# Patient Record
Sex: MALE | Race: WHITE | NOT HISPANIC OR LATINO | Employment: UNEMPLOYED | URBAN - METROPOLITAN AREA
[De-identification: names, ages, dates, MRNs, and addresses within clinical notes are randomized per-mention and may not be internally consistent; named-entity substitution may affect disease eponyms.]

---

## 2017-03-13 ENCOUNTER — GENERIC CONVERSION - ENCOUNTER (OUTPATIENT)
Dept: OTHER | Facility: OTHER | Age: 47
End: 2017-03-13

## 2017-03-13 ENCOUNTER — HOSPITAL ENCOUNTER (INPATIENT)
Facility: HOSPITAL | Age: 47
LOS: 3 days | Discharge: HOME | DRG: 395 | End: 2017-03-16
Attending: EMERGENCY MEDICINE | Admitting: ANESTHESIOLOGY
Payer: COMMERCIAL

## 2017-03-13 ENCOUNTER — APPOINTMENT (EMERGENCY)
Dept: RADIOLOGY | Facility: HOSPITAL | Age: 47
DRG: 395 | End: 2017-03-13
Payer: COMMERCIAL

## 2017-03-13 DIAGNOSIS — D64.9 ANEMIA: Primary | ICD-10-CM

## 2017-03-13 DIAGNOSIS — D62 ACUTE BLOOD LOSS ANEMIA: ICD-10-CM

## 2017-03-13 DIAGNOSIS — D73.4 CYST OF SPLEEN: ICD-10-CM

## 2017-03-13 DIAGNOSIS — K92.1 GASTROINTESTINAL HEMORRHAGE WITH MELENA: Chronic | ICD-10-CM

## 2017-03-13 DIAGNOSIS — K92.2 GI BLEED: ICD-10-CM

## 2017-03-13 PROBLEM — R10.9 ABDOMINAL PAIN: Status: ACTIVE | Noted: 2017-03-13

## 2017-03-13 LAB
ABO GROUP BLD: NORMAL
ABO GROUP BLD: NORMAL
ALBUMIN SERPL BCP-MCNC: 2.8 G/DL (ref 3.5–5)
ALP SERPL-CCNC: 77 U/L (ref 46–116)
ALT SERPL W P-5'-P-CCNC: 13 U/L (ref 12–78)
ANION GAP SERPL CALCULATED.3IONS-SCNC: 12 MMOL/L (ref 4–13)
AST SERPL W P-5'-P-CCNC: 17 U/L (ref 5–45)
BASOPHILS # BLD AUTO: 0 THOUSANDS/ΜL (ref 0–0.1)
BASOPHILS NFR BLD AUTO: 0 % (ref 0–1)
BILIRUB SERPL-MCNC: 0.1 MG/DL (ref 0.2–1)
BLD GP AB SCN SERPL QL: NEGATIVE
BUN SERPL-MCNC: 17 MG/DL (ref 5–25)
CALCIUM SERPL-MCNC: 9.1 MG/DL (ref 8.3–10.1)
CHLORIDE SERPL-SCNC: 98 MMOL/L (ref 100–108)
CO2 SERPL-SCNC: 26 MMOL/L (ref 21–32)
CREAT SERPL-MCNC: 1.01 MG/DL (ref 0.6–1.3)
EOSINOPHIL # BLD AUTO: 0 THOUSAND/ΜL (ref 0–0.61)
EOSINOPHIL NFR BLD AUTO: 1 % (ref 0–6)
ERYTHROCYTE [DISTWIDTH] IN BLOOD BY AUTOMATED COUNT: 19.3 % (ref 11.6–15.1)
GFR SERPL CREATININE-BSD FRML MDRD: >60 ML/MIN/1.73SQ M
GLUCOSE SERPL-MCNC: 307 MG/DL (ref 65–140)
HCT VFR BLD AUTO: 17.4 % (ref 42–52)
HGB BLD-MCNC: 5.1 G/DL (ref 14–18)
LACTATE SERPL-SCNC: 2.1 MMOL/L (ref 0.5–2)
LIPASE SERPL-CCNC: 210 U/L (ref 73–393)
LYMPHOCYTES # BLD AUTO: 0.6 THOUSANDS/ΜL (ref 0.6–4.47)
LYMPHOCYTES NFR BLD AUTO: 10 % (ref 14–44)
MAGNESIUM SERPL-MCNC: 1.6 MG/DL (ref 1.6–2.6)
MCH RBC QN AUTO: 21 PG (ref 27–31)
MCHC RBC AUTO-ENTMCNC: 29.2 G/DL (ref 31.4–37.4)
MCV RBC AUTO: 72 FL (ref 82–98)
MONOCYTES # BLD AUTO: 0.5 THOUSAND/ΜL (ref 0.17–1.22)
MONOCYTES NFR BLD AUTO: 8 % (ref 4–12)
NEUTROPHILS # BLD AUTO: 5 THOUSANDS/ΜL (ref 1.85–7.62)
NEUTS SEG NFR BLD AUTO: 81 % (ref 43–75)
NRBC BLD AUTO-RTO: 0 /100 WBCS
PLATELET # BLD AUTO: 360 THOUSANDS/UL (ref 130–400)
PMV BLD AUTO: 7.5 FL (ref 8.9–12.7)
POTASSIUM SERPL-SCNC: 3.7 MMOL/L (ref 3.5–5.3)
PROT SERPL-MCNC: 7.7 G/DL (ref 6.4–8.2)
RBC # BLD AUTO: 2.42 MILLION/UL (ref 4.7–6.1)
RH BLD: NEGATIVE
RH BLD: NEGATIVE
SODIUM SERPL-SCNC: 136 MMOL/L (ref 136–145)
TROPONIN I SERPL-MCNC: <0.02 NG/ML
WBC # BLD AUTO: 6.2 THOUSAND/UL (ref 4.8–10.8)

## 2017-03-13 PROCEDURE — 85025 COMPLETE CBC W/AUTO DIFF WBC: CPT | Performed by: EMERGENCY MEDICINE

## 2017-03-13 PROCEDURE — C9113 INJ PANTOPRAZOLE SODIUM, VIA: HCPCS | Performed by: FAMILY MEDICINE

## 2017-03-13 PROCEDURE — 36415 COLL VENOUS BLD VENIPUNCTURE: CPT | Performed by: EMERGENCY MEDICINE

## 2017-03-13 PROCEDURE — 80053 COMPREHEN METABOLIC PANEL: CPT | Performed by: EMERGENCY MEDICINE

## 2017-03-13 PROCEDURE — 74177 CT ABD & PELVIS W/CONTRAST: CPT

## 2017-03-13 PROCEDURE — 83690 ASSAY OF LIPASE: CPT | Performed by: EMERGENCY MEDICINE

## 2017-03-13 PROCEDURE — 36430 TRANSFUSION BLD/BLD COMPNT: CPT

## 2017-03-13 PROCEDURE — 86901 BLOOD TYPING SEROLOGIC RH(D): CPT | Performed by: EMERGENCY MEDICINE

## 2017-03-13 PROCEDURE — 86850 RBC ANTIBODY SCREEN: CPT | Performed by: EMERGENCY MEDICINE

## 2017-03-13 PROCEDURE — 86900 BLOOD TYPING SEROLOGIC ABO: CPT | Performed by: EMERGENCY MEDICINE

## 2017-03-13 PROCEDURE — 93005 ELECTROCARDIOGRAM TRACING: CPT | Performed by: EMERGENCY MEDICINE

## 2017-03-13 PROCEDURE — 99285 EMERGENCY DEPT VISIT HI MDM: CPT

## 2017-03-13 PROCEDURE — 86920 COMPATIBILITY TEST SPIN: CPT

## 2017-03-13 PROCEDURE — 30233N1 TRANSFUSION OF NONAUTOLOGOUS RED BLOOD CELLS INTO PERIPHERAL VEIN, PERCUTANEOUS APPROACH: ICD-10-PCS | Performed by: INTERNAL MEDICINE

## 2017-03-13 PROCEDURE — 87081 CULTURE SCREEN ONLY: CPT | Performed by: ANESTHESIOLOGY

## 2017-03-13 PROCEDURE — 84484 ASSAY OF TROPONIN QUANT: CPT | Performed by: EMERGENCY MEDICINE

## 2017-03-13 PROCEDURE — 96361 HYDRATE IV INFUSION ADD-ON: CPT

## 2017-03-13 PROCEDURE — 83735 ASSAY OF MAGNESIUM: CPT | Performed by: EMERGENCY MEDICINE

## 2017-03-13 PROCEDURE — 83605 ASSAY OF LACTIC ACID: CPT | Performed by: EMERGENCY MEDICINE

## 2017-03-13 PROCEDURE — P9021 RED BLOOD CELLS UNIT: HCPCS

## 2017-03-13 PROCEDURE — 96360 HYDRATION IV INFUSION INIT: CPT

## 2017-03-13 RX ORDER — PANTOPRAZOLE SODIUM 40 MG/1
40 INJECTION, POWDER, FOR SOLUTION INTRAVENOUS EVERY 12 HOURS SCHEDULED
Status: DISCONTINUED | OUTPATIENT
Start: 2017-03-13 | End: 2017-03-16 | Stop reason: HOSPADM

## 2017-03-13 RX ORDER — DOXYCYCLINE HYCLATE 100 MG/1
100 TABLET, DELAYED RELEASE ORAL 2 TIMES DAILY
COMMUNITY
End: 2017-03-13 | Stop reason: CLARIF

## 2017-03-13 RX ORDER — OMEPRAZOLE 40 MG/1
40 CAPSULE, DELAYED RELEASE ORAL DAILY
Status: ON HOLD | COMMUNITY
End: 2017-03-16

## 2017-03-13 RX ADMIN — PANTOPRAZOLE SODIUM 40 MG: 40 INJECTION, POWDER, FOR SOLUTION INTRAVENOUS at 23:56

## 2017-03-13 RX ADMIN — SODIUM CHLORIDE 1000 ML: 0.9 INJECTION, SOLUTION INTRAVENOUS at 19:54

## 2017-03-13 RX ADMIN — IOHEXOL 100 ML: 350 INJECTION, SOLUTION INTRAVENOUS at 21:03

## 2017-03-14 PROBLEM — F32.A DEPRESSION: Chronic | Status: ACTIVE | Noted: 2017-03-14

## 2017-03-14 PROBLEM — D62 ACUTE BLOOD LOSS ANEMIA: Status: ACTIVE | Noted: 2017-03-13

## 2017-03-14 LAB
ABO GROUP BLD BPU: NORMAL
ABO GROUP BLD BPU: NORMAL
ANION GAP SERPL CALCULATED.3IONS-SCNC: 9 MMOL/L (ref 4–13)
BASOPHILS # BLD AUTO: 0 THOUSANDS/ΜL (ref 0–0.1)
BASOPHILS NFR BLD AUTO: 0 % (ref 0–1)
BILIRUB UR QL STRIP: NEGATIVE
BPU ID: NORMAL
BPU ID: NORMAL
BUN SERPL-MCNC: 9 MG/DL (ref 5–25)
CALCIUM SERPL-MCNC: 8.1 MG/DL (ref 8.3–10.1)
CHLORIDE SERPL-SCNC: 102 MMOL/L (ref 100–108)
CLARITY UR: CLEAR
CO2 SERPL-SCNC: 24 MMOL/L (ref 21–32)
COLOR UR: YELLOW
CREAT SERPL-MCNC: 0.7 MG/DL (ref 0.6–1.3)
CROSSMATCH: NORMAL
CROSSMATCH: NORMAL
EOSINOPHIL # BLD AUTO: 0.1 THOUSAND/ΜL (ref 0–0.61)
EOSINOPHIL NFR BLD AUTO: 1 % (ref 0–6)
ERYTHROCYTE [DISTWIDTH] IN BLOOD BY AUTOMATED COUNT: 18.8 % (ref 11.6–15.1)
ERYTHROCYTE [DISTWIDTH] IN BLOOD BY AUTOMATED COUNT: 19.9 % (ref 11.6–15.1)
EST. AVERAGE GLUCOSE BLD GHB EST-MCNC: 120 MG/DL
GFR SERPL CREATININE-BSD FRML MDRD: >60 ML/MIN/1.73SQ M
GLUCOSE SERPL-MCNC: 140 MG/DL (ref 65–140)
GLUCOSE UR STRIP-MCNC: NEGATIVE MG/DL
HBA1C MFR BLD: 5.8 % (ref 4.2–6.3)
HCT VFR BLD AUTO: 22.4 % (ref 42–52)
HCT VFR BLD AUTO: 23 % (ref 42–52)
HCT VFR BLD AUTO: 26.3 % (ref 42–52)
HCT VFR BLD AUTO: 26.5 % (ref 42–52)
HGB BLD-MCNC: 7 G/DL (ref 14–18)
HGB BLD-MCNC: 7.1 G/DL (ref 14–18)
HGB BLD-MCNC: 8.2 G/DL (ref 14–18)
HGB BLD-MCNC: 8.4 G/DL (ref 14–18)
HGB UR QL STRIP.AUTO: NEGATIVE
KETONES UR STRIP-MCNC: NEGATIVE MG/DL
LACTATE SERPL-SCNC: 0.7 MMOL/L (ref 0.5–2)
LEUKOCYTE ESTERASE UR QL STRIP: NEGATIVE
LYMPHOCYTES # BLD AUTO: 0.6 THOUSANDS/ΜL (ref 0.6–4.47)
LYMPHOCYTES NFR BLD AUTO: 9 % (ref 14–44)
MAGNESIUM SERPL-MCNC: 1.5 MG/DL (ref 1.6–2.6)
MCH RBC QN AUTO: 24 PG (ref 27–31)
MCH RBC QN AUTO: 24.3 PG (ref 27–31)
MCHC RBC AUTO-ENTMCNC: 31.2 G/DL (ref 31.4–37.4)
MCHC RBC AUTO-ENTMCNC: 31.8 G/DL (ref 31.4–37.4)
MCV RBC AUTO: 76 FL (ref 82–98)
MCV RBC AUTO: 77 FL (ref 82–98)
MONOCYTES # BLD AUTO: 0.6 THOUSAND/ΜL (ref 0.17–1.22)
MONOCYTES NFR BLD AUTO: 8 % (ref 4–12)
NEUTROPHILS # BLD AUTO: 5.8 THOUSANDS/ΜL (ref 1.85–7.62)
NEUTS SEG NFR BLD AUTO: 82 % (ref 43–75)
NITRITE UR QL STRIP: NEGATIVE
NRBC BLD AUTO-RTO: 0 /100 WBCS
PH UR STRIP.AUTO: 5.5 [PH] (ref 5–9)
PHOSPHATE SERPL-MCNC: 2.9 MG/DL (ref 2.7–4.5)
PLATELET # BLD AUTO: 299 THOUSANDS/UL (ref 130–400)
PLATELET # BLD AUTO: 308 THOUSANDS/UL (ref 130–400)
PMV BLD AUTO: 6.9 FL (ref 8.9–12.7)
PMV BLD AUTO: 7.3 FL (ref 8.9–12.7)
POTASSIUM SERPL-SCNC: 3.8 MMOL/L (ref 3.5–5.3)
PROT UR STRIP-MCNC: NEGATIVE MG/DL
RBC # BLD AUTO: 2.92 MILLION/UL (ref 4.7–6.1)
RBC # BLD AUTO: 3.46 MILLION/UL (ref 4.7–6.1)
SODIUM SERPL-SCNC: 135 MMOL/L (ref 136–145)
SP GR UR STRIP.AUTO: 1.01 (ref 1–1.03)
UNIT DISPENSE STATUS: NORMAL
UNIT DISPENSE STATUS: NORMAL
UNIT PRODUCT CODE: NORMAL
UNIT PRODUCT CODE: NORMAL
UNIT RH: NORMAL
UNIT RH: NORMAL
UROBILINOGEN UR QL STRIP.AUTO: 0.2 E.U./DL
WBC # BLD AUTO: 5.6 THOUSAND/UL (ref 4.8–10.8)
WBC # BLD AUTO: 7.1 THOUSAND/UL (ref 4.8–10.8)

## 2017-03-14 PROCEDURE — 84100 ASSAY OF PHOSPHORUS: CPT | Performed by: FAMILY MEDICINE

## 2017-03-14 PROCEDURE — 80048 BASIC METABOLIC PNL TOTAL CA: CPT | Performed by: FAMILY MEDICINE

## 2017-03-14 PROCEDURE — 85018 HEMOGLOBIN: CPT | Performed by: FAMILY MEDICINE

## 2017-03-14 PROCEDURE — P9021 RED BLOOD CELLS UNIT: HCPCS

## 2017-03-14 PROCEDURE — 83735 ASSAY OF MAGNESIUM: CPT | Performed by: FAMILY MEDICINE

## 2017-03-14 PROCEDURE — 85014 HEMATOCRIT: CPT | Performed by: FAMILY MEDICINE

## 2017-03-14 PROCEDURE — 83605 ASSAY OF LACTIC ACID: CPT | Performed by: FAMILY MEDICINE

## 2017-03-14 PROCEDURE — C9113 INJ PANTOPRAZOLE SODIUM, VIA: HCPCS | Performed by: FAMILY MEDICINE

## 2017-03-14 PROCEDURE — 85027 COMPLETE CBC AUTOMATED: CPT | Performed by: NURSE PRACTITIONER

## 2017-03-14 PROCEDURE — 85025 COMPLETE CBC W/AUTO DIFF WBC: CPT | Performed by: FAMILY MEDICINE

## 2017-03-14 PROCEDURE — 83036 HEMOGLOBIN GLYCOSYLATED A1C: CPT | Performed by: FAMILY MEDICINE

## 2017-03-14 PROCEDURE — 81003 URINALYSIS AUTO W/O SCOPE: CPT | Performed by: EMERGENCY MEDICINE

## 2017-03-14 RX ORDER — POLYETHYLENE GLYCOL 3350 17 G/17G
238 POWDER, FOR SOLUTION ORAL ONCE
Status: COMPLETED | OUTPATIENT
Start: 2017-03-14 | End: 2017-03-14

## 2017-03-14 RX ORDER — MAGNESIUM SULFATE HEPTAHYDRATE 40 MG/ML
4 INJECTION, SOLUTION INTRAVENOUS ONCE
Status: COMPLETED | OUTPATIENT
Start: 2017-03-14 | End: 2017-03-14

## 2017-03-14 RX ORDER — ACETAMINOPHEN 325 MG/1
650 TABLET ORAL EVERY 6 HOURS PRN
Status: DISCONTINUED | OUTPATIENT
Start: 2017-03-14 | End: 2017-03-16 | Stop reason: HOSPADM

## 2017-03-14 RX ORDER — SODIUM CHLORIDE 9 MG/ML
50 INJECTION, SOLUTION INTRAVENOUS CONTINUOUS
Status: DISCONTINUED | OUTPATIENT
Start: 2017-03-14 | End: 2017-03-15

## 2017-03-14 RX ORDER — POTASSIUM CHLORIDE 14.9 MG/ML
20 INJECTION INTRAVENOUS ONCE
Status: COMPLETED | OUTPATIENT
Start: 2017-03-14 | End: 2017-03-14

## 2017-03-14 RX ADMIN — PANTOPRAZOLE SODIUM 40 MG: 40 INJECTION, POWDER, FOR SOLUTION INTRAVENOUS at 08:40

## 2017-03-14 RX ADMIN — BISACODYL 10 MG: 5 TABLET, COATED ORAL at 12:19

## 2017-03-14 RX ADMIN — SODIUM CHLORIDE 50 ML/HR: 0.9 INJECTION, SOLUTION INTRAVENOUS at 06:10

## 2017-03-14 RX ADMIN — POTASSIUM CHLORIDE 20 MEQ: 200 INJECTION, SOLUTION INTRAVENOUS at 07:28

## 2017-03-14 RX ADMIN — MAGNESIUM SULFATE HEPTAHYDRATE 4 G: 40 INJECTION, SOLUTION INTRAVENOUS at 07:28

## 2017-03-14 RX ADMIN — ACETAMINOPHEN 650 MG: 325 TABLET, FILM COATED ORAL at 18:14

## 2017-03-14 RX ADMIN — POLYETHYLENE GLYCOL 3350 238 G: 17 POWDER, FOR SOLUTION ORAL at 13:10

## 2017-03-14 RX ADMIN — PANTOPRAZOLE SODIUM 40 MG: 40 INJECTION, POWDER, FOR SOLUTION INTRAVENOUS at 20:30

## 2017-03-15 ENCOUNTER — ANESTHESIA (INPATIENT)
Dept: GASTROENTEROLOGY | Facility: AMBULARY SURGERY CENTER | Age: 47
DRG: 395 | End: 2017-03-15
Payer: COMMERCIAL

## 2017-03-15 ENCOUNTER — GENERIC CONVERSION - ENCOUNTER (OUTPATIENT)
Dept: OTHER | Facility: OTHER | Age: 47
End: 2017-03-15

## 2017-03-15 LAB
ABO GROUP BLD BPU: NORMAL
ANION GAP SERPL CALCULATED.3IONS-SCNC: 9 MMOL/L (ref 4–13)
BPU ID: NORMAL
BUN SERPL-MCNC: 5 MG/DL (ref 5–25)
CALCIUM SERPL-MCNC: 8.1 MG/DL (ref 8.3–10.1)
CHLORIDE SERPL-SCNC: 104 MMOL/L (ref 100–108)
CO2 SERPL-SCNC: 26 MMOL/L (ref 21–32)
CREAT SERPL-MCNC: 0.67 MG/DL (ref 0.6–1.3)
CROSSMATCH: NORMAL
ERYTHROCYTE [DISTWIDTH] IN BLOOD BY AUTOMATED COUNT: 18.5 % (ref 11.6–15.1)
GFR SERPL CREATININE-BSD FRML MDRD: >60 ML/MIN/1.73SQ M
GLUCOSE SERPL-MCNC: 113 MG/DL (ref 65–140)
HCT VFR BLD AUTO: 26.1 % (ref 42–52)
HGB BLD-MCNC: 8.2 G/DL (ref 14–18)
MAGNESIUM SERPL-MCNC: 2.4 MG/DL (ref 1.6–2.6)
MCH RBC QN AUTO: 24.5 PG (ref 27–31)
MCHC RBC AUTO-ENTMCNC: 31.3 G/DL (ref 31.4–37.4)
MCV RBC AUTO: 78 FL (ref 82–98)
MRSA NOSE QL CULT: NORMAL
PLATELET # BLD AUTO: 278 THOUSANDS/UL (ref 130–400)
PMV BLD AUTO: 7.1 FL (ref 8.9–12.7)
POTASSIUM SERPL-SCNC: 3.9 MMOL/L (ref 3.5–5.3)
RBC # BLD AUTO: 3.33 MILLION/UL (ref 4.7–6.1)
SODIUM SERPL-SCNC: 139 MMOL/L (ref 136–145)
UNIT DISPENSE STATUS: NORMAL
UNIT PRODUCT CODE: NORMAL
UNIT RH: NORMAL
WBC # BLD AUTO: 5.2 THOUSAND/UL (ref 4.8–10.8)

## 2017-03-15 PROCEDURE — 0DB98ZX EXCISION OF DUODENUM, VIA NATURAL OR ARTIFICIAL OPENING ENDOSCOPIC, DIAGNOSTIC: ICD-10-PCS | Performed by: INTERNAL MEDICINE

## 2017-03-15 PROCEDURE — 88342 IMHCHEM/IMCYTCHM 1ST ANTB: CPT | Performed by: INTERNAL MEDICINE

## 2017-03-15 PROCEDURE — 0DBK8ZX EXCISION OF ASCENDING COLON, VIA NATURAL OR ARTIFICIAL OPENING ENDOSCOPIC, DIAGNOSTIC: ICD-10-PCS | Performed by: INTERNAL MEDICINE

## 2017-03-15 PROCEDURE — C9113 INJ PANTOPRAZOLE SODIUM, VIA: HCPCS | Performed by: FAMILY MEDICINE

## 2017-03-15 PROCEDURE — 88305 TISSUE EXAM BY PATHOLOGIST: CPT | Performed by: INTERNAL MEDICINE

## 2017-03-15 PROCEDURE — 0DB68ZX EXCISION OF STOMACH, VIA NATURAL OR ARTIFICIAL OPENING ENDOSCOPIC, DIAGNOSTIC: ICD-10-PCS | Performed by: INTERNAL MEDICINE

## 2017-03-15 PROCEDURE — 80048 BASIC METABOLIC PNL TOTAL CA: CPT | Performed by: NURSE PRACTITIONER

## 2017-03-15 PROCEDURE — 85027 COMPLETE CBC AUTOMATED: CPT | Performed by: NURSE PRACTITIONER

## 2017-03-15 PROCEDURE — 83735 ASSAY OF MAGNESIUM: CPT | Performed by: NURSE PRACTITIONER

## 2017-03-15 RX ORDER — DIPHENHYDRAMINE HCL 12.5MG/5ML
12.5 LIQUID (ML) ORAL ONCE
Status: DISCONTINUED | OUTPATIENT
Start: 2017-03-15 | End: 2017-03-15

## 2017-03-15 RX ORDER — LANOLIN ALCOHOL/MO/W.PET/CERES
3 CREAM (GRAM) TOPICAL
Status: DISCONTINUED | OUTPATIENT
Start: 2017-03-15 | End: 2017-03-16 | Stop reason: HOSPADM

## 2017-03-15 RX ORDER — PROPOFOL 10 MG/ML
INJECTION, EMULSION INTRAVENOUS AS NEEDED
Status: DISCONTINUED | OUTPATIENT
Start: 2017-03-15 | End: 2017-03-15 | Stop reason: SURG

## 2017-03-15 RX ORDER — FENTANYL CITRATE 50 UG/ML
INJECTION, SOLUTION INTRAMUSCULAR; INTRAVENOUS AS NEEDED
Status: DISCONTINUED | OUTPATIENT
Start: 2017-03-15 | End: 2017-03-15 | Stop reason: SURG

## 2017-03-15 RX ORDER — DIPHENHYDRAMINE HCL 25 MG
12.5 TABLET ORAL ONCE
Status: COMPLETED | OUTPATIENT
Start: 2017-03-15 | End: 2017-03-15

## 2017-03-15 RX ADMIN — PROPOFOL 20 MG: 10 INJECTION, EMULSION INTRAVENOUS at 13:40

## 2017-03-15 RX ADMIN — SODIUM CHLORIDE 50 ML/HR: 0.9 INJECTION, SOLUTION INTRAVENOUS at 01:28

## 2017-03-15 RX ADMIN — PROPOFOL 50 MG: 10 INJECTION, EMULSION INTRAVENOUS at 13:25

## 2017-03-15 RX ADMIN — SODIUM CHLORIDE: 0.9 INJECTION, SOLUTION INTRAVENOUS at 13:00

## 2017-03-15 RX ADMIN — PANTOPRAZOLE SODIUM 40 MG: 40 INJECTION, POWDER, FOR SOLUTION INTRAVENOUS at 21:10

## 2017-03-15 RX ADMIN — FENTANYL CITRATE 50 MCG: 50 INJECTION, SOLUTION INTRAMUSCULAR; INTRAVENOUS at 13:15

## 2017-03-15 RX ADMIN — DIPHENHYDRAMINE HCL 12.5 MG: 25 TABLET ORAL at 01:25

## 2017-03-15 RX ADMIN — FENTANYL CITRATE 50 MCG: 50 INJECTION, SOLUTION INTRAMUSCULAR; INTRAVENOUS at 13:22

## 2017-03-15 RX ADMIN — ACETAMINOPHEN 650 MG: 325 TABLET, FILM COATED ORAL at 19:43

## 2017-03-15 RX ADMIN — LIDOCAINE HYDROCHLORIDE 100 MG: 20 INJECTION, SOLUTION INTRAVENOUS at 13:15

## 2017-03-15 RX ADMIN — MELATONIN TAB 3 MG 3 MG: 3 TAB at 22:07

## 2017-03-15 RX ADMIN — PROPOFOL 50 MG: 10 INJECTION, EMULSION INTRAVENOUS at 13:30

## 2017-03-15 RX ADMIN — PANTOPRAZOLE SODIUM 40 MG: 40 INJECTION, POWDER, FOR SOLUTION INTRAVENOUS at 08:31

## 2017-03-15 RX ADMIN — PROPOFOL 200 MG: 10 INJECTION, EMULSION INTRAVENOUS at 13:15

## 2017-03-15 RX ADMIN — PROPOFOL 50 MG: 10 INJECTION, EMULSION INTRAVENOUS at 13:35

## 2017-03-15 RX ADMIN — PROPOFOL 50 MG: 10 INJECTION, EMULSION INTRAVENOUS at 13:20

## 2017-03-16 VITALS
HEIGHT: 69 IN | HEART RATE: 100 BPM | OXYGEN SATURATION: 100 % | BODY MASS INDEX: 22.53 KG/M2 | DIASTOLIC BLOOD PRESSURE: 75 MMHG | WEIGHT: 152.12 LBS | SYSTOLIC BLOOD PRESSURE: 140 MMHG | RESPIRATION RATE: 21 BRPM | TEMPERATURE: 98.2 F

## 2017-03-16 LAB
ATRIAL RATE: 101 BPM
ERYTHROCYTE [DISTWIDTH] IN BLOOD BY AUTOMATED COUNT: 19.3 % (ref 11.6–15.1)
HCT VFR BLD AUTO: 28.5 % (ref 42–52)
HGB BLD-MCNC: 8.7 G/DL (ref 14–18)
MCH RBC QN AUTO: 24.1 PG (ref 27–31)
MCHC RBC AUTO-ENTMCNC: 30.6 G/DL (ref 31.4–37.4)
MCV RBC AUTO: 79 FL (ref 82–98)
P AXIS: 73 DEGREES
PLATELET # BLD AUTO: 322 THOUSANDS/UL (ref 130–400)
PMV BLD AUTO: 6.9 FL (ref 8.9–12.7)
PR INTERVAL: 146 MS
QRS AXIS: 51 DEGREES
QRSD INTERVAL: 80 MS
QT INTERVAL: 340 MS
QTC INTERVAL: 440 MS
RBC # BLD AUTO: 3.61 MILLION/UL (ref 4.7–6.1)
T WAVE AXIS: 42 DEGREES
VENTRICULAR RATE: 101 BPM
WBC # BLD AUTO: 6.1 THOUSAND/UL (ref 4.8–10.8)

## 2017-03-16 PROCEDURE — 85027 COMPLETE CBC AUTOMATED: CPT | Performed by: PHYSICIAN ASSISTANT

## 2017-03-16 PROCEDURE — C9113 INJ PANTOPRAZOLE SODIUM, VIA: HCPCS | Performed by: FAMILY MEDICINE

## 2017-03-16 RX ORDER — OMEPRAZOLE 40 MG/1
40 CAPSULE, DELAYED RELEASE ORAL
Qty: 60 CAPSULE | Refills: 0 | Status: SHIPPED | OUTPATIENT
Start: 2017-03-16 | End: 2017-09-13 | Stop reason: HOSPADM

## 2017-03-16 RX ORDER — PANTOPRAZOLE SODIUM 40 MG/1
40 TABLET, DELAYED RELEASE ORAL
Qty: 60 TABLET | Refills: 1 | Status: SHIPPED | OUTPATIENT
Start: 2017-03-16 | End: 2017-09-12 | Stop reason: ALTCHOICE

## 2017-03-16 RX ADMIN — ACETAMINOPHEN 650 MG: 325 TABLET, FILM COATED ORAL at 05:19

## 2017-03-16 RX ADMIN — PANTOPRAZOLE SODIUM 40 MG: 40 INJECTION, POWDER, FOR SOLUTION INTRAVENOUS at 08:49

## 2017-03-19 ENCOUNTER — GENERIC CONVERSION - ENCOUNTER (OUTPATIENT)
Dept: OTHER | Facility: OTHER | Age: 47
End: 2017-03-19

## 2017-03-27 ENCOUNTER — ALLSCRIPTS OFFICE VISIT (OUTPATIENT)
Dept: OTHER | Facility: OTHER | Age: 47
End: 2017-03-27

## 2017-03-27 ENCOUNTER — GENERIC CONVERSION - ENCOUNTER (OUTPATIENT)
Dept: OTHER | Facility: OTHER | Age: 47
End: 2017-03-27

## 2017-03-29 ENCOUNTER — ALLSCRIPTS OFFICE VISIT (OUTPATIENT)
Dept: OTHER | Facility: OTHER | Age: 47
End: 2017-03-29

## 2017-05-08 ENCOUNTER — ANESTHESIA EVENT (OUTPATIENT)
Dept: GASTROENTEROLOGY | Facility: AMBULARY SURGERY CENTER | Age: 47
End: 2017-05-08

## 2017-05-08 ENCOUNTER — ANESTHESIA (OUTPATIENT)
Dept: GASTROENTEROLOGY | Facility: AMBULARY SURGERY CENTER | Age: 47
End: 2017-05-08

## 2017-05-23 ENCOUNTER — ANESTHESIA EVENT (OUTPATIENT)
Dept: GASTROENTEROLOGY | Facility: AMBULARY SURGERY CENTER | Age: 47
End: 2017-05-23
Payer: COMMERCIAL

## 2017-05-24 ENCOUNTER — GENERIC CONVERSION - ENCOUNTER (OUTPATIENT)
Dept: OTHER | Facility: OTHER | Age: 47
End: 2017-05-24

## 2017-05-24 ENCOUNTER — HOSPITAL ENCOUNTER (OUTPATIENT)
Facility: AMBULARY SURGERY CENTER | Age: 47
Setting detail: OUTPATIENT SURGERY
Discharge: HOME | End: 2017-05-24
Attending: INTERNAL MEDICINE | Admitting: INTERNAL MEDICINE
Payer: COMMERCIAL

## 2017-05-24 ENCOUNTER — ANESTHESIA (OUTPATIENT)
Dept: GASTROENTEROLOGY | Facility: AMBULARY SURGERY CENTER | Age: 47
End: 2017-05-24
Payer: COMMERCIAL

## 2017-05-24 VITALS
RESPIRATION RATE: 18 BRPM | DIASTOLIC BLOOD PRESSURE: 87 MMHG | OXYGEN SATURATION: 98 % | TEMPERATURE: 97.6 F | SYSTOLIC BLOOD PRESSURE: 126 MMHG | HEART RATE: 80 BPM

## 2017-05-24 RX ORDER — SODIUM CHLORIDE, SODIUM LACTATE, POTASSIUM CHLORIDE, CALCIUM CHLORIDE 600; 310; 30; 20 MG/100ML; MG/100ML; MG/100ML; MG/100ML
125 INJECTION, SOLUTION INTRAVENOUS CONTINUOUS
Status: DISCONTINUED | OUTPATIENT
Start: 2017-05-24 | End: 2017-05-24 | Stop reason: SDUPTHER

## 2017-05-24 RX ORDER — PROPOFOL 10 MG/ML
INJECTION, EMULSION INTRAVENOUS AS NEEDED
Status: DISCONTINUED | OUTPATIENT
Start: 2017-05-24 | End: 2017-05-24 | Stop reason: SURG

## 2017-05-24 RX ORDER — OMEPRAZOLE 10 MG/1
10 CAPSULE, DELAYED RELEASE ORAL DAILY
COMMUNITY
End: 2017-09-12 | Stop reason: ALTCHOICE

## 2017-05-24 RX ORDER — SODIUM CHLORIDE, SODIUM LACTATE, POTASSIUM CHLORIDE, CALCIUM CHLORIDE 600; 310; 30; 20 MG/100ML; MG/100ML; MG/100ML; MG/100ML
125 INJECTION, SOLUTION INTRAVENOUS CONTINUOUS
Status: DISCONTINUED | OUTPATIENT
Start: 2017-05-24 | End: 2017-05-24 | Stop reason: HOSPADM

## 2017-05-24 RX ADMIN — SODIUM CHLORIDE, SODIUM LACTATE, POTASSIUM CHLORIDE, AND CALCIUM CHLORIDE: .6; .31; .03; .02 INJECTION, SOLUTION INTRAVENOUS at 10:10

## 2017-05-24 RX ADMIN — LIDOCAINE HYDROCHLORIDE 50 MG: 20 INJECTION, SOLUTION INTRAVENOUS at 10:50

## 2017-05-24 RX ADMIN — PROPOFOL 100 MG: 10 INJECTION, EMULSION INTRAVENOUS at 10:55

## 2017-05-24 RX ADMIN — PROPOFOL 100 MG: 10 INJECTION, EMULSION INTRAVENOUS at 10:59

## 2017-05-24 RX ADMIN — PROPOFOL 200 MG: 10 INJECTION, EMULSION INTRAVENOUS at 10:50

## 2017-05-24 RX ADMIN — PROPOFOL 50 MG: 10 INJECTION, EMULSION INTRAVENOUS at 11:07

## 2017-05-24 RX ADMIN — PROPOFOL 50 MG: 10 INJECTION, EMULSION INTRAVENOUS at 11:04

## 2017-09-12 ENCOUNTER — ANESTHESIA EVENT (OUTPATIENT)
Dept: GASTROENTEROLOGY | Facility: AMBULARY SURGERY CENTER | Age: 47
End: 2017-09-12

## 2017-09-12 ENCOUNTER — HOSPITAL ENCOUNTER (INPATIENT)
Facility: HOSPITAL | Age: 47
LOS: 1 days | Discharge: LEFT BEFORE DISCHARGE | DRG: 395 | End: 2017-09-13
Attending: EMERGENCY MEDICINE | Admitting: ANESTHESIOLOGY
Payer: COMMERCIAL

## 2017-09-12 ENCOUNTER — APPOINTMENT (INPATIENT)
Dept: RADIOLOGY | Facility: HOSPITAL | Age: 47
DRG: 395 | End: 2017-09-12
Payer: COMMERCIAL

## 2017-09-12 DIAGNOSIS — D64.9 SYMPTOMATIC ANEMIA: Primary | ICD-10-CM

## 2017-09-12 DIAGNOSIS — K92.1 GASTROINTESTINAL HEMORRHAGE WITH MELENA: Chronic | ICD-10-CM

## 2017-09-12 PROBLEM — D62 ACUTE BLOOD LOSS ANEMIA: Status: ACTIVE | Noted: 2017-09-12

## 2017-09-12 LAB
ABO GROUP BLD: NORMAL
ALBUMIN SERPL BCP-MCNC: 2.5 G/DL (ref 3.5–5)
ALP SERPL-CCNC: 59 U/L (ref 46–116)
ALT SERPL W P-5'-P-CCNC: 13 U/L (ref 12–78)
ANION GAP SERPL CALCULATED.3IONS-SCNC: 8 MMOL/L (ref 4–13)
AST SERPL W P-5'-P-CCNC: 10 U/L (ref 5–45)
BASOPHILS # BLD AUTO: 0 THOUSANDS/ΜL (ref 0–0.1)
BASOPHILS NFR BLD AUTO: 0 % (ref 0–1)
BILIRUB SERPL-MCNC: 0.2 MG/DL (ref 0.2–1)
BLD GP AB SCN SERPL QL: NEGATIVE
BUN SERPL-MCNC: 14 MG/DL (ref 5–25)
CALCIUM SERPL-MCNC: 8.5 MG/DL (ref 8.3–10.1)
CHLORIDE SERPL-SCNC: 98 MMOL/L (ref 100–108)
CO2 SERPL-SCNC: 30 MMOL/L (ref 21–32)
CREAT SERPL-MCNC: 1.03 MG/DL (ref 0.6–1.3)
EOSINOPHIL # BLD AUTO: 0.1 THOUSAND/ΜL (ref 0–0.61)
EOSINOPHIL NFR BLD AUTO: 1 % (ref 0–6)
ERYTHROCYTE [DISTWIDTH] IN BLOOD BY AUTOMATED COUNT: 17.1 % (ref 11.6–15.1)
GFR SERPL CREATININE-BSD FRML MDRD: 85 ML/MIN/1.73SQ M
GLUCOSE SERPL-MCNC: 279 MG/DL (ref 65–140)
HCT VFR BLD AUTO: 12.1 % (ref 42–52)
HGB BLD-MCNC: 3.8 G/DL (ref 14–18)
HGB BLD-MCNC: 5.6 G/DL (ref 14–18)
HYPERCHROMIA BLD QL SMEAR: PRESENT
INR PPP: 1.08 (ref 0.86–1.16)
LYMPHOCYTES # BLD AUTO: 1.1 THOUSANDS/ΜL (ref 0.6–4.47)
LYMPHOCYTES NFR BLD AUTO: 10 % (ref 14–44)
MAGNESIUM SERPL-MCNC: 1.9 MG/DL (ref 1.6–2.6)
MCH RBC QN AUTO: 24.2 PG (ref 27–31)
MCHC RBC AUTO-ENTMCNC: 30.9 G/DL (ref 31.4–37.4)
MCV RBC AUTO: 79 FL (ref 82–98)
MICROCYTES BLD QL AUTO: PRESENT
MONOCYTES # BLD AUTO: 0.7 THOUSAND/ΜL (ref 0.17–1.22)
MONOCYTES NFR BLD AUTO: 6 % (ref 4–12)
NEUTROPHILS # BLD AUTO: 10 THOUSANDS/ΜL (ref 1.85–7.62)
NEUTS SEG NFR BLD AUTO: 84 % (ref 43–75)
NRBC BLD AUTO-RTO: 0 /100 WBCS
PHOSPHATE SERPL-MCNC: 2.9 MG/DL (ref 2.7–4.5)
PLATELET # BLD AUTO: 673 THOUSANDS/UL (ref 130–400)
PLATELET BLD QL SMEAR: ABNORMAL
PMV BLD AUTO: 6.9 FL (ref 8.9–12.7)
POTASSIUM SERPL-SCNC: 3.6 MMOL/L (ref 3.5–5.3)
PROT SERPL-MCNC: 6.7 G/DL (ref 6.4–8.2)
PROTHROMBIN TIME: 11.4 SECONDS (ref 9.4–11.7)
RBC # BLD AUTO: 1.55 MILLION/UL (ref 4.7–6.1)
RH BLD: NEGATIVE
SODIUM SERPL-SCNC: 136 MMOL/L (ref 136–145)
SPECIMEN EXPIRATION DATE: NORMAL
WBC # BLD AUTO: 11.9 THOUSAND/UL (ref 4.8–10.8)

## 2017-09-12 PROCEDURE — 87081 CULTURE SCREEN ONLY: CPT | Performed by: INTERNAL MEDICINE

## 2017-09-12 PROCEDURE — 80053 COMPREHEN METABOLIC PANEL: CPT

## 2017-09-12 PROCEDURE — 30233N1 TRANSFUSION OF NONAUTOLOGOUS RED BLOOD CELLS INTO PERIPHERAL VEIN, PERCUTANEOUS APPROACH: ICD-10-PCS | Performed by: ANESTHESIOLOGY

## 2017-09-12 PROCEDURE — C9113 INJ PANTOPRAZOLE SODIUM, VIA: HCPCS | Performed by: PHYSICIAN ASSISTANT

## 2017-09-12 PROCEDURE — 99285 EMERGENCY DEPT VISIT HI MDM: CPT

## 2017-09-12 PROCEDURE — 85018 HEMOGLOBIN: CPT | Performed by: PHYSICIAN ASSISTANT

## 2017-09-12 PROCEDURE — 86920 COMPATIBILITY TEST SPIN: CPT

## 2017-09-12 PROCEDURE — P9021 RED BLOOD CELLS UNIT: HCPCS

## 2017-09-12 PROCEDURE — 85610 PROTHROMBIN TIME: CPT | Performed by: PHYSICIAN ASSISTANT

## 2017-09-12 PROCEDURE — 86900 BLOOD TYPING SEROLOGIC ABO: CPT | Performed by: EMERGENCY MEDICINE

## 2017-09-12 PROCEDURE — 71260 CT THORAX DX C+: CPT

## 2017-09-12 PROCEDURE — 93005 ELECTROCARDIOGRAM TRACING: CPT | Performed by: PHYSICIAN ASSISTANT

## 2017-09-12 PROCEDURE — 36415 COLL VENOUS BLD VENIPUNCTURE: CPT

## 2017-09-12 PROCEDURE — 36430 TRANSFUSION BLD/BLD COMPNT: CPT

## 2017-09-12 PROCEDURE — 86850 RBC ANTIBODY SCREEN: CPT | Performed by: EMERGENCY MEDICINE

## 2017-09-12 PROCEDURE — 86901 BLOOD TYPING SEROLOGIC RH(D): CPT | Performed by: EMERGENCY MEDICINE

## 2017-09-12 PROCEDURE — 85025 COMPLETE CBC W/AUTO DIFF WBC: CPT

## 2017-09-12 PROCEDURE — P9016 RBC LEUKOCYTES REDUCED: HCPCS

## 2017-09-12 PROCEDURE — 83735 ASSAY OF MAGNESIUM: CPT | Performed by: ANESTHESIOLOGY

## 2017-09-12 PROCEDURE — 84100 ASSAY OF PHOSPHORUS: CPT | Performed by: ANESTHESIOLOGY

## 2017-09-12 RX ORDER — ACETAMINOPHEN 325 MG/1
1000 TABLET ORAL ONCE
Status: DISCONTINUED | OUTPATIENT
Start: 2017-09-12 | End: 2017-09-12

## 2017-09-12 RX ORDER — PANTOPRAZOLE SODIUM 40 MG/1
40 INJECTION, POWDER, FOR SOLUTION INTRAVENOUS ONCE
Status: COMPLETED | OUTPATIENT
Start: 2017-09-12 | End: 2017-09-12

## 2017-09-12 RX ORDER — LANOLIN ALCOHOL/MO/W.PET/CERES
6 CREAM (GRAM) TOPICAL
Status: DISCONTINUED | OUTPATIENT
Start: 2017-09-12 | End: 2017-09-13 | Stop reason: HOSPADM

## 2017-09-12 RX ORDER — ACETAMINOPHEN 325 MG/1
975 TABLET ORAL ONCE
Status: COMPLETED | OUTPATIENT
Start: 2017-09-12 | End: 2017-09-12

## 2017-09-12 RX ADMIN — PANTOPRAZOLE SODIUM 40 MG: 40 INJECTION, POWDER, FOR SOLUTION INTRAVENOUS at 13:50

## 2017-09-12 RX ADMIN — SODIUM CHLORIDE 8 MG/HR: 9 INJECTION, SOLUTION INTRAVENOUS at 13:51

## 2017-09-12 RX ADMIN — SODIUM CHLORIDE 8 MG/HR: 9 INJECTION, SOLUTION INTRAVENOUS at 13:16

## 2017-09-12 RX ADMIN — SODIUM CHLORIDE 8 MG/HR: 9 INJECTION, SOLUTION INTRAVENOUS at 22:09

## 2017-09-12 RX ADMIN — ACETAMINOPHEN 975 MG: 325 TABLET, FILM COATED ORAL at 17:31

## 2017-09-12 RX ADMIN — IOHEXOL 80 ML: 350 INJECTION, SOLUTION INTRAVENOUS at 12:28

## 2017-09-12 RX ADMIN — MELATONIN TAB 3 MG 6 MG: 3 TAB at 21:41

## 2017-09-13 ENCOUNTER — ANESTHESIA (OUTPATIENT)
Dept: GASTROENTEROLOGY | Facility: AMBULARY SURGERY CENTER | Age: 47
End: 2017-09-13

## 2017-09-13 VITALS
HEART RATE: 92 BPM | HEIGHT: 69 IN | DIASTOLIC BLOOD PRESSURE: 70 MMHG | BODY MASS INDEX: 20.51 KG/M2 | RESPIRATION RATE: 20 BRPM | SYSTOLIC BLOOD PRESSURE: 139 MMHG | TEMPERATURE: 99.1 F | WEIGHT: 138.45 LBS | OXYGEN SATURATION: 99 %

## 2017-09-13 LAB
ABO GROUP BLD BPU: NORMAL
ALBUMIN SERPL BCP-MCNC: 2.3 G/DL (ref 3.5–5)
ALP SERPL-CCNC: 64 U/L (ref 46–116)
ALT SERPL W P-5'-P-CCNC: 11 U/L (ref 12–78)
ANION GAP SERPL CALCULATED.3IONS-SCNC: 7 MMOL/L (ref 4–13)
AST SERPL W P-5'-P-CCNC: 9 U/L (ref 5–45)
BASOPHILS # BLD AUTO: 0 THOUSANDS/ΜL (ref 0–0.1)
BASOPHILS NFR BLD AUTO: 0 % (ref 0–1)
BILIRUB SERPL-MCNC: 0.3 MG/DL (ref 0.2–1)
BPU ID: NORMAL
BUN SERPL-MCNC: 9 MG/DL (ref 5–25)
CALCIUM SERPL-MCNC: 8.4 MG/DL (ref 8.3–10.1)
CHLORIDE SERPL-SCNC: 102 MMOL/L (ref 100–108)
CO2 SERPL-SCNC: 29 MMOL/L (ref 21–32)
CREAT SERPL-MCNC: 0.78 MG/DL (ref 0.6–1.3)
CROSSMATCH: NORMAL
EOSINOPHIL # BLD AUTO: 0.1 THOUSAND/ΜL (ref 0–0.61)
EOSINOPHIL NFR BLD AUTO: 1 % (ref 0–6)
ERYTHROCYTE [DISTWIDTH] IN BLOOD BY AUTOMATED COUNT: 16.5 % (ref 11.6–15.1)
GFR SERPL CREATININE-BSD FRML MDRD: 106 ML/MIN/1.73SQ M
GLUCOSE SERPL-MCNC: 158 MG/DL (ref 65–140)
HCT VFR BLD AUTO: 22.6 % (ref 42–52)
HCT VFR BLD AUTO: 23 % (ref 42–52)
HGB BLD-MCNC: 7.3 G/DL (ref 14–18)
HGB BLD-MCNC: 7.4 G/DL (ref 14–18)
INR PPP: 1.08 (ref 0.86–1.16)
LG PLATELETS BLD QL SMEAR: PRESENT
LYMPHOCYTES # BLD AUTO: 0.8 THOUSANDS/ΜL (ref 0.6–4.47)
LYMPHOCYTES NFR BLD AUTO: 8 % (ref 14–44)
MAGNESIUM SERPL-MCNC: 1.9 MG/DL (ref 1.6–2.6)
MCH RBC QN AUTO: 27 PG (ref 27–31)
MCHC RBC AUTO-ENTMCNC: 32.2 G/DL (ref 31.4–37.4)
MCV RBC AUTO: 84 FL (ref 82–98)
MONOCYTES # BLD AUTO: 0.4 THOUSAND/ΜL (ref 0.17–1.22)
MONOCYTES NFR BLD AUTO: 4 % (ref 4–12)
NEUTROPHILS # BLD AUTO: 9.1 THOUSANDS/ΜL (ref 1.85–7.62)
NEUTS SEG NFR BLD AUTO: 87 % (ref 43–75)
NRBC BLD AUTO-RTO: 0 /100 WBCS
PHOSPHATE SERPL-MCNC: 3.5 MG/DL (ref 2.7–4.5)
PLATELET # BLD AUTO: 590 THOUSANDS/UL (ref 130–400)
PLATELET BLD QL SMEAR: ABNORMAL
PMV BLD AUTO: 6.3 FL (ref 8.9–12.7)
POTASSIUM SERPL-SCNC: 4.4 MMOL/L (ref 3.5–5.3)
PROT SERPL-MCNC: 6.2 G/DL (ref 6.4–8.2)
PROTHROMBIN TIME: 11.4 SECONDS (ref 9.4–11.7)
RBC # BLD AUTO: 2.74 MILLION/UL (ref 4.7–6.1)
SODIUM SERPL-SCNC: 138 MMOL/L (ref 136–145)
TOXIC GRANULES BLD QL SMEAR: PRESENT
UNIT DISPENSE STATUS: NORMAL
UNIT PRODUCT CODE: NORMAL
UNIT RH: NORMAL
WBC # BLD AUTO: 10.5 THOUSAND/UL (ref 4.8–10.8)

## 2017-09-13 PROCEDURE — C9113 INJ PANTOPRAZOLE SODIUM, VIA: HCPCS | Performed by: PHYSICIAN ASSISTANT

## 2017-09-13 PROCEDURE — 85018 HEMOGLOBIN: CPT | Performed by: PHYSICIAN ASSISTANT

## 2017-09-13 PROCEDURE — 84100 ASSAY OF PHOSPHORUS: CPT | Performed by: PHYSICIAN ASSISTANT

## 2017-09-13 PROCEDURE — 80053 COMPREHEN METABOLIC PANEL: CPT | Performed by: PHYSICIAN ASSISTANT

## 2017-09-13 PROCEDURE — 85014 HEMATOCRIT: CPT | Performed by: PHYSICIAN ASSISTANT

## 2017-09-13 PROCEDURE — 83735 ASSAY OF MAGNESIUM: CPT | Performed by: PHYSICIAN ASSISTANT

## 2017-09-13 PROCEDURE — 85025 COMPLETE CBC W/AUTO DIFF WBC: CPT | Performed by: PHYSICIAN ASSISTANT

## 2017-09-13 PROCEDURE — 85610 PROTHROMBIN TIME: CPT | Performed by: PHYSICIAN ASSISTANT

## 2017-09-13 RX ORDER — SODIUM CHLORIDE, SODIUM LACTATE, POTASSIUM CHLORIDE, CALCIUM CHLORIDE 600; 310; 30; 20 MG/100ML; MG/100ML; MG/100ML; MG/100ML
125 INJECTION, SOLUTION INTRAVENOUS CONTINUOUS
Status: DISCONTINUED | OUTPATIENT
Start: 2017-09-13 | End: 2017-09-13 | Stop reason: SDUPTHER

## 2017-09-13 RX ORDER — SUCRALFATE 1 G/1
1 TABLET ORAL 4 TIMES DAILY
Qty: 120 TABLET | Refills: 0 | Status: SHIPPED | OUTPATIENT
Start: 2017-09-13 | End: 2019-03-13

## 2017-09-13 RX ORDER — PANTOPRAZOLE SODIUM 40 MG/1
40 TABLET, DELAYED RELEASE ORAL 2 TIMES DAILY
Qty: 60 TABLET | Refills: 0 | Status: SHIPPED | OUTPATIENT
Start: 2017-09-13 | End: 2019-09-04

## 2017-09-13 RX ORDER — SODIUM CHLORIDE, SODIUM LACTATE, POTASSIUM CHLORIDE, CALCIUM CHLORIDE 600; 310; 30; 20 MG/100ML; MG/100ML; MG/100ML; MG/100ML
125 INJECTION, SOLUTION INTRAVENOUS CONTINUOUS
Status: DISCONTINUED | OUTPATIENT
Start: 2017-09-13 | End: 2017-09-13 | Stop reason: HOSPADM

## 2017-09-13 RX ORDER — MAGNESIUM SULFATE HEPTAHYDRATE 40 MG/ML
2 INJECTION, SOLUTION INTRAVENOUS ONCE
Status: COMPLETED | OUTPATIENT
Start: 2017-09-13 | End: 2017-09-13

## 2017-09-13 RX ADMIN — SODIUM CHLORIDE, SODIUM LACTATE, POTASSIUM CHLORIDE, AND CALCIUM CHLORIDE 125 ML/HR: .6; .31; .03; .02 INJECTION, SOLUTION INTRAVENOUS at 08:10

## 2017-09-13 RX ADMIN — SODIUM CHLORIDE 8 MG/HR: 9 INJECTION, SOLUTION INTRAVENOUS at 08:32

## 2017-09-13 RX ADMIN — SODIUM CHLORIDE, SODIUM LACTATE, POTASSIUM CHLORIDE, AND CALCIUM CHLORIDE 125 ML/HR: .6; .31; .03; .02 INJECTION, SOLUTION INTRAVENOUS at 05:49

## 2017-09-13 RX ADMIN — MAGNESIUM SULFATE HEPTAHYDRATE 2 G: 40 INJECTION, SOLUTION INTRAVENOUS at 08:06

## 2017-09-14 LAB — MRSA NOSE QL CULT: NORMAL

## 2017-09-15 LAB
ATRIAL RATE: 96 BPM
P AXIS: 80 DEGREES
PR INTERVAL: 152 MS
QRS AXIS: 52 DEGREES
QRSD INTERVAL: 82 MS
QT INTERVAL: 352 MS
QTC INTERVAL: 444 MS
T WAVE AXIS: 53 DEGREES
VENTRICULAR RATE: 96 BPM

## 2018-01-13 VITALS
SYSTOLIC BLOOD PRESSURE: 140 MMHG | DIASTOLIC BLOOD PRESSURE: 86 MMHG | HEART RATE: 93 BPM | TEMPERATURE: 97.4 F | BODY MASS INDEX: 22.22 KG/M2 | RESPIRATION RATE: 18 BRPM | OXYGEN SATURATION: 99 % | HEIGHT: 69 IN | WEIGHT: 150 LBS

## 2018-01-16 NOTE — RESULT NOTES
Message   Please inform the patient that the biopsies from his upper endoscopy were negative for celiac sprue, negative for H  pylori  The one polyp removed was a tubular adenoma, there was no high-grade dysplasia and no cancer  Please schedule office visit in 6-8 weeks  Verified Results  COLONOSCOPY [id number] 03:32PM Susy Merlin     Test Name Result Flag Reference   Colonoscopy 03/15/2017        Summary / No summary entered :      No summary entered   Documents attached :      EPIC OP NOTE - Susy Merlin; [place]: [id number] - Transcription      Encounter - Golden Ming (Gastroenterology Adult) (Additional      Information Document)  (1) TISSUE EXAM [id number] 01:29PM Susy Merlin     Test Name Result Flag Reference   LAB AP CASE REPORT (Report)     Surgical Pathology Report             Case: [id number]                   Authorizing Provider: Monica Gonzales MD      Collected:      03/15/2017 1329        Ordering Location:   [address] [place] [address] Received:      03/15/2017 1423                    Intensive Care Unit                              Pathologist:      Molly Hylton MD                              Specimens:  A) - Duodenum, Duodenum bx-cold bx-r/o Celiac                             B) - Stomach, Stomach body bx-cold bx-r/o H  Pylori                           C) - Large Intestine, Cecum, Cecum polyp x 1-cold snare   LAB AP FINAL DIAGNOSIS (Report)     A  Small bowel, duodenum, biopsy:        - Small bowel mucosa with no significant pathologic alteration         - No villous blunting, intraepithelial lymphocytosis or crypt   hyperplasia is identified to suggest malabsorptive enteropathy         - No acute or peptic duodenitis is identified         - No dysplasia or malignancy is identified  B  Stomach, body, biopsy:        - Oxyntic mucosa with mild chronic inactive gastritis          - No Helicobacter pylori organisms are identified on the   immunohistochemical stain, performed with an appropriate positive control         - No intestinal metaplasia, dysplasia or malignancy is   identified  C  Colon, cecum, polypectomy:        - Tubular adenoma  - No high-grade dysplasia or malignancy is identified  Interpretation performed at , [address]   Electronically signed by Carlos Ribeiro MD on 3/17/2017 at 5:44 PM   LAB AP SURGICAL ADDITIONAL INFORMATION (Report)     These tests were developed and their performance characteristics   determined by [facility] or [facility]  They may not be cleared or approved by the [place]  Food and   Drug Administration  The FDA has determined that such clearance or   approval is not necessary  These tests are used for clinical purposes  They should not be regarded as investigational or for research  This   laboratory has been approved by [facility], designated as a high-complexity   laboratory and is qualified to perform these tests  LAB AP GROSS DESCRIPTION (Report)     A  The specimen is received in formalin, labeled with the patient's name   and hospital number, and is designated duodenum biopsy rule out celiac  The specimen consists of 5 tan soft tissue fragments ranging in size from   0 2-0 5 cm each  Entirely submitted  One cassette  B  The specimen is received in formalin, labeled with the patient's name   and hospital number, and is designated stomach body biopsy rule out H    pylori  The specimen consists of multiple tan soft tissue fragments   measuring in aggregate 0 6 x 0 6 x 0 1 cm  Entirely submitted  One   cassette  Note: The estimated total formalin fixation time based upon information   provided by the submitting clinician and the standard processing schedule   is 15 0 hours  C  The specimen is received in formalin, labeled with the patient's name   and hospital number, and is designated cecum polyp ? ?1   The specimen   consists of a tan soft tissue fragment measuring 0 6 cm  No margin is   identified  Entirely submitted  One cassette  Note: The estimated total formalin fixation time based upon information   provided by the submitting clinician and the standard processing schedule   is 14 75 hours      MAC

## 2018-01-22 VITALS
RESPIRATION RATE: 18 BRPM | OXYGEN SATURATION: 97 % | SYSTOLIC BLOOD PRESSURE: 140 MMHG | DIASTOLIC BLOOD PRESSURE: 80 MMHG | HEART RATE: 90 BPM | HEIGHT: 69 IN | TEMPERATURE: 98.7 F | BODY MASS INDEX: 22.19 KG/M2 | WEIGHT: 149.8 LBS

## 2018-01-24 NOTE — MISCELLANEOUS
Assessment   1  GI bleeding (578 9) (K92 2)1   2  Blood loss anemia (280 0) (D50 0)1   3  Weight loss (783 21) (R63 4)1   4  Depression (311) (F32 9)1   5  Impaired fasting glucose (790 21) (R73 01)1   6  Hospital discharge follow-up (G13 00) (E35)3      1 Amended By: Rommel Rubio; Mar 27 2017 2:26 PM EST   2 Amended By: Rommel Rubio; Mar 27 2017 3:44 PM EST    Plan  Blood loss anemia, GI bleeding, Impaired fasting glucose, Weight loss    · (1) CBC/PLT/DIFF; Status:Active; Requested for:27Mar2017; 1   Perform:[facility]; Due:27Mar2018; Ordered; For:Blood loss anemia, GI bleeding,   Impaired fasting glucose, Weight loss; Ordered By:Sergey Edward    · (1) COMPREHENSIVE METABOLIC PANEL; Status:Active; Requested for:27Mar2017; 1   Perform:[facility]; Due:27Mar2018; Ordered; For:Blood loss anemia, GI bleeding,   Impaired fasting glucose, Weight loss; Ordered By:Sergey Edward    · (1) HEMOGLOBIN A1C; Status:Active; Requested for:27Mar2017; 1   Perform:[facility]; Due:27Mar2018; Ordered; For:Blood loss anemia, GI bleeding,   Impaired fasting glucose, Weight loss; Ordered By:Sergey Edward    · 1 - Hammad Galindo MD (Gastroenterology) Physician Referral  Consult Only: the expectation  is that the referring provider will communicate back to the patient on treatment options  Evaluation and Treatment: the expectation is that the referred to provider will  communicate back to the patient on treatment options  Status: Active  Requested for:  [id number]   Ordered; For: Blood loss anemia, GI bleeding, Impaired fasting glucose, Weight loss;    Ordered By: Rommel Rubio  Performed:   Due: 67DKN15914   () Care Summary provided  Louann Cirilo    · Follow-up Visit in 4 Weeks Evaluation and Treatment  Follow-up  Status:1 1,3  Complete  Done:3  [id number]   Ordered; For: Blood loss anemia, GI bleeding, Impaired fasting glucose, Weight loss;    Ordered By: Rommel Rubio  Performed:   Due: 18UFX0811;1  Last Updated By:   Sp Garcia Beau Conklin; 3/27/2017 2:34:39 PM3   Depression    · Start: Escitalopram Oxalate 10 MG Oral Tablet (Lexapro); TAKE 1 TABLET DAILY1   Rx By: Charlene Romo; Dispense: 30 Days ; #:30 Tablet; Refill: 1;For: Depression; BE   = N;1 1,2  Verified Transmission to [address] [facility];2 1,2  Last Updated By: System, [employer]; 3/27/2017 2:26:43 PM2      1 Amended By: Charlene Romo; Mar 27 2017 2:26 PM EST   2 Amended By: Charlene Romo; Mar 27 2017 2:31 PM EST   3 Amended By: Charlene Romo; Mar 27 2017 2:35 PM EST    Discussion/Summary  Discussion Summary:   MUST FOLLOW UP WITH GI per hospital records and recommendation  unfortunately, I can't write a letter getting you out of community service  please get blood work done and start the omeprazole  start the lexapro    f/u in 4 weeks1    if symptoms worsen--- please go back to ED>2    Counseling Documentation With Imm: The  patient3  ,  patient's family3  was counseled regarding3  instructions for management3 , risk factor reductions3 , impressions3 , importance of compliance with treatment3   Medication SE Review and Pt Understands Tx:       1 Amended By: Charlene Romo; Mar 27 2017 2:24 PM EST   2 Amended By: Charlene Romo; Mar 27 2017 2:34 PM EST   3 Amended By: Charlene Romo; Mar 27 2017 3:42 PM EST    Chief Complaint  Chief Complaint Free Text Note Form: patient presenting to follow from [facility] for GI bleed /[facility]1        1 Amended By: Mary Anne Clark; Mar 27 2017 2:06 PM EST    History of Present Illness  TCM Communication [facility]: The patient is being contacted for follow-up after hospitalization and 3/16/17 [facility]  He was hospitalized at [place] [address]  The  date of admission:1  ,  date of discharge: 03/16/20171  1   Diagnosis:1  GI Bleed1   He was discharged to home  Medications reviewed and updated today  1    He scheduled a follow up appointment  1   1   1      Symptoms:1  lower abdominal pain1 , anorexia1 , loose stools1 and constipation1 , but no fever1 , no weakness1 , no dizziness1 , no headache1 , no fatigue1 , no cough1 , no shortness of breath1 , no chest pain1 , no back pain on left side1 , no back pain on right side1 , no arm pain left side1 , no arm pain on right side1 , no leg pain on left side1 , no leg pain on right side1 , no upper abdominal pain1 , no middle abdominal pain1 , no rash:1 , no nausea1 , no vomiting1 , no pain with urinating1 , no incisional pain1 , no wound drainage1  and no swelling1   Counseling was provided to  the patient1  1   Communication performed and completed by AMADA Guzman LPN 0/85/89   HPI: Pt seen and examined  Here with his mom  Pt was hospitalized at [facility] from 3/13-3/16 for GI bleed and anemia  Pt was transfused for a HGB initially of 5  Pt also went for endoscopic studies  Pt had a 6mm polyp in ascending colon  + esophagitis, moderate gastritis, duodenal ulcer  Pt was supposed to follow up with GI but didn't    2  2,3  Pt was told to follow up with GI in 2 weeks for repeat c-scope  Pt stated that his symptoms are still 2  2,3  present 3  2,3  with 3  2,3  abdominal pain and weight loss  3  Pt stated that he continues to lose weight  denied any drug use or supplements  2    Pt's sugar was 307 on arrival to ED and mom is very upset she wasn't told about this    4  Pt's mom stated that 2  2,3  he 2  2,3  had a DUI and never completed his community service  Pt now has to do community service but mom wants a note stating that he can't do the community service  Pt's 2  2,3  mother 3  2,3  became 3  very upset 2  2,3  with 3  2,3  me 3  2,3  when I explained that I wouldn't write a note excusing him from community service  She became agitated stated the hospitalist wouldn't write it either  Pt noted, "I wish Dr Nini Baumann never retired; he would write it 3  for 2  2,3  him "  3  2,3    2  Pt's mother feels he is anxious and depressed   Pt stated that he is only anxious over his medical issues  Mom stated that he goes up to his room and stays there all day  3      Pt refuses to see a psychiatrist or a therapist 2        1 Amended By: Hugh Blanchard; Mar 22 2017 4:05 PM EST   2 Amended By: Nicole Baird; Mar 27 2017 2:09 PM EST   3 Amended By: Nicole Baird; Mar 27 2017 2:33 PM EST   4 Amended By: Nicole Baird; Mar 27 2017 2:35 PM EST    Review of Systems  Complete-Male:   Constitutional:1  feeling poorly1  and feeling tired1   ENT:1  no sore throat1   Cardiovascular: 1  No complaints of slow heart rate, no fast heart rate, no chest pain, no palpitations, no leg claudication, no lower extremity1   Respiratory:1  no shortness of breath1   Gastrointestinal:1  as noted in [facility]   Musculoskeletal:1  no arthralgias1  and no myalgias1   Psychiatric:1  anxiety1   Hematologic/Lymphatic:1  no swollen glands1         1 Amended By: Nicole Baird; Mar 27 2017 3:41 PM EST    Active Problems   1  Abdominal pain (789 00) (R10 9)  2  Benign essential hypertension (401 1) (I10)  3  Body mass index (BMI) 22 0-22 9, adult (V85 1) (Z68 22)  4  Fatigue (780 79) (R53 83)  5  History of GI bleed (V12 79) (Z87 19)  6  Weight loss (783 21) (R63 4)    Past Medical History   1  History of Diverticulosis (562 10) (K57 90)    Surgical History   1  History of Endoscopic Control Of Gastric Bleeding    Family History  Mother   1  No pertinent family history  Family History Reviewed: The family history was reviewed and updated today  1        1 Amended By: Nicole Baird; Mar 27 2017 3:41 PM EST    Social History    · Caffeine use (V49 89) (F15 90)   · Current smoker on some days (305 1) (F17 200)   · Occasional alcohol use    Social History Reviewed: The social history was reviewed and updated today  1        1 Amended By: Nicole Baird; Mar 27 2017 3:41 PM EST    Current Meds  1  Omeprazole 40 MG Oral Capsule Delayed Release; take 1 capsule daily;    Therapy: 66REV4472 to (Evaluate:94Nee9914) Requested for: 89Hsn0374; Last   Rx:36Vup2617 Ordered  Medication List Reviewed: The medication list was reviewed and updated today  1        1 Amended By: Kuldip rFegoso; Mar 27 2017 3:41 PM EST    Allergies   1  No Known Drug Allergies   2  No Known Environmental Allergies  3  No Known Food Allergies    Physical Exam    Constitutional1    General appearance: No acute distress, well appearing and well nourished  1    Eyes1    Conjunctiva and lids: No swelling, erythema, or discharge  1  Non icteric sclera1   Ears, Nose, Mouth, and Throat1  Moist mucus membranes1   Pulmonary1    Respiratory effort: No increased work of breathing or signs of respiratory distress  1    Auscultation of lungs: Clear to auscultation, equal breath sounds bilaterally, no wheezes, no rales, no rhonci  1    Cardiovascular1    Auscultation of heart: Normal rate and rhythm, normal S1 and S2, without murmurs  1    Abdomen1  Soft but tenderness epigastric and left upper quadarant  no rebound1      Musculoskeletal1    Gait and station: Normal 1    Psychiatric1    Orientation to person, place and time: Normal 1    Mood and affect: Normal 1          1 Amended By: Kuldip Fregoso; Mar 27 2017 3:42 PM EST    Results/Data  COLONOSCOPY1 [id number] 03:32PM1 Salvador Sim     Test Name Result Flag Reference   Colonoscopy1 03/15/91415       Summary / No summary entered :      No summary entered  Documents attached :      EPIC OP NOTE - Salvador Mo Paton: [id number] - Transcription Encounter - Salvador Sim -      (Gastroenterology Adult) (Additional Information Document)  (1) TISSUE EXAM1 [id number] 01:29PM1 Salvador Sim     Test Name Result Flag Reference   LAB AP CASE [address] [place], [place] [id number] [facility]     Surgical Pathology Report             Case: [id number]                   Authorizing Provider: Sidney Cotter MD      Collected:      03/15/2017 1329        Ordering Location:   [address] [place] [address] Received:      03/15/2017 1423                    Intensive Care Unit                              Pathologist:      Neal Manjarrez MD                              Specimens:  A) - Duodenum, Duodenum bx-cold bx-r/o Celiac                             B) - Stomach, Stomach body bx-cold bx-r/o H  Pylori                           C) - Large Intestine, Cecum, Cecum polyp x 1-cold snare   LAB AP FINAL DIAGNOSIS1 (Report)1     A  Small bowel, duodenum, biopsy:        - Small bowel mucosa with no significant pathologic alteration         - No villous blunting, intraepithelial lymphocytosis or crypt   hyperplasia is identified to suggest malabsorptive enteropathy         - No acute or peptic duodenitis is identified         - No dysplasia or malignancy is identified  B  Stomach, body, biopsy:        - Oxyntic mucosa with mild chronic inactive gastritis  - No Helicobacter pylori organisms are identified on the   immunohistochemical stain, performed with an appropriate positive control         - No intestinal metaplasia, dysplasia or malignancy is   identified  C  Colon, cecum, polypectomy:        - Tubular adenoma  - No high-grade dysplasia or malignancy is identified  Interpretation performed at , [address]   Electronically signed by Neal Manjarrez MD on 3/17/2017 at 5:44 PM   LAB AP SURGICAL ADDITIONAL INFORMATION1 (Report)1     These tests were developed and their performance characteristics   determined by [facility] or [facility]  They may not be cleared or approved by the [place]  Food and   Drug Administration  The FDA has determined that such clearance or   approval is not necessary  These tests are used for clinical purposes  They should not be regarded as investigational or for research  This   laboratory has been approved by CLIA 88, designated as a high-complexity   laboratory and is qualified to perform these tests  LAB AP GROSS DESCRIPTION1 (Report)1     A   The specimen is received in formalin, labeled with the patient's name   and hospital number, and is designated duodenum biopsy rule out celiac  The specimen consists of 5 tan soft tissue fragments ranging in size from   0 2-0 5 cm each  Entirely submitted  One cassette  B  The specimen is received in formalin, labeled with the patient's name   and hospital number, and is designated stomach body biopsy rule out H    pylori  The specimen consists of multiple tan soft tissue fragments   measuring in aggregate 0 6 x 0 6 x 0 1 cm  Entirely submitted  One   cassette  Note: The estimated total formalin fixation time based upon information   provided by the submitting clinician and the standard processing schedule   is 15 0 hours  C  The specimen is received in formalin, labeled with the patient's name   and hospital number, and is designated cecum polyp ? ?1  The specimen   consists of a tan soft tissue fragment measuring 0 6 cm  No margin is   identified  Entirely submitted  One cassette  Note: The estimated total formalin fixation time based upon information   provided by the submitting clinician and the standard processing schedule   is 14 75 hours  MAC          1   Amended By: Ariel Chen; Mar 27 2017 3:42 PM EST Signatures   Electronically signed by : Wan Rushing DO; Mar 16 2017  3:19PM EST                       (Author)    Electronically signed by : Wan Rushing DO; Mar 23 2017 12:58PM EST                       (Author)    Electronically signed by : Wan Rushing DO; Mar 27 2017  3:43PM EST                       (Author)    Electronically signed by : Wan Rushing DO; Mar 27 2017  3:44PM EST                       (Author)

## 2019-02-26 ENCOUNTER — TELEPHONE (OUTPATIENT)
Dept: FAMILY MEDICINE CLINIC | Facility: CLINIC | Age: 49
End: 2019-02-26

## 2019-03-06 ENCOUNTER — TELEPHONE (OUTPATIENT)
Dept: FAMILY MEDICINE CLINIC | Facility: CLINIC | Age: 49
End: 2019-03-06

## 2019-03-06 NOTE — TELEPHONE ENCOUNTER
Pt  Has michael appt  With Dr Chapis Pereyra on 3/13  He is being d/c from [place] at [facility] in [place] today (3/6)  They will fax current meds and other records

## 2019-03-08 ENCOUNTER — TELEPHONE (OUTPATIENT)
Dept: FAMILY MEDICINE CLINIC | Facility: CLINIC | Age: 49
End: 2019-03-08

## 2019-03-08 NOTE — TELEPHONE ENCOUNTER
Nurse  from [facility] would like to talk to a nurse regarding Aron Jolly  He has a michael appt  Scheduled for 3/13 with Dr Royer Brar  This will be his first time here  She would like to go over the discharge needs and home nursing

## 2019-03-13 ENCOUNTER — OFFICE VISIT (OUTPATIENT)
Dept: FAMILY MEDICINE CLINIC | Facility: CLINIC | Age: 49
End: 2019-03-13
Payer: COMMERCIAL

## 2019-03-13 VITALS
TEMPERATURE: 98.7 F | HEART RATE: 104 BPM | DIASTOLIC BLOOD PRESSURE: 70 MMHG | WEIGHT: 148 LBS | SYSTOLIC BLOOD PRESSURE: 124 MMHG | BODY MASS INDEX: 21.92 KG/M2 | RESPIRATION RATE: 16 BRPM | HEIGHT: 69 IN

## 2019-03-13 DIAGNOSIS — I10 BENIGN ESSENTIAL HYPERTENSION: ICD-10-CM

## 2019-03-13 DIAGNOSIS — D50.9 IRON DEFICIENCY ANEMIA, UNSPECIFIED IRON DEFICIENCY ANEMIA TYPE: ICD-10-CM

## 2019-03-13 DIAGNOSIS — E11.65 TYPE 2 DIABETES MELLITUS WITH HYPERGLYCEMIA, WITH LONG-TERM CURRENT USE OF INSULIN (HCC): Primary | ICD-10-CM

## 2019-03-13 DIAGNOSIS — I69.851 HEMIPARESIS OF RIGHT DOMINANT SIDE AS LATE EFFECT OF OTHER CEREBROVASCULAR DISEASE (HCC): ICD-10-CM

## 2019-03-13 DIAGNOSIS — Z79.4 TYPE 2 DIABETES MELLITUS WITH HYPERGLYCEMIA, WITH LONG-TERM CURRENT USE OF INSULIN (HCC): Primary | ICD-10-CM

## 2019-03-13 DIAGNOSIS — R25.2 SPASTICITY: ICD-10-CM

## 2019-03-13 PROBLEM — K92.2 GI BLEEDING: Chronic | Status: RESOLVED | Noted: 2017-03-13 | Resolved: 2019-03-13

## 2019-03-13 PROBLEM — E11.9 TYPE II DIABETES MELLITUS (HCC): Status: ACTIVE | Noted: 2018-12-29

## 2019-03-13 PROBLEM — Z98.890 S/P CRANIOTOMY: Status: ACTIVE | Noted: 2019-02-28

## 2019-03-13 PROBLEM — D62 ACUTE BLOOD LOSS ANEMIA: Status: RESOLVED | Noted: 2017-09-12 | Resolved: 2019-03-13

## 2019-03-13 PROBLEM — K92.1 GASTROINTESTINAL HEMORRHAGE WITH MELENA: Status: RESOLVED | Noted: 2017-09-12 | Resolved: 2019-03-13

## 2019-03-13 PROBLEM — F17.200 TOBACCO USE DISORDER: Status: ACTIVE | Noted: 2018-12-29

## 2019-03-13 PROBLEM — D62 ACUTE BLOOD LOSS ANEMIA: Status: RESOLVED | Noted: 2017-03-13 | Resolved: 2019-03-13

## 2019-03-13 PROCEDURE — 3008F BODY MASS INDEX DOCD: CPT | Performed by: FAMILY MEDICINE

## 2019-03-13 PROCEDURE — 3074F SYST BP LT 130 MM HG: CPT | Performed by: FAMILY MEDICINE

## 2019-03-13 PROCEDURE — 99214 OFFICE O/P EST MOD 30 MIN: CPT | Performed by: FAMILY MEDICINE

## 2019-03-13 PROCEDURE — 3078F DIAST BP <80 MM HG: CPT | Performed by: FAMILY MEDICINE

## 2019-03-13 RX ORDER — OMEPRAZOLE 40 MG/1
CAPSULE, DELAYED RELEASE ORAL
COMMUNITY
Start: 2019-01-24 | End: 2019-09-04

## 2019-03-13 RX ORDER — FLASH GLUCOSE SCANNING READER
EACH MISCELLANEOUS
Qty: 1 DEVICE | Refills: 6 | Status: SHIPPED | OUTPATIENT
Start: 2019-03-13 | End: 2019-09-04

## 2019-03-13 RX ORDER — PANTOPRAZOLE SODIUM 40 MG/1
1 TABLET, DELAYED RELEASE ORAL
COMMUNITY
Start: 2017-03-29 | End: 2019-09-04

## 2019-03-13 RX ORDER — FLASH GLUCOSE SENSOR
KIT MISCELLANEOUS
Qty: 1 EACH | Refills: 6 | Status: SHIPPED | OUTPATIENT
Start: 2019-03-13 | End: 2019-09-04

## 2019-03-13 RX ORDER — ATORVASTATIN CALCIUM 10 MG/1
10 TABLET, FILM COATED ORAL DAILY
Qty: 30 TABLET | Refills: 3 | Status: SHIPPED | OUTPATIENT
Start: 2019-03-13 | End: 2019-09-04

## 2019-03-13 RX ORDER — BACLOFEN 5 MG/1
10 TABLET ORAL 3 TIMES DAILY
Qty: 90 TABLET | Refills: 3 | Status: SHIPPED | OUTPATIENT
Start: 2019-03-13 | End: 2019-04-29 | Stop reason: SDUPTHER

## 2019-03-13 RX ORDER — LISINOPRIL 10 MG/1
TABLET ORAL
COMMUNITY
Start: 2019-02-14 | End: 2019-04-19 | Stop reason: SDUPTHER

## 2019-03-13 RX ORDER — PREDNISONE 20 MG/1
TABLET ORAL
COMMUNITY
Start: 2019-02-12 | End: 2019-03-13

## 2019-03-13 RX ORDER — VANCOMYCIN HYDROCHLORIDE 1 G/20ML
INJECTION, POWDER, LYOPHILIZED, FOR SOLUTION INTRAVENOUS
COMMUNITY
Start: 2019-01-15 | End: 2019-03-13

## 2019-03-13 NOTE — PROGRESS NOTES
Chief Complaint   Patient presents with    Follow-up     chronic conditions   recent rehab stay     Patient ID: Maryam Hamilton is a 46 y o  male  HPI   pt was Dx with brain abscesses, change in MS, anemia 3 m ago -  Underwent IV AB tx, multiple blood transfusions, biopsy of brain lesions -  Was d/c from rehab 1 wk ago, newly Dx of DM - has residual R sided weakness, in wheelchair -  H/o anemia since 2017 -  Did not have health insurance -  Did not see GI until last hospital stay since 2017  -  BG ot home from 140-400 -  Does not follow ADA diet     The following portions of the patient's history were reviewed and updated as appropriate: allergies, current medications, past family history, past medical history, past social history, past surgical history and problem list     Review of Systems   Constitutional: Positive for fatigue  Negative for activity change, appetite change and fever  Respiratory: Negative  Cardiovascular: Negative  Gastrointestinal: Negative  Genitourinary: Negative  Musculoskeletal: Positive for gait problem  Negative for arthralgias and back pain  Skin: Negative  Neurological: Positive for facial asymmetry, speech difficulty and weakness  Negative for tremors, seizures and numbness  Current Outpatient Medications   Medication Sig Dispense Refill    lisinopril (ZESTRIL) 10 mg tablet        No current facility-administered medications for this visit  Objective:    /70 (BP Location: Right arm, Patient Position: Sitting, Cuff Size: Standard)   Pulse 104   Temp 98 7 °F (37 1 °C) (Tympanic)   Resp 16   Ht 5' 9" (1 753 m)   Wt 67 1 kg (148 lb)   BMI 21 86 kg/m²        Physical Exam   Constitutional: He is oriented to person, place, and time  No distress  Cardiovascular: Normal rate and regular rhythm  Pulmonary/Chest: Effort normal and breath sounds normal  No respiratory distress  Abdominal: Soft     Musculoskeletal: He exhibits no edema or deformity  Neurological: He is alert and oriented to person, place, and time  He displays no tremor  He exhibits abnormal muscle tone  Gait abnormal    R side weakness                Assessment/Plan:         Diagnoses and all orders for this visit:    Type 2 diabetes mellitus with hyperglycemia, with long-term current use of insulin ([address] )  -     Ambulatory referral to Diabetic Education; Future  -     atorvastatin (LIPITOR) 10 mg tablet; Take 1 tablet (10 mg total) by mouth daily  -     SITagliptin-metFORMIN HCl ER (JANUMET XR)  MG TB24; Take 1 tablet by mouth daily with dinner  -     Continuous Blood Gluc Sensor (FREESTYLE MARCIE 14 DAY SENSOR) MISC; To check BG 3 times a day  -     Continuous Blood Gluc  (FREESTYLE MARCIE 14 DAY READER) KARIME; To check BG 3 times a day       Discussed general issues about diabetes pathophysiology and management  Counseling at today's visit: discussed the need for weight loss, focused on the need to adhere to the prescribed ADA diet, discussed the advantages of a diet low in carbohydrates and discussed DASH diet  Addressed ADA diet  Encouraged aerobic exercise    Benign essential hypertension    Hemiparesis of right dominant side as late effect of other cerebrovascular disease (HCC)    Spasticity  -     baclofen 5 MG TABS; Take 10 mg by mouth 3 (three) times a day    rto in 1 m   Phone f/u in 2 wks                           Leonid Helton MD

## 2019-03-18 ENCOUNTER — TELEPHONE (OUTPATIENT)
Dept: FAMILY MEDICINE CLINIC | Facility: CLINIC | Age: 49
End: 2019-03-18

## 2019-03-18 NOTE — TELEPHONE ENCOUNTER
Katrina Fish from [facility] called to advise us that patient's blood sugar is 356 as of 4:30 this afternoon

## 2019-03-20 NOTE — TELEPHONE ENCOUNTER
Spoke with PT regarding elevated HR - HR was 120 and dropped to 105 - Dr TEJADA would like to see the patient this Friday - pt will call back on whether he can schedule due to transportation - also, need to speak with patient upon return call so that I can review blood sugar readings

## 2019-03-22 NOTE — TELEPHONE ENCOUNTER
Pt called this morning and spoke with phone room because he was under the impression he had an appt, (he had never called back to schedule an appt) but even if he had had an appt, he told staff he is unable to come in today

## 2019-03-29 LAB
LEFT EYE DIABETIC RETINOPATHY: NORMAL
RIGHT EYE DIABETIC RETINOPATHY: NORMAL
SEVERITY (EYE EXAM): NORMAL

## 2019-04-08 ENCOUNTER — TRANSCRIBE ORDERS (OUTPATIENT)
Dept: ADMINISTRATIVE | Facility: HOSPITAL | Age: 49
End: 2019-04-08

## 2019-04-08 DIAGNOSIS — Z09 FOLLOW-UP EXAMINATION, FOLLOWING OTHER SURGERY: ICD-10-CM

## 2019-04-08 DIAGNOSIS — Z98.890 PERSONAL HISTORY OF SURGERY TO HEART AND GREAT VESSELS, PRESENTING HAZARDS TO HEALTH: ICD-10-CM

## 2019-04-08 DIAGNOSIS — G06.0 INTRACRANIAL ABSCESS: Primary | ICD-10-CM

## 2019-04-09 ENCOUNTER — TELEPHONE (OUTPATIENT)
Dept: FAMILY MEDICINE CLINIC | Facility: CLINIC | Age: 49
End: 2019-04-09

## 2019-04-19 ENCOUNTER — TELEPHONE (OUTPATIENT)
Dept: FAMILY MEDICINE CLINIC | Facility: CLINIC | Age: 49
End: 2019-04-19

## 2019-04-19 DIAGNOSIS — I69.851 HEMIPARESIS OF RIGHT DOMINANT SIDE AS LATE EFFECT OF OTHER CEREBROVASCULAR DISEASE (HCC): Primary | ICD-10-CM

## 2019-04-19 DIAGNOSIS — I10 BENIGN ESSENTIAL HYPERTENSION: ICD-10-CM

## 2019-04-19 DIAGNOSIS — E11.9 TYPE 2 DIABETES MELLITUS WITHOUT COMPLICATION, WITH LONG-TERM CURRENT USE OF INSULIN (HCC): ICD-10-CM

## 2019-04-19 DIAGNOSIS — Z79.4 TYPE 2 DIABETES MELLITUS WITHOUT COMPLICATION, WITH LONG-TERM CURRENT USE OF INSULIN (HCC): ICD-10-CM

## 2019-04-19 PROCEDURE — 4010F ACE/ARB THERAPY RXD/TAKEN: CPT | Performed by: FAMILY MEDICINE

## 2019-04-19 RX ORDER — LISINOPRIL 10 MG/1
10 TABLET ORAL DAILY
Qty: 90 TABLET | Refills: 1 | Status: SHIPPED | OUTPATIENT
Start: 2019-04-19 | End: 2019-09-04

## 2019-04-19 RX ORDER — BLOOD-GLUCOSE METER
EACH MISCELLANEOUS 3 TIMES DAILY
Qty: 1 KIT | Refills: 0 | Status: SHIPPED | OUTPATIENT
Start: 2019-04-19 | End: 2019-09-04

## 2019-04-22 ENCOUNTER — HOSPITAL ENCOUNTER (OUTPATIENT)
Dept: RADIOLOGY | Facility: HOSPITAL | Age: 49
Discharge: HOME | End: 2019-04-22
Payer: COMMERCIAL

## 2019-04-22 DIAGNOSIS — Z98.890 PERSONAL HISTORY OF SURGERY TO HEART AND GREAT VESSELS, PRESENTING HAZARDS TO HEALTH: ICD-10-CM

## 2019-04-22 DIAGNOSIS — G06.0 INTRACRANIAL ABSCESS: ICD-10-CM

## 2019-04-22 DIAGNOSIS — Z09 FOLLOW-UP EXAMINATION, FOLLOWING OTHER SURGERY: ICD-10-CM

## 2019-04-22 PROCEDURE — A9585 GADOBUTROL INJECTION: HCPCS

## 2019-04-22 PROCEDURE — 70553 MRI BRAIN STEM W/O & W/DYE: CPT

## 2019-04-22 RX ADMIN — GADOBUTROL 7 ML: 604.72 INJECTION INTRAVENOUS at 16:16

## 2019-04-23 ENCOUNTER — TELEPHONE (OUTPATIENT)
Dept: FAMILY MEDICINE CLINIC | Facility: CLINIC | Age: 49
End: 2019-04-23

## 2019-04-23 ENCOUNTER — EVALUATION (OUTPATIENT)
Dept: PHYSICAL THERAPY | Facility: CLINIC | Age: 49
End: 2019-04-23
Payer: COMMERCIAL

## 2019-04-23 DIAGNOSIS — I69.851 HEMIPARESIS OF RIGHT DOMINANT SIDE AS LATE EFFECT OF OTHER CEREBROVASCULAR DISEASE (HCC): Primary | ICD-10-CM

## 2019-04-23 PROCEDURE — 97163 PT EVAL HIGH COMPLEX 45 MIN: CPT

## 2019-04-23 PROCEDURE — G8979 MOBILITY GOAL STATUS: HCPCS

## 2019-04-23 PROCEDURE — G8978 MOBILITY CURRENT STATUS: HCPCS

## 2019-04-25 ENCOUNTER — APPOINTMENT (OUTPATIENT)
Dept: PHYSICAL THERAPY | Facility: CLINIC | Age: 49
End: 2019-04-25
Payer: COMMERCIAL

## 2019-04-29 ENCOUNTER — APPOINTMENT (OUTPATIENT)
Dept: PHYSICAL THERAPY | Facility: CLINIC | Age: 49
End: 2019-04-29
Payer: COMMERCIAL

## 2019-04-29 DIAGNOSIS — R25.2 SPASTICITY: ICD-10-CM

## 2019-04-29 RX ORDER — BACLOFEN 5 MG/1
10 TABLET ORAL 3 TIMES DAILY
Qty: 90 TABLET | Refills: 3 | Status: SHIPPED | OUTPATIENT
Start: 2019-04-29 | End: 2019-06-10 | Stop reason: SDUPTHER

## 2019-05-02 ENCOUNTER — TELEPHONE (OUTPATIENT)
Dept: PHYSICAL THERAPY | Facility: CLINIC | Age: 49
End: 2019-05-02

## 2019-05-09 ENCOUNTER — OFFICE VISIT (OUTPATIENT)
Dept: FAMILY MEDICINE CLINIC | Facility: CLINIC | Age: 49
End: 2019-05-09
Payer: COMMERCIAL

## 2019-05-09 VITALS
BODY MASS INDEX: 23.99 KG/M2 | DIASTOLIC BLOOD PRESSURE: 90 MMHG | WEIGHT: 162 LBS | HEIGHT: 69 IN | SYSTOLIC BLOOD PRESSURE: 140 MMHG | TEMPERATURE: 98 F | HEART RATE: 96 BPM | RESPIRATION RATE: 16 BRPM

## 2019-05-09 DIAGNOSIS — E11.9 TYPE 2 DIABETES MELLITUS WITHOUT COMPLICATION, WITH LONG-TERM CURRENT USE OF INSULIN (HCC): ICD-10-CM

## 2019-05-09 DIAGNOSIS — Z79.4 TYPE 2 DIABETES MELLITUS WITHOUT COMPLICATION, WITH LONG-TERM CURRENT USE OF INSULIN (HCC): ICD-10-CM

## 2019-05-09 DIAGNOSIS — I69.851 HEMIPARESIS OF RIGHT DOMINANT SIDE AS LATE EFFECT OF OTHER CEREBROVASCULAR DISEASE (HCC): Primary | ICD-10-CM

## 2019-05-09 DIAGNOSIS — I10 BENIGN ESSENTIAL HYPERTENSION: ICD-10-CM

## 2019-05-09 DIAGNOSIS — Z98.890 S/P CRANIOTOMY: ICD-10-CM

## 2019-05-09 DIAGNOSIS — D50.9 IRON DEFICIENCY ANEMIA, UNSPECIFIED IRON DEFICIENCY ANEMIA TYPE: ICD-10-CM

## 2019-05-09 LAB — SL AMB POCT HEMOGLOBIN AIC: 6.5 (ref ?–6.5)

## 2019-05-09 PROCEDURE — 3044F HG A1C LEVEL LT 7.0%: CPT | Performed by: FAMILY MEDICINE

## 2019-05-09 PROCEDURE — 99214 OFFICE O/P EST MOD 30 MIN: CPT | Performed by: FAMILY MEDICINE

## 2019-05-09 PROCEDURE — 3008F BODY MASS INDEX DOCD: CPT | Performed by: FAMILY MEDICINE

## 2019-05-09 PROCEDURE — 83036 HEMOGLOBIN GLYCOSYLATED A1C: CPT | Performed by: FAMILY MEDICINE

## 2019-05-10 ENCOUNTER — TELEPHONE (OUTPATIENT)
Dept: FAMILY MEDICINE CLINIC | Facility: CLINIC | Age: 49
End: 2019-05-10

## 2019-05-10 RX ORDER — PEN NEEDLE, DIABETIC 32GX 5/32"
1 NEEDLE, DISPOSABLE MISCELLANEOUS 4 TIMES DAILY
Refills: 0 | COMMUNITY
Start: 2019-04-20 | End: 2019-08-06 | Stop reason: SDUPTHER

## 2019-05-20 ENCOUNTER — TELEPHONE (OUTPATIENT)
Dept: FAMILY MEDICINE CLINIC | Facility: CLINIC | Age: 49
End: 2019-05-20

## 2019-06-05 DIAGNOSIS — E11.9 TYPE 2 DIABETES MELLITUS WITHOUT COMPLICATION, WITH LONG-TERM CURRENT USE OF INSULIN (HCC): Primary | ICD-10-CM

## 2019-06-05 DIAGNOSIS — Z79.4 TYPE 2 DIABETES MELLITUS WITHOUT COMPLICATION, WITH LONG-TERM CURRENT USE OF INSULIN (HCC): Primary | ICD-10-CM

## 2019-06-05 RX ORDER — ALOGLIPTIN AND METFORMIN HYDROCHLORIDE 12.5; 5 MG/1; MG/1
1 TABLET, FILM COATED ORAL 2 TIMES DAILY
Qty: 60 TABLET | Refills: 6 | Status: SHIPPED | OUTPATIENT
Start: 2019-06-05 | End: 2019-09-04

## 2019-06-10 ENCOUNTER — EVALUATION (OUTPATIENT)
Dept: PHYSICAL THERAPY | Facility: CLINIC | Age: 49
End: 2019-06-10
Payer: COMMERCIAL

## 2019-06-10 DIAGNOSIS — E11.65 TYPE 2 DIABETES MELLITUS WITH HYPERGLYCEMIA, WITH LONG-TERM CURRENT USE OF INSULIN (HCC): Primary | ICD-10-CM

## 2019-06-10 DIAGNOSIS — Z98.890 S/P CRANIOTOMY: Primary | ICD-10-CM

## 2019-06-10 DIAGNOSIS — R25.2 SPASTICITY: ICD-10-CM

## 2019-06-10 DIAGNOSIS — Z79.4 TYPE 2 DIABETES MELLITUS WITH HYPERGLYCEMIA, WITH LONG-TERM CURRENT USE OF INSULIN (HCC): Primary | ICD-10-CM

## 2019-06-10 PROCEDURE — 97112 NEUROMUSCULAR REEDUCATION: CPT | Performed by: PHYSICAL THERAPIST

## 2019-06-10 RX ORDER — BACLOFEN 5 MG/1
10 TABLET ORAL 3 TIMES DAILY
Qty: 90 TABLET | Refills: 3 | Status: SHIPPED | OUTPATIENT
Start: 2019-06-10 | End: 2019-07-18 | Stop reason: SDUPTHER

## 2019-06-18 ENCOUNTER — APPOINTMENT (OUTPATIENT)
Dept: PHYSICAL THERAPY | Facility: CLINIC | Age: 49
End: 2019-06-18
Payer: COMMERCIAL

## 2019-06-25 ENCOUNTER — OFFICE VISIT (OUTPATIENT)
Dept: PHYSICAL THERAPY | Facility: CLINIC | Age: 49
End: 2019-06-25
Payer: COMMERCIAL

## 2019-06-25 DIAGNOSIS — Z98.890 S/P CRANIOTOMY: Primary | ICD-10-CM

## 2019-06-25 PROCEDURE — 97112 NEUROMUSCULAR REEDUCATION: CPT | Performed by: PHYSICAL THERAPIST

## 2019-06-25 PROCEDURE — G8980 MOBILITY D/C STATUS: HCPCS | Performed by: PHYSICAL THERAPIST

## 2019-06-25 PROCEDURE — G8979 MOBILITY GOAL STATUS: HCPCS | Performed by: PHYSICAL THERAPIST

## 2019-07-03 DIAGNOSIS — Z79.4 TYPE 2 DIABETES MELLITUS WITH HYPERGLYCEMIA, WITH LONG-TERM CURRENT USE OF INSULIN (HCC): Primary | ICD-10-CM

## 2019-07-03 DIAGNOSIS — E11.65 TYPE 2 DIABETES MELLITUS WITH HYPERGLYCEMIA, WITH LONG-TERM CURRENT USE OF INSULIN (HCC): Primary | ICD-10-CM

## 2019-07-03 DIAGNOSIS — Z79.4 TYPE 2 DIABETES MELLITUS WITH HYPERGLYCEMIA, WITH LONG-TERM CURRENT USE OF INSULIN (HCC): ICD-10-CM

## 2019-07-03 DIAGNOSIS — E11.65 TYPE 2 DIABETES MELLITUS WITH HYPERGLYCEMIA, WITH LONG-TERM CURRENT USE OF INSULIN (HCC): ICD-10-CM

## 2019-07-18 DIAGNOSIS — R25.2 SPASTICITY: ICD-10-CM

## 2019-07-19 RX ORDER — BACLOFEN 5 MG/1
TABLET ORAL
Qty: 90 TABLET | Refills: 3 | Status: SHIPPED | OUTPATIENT
Start: 2019-07-19 | End: 2019-08-16 | Stop reason: SDUPTHER

## 2019-07-23 ENCOUNTER — TELEPHONE (OUTPATIENT)
Dept: PHYSICAL THERAPY | Facility: CLINIC | Age: 49
End: 2019-07-23

## 2019-07-23 NOTE — TELEPHONE ENCOUNTER
Sae Veliz at home number regarding cancellation of PT appointment today  Voice message left asking pt to call, and to offer assistance with transportation as able

## 2019-08-06 DIAGNOSIS — Z79.4 TYPE 2 DIABETES MELLITUS WITH HYPERGLYCEMIA, WITH LONG-TERM CURRENT USE OF INSULIN (HCC): Primary | ICD-10-CM

## 2019-08-06 DIAGNOSIS — E11.65 TYPE 2 DIABETES MELLITUS WITH HYPERGLYCEMIA, WITH LONG-TERM CURRENT USE OF INSULIN (HCC): Primary | ICD-10-CM

## 2019-08-06 RX ORDER — PEN NEEDLE, DIABETIC 32GX 5/32"
NEEDLE, DISPOSABLE MISCELLANEOUS
Qty: 100 EACH | Refills: 0 | Status: SHIPPED | OUTPATIENT
Start: 2019-08-06 | End: 2019-09-03 | Stop reason: SDUPTHER

## 2019-08-16 DIAGNOSIS — R25.2 SPASTICITY: ICD-10-CM

## 2019-08-16 RX ORDER — BACLOFEN 5 MG/1
TABLET ORAL
Qty: 90 TABLET | Refills: 3 | Status: SHIPPED | OUTPATIENT
Start: 2019-08-16 | End: 2019-09-04

## 2019-09-03 DIAGNOSIS — E11.9 TYPE 2 DIABETES MELLITUS WITHOUT COMPLICATION, WITH LONG-TERM CURRENT USE OF INSULIN (HCC): ICD-10-CM

## 2019-09-03 DIAGNOSIS — R25.2 SPASTICITY: ICD-10-CM

## 2019-09-03 DIAGNOSIS — E11.65 TYPE 2 DIABETES MELLITUS WITH HYPERGLYCEMIA, WITH LONG-TERM CURRENT USE OF INSULIN (HCC): ICD-10-CM

## 2019-09-03 DIAGNOSIS — Z79.4 TYPE 2 DIABETES MELLITUS WITHOUT COMPLICATION, WITH LONG-TERM CURRENT USE OF INSULIN (HCC): ICD-10-CM

## 2019-09-03 DIAGNOSIS — Z79.4 TYPE 2 DIABETES MELLITUS WITH HYPERGLYCEMIA, WITH LONG-TERM CURRENT USE OF INSULIN (HCC): ICD-10-CM

## 2019-09-04 ENCOUNTER — APPOINTMENT (EMERGENCY)
Dept: RADIOLOGY | Facility: HOSPITAL | Age: 49
End: 2019-09-04
Payer: COMMERCIAL

## 2019-09-04 ENCOUNTER — HOSPITAL ENCOUNTER (EMERGENCY)
Facility: HOSPITAL | Age: 49
Discharge: HOME | End: 2019-09-05
Attending: EMERGENCY MEDICINE | Admitting: EMERGENCY MEDICINE
Payer: COMMERCIAL

## 2019-09-04 DIAGNOSIS — R53.1 WEAKNESS: Primary | ICD-10-CM

## 2019-09-04 LAB
ALBUMIN SERPL BCP-MCNC: 4.7 G/DL (ref 3.5–5)
ALP SERPL-CCNC: 79 U/L (ref 46–116)
ALT SERPL W P-5'-P-CCNC: 14 U/L (ref 12–78)
ANION GAP SERPL CALCULATED.3IONS-SCNC: 13 MMOL/L (ref 4–13)
APTT PPP: 31 SECONDS (ref 23–37)
AST SERPL W P-5'-P-CCNC: 16 U/L (ref 5–45)
BASOPHILS # BLD AUTO: 0.02 THOUSANDS/ΜL (ref 0–0.1)
BASOPHILS NFR BLD AUTO: 0 % (ref 0–1)
BILIRUB SERPL-MCNC: 0.5 MG/DL (ref 0.2–1)
BILIRUB UR QL STRIP: NEGATIVE
BUN SERPL-MCNC: 15 MG/DL (ref 5–25)
CALCIUM SERPL-MCNC: 9.3 MG/DL (ref 8.3–10.1)
CHLORIDE SERPL-SCNC: 100 MMOL/L (ref 100–108)
CLARITY UR: CLEAR
CO2 SERPL-SCNC: 26 MMOL/L (ref 21–32)
COLOR UR: YELLOW
CREAT SERPL-MCNC: 0.87 MG/DL (ref 0.6–1.3)
EOSINOPHIL # BLD AUTO: 0.01 THOUSAND/ΜL (ref 0–0.61)
EOSINOPHIL NFR BLD AUTO: 0 % (ref 0–6)
ERYTHROCYTE [DISTWIDTH] IN BLOOD BY AUTOMATED COUNT: 13.4 % (ref 11.6–15.1)
GFR SERPL CREATININE-BSD FRML MDRD: 100 ML/MIN/1.73SQ M
GLUCOSE SERPL-MCNC: 76 MG/DL (ref 65–140)
GLUCOSE SERPL-MCNC: 84 MG/DL (ref 65–140)
GLUCOSE UR STRIP-MCNC: NEGATIVE MG/DL
HCT VFR BLD AUTO: 40.9 % (ref 36.5–49.3)
HGB BLD-MCNC: 13.3 G/DL (ref 12–17)
HGB UR QL STRIP.AUTO: NEGATIVE
IMM GRANULOCYTES # BLD AUTO: 0.02 THOUSAND/UL (ref 0–0.2)
IMM GRANULOCYTES NFR BLD AUTO: 0 % (ref 0–2)
INR PPP: 1.06 (ref 0.91–1.09)
KETONES UR STRIP-MCNC: ABNORMAL MG/DL
LACTATE SERPL-SCNC: 0.6 MMOL/L (ref 0.5–2)
LEUKOCYTE ESTERASE UR QL STRIP: NEGATIVE
LYMPHOCYTES # BLD AUTO: 0.7 THOUSANDS/ΜL (ref 0.6–4.47)
LYMPHOCYTES NFR BLD AUTO: 8 % (ref 14–44)
MCH RBC QN AUTO: 30.3 PG (ref 26.8–34.3)
MCHC RBC AUTO-ENTMCNC: 32.5 G/DL (ref 31.4–37.4)
MCV RBC AUTO: 93 FL (ref 82–98)
MONOCYTES # BLD AUTO: 0.36 THOUSAND/ΜL (ref 0.17–1.22)
MONOCYTES NFR BLD AUTO: 4 % (ref 4–12)
NEUTROPHILS # BLD AUTO: 7.34 THOUSANDS/ΜL (ref 1.85–7.62)
NEUTS SEG NFR BLD AUTO: 88 % (ref 43–75)
NITRITE UR QL STRIP: NEGATIVE
NRBC BLD AUTO-RTO: 0 /100 WBCS
PH UR STRIP.AUTO: 6 [PH]
PLATELET # BLD AUTO: 225 THOUSANDS/UL (ref 149–390)
PMV BLD AUTO: 10.3 FL (ref 8.9–12.7)
POTASSIUM SERPL-SCNC: 3.1 MMOL/L (ref 3.5–5.3)
PROT SERPL-MCNC: 7.8 G/DL (ref 6.4–8.2)
PROT UR STRIP-MCNC: NEGATIVE MG/DL
PROTHROMBIN TIME: 11.4 SECONDS (ref 9.8–12)
RBC # BLD AUTO: 4.39 MILLION/UL (ref 3.88–5.62)
SODIUM SERPL-SCNC: 139 MMOL/L (ref 136–145)
SP GR UR STRIP.AUTO: <=1.005 (ref 1–1.03)
UROBILINOGEN UR QL STRIP.AUTO: 0.2 E.U./DL
WBC # BLD AUTO: 8.45 THOUSAND/UL (ref 4.31–10.16)

## 2019-09-04 PROCEDURE — 93005 ELECTROCARDIOGRAM TRACING: CPT

## 2019-09-04 PROCEDURE — 80053 COMPREHEN METABOLIC PANEL: CPT | Performed by: EMERGENCY MEDICINE

## 2019-09-04 PROCEDURE — 85730 THROMBOPLASTIN TIME PARTIAL: CPT | Performed by: EMERGENCY MEDICINE

## 2019-09-04 PROCEDURE — 82948 REAGENT STRIP/BLOOD GLUCOSE: CPT

## 2019-09-04 PROCEDURE — 81003 URINALYSIS AUTO W/O SCOPE: CPT | Performed by: EMERGENCY MEDICINE

## 2019-09-04 PROCEDURE — 85610 PROTHROMBIN TIME: CPT | Performed by: EMERGENCY MEDICINE

## 2019-09-04 PROCEDURE — 99285 EMERGENCY DEPT VISIT HI MDM: CPT

## 2019-09-04 PROCEDURE — 87040 BLOOD CULTURE FOR BACTERIA: CPT | Performed by: EMERGENCY MEDICINE

## 2019-09-04 PROCEDURE — 85025 COMPLETE CBC W/AUTO DIFF WBC: CPT | Performed by: EMERGENCY MEDICINE

## 2019-09-04 PROCEDURE — 96361 HYDRATE IV INFUSION ADD-ON: CPT

## 2019-09-04 PROCEDURE — 83605 ASSAY OF LACTIC ACID: CPT | Performed by: EMERGENCY MEDICINE

## 2019-09-04 PROCEDURE — 96360 HYDRATION IV INFUSION INIT: CPT

## 2019-09-04 PROCEDURE — 36415 COLL VENOUS BLD VENIPUNCTURE: CPT | Performed by: EMERGENCY MEDICINE

## 2019-09-04 PROCEDURE — 70450 CT HEAD/BRAIN W/O DYE: CPT

## 2019-09-04 RX ORDER — INSULIN GLARGINE 100 [IU]/ML
20 INJECTION, SOLUTION SUBCUTANEOUS
COMMUNITY
End: 2019-10-18 | Stop reason: SDUPTHER

## 2019-09-04 RX ORDER — INSULIN ASPART 100 [IU]/ML
5 INJECTION, SUSPENSION SUBCUTANEOUS
COMMUNITY
End: 2019-10-18

## 2019-09-04 RX ORDER — CHOLECALCIFEROL (VITAMIN D3) 125 MCG
CAPSULE ORAL
COMMUNITY

## 2019-09-04 RX ORDER — LISINOPRIL 10 MG/1
10 TABLET ORAL DAILY
COMMUNITY
End: 2020-03-11 | Stop reason: SDUPTHER

## 2019-09-04 RX ORDER — POTASSIUM CHLORIDE 20 MEQ/1
20 TABLET, EXTENDED RELEASE ORAL ONCE
Status: COMPLETED | OUTPATIENT
Start: 2019-09-04 | End: 2019-09-04

## 2019-09-04 RX ORDER — BACLOFEN 10 MG/1
10 TABLET ORAL 3 TIMES DAILY
COMMUNITY
End: 2019-10-04 | Stop reason: SDUPTHER

## 2019-09-04 RX ORDER — BACLOFEN 10 MG/1
10 TABLET ORAL ONCE
Status: COMPLETED | OUTPATIENT
Start: 2019-09-04 | End: 2019-09-04

## 2019-09-04 RX ORDER — PEN NEEDLE, DIABETIC 32GX 5/32"
NEEDLE, DISPOSABLE MISCELLANEOUS
Qty: 100 EACH | Refills: 0 | Status: SHIPPED | OUTPATIENT
Start: 2019-09-04 | End: 2019-09-04

## 2019-09-04 RX ORDER — ATORVASTATIN CALCIUM 10 MG/1
10 TABLET, FILM COATED ORAL DAILY
COMMUNITY
End: 2020-03-11 | Stop reason: SDUPTHER

## 2019-09-04 RX ORDER — BACLOFEN 5 MG/1
TABLET ORAL
Qty: 90 TABLET | Refills: 3 | Status: SHIPPED | OUTPATIENT
Start: 2019-09-04 | End: 2019-09-04

## 2019-09-04 RX ADMIN — SODIUM CHLORIDE 1000 ML: 0.9 INJECTION, SOLUTION INTRAVENOUS at 20:58

## 2019-09-04 RX ADMIN — POTASSIUM CHLORIDE 20 MEQ: 1500 TABLET, EXTENDED RELEASE ORAL at 22:03

## 2019-09-04 RX ADMIN — BACLOFEN 10 MG: 10 TABLET ORAL at 21:03

## 2019-09-05 ENCOUNTER — VBI (OUTPATIENT)
Dept: ADMINISTRATIVE | Facility: OTHER | Age: 49
End: 2019-09-05

## 2019-09-05 VITALS
DIASTOLIC BLOOD PRESSURE: 77 MMHG | OXYGEN SATURATION: 97 % | HEART RATE: 96 BPM | TEMPERATURE: 97.8 F | SYSTOLIC BLOOD PRESSURE: 143 MMHG | RESPIRATION RATE: 20 BRPM

## 2019-09-05 LAB
ATRIAL RATE: 101 BPM
P AXIS: 65 DEGREES
PR INTERVAL: 152 MS
QRS AXIS: 39 DEGREES
QRSD INTERVAL: 84 MS
QT INTERVAL: 356 MS
QTC INTERVAL: 461 MS
T WAVE AXIS: 37 DEGREES
VENTRICULAR RATE: 101 BPM

## 2019-09-05 PROCEDURE — 93010 ELECTROCARDIOGRAM REPORT: CPT | Performed by: INTERNAL MEDICINE

## 2019-09-05 NOTE — ED PROVIDER NOTES
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 Taking?    Melatonin 5 MG TABS   Yes Yes   Sig: Take by mouth   atorvastatin (LIPITOR) 10 mg tablet   Yes Yes   Sig: Take 10 mg by mouth daily   baclofen 10 mg tablet   Yes Yes   Sig: Take 10 mg by mouth 3 (three) times a day   insulin aspart protamine-insulin aspart (NovoLOG 70/30) 100 units/mL injection   Yes Yes   Sig: Inject 5 Units under the skin 2 (two) times a day before meals   insulin glargine (LANTUS) 100 units/mL subcutaneous injection   Yes Yes   Sig: Inject 20 Units under the skin daily at bedtime   lisinopril (ZESTRIL) 10 mg tablet   Yes Yes   Sig: Take 10 mg by mouth daily   sitaGLIPtin-metFORMIN (JANUMET)  MG per tablet   Yes Yes   Sig: Take 1 tablet by mouth 2 (two) times a day with meals      Facility-Administered Medications: None       Past Medical History:   Diagnosis Date    Abdominal cyst     Anemia     Hx     Chronic pain disorder     abdominal    Diverticulitis     GI bleed     History of transfusion 2016    5 units    Lightheadedness     Multiple lesions on computed tomography of brain and spine     Spleen laceration        Past Surgical History:   Procedure Laterality Date    ABDOMINAL SURGERY  2016    lap removal of mass post spleen injury    COLONOSCOPY N/A 5/24/2017    Procedure: COLONOSCOPY;  Surgeon: Julia Cordero MD;  Location: [place] [address] GI LAB; Service:     EGD AND COLONOSCOPY N/A 3/15/2017    Procedure: EGD AND COLONOSCOPY;  Surgeon: Julia Cordero MD;  Location: [place] [address] GI LAB; Service:     OTHER SURGICAL HISTORY      per Allscripts-had spleen surgery; no other details    UPPER GASTROINTESTINAL ENDOSCOPY      for gastric bleeding       Family History   Problem Relation Age of Onset    No Known Problems Mother     Diabetes Father     No Known Problems Sister     No Known Problems Brother      I have reviewed and agree with the history as documented  Social History     Tobacco Use    Smoking status: Former Smoker     Packs/day: 0 25     Years: 5 00     Pack years: 1 25     Last attempt to quit: 4/23/2016     Years since quitting: 3 3    Smokeless tobacco: Current User    Tobacco comment: per Allscripts-Former smoker   Substance Use Topics    Alcohol use: Yes     Alcohol/week: 0 0 - 1 0 standard drinks     Frequency: Monthly or less     Drinks per session: 1 or 2     Binge frequency: Never     Comment: ocassionally    Drug use: No        Review of Systems   Constitutional: Negative for activity change, chills, diaphoresis and fever  HENT: Negative for congestion, ear pain, nosebleeds, sore throat, trouble swallowing and voice change  Eyes: Negative for pain, discharge and redness  Respiratory: Negative for apnea, cough, choking, shortness of breath, wheezing and stridor  Cardiovascular: Negative for chest pain and palpitations  Gastrointestinal: Negative for abdominal distention, abdominal pain, constipation, diarrhea, nausea and vomiting  Endocrine: Negative for polydipsia     Genitourinary: Negative for difficulty urinating, dysuria, flank pain, frequency, hematuria and urgency  Musculoskeletal: Positive for gait problem  Negative for back pain, joint swelling, myalgias, neck pain and neck stiffness  Skin: Negative for pallor and rash  Neurological: Positive for weakness  Negative for dizziness, tremors, syncope, speech difficulty, numbness and headaches  Hematological: Negative for adenopathy  Psychiatric/Behavioral: Positive for confusion  Negative for hallucinations, self-injury and suicidal ideas  The patient is not nervous/anxious  Physical Exam  Physical Exam   Constitutional: He is oriented to person, place, and time  He appears well-developed and well-nourished  No distress  HENT:   Head: Normocephalic and atraumatic  Right Ear: External ear normal    Left Ear: External ear normal    Nose: Nose normal    Mouth/Throat: Oropharynx is clear and moist    Eyes: Pupils are equal, round, and reactive to light  Conjunctivae are normal    Neck: Normal range of motion  Neck supple  Cardiovascular: Normal rate, regular rhythm, normal heart sounds and intact distal pulses  Pulmonary/Chest: Effort normal and breath sounds normal    Abdominal: Soft  Bowel sounds are normal    Musculoskeletal: Normal range of motion  Neurological: He is alert and oriented to person, place, and time  He has normal reflexes  Patient has residual right-sided weakness from prior brain lesions  Skin: Skin is warm and dry  He is not diaphoretic  Psychiatric: He has a normal mood and affect  Nursing note and vitals reviewed        Vital Signs  ED Triage Vitals   Temperature Pulse Respirations Blood Pressure SpO2   09/04/19 2023 09/04/19 2023 09/04/19 2023 09/04/19 2030 09/04/19 2023   97 8 °F (36 6 °C) (!) 108 18 148/88 97 %      Temp Source Heart Rate Source Patient Position - Orthostatic VS BP Location FiO2 (%)   09/04/19 2023 09/04/19 2206 09/04/19 2206 09/04/19 2030 --   Tympanic Monitor Lying Left arm       Pain Score --                  Vitals:    09/04/19 2023 09/04/19 2030 09/04/19 2206   BP:  148/88 153/90   Pulse: (!) 108  90   Patient Position - Orthostatic VS:   Lying         Visual Acuity      ED Medications  Medications   baclofen tablet 10 mg (10 mg Oral Given 9/4/19 2103)   sodium chloride 0 9 % bolus 1,000 mL (0 mL Intravenous Stopped 9/4/19 2325)   potassium chloride (K-DUR,KLOR-CON) CR tablet 20 mEq (20 mEq Oral Given 9/4/19 2203)       Diagnostic Studies  Results Reviewed     Procedure Component Value Units Date/Time    UA w Reflex to Microscopic [[id number]]  (Abnormal) Collected:  09/04/19 2203    Lab Status:  Final result Specimen:  Urine, Clean Catch Updated:  09/04/19 2212     Color, UA Yellow     Clarity, UA Clear     Specific Gravity, UA <=1 005     pH, UA 6 0     Leukocytes, UA Negative     Nitrite, UA Negative     Protein, UA Negative mg/dl      Glucose, UA Negative mg/dl      Ketones, UA 15 (1+) mg/dl      Urobilinogen, UA 0 2 E U /dl      Bilirubin, UA Negative     Blood, UA Negative    Lactic acid, plasma [[id number]]  (Normal) Collected:  09/04/19 2056    Lab Status:  Final result Specimen:  Blood from Arm, Left Updated:  09/04/19 2129     LACTIC ACID 0 6 mmol/L     Narrative:       Result may be elevated if tourniquet was used during collection      Comprehensive metabolic panel [[id number]]  (Abnormal) Collected:  09/04/19 2056    Lab Status:  Final result Specimen:  Blood from Arm, Left Updated:  09/04/19 2126     Sodium 139 mmol/L      Potassium 3 1 mmol/L      Chloride 100 mmol/L      CO2 26 mmol/L      ANION GAP 13 mmol/L      BUN 15 mg/dL      Creatinine 0 87 mg/dL      Glucose 84 mg/dL      Calcium 9 3 mg/dL      AST 16 U/L      ALT 14 U/L      Alkaline Phosphatase 79 U/L      Total Protein 7 8 g/dL      Albumin 4 7 g/dL      Total Bilirubin 0 50 mg/dL      eGFR 100 ml/min/1 73sq m     Narrative:       [place] guidelines for Chronic Kidney Disease (CKD):     Stage 1 with normal or high GFR (GFR > 90 mL/min/1 73 square meters)    Stage 2 Mild CKD (GFR = 60-89 mL/min/1 73 square meters)    Stage 3A Moderate CKD (GFR = 45-59 mL/min/1 73 square meters)    Stage 3B Moderate CKD (GFR = 30-44 mL/min/1 73 square meters)    Stage 4 Severe CKD (GFR = 15-29 mL/min/1 73 square meters)    Stage 5 End Stage CKD (GFR <15 mL/min/1 73 square meters)  Note: GFR calculation is accurate only with a steady state creatinine    Protime-INR [[id number]]  (Normal) Collected:  09/04/19 2056    Lab Status:  Final result Specimen:  Blood from Arm, Left Updated:  09/04/19 2119     Protime 11 4 seconds      INR 1 06    APTT [[id number]]  (Normal) Collected:  09/04/19 2056    Lab Status:  Final result Specimen:  Blood from Arm, Left Updated:  09/04/19 2119     PTT 31 seconds     CBC and differential [[id number]]  (Abnormal) Collected:  09/04/19 2056    Lab Status:  Final result Specimen:  Blood from Arm, Left Updated:  09/04/19 2106     WBC 8 45 Thousand/uL      RBC 4 39 Million/uL      Hemoglobin 13 3 g/dL      Hematocrit 40 9 %      MCV 93 fL      MCH 30 3 pg      MCHC 32 5 g/dL      RDW 13 4 %      MPV 10 3 fL      Platelets 781 Thousands/uL      nRBC 0 /100 WBCs      Neutrophils Relative 88 %      Immat GRANS % 0 %      Lymphocytes Relative 8 %      Monocytes Relative 4 %      Eosinophils Relative 0 %      Basophils Relative 0 %      Neutrophils Absolute 7 34 Thousands/µL      Immature Grans Absolute 0 02 Thousand/uL      Lymphocytes Absolute 0 70 Thousands/µL      Monocytes Absolute 0 36 Thousand/µL      Eosinophils Absolute 0 01 Thousand/µL      Basophils Absolute 0 02 Thousands/µL     Blood culture #1 [[id number]] Collected:  09/04/19 2056    Lab Status: In process Specimen:  Blood from Arm, Left Updated:  09/04/19 2103    Blood culture #2 [[id number]] Collected:  09/04/19 2056    Lab Status:   In process Specimen:  Blood from Arm, Right Updated:  09/04/19 2103    Fingerstick Glucose (POCT) [[id number]]  (Normal) Collected:  09/04/19 2038    Lab Status:  Final result Updated:  09/04/19 2051     POC Glucose 76 mg/dl                  CT head without contrast   Final Result by Merlinda Joe, DO (09/04 2316)      No acute intracranial abnormality is seen  Other findings as above  Workstation performed: [id number]                    Procedures  Procedures       ED Course                               MDM    Disposition  Final diagnoses:   Weakness     Time reflects when diagnosis was documented in both MDM as applicable and the Disposition within this note     Time User Action Codes Description Comment    9/5/2019 12:02 AM Adrian Lowe Add [R53 1] Weakness       ED Disposition     ED Disposition Condition Date/Time Comment    Discharge Stable Thu Sep 5, 2019 12:02 AM Felecia Vera discharge to home/self care  Follow-up Information     Follow up With Specialties Details Why Contact Info    Wil Noyola MD Family Medicine Schedule an appointment as soon as possible for a visit in 1 day As needed [id number] [facility] [id number] [phone]            Patient's Medications   Discharge Prescriptions    No medications on file     No discharge procedures on file      ED Provider  Electronically Signed by           Brayan Vega DO  09/05/19 0003

## 2019-09-05 NOTE — TELEPHONE ENCOUNTER
PATIENT WENT TO THE ED ON 9/4 FOR WEAKNESS/ALTERED MENTAL STATUS = NEEDS F/U APPT HEALTH CARE ASSISTANCE PENDING    2nd attempt - lmom - mailing ED Edc letter

## 2019-09-10 LAB
BACTERIA BLD CULT: NORMAL
BACTERIA BLD CULT: NORMAL

## 2019-09-23 ENCOUNTER — APPOINTMENT (EMERGENCY)
Dept: RADIOLOGY | Facility: HOSPITAL | Age: 49
DRG: 052 | End: 2019-09-23
Payer: COMMERCIAL

## 2019-09-23 ENCOUNTER — HOSPITAL ENCOUNTER (INPATIENT)
Facility: HOSPITAL | Age: 49
LOS: 1 days | Discharge: HOME | DRG: 052 | End: 2019-09-25
Attending: EMERGENCY MEDICINE | Admitting: ANESTHESIOLOGY
Payer: COMMERCIAL

## 2019-09-23 DIAGNOSIS — R44.3 HALLUCINATIONS: Primary | ICD-10-CM

## 2019-09-23 DIAGNOSIS — N17.9 AKI (ACUTE KIDNEY INJURY) (HCC): ICD-10-CM

## 2019-09-23 DIAGNOSIS — E87.2 HIGH ANION GAP METABOLIC ACIDOSIS: ICD-10-CM

## 2019-09-23 PROBLEM — R41.82 ALTERED MENTAL STATUS: Status: ACTIVE | Noted: 2019-09-23

## 2019-09-23 LAB
ALBUMIN SERPL BCP-MCNC: 5 G/DL (ref 3.5–5)
ALP SERPL-CCNC: 63 U/L (ref 46–116)
ALT SERPL W P-5'-P-CCNC: 15 U/L (ref 12–78)
AMPHETAMINES SERPL QL SCN: NEGATIVE
ANION GAP SERPL CALCULATED.3IONS-SCNC: 17 MMOL/L (ref 4–13)
APTT PPP: 33 SECONDS (ref 25–32)
ARTERIAL PATENCY WRIST A: YES
AST SERPL W P-5'-P-CCNC: 14 U/L (ref 5–45)
BACTERIA UR QL AUTO: ABNORMAL /HPF
BARBITURATES UR QL: NEGATIVE
BASE EXCESS BLDA CALC-SCNC: -9.5 MMOL/L
BASOPHILS # BLD AUTO: 0.03 THOUSANDS/ΜL (ref 0–0.1)
BASOPHILS NFR BLD AUTO: 0 % (ref 0–1)
BENZODIAZ UR QL: NEGATIVE
BILIRUB SERPL-MCNC: 0.5 MG/DL (ref 0.2–1)
BILIRUB UR QL STRIP: NEGATIVE
BODY TEMPERATURE: 99.7 DEGREES FEHRENHEIT
BUN SERPL-MCNC: 48 MG/DL (ref 5–25)
CALCIUM SERPL-MCNC: 10 MG/DL (ref 8.3–10.1)
CHLORIDE SERPL-SCNC: 98 MMOL/L (ref 100–108)
CLARITY UR: CLEAR
CO2 SERPL-SCNC: 22 MMOL/L (ref 21–32)
COCAINE UR QL: NEGATIVE
COLOR UR: YELLOW
CREAT SERPL-MCNC: 1.38 MG/DL (ref 0.6–1.3)
EOSINOPHIL # BLD AUTO: 0.04 THOUSAND/ΜL (ref 0–0.61)
EOSINOPHIL NFR BLD AUTO: 0 % (ref 0–6)
ERYTHROCYTE [DISTWIDTH] IN BLOOD BY AUTOMATED COUNT: 13.7 % (ref 11.6–15.1)
GFR SERPL CREATININE-BSD FRML MDRD: 59 ML/MIN/1.73SQ M
GLUCOSE SERPL-MCNC: 93 MG/DL (ref 65–140)
GLUCOSE UR STRIP-MCNC: NEGATIVE MG/DL
HCO3 BLDA-SCNC: 16.6 MMOL/L (ref 22–28)
HCT VFR BLD AUTO: 43.2 % (ref 36.5–49.3)
HGB BLD-MCNC: 14.1 G/DL (ref 12–17)
HGB UR QL STRIP.AUTO: ABNORMAL
HYALINE CASTS #/AREA URNS LPF: ABNORMAL /LPF
IMM GRANULOCYTES # BLD AUTO: 0.03 THOUSAND/UL (ref 0–0.2)
IMM GRANULOCYTES NFR BLD AUTO: 0 % (ref 0–2)
INR PPP: 1.17 (ref 0.91–1.09)
KETONES UR STRIP-MCNC: ABNORMAL MG/DL
LEUKOCYTE ESTERASE UR QL STRIP: NEGATIVE
LYMPHOCYTES # BLD AUTO: 1.06 THOUSANDS/ΜL (ref 0.6–4.47)
LYMPHOCYTES NFR BLD AUTO: 9 % (ref 14–44)
MCH RBC QN AUTO: 30.7 PG (ref 26.8–34.3)
MCHC RBC AUTO-ENTMCNC: 32.6 G/DL (ref 31.4–37.4)
MCV RBC AUTO: 94 FL (ref 82–98)
METHADONE UR QL: NEGATIVE
MONOCYTES # BLD AUTO: 0.55 THOUSAND/ΜL (ref 0.17–1.22)
MONOCYTES NFR BLD AUTO: 5 % (ref 4–12)
NEUTROPHILS # BLD AUTO: 9.78 THOUSANDS/ΜL (ref 1.85–7.62)
NEUTS SEG NFR BLD AUTO: 86 % (ref 43–75)
NITRITE UR QL STRIP: NEGATIVE
NON VENT ROOM AIR: 21 %
NON-SQ EPI CELLS URNS QL MICRO: ABNORMAL /HPF
NRBC BLD AUTO-RTO: 0 /100 WBCS
O2 CT BLDA-SCNC: 18 ML/DL (ref 16–23)
OPIATES UR QL SCN: NEGATIVE
OXYHGB MFR BLDA: 93.6 % (ref 94–97)
PCO2 BLDA: 37.1 MM HG (ref 36–44)
PCO2 TEMP ADJ BLDA: 38.1 MM HG (ref 36–44)
PCP UR QL: NEGATIVE
PH BLD: 7.26 [PH] (ref 7.35–7.45)
PH BLDA: 7.27 [PH] (ref 7.35–7.45)
PH UR STRIP.AUTO: 5 [PH]
PLATELET # BLD AUTO: 252 THOUSANDS/UL (ref 149–390)
PMV BLD AUTO: 10.6 FL (ref 8.9–12.7)
PO2 BLD: 88.2 MM HG (ref 75–129)
PO2 BLDA: 84.9 MM HG (ref 75–129)
POTASSIUM SERPL-SCNC: 4.4 MMOL/L (ref 3.5–5.3)
PROT SERPL-MCNC: 8.3 G/DL (ref 6.4–8.2)
PROT UR STRIP-MCNC: NEGATIVE MG/DL
PROTHROMBIN TIME: 12.5 SECONDS (ref 9.8–12)
RBC # BLD AUTO: 4.59 MILLION/UL (ref 3.88–5.62)
RBC #/AREA URNS AUTO: ABNORMAL /HPF
SODIUM SERPL-SCNC: 137 MMOL/L (ref 136–145)
SP GR UR STRIP.AUTO: <=1.005 (ref 1–1.03)
SPECIMEN SOURCE: ABNORMAL
THC UR QL: NEGATIVE
UROBILINOGEN UR QL STRIP.AUTO: 0.2 E.U./DL
WBC # BLD AUTO: 11.49 THOUSAND/UL (ref 4.31–10.16)
WBC #/AREA URNS AUTO: ABNORMAL /HPF

## 2019-09-23 PROCEDURE — 36415 COLL VENOUS BLD VENIPUNCTURE: CPT | Performed by: EMERGENCY MEDICINE

## 2019-09-23 PROCEDURE — 80053 COMPREHEN METABOLIC PANEL: CPT | Performed by: EMERGENCY MEDICINE

## 2019-09-23 PROCEDURE — 93005 ELECTROCARDIOGRAM TRACING: CPT

## 2019-09-23 PROCEDURE — 85610 PROTHROMBIN TIME: CPT | Performed by: EMERGENCY MEDICINE

## 2019-09-23 PROCEDURE — 81001 URINALYSIS AUTO W/SCOPE: CPT | Performed by: NURSE PRACTITIONER

## 2019-09-23 PROCEDURE — 99285 EMERGENCY DEPT VISIT HI MDM: CPT

## 2019-09-23 PROCEDURE — 85025 COMPLETE CBC W/AUTO DIFF WBC: CPT | Performed by: EMERGENCY MEDICINE

## 2019-09-23 PROCEDURE — 82805 BLOOD GASES W/O2 SATURATION: CPT | Performed by: NURSE PRACTITIONER

## 2019-09-23 PROCEDURE — 99220 PR INITIAL OBSERVATION CARE/DAY 70 MINUTES: CPT | Performed by: NURSE PRACTITIONER

## 2019-09-23 PROCEDURE — 85730 THROMBOPLASTIN TIME PARTIAL: CPT | Performed by: EMERGENCY MEDICINE

## 2019-09-23 PROCEDURE — 96361 HYDRATE IV INFUSION ADD-ON: CPT

## 2019-09-23 PROCEDURE — 70470 CT HEAD/BRAIN W/O & W/DYE: CPT

## 2019-09-23 PROCEDURE — 80307 DRUG TEST PRSMV CHEM ANLYZR: CPT | Performed by: NURSE PRACTITIONER

## 2019-09-23 PROCEDURE — 96360 HYDRATION IV INFUSION INIT: CPT

## 2019-09-23 RX ORDER — DEXTROSE AND SODIUM CHLORIDE 5; .9 G/100ML; G/100ML
100 INJECTION, SOLUTION INTRAVENOUS CONTINUOUS
Status: DISCONTINUED | OUTPATIENT
Start: 2019-09-24 | End: 2019-09-24

## 2019-09-23 RX ADMIN — SODIUM CHLORIDE 1000 ML: 0.9 INJECTION, SOLUTION INTRAVENOUS at 20:57

## 2019-09-23 RX ADMIN — IOHEXOL 85 ML: 350 INJECTION, SOLUTION INTRAVENOUS at 21:30

## 2019-09-23 RX ADMIN — SODIUM CHLORIDE 1000 ML: 0.9 INJECTION, SOLUTION INTRAVENOUS at 22:47

## 2019-09-24 PROBLEM — E87.29 HIGH ANION GAP METABOLIC ACIDOSIS: Status: ACTIVE | Noted: 2019-09-24

## 2019-09-24 PROBLEM — E78.5 HYPERLIPIDEMIA: Status: ACTIVE | Noted: 2019-09-24

## 2019-09-24 PROBLEM — D50.9 IRON DEFICIENCY ANEMIA: Chronic | Status: ACTIVE | Noted: 2017-04-14

## 2019-09-24 PROBLEM — R33.9 URINARY RETENTION: Status: RESOLVED | Noted: 2019-09-24 | Resolved: 2019-09-24

## 2019-09-24 PROBLEM — F17.200 TOBACCO USE DISORDER: Chronic | Status: ACTIVE | Noted: 2018-12-29

## 2019-09-24 PROBLEM — N17.9 ACUTE KIDNEY INJURY (HCC): Status: ACTIVE | Noted: 2019-09-24

## 2019-09-24 PROBLEM — E87.29 HIGH ANION GAP METABOLIC ACIDOSIS: Status: RESOLVED | Noted: 2019-09-24 | Resolved: 2019-09-24

## 2019-09-24 PROBLEM — E78.5 HYPERLIPIDEMIA: Chronic | Status: ACTIVE | Noted: 2019-09-24

## 2019-09-24 PROBLEM — Z98.890 S/P CRANIOTOMY: Chronic | Status: ACTIVE | Noted: 2019-02-28

## 2019-09-24 PROBLEM — G92.8 TOXIC METABOLIC ENCEPHALOPATHY: Status: ACTIVE | Noted: 2019-09-23

## 2019-09-24 PROBLEM — G92.8 TOXIC METABOLIC ENCEPHALOPATHY: Status: RESOLVED | Noted: 2019-09-23 | Resolved: 2019-09-24

## 2019-09-24 PROBLEM — E11.9 TYPE II DIABETES MELLITUS (HCC): Status: RESOLVED | Noted: 2018-12-29 | Resolved: 2019-09-24

## 2019-09-24 PROBLEM — N17.9 ACUTE KIDNEY INJURY (HCC): Status: RESOLVED | Noted: 2019-09-24 | Resolved: 2019-09-24

## 2019-09-24 PROBLEM — E87.2 HIGH ANION GAP METABOLIC ACIDOSIS: Status: ACTIVE | Noted: 2019-09-24

## 2019-09-24 PROBLEM — E11.65 TYPE 2 DIABETES MELLITUS WITH HYPERGLYCEMIA (HCC): Chronic | Status: ACTIVE | Noted: 2018-12-31

## 2019-09-24 PROBLEM — E87.2 HIGH ANION GAP METABOLIC ACIDOSIS: Status: RESOLVED | Noted: 2019-09-24 | Resolved: 2019-09-24

## 2019-09-24 PROBLEM — R33.9 URINARY RETENTION: Status: ACTIVE | Noted: 2019-09-24

## 2019-09-24 LAB
ANION GAP SERPL CALCULATED.3IONS-SCNC: 13 MMOL/L (ref 4–13)
ANION GAP SERPL CALCULATED.3IONS-SCNC: 15 MMOL/L (ref 4–13)
APAP SERPL-MCNC: <2 UG/ML (ref 10–20)
ARTERIAL PATENCY WRIST A: YES
ATRIAL RATE: 99 BPM
BASE EXCESS BLDA CALC-SCNC: -3.9 MMOL/L
BETA-HYDROXYBUTYRATE: 2.6 MMOL/L
BODY TEMPERATURE: 99.2 DEGREES FEHRENHEIT
BUN SERPL-MCNC: 33 MG/DL (ref 5–25)
BUN SERPL-MCNC: 36 MG/DL (ref 5–25)
CALCIUM SERPL-MCNC: 8.4 MG/DL (ref 8.3–10.1)
CALCIUM SERPL-MCNC: 8.7 MG/DL (ref 8.3–10.1)
CHLORIDE SERPL-SCNC: 104 MMOL/L (ref 100–108)
CHLORIDE SERPL-SCNC: 104 MMOL/L (ref 100–108)
CK MB SERPL-MCNC: 1.6 % (ref 0–2.5)
CK MB SERPL-MCNC: 3.8 NG/ML (ref 0–5)
CK SERPL-CCNC: 237 U/L (ref 39–308)
CO2 SERPL-SCNC: 22 MMOL/L (ref 21–32)
CO2 SERPL-SCNC: 23 MMOL/L (ref 21–32)
CREAT SERPL-MCNC: 0.99 MG/DL (ref 0.6–1.3)
CREAT SERPL-MCNC: 1.09 MG/DL (ref 0.6–1.3)
ERYTHROCYTE [DISTWIDTH] IN BLOOD BY AUTOMATED COUNT: 13.7 % (ref 11.6–15.1)
EST. AVERAGE GLUCOSE BLD GHB EST-MCNC: 108 MG/DL
ETHANOL SERPL-MCNC: <3 MG/DL (ref 0–3)
GFR SERPL CREATININE-BSD FRML MDRD: 78 ML/MIN/1.73SQ M
GFR SERPL CREATININE-BSD FRML MDRD: 88 ML/MIN/1.73SQ M
GLUCOSE SERPL-MCNC: 100 MG/DL (ref 65–140)
GLUCOSE SERPL-MCNC: 100 MG/DL (ref 65–140)
GLUCOSE SERPL-MCNC: 107 MG/DL (ref 65–140)
GLUCOSE SERPL-MCNC: 135 MG/DL (ref 65–140)
GLUCOSE SERPL-MCNC: 162 MG/DL (ref 65–140)
GLUCOSE SERPL-MCNC: 88 MG/DL (ref 65–140)
GLUCOSE SERPL-MCNC: 92 MG/DL (ref 65–140)
HBA1C MFR BLD: 5.4 % (ref 4.2–6.3)
HCO3 BLDA-SCNC: 22.3 MMOL/L (ref 22–28)
HCT VFR BLD AUTO: 39.8 % (ref 36.5–49.3)
HGB BLD-MCNC: 12.8 G/DL (ref 12–17)
LACTATE SERPL-SCNC: 0.4 MMOL/L (ref 0.5–2)
MCH RBC QN AUTO: 30.5 PG (ref 26.8–34.3)
MCHC RBC AUTO-ENTMCNC: 32.2 G/DL (ref 31.4–37.4)
MCV RBC AUTO: 95 FL (ref 82–98)
NASAL CANNULA: 1.5
O2 CT BLDA-SCNC: 17.5 ML/DL (ref 16–23)
OXYHGB MFR BLDA: 94.6 % (ref 94–97)
P AXIS: 79 DEGREES
PCO2 BLDA: 45.1 MM HG (ref 36–44)
PCO2 TEMP ADJ BLDA: 45.7 MM HG (ref 36–44)
PH BLD: 7.31 [PH] (ref 7.35–7.45)
PH BLDA: 7.31 [PH] (ref 7.35–7.45)
PLATELET # BLD AUTO: 216 THOUSANDS/UL (ref 149–390)
PMV BLD AUTO: 10.2 FL (ref 8.9–12.7)
PO2 BLD: 91 MM HG (ref 75–129)
PO2 BLDA: 89.3 MM HG (ref 75–129)
POTASSIUM SERPL-SCNC: 4.1 MMOL/L (ref 3.5–5.3)
POTASSIUM SERPL-SCNC: 4.5 MMOL/L (ref 3.5–5.3)
PR INTERVAL: 150 MS
QRS AXIS: 67 DEGREES
QRSD INTERVAL: 78 MS
QT INTERVAL: 332 MS
QTC INTERVAL: 426 MS
RBC # BLD AUTO: 4.2 MILLION/UL (ref 3.88–5.62)
SALICYLATES SERPL-MCNC: 13 MG/DL (ref 3–20)
SALICYLATES SERPL-MCNC: 17.6 MG/DL (ref 3–20)
SALICYLATES SERPL-MCNC: 18.8 MG/DL (ref 3–20)
SODIUM SERPL-SCNC: 140 MMOL/L (ref 136–145)
SODIUM SERPL-SCNC: 141 MMOL/L (ref 136–145)
SPECIMEN SOURCE: ABNORMAL
T WAVE AXIS: 51 DEGREES
VENTRICULAR RATE: 99 BPM
WBC # BLD AUTO: 8.48 THOUSAND/UL (ref 4.31–10.16)

## 2019-09-24 PROCEDURE — 87147 CULTURE TYPE IMMUNOLOGIC: CPT | Performed by: FAMILY MEDICINE

## 2019-09-24 PROCEDURE — 80329 ANALGESICS NON-OPIOID 1 OR 2: CPT | Performed by: NURSE PRACTITIONER

## 2019-09-24 PROCEDURE — 80329 ANALGESICS NON-OPIOID 1 OR 2: CPT | Performed by: PHYSICIAN ASSISTANT

## 2019-09-24 PROCEDURE — 90732 PPSV23 VACC 2 YRS+ SUBQ/IM: CPT | Performed by: PHYSICIAN ASSISTANT

## 2019-09-24 PROCEDURE — 99255 IP/OBS CONSLTJ NEW/EST HI 80: CPT | Performed by: PSYCHIATRY & NEUROLOGY

## 2019-09-24 PROCEDURE — 80048 BASIC METABOLIC PNL TOTAL CA: CPT | Performed by: NURSE PRACTITIONER

## 2019-09-24 PROCEDURE — 99255 IP/OBS CONSLTJ NEW/EST HI 80: CPT | Performed by: PHYSICIAN ASSISTANT

## 2019-09-24 PROCEDURE — 85027 COMPLETE CBC AUTOMATED: CPT | Performed by: PHYSICIAN ASSISTANT

## 2019-09-24 PROCEDURE — 82553 CREATINE MB FRACTION: CPT | Performed by: NURSE PRACTITIONER

## 2019-09-24 PROCEDURE — 99238 HOSP IP/OBS DSCHRG MGMT 30/<: CPT | Performed by: PHYSICIAN ASSISTANT

## 2019-09-24 PROCEDURE — 80320 DRUG SCREEN QUANTALCOHOLS: CPT | Performed by: NURSE PRACTITIONER

## 2019-09-24 PROCEDURE — 82010 KETONE BODYS QUAN: CPT | Performed by: NURSE PRACTITIONER

## 2019-09-24 PROCEDURE — 82805 BLOOD GASES W/O2 SATURATION: CPT | Performed by: PHYSICIAN ASSISTANT

## 2019-09-24 PROCEDURE — 87081 CULTURE SCREEN ONLY: CPT | Performed by: FAMILY MEDICINE

## 2019-09-24 PROCEDURE — 99449 NTRPROF PH1/NTRNET/EHR 31/>: CPT | Performed by: EMERGENCY MEDICINE

## 2019-09-24 PROCEDURE — 82948 REAGENT STRIP/BLOOD GLUCOSE: CPT

## 2019-09-24 PROCEDURE — 80048 BASIC METABOLIC PNL TOTAL CA: CPT | Performed by: PHYSICIAN ASSISTANT

## 2019-09-24 PROCEDURE — 83036 HEMOGLOBIN GLYCOSYLATED A1C: CPT | Performed by: PHYSICIAN ASSISTANT

## 2019-09-24 PROCEDURE — 82550 ASSAY OF CK (CPK): CPT | Performed by: NURSE PRACTITIONER

## 2019-09-24 PROCEDURE — 83605 ASSAY OF LACTIC ACID: CPT | Performed by: NURSE PRACTITIONER

## 2019-09-24 PROCEDURE — 93010 ELECTROCARDIOGRAM REPORT: CPT | Performed by: INTERNAL MEDICINE

## 2019-09-24 RX ORDER — BACLOFEN 10 MG/1
10 TABLET ORAL 3 TIMES DAILY
Status: DISCONTINUED | OUTPATIENT
Start: 2019-09-24 | End: 2019-09-25 | Stop reason: HOSPADM

## 2019-09-24 RX ORDER — ONDANSETRON 2 MG/ML
4 INJECTION INTRAMUSCULAR; INTRAVENOUS EVERY 6 HOURS PRN
Status: DISCONTINUED | OUTPATIENT
Start: 2019-09-24 | End: 2019-09-25 | Stop reason: HOSPADM

## 2019-09-24 RX ORDER — INSULIN GLARGINE 100 [IU]/ML
20 INJECTION, SOLUTION SUBCUTANEOUS
Status: DISCONTINUED | OUTPATIENT
Start: 2019-09-24 | End: 2019-09-25 | Stop reason: HOSPADM

## 2019-09-24 RX ORDER — METOPROLOL TARTRATE 5 MG/5ML
5 INJECTION INTRAVENOUS ONCE
Status: DISCONTINUED | OUTPATIENT
Start: 2019-09-24 | End: 2019-09-24

## 2019-09-24 RX ORDER — ECHINACEA PURPUREA EXTRACT 125 MG
1 TABLET ORAL
Status: DISCONTINUED | OUTPATIENT
Start: 2019-09-24 | End: 2019-09-25 | Stop reason: HOSPADM

## 2019-09-24 RX ORDER — INSULIN ASPART 100 [IU]/ML
5 INJECTION, SUSPENSION SUBCUTANEOUS
Status: DISCONTINUED | OUTPATIENT
Start: 2019-09-24 | End: 2019-09-25 | Stop reason: HOSPADM

## 2019-09-24 RX ORDER — ATORVASTATIN CALCIUM 10 MG/1
10 TABLET, FILM COATED ORAL DAILY
Status: DISCONTINUED | OUTPATIENT
Start: 2019-09-24 | End: 2019-09-25 | Stop reason: HOSPADM

## 2019-09-24 RX ORDER — OXYMETAZOLINE HYDROCHLORIDE 0.05 G/100ML
2 SPRAY NASAL EVERY 12 HOURS SCHEDULED
Status: DISCONTINUED | OUTPATIENT
Start: 2019-09-24 | End: 2019-09-25 | Stop reason: HOSPADM

## 2019-09-24 RX ADMIN — INSULIN ASPART 5 UNITS: 100 INJECTION, SUSPENSION SUBCUTANEOUS at 16:24

## 2019-09-24 RX ADMIN — OXYMETAZOLINE HCL 2 SPRAY: 0.05 SPRAY NASAL at 23:06

## 2019-09-24 RX ADMIN — SODIUM BICARBONATE 100 ML/HR: 84 INJECTION, SOLUTION INTRAVENOUS at 03:23

## 2019-09-24 RX ADMIN — ATORVASTATIN CALCIUM 10 MG: 10 TABLET, FILM COATED ORAL at 08:12

## 2019-09-24 RX ADMIN — NICOTINE 1 PATCH: 7 PATCH TRANSDERMAL at 08:19

## 2019-09-24 RX ADMIN — BACLOFEN 10 MG: 10 TABLET ORAL at 22:17

## 2019-09-24 RX ADMIN — INSULIN LISPRO 1 UNITS: 100 INJECTION, SOLUTION INTRAVENOUS; SUBCUTANEOUS at 11:49

## 2019-09-24 RX ADMIN — BACLOFEN 10 MG: 10 TABLET ORAL at 08:12

## 2019-09-24 RX ADMIN — INSULIN ASPART 5 UNITS: 100 INJECTION, SUSPENSION SUBCUTANEOUS at 08:11

## 2019-09-24 RX ADMIN — INSULIN GLARGINE 20 UNITS: 100 INJECTION, SOLUTION SUBCUTANEOUS at 01:51

## 2019-09-24 RX ADMIN — DEXTROSE AND SODIUM CHLORIDE 100 ML/HR: 5; .9 INJECTION, SOLUTION INTRAVENOUS at 00:58

## 2019-09-24 RX ADMIN — PNEUMOCOCCAL VACCINE POLYVALENT 0.5 ML
25; 25; 25; 25; 25; 25; 25; 25; 25; 25; 25; 25; 25; 25; 25; 25; 25; 25; 25; 25; 25; 25; 25 INJECTION, SOLUTION INTRAMUSCULAR; SUBCUTANEOUS at 13:49

## 2019-09-24 RX ADMIN — BACLOFEN 10 MG: 10 TABLET ORAL at 16:24

## 2019-09-24 RX ADMIN — INSULIN GLARGINE 20 UNITS: 100 INJECTION, SOLUTION SUBCUTANEOUS at 22:17

## 2019-09-24 NOTE — ED NOTES
Pt did not respond to verbal stimuli  Patient aroused with painful stimuli       Tessy Aleman RN  09/23/19 3211

## 2019-09-24 NOTE — ASSESSMENT & PLAN NOTE
Etiology unclear, urine positive for ketones, patient does have an BELLA  He is unable to provide detail of the events for the past several days  · Ethanol, lactic acid, salicylate, beta hydroxybutyrate pending  · Start D5NS - query starvation acidosis?   · Repeat ABG in AM

## 2019-09-24 NOTE — H&P
H&P- Celsa Ballesteros 1970, 44 y o  male MRN: [id number]    Unit/Bed#: 2 [place] [address] Encounter: [id number]    Primary Care Provider: Lacy Santa MD   Date and time admitted to hospital: 9/23/2019  8:26 PM    * Altered mental status  Assessment & Plan  Patient presented to the ED with AMS  Family was called to obtain history but did not call back  Per ER staff, patient was brought in by EMS after he was confused and walking around his house with a machete  In the ER initially patient was alert and oriented for staff  At the time of evaluation, patient was disoriented and was unable to answer basic questions, speaking inappropriately  Patient was again seen on the floor and was alert and oriented with no recollection of the ER, today's events or events for the past three days  CT of the head unremarkable  Labs revealed mild leukocytosis, BELLA with Cr of 1 3, anion gap metabolic acidosis  UDS negative  Patient had over 800cc of urine in the bladder and catheter was placed  · Admit to medicine  · Neuro check every 4 hours  · Neurology evaluation  · EEG  · MRI of the brain  · 1:1 for safety  · Management of metabolic acidosis as below    Hyperlipidemia  Assessment & Plan  Continue lipitor  Check lipid panel    Acute kidney injury ([address] )  Assessment & Plan  In the setting of urinary retention and likely poor oral intake  · Continue wiseman catheter for now  · Urinalysis with no infection  · Repeat BMP in AM  · If no improvement - renal ultrasound    Urinary retention  Assessment & Plan  History of urinary retention  Wiseman catheter was placed in the ER as patient was very difficult to catheterize  Urology evaluation    High anion gap metabolic acidosis  Assessment & Plan  Etiology unclear, urine positive for ketones, patient does have an BELLA  He is unable to provide detail of the events for the past several days      · Ethanol, lactic acid, salicylate, beta hydroxybutyrate pending  · Start D5NS - query starvation acidosis? · Repeat ABG in AM    Type 2 diabetes mellitus with hyperglycemia [facility])  Assessment & Plan  Lab Results   Component Value Date    HGBA1C 6 5 05/09/2019   Continue lantus and 70/30 per home routine  Diabetic diet  Accuchecks AC/HS with insulin sliding scale  Check Hgba1c  Hold metformin    S/P craniotomy  Assessment & Plan  For multiple abscesses; residual spasticity  Continue baclofen    Benign essential hypertension  Assessment & Plan  Hold lisinopril given BELLA  Monitor BPs    VTE Prophylaxis: Enoxaparin (Lovenox)  / sequential compression device   Code Status: Level 1 - Full Code    Anticipated Length of Stay:  Patient will be admitted on an Observation basis with an anticipated length of stay of less than 2 midnights  Justification for Hospital Stay: AMS    Total Time for Visit, including Counseling / Coordination of Care: 30 minutes  Greater than 50% of this total time spent on direct patient counseling and coordination of care  Chief Complaint:   Hallucinations (squad states pt was running around with a machete, pt states has been having hallucinations after waking up, states dreams seem to continue after he wakes up for about 5-10 minutes  Tonight woke up and went downstairs, turned and "saw a pair of sneakered feet at the top of the stairs, no one else should have been there so I grabbed a machete, I guess I was running around with it")      History of Present Illness:    Celsa Ballesteros is a 44 y o  male with a PMH of hypertension, hyperlipidemia, insulin dependent diabetes, brain abscess post craniotomy who presents with altered mental status  Family was called to obtain history but did not call back  Per ER staff, patient was brought in by EMS after he was confused and walking around his house with a machete  In the ER initially patient was alert and oriented for staff    At the time of evaluation, patient was disoriented and was unable to answer basic questions, speaking inappropriately  Patient was again seen on the floor and was alert and oriented with no recollection of the ER, today's events or events for the past three days  CT of the head unremarkable  Labs revealed mild leukocytosis, BELLA with Cr of 1 3, anion gap metabolic acidosis  UDS negative  Patient had over 800cc of urine in the bladder and catheter was placed  Review of Systems:    Review of Systems   Unable to perform ROS: Mental status change       Past Medical and Surgical History:     Past Medical History:   Diagnosis Date    Abdominal cyst     Anemia     Hx     Chronic pain disorder     abdominal    Diabetes mellitus ([address] )     Diverticulitis     GI bleed     History of transfusion 2016    5 units    Lightheadedness     Multiple lesions on computed tomography of brain and spine     Spleen laceration        Past Surgical History:   Procedure Laterality Date    ABDOMINAL SURGERY  2016    lap removal of mass post spleen injury    COLONOSCOPY N/A 5/24/2017    Procedure: COLONOSCOPY;  Surgeon: Nicolette Monge MD;  Location: [facility] GI LAB; Service:     EGD AND COLONOSCOPY N/A 3/15/2017    Procedure: EGD AND COLONOSCOPY;  Surgeon: Nicolette Monge MD;  Location: [facility] GI LAB; Service:     OTHER SURGICAL HISTORY      per Allscripts-had spleen surgery; no other details    UPPER GASTROINTESTINAL ENDOSCOPY      for gastric bleeding       Meds/Allergies:    Prior to Admission medications    Medication Sig Start Date End Date Taking?  Authorizing Provider   metFORMIN (GLUCOPHAGE) 500 mg tablet Take 500 mg by mouth 2 (two) times a day with meals   Yes Historical Provider, MD   atorvastatin (LIPITOR) 10 mg tablet Take 10 mg by mouth daily    Historical Provider, MD   baclofen 10 mg tablet Take 10 mg by mouth 3 (three) times a day    Historical Provider, MD   insulin aspart protamine-insulin aspart (NovoLOG 70/30) 100 units/mL injection Inject 5 Units under the skin 2 (two) times a day before meals Historical Provider, MD   insulin glargine (LANTUS) 100 units/mL subcutaneous injection Inject 20 Units under the skin daily at bedtime    Historical Provider, MD   lisinopril (ZESTRIL) 10 mg tablet Take 10 mg by mouth daily    Historical Provider, MD   Melatonin 5 MG TABS Take by mouth    Historical Provider, MD   sitaGLIPtin-metFORMIN (JANUMET)  MG per tablet Take 1 tablet by mouth 2 (two) times a day with meals    Historical Provider, MD       Allergies: No Known Allergies    Social History:     Marital Status: Single   Substance Use History:   Social History     Substance and Sexual Activity   Alcohol Use Yes    Alcohol/week: 0 0 - 1 0 standard drinks    Frequency: Monthly or less    Drinks per session: 1 or 2    Binge frequency: Never    Comment: ocassionally     Social History     Tobacco Use   Smoking Status Former Smoker    Packs/day: 0 25    Years: 5 00    Pack years: 1 25    Last attempt to quit: 4/23/2016    Years since quitting: 3 4   Smokeless Tobacco Current User   Tobacco Comment    per [employer]-Former smoker     Social History     Substance and Sexual Activity   Drug Use No       Family History:    Family History   Problem Relation Age of Onset    No Known Problems Mother     Diabetes Father     No Known Problems Sister     No Known Problems Brother        Physical Exam:     Vitals:   Blood Pressure: 117/79 (09/24/19 0024)  Pulse: (!) 109 (09/24/19 0024)  Temperature: 98 9 °F (37 2 °C) (09/24/19 0024)  Temp Source: Tympanic (09/23/19 2338)  Respirations: 18 (09/24/19 0024)  Height: 5' 9" (175 3 cm) (09/24/19 0048)  Weight - Scale: 72 kg (158 lb 11 7 oz) (09/24/19 0048)  SpO2: 94 % (09/24/19 0024)    Physical Exam   Constitutional: He appears well-developed  HENT:   Head: Normocephalic and atraumatic  Eyes: Pupils are equal, round, and reactive to light  Neck: Normal range of motion  Cardiovascular: Normal rate, regular rhythm and normal heart sounds     Pulmonary/Chest: Effort normal and breath sounds normal    Abdominal: Soft  Bowel sounds are normal    Musculoskeletal: He exhibits no edema  Spasms to right side of body  Mild weakness comparatively  Waxing and waning mentation   Neurological: He is alert  Skin: Skin is warm and dry  Psychiatric: He has a normal mood and affect  His behavior is normal    Nursing note and vitals reviewed  Additional Data:     Lab Results: I have personally reviewed pertinent reports  Results from last 7 days   Lab Units 09/23/19 2053   WBC Thousand/uL 11 49*   HEMOGLOBIN g/dL 14 1   HEMATOCRIT % 43 2   PLATELETS Thousands/uL 252   NEUTROS PCT % 86*     Results from last 7 days   Lab Units 09/23/19 2053   SODIUM mmol/L 137   POTASSIUM mmol/L 4 4   CHLORIDE mmol/L 98*   CO2 mmol/L 22   BUN mg/dL 48*   CREATININE mg/dL 1 38*   CALCIUM mg/dL 10 0   TOTAL BILIRUBIN mg/dL 0 50   ALK PHOS U/L 63   ALT U/L 15   AST U/L 14     Results from last 7 days   Lab Units 09/23/19 2053   INR  1 17*                   Imaging: I have personally reviewed pertinent reports  CT head with and without contrast   Final Result by Maurizio Chairez MD (09/23 2151)      No acute intracranial abnormalities visualized on CT of the brain without and with contrast       Workstation performed: [id number]         MRI inpatient order    (Results Pending)       CT head with and without contrast   Final Result      No acute intracranial abnormalities visualized on CT of the brain without and with contrast       Workstation performed: [id number]         MRI inpatient order    (Results Pending)       EKG, Pathology, and Other Studies Reviewed on Admission:   · EKG: pending    Allscripts / Epic Records Reviewed: Yes     ** Please Note: This note has been constructed using a voice recognition system   **

## 2019-09-24 NOTE — CONSULTS
Consult- Dennys Varela 1970, 44 y o  male MRN: [id number]    Unit/Bed#: [address] [place] [address] Encounter: [id number]    Primary Care Provider: Britney Brown MD   Date and time admitted to hospital: 9/23/2019  8:26 PM      Inpatient consult to Deanna Sanders performed by: Abhi Estrada PA-C  Consult ordered by: TIM Green          * Altered mental status  Assessment & Plan  · Waxing and waning mental status between being clear and coherent and completely confused  · Unclear if medication overdose  Patient had a metabolic acidosis evident on labs and later a salicylate level was found to be positive  Given the unclear timeline as patient cannot remember if he took any medication, poison Control advising that we treat this as a true overdose  · Neurochecks q2 hrs  · Toxicology consult  · MRI of brain and EEG ordered    High anion gap metabolic acidosis  Assessment & Plan  · ABG and BMP with anion gap acidosis with unclear etiology  · Given salicylate level positive, and unknown time of ingestion, advised by poison Control to treat as true overdose  · Bicarb infusion  Poison Control recommending dialysis however will discuss further with Nephrology  · Nephrology consult  · Patient also had positive ketones in urine as well as positive beta hydroxybutyrate, possible component of starvation ketosis    Acute kidney injury [facility])  Assessment & Plan  · Likely pre renal, continue fluid resuscitation    S/P craniotomy  Assessment & Plan  · History of brain abscess status post craniotomy in January 2019  Grow strep anginosus and completed 6 week therapy with ceftriaxone at that time      Type 2 diabetes mellitus with hyperglycemia [facility])  Assessment & Plan  Lab Results   Component Value Date    HGBA1C 6 5 05/09/2019       Recent Labs     09/24/19  0131   POCGLU 92     Home metformin, sliding scale insulin    Blood Sugar Average: Last 72 hrs:  (P) 92    Urinary retention  Assessment & Plan  · History of retention in the past   Brandt currently in place, will attempt voiding trial    Hyperlipidemia  Assessment & Plan  · Continue Lipitor    Benign essential hypertension  Assessment & Plan  · Hold lisinopril for now      -------------------------------------------------------------------------------------------------------------  Chief Complaint:     History of Present Illness   HX and PE limited by: Altered mental status  Nelda Karimi is a 44 y o  male past medical history of hypertension, hyperlipidemia, insulin-dependent diabetes, 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 antibiotic treatment, duodenal ulcers who presents with altered mental status  Patient does not remember anything from the past week and is currently alert and oriented  Reportedly patient was disoriented and unable to answer basic questions, speaking inappropriately  At home, he was confused in yielding a machete around the house  Currently unable to reach his family  Patient was admitted initially to Internal Medicine, however in the workup of a metabolic acidosis, his salicylate level was elevated and there was a concern for overdose  He will transfer to step-down for further monitoring    History obtained from chart review and the patient   -------------------------------------------------------------------------------------------------------------  Dispo: Transfer to Stepdown Level 1     Code Status: Level 1 - Full Code  --------------------------------------------------------------------------------------------------------------  Review of Systems    A 12-point, complete review of systems was reviewed and negative except as stated above     Physical Exam   Constitutional: He is oriented to person, place, and time  He appears well-developed and well-nourished  No distress  HENT:   Head: Normocephalic and atraumatic  Eyes: Pupils are equal, round, and reactive to light     Neck: Normal range of motion  Neck supple  Cardiovascular: Normal rate and regular rhythm  No murmur heard  Pulmonary/Chest: Effort normal and breath sounds normal  No respiratory distress  Abdominal: Soft  Bowel sounds are normal  He exhibits no distension  Neurological: He is alert and oriented to person, place, and time  Skin: Skin is warm and dry  Nursing note and vitals reviewed  --------------------------------------------------------------------------------------------------------------  Historical Information   Past Medical History:   Diagnosis Date    Abdominal cyst     Anemia     Hx     Chronic pain disorder     abdominal    Diabetes mellitus ([facility] [address] )     Diverticulitis     GI bleed     History of transfusion 2016    5 units    Lightheadedness     Multiple lesions on computed tomography of brain and spine     Spleen laceration      Past Surgical History:   Procedure Laterality Date    ABDOMINAL SURGERY  2016    lap removal of mass post spleen injury    COLONOSCOPY N/A 5/24/2017    Procedure: COLONOSCOPY;  Surgeon: Nicolette Monge MD;  Location: [facility]; Service:     EGD AND COLONOSCOPY N/A 3/15/2017    Procedure: EGD AND COLONOSCOPY;  Surgeon: Nicolette Monge MD;  Location: [facility];   Service:     OTHER SURGICAL HISTORY      per [employer]-had spleen surgery; no other details    UPPER GASTROINTESTINAL ENDOSCOPY      for gastric bleeding     Social History   Social History     Substance and Sexual Activity   Alcohol Use Yes    Alcohol/week: 0 0 - 1 0 standard drinks    Frequency: Monthly or less    Drinks per session: 1 or 2    Binge frequency: Never    Comment: ocassionally     Social History     Substance and Sexual Activity   Drug Use No     Social History     Tobacco Use   Smoking Status Former Smoker    Packs/day: 0 25    Years: 5 00    Pack years: 1 25    Last attempt to quit: 4/23/2016    Years since quitting: 3 4   Smokeless Tobacco Current User   Tobacco Comment    per [employer]-Former smoker     Exercise History:   Family History: I have reviewed this patient's family history and commented on sigificant items within the HPI    Vitals:   Vitals:    09/23/19 2338 09/24/19 0024 09/24/19 0024 09/24/19 0048   BP: 166/66 117/79 117/79    BP Location: Right arm      Pulse: (!) 114 102 (!) 109    Resp: 22  18    Temp: 100 °F (37 8 °C) 98 9 °F (37 2 °C) 98 9 °F (37 2 °C)    TempSrc: Tympanic      SpO2: 96% 96% 94%    Weight:    72 kg (158 lb 11 7 oz)   Height:    5' 9" (1 753 m)     Temp  Min: 97 4 °F (36 3 °C)  Max: 100 °F (37 8 °C)  IBW: 70 7 kg  Height: 5' 9" (175 3 cm)  Body mass index is 23 44 kg/m²  Medications:  Current Facility-Administered Medications   Medication Dose Route Frequency    atorvastatin (LIPITOR) tablet 10 mg  10 mg Oral Daily    baclofen tablet 10 mg  10 mg Oral TID    insulin aspart protamine-insulin aspart (NovoLOG 70/30) 100 units/mL subcutaneous injection 5 Units  5 Units Subcutaneous BID AC    insulin glargine (LANTUS) subcutaneous injection 20 Units 0 2 mL  20 Units Subcutaneous HS    insulin lispro (HumaLOG) 100 units/mL subcutaneous injection 1-5 Units  1-5 Units Subcutaneous TID AC    insulin lispro (HumaLOG) 100 units/mL subcutaneous injection 1-5 Units  1-5 Units Subcutaneous HS    nicotine (NICODERM CQ) 7 mg/24hr TD 24 hr patch 1 patch  1 patch Transdermal Daily    ondansetron (ZOFRAN) injection 4 mg  4 mg Intravenous Q6H PRN    pneumococcal 23-valent polysaccharide vaccine (PNEUMOVAX-23) injection 0 5 mL  0 5 mL Subcutaneous Prior to discharge    sodium bicarbonate 150 mEq in dextrose 5 % 1,000 mL infusion  100 mL/hr Intravenous Continuous     Home medications:  Prior to Admission Medications   Prescriptions Last Dose Informant Patient Reported? Taking?    Melatonin 5 MG TABS   Yes No   Sig: Take by mouth   atorvastatin (LIPITOR) 10 mg tablet   Yes No   Sig: Take 10 mg by mouth daily   baclofen 10 mg tablet   Yes No   Sig: Take 10 mg by mouth 3 (three) times a day   insulin aspart protamine-insulin aspart (NovoLOG 70/30) 100 units/mL injection   Yes No   Sig: Inject 5 Units under the skin 2 (two) times a day before meals   insulin glargine (LANTUS) 100 units/mL subcutaneous injection   Yes No   Sig: Inject 20 Units under the skin daily at bedtime   lisinopril (ZESTRIL) 10 mg tablet   Yes No   Sig: Take 10 mg by mouth daily   metFORMIN (GLUCOPHAGE) 500 mg tablet   Yes Yes   Sig: Take 500 mg by mouth 2 (two) times a day with meals   sitaGLIPtin-metFORMIN (JANUMET)  MG per tablet Not Taking at Unknown time  Yes No   Sig: Take 1 tablet by mouth 2 (two) times a day with meals      Facility-Administered Medications: None     Allergies:  No Known Allergies      Laboratory and Diagnostics:  Results from last 7 days   Lab Units 09/23/19 2053   WBC Thousand/uL 11 49*   HEMOGLOBIN g/dL 14 1   HEMATOCRIT % 43 2   PLATELETS Thousands/uL 252   NEUTROS PCT % 86*   MONOS PCT % 5     Results from last 7 days   Lab Units 09/23/19 2053   SODIUM mmol/L 137   POTASSIUM mmol/L 4 4   CHLORIDE mmol/L 98*   CO2 mmol/L 22   ANION GAP mmol/L 17*   BUN mg/dL 48*   CREATININE mg/dL 1 38*   CALCIUM mg/dL 10 0   GLUCOSE RANDOM mg/dL 93   ALT U/L 15   AST U/L 14   ALK PHOS U/L 63   ALBUMIN g/dL 5 0   TOTAL BILIRUBIN mg/dL 0 50          Results from last 7 days   Lab Units 09/23/19 2053   INR  1 17*   PTT seconds 33*          Results from last 7 days   Lab Units 09/24/19  0051   LACTIC ACID mmol/L 0 4*     ABG:  Results from last 7 days   Lab Units 09/23/19 2335   PH ART  7 269*   PCO2 ART mm Hg 37 1   PO2 ART mm Hg 84 9   HCO3 ART mmol/L 16 6*   BASE EXC ART mmol/L -9 5   ABG SOURCE  Radial, Right     VBG:  Results from last 7 days   Lab Units 09/23/19 2335   ABG SOURCE  Radial, Right             Micro:          EKG: nsr  Imaging: I have personally reviewed pertinent reports  ------------------------------------------------------------------------------------------------------------  Advance Directive and Living Will:      Power of :    POLST:    ------------------------------------------------------------------------------------------------------------  Counseling / Coordination of Care  Total time spent today 41 minutes  Greater than 50% of total time was spent with the patient and / or family counseling and / or coordination of care  A description of the counseling / coordination of care: reviewing labs, notes      Lee Kincaid PA-C        Portions of the record may have been created with voice recognition software  Occasional wrong word or "sound a like" substitutions may have occurred due to the inherent limitations of voice recognition software    Read the chart carefully and recognize, using context, where substitutions have occurred

## 2019-09-24 NOTE — ASSESSMENT & PLAN NOTE
Lab Results   Component Value Date    HGBA1C 6 5 05/09/2019       Recent Labs     09/24/19  0131   POCGLU 92     Home metformin, sliding scale insulin    Blood Sugar Average: Last 72 hrs:  (P) 92

## 2019-09-24 NOTE — QUICK NOTE
Elevated salicylate noted  Poison control contacted  Recommendations received for repeat salicylate every 2 hours, bicarb drip, dialysis, charcoal and nephrology consultation  Poison control recommended ICU admission  Nephrology consulted, case reviewed  Recommendations received for bicarb drip, repeat BMP  Critical care consulted

## 2019-09-24 NOTE — ASSESSMENT & PLAN NOTE
Lab Results   Component Value Date    HGBA1C 6 5 05/09/2019   Continue lantus and 70/30 per home routine  Diabetic diet  Accuchecks AC/HS with insulin sliding scale  Check Hgba1c  Hold metformin

## 2019-09-24 NOTE — DISCHARGE INSTRUCTIONS
Adult Overdose   WHAT YOU NEED TO KNOW:   An overdose occurs when you take more medicine than is safe to take  An overdose may be mild, or it may be a life-threatening emergency  You may feel drowsy, dizzy, or nauseated, depending on what medicine you took  No specific harm was found to your body as a result of your overdose  Your symptoms have decreased over the last 6 to 12 hours  DISCHARGE INSTRUCTIONS:   Call 911 if you or someone close to you has any of the following symptoms:   · Your face is very pale and clammy to the touch  · Your body is limp or you are unable to speak  · You cannot be awakened  · Your breathing is slower or faster than usual      · Your heart is beating slower than usual     · You feel confused or more tired than usual, or you are sweating more than normal     · Your speech is slurred  · Your fingernails or lips are blue or purple  Return to the emergency department if:   · You have severe nausea and vomiting  · You cannot have a bowel movement or urinate  · Your skin and the whites of your eyes turn yellow  Contact your healthcare provider if:   · You think your medicine is not working  · You have nausea, vomiting, diarrhea, or abdominal cramps  · You have questions or concerns about your medicine  Take your medicine as directed:  Contact your healthcare provider if you think your medicine is not helping or if you have side effects  Do not take more medicine that is prescribed  Keep your medicines in the original containers  Keep a list of the medicines, vitamins, and herbs you take  Include the amounts, and when and why you take them  Do not share your medicine with others  Prevent another overdose:   · Read labels carefully  Read the labels of all the medicines that you take  Never take more than the label says to take  If you have questions, ask your pharmacist or healthcare provider  · Do not drink alcohol    Alcohol increases your risk for another overdose  Alcohol can also hide important symptoms that you need to call your healthcare provider for  · Do not drive or operate machinery  until your healthcare provider says it is okay  These activities may be dangerous after an overdose  · Use caution if you take more than one medicine at a time  Mixing medicines or taking more than one medicine at a time can be dangerous  · Tell your family or friends what medicines you are taking  Talk with them about what to do if you have an overdose  Follow up with your healthcare provider as directed: You may need to see a counselor or psychiatrist  Write down your questions so you remember to ask them during your visits  © 2017 [facility] Information is for End User's use only and may not be sold, redistributed or otherwise used for commercial purposes  All illustrations and images included in CareNotes® are the copyrighted property of A D A M [employer] Inc  or [employer]  The above information is an  only  It is not intended as medical advice for individual conditions or treatments  Talk to your doctor, nurse or pharmacist before following any medical regimen to see if it is safe and effective for you

## 2019-09-24 NOTE — ASSESSMENT & PLAN NOTE
· History of brain abscess status post craniotomy in January 2019  Grow strep anginosus and completed 6 week therapy with ceftriaxone at that time

## 2019-09-24 NOTE — ASSESSMENT & PLAN NOTE
Patient presented to the ED with AMS  Family was called to obtain history but did not call back  Per ER staff, patient was brought in by EMS after he was confused and walking around his house with a machete  In the ER initially patient was alert and oriented for staff  At the time of evaluation, patient was disoriented and was unable to answer basic questions, speaking inappropriately  Patient was again seen on the floor and was alert and oriented with no recollection of the ER, today's events or events for the past three days  CT of the head unremarkable  Labs revealed mild leukocytosis, BELLA with Cr of 1 3, anion gap metabolic acidosis  UDS negative  Patient had over 800cc of urine in the bladder and catheter was placed      · Admit to medicine  · Neuro check every 4 hours  · Neurology evaluation  · EEG  · MRI of the brain  · 1:1 for safety  · Management of metabolic acidosis as below

## 2019-09-24 NOTE — CONSULTS
INTERPROFESSIONAL (PHONE) CONSULTATION - Medical Toxicology  Ruben Goetz 44 y o  male MRN: [id number]  Unit/Bed#: ICU 10 Encounter: [id number]      Reason for Consult / Principal Problem: altered mental status  Inpatient consult to Toxicology  Consult performed by: Edward Reid DO  Consult ordered by: Ronan Chowdhury PA-C        09/24/19      ASSESSMENT:  44year-old male with altered mental status    RECOMMENDATIONS:  Presentation is consistent with substance abuse - baclofen and ethanol  There is no evidence of aspirin toxicity or severe baclofen toxicity  From a toxicology standpoint, there is no further intervention needed but I would suggest counseling him on adhering to prescription recommendations regarding his baclofen  For further questions, please contact the medical  on call via [employer] or throughl the [employer] or [employer]  Please see additional teaching note below:    Hx and PE limited by the dynamics of a phone consultation  I have not personally interviewed or evaluated the patient, but only advised based on the information provided to me  Primary provider is responsible for all clinical decisions  Pertinent history, physical exam and clinical findings and course discussed: Ruben Goetz is a 44y o  year old male who presents with possibly loss of consciousness after reportedly taking some extra baclofen and then consuming ethanol  He is currently asymptomatic after observation and supportive care  Review of systems and physical exam not performed by me      Historical Information   Past Medical History:   Diagnosis Date    Abdominal cyst     Anemia     Hx     Chronic pain disorder     abdominal    Diabetes mellitus ([address] )     Diverticulitis     GI bleed     History of transfusion 2016    5 units    Lightheadedness     Multiple lesions on computed tomography of brain and spine     Spleen laceration      Past Surgical History:   Procedure Laterality Date    ABDOMINAL SURGERY  2016    lap removal of mass post spleen injury    COLONOSCOPY N/A 5/24/2017    Procedure: COLONOSCOPY;  Surgeon: Ban Amanda MD;  Location: [facility]; Service:     EGD AND COLONOSCOPY N/A 3/15/2017    Procedure: EGD AND COLONOSCOPY;  Surgeon: Ban Amanda MD;  Location: [facility]; Service:     OTHER SURGICAL HISTORY      per [employer]-had spleen surgery; no other details    UPPER GASTROINTESTINAL ENDOSCOPY      for gastric bleeding     Social History   Social History     Substance and Sexual Activity   Alcohol Use Yes    Alcohol/week: 0 0 - 1 0 standard drinks    Frequency: Monthly or less    Drinks per session: 1 or 2    Binge frequency: Never    Comment: ocassionally     Social History     Substance and Sexual Activity   Drug Use No     Social History     Tobacco Use   Smoking Status Former Smoker    Packs/day: 0 25    Years: 5 00    Pack years: 1 25    Last attempt to quit: 4/23/2016    Years since quitting: 3 4   Smokeless Tobacco Current User   Tobacco Comment    per [employer]-Former smoker     Family History   Problem Relation Age of Onset    No Known Problems Mother     Diabetes Father     No Known Problems Sister     No Known Problems Brother         Prior to Admission medications    Medication Sig Start Date End Date Taking?  Authorizing Provider   metFORMIN (GLUCOPHAGE) 500 mg tablet Take 500 mg by mouth 2 (two) times a day with meals   Yes Historical Provider, MD   atorvastatin (LIPITOR) 10 mg tablet Take 10 mg by mouth daily    Historical Provider, MD   baclofen 10 mg tablet Take 10 mg by mouth 3 (three) times a day    Historical Provider, MD   insulin aspart protamine-insulin aspart (NovoLOG 70/30) 100 units/mL injection Inject 5 Units under the skin 2 (two) times a day before meals    Historical Provider, MD   insulin glargine (LANTUS) 100 units/mL subcutaneous injection Inject 20 Units under the skin daily at bedtime    Historical Provider, MD   lisinopril (ZESTRIL) 10 mg tablet Take 10 mg by mouth daily    Historical Provider, MD   Melatonin 5 MG TABS Take by mouth    Historical Provider, MD   sitaGLIPtin-metFORMIN (JANUMET)  MG per tablet Take 1 tablet by mouth 2 (two) times a day with meals    Historical Provider, MD       Current Facility-Administered Medications   Medication Dose Route Frequency    atorvastatin (LIPITOR) tablet 10 mg  10 mg Oral Daily    baclofen tablet 10 mg  10 mg Oral TID    insulin aspart protamine-insulin aspart (NovoLOG 70/30) 100 units/mL subcutaneous injection 5 Units  5 Units Subcutaneous BID AC    insulin glargine (LANTUS) subcutaneous injection 20 Units 0 2 mL  20 Units Subcutaneous HS    insulin lispro (HumaLOG) 100 units/mL subcutaneous injection 1-5 Units  1-5 Units Subcutaneous TID AC    insulin lispro (HumaLOG) 100 units/mL subcutaneous injection 1-5 Units  1-5 Units Subcutaneous HS    nicotine (NICODERM CQ) 7 mg/24hr TD 24 hr patch 1 patch  1 patch Transdermal Daily    ondansetron (ZOFRAN) injection 4 mg  4 mg Intravenous Q6H PRN    pneumococcal 23-valent polysaccharide vaccine (PNEUMOVAX-23) injection 0 5 mL  0 5 mL Subcutaneous Prior to discharge    sodium bicarbonate 150 mEq in dextrose 5 % 1,000 mL infusion  100 mL/hr Intravenous Continuous       No Known Allergies    Objective       Intake/Output Summary (Last 24 hours) at 9/24/2019 0808  Last data filed at 9/24/2019 0345  Gross per 24 hour   Intake 2191 67 ml   Output 1550 ml   Net 641 67 ml       Invasive Devices:   Peripheral IV 09/23/19 Right Arm (Active)   Site Assessment Clean;Dry; Intact 9/24/2019  3:35 AM   Dressing Type Transparent 9/24/2019  3:35 AM   Line Status Blood return noted; Flushed; Infusing 9/24/2019  3:35 AM   Dressing Status Clean;Dry; Intact 9/24/2019  3:35 AM   Dressing Change Due 09/27/19 9/24/2019  3:35 AM       Urethral Catheter Coude 16 Fr   (Active)   Amt returned on insertion(mL) 1000 mL 9/23/2019 11:36 PM   Reasons to continue Urinary Catheter  Acute urinary retention/obstruction failing urinary retention protocol 9/24/2019  3:23 AM   Goal for Removal Remove after 48 hrs of I/O monitoring 9/24/2019  3:23 AM   Site Assessment Clean;Skin intact; Patent 9/24/2019  3:23 AM   Collection Container Standard drainage bag 9/24/2019  3:23 AM   Securement Method Securing device (Describe) 9/24/2019  3:23 AM   Output (mL) 550 mL 9/24/2019  3:45 AM       Vitals   Vitals:    09/24/19 0540 09/24/19 0600 09/24/19 0700 09/24/19 0723   BP:  115/74 131/61    TempSrc: Oral   Tympanic   Pulse:  101 99    Resp:  (!) 52 (!) 34    Patient Position - Orthostatic VS:       Temp: 99 2 °F (37 3 °C)   98 8 °F (37 1 °C)         EKG, Pathology, and/or Other Studies: I have personally reviewed pertinent reports  Lab Results: I have personally reviewed pertinent reports  Labs:    Results from last 7 days   Lab Units 09/24/19 0512 09/23/19 2053   WBC Thousand/uL 8 48 11 49*   HEMOGLOBIN g/dL 12 8 14 1   HEMATOCRIT % 39 8 43 2   PLATELETS Thousands/uL 216 252   NEUTROS PCT %  --  86*   LYMPHS PCT %  --  9*   MONOS PCT %  --  5      Results from last 7 days   Lab Units 09/24/19 0512 09/23/19 2053   SODIUM mmol/L 140   < > 137   POTASSIUM mmol/L 4 1   < > 4 4   CHLORIDE mmol/L 104   < > 98*   CO2 mmol/L 23   < > 22   BUN mg/dL 33*   < > 48*   CREATININE mg/dL 1 09   < > 1 38*   CALCIUM mg/dL 8 7   < > 10 0   ALK PHOS U/L  --   --  63   ALT U/L  --   --  15   AST U/L  --   --  14    < > = values in this interval not displayed        Results from last 7 days   Lab Units 09/23/19 2053   INR  1 17*   PTT seconds 33*     Results from last 7 days   Lab Units 09/24/19  0051   LACTIC ACID mmol/L 0 4*     0   Lab Value Date/Time    TROPONINI <0 02 03/13/2017 1952         Results from last 7 days   Lab Units 09/24/19 0512  09/24/19  0051 09/24/19  0024   ACETAMINOPHEN LVL ug/mL  --   --   --  <2 0*   ETHANOL LVL mg/dL  --   -- <3  --    SALICYLATE LVL mg/dL 07 0   < >  --   --     < > = values in this interval not displayed  Invalid input(s): EXTPREGUR      Imaging Studies: I have personally reviewed pertinent reports  Counseling / Coordination of Care  Total time spent today 36 minutes  This was a phone consultation

## 2019-09-24 NOTE — CONSULTS
[address]   Neurology Initial Consult    Clayton Harrell is a 44 y o  male  ICU 10/ICU 10          Information obtained from:   Chief Complaint   Patient presents with    Hallucinations     squad states pt was running around with a machete, pt states has been having hallucinations after waking up, states dreams seem to continue after he wakes up for about 5-10 minutes  Tonight woke up and went downstairs, turned and "saw a pair of sneakered feet at the top of the stairs, no one else should have been there so I grabbed a machete, I guess I was running around with it"         Assessment/Plan:    1  Visual hallucinations  2  Auditory hallucinations  3  Dyskinesia  3  S/p left suboccipital craniotomy  4  H/o intracranial abscesses     He may have taken more baclofen dosage than prescribed and had interaction with alcohol explaining his initial encephalopathy  His visual hallucinations are chronic likely from involvement of brainstem with abscesses and surgical resection  Restart him on baclofen 10mg tid and advise family members to monitor that he's taking it only as prescribed  He should avoid alcohol while taking baclofen  His auditory hallucinations are rather new  These are typically psychological  Recommend psych evaluation  Discussed plan  HPI:  Clayton Harrell is a 43 yo M with PMH of DM II is being evaluated for hallucinations  Patient has 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 and is s/p left suboccipital craniotomy in Jan of 2019  Since then his vision is poor and can not focus on an image  He has had visual hallucinations since the surgery  He says that for few days prior to admission he was experiencing auditory hallucinations  He would have conversations with people that were not present according to his family members  Patient was found to be confused at home, speaking inappropriately   He says baclofen 10mg tid was helping with his dyskinesia and visual hallucinations but he may have inadvertently taken more and there was concern for overdose  He had metabolic acidosis  He drinks alcohol periodically as well  His cr is mildly elevated       Past Medical History:   Diagnosis Date    Abdominal cyst     Anemia     Hx     Chronic pain disorder     abdominal    Diabetes mellitus ([address] )     Diverticulitis     GI bleed     History of transfusion 2016    5 units    Lightheadedness     Multiple lesions on computed tomography of brain and spine     Spleen laceration        Past Surgical History:   Procedure Laterality Date    ABDOMINAL SURGERY  2016    lap removal of mass post spleen injury    COLONOSCOPY N/A 5/24/2017    Procedure: COLONOSCOPY;  Surgeon: Carlos Panchal MD;  Location: [facility] GI LAB; Service:     EGD AND COLONOSCOPY N/A 3/15/2017    Procedure: EGD AND COLONOSCOPY;  Surgeon: Carlos Panchal MD;  Location: [facility] GI LAB;   Service:     OTHER SURGICAL HISTORY      per [employer]-had spleen surgery; no other details    UPPER GASTROINTESTINAL ENDOSCOPY      for gastric bleeding       No Known Allergies      Current Facility-Administered Medications:     atorvastatin (LIPITOR) tablet 10 mg, 10 mg, Oral, Daily, Candy Salinas PA-C, 10 mg at 09/24/19 0233    baclofen tablet 10 mg, 10 mg, Oral, TID, Candy Salinas PA-C, 10 mg at 09/24/19 1624    insulin aspart protamine-insulin aspart (NovoLOG 70/30) 100 units/mL subcutaneous injection 5 Units, 5 Units, Subcutaneous, BID AC, Candy Salinas PA-C, 5 Units at 09/24/19 1624    insulin glargine (LANTUS) subcutaneous injection 20 Units 0 2 mL, 20 Units, Subcutaneous, HS, Candy Salinas PA-C, 20 Units at 09/24/19 0151    insulin lispro (HumaLOG) 100 units/mL subcutaneous injection 1-5 Units, 1-5 Units, Subcutaneous, TID AC, 1 Units at 09/24/19 1149 **AND** [CANCELED] Fingerstick Glucose (POCT), , , TID AC, Candy Salinas PA-C    insulin lispro (HumaLOG) 100 units/mL subcutaneous injection 1-5 Units, 1-5 Units, Subcutaneous, HS, Candy Salinas PA-C    nicotine (NICODERM CQ) 7 mg/24hr TD 24 hr patch 1 patch, 1 patch, Transdermal, Daily, Candy Salinas PA-C, 1 patch at 09/24/19 0819    ondansetron (ZOFRAN) injection 4 mg, 4 mg, Intravenous, Q6H PRN, Rain Tse PA-C    Social History     Socioeconomic History    Marital status: Single     Spouse name: Not on file    Number of children: Not on file    Years of education: Not on file    Highest education level: Not on file   Occupational History    Not on file   Social Needs    Financial resource strain: Not on file    Food insecurity:     Worry: Not on file     Inability: Not on file    Transportation needs:     Medical: Not on file     Non-medical: Not on file   Tobacco Use    Smoking status: Former Smoker     Packs/day: 0 25     Years: 5 00     Pack years: 1 25     Last attempt to quit: 4/23/2016     Years since quitting: 3 4    Smokeless tobacco: Current User    Tobacco comment: per [employer]-Former smoker   Substance and Sexual Activity    Alcohol use:  Yes     Alcohol/week: 0 0 - 1 0 standard drinks     Frequency: Monthly or less     Drinks per session: 1 or 2     Binge frequency: Never     Comment: ocassionally    Drug use: No    Sexual activity: Not on file   Lifestyle    Physical activity:     Days per week: Not on file     Minutes per session: Not on file    Stress: Not on file   Relationships    Social connections:     Talks on phone: Not on file     Gets together: Not on file     Attends [employer] service: Not on file     Active member of club or organization: Not on file     Attends meetings of clubs or organizations: Not on file     Relationship status: Not on file    Intimate partner violence:     Fear of current or ex partner: Not on file     Emotionally abused: Not on file     Physically abused: Not on file     Forced sexual activity: Not on file   Other Topics Concern    Not on file   Social History Narrative    Caffeine use    Feels safe at home       Family History   Problem Relation Age of Onset    No Known Problems Mother     Diabetes Father     No Known Problems Sister     No Known Problems Brother          Review of systems:  Please see HPI for positive symptoms  No fever, no chills, no weight change  Ocular: No drainage, no blurred vision  + visual hallucinations HEENT:  No sore throat, earache, or congestion  No neck pain  COR:  No chest pain  No palpitations  Lungs:  no sob, wheezing,  GI:  no  nausea, no vomiting, no diarrhea, no constipation, no anorexia  :  No dysuria, frequency, or urgency  No hematuria  Musculoskeletal:  No joint pain or swelling or edema  Skin:  No rash or itching  Psychiatric:  no anxiety, no depression  Endocrine:  No polyuria or polydipsia  Physical examination:  Vitals:    09/24/19 1600   BP: 160/90   Pulse: 97   Resp: 20   Temp: 98 4 °F (36 9 °C)   SpO2: 96%       GENERAL APPEARANCE:  The patient is alert, oriented  He is in no acute distress  HEENT:  Head is normocephalic  The sinuses are otherwise nontender  Pupils are equal and reactive  NECK:  Supple without lymphadenopathy  HEART:  Regular rate and rhythm  LUNGS:  clear to auscultation  No crackles or wheezes are heard  ABDOMEN:  Soft, nontender, nondistended with good bowel sounds heard  EXTREMITIES:  Without cyanosis, clubbing or edema  Mental status: The patient is alert, attentive, and oriented  Speech is clear and fluent, good repetition, comprehension, and naming  he recalls 3/3 objects at 5 minutes  Cranial nerves:  CN II: Visual fields: poor acuity b/l  Fundoscopic exam is normal with sharp discs and no vascular changes    Pupils are 3 mm and  reactive to light  CN III, IV, VI: At primary gaze, there is no eye deviation  CN V: Facial sensation is intact to pinprick in all 3 divisions bilaterally  Corneal responses are intact    CN VII: Face is symmetric with normal eye closure and smile   CN VIII: Hearing is normal to rubbing fingers  CN IX, X: Palate elevates symmetrically  Phonation is normal   CN XI: Head turning and shoulder shrug are intact  CN XII: Tongue is midline with normal movements and no atrophy  Motor: There is no pronator drift of out-stretched arms  Muscle bulk and tone are normal      Muscle exam  Arm Right Left Leg Right Left   Deltoid 5/5 5/5 Iliopsoas 5/5 5/5   Biceps 5/5 5/5 Quads 5/5 5/5   Triceps 5/5 5/5 Hamstrings 5/5 5/5   Wrist Extension 5/5 5/5 Ankle Dorsi Flexion 5/5 5/5   Wrist Flexion 5/5 5/5 Ankle Plantar Flexion 5/5 5/5   Interossei 5/5 5/5 Ankle Eversion 5/5 5/5   APB 5/5 5/5 Ankle Inversion 5/5 5/5       Reflexes   RJ BJ TJ KJ AJ Plantars Alvarez's   Right 2+ 2+ 2+ 2+ 2+ Downgoing Not present   Left 2+ 2+ 2+ 2+ 2+ Downgoing Not present     Sensory:  Light touch, pinprick, position sense, and vibration sense are intact in fingers and toes  Coordination:  Constant fidgetiness  Moves his head, trunk, feet involuntarily  Romberg negative  Gait/Stance:  Can ambulate with cane  Lab Results   Component Value Date    WBC 8 48 09/24/2019    HGB 12 8 09/24/2019    HCT 39 8 09/24/2019    MCV 95 09/24/2019     09/24/2019     Lab Results   Component Value Date    HGBA1C 5 4 09/24/2019     Lab Results   Component Value Date    ALT 15 09/23/2019    AST 14 09/23/2019    ALKPHOS 63 09/23/2019     Lab Results   Component Value Date    CALCIUM 8 7 09/24/2019    K 4 1 09/24/2019    CO2 23 09/24/2019     09/24/2019    BUN 33 (H) 09/24/2019    CREATININE 1 09 09/24/2019         Radiology          Review of reports and notes reveal:    Independent Interpretation of images or specimens:  Ct Head With And Without Contrast    Result Date: 9/23/2019  No acute intracranial abnormalities visualized on CT of the brain without and with contrast  Workstation performed: [id number]               Thank you for this consult      Total time of encounter: 60 min  More than 50% of time was spent in counseling and coordination of care of patient  DEVAN Walker    [facility]  [address]  [address]

## 2019-09-24 NOTE — UTILIZATION REVIEW
Initial Clinical Review    Admission: Date/Time/Statement: Inpatient Admission Orders (From admission, onward)     Ordered        09/24/19 0419  Inpatient Admission  Once                   Orders Placed This Encounter   Procedures    Place in Observation     Standing Status:   Standing     Number of Occurrences:   1     Order Specific Question:   Admitting Physician     Answer:   Emmanuel Garnica [[id number]]     Order Specific Question:   Level of Care     Answer:   Med Surg [16]    Inpatient Admission     Standing Status:   Standing     Number of Occurrences:   1     Order Specific Question:   Admitting Physician     Answer:   Omid Muñiz [[id number]]     Order Specific Question:   Level of Care     Answer:   Level 1 Stepdown [13]     Order Specific Question:   Estimated length of stay     Answer:   More than 2 Midnights     Order Specific Question:   Certification     Answer:   I certify that inpatient services are medically necessary for this patient for a duration of greater than two midnights  See H&P and MD Progress Notes for additional information about the patient's course of treatment  ED Arrival Information     Expected Arrival Acuity Means of Arrival Escorted By Service Admission Type    9/23/2019 9/23/2019 20:23 Emergent Ambulance [facility] Emergency    Arrival Complaint    altered mental state        Chief Complaint   Patient presents with    Hallucinations     squad states pt was running around with a machete, pt states has been having hallucinations after waking up, states dreams seem to continue after he wakes up for about 5-10 minutes  Tonight woke up and went downstairs, turned and "saw a pair of sneakered feet at the top of the stairs, no one else should have been there so I grabbed a machete, I guess I was running around with it"     Assessment/Plan:   Patient has a history of cryptogenic brain abscesses which were treated    Patient has had problems with spasticity in the extremities since  He states that for the last year he has been taking baclofen up to 6 times a day  He states that in the last few months he has been noticing visual and sometimes auditory hallucinations  He states it feels like he is still in a dream but he knows he is not  More recently patient has been stressed and has been eating and sleeping very poorly  Family states he is not taking care of himself  Today the patient had hallucination that there was someone in the house and he grabbed a machete which he was running around with  Has had no fever no recent head injury          ED Triage Vitals   Temperature Pulse Respirations Blood Pressure SpO2   09/23/19 2037 09/23/19 2037 09/23/19 2037 09/23/19 2037 09/23/19 2037   99 4 °F (37 4 °C) 100 20 152/98 94 %      Temp Source Heart Rate Source Patient Position - Orthostatic VS BP Location FiO2 (%)   09/23/19 2037 09/23/19 2200 09/23/19 2037 09/23/19 2037 --   Tympanic Monitor Lying Right arm       Pain Score       09/23/19 2037       No Pain        Wt Readings from Last 1 Encounters:   09/24/19 68 8 kg (151 lb 10 8 oz)     Additional Vital Signs:   09/23/19 2130    98  24  Abnormal       95 %  None (Room air)     09/23/19 2111              None (Room air)     09/23/19 2100        153/85           09/23/19 2037  99 4 °F (37 4 °C)  100  20  152/98    94 %  None (Room air)  Lying     Pertinent Labs/Diagnostic Test Results:   Results from last 7 days   Lab Units 09/24/19 0512 09/23/19 2053   WBC Thousand/uL 8 48 11 49*   HEMOGLOBIN g/dL 12 8 14 1   HEMATOCRIT % 39 8 43 2   PLATELETS Thousands/uL 216 252   NEUTROS ABS Thousands/µL  --  9 78*     Results from last 7 days   Lab Units 09/24/19  0512 09/24/19  0230 09/23/19 2053   SODIUM mmol/L 140 141 137   POTASSIUM mmol/L 4 1 4 5 4 4   CHLORIDE mmol/L 104 104 98*   CO2 mmol/L 23 22 22   ANION GAP mmol/L 13 15* 17*   BUN mg/dL 33* 36* 48*   CREATININE mg/dL 1 09 0 99 1 38*   EGFR ml/min/1 73sq m 78 88 59   CALCIUM mg/dL 8 7 8 4 10 0     Results from last 7 days   Lab Units 09/23/19  2053   AST U/L 14   ALT U/L 15   ALK PHOS U/L 63   TOTAL PROTEIN g/dL 8 3*   ALBUMIN g/dL 5 0   TOTAL BILIRUBIN mg/dL 0 50     Results from last 7 days   Lab Units 09/24/19  0718 09/24/19  0131   POC GLUCOSE mg/dl 100 92     Results from last 7 days   Lab Units 09/24/19  0512 09/24/19  0230 09/23/19  2053   GLUCOSE RANDOM mg/dL 135 107 93         Results from last 7 days   Lab Units 09/24/19  0512   HEMOGLOBIN A1C % 5 4   EAG mg/dl 108     BETA-HYDROXYBUTYRATE   Date Value Ref Range Status   09/24/2019 2 6 (H) <0 6 mmol/L Final      Results from last 7 days   Lab Units 09/24/19  0546 09/23/19  2335   PH ART  7 312* 7 269*   PCO2 ART mm Hg 45 1* 37 1   PO2 ART mm Hg 89 3 84 9   HCO3 ART mmol/L 22 3 16 6*   BASE EXC ART mmol/L -3 9 -9 5   O2 CONTENT ART mL/dL 17 5 18 0   O2 HGB, ARTERIAL % 94 6 93 6*   ABG SOURCE  Radial, Right Radial, Right     Results from last 7 days   Lab Units 09/24/19  0230   CK TOTAL U/L 237   CK MB INDEX % 1 6   CK MB ng/mL 3 8     Results from last 7 days   Lab Units 09/23/19  2053   PROTIME seconds 12 5*   INR  1 17*   PTT seconds 33*     Results from last 7 days   Lab Units 09/24/19  0051   LACTIC ACID mmol/L 0 4*     Results from last 7 days   Lab Units 09/23/19  2335   CLARITY UA  Clear   COLOR UA  Yellow   SPEC GRAV UA  <=1 005   PH UA  5 0   GLUCOSE UA mg/dl Negative   KETONES UA mg/dl 15 (1+)*   BLOOD UA  Small*   PROTEIN UA mg/dl Negative   NITRITE UA  Negative   BILIRUBIN UA  Negative   UROBILINOGEN UA E U /dl 0 2   LEUKOCYTES UA  Negative   WBC UA /hpf None Seen   RBC UA /hpf 2-4*   BACTERIA UA /hpf Occasional   EPITHELIAL CELLS WET PREP /hpf None Seen     Results from last 7 days   Lab Units 09/23/19  2334   AMPH/METH  Negative   BARBITURATE UR  Negative   BENZODIAZEPINE UR  Negative   COCAINE UR  Negative   METHADONE URINE  Negative   OPIATE UR  Negative   PCP UR  Negative THC UR  Negative     Results from last 7 days   Lab Units 09/24/19  0512 09/24/19  0230 09/24/19  0052 09/24/19  0051 09/24/19  0024   ETHANOL LVL mg/dL  --   --   --  <3  --    ACETAMINOPHEN LVL ug/mL  --   --   --   --  <8 8*   SALICYLATE LVL mg/dL 52 3 17 6 18 8  --   --      CT HEAD= No acute intracranial abnormalities visualized on CT of the brain without and with contrast   ED Treatment:   Medication Administration from 09/23/2019 2023 to 09/24/2019 0012       Date/Time Order Dose Route Action Action by Comments     09/23/2019 2057 sodium chloride 0 9 % bolus 1,000 mL 1,000 mL Intravenous New Bag Sobeida Helton RN      09/23/2019 2130 iohexol (OMNIPAQUE) 350 MG/ML injection (MULTI-DOSE) 85 mL 85 mL Intravenous Given Trude Preeti      09/23/2019 2247 sodium chloride 0 9 % bolus 1,000 mL 1,000 mL Intravenous New Bag Otis Packer RN         Past Medical History:   Diagnosis Date    Abdominal cyst     Anemia     Hx     Chronic pain disorder     abdominal    [address] mellitus ([facility] [address] )     Diverticulitis     GI bleed     History of transfusion 2016    5 units    Lightheadedness     Multiple lesions on computed tomography of brain and spine     Spleen laceration      Present on Admission:   Toxic metabolic encephalopathy   High anion gap metabolic acidosis   Urinary retention   Acute kidney injury ([facility] [address] )   Type 2 diabetes mellitus with hyperglycemia (HCC)   Benign essential hypertension   Hyperlipidemia  Admitting Diagnosis: Hallucinations [R44 3]  BELLA (acute kidney injury) ([facility] [address] ) [N17 9]  Age/Sex: 44 y o  male  Admission Orders:  LEVEL 1 STEPDOWN  1:1 OBSERVATION  ACCUCHECKS WITH COVERAGE SCALE  CONSULT NEURO  NEURO CHECKS Q4H  CONSULT RENAL  CONSULT TOXICOLOGY    Current Facility-Administered Medications:  atorvastatin 10 mg Oral Daily Candy Salinas PA-C   baclofen 10 mg Oral TID Candy Salinas PA-C   insulin aspart protamine-insulin aspart 5 Units Subcutaneous BID AC Candy Salinas PA-C   insulin glargine 20 Units Subcutaneous HS Candy Salinas PA-C   insulin lispro 1-5 Units Subcutaneous TID AC Candy Salinas PA-C   insulin lispro 1-5 Units Subcutaneous HS Candy Salinas PA-C   nicotine 1 patch Transdermal Daily Candy Salinas PA-C   ondansetron 4 mg Intravenous Q6H PRN Candy Salinas PA-C   pneumococcal 23-valent polysaccharide vaccine 0 5 mL Subcutaneous Prior to discharge Rain Tse PA-C   sodium bicarbonate 150 mEq in dextrose 5 % 1,000 mL infusion   Rate: 100 mL/hr Dose: 100 mL/hr  Freq: Continuous Route: IV  Last Dose: Stopped (09/24/19 0820)    Network Utilization Review Department  Phone: [phone]; Fax [phone]  [email or web address]  ATTENTION: Please call with any questions or concerns to [phone]  and carefully listen to the prompts so that you are directed to the right person  Send all requests for admission clinical reviews, approved or denied determinations and any other requests to fax [phone]   All voicemails are confidential

## 2019-09-24 NOTE — ASSESSMENT & PLAN NOTE
· Waxing and waning mental status between being clear and coherent and completely confused  · Unclear if medication overdose  Patient had a metabolic acidosis evident on labs and later a salicylate level was found to be positive  Given the unclear timeline as patient cannot remember if he took any medication, poison Control advising that we treat this as a true overdose    · Neurochecks q2 hrs  · Toxicology consult  · MRI of brain and EEG ordered

## 2019-09-24 NOTE — ASSESSMENT & PLAN NOTE
History of urinary retention  Brandt catheter was placed in the ER as patient was very difficult to catheterize  Urology evaluation

## 2019-09-24 NOTE — DISCHARGE SUMMARY
Discharge Summary - [facility] Critical Care    Patient Information: Jermaine Knutson 44 y o  male MRN: [id number]  Unit/Bed#: ICU 10 Encounter: [id number]    Discharging Physician / Practitioner: ANGELO Stephens  PCP: Salome Griffin MD    Admission Date: 9/23/2019  Discharge Date: 09/25/19    Reason for Admission: Altered Mental Status    Discharge Diagnoses:   Principal Problem (Resolved):    Toxic metabolic encephalopathy  Active Problems:    S/P craniotomy    Benign essential hypertension    Type 2 diabetes mellitus with hyperglycemia ([facility] [address] )    Hyperlipidemia  Resolved Problems:    High anion gap metabolic acidosis    Acute kidney injury [facility])    Urinary retention      Consultations During Hospital Stay:  · Toxicology  · Neurology  · Psychiatry    Procedures Performed:     · None    Significant Findings:     · None    Incidental Findings:   · None     Test Results Pending at Discharge (will require follow up): · None     Outpatient Tests Requested:  · None    Complications:  None    Hospital Course:     Jermaine Knutson is a 44 y o  male patient with pmhx of hypertension, hyperlipidemia, insulin-dependent diabetes, 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 antibiotic treatment, duodenal ulcers who originally presented to the hospital on 9/23/2019 due to altered mental status  Patient does not remember anything from the past week and is currently alert and oriented  Reportedly patient was disoriented and unable to answer basic questions, speaking inappropriately  At home, he was confused and yielding a machete around the house due to hallucinations he was having  Patient was admitted initially to Internal Medicine, however in the workup of a metabolic acidosis, his salicylate level was elevated and there was a concern for possible overdose  He wasl transfered to step-down for further monitoring  Today he was awake alert and oriented x 3 and offered no complaints   He self reports accidentally taking more baclofen then was prescribed but unclear how many he took  He admits to having no recollection of the days events until he awoke here in the hospital  He denies any c/o pain, nausea or dizziness  He denies any other symptoms at this time  Condition at Discharge: good     Discharge Day Visit / Exam:   Physical Exam   Constitutional: He is oriented to person, place, and time  He appears well-developed  No distress  HENT:   Head: Normocephalic and atraumatic  Eyes: Pupils are equal, round, and reactive to light  Conjunctivae are normal    Neck: Normal range of motion  Cardiovascular: Normal rate, regular rhythm, normal heart sounds and intact distal pulses  No murmur heard  Pulmonary/Chest: Effort normal and breath sounds normal  No stridor  No respiratory distress  He has no wheezes  Abdominal: Soft  Bowel sounds are normal  He exhibits no distension  There is no tenderness  Musculoskeletal: Normal range of motion  He exhibits no edema  Neurological: He is alert and oriented to person, place, and time  Skin: Skin is warm  Capillary refill takes less than 2 seconds  He is not diaphoretic  Discharge instructions/Information to patient and family:   See after visit summary for information provided to patient and family  Provisions for Follow-Up Care:  See after visit summary for information related to follow-up care and any pertinent home health orders  Disposition: Home    Planned Readmission: none    Discharge Statement:  I spent 20 minutes discharging the patient  This time was spent on the day of discharge  I had direct contact with the patient on the day of discharge  Greater than 50% of the total time was spent examining patient, answering all patient questions, arranging and discussing plan of care with patient as well as directly providing post-discharge instructions  Additional time then spent on discharge activities      Discharge Medications:  See after visit summary for reconciled discharge medications provided to patient and family      Discharge Diet: diabetic diet  Activity restrictions: none    ANGELO Stephens  9/25/2019  5:18 AM

## 2019-09-24 NOTE — PLAN OF CARE
Problem: Potential for Falls  Goal: Patient will remain free of falls  Description  INTERVENTIONS:  - Assess patient frequently for physical needs  -  Identify cognitive and physical deficits and behaviors that affect risk of falls    -  [place] fall precautions as indicated by assessment   - Educate patient/family on patient safety including physical limitations  - Instruct patient to call for assistance with activity based on assessment  - Modify environment to reduce risk of injury  - Consider OT/PT consult to assist with strengthening/mobility  Outcome: Progressing     Problem: SAFETY ADULT  Goal: Maintain or return to baseline ADL function  Description  INTERVENTIONS:  -  Assess patient's ability to carry out ADLs; assess patient's baseline for ADL function and identify physical deficits which impact ability to perform ADLs (bathing, care of mouth/teeth, toileting, grooming, dressing, etc )  - Assess/evaluate cause of self-care deficits   - Assess range of motion  - Assess patient's mobility; develop plan if impaired  - Assess patient's need for assistive devices and provide as appropriate  - Encourage maximum independence but intervene and supervise when necessary  - Involve family in performance of ADLs  - Assess for home care needs following discharge   - Consider OT consult to assist with ADL evaluation and planning for discharge  - Provide patient education as appropriate  Outcome: Progressing  Goal: Maintain or return mobility status to optimal level  Description  INTERVENTIONS:  - Assess patient's baseline mobility status (ambulation, transfers, stairs, etc )    - Identify cognitive and physical deficits and behaviors that affect mobility  - Identify mobility aids required to assist with transfers and/or ambulation (gait belt, sit-to-stand, lift, walker, cane, etc )  - [place] fall precautions as indicated by assessment  - Record patient progress and toleration of activity level on Mobility SBAR; progress patient to next Phase/Stage  - Instruct patient to call for assistance with activity based on assessment  - Consider rehabilitation consult to assist with strengthening/weightbearing, etc   Outcome: Progressing     Problem: DISCHARGE PLANNING  Goal: Discharge to home or other facility with appropriate resources  Description  INTERVENTIONS:  - Identify barriers to discharge w/patient and caregiver  - Arrange for needed discharge resources and transportation as appropriate  - Identify discharge learning needs (meds, wound care, etc )  - Arrange for interpretive services to assist at discharge as needed  - Refer to Case Management Department for coordinating discharge planning if the patient needs post-hospital services based on physician/advanced practitioner order or complex needs related to functional status, cognitive ability, or social support system  Outcome: Progressing     Problem: Knowledge Deficit  Goal: Patient/family/caregiver demonstrates understanding of disease process, treatment plan, medications, and discharge instructions  Description  Complete learning assessment and assess knowledge base    Interventions:  - Provide teaching at level of understanding  - Provide teaching via preferred learning methods  Outcome: Progressing     Problem: NEUROSENSORY - ADULT  Goal: Achieves stable or improved neurological status  Description  INTERVENTIONS  - Monitor and report changes in neurological status  - Monitor vital signs such as temperature, blood pressure, glucose, and any other labs ordered   - Initiate measures to prevent increased intracranial pressure  - Monitor for seizure activity and implement precautions if appropriate      Outcome: Progressing  Goal: Remains free of injury related to seizures activity  Description  INTERVENTIONS  - Maintain airway, patient safety  and administer oxygen as ordered  - Monitor patient for seizure activity, document and report duration and description of seizure to physician/advanced practitioner  - If seizure occurs,  ensure patient safety during seizure  - Reorient patient post seizure  - Seizure pads on all 4 side rails  - Instruct patient/family to notify RN of any seizure activity including if an aura is experienced  - Instruct patient/family to call for assistance with activity based on nursing assessment  - Administer anti-seizure medications if ordered    Outcome: Progressing  Goal: Achieves maximal functionality and self care  Description  INTERVENTIONS  - Monitor swallowing and airway patency with patient fatigue and changes in neurological status  - Encourage and assist patient to increase activity and self care     - Encourage visually impaired, hearing impaired and aphasic patients to use assistive/communication devices  Outcome: Progressing     Problem: GENITOURINARY - ADULT  Goal: Maintains or returns to baseline urinary function  Description  INTERVENTIONS:  - Assess urinary function  - Encourage oral fluids to ensure adequate hydration if ordered  - Administer IV fluids as ordered to ensure adequate hydration  - Administer ordered medications as needed  - Offer frequent toileting  - Follow urinary retention protocol if ordered  Outcome: Progressing  Goal: Absence of urinary retention  Description  INTERVENTIONS:  - Assess patient's ability to void and empty bladder  - Monitor I/O  - Bladder scan as needed  - Discuss with physician/AP medications to alleviate retention as needed  - Discuss catheterization for long term situations as appropriate   Outcome: Progressing  Goal: Urinary catheter remains patent  Description  INTERVENTIONS:  - Assess patency of urinary catheter  - If patient has a chronic wiseman, consider changing catheter if non-functioning  - Follow guidelines for intermittent irrigation of non-functioning urinary catheter  Outcome: Progressing

## 2019-09-24 NOTE — ED PROVIDER NOTES
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  Patient has had problems with spasticity in the extremities since  He states that for the last year he has been taking baclofen up to 6 times a day  He states that in the last few months he has been noticing visual and sometimes auditory hallucinations  He states it feels like he is still in a dream but he knows he is not  More recently patient has been stressed and has been eating and sleeping very poorly  Family states he is not taking care of himself  Today the patient had hallucination that there was someone in the house and he grabbed a machete which he was running around with  Has had no fever no recent head injury  Prior to Admission Medications   Prescriptions Last Dose Informant Patient Reported? Taking?    Melatonin 5 MG TABS   Yes No   Sig: Take by mouth   atorvastatin (LIPITOR) 10 mg tablet   Yes No   Sig: Take 10 mg by mouth daily   baclofen 10 mg tablet   Yes No   Sig: Take 10 mg by mouth 3 (three) times a day   insulin aspart protamine-insulin aspart (NovoLOG 70/30) 100 units/mL injection   Yes No   Sig: Inject 5 Units under the skin 2 (two) times a day before meals   insulin glargine (LANTUS) 100 units/mL subcutaneous injection   Yes No   Sig: Inject 20 Units under the skin daily at bedtime   lisinopril (ZESTRIL) 10 mg tablet   Yes No   Sig: Take 10 mg by mouth daily   metFORMIN (GLUCOPHAGE) 500 mg tablet   Yes Yes   Sig: Take 500 mg by mouth 2 (two) times a day with meals   sitaGLIPtin-metFORMIN (JANUMET)  MG per tablet Not Taking at Unknown time  Yes No   Sig: Take 1 tablet by mouth 2 (two) times a day with meals      Facility-Administered Medications: None       Past Medical History:   Diagnosis Date    Abdominal cyst     Anemia     Hx     Chronic pain disorder     abdominal    Diverticulitis     GI bleed     History of transfusion 2016    5 units    Lightheadedness     Multiple lesions on computed tomography of brain and spine     Spleen laceration        Past Surgical History:   Procedure Laterality Date    ABDOMINAL SURGERY  2016    lap removal of mass post spleen injury    COLONOSCOPY N/A 5/24/2017    Procedure: COLONOSCOPY;  Surgeon: Daniel Sam MD;  Location: [facility] GI LAB; Service:     EGD AND COLONOSCOPY N/A 3/15/2017    Procedure: EGD AND COLONOSCOPY;  Surgeon: Daniel Sam MD;  Location: [facility] GI LAB; Service:     OTHER SURGICAL HISTORY      per [employer]-had spleen surgery; no other details    UPPER GASTROINTESTINAL ENDOSCOPY      for gastric bleeding       Family History   Problem Relation Age of Onset    No Known Problems Mother     Diabetes Father     No Known Problems Sister     No Known Problems Brother      I have reviewed and agree with the history as documented  Social History     Tobacco Use    Smoking status: Former Smoker     Packs/day: 0 25     Years: 5 00     Pack years: 1 25     Last attempt to quit: 4/23/2016     Years since quitting: 3 4    Smokeless tobacco: Current User    Tobacco comment: per [employer]-Former smoker   Substance Use Topics    Alcohol use: Yes     Alcohol/week: 0 0 - 1 0 standard drinks     Frequency: Monthly or less     Drinks per session: 1 or 2     Binge frequency: Never     Comment: ocassionally    Drug use: No        Review of Systems   Constitutional: Negative for chills and fever  HENT: Negative for congestion and sore throat  Eyes: Positive for visual disturbance  Respiratory: Negative for cough and shortness of breath  Cardiovascular: Negative for chest pain  Gastrointestinal: Negative for abdominal pain  Genitourinary: Negative for dysuria  Musculoskeletal: Positive for myalgias  Negative for back pain  Patient gets spasticity   Skin: Negative for rash  Neurological: Negative for syncope and headaches  Hematological: Does not bruise/bleed easily  Psychiatric/Behavioral: Positive for hallucinations  All other systems reviewed and are negative  Physical Exam  Physical Exam   Constitutional: He is oriented to person, place, and time  He appears well-developed and well-nourished  HENT:   Head: Normocephalic  Mouth/Throat: Oropharynx is clear and moist    Eyes: Conjunctivae are normal    Neck: Normal range of motion  Neck supple  Cardiovascular: Normal rate, regular rhythm and normal heart sounds  Pulmonary/Chest: Effort normal and breath sounds normal    Abdominal: Soft  Bowel sounds are normal    Musculoskeletal: Normal range of motion  He exhibits no tenderness  Neurological: He is alert and oriented to person, place, and time  No cranial nerve deficit or sensory deficit  He exhibits normal muscle tone  Coordination normal    Skin: Skin is warm and dry  Capillary refill takes less than 2 seconds  Psychiatric: He has a normal mood and affect  His behavior is normal    Nursing note and vitals reviewed        Vital Signs  ED Triage Vitals   Temperature Pulse Respirations Blood Pressure SpO2   09/23/19 2037 09/23/19 2037 09/23/19 2037 09/23/19 2037 09/23/19 2037   99 4 °F (37 4 °C) 100 20 152/98 94 %      Temp Source Heart Rate Source Patient Position - Orthostatic VS BP Location FiO2 (%)   09/23/19 2037 09/23/19 2200 09/23/19 2037 09/23/19 2037 --   Tympanic Monitor Lying Right arm       Pain Score       09/23/19 2037       No Pain           Vitals:    09/23/19 2145 09/23/19 2200 09/23/19 2215 09/23/19 2338   BP: 142/70 142/70 134/74 166/66   Pulse:  94 94 (!) 114   Patient Position - Orthostatic VS:  Lying           Visual Acuity  Visual Acuity      Most Recent Value   L Pupil Size (mm)  2   R Pupil Size (mm)  2          ED Medications  Medications   sodium chloride 0 9 % bolus 1,000 mL (0 mL Intravenous Stopped 9/23/19 2240)   iohexol (OMNIPAQUE) 350 MG/ML injection (MULTI-DOSE) 85 mL (85 mL Intravenous Given 9/23/19 2130)   sodium chloride 0 9 % bolus 1,000 mL (1,000 mL Intravenous New Bag 9/23/19 2247)       Diagnostic Studies  Results Reviewed     Procedure Component Value Units Date/Time    UA w Reflex to Microscopic w Reflex to Culture [[id number]]  (Abnormal) Collected:  09/23/19 2335    Lab Status:  Final result Specimen:  Urine, Indwelling Brandt Catheter Updated:  09/23/19 2344     Color, UA Yellow     Clarity, UA Clear     Specific Gravity, UA <=1 005     pH, UA 5 0     Leukocytes, UA Negative     Nitrite, UA Negative     Protein, UA Negative mg/dl      Glucose, UA Negative mg/dl      Ketones, UA 15 (1+) mg/dl      Urobilinogen, UA 0 2 E U /dl      Bilirubin, UA Negative     Blood, UA Small    Urine Microscopic [[id number]] Collected:  09/23/19 2335    Lab Status: In process Specimen:  Urine, Indwelling Brandt Catheter Updated:  09/23/19 2344    Rapid drug screen, urine [[id number]] Collected:  09/23/19 2334    Lab Status:   In process Specimen:  Urine, Clean Catch Updated:  09/23/19 2341    Blood gas, arterial [[id number]]  (Abnormal) Collected:  09/23/19 2335    Lab Status:  Final result Specimen:  Blood, Arterial from Radial, Right Updated:  09/23/19 2340     pH, Arterial 7 269     PH ART TC 7 261     pCO2, Arterial 37 1 mm Hg      PCO2 (TC) Arterial 38 1 mm Hg      pO2, Arterial 84 9 mm Hg      PO2 (TC) Arterial 88 2 mm Hg      HCO3, Arterial 16 6 mmol/L      Base Excess, Arterial -9 5 mmol/L      O2 Content, Arterial 18 0 mL/dL      O2 HGB,Arterial  93 6 %      SOURCE Radial, Right     MARK TEST Yes     Tem[facility] 99 7 [facility]      ROOM AIR FIO2 21 %     Comprehensive metabolic panel [[id number]]  (Abnormal) Collected:  09/23/19 2053    Lab Status:  Final result Specimen:  Blood from Arm, Right Updated:  09/23/19 2116     Sodium 137 mmol/L      Potassium 4 4 mmol/L      Chloride 98 mmol/L      CO2 22 mmol/L      ANION GAP 17 mmol/L      BUN 48 mg/dL      Creatinine 1 38 mg/dL      Glucose 93 mg/dL      Calcium 10 0 mg/dL      AST 14 U/L      ALT 15 U/L      Alkaline Phosphatase 63 U/L      Total Protein 8 3 g/dL      Albumin 5 0 g/dL      Total Bilirubin 0 50 mg/dL      eGFR 59 ml/min/1 73sq m     Narrative:       [place] guidelines for Chronic Kidney Disease (CKD):     Stage 1 with normal or high GFR (GFR > 90 mL/min/1 73 square meters)    Stage 2 Mild CKD (GFR = 60-89 mL/min/1 73 square meters)    Stage 3A Moderate CKD (GFR = 45-59 mL/min/1 73 square meters)    Stage 3B Moderate CKD (GFR = 30-44 mL/min/1 73 square meters)    Stage 4 Severe CKD (GFR = 15-29 mL/min/1 73 square meters)    Stage 5 End Stage CKD (GFR <15 mL/min/1 73 square meters)  Note: GFR calculation is accurate only with a steady state creatinine    Protime-INR [[id number]]  (Abnormal) Collected:  09/23/19 2053    Lab Status:  Final result Specimen:  Blood from Arm, Right Updated:  09/23/19 2112     Protime 12 5 seconds      INR 1 17    APTT [[id number]]  (Abnormal) Collected:  09/23/19 2053    Lab Status:  Final result Specimen:  Blood from Arm, Right Updated:  09/23/19 2112     PTT 33 seconds     CBC and differential [[id number]]  (Abnormal) Collected:  09/23/19 2053    Lab Status:  Final result Specimen:  Blood from Arm, Right Updated:  09/23/19 2101     WBC 11 49 Thousand/uL      RBC 4 59 Million/uL      Hemoglobin 14 1 g/dL      Hematocrit 43 2 %      MCV 94 fL      MCH 30 7 pg      MCHC 32 6 g/dL      RDW 13 7 %      MPV 10 6 fL      Platelets 523 Thousands/uL      nRBC 0 /100 WBCs      Neutrophils Relative 86 %      Immat GRANS % 0 %      Lymphocytes Relative 9 %      Monocytes Relative 5 %      Eosinophils Relative 0 %      Basophils Relative 0 %      Neutrophils Absolute 9 78 Thousands/µL      Immature Grans Absolute 0 03 Thousand/uL      Lymphocytes Absolute 1 06 Thousands/µL      Monocytes Absolute 0 55 Thousand/µL      Eosinophils Absolute 0 04 Thousand/µL      Basophils Absolute 0 03 Thousands/µL                  CT head with and without contrast   Final Result by Caity Montgomery MD (09/23 2151)      No acute intracranial abnormalities visualized on CT of the brain without and with contrast       Workstation performed: [id number]                    Procedures  ECG 12 Lead Documentation Only  Date/Time: 9/23/2019 9:26 PM  Performed by: Audrey Head MD  Authorized by: Audrey Head MD     Indications / Diagnosis:  Visual hallucinations  ECG reviewed by me, the ED Provider: yes    Patient location:  ED  Interpretation:     Interpretation: normal    Rate:     ECG rate:  99    ECG rate assessment: normal    Rhythm:     Rhythm: sinus rhythm    Ectopy:     Ectopy: none    QRS:     QRS axis:  Normal    QRS intervals:  Normal  Conduction:     Conduction: normal    ST segments:     ST segments:  Normal  T waves:     T waves: normal             ED Course                               MDM  Number of Diagnoses or Management Options  BELLA (acute kidney injury) ([facility] 75 ):   Hallucinations:   Diagnosis management comments: Patient has been taking in excess of baclofen which is known to cause auditory and visual hallucinations  Will check CT scan of his head considering his history        Disposition  Final diagnoses:   Hallucinations   [facility]) ([facility] )     Time reflects when diagnosis was documented in both MDM as applicable and the Disposition within this note     Time User Action Codes Description Comment    9/23/2019 10:45 PM Joann Mark Add [R44 3] Hallucinations     9/23/2019 10:45 PM Melanie PUGH Add [N17 9] BELLA (acute kidney injury) [facility])       ED Disposition     ED Disposition Condition Date/Time Comment    Admit Stable Mon Sep 23, 2019 10:45 PM Case was discussed with HUI Marc and the patient's admission status was agreed to be Admission Status: observation status to the service of Dr Zeynep Peña   Follow-up Information    None         Patient's Medications   Discharge Prescriptions    No medications on file     No discharge procedures on file      ED Provider  Electronically Signed by           Home Lemon MD  09/23/19 2278

## 2019-09-24 NOTE — ED NOTES
Pt attempted to urinated via urinal, pt is unable to at this time  Attempted to strait cath pt but was unable  Bladder scan completed and pt retaining >895 ml of urine  Order obtained to insert wiseman, coude wiseman placed         Aldo Ibarra RN  09/23/19 [address] Amarjit Quinones RN  09/23/19 0011

## 2019-09-24 NOTE — ED NOTES
Patient transported to [address] [place] [address], [address] [place] [address]  09/23/19 2762

## 2019-09-25 VITALS
BODY MASS INDEX: 22.47 KG/M2 | TEMPERATURE: 98.3 F | HEART RATE: 89 BPM | WEIGHT: 151.68 LBS | HEIGHT: 69 IN | DIASTOLIC BLOOD PRESSURE: 87 MMHG | SYSTOLIC BLOOD PRESSURE: 158 MMHG | RESPIRATION RATE: 20 BRPM | OXYGEN SATURATION: 96 %

## 2019-09-25 LAB
GLUCOSE SERPL-MCNC: 83 MG/DL (ref 65–140)
MRSA NOSE QL CULT: ABNORMAL
MRSA NOSE QL CULT: ABNORMAL

## 2019-09-25 PROCEDURE — 82948 REAGENT STRIP/BLOOD GLUCOSE: CPT

## 2019-09-25 RX ADMIN — SALINE NASAL SPRAY 1 SPRAY: 1.5 SOLUTION NASAL at 06:10

## 2019-09-25 RX ADMIN — ATORVASTATIN CALCIUM 10 MG: 10 TABLET, FILM COATED ORAL at 09:07

## 2019-09-25 RX ADMIN — SALINE NASAL SPRAY 1 SPRAY: 1.5 SOLUTION NASAL at 01:37

## 2019-09-25 RX ADMIN — OXYMETAZOLINE HCL 2 SPRAY: 0.05 SPRAY NASAL at 09:07

## 2019-09-25 RX ADMIN — NICOTINE 1 PATCH: 7 PATCH TRANSDERMAL at 07:52

## 2019-09-25 RX ADMIN — BACLOFEN 10 MG: 10 TABLET ORAL at 09:07

## 2019-09-25 RX ADMIN — INSULIN ASPART 5 UNITS: 100 INJECTION, SUSPENSION SUBCUTANEOUS at 07:50
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
## 2019-09-25 NOTE — PLAN OF CARE
Problem: Potential for Falls  Goal: Patient will remain free of falls  Description  INTERVENTIONS:  - Assess patient frequently for physical needs  -  Identify cognitive and physical deficits and behaviors that affect risk of falls    -  [place] fall precautions as indicated by assessment   - Educate patient/family on patient safety including physical limitations  - Instruct patient to call for assistance with activity based on assessment  - Modify environment to reduce risk of injury  - Consider OT/PT consult to assist with strengthening/mobility  Outcome: Completed     Problem: SAFETY ADULT  Goal: Maintain or return to baseline ADL function  Description  INTERVENTIONS:  -  Assess patient's ability to carry out ADLs; assess patient's baseline for ADL function and identify physical deficits which impact ability to perform ADLs (bathing, care of mouth/teeth, toileting, grooming, dressing, etc )  - Assess/evaluate cause of self-care deficits   - Assess range of motion  - Assess patient's mobility; develop plan if impaired  - Assess patient's need for assistive devices and provide as appropriate  - Encourage maximum independence but intervene and supervise when necessary  - Involve family in performance of ADLs  - Assess for home care needs following discharge   - Consider OT consult to assist with ADL evaluation and planning for discharge  - Provide patient education as appropriate  Outcome: Completed  Goal: Maintain or return mobility status to optimal level  Description  INTERVENTIONS:  - Assess patient's baseline mobility status (ambulation, transfers, stairs, etc )    - Identify cognitive and physical deficits and behaviors that affect mobility  - Identify mobility aids required to assist with transfers and/or ambulation (gait belt, sit-to-stand, lift, walker, cane, etc )  - [place] fall precautions as indicated by assessment  - Record patient progress and toleration of activity level on Mobility SBAR; progress patient to next Phase/Stage  - Instruct patient to call for assistance with activity based on assessment  - Consider rehabilitation consult to assist with strengthening/weightbearing, etc   Outcome: Completed     Problem: DISCHARGE PLANNING  Goal: Discharge to home or other facility with appropriate resources  Description  INTERVENTIONS:  - Identify barriers to discharge w/patient and caregiver  - Arrange for needed discharge resources and transportation as appropriate  - Identify discharge learning needs (meds, wound care, etc )  - Arrange for interpretive services to assist at discharge as needed  - Refer to Case Management Department for coordinating discharge planning if the patient needs post-hospital services based on physician/advanced practitioner order or complex needs related to functional status, cognitive ability, or social support system  Outcome: Completed     Problem: Knowledge Deficit  Goal: Patient/family/caregiver demonstrates understanding of disease process, treatment plan, medications, and discharge instructions  Description  Complete learning assessment and assess knowledge base  Interventions:  - Provide teaching at level of understanding  - Provide teaching via preferred learning methods  Outcome: Completed     Problem: NEUROSENSORY - ADULT  Goal: Achieves stable or improved neurological status  Description  INTERVENTIONS  - Monitor and report changes in neurological status  - Monitor vital signs such as temperature, blood pressure, glucose, and any other labs ordered   - Initiate measures to prevent increased intracranial pressure  - Monitor for seizure activity and implement precautions if appropriate      Outcome: Completed  Goal: Achieves maximal functionality and self care  Description  INTERVENTIONS  - Monitor swallowing and airway patency with patient fatigue and changes in neurological status  - Encourage and assist patient to increase activity and self care     - Encourage visually impaired, hearing impaired and aphasic patients to use assistive/communication devices  Outcome: Completed     Problem: GENITOURINARY - ADULT  Goal: Maintains or returns to baseline urinary function  Description  INTERVENTIONS:  - Assess urinary function  - Encourage oral fluids to ensure adequate hydration if ordered  - Administer IV fluids as ordered to ensure adequate hydration  - Administer ordered medications as needed  - Offer frequent toileting  - Follow urinary retention protocol if ordered  Outcome: Completed  Goal: Absence of urinary retention  Description  INTERVENTIONS:  - Assess patient's ability to void and empty bladder  - Monitor I/O  - Bladder scan as needed  - Discuss with physician/AP medications to alleviate retention as needed  - Discuss catheterization for long term situations as appropriate   Outcome: Completed     Problem: CONFUSION/THOUGHT DISTURBANCE  Goal: Thought disturbances (confusion, delirium, depression, dementia or psychosis) are managed to maintain or return to baseline mental status and functional level  Description  INTERVENTIONS:  - Assess for possible contributors to  thought disturbance, including but not limited to medications, infection, impaired vision or hearing, underlying metabolic abnormalities, dehydration, respiratory compromise,  psychiatric diagnoses and notify attending PHYSICAN/AP  - Monitor and intervene to maintain adequate nutrition, hydration, elimination, sleep and activity  - Decrease environmental stimuli, including noise as appropriate  - Provide frequent contacts to provide refocusing, direction and reassurance as needed  Approach patient calmly with eye contact and at their level    - [place] high risk fall precautions, aspiration precautions and other safety measures, as indicated  - If delirium suspected, notify physician/AP of change in condition and request immediate in-person evaluation  - Pursue consults as appropriate including Geriatric (campus dependent), OT for cognitive evaluation/activity planning, psychiatric, pastoral care, etc   Outcome: Completed

## 2019-09-25 NOTE — PROGRESS NOTES
09/25/19 [id number] [facility]   Patient Information   Mental Status Alert   Primary Caregiver Self   Support System Immediate family   Activities of Daily Living Prior to Admission   Functional Status Independent   Ambulation Independent     Per chart review and rounds with nursing and provider pt is reported to be independent with no apparent discharge needs at this time

## 2019-09-26 ENCOUNTER — TRANSITIONAL CARE MANAGEMENT (OUTPATIENT)
Dept: FAMILY MEDICINE CLINIC | Facility: CLINIC | Age: 49
End: 2019-09-26

## 2019-10-04 DIAGNOSIS — R25.2 SPASTICITY: Primary | ICD-10-CM

## 2019-10-04 DIAGNOSIS — R25.2 SPASTICITY: ICD-10-CM

## 2019-10-04 RX ORDER — BACLOFEN 10 MG/1
10 TABLET ORAL 3 TIMES DAILY
Qty: 30 TABLET | Refills: 0 | Status: SHIPPED | OUTPATIENT
Start: 2019-10-04 | End: 2019-10-12 | Stop reason: SDUPTHER

## 2019-10-04 RX ORDER — BACLOFEN 10 MG/1
10 TABLET ORAL 3 TIMES DAILY
OUTPATIENT
Start: 2019-10-04

## 2019-10-04 RX ORDER — BACLOFEN 5 MG/1
TABLET ORAL
Qty: 90 TABLET | Refills: 3 | OUTPATIENT
Start: 2019-10-04

## 2019-10-12 DIAGNOSIS — R25.2 SPASTICITY: ICD-10-CM

## 2019-10-12 RX ORDER — BACLOFEN 10 MG/1
10 TABLET ORAL 3 TIMES DAILY
Qty: 30 TABLET | Refills: 0 | Status: SHIPPED | OUTPATIENT
Start: 2019-10-12 | End: 2019-10-18

## 2019-10-17 RX ORDER — BACLOFEN 5 MG/1
TABLET ORAL
Refills: 0 | COMMUNITY
Start: 2019-09-23 | End: 2019-10-18 | Stop reason: SDUPTHER

## 2019-10-18 ENCOUNTER — OFFICE VISIT (OUTPATIENT)
Dept: FAMILY MEDICINE CLINIC | Facility: CLINIC | Age: 49
End: 2019-10-18
Payer: COMMERCIAL

## 2019-10-18 VITALS
SYSTOLIC BLOOD PRESSURE: 136 MMHG | TEMPERATURE: 97.9 F | WEIGHT: 156 LBS | HEART RATE: 100 BPM | DIASTOLIC BLOOD PRESSURE: 74 MMHG | RESPIRATION RATE: 18 BRPM | BODY MASS INDEX: 23.11 KG/M2 | HEIGHT: 69 IN

## 2019-10-18 DIAGNOSIS — R25.2 SPASTICITY: ICD-10-CM

## 2019-10-18 DIAGNOSIS — R47.9 SPEECH DISTURBANCE, UNSPECIFIED TYPE: ICD-10-CM

## 2019-10-18 DIAGNOSIS — E11.65 TYPE 2 DIABETES MELLITUS WITH HYPERGLYCEMIA, WITH LONG-TERM CURRENT USE OF INSULIN (HCC): Primary | Chronic | ICD-10-CM

## 2019-10-18 DIAGNOSIS — R53.1 RIGHT SIDED WEAKNESS: ICD-10-CM

## 2019-10-18 DIAGNOSIS — Z98.890 S/P CRANIOTOMY: Chronic | ICD-10-CM

## 2019-10-18 DIAGNOSIS — Z79.4 TYPE 2 DIABETES MELLITUS WITH HYPERGLYCEMIA, WITH LONG-TERM CURRENT USE OF INSULIN (HCC): Primary | Chronic | ICD-10-CM

## 2019-10-18 DIAGNOSIS — I10 BENIGN ESSENTIAL HYPERTENSION: Chronic | ICD-10-CM

## 2019-10-18 DIAGNOSIS — E78.5 HYPERLIPIDEMIA, UNSPECIFIED HYPERLIPIDEMIA TYPE: Chronic | ICD-10-CM

## 2019-10-18 PROCEDURE — 3078F DIAST BP <80 MM HG: CPT | Performed by: FAMILY MEDICINE

## 2019-10-18 PROCEDURE — 99214 OFFICE O/P EST MOD 30 MIN: CPT | Performed by: FAMILY MEDICINE

## 2019-10-18 PROCEDURE — 3008F BODY MASS INDEX DOCD: CPT | Performed by: FAMILY MEDICINE

## 2019-10-18 PROCEDURE — 3075F SYST BP GE 130 - 139MM HG: CPT | Performed by: FAMILY MEDICINE

## 2019-10-18 RX ORDER — INSULIN GLARGINE 100 [IU]/ML
20 INJECTION, SOLUTION SUBCUTANEOUS
Qty: 600 UNITS | Refills: 6 | Status: SHIPPED | OUTPATIENT
Start: 2019-10-18 | End: 2019-10-21 | Stop reason: CLARIF

## 2019-10-18 RX ORDER — BACLOFEN 5 MG/1
5 TABLET ORAL 3 TIMES DAILY PRN
Qty: 90 TABLET | Refills: 1 | Status: SHIPPED | OUTPATIENT
Start: 2019-10-18 | End: 2019-12-03 | Stop reason: SDUPTHER

## 2019-10-18 NOTE — PROGRESS NOTES
Chief Complaint   Patient presents with    Follow-up     multiple issues        Patient ID: Reina Shafer is a 44 y o  male  HPI  Pt is seeing for f/u DM2, R residual weakness after craniotomy for multiple brain abssecces several months ago - willing to restart PT/Pt/speech therapy, was recently hospitalized for change in MS after taking too much of Baclofen - Hg AIC was 5 4     The following portions of the patient's history were reviewed and updated as appropriate: allergies, current medications, past family history, past medical history, past social history, past surgical history and problem list     Review of Systems   Constitutional: Negative  Respiratory: Negative  Cardiovascular: Negative  Gastrointestinal: Negative  Genitourinary: Negative  Musculoskeletal: Negative  Skin: Negative  Neurological: Positive for speech difficulty and weakness  Hematological: Negative  Psychiatric/Behavioral: Negative  Negative for dysphoric mood  The patient is not nervous/anxious  Current Outpatient Medications   Medication Sig Dispense Refill    atorvastatin (LIPITOR) 10 mg tablet Take 10 mg by mouth daily      Baclofen 10 MG TABS   0    insulin aspart protamine-insulin aspart (NovoLOG 70/30) 100 units/mL injection Inject 5 Units under the skin 2 (two) times a day before meals      insulin glargine (LANTUS) 100 units/mL subcutaneous injection Inject 20 Units under the skin daily at bedtime      lisinopril (ZESTRIL) 10 mg tablet Take 10 mg by mouth daily      Melatonin 5 MG TABS Take by mouth      metFORMIN (GLUCOPHAGE) 500 mg tablet Take 500 mg by mouth 2 (two) times a day with meals      sitaGLIPtin-metFORMIN (JANUMET)  MG per tablet Take 1 tablet by mouth 2 (two) times a day with meals       No current facility-administered medications for this visit          Objective:    /74 (BP Location: Right arm, Patient Position: Sitting, Cuff Size: Standard)   Pulse 100 Temp 97 9 °F (36 6 °C) (Tympanic)   Resp 18   Ht 5' 9" (1 753 m)   Wt 70 8 kg (156 lb)   BMI 23 04 kg/m²        Physical Exam   Constitutional: He is oriented to person, place, and time  No distress  Cardiovascular: Normal rate  Pulmonary/Chest: Breath sounds normal  No respiratory distress  He has no wheezes  He has no rales  Musculoskeletal: Normal range of motion  He exhibits no edema, tenderness or deformity  Using a cane    Neurological: He is alert and oriented to person, place, and time  R side weakness    Skin: No pallor  Psychiatric: He has a normal mood and affect  Judgment normal          Labs in chart were reviewed    Recent Results (from the past 672 hour(s))   CBC and differential    Collection Time: 09/23/19  8:53 PM   Result Value Ref Range    WBC 11 49 (H) 4 31 - 10 16 Thousand/uL    RBC 4 59 3 88 - 5 62 Million/uL    Hemoglobin 14 1 12 0 - 17 0 g/dL    Hematocrit 43 2 36 5 - 49 3 %    MCV 94 82 - 98 fL    MCH 30 7 26 8 - 34 3 pg    MCHC 32 6 31 4 - 37 4 g/dL    RDW 13 7 11 6 - 15 1 %    MPV 10 6 8 9 - 12 7 fL    Platelets 764 700 - 138 Thousands/uL    nRBC 0 /100 WBCs    Neutrophils Relative 86 (H) 43 - 75 %    Immat GRANS % 0 0 - 2 %    Lymphocytes Relative 9 (L) 14 - 44 %    Monocytes Relative 5 4 - 12 %    Eosinophils Relative 0 0 - 6 %    Basophils Relative 0 0 - 1 %    Neutrophils Absolute 9 78 (H) 1 85 - 7 62 Thousands/µL    Immature Grans Absolute 0 03 0 00 - 0 20 Thousand/uL    Lymphocytes Absolute 1 06 0 60 - 4 47 Thousands/µL    Monocytes Absolute 0 55 0 17 - 1 22 Thousand/µL    Eosinophils Absolute 0 04 0 00 - 0 61 Thousand/µL    Basophils Absolute 0 03 0 00 - 0 10 Thousands/µL   Protime-INR    Collection Time: 09/23/19  8:53 PM   Result Value Ref Range    Protime 12 5 (H) 9 8 - 12 0 seconds    INR 1 17 (H) 0 91 - 1 09   APTT    Collection Time: 09/23/19  8:53 PM   Result Value Ref Range    PTT 33 (H) 25 - 32 seconds   Comprehensive metabolic panel    Collection Time: 09/23/19  8:53 PM   Result Value Ref Range    Sodium 137 136 - 145 mmol/L    Potassium 4 4 3 5 - 5 3 mmol/L    Chloride 98 (L) 100 - 108 mmol/L    CO2 22 21 - 32 mmol/L    ANION GAP 17 (H) 4 - 13 mmol/L    BUN 48 (H) 5 - 25 mg/dL    Creatinine 1 38 (H) 0 60 - 1 30 mg/dL    Glucose 93 65 - 140 mg/dL    Calcium 10 0 8 3 - 10 1 mg/dL    AST 14 5 - 45 U/L    ALT 15 12 - 78 U/L    Alkaline Phosphatase 63 46 - 116 U/L    Total Protein 8 3 (H) 6 4 - 8 2 g/dL    Albumin 5 0 3 5 - 5 0 g/dL    Total Bilirubin 0 50 0 20 - 1 00 mg/dL    eGFR 59 ml/min/1 73sq m   ECG 12 lead    Collection Time: 09/23/19  9:22 PM   Result Value Ref Range    Ventricular Rate 99 BPM    Atrial Rate 99 BPM    NC Interval 150 ms    QRSD Interval 78 ms    QT Interval 332 ms    QTC Interval 426 ms    P Amonate 79 degrees    QRS Axis 67 degrees    T Wave Axis 51 degrees   Rapid drug screen, urine    Collection Time: 09/23/19 11:34 PM   Result Value Ref Range    Amph/Meth UR Negative Negative    Barbiturate Ur Negative Negative    Benzodiazepine Urine Negative Negative    Cocaine Urine Negative Negative    Methadone Urine Negative Negative    Opiate Urine Negative Negative    PCP Ur Negative Negative    THC Urine Negative Negative   Blood gas, arterial    Collection Time: 09/23/19 11:35 PM   Result Value Ref Range    pH, Arterial 7 269 (L) 7 350 - 7 450    PH ART TC 7 261 (L) 7 350 - 7 450    pCO2, Arterial 37 1 36 0 - 44 0 mm Hg    PCO2 (TC) Arterial 38 1 36 0 - 44 0 mm Hg    pO2, Arterial 84 9 75 0 - 129 0 mm Hg    PO2 (TC) Arterial 88 2 75 0 - 129 0 mm Hg    HCO3, Arterial 16 6 (L) 22 0 - 28 0 mmol/L    Base Excess, Arterial -9 5 mmol/L    O2 Content, Arterial 18 0 16 0 - 23 0 mL/dL    O2 HGB,Arterial  93 6 (L) 94 0 - 97 0 %    SOURCE Radial, Right     MARK TEST Yes     Temperature 99 7 Degrees Fehrenheit    ROOM AIR FIO2 21 %   UA w Reflex to Microscopic w Reflex to Culture    Collection Time: 09/23/19 11:35 PM   Result Value Ref Range    Color, UA Yellow     Clarity, UA Clear     Specific Gravity, UA <=1 005 1 000 - 1 030    pH, UA 5 0 5 0, 5 5, 6 0, 6 5, 7 0, 7 5, 8 0, 8 5, 9 0    Leukocytes, UA Negative Negative    Nitrite, UA Negative Negative    Protein, UA Negative Negative mg/dl    Glucose, UA Negative Negative mg/dl    Ketones, UA 15 (1+) (A) Negative mg/dl    Urobilinogen, UA 0 2 0 2, 1 0 E U /dl E U /dl    Bilirubin, UA Negative Negative    Blood, UA Small (A) Negative   Urine Microscopic    Collection Time: 09/23/19 11:35 PM   Result Value Ref Range    RBC, UA 2-4 (A) None Seen, 0-5 /hpf    WBC, UA None Seen None Seen, 0-5, 5-55, 5-65 /hpf    Epithelial Cells None Seen None Seen, Occasional /hpf    Bacteria, UA Occasional None Seen, Occasional /hpf    Hyaline Casts, UA 1-2 (A) (none) /lpf   Acetaminophen level-"If concentration is detectable, please discuss with medical  on call "    Collection Time: 09/24/19 12:24 AM   Result Value Ref Range    Acetaminophen Level <2 0 (L) 10 - 20 ug/mL   MRSA culture    Collection Time: 09/24/19 12:45 AM   Result Value Ref Range    MRSA Culture Only (A)      Methicillin Resistant Staphylococcus aureus isolated    MRSA Culture Only       Please note: This patient requires contact precautions     Lactic acid, plasma    Collection Time: 09/24/19 12:51 AM   Result Value Ref Range    LACTIC ACID 0 4 (L) 0 5 - 2 0 mmol/L   Ethanol    Collection Time: 09/24/19 12:51 AM   Result Value Ref Range    Ethanol Lvl <3 0 - 3 mg/dL   Salicylate level    Collection Time: 09/24/19 12:52 AM   Result Value Ref Range    Salicylate Lvl 39 6 3 - 20 mg/dL   Beta Hydroxybutyrate    Collection Time: 09/24/19 12:52 AM   Result Value Ref Range    BETA-HYDROXYBUTYRATE 2 6 (H) <0 6 mmol/L   Fingerstick Glucose (POCT)    Collection Time: 09/24/19  1:31 AM   Result Value Ref Range    POC Glucose 92 65 - 140 mg/dl   Basic metabolic panel    Collection Time: 09/24/19  2:30 AM   Result Value Ref Range    Sodium 141 136 - 145 mmol/L Potassium 4 5 3 5 - 5 3 mmol/L    Chloride 104 100 - 108 mmol/L    CO2 22 21 - 32 mmol/L    ANION GAP 15 (H) 4 - 13 mmol/L    BUN 36 (H) 5 - 25 mg/dL    Creatinine 0 99 0 60 - 1 30 mg/dL    Glucose 107 65 - 140 mg/dL    Calcium 8 4 8 3 - 10 1 mg/dL    eGFR 88 TX/HUK/9 34NX m   Salicylate level    Collection Time: 09/24/19  2:30 AM   Result Value Ref Range    Salicylate Lvl 41 0 3 - 20 mg/dL   CK (with reflex to MB)    Collection Time: 09/24/19  2:30 AM   Result Value Ref Range    Total  39 - 308 U/L   CKMB    Collection Time: 09/24/19  2:30 AM   Result Value Ref Range    CK-MB Index 1 6 0 0 - 2 5 %    CK-MB 3 8 0 0 - 5 0 ng/mL   Hemoglobin A1C    Collection Time: 09/24/19  5:12 AM   Result Value Ref Range    Hemoglobin A1C 5 4 4 2 - 6 3 %     mg/dl   Basic metabolic panel    Collection Time: 09/24/19  5:12 AM   Result Value Ref Range    Sodium 140 136 - 145 mmol/L    Potassium 4 1 3 5 - 5 3 mmol/L    Chloride 104 100 - 108 mmol/L    CO2 23 21 - 32 mmol/L    ANION GAP 13 4 - 13 mmol/L    BUN 33 (H) 5 - 25 mg/dL    Creatinine 1 09 0 60 - 1 30 mg/dL    Glucose 135 65 - 140 mg/dL    Calcium 8 7 8 3 - 10 1 mg/dL    eGFR 78 ml/min/1 73sq m   CBC (With Platelets)    Collection Time: 09/24/19  5:12 AM   Result Value Ref Range    WBC 8 48 4 31 - 10 16 Thousand/uL    RBC 4 20 3 88 - 5 62 Million/uL    Hemoglobin 12 8 12 0 - 17 0 g/dL    Hematocrit 39 8 36 5 - 49 3 %    MCV 95 82 - 98 fL    MCH 30 5 26 8 - 34 3 pg    MCHC 32 2 31 4 - 37 4 g/dL    RDW 13 7 11 6 - 15 1 %    Platelets 420 454 - 877 Thousands/uL    MPV 10 2 8 9 - 91 8 fL   Salicylate level    Collection Time: 09/24/19  5:12 AM   Result Value Ref Range    Salicylate Lvl 59 0 3 - 20 mg/dL   Blood gas, arterial    Collection Time: 09/24/19  5:46 AM   Result Value Ref Range    pH, Arterial 7 312 (L) 7 350 - 7 450    PH ART TC 7 308 (L) 7 350 - 7 450    pCO2, Arterial 45 1 (H) 36 0 - 44 0 mm Hg    PCO2 (TC) Arterial 45 7 (H) 36 0 - 44 0 mm Hg    pO2, Arterial 89 3 75 0 - 129 0 mm Hg    PO2 (TC) Arterial 91 0 75 0 - 129 0 mm Hg    HCO3, Arterial 22 3 22 0 - 28 0 mmol/L    Base Excess, Arterial -3 9 mmol/L    O2 Content, Arterial 17 5 16 0 - 23 0 mL/dL    O2 HGB,Arterial  94 6 94 0 - 97 0 %    SOURCE Radial, Right     MARK TEST Yes     Temperature 99 2 Degrees Fehrenheit    Nasal Cannula 1 5    Fingerstick Glucose (POCT)    Collection Time: 09/24/19  7:18 AM   Result Value Ref Range    POC Glucose 100 65 - 140 mg/dl   Fingerstick Glucose (POCT)    Collection Time: 09/24/19 11:08 AM   Result Value Ref Range    POC Glucose 162 (H) 65 - 140 mg/dl   Fingerstick Glucose (POCT)    Collection Time: 09/24/19  4:19 PM   Result Value Ref Range    POC Glucose 100 65 - 140 mg/dl   Fingerstick Glucose (POCT)    Collection Time: 09/24/19  8:52 PM   Result Value Ref Range    POC Glucose 88 65 - 140 mg/dl   Fingerstick Glucose (POCT)    Collection Time: 09/25/19  7:18 AM   Result Value Ref Range    POC Glucose 83 65 - 140 mg/dl     Assessment/Plan:         Diagnoses and all orders for this visit:    Type 2 diabetes mellitus with hyperglycemia, with long-term current use of insulin (HCC)  -     insulin glargine (LANTUS) 100 units/mL subcutaneous injection; Inject 20 Units under the skin daily at bedtime  -     Lipid panel; Future  -     Microalbumin / creatinine urine ratio; Future  -     Lipid panel  -     Microalbumin / creatinine urine ratio    Spasticity  -  Will decrease med from 10 mg TID to    Baclofen 5 MG TABS; Take 5 mg by mouth 3 (three) times a day as needed (spasticity)  -     Ambulatory referral to Physical Therapy; Future  -     Ambulatory referral to Occupational Therapy; Future    Benign essential hypertension    Hyperlipidemia, unspecified hyperlipidemia type  -     Lipid panel; Future  -     Lipid panel    S/P craniotomy    Right sided weakness  -     Ambulatory referral to Physical Therapy;  Future  -     Ambulatory referral to Occupational Therapy; Future    Speech disturbance, unspecified type  -     Ambulatory referral to Speech Therapy;  Future          rto in 3                        Tommie Ortiz MD

## 2019-10-21 DIAGNOSIS — E11.9 TYPE 2 DIABETES MELLITUS WITHOUT COMPLICATION, WITH LONG-TERM CURRENT USE OF INSULIN (HCC): Primary | ICD-10-CM

## 2019-10-21 DIAGNOSIS — Z79.4 TYPE 2 DIABETES MELLITUS WITHOUT COMPLICATION, WITH LONG-TERM CURRENT USE OF INSULIN (HCC): Primary | ICD-10-CM

## 2019-10-22 DIAGNOSIS — Z79.4 TYPE 2 DIABETES MELLITUS WITHOUT COMPLICATION, WITH LONG-TERM CURRENT USE OF INSULIN (HCC): Primary | ICD-10-CM

## 2019-10-22 DIAGNOSIS — E11.9 TYPE 2 DIABETES MELLITUS WITHOUT COMPLICATION, WITH LONG-TERM CURRENT USE OF INSULIN (HCC): Primary | ICD-10-CM

## 2019-11-04 ENCOUNTER — EVALUATION (OUTPATIENT)
Dept: PHYSICAL THERAPY | Facility: CLINIC | Age: 49
End: 2019-11-04
Payer: COMMERCIAL

## 2019-11-04 ENCOUNTER — EVALUATION (OUTPATIENT)
Dept: OCCUPATIONAL THERAPY | Facility: CLINIC | Age: 49
End: 2019-11-04
Payer: COMMERCIAL

## 2019-11-04 DIAGNOSIS — R25.2 SPASTICITY: ICD-10-CM

## 2019-11-04 DIAGNOSIS — R53.1 RIGHT SIDED WEAKNESS: ICD-10-CM

## 2019-11-04 DIAGNOSIS — R53.1 RIGHT SIDED WEAKNESS: Primary | ICD-10-CM

## 2019-11-04 PROCEDURE — 97110 THERAPEUTIC EXERCISES: CPT | Performed by: OCCUPATIONAL THERAPIST

## 2019-11-04 PROCEDURE — 97165 OT EVAL LOW COMPLEX 30 MIN: CPT | Performed by: OCCUPATIONAL THERAPIST

## 2019-11-04 PROCEDURE — 97163 PT EVAL HIGH COMPLEX 45 MIN: CPT | Performed by: PHYSICAL THERAPIST

## 2019-11-04 NOTE — PROGRESS NOTES
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  He denies falls over the past year, however based on outcome measures he is deemed a high fall risk (Kennedy, gait speed, TUG)  He takes baclofen for spasticity management however he ran out and hasn't taken it over past 2 days, reports feeling more unsteady and shaky today  Used RW today , but otherwise reports using SPC mainly for ambulation  He presents w/ increased tone RLE, decreased strength proximally, impaired coordination, ataxia, impaired static and dynamic balance - all contributing to fall risk and decreased safety w/ functional mobility  Discussed w/ pt referral to physiatry for further spasticity management and pt in agreement  Pt requires skilled PT services to address above impairments, reduce fall risk, and maximize safety w/ all functional mobility  Impairments: abnormal coordination, abnormal gait, abnormal muscle tone, abnormal movement, impaired balance, impaired physical strength, lacks appropriate home exercise program and safety issue  Understanding of Dx/Px/POC: good   Prognosis: good    Goals  Goals  STG 30 days    1  Patient will improve static balance with feet together eyes closed on firm surface to 30 seconds indicating reduction in fall risk   2  Patient will perform 6MWT with LRAD to assess cardiovascular endurance     3  Patient will display 2 3  second improvement with overall score of 11 seconds (using SPC/ or no AD) with TUG test or lower with noted improvement being Minimal Detectable Change pre current research standards with fall risk     4  Patient will achieve 38/56 LYONS score with minimal improve by 6 points or more demonstrating Minimal Detectable change per current research standards for this objective test which assess fall risk  5  Patient will achieve   59 ft/sec improvement with score of  2 66 ft/sec (using SPC) with 10 Meter Walk test, demonstrating Minimal Detectable change per current research standards for this objective test which assess fall risk  LT days   1  Patient will score low risk for falls with 3/4 fall risk measures   2  Patient will be able to ambulate 1,000 feet without AD during 6 minute walk test w/ SPC    3  Patient will be able to perform floor transfer without physical assistance   4  Patient will be able to carry objects without loss of balance  5   Patient will be able to ambulate outdoors without any loss of balance    Cut off score   All date taken from [facility] or Rehab Measures    LYONS test: 46/56                                              5 x STS Test:  MDC: 6 points                                                  MDC: 2 3 seconds   age norms                                                                 Age Norms   61-76 year old = M: 54, F: 54                        61-76 year old: 11 4 seconds   66-77 year old = M 47,  F: 50                       71-76 year old: 12 6 seconds    80-80 year old = M46,   F: 53                       80-80 year old: 14 8 seconds     TUG test:                                                                     10 Meter Walk Test:  MDC: 4 14 seconds       MDC:  59 ft/sec  Cut off score for Falls                                                  Age Norms  > 13 5 seconds community dwelling adults                20-29; M: 4 56 ft/sec F: 4 62 ft/sec  > 32 2 Frail Elderly                                                     30-39: M 4 76 ft/sec  F: 4 68 ft/sec          40-49: M: 4 79 ft/sec  F: 4 62 ft/sec  6 Minute Walk Test      50-59: M: 4 76 ft/sec  F: 4 56 ft/sec  MDC: 190 feet       60-69: M: 4 56 ft/sec  F: 4 26 ft/sec  Age Norms       70-+    M: 4 36 ft/sec  F: 4 16 ft/sec  60-69:    M: 1876 F: 1765  70-79:    M: 80 F: 1545  80-89 +: M: 80 F; 1286     Plan  Patient would benefit from: skilled physical therapy  Planned modality interventions: biofeedback and electrical stimulation/Russian stimulation  Planned therapy interventions: activity modification, balance, balance/weight bearing training, behavior modification, body mechanics training, breathing training, community reintegration, flexibility, gait training, graded activity, graded exercise, graded motor, home exercise program, functional ROM exercises, transfer training, therapeutic training, therapeutic exercise, therapeutic activities, stretching, strengthening, postural training, patient education, neuromuscular re-education and manual therapy  Frequency: 2x week  Duration in weeks: 12  Plan of Care beginning date: 2019  Plan of Care expiration date: 2020  Treatment plan discussed with: patient        Subjective Evaluation    History of Present Illness  Mechanism of injury: 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 , resulting in stroke-like symptoms (R sided impairments)  He takes baclofen for spasticity, but hasn't had any in past 2 days due to running out so he feels his symptoms are worsened today  He reports double vision and difficulty with reading - he is trying to see an optometrist regarding prism glasses  At home he ambulates w/ SPC or no AD  He presents today w/ RW      Pain  No pain reported  Current pain ratin  At best pain ratin  At worst pain ratin    Social Support  Steps to enter house: yes  Stairs in house: yes (12)   Lives in: multiple-level home  Lives with: parents    Employment status: not working ([employer] )  Hand dominance: left    Treatments  Previous treatment: physical therapy (a few months ago)  Patient Goals  Patient goals for therapy: improved balance, increased motion, increased strength, independence with ADLs/IADLs and return to sport/leisure activities  Patient goal: get full control of R side back , walk without AD safely no loss of balance        Objective     Functional Assessment        Comments  5x STS= 11 72 sec, no UE    LLE strength grossly 5/5  RLE hip flexion 3+/5, knee ext 3+/5, ankle DF 5/5    Increased tone t/o R adductors and gastroc      Impaired coordination via finger to nose, heel to shin, alt toe taps - ataxic    Gait: ataxic, exaggerated heel strike R side w/ poor roll over     Kennedy/56  TUG (RW): 13 3 sec  TUG (SPC): 13 7 sec (used gait belt for safety during testing)  TUG (no AD): 13 8 sec (used gait belt for safety during testing)  Gait speed (RW): 10/11 36 = 0 88 m/s or 2 9 ft/sec  Gait speed [facility]): 10/15 95 = 0 62 m/s or 2 07 ft/sec (used gait belt for safety during testing)               Precautions:   Past Medical History:   Diagnosis Date    Abdominal cyst     Anemia     Hx     Chronic pain disorder     abdominal    Diabetes mellitus (HCC)     Diverticulitis     GI bleed     History of transfusion 2016    5 units    Lightheadedness     Multiple lesions on computed tomography of brain and spine     Spleen laceration

## 2019-11-04 NOTE — PROGRESS NOTES
OT Evaluation     Today's date: 2019  Patient name: Sarah Coyle  : 1970  MRN: [id number]  Referring provider: Destinee Leigh MD  Dx:   Encounter Diagnosis     ICD-10-CM    1  Right sided weakness R53 1 Ambulatory referral to Occupational Therapy   2  Spasticity R25 2 Ambulatory referral to Occupational Therapy       Start Time: 0800  Stop Time: 2477  Total time in clinic (min): 55 minutes    Assessment  Assessment details: CASE SUMMARY:   Sarah Coyle is a 44y o  year old male who suffered a Brain infection in ~Dec  2018  He underwent a craniotomy with drainage of an intracranial abscess on 1/3/2019  His primary complaints are vison and fine motor skills  PMHx includes: See chart for full details with medications  He currently lives with his parents  Some of his interests include: computer work  He was previously working in [employer]  FUNCTIONAL SUMMARY:   Sarha Coyle is independent in basic self care skills  He has difficulty with balance and ambulation, lifting tasks  He manages some light cooking tasks  He reports difficulty with vision and tracking  FOTO was not available  Patient presents with   Good general ROM in right shoulder,   Decreased strength in  right upper extremity and hand,   Decreased Coordination in the  right U/E and hand   Impaired sensation on R hand  Difficulties with visual perception- to be further evaluated at next visit  Patient would benefit from Occupational Therapy to address these impairments in order to return to His prior level of function  Goals  Short Term Goals:   to be completed in 6-8 weeks  1 Increase U/E/ wrist to 5/5   2  Increase hand strength right =75% of unaffected side  3  Increase coordination in  right hand and U/E as evidenced by improved Nine Hold Peg Test scores, through the use of coordination activities and metronome activites to improve timing and sequencing    4  Improve sensory awareness in R hand through sensory re-ed techniques  5  Complete evaluation for visual perception difficulties and develop goals as appropriate  Long Term Goals: To be completed in 8-12 weeks  1  Ability to perform lifting, carrying, cooking,cleaning tasks  with minimal to no discomfort,   2  Patient demonstrates good carryover of HEP,   3  Improved U/E / wrist strength to 5/5 and hand strength  right =75% of unaffected side  4  Improved Coordination as evidenced by improved Nine Hole Peg Test score  5  Improve sensory awareness in R hand as evidenced by improved Chuck Rossier results  Plan  Patient would benefit from: skilled speech therapy and skilled occupational therapy  Planned modality interventions: thermotherapy: hydrocollator packs  Planned therapy interventions: joint mobilization, manual therapy, massage, flexibility, functional ROM exercises, home exercise program, therapeutic activities, therapeutic exercise, stretching and strengthening  Other planned therapy interventions: vision exercises  Frequency: 2x week  Duration in weeks: 12  Plan of Care beginning date: 2019  Plan of Care expiration date: 2020        Subjective Evaluation    Pain  Current pain ratin  At best pain ratin  At worst pain ratin  Location: R u/e    Treatments  Current treatment: occupational therapy  Patient Goals  Patient goals for therapy: increased motion, increased strength and independence with ADLs/IADLs  Patient goal: Improved coordination in R hand and improved visual perception  Objective     Neurological Testing     Sensation     Wrist/Hand     Right   Intact: light touch    Additional Neurological Details  4 31mmHg on all fingertips except 4 56mmHg on ring finger  Impaired stereognosis       Strength/Myotome Testing     Left Wrist/Hand      (2nd hand position)     Trial 1: 108    Thumb Strength  Key/Lateral Pinch     [place] 1: 25  Palmar/Three-Point Pinch     Trial 1: 25    Right Wrist/Hand   Wrist extension: 4+  Wrist flexion: 4+     (2nd hand position)     Trial 1: 60    Thumb Strength   Key/Lateral Pinch     Trial 1: 19    Additional Strength Details  Unable to coordinate a tip pinch  Tests     Additional Tests Details  COORDINATION: Patient demonstrates incoordination in the shoulder/R U/E as well as the R hand  He demonstrates some ataxic motion and mild increased tone that impairs hand and U/E function  Nine Hole Peg test   Placement:  R=Unable  L=22sec  Removal:      R=23sec   L=8sec             Treatment Today:     Subjective: Patient reports pain level as 0/10 today  Objective: Completed initial evaluation  See report for details  Instructed in supine exercises with 1lb weight to increase strength/ stability and coordination at the proximal shoulder  Instructed in exercises for hand with pinck sponge to , pinch and try to twirl  Home Program:See Media Section for details  Supine ex with 1lb wt  See handout  Pink sponge  See handout    Precautions: Fall risk  Assessment: Patient tolerated session well  Appears to understand HEP well  Plan: Continue Occupational Therapy ~2x/week to  improve ROM, strength coordination  and function

## 2019-11-06 ENCOUNTER — TELEPHONE (OUTPATIENT)
Dept: FAMILY MEDICINE CLINIC | Facility: CLINIC | Age: 49
End: 2019-11-06

## 2019-11-06 DIAGNOSIS — R25.2 SPASTICITY: Primary | ICD-10-CM

## 2019-11-06 RX ORDER — CYCLOBENZAPRINE HCL 5 MG
5 TABLET ORAL 3 TIMES DAILY PRN
Qty: 30 TABLET | Refills: 0 | Status: SHIPPED | OUTPATIENT
Start: 2019-11-06 | End: 2020-01-14

## 2019-11-06 NOTE — TELEPHONE ENCOUNTER
Dr Simona Rendon    Patient says he had been taking baclofen 5 mg 2 pills 3 x daily, instead of 1 pill  3x daily, so he is all out of meds and pharmacy says he is not yet due for refill  Please send new rx to [facility]

## 2019-11-07 ENCOUNTER — APPOINTMENT (OUTPATIENT)
Dept: PHYSICAL THERAPY | Facility: CLINIC | Age: 49
End: 2019-11-07
Payer: COMMERCIAL

## 2019-11-07 ENCOUNTER — APPOINTMENT (OUTPATIENT)
Dept: OCCUPATIONAL THERAPY | Facility: CLINIC | Age: 49
End: 2019-11-07
Payer: COMMERCIAL

## 2019-11-12 ENCOUNTER — OFFICE VISIT (OUTPATIENT)
Dept: PHYSICAL THERAPY | Facility: CLINIC | Age: 49
End: 2019-11-12
Payer: COMMERCIAL

## 2019-11-12 ENCOUNTER — OFFICE VISIT (OUTPATIENT)
Dept: OCCUPATIONAL THERAPY | Facility: CLINIC | Age: 49
End: 2019-11-12
Payer: COMMERCIAL

## 2019-11-12 DIAGNOSIS — R25.2 SPASTICITY: Primary | ICD-10-CM

## 2019-11-12 DIAGNOSIS — R53.1 RIGHT SIDED WEAKNESS: ICD-10-CM

## 2019-11-12 PROCEDURE — 97110 THERAPEUTIC EXERCISES: CPT | Performed by: PHYSICAL THERAPIST

## 2019-11-12 PROCEDURE — 97530 THERAPEUTIC ACTIVITIES: CPT

## 2019-11-12 PROCEDURE — 97112 NEUROMUSCULAR REEDUCATION: CPT | Performed by: PHYSICAL THERAPIST

## 2019-11-12 NOTE — PROGRESS NOTES
Daily Note     Today's date: 2019  Patient name: Meir Spine  : 1970  MRN: [id number]  Referring provider: Ron Franco MD  Dx:   Encounter Diagnosis     ICD-10-CM    1  Spasticity R25 2    2  Right sided weakness R53 1                  Assessment: Tolerated treatment fair  Patient would benefit from continued OT      Plan: Continue per plan of care  Precautions:   Past Medical History:   Diagnosis Date    Abdominal cyst     Anemia     Hx     Chronic pain disorder     abdominal    Diabetes mellitus (HCC)     Diverticulitis     GI bleed     History of transfusion 2016    5 units    Lightheadedness     Multiple lesions on computed tomography of brain and spine     Spleen laceration        Subjective: 010      Objective: Completed exercises and activities to increase ROM, strength, coordination and function  I See treatment diary below     Precautions          Exercise Diary         Clothes pegs Easy 2 x 20       putty Pushing/button removal       Weight bearing Ball pushing       Large geometric peg/pegboard Able to manipulate 3 pegs                                                                                                                                                  Assessment: Tolerated treatment fair  Patient would benefit from continued OT will assess patient's vision at next session- very ataxic movement- difficulty to manipulate items      Plan: Continue per plan of care

## 2019-11-12 NOTE — PROGRESS NOTES
Daily Note     Today's date: 2019  Patient name: Karen Fenton  : 1970  MRN: [id number]  Referring provider: Alma Rosa Guthrie MD  Dx:   Encounter Diagnosis     ICD-10-CM    1  Spasticity R25 2    2  Right sided weakness R53 1        Start Time: 0800  Stop Time: 0845  Total time in clinic (min): 45 minutes    Subjective: Keren Rush reports that he has still not received more Baclofen and instead his doctor gave him a muscle relaxant  He is hoping to get more baclofen next week  Objective: See treatment diary below  3lb ankle weights used throughout session    - Ambulation with SPC 50 ft x4     Exercises on 8 in step with targets for stepping   - Step taps   - Step ups  - Lunges     - Small step lunges with forward reach  - Standing hamstring curls     Assessment: Celestino tolerated treatment well, though he still demonstrates ataxic gait even with use of ankle weights  He required manual facilitation to prevent knee hyperextension throughout step taps and hamstring curls, as well as support for small step lunges in order to perform without UEs  He responded well to manual facilitation to prevent hyperextension and promote weight bearing through his forefoot, and benefited from targets for purposeful stepping  He also demonstrated difficulty without UE support during exercises but was able to complete  Keren Rush would benefit from continued skilled PT to reduce ataxia and improve balance for safety with independent function  Plan: Continue plan of care  Keren Rush was instructed to bring shoes that had better arch support for next session        Precautions:   Past Medical History:   Diagnosis Date    Abdominal cyst     Anemia     Hx     Chronic pain disorder     abdominal    Diabetes mellitus ([address] )     Diverticulitis     GI bleed     History of transfusion 2016    5 units    Lightheadedness     Multiple lesions on computed tomography of brain and spine     Spleen laceration

## 2019-11-14 ENCOUNTER — APPOINTMENT (OUTPATIENT)
Dept: OCCUPATIONAL THERAPY | Facility: CLINIC | Age: 49
End: 2019-11-14
Payer: COMMERCIAL

## 2019-11-14 ENCOUNTER — APPOINTMENT (OUTPATIENT)
Dept: PHYSICAL THERAPY | Facility: CLINIC | Age: 49
End: 2019-11-14
Payer: COMMERCIAL

## 2019-11-19 ENCOUNTER — OFFICE VISIT (OUTPATIENT)
Dept: PHYSICAL THERAPY | Facility: CLINIC | Age: 49
End: 2019-11-19
Payer: COMMERCIAL

## 2019-11-19 ENCOUNTER — OFFICE VISIT (OUTPATIENT)
Dept: OCCUPATIONAL THERAPY | Facility: CLINIC | Age: 49
End: 2019-11-19
Payer: COMMERCIAL

## 2019-11-19 DIAGNOSIS — R53.1 RIGHT SIDED WEAKNESS: ICD-10-CM

## 2019-11-19 DIAGNOSIS — R25.2 SPASTICITY: Primary | ICD-10-CM

## 2019-11-19 PROCEDURE — 97110 THERAPEUTIC EXERCISES: CPT | Performed by: PHYSICAL THERAPIST

## 2019-11-19 PROCEDURE — 97530 THERAPEUTIC ACTIVITIES: CPT

## 2019-11-19 PROCEDURE — 97112 NEUROMUSCULAR REEDUCATION: CPT | Performed by: PHYSICAL THERAPIST

## 2019-11-19 NOTE — PROGRESS NOTES
Daily Note     Today's date: 2019  Patient name: Sarah Coyle  : 1970  MRN: [id number]  Referring provider: Destinee Leigh MD  Dx:   Encounter Diagnosis     ICD-10-CM    1  Spasticity R25 2    2  Right sided weakness R53 1        Start Time: 945  Stop Time: 1030  Total time in clinic (min): 45 minutes    Subjective: Omkar Sen reports that the muscle relaxant his doctor prescribed is not working, and he is taking greater efforts to get more Baclofen  Objective: See treatment diary below  2lb ankle weights & 8lb vest used throughout session    - Ambulation with SPC 50 ft x2  - Ambulation with walker 50 ft x3  - Step Taps on 6 in step with 2 UE support - 20x each leg  - Heel Raises 25x each leg  - Hamstring Curls 25x each leg  - Wall Slides x10 with 10 second hold  - Terminal Knee Extension in parallel bars, with red t-band 20x - R side only  - Supine Bridging with TrA Contraction x10  - Supine Hamstring Stretch 1 minute each leg  - Side-lying Quad Stretch 1 minute each leg  - Prone Hip Flexor Stretch 30 seconds     Assessment: Celestino tolerated treatment well, though he still demonstrates ataxic gait with use of ankle weights and vest  He requires facilitation to prevent R knee hyperextension throughout standing exercises  Additionally he required facilitation to prevent compensation of hip flexors during hamstring curls  He responded well to terminal knee extension and wall slide exercises, and would benefit from wall squats in small range oscillation for greater quadriceps strengthening  Omkar Sen requires continued skilled PT to reduce ataxia and improve balance for safety with independent function  Plan: Continue plan of care  Progress wall slides  Incorporate ambulation with terminal knee extension        Precautions:   Past Medical History:   Diagnosis Date    Abdominal cyst     Anemia     Hx     Chronic pain disorder     abdominal    Diabetes mellitus ([facility] [address]     Diverticulitis     GI bleed  History of transfusion 2016    5 units    Lightheadedness     Multiple lesions on computed tomography of brain and spine     Spleen laceration

## 2019-11-19 NOTE — PROGRESS NOTES
Daily Note     Today's date: 2019  Patient name: Zara Arana  : 1970  MRN: [id number]  Referring provider: Reyna Johnson MD  Dx:   Encounter Diagnosis     ICD-10-CM    1  Spasticity R25 2    2  Right sided weakness R53 1                  Assessment: Tolerated treatment fair  Patient would benefit from continued OT      Plan: Continue per plan of care  Precautions:   Past Medical History:   Diagnosis Date    Abdominal cyst     Anemia     Hx     Chronic pain disorder     abdominal    Diabetes mellitus (HCC)     Diverticulitis     GI bleed     History of transfusion 2016    5 units    Lightheadedness     Multiple lesions on computed tomography of brain and spine     Spleen laceration        Subjective: 010      Objective: Completed exercises and activities to increase ROM, strength, coordination and function  See treatment diary below    Position in quadruped to complete strengthening exercises- weight shifting on R UE for increased proprioceptive input through arm while using LUE to engage in Large movement task  Weight bearing on R elbow while using the heal of R hand for pushing in theraputty      Encourage patient to use gross grasp for placing and moving large blocks  Visual screening completed see below:     Precautions          Exercise Diary         Clothes pegs Easy 2 x 20       putty Pushing/button removal       Weight bearing Ball pushing       Large geometric peg/pegboard Able to manipulate 3 pegs                                                                                                                                            Vision Screening Recording:   vision screen: With Glasses  near acuity: R 20/200  L 20/70  binocularity far: esotropia  binocularity near: esotropia  red green fusion: suppression  near point of convergence: 6" with 10" conv break  deny string: Misalignment  Suppression far and near  Pursuits: Jerky with undershooting in horizontal plain  Saccades: Inaccurate with both eye undershooting in horizontal plain  ocular ROM: Limited in R/L upper quadrants  visual perceptual midline shift:     Vision: ST  - Pt will increase oculomotor control for improved saccades, con/divergent tasks for improved reading, board to table tasks with minimal increase in symptoms 4 weeks  - Pt will tolerate multi-modal envt x 15 min with 80% accuracy of cog load and min increase of symptoms of diplopia 4 weeks  Vision: LT  - Pt will increase oculomotor control for improved dynamic activities with head turns, board/screen to table tasks symptom free  - Pt will increase oculomotor control for WNL saccades, con/divergent tasks symptom free               Assessment: Tolerated treatment fair  Patient would benefit from continued OT will Vision  Patient's vision assessed see goal     Very ataxic movement- difficulty to manipulate items      Plan: Continue per plan of care

## 2019-11-21 ENCOUNTER — OFFICE VISIT (OUTPATIENT)
Dept: PHYSICAL THERAPY | Facility: CLINIC | Age: 49
End: 2019-11-21
Payer: COMMERCIAL

## 2019-11-21 ENCOUNTER — OFFICE VISIT (OUTPATIENT)
Dept: OCCUPATIONAL THERAPY | Facility: CLINIC | Age: 49
End: 2019-11-21
Payer: COMMERCIAL

## 2019-11-21 DIAGNOSIS — R25.2 SPASTICITY: Primary | ICD-10-CM

## 2019-11-21 DIAGNOSIS — R53.1 RIGHT SIDED WEAKNESS: Primary | ICD-10-CM

## 2019-11-21 DIAGNOSIS — R53.1 RIGHT SIDED WEAKNESS: ICD-10-CM

## 2019-11-21 DIAGNOSIS — R25.2 SPASTICITY: ICD-10-CM

## 2019-11-21 PROCEDURE — 97116 GAIT TRAINING THERAPY: CPT | Performed by: PHYSICAL THERAPIST

## 2019-11-21 PROCEDURE — 97530 THERAPEUTIC ACTIVITIES: CPT

## 2019-11-21 PROCEDURE — 97110 THERAPEUTIC EXERCISES: CPT

## 2019-11-21 PROCEDURE — 97110 THERAPEUTIC EXERCISES: CPT | Performed by: PHYSICAL THERAPIST

## 2019-11-21 PROCEDURE — 97112 NEUROMUSCULAR REEDUCATION: CPT | Performed by: PHYSICAL THERAPIST

## 2019-11-21 NOTE — PROGRESS NOTES
Daily Note     Today's date: 2019  Patient name: Shanita Umaña  : 1970  MRN: [id number]  Referring provider: Carlene Bazzi MD  Dx:   Encounter Diagnosis     ICD-10-CM    1  Right sided weakness R53 1    2  Spasticity R25 2        Start Time:   Stop Time: 1030  Total time in clinic (min): 45 minutes    Subjective: Harriett reports that he felt tired after the last session but "it was a good workout " Additionally, he states that he has stopped taking the muscle relaxant his doctor provided, and he is still waiting for more baclofen  Objective: See treatment diary below  2lb ankle weights & 8lb vest used throughout session (except for solo step activities)     - Ambulation with walker 50 ft x2  - Ambulation in Solo Step - with green resistance band and dowel with blue tband - 3 Laps  - Ambulation in Solo Step - pushing sled 50 lbs - 4 laps   - Step Taps on 6 in step with 2 UE support - 25x each leg  - Hamstring Curls 25x each leg  - Supine Bridging with TrA Contraction x20  - Supine Hamstring Stretch 1 minute each leg  - Side-lying Quad Stretch 1 minute each leg  - Prone Hip Flexor Stretch 30 seconds     Not Performed:  - Heel Raises 25x each leg  - Wall Slide with small range oscillation x10 with 10 second hold  - Terminal Knee Extension in parallel bars, with red t-band 20x - R side only    Assessment: Celestino tolerated treatment well, though he still demonstrates ataxic gait, especially because he has not taken any medication for his spasticity  For this reason, a walker was used for general ambulation outside Solo Step  Harriett performed very well in the solo step, responding well to use of sled and dowel for UE proprioceptive input and theraband for prevention of knee hyperextension, as he still requires this tactile feedback  He also continues to require tactile feedback to prevent hip flexor compensation during hamstring curls  Harriett was escorted out of the clinic using RW   He requires continued skilled PT to reduce ataxia and improve balance for safety with independent function  Plan: Continue plan of care  Progress wall slides to small range oscillation  Incorporate ambulation with terminal knee extension  NMES for plantarflexors       Precautions:   Past Medical History:   Diagnosis Date    Abdominal cyst     Anemia     Hx     Chronic pain disorder     abdominal    Diabetes mellitus ([facility] [address]     Diverticulitis     GI bleed     History of transfusion 2016    5 units    Lightheadedness     Multiple lesions on computed tomography of brain and spine     Spleen laceration

## 2019-11-21 NOTE — PROGRESS NOTES
Daily Note     Today's date: 2019  Patient name: Duane Roth  : 1970  MRN: [id number]  Referring provider: Jackie Ibrahim MD  Dx:   Encounter Diagnosis     ICD-10-CM    1  Spasticity R25 2    2  Right sided weakness R53 1               Assessment: Tolerated treatment fair  Patient would benefit from continued OT      Plan: Continue per plan of care  Precautions:   Past Medical History:   Diagnosis Date    Abdominal cyst     Anemia     Hx     Chronic pain disorder     abdominal    Diabetes mellitus (HCC)     Diverticulitis     GI bleed     History of transfusion 2016    5 units    Lightheadedness     Multiple lesions on computed tomography of brain and spine     Spleen laceration        Subjective: 010      Objective: Completed exercises and activities to increase ROM, strength, coordination and function  See treatment diary below    Position in quadruped to complete strengthening exercises- weight shifting on R UE for increased proprioceptive input through arm while using LUE to engage in Large movement task  Weight bearing on R elbow while using the heal of R hand for pushing in theraputty      Encourage patient to use gross grasp for placing and moving large blocks       Precautions          Exercise Diary         Clothes pegs Easy 2 x 20       putty Pushing/button removal       Weight bearing Ball pushing       Large geometric peg/pegboard Able to manipulate 3 pegs                                                                                                                                            Vision Screening Recording:   vision screen: With Glasses  near acuity: R 20/200  L 20/70  binocularity far: esotropia  binocularity near: esotropia  red green fusion: suppression  near point of convergence: 6" with 10" conv break  deny string: Misalignment  Suppression far and near  Pursuits: Jerky with undershooting in horizontal plain  Saccades: Inaccurate with both eye undershooting in horizontal plain  ocular ROM: Limited in R/L upper quadrants  visual perceptual midline shift:     Vision: ST  - Pt will increase oculomotor control for improved saccades, con/divergent tasks for improved reading, board to table tasks with minimal increase in symptoms 4 weeks  - Pt will tolerate multi-modal envt x 15 min with 80% accuracy of cog load and min increase of symptoms of diplopia 4 weeks  Vision: LT  - Pt will increase oculomotor control for improved dynamic activities with head turns, board/screen to table tasks symptom free  - Pt will increase oculomotor control for WNL saccades, con/divergent tasks symptom free               Assessment: Tolerated treatment fair  Patient would benefit from continued OT will Vision  Patient's vision assessed see goal     Very ataxic movement- difficulty to manipulate items      Plan: Continue per plan of care

## 2019-11-26 ENCOUNTER — OFFICE VISIT (OUTPATIENT)
Dept: PHYSICAL THERAPY | Facility: CLINIC | Age: 49
End: 2019-11-26
Payer: COMMERCIAL

## 2019-11-26 DIAGNOSIS — R25.2 SPASTICITY: Primary | ICD-10-CM

## 2019-11-26 DIAGNOSIS — R53.1 RIGHT SIDED WEAKNESS: ICD-10-CM

## 2019-11-26 PROCEDURE — 97116 GAIT TRAINING THERAPY: CPT | Performed by: PHYSICAL THERAPIST

## 2019-11-26 PROCEDURE — 97110 THERAPEUTIC EXERCISES: CPT | Performed by: PHYSICAL THERAPIST

## 2019-11-26 NOTE — PROGRESS NOTES
Daily Note     Today's date: 2019  Patient name: Zaida Aguila  : 1970  MRN: [id number]  Referring provider: Piter Fajardo MD  Dx:   Encounter Diagnosis     ICD-10-CM    1  Spasticity R25 2    2  Right sided weakness R53 1        Start Time: 945  Stop Time: 1030  Total time in clinic (min): 45 minutes    Subjective: Frandy Angela reports feeling okay today, no new updates  He is still wearing his usual sneakers and has not received more Baclofen    Objective: See treatment diary below    2lb ankle weights & 16lb vest used throughout session     - Ambulation with walker 50 ft x4 with NMES on R gastroc  - Ambulation with walker 50 ft x2 with NMES on R hamstrings   - Ankle DF & PF in supine with knees bent - blue t-band for L, no resistance and NMES facilitation on R  - Heel Raises in standing, bilaterally x25, R LE only x25 - NMES facilitation throughout  - Prone Hamstring Curls 25x each leg  - Supine Psoas Marches with blue t-band, x20 bilaterally - facilitation to prevent hyperextension on R  - Supine Bridging with TrA Contraction x20       Assessment: Celestino tolerated treatment well  He responded well to the use of ankle weights and increased weight in vest to diminish ataxia with gait, but a walker is still required  He also benefited from use of NMES on R plantarflexors to promote eccentric control during heel strike in ambulation  Frandy Angela did demonstrate increased eversion during this intervention though  NMES on hamstrings during ambulation did not contribute significantly to reduced hyperextension, but this may be due to small electrode size used today  Frandy Angela performed well with supine exercises, but still requires manual facilitation for proper postural alignment and to avoid compensation  Frandy Angela was escorted out of the clinic using RW  He requires continued skilled PT to reduce ataxia and improve balance for safety with independent function  Plan: Continue plan of care   Progress wall slides to small range oscillation  Incorporate ambulation with terminal knee extension  NMES for plantarflexors       Precautions:   Past Medical History:   Diagnosis Date    Abdominal cyst     Anemia     Hx     Chronic pain disorder     abdominal    Diabetes mellitus ([facility] [address] [facility]     Diverticulitis     GI bleed     History of transfusion 2016    5 units    Lightheadedness     Multiple lesions on computed tomography of brain and spine     Spleen laceration

## 2019-11-29 ENCOUNTER — OFFICE VISIT (OUTPATIENT)
Dept: PHYSICAL THERAPY | Facility: CLINIC | Age: 49
End: 2019-11-29
Payer: COMMERCIAL

## 2019-11-29 DIAGNOSIS — R25.2 SPASTICITY: Primary | ICD-10-CM

## 2019-11-29 DIAGNOSIS — R53.1 RIGHT SIDED WEAKNESS: ICD-10-CM

## 2019-11-29 PROCEDURE — 97112 NEUROMUSCULAR REEDUCATION: CPT | Performed by: PHYSICAL THERAPIST

## 2019-11-29 PROCEDURE — 97116 GAIT TRAINING THERAPY: CPT | Performed by: PHYSICAL THERAPIST

## 2019-11-29 NOTE — PROGRESS NOTES
Daily Note     Today's date: 2019  Patient name: Shanita Umaña  : 1970  MRN: [id number]  Referring provider: Carlene Bazzi MD  Dx:   Encounter Diagnosis     ICD-10-CM    1  Spasticity R25 2    2  Right sided weakness R53 1                   Subjective: You Blevins reports he has not gotten any baclofen, keeps forgetting to call MD  He is having trouble using R hand controller for his VR gt system  Objective: See treatment diary below    16lb vest used throughout session     - Ambulation with weighted walker (8#) 100 ft x4 with NMES on R gastroc  -Pushing 50# weighted sled   -Standing R hamstring curls  -R step taps 20 reps  -R step ups 20 reps  -RLE on 6" step and LLE stepping from floor to 12" step   -LLE on 6" step and RLE stepping from floor to 12" step       Assessment:   Celestino tolerated treatment well today  Initiated session w/ use of NMES to R gastroc with overall improvements noted in stance phase of gait  He responds well to use of weighted vest and weighted walker to improve his stability  He requires continued skilled PT to reduce ataxia and improve balance for safety with independent function  Plan: Continue plan of care  Progress wall slides to small range oscillation  Incorporate ambulation with terminal knee extension  NMES for plantarflexors       Precautions:   Past Medical History:   Diagnosis Date    Abdominal cyst     Anemia     Hx     Chronic pain disorder     abdominal    Diabetes mellitus ([facility] [address]     Diverticulitis     GI bleed     History of transfusion 2016    5 units    Lightheadedness     Multiple lesions on computed tomography of brain and spine     Spleen laceration

## 2019-12-03 ENCOUNTER — OFFICE VISIT (OUTPATIENT)
Dept: PHYSICAL THERAPY | Facility: CLINIC | Age: 49
End: 2019-12-03
Payer: COMMERCIAL

## 2019-12-03 ENCOUNTER — OFFICE VISIT (OUTPATIENT)
Dept: OCCUPATIONAL THERAPY | Facility: CLINIC | Age: 49
End: 2019-12-03
Payer: COMMERCIAL

## 2019-12-03 DIAGNOSIS — R53.1 RIGHT SIDED WEAKNESS: ICD-10-CM

## 2019-12-03 DIAGNOSIS — Z79.4 TYPE 2 DIABETES MELLITUS WITHOUT COMPLICATION, WITH LONG-TERM CURRENT USE OF INSULIN (HCC): ICD-10-CM

## 2019-12-03 DIAGNOSIS — R25.2 SPASTICITY: ICD-10-CM

## 2019-12-03 DIAGNOSIS — E11.9 TYPE 2 DIABETES MELLITUS WITHOUT COMPLICATION, WITH LONG-TERM CURRENT USE OF INSULIN (HCC): ICD-10-CM

## 2019-12-03 DIAGNOSIS — R25.2 SPASTICITY: Primary | ICD-10-CM

## 2019-12-03 PROCEDURE — 97110 THERAPEUTIC EXERCISES: CPT

## 2019-12-03 PROCEDURE — 97110 THERAPEUTIC EXERCISES: CPT | Performed by: PHYSICAL THERAPIST

## 2019-12-03 PROCEDURE — 97530 THERAPEUTIC ACTIVITIES: CPT

## 2019-12-03 PROCEDURE — 97112 NEUROMUSCULAR REEDUCATION: CPT | Performed by: PHYSICAL THERAPIST

## 2019-12-03 PROCEDURE — 97116 GAIT TRAINING THERAPY: CPT | Performed by: PHYSICAL THERAPIST

## 2019-12-03 RX ORDER — BACLOFEN 5 MG/1
5 TABLET ORAL 3 TIMES DAILY PRN
Qty: 90 TABLET | Refills: 1 | Status: SHIPPED | OUTPATIENT
Start: 2019-12-03 | End: 2020-01-14

## 2019-12-03 NOTE — TELEPHONE ENCOUNTER
Dr Ursula Cisneros pt needs a new script for baclofen it has to be 10 mg that's all the ins will pay for

## 2019-12-03 NOTE — PROGRESS NOTES
Daily Note     Today's date: 12/3/2019  Patient name: Hemal Hummel  : 1970  MRN: [id number]  Referring provider: Dali Hammer MD  Dx:   Encounter Diagnosis     ICD-10-CM    1  Spasticity R25 2    2  Right sided weakness R53 1               Assessment: Tolerated treatment fair  Patient would benefit from continued OT      Plan: Continue per plan of care  Precautions:   Past Medical History:   Diagnosis Date    Abdominal cyst     Anemia     Hx     Chronic pain disorder     abdominal    Diabetes mellitus (HCC)     Diverticulitis     GI bleed     History of transfusion 2016    5 units    Lightheadedness     Multiple lesions on computed tomography of brain and spine     Spleen laceration        Subjective: 010      Objective: Completed exercises and activities to increase ROM, strength, coordination and function  See treatment diary below    Position in quadruped to complete strengthening exercises- weight shifting on R UE for increased proprioceptive input through arm while using LUE to engage in Large movement task    Completed grasp and release with RUE,  Strengthening with power web  Completed sensory integration activity with various textures  Visual processing exercises with using orthopic exercises to improve eye teaming             Precautions          Exercise Diary         Clothes pegs Easy 2 x 20       putty Pushing/button removal       Weight bearing Ball pushing       Large geometric peg/pegboard Able to manipulate 3 pegs                                                                                                                                            Vision Screening Recording:   vision screen: With Glasses  near acuity: R 20/200  L 20/70  binocularity far: esotropia  binocularity near: esotropia  red green fusion: suppression  near point of convergence: 6" with 10" conv break  deny string: Misalignment  Suppression far and near  Pursuits: Jerky with undershooting in horizontal plain  Saccades: Inaccurate with both eye undershooting in horizontal plain  ocular ROM: Limited in R/L upper quadrants  visual perceptual midline shift:     Vision: ST  - Pt will increase oculomotor control for improved saccades, con/divergent tasks for improved reading, board to table tasks with minimal increase in symptoms 4 weeks  - Pt will tolerate multi-modal envt x 15 min with 80% accuracy of cog load and min increase of symptoms of diplopia 4 weeks  Vision: LT  - Pt will increase oculomotor control for improved dynamic activities with head turns, board/screen to table tasks symptom free  - Pt will increase oculomotor control for WNL saccades, con/divergent tasks symptom free               Assessment: Tolerated treatment fair  Patient would benefit from continued OT will Vision  Very ataxic movement- difficulty to manipulate items  Patient discussed difficulty with using remote of his Oculus Quest- will fashion support to trial at next session when he brings in the instrument      Plan: Continue per plan of care

## 2019-12-03 NOTE — PROGRESS NOTES
Daily Note     Today's date: 12/3/2019  Patient name: Cipriano Farr  : 1970  MRN: [id number]  Referring provider: Archie Garrido MD  Dx:   Encounter Diagnosis     ICD-10-CM    1  Spasticity R25 2    2  Right sided weakness R53 1        Start Time: 945  Stop Time: 104  Total time in clinic (min): 60 minutes    Subjective: Harriett reports feeling good, no updates or changes since last visit  Objective: See treatment diary below    - Bridges with TrA Contraction - x20  - Psoas Marches - x20 bilaterally with blue tb  - Prone Hamstring Curl - x20 8 lb ankle weights    With 16lb vest:   - Ambulation with weighted walker (16#) 100 ft x2   - Pushing 50# weighted sled 100 ft - with use of blue tb to prevent hyperextension of R knee     Ambulation in AlterG x10 minutes  - feet not completely on ground with 50% so increased to 60% for first 5 minutes  - 70% for last 5 minutes   - speed varied from  5-1 2 mph    - Quadruped Lifting 1 UE/LE x10 each  - Prone>>Quadruped>>Tall Kneel x5  - Tall Knee >> Half Kneel x5 each leg  - Sitting on Heels>>Tall Kneel with 10lb Med Ball Lift x10    Assessment: Harriett responded well to use of Alter G for un-weighted ambulation and visual feedback of gait pattern  He was able to initiate self-correction of gait abnormalities but would benefit from maintaining a slower speed to focus on neuromuscular control  He demonstrated good carryover of improved gait pattern afterwards  Harriett demonstrated difficulty with quadruped exercises, indicating core, hip flexor, and general UE weakness  He required frequent verbal and tactile cueing to perform with proper posture and prevent compensations  He also struggled with movements through developmental sequence and would benefit from further performance of these exercises  Harriett requires continues skilled physical therapy to address ataxic gait and general weakness to promote safe and optimal level of function  Plan: Continue plan of care  Continue use of AlterG       Precautions:   Past Medical History:   Diagnosis Date    Abdominal cyst     Anemia     Hx     Chronic pain disorder     abdominal    [facility] mellitus ([facility] [address]     Diverticulitis     GI bleed     History of transfusion 2016    5 units    Lightheadedness     Multiple lesions on computed tomography of brain and spine     Spleen laceration

## 2019-12-04 RX ORDER — BACLOFEN 10 MG/1
5 TABLET ORAL 3 TIMES DAILY
Qty: 30 TABLET | Refills: 0 | Status: SHIPPED | OUTPATIENT
Start: 2019-12-04 | End: 2020-01-14

## 2019-12-05 ENCOUNTER — OFFICE VISIT (OUTPATIENT)
Dept: OCCUPATIONAL THERAPY | Facility: CLINIC | Age: 49
End: 2019-12-05
Payer: COMMERCIAL

## 2019-12-05 ENCOUNTER — OFFICE VISIT (OUTPATIENT)
Dept: PHYSICAL THERAPY | Facility: CLINIC | Age: 49
End: 2019-12-05
Payer: COMMERCIAL

## 2019-12-05 DIAGNOSIS — R53.1 RIGHT SIDED WEAKNESS: ICD-10-CM

## 2019-12-05 DIAGNOSIS — R25.2 SPASTICITY: Primary | ICD-10-CM

## 2019-12-05 PROCEDURE — 97530 THERAPEUTIC ACTIVITIES: CPT

## 2019-12-05 PROCEDURE — 97112 NEUROMUSCULAR REEDUCATION: CPT | Performed by: PHYSICAL THERAPIST

## 2019-12-05 PROCEDURE — 97116 GAIT TRAINING THERAPY: CPT | Performed by: PHYSICAL THERAPIST

## 2019-12-05 PROCEDURE — 97140 MANUAL THERAPY 1/> REGIONS: CPT

## 2019-12-05 NOTE — PROGRESS NOTES
Daily Note     Today's date: 2019  Patient name: Meir Spine  : 1970  MRN: [id number]  Referring provider: Ron Franco MD  Dx:   Encounter Diagnosis     ICD-10-CM    1  Spasticity R25 2    2  Right sided weakness R53 1               Assessment: Tolerated treatment fair  Patient would benefit from continued OT      Plan: Continue per plan of care  Precautions:   Past Medical History:   Diagnosis Date    Abdominal cyst     Anemia     Hx     Chronic pain disorder     abdominal    Diabetes mellitus (HCC)     Diverticulitis     GI bleed     History of transfusion 2016    5 units    Lightheadedness     Multiple lesions on computed tomography of brain and spine     Spleen laceration        Subjective: 010      Objective: Completed exercises and activities to increase ROM, strength, coordination and function  See treatment diary below   Graston technique to R UE to relax muscles   Use of theraband to provide Proprioceptive input through arm while using LUE to engage in Large movement task   While engage in grasp and release   Strengthening with power web  Completed sensory integration activity with various textures        Patient brought in his Oculus Quest to trial inhouse made hand support to improves coordination and stability during usage       Precautions          Exercise Diary         Clothes pegs Easy 2 x 20       putty Pushing/button removal       Weight bearing Ball pushing       Large geometric peg/pegboard Able to manipulate 3 pegs                                                                                                                                            Vision Screening Recording:   vision screen: With Glasses  near acuity: R 20/200  L 20/70  binocularity far: esotropia  binocularity near: esotropia  red green fusion: suppression  near point of convergence: 6" with 10" conv break  deny string: Misalignment  Suppression far and near  Pursuits: [employer] with undershooting in horizontal plain  Saccades: Inaccurate with both eye undershooting in horizontal plain  ocular ROM: Limited in R/L upper quadrants  visual perceptual midline shift:     Vision: ST  - Pt will increase oculomotor control for improved saccades, con/divergent tasks for improved reading, board to table tasks with minimal increase in symptoms 4 weeks  - Pt will tolerate multi-modal envt x 15 min with 80% accuracy of cog load and min increase of symptoms of diplopia 4 weeks  Vision: LT  - Pt will increase oculomotor control for improved dynamic activities with head turns, board/screen to table tasks symptom free  - Pt will increase oculomotor control for WNL saccades, con/divergent tasks symptom free         Assessment: Tolerated treatment fair  Patient would benefit from continued OT with Vision perception training     Very ataxic movement- difficulty to manipulate items  Patient tolerated hand support and will take home at next session      Plan: Continue per plan of care

## 2019-12-05 NOTE — PROGRESS NOTES
Daily Note     Today's date: 2019  Patient name: Steve Roberts  : 1970  MRN: [id number]  Referring provider: Colette Whiteside MD  Dx:   Encounter Diagnosis     ICD-10-CM    1  Spasticity R25 2    2  Right sided weakness R53 1        Start Time: 945  Stop Time:   Total time in clinic (min): 45 minutes    Subjective: Cara Syed reports feeling good, no updates or changes since last visit  Objective: See treatment diary below    Ambulation in AlterG x20 minutes @ 70%  -  3- 5 mph for first 10 minutes  - 1 2 for last 10 minutes     -Ambulation with 16 lb vest, 4 lb ankle weights, and 16 lbs on walker: Ambulation 50 ft x3   -STS 20 reps     Not Performed:  - Pushing 50# weighted sled 100 ft - with use of blue tb to prevent hyperextension of R knee  - Bridges with TrA Contraction - x20  - Psoas Marches - x20 bilaterally with blue tb  - Prone Hamstring Curl - x20 8 lb ankle weights  - Quadruped Lifting 1 UE/LE x10 each  - Prone>>Quadruped>>Tall Kneel x5  - Tall Knee >> Half Kneel x5 each leg  - Sitting on Heels>>Tall Kneel with 10lb Med Ball Lift x10    Assessment: Cara Syed continues to respond well to use of Alter G for un-weighted ambulation and visual feedback of gait pattern  At slow speeds he is able to self-correct gait abnormalities and he demonstrates good carry over when speed is increased to a more natural rate  He occasionally requires verbal cues, especially for proper heel strike and equal weight bearing on both LEs  Overall, Cara Syed was able to ambulate for 20 minutes consecutively, indicating improvements in cardiovascular endurance  He also demonstrates fair carryover of corrected stepping when weighted after Alter G, however he does demonstrate hyperextension and medial ankle whip of R LE  Cara Syed requires continues skilled physical therapy to address ataxic gait and general weakness to promote safe and optimal level of function  Plan: Continue plan of care  Continue use of AlterG  Precautions:   Past Medical History:   Diagnosis Date    Abdominal cyst     Anemia     Hx     Chronic pain disorder     abdominal    Diabetes mellitus ([place] [address] )     Diverticulitis     GI bleed     History of transfusion 2016    5 units    Lightheadedness     Multiple lesions on computed tomography of brain and spine     Spleen laceration

## 2019-12-10 ENCOUNTER — OFFICE VISIT (OUTPATIENT)
Dept: OCCUPATIONAL THERAPY | Facility: CLINIC | Age: 49
End: 2019-12-10
Payer: COMMERCIAL

## 2019-12-10 ENCOUNTER — OFFICE VISIT (OUTPATIENT)
Dept: PHYSICAL THERAPY | Facility: CLINIC | Age: 49
End: 2019-12-10
Payer: COMMERCIAL

## 2019-12-10 DIAGNOSIS — R53.1 RIGHT SIDED WEAKNESS: Primary | ICD-10-CM

## 2019-12-10 DIAGNOSIS — R25.2 SPASTICITY: Primary | ICD-10-CM

## 2019-12-10 DIAGNOSIS — R25.2 SPASTICITY: ICD-10-CM

## 2019-12-10 DIAGNOSIS — R53.1 RIGHT SIDED WEAKNESS: ICD-10-CM

## 2019-12-10 PROCEDURE — 97530 THERAPEUTIC ACTIVITIES: CPT

## 2019-12-10 PROCEDURE — 97116 GAIT TRAINING THERAPY: CPT | Performed by: PHYSICAL THERAPIST

## 2019-12-10 PROCEDURE — 97112 NEUROMUSCULAR REEDUCATION: CPT | Performed by: PHYSICAL THERAPIST

## 2019-12-10 PROCEDURE — 97110 THERAPEUTIC EXERCISES: CPT

## 2019-12-10 NOTE — PROGRESS NOTES
Daily Note     Today's date: 12/10/2019  Patient name: Steve Roberts  : 1970  MRN: [id number]  Referring provider: Colette Whiteside MD  Dx:   Encounter Diagnosis     ICD-10-CM    1  Spasticity R25 2    2  Right sided weakness R53 1        Start Time: 945  Stop Time:   Total time in clinic (min): 45 minutes    Subjective: Cara Syed presents to session with his own RW  He reports feeling good today  He received more Baclofen on Friday and has taken it today  He reports that he feels more steady and his balance seems better with it  Objective: See treatment diary below    Ambulation in AlterG x20 minutes @ 70%  -  3- 5 mph for first 10 minutes  - 1 2 for last 10 minutes     - Ambulation 300 ft with 16 lb vest, 4 lb ankle weights, and 16 lbs on walker  - STS 20 reps, no UE   - Hamstring Curls x25 on machine - 50#    Not Performed:  - Pushing 50# weighted sled 100 ft - with use of blue tb to prevent hyperextension of R knee  - Bridges with TrA Contraction - x20  - Psoas Marches - x20 bilaterally with blue tb  - Prone Hamstring Curl - x20 8 lb ankle weights  - Quadruped Lifting 1 UE/LE x10 each  - Prone>>Quadruped>>Tall Kneel x5  - Tall Knee >> Half Kneel x5 each leg  - Sitting on Heels>>Tall Kneel with 10lb Med Ball Lift x10    Assessment: Cara Syed performed well in the Alter G with un-weighted ambulation and visual feedback of gait pattern  He displays good carryover of corrections to gait abnormalities is able to maintain these with faster speeds  He also demonstrates fair carryover of corrected stepping after Alter G, and continues to benefit from use of weighted vest, ankle weights, and weighted walker  He was able to perform hamstring curls but requires use of gait belt to prevent hip compensation  Cara Syed requires continued skilled physical therapy to address ataxic gait and general weakness to promote safe and optimal level of function  Plan: Continue plan of care  Continue use of AlterG  Precautions:   Past Medical History:   Diagnosis Date    Abdominal cyst     Anemia     Hx     Chronic pain disorder     abdominal    Diabetes mellitus ([address] )     Diverticulitis     GI bleed     History of transfusion 2016    5 units    Lightheadedness     Multiple lesions on computed tomography of brain and spine     Spleen laceration

## 2019-12-10 NOTE — PROGRESS NOTES
Daily Note     Today's date: 12/10/2019  Patient name: Douglas Gottlieb  : 1970  MRN: [id number]  Referring provider: Rika Miller MD  Dx:   Encounter Diagnosis     ICD-10-CM    1  Right sided weakness R53 1    2  Spasticity R25 2               Assessment: Tolerated treatment fair  Patient would benefit from continued OT      Plan: Continue per plan of care  Precautions:   Past Medical History:   Diagnosis Date    Abdominal cyst     Anemia     Hx     Chronic pain disorder     abdominal    Diabetes mellitus (HCC)     Diverticulitis     GI bleed     History of transfusion 2016    5 units    Lightheadedness     Multiple lesions on computed tomography of brain and spine     Spleen laceration        Subjective: 010      Objective: Completed exercises and activities to increase ROM, strength, coordination and function  See treatment diary below   Use of theraband to provide Proprioceptive input through arm while using LUE to engage in Large movement task   While engage in grasp and release  Completed sensory integration activity with various textures  Quadruped while engage in reaching/ grasping and placing cones with use of 2lb wrist weight for extra stability  Putty for push pull with focus to use all digits        Patient brought in his Oculus Quest to trial inhouse made hand support to improves coordination and stability during usage       Precautions          Exercise Diary         Clothes pegs Easy 2 x 20       putty Pushing/button removal       Weight bearing Ball pushing       Large geometric peg/pegboard Able to manipulate 3 pegs                                                                                                                                            Vision Screening Recording:   vision screen: With Glasses  near acuity: R 20/200  L 20/70  binocularity far: esotropia  binocularity near: esotropia  red green fusion: suppression  near point of convergence: 6" with 10" conv break  deny string: Misalignment  Suppression far and near  Pursuits: Jerky with undershooting in horizontal plain  Saccades: Inaccurate with both eye undershooting in horizontal plain  ocular ROM: Limited in R/L upper quadrants  visual perceptual midline shift:     Vision: ST  - Pt will increase oculomotor control for improved saccades, con/divergent tasks for improved reading, board to table tasks with minimal increase in symptoms 4 weeks  - Pt will tolerate multi-modal envt x 15 min with 80% accuracy of cog load and min increase of symptoms of diplopia 4 weeks  Vision: LT  - Pt will increase oculomotor control for improved dynamic activities with head turns, board/screen to table tasks symptom free  - Pt will increase oculomotor control for WNL saccades, con/divergent tasks symptom free         Assessment: Tolerated treatment fair  Patient would benefit from continued OT with Vision perception training     Very ataxic movement- difficulty to manipulate items  Tolerated increased exercises well      Plan: Continue per plan of care

## 2019-12-12 ENCOUNTER — APPOINTMENT (OUTPATIENT)
Dept: PHYSICAL THERAPY | Facility: CLINIC | Age: 49
End: 2019-12-12
Payer: COMMERCIAL

## 2019-12-12 ENCOUNTER — APPOINTMENT (OUTPATIENT)
Dept: OCCUPATIONAL THERAPY | Facility: CLINIC | Age: 49
End: 2019-12-12
Payer: COMMERCIAL

## 2019-12-17 ENCOUNTER — APPOINTMENT (OUTPATIENT)
Dept: PHYSICAL THERAPY | Facility: CLINIC | Age: 49
End: 2019-12-17
Payer: COMMERCIAL

## 2019-12-17 ENCOUNTER — APPOINTMENT (OUTPATIENT)
Dept: OCCUPATIONAL THERAPY | Facility: CLINIC | Age: 49
End: 2019-12-17
Payer: COMMERCIAL

## 2019-12-18 ENCOUNTER — OFFICE VISIT (OUTPATIENT)
Dept: OCCUPATIONAL THERAPY | Facility: CLINIC | Age: 49
End: 2019-12-18
Payer: COMMERCIAL

## 2019-12-18 DIAGNOSIS — R53.1 RIGHT SIDED WEAKNESS: Primary | ICD-10-CM

## 2019-12-18 DIAGNOSIS — R25.2 SPASTICITY: ICD-10-CM

## 2019-12-18 PROCEDURE — 97140 MANUAL THERAPY 1/> REGIONS: CPT

## 2019-12-18 PROCEDURE — 97530 THERAPEUTIC ACTIVITIES: CPT

## 2019-12-18 NOTE — PROGRESS NOTES
Daily Note     Today's date: 2019  Patient name: Roxanne Vazquez  : 1970  MRN: [id number]  Referring provider: Tab Lucas MD  Dx:   Encounter Diagnosis     ICD-10-CM    1  Right sided weakness R53 1    2  Spasticity R25 2               Assessment: Tolerated treatment fair  Patient would benefit from continued OT      Plan: Continue per plan of care  Precautions:   Past Medical History:   Diagnosis Date    Abdominal cyst     Anemia     Hx     Chronic pain disorder     abdominal    Diabetes mellitus (HCC)     Diverticulitis     GI bleed     History of transfusion 2016    5 units    Lightheadedness     Multiple lesions on computed tomography of brain and spine     Spleen laceration        Subjective: 010      Objective: Completed exercises and activities to increase ROM, strength, coordination and function    See treatment diary below   Graston technique to the R UE with sweeping motion between radial and ulna bones  Vibration with ball for forearm to wrist  Grasp and release of large clothes pegs while placing on board  Completed sensory integration activity with various textures                  Patient given support for his Oculus Quest to trial at home - should improve his coordination     Precautions          Exercise Diary         Clothes pegs Easy 2 x 20       putty Pushing/button removal       Weight bearing Ball pushing       Large geometric peg/pegboard Able to manipulate 3 pegs                                                                                                                                            Vision Screening Recording:   vision screen: With Glasses  near acuity: R 20/200  L 20/70  binocularity far: esotropia  binocularity near: esotropia  red green fusion: suppression  near point of convergence: 6" with 10" conv break  deny string: Misalignment  Suppression far and near  Pursuits: Jerky with undershooting in horizontal plain  Saccades: Inaccurate with both eye undershooting in horizontal plain  ocular ROM: Limited in R/L upper quadrants  visual perceptual midline shift:     Vision: ST  - Pt will increase oculomotor control for improved saccades, con/divergent tasks for improved reading, board to table tasks with minimal increase in symptoms 4 weeks  - Pt will tolerate multi-modal envt x 15 min with 80% accuracy of cog load and min increase of symptoms of diplopia 4 weeks  Vision: LT  - Pt will increase oculomotor control for improved dynamic activities with head turns, board/screen to table tasks symptom free  - Pt will increase oculomotor control for WNL saccades, con/divergent tasks symptom free         Assessment: Tolerated treatment fair  Patient would benefit from continued OT with Vision perception training     Very ataxic movement- difficulty to manipulate items  Tolerated increased exercises well      Plan: Continue per plan of care

## 2019-12-19 ENCOUNTER — OFFICE VISIT (OUTPATIENT)
Dept: OCCUPATIONAL THERAPY | Facility: CLINIC | Age: 49
End: 2019-12-19
Payer: COMMERCIAL

## 2019-12-19 ENCOUNTER — OFFICE VISIT (OUTPATIENT)
Dept: PHYSICAL THERAPY | Facility: CLINIC | Age: 49
End: 2019-12-19
Payer: COMMERCIAL

## 2019-12-19 DIAGNOSIS — R25.2 SPASTICITY: ICD-10-CM

## 2019-12-19 DIAGNOSIS — R25.2 SPASTICITY: Primary | ICD-10-CM

## 2019-12-19 DIAGNOSIS — R53.1 RIGHT SIDED WEAKNESS: Primary | ICD-10-CM

## 2019-12-19 DIAGNOSIS — R53.1 RIGHT SIDED WEAKNESS: ICD-10-CM

## 2019-12-19 PROCEDURE — 97110 THERAPEUTIC EXERCISES: CPT

## 2019-12-19 PROCEDURE — 97116 GAIT TRAINING THERAPY: CPT | Performed by: PHYSICAL THERAPIST

## 2019-12-19 PROCEDURE — 97530 THERAPEUTIC ACTIVITIES: CPT

## 2019-12-19 PROCEDURE — 97112 NEUROMUSCULAR REEDUCATION: CPT | Performed by: PHYSICAL THERAPIST

## 2019-12-19 PROCEDURE — 97140 MANUAL THERAPY 1/> REGIONS: CPT

## 2019-12-19 PROCEDURE — 97110 THERAPEUTIC EXERCISES: CPT | Performed by: PHYSICAL THERAPIST

## 2019-12-19 NOTE — PROGRESS NOTES
Daily Note     Today's date: 2019  Patient name: Daryl Greco  : 1970  MRN: [id number]  Referring provider: Socorro Balderas MD  Dx:   Encounter Diagnosis     ICD-10-CM    1  Right sided weakness R53 1    2  Spasticity R25 2               Assessment: Tolerated treatment fair  Patient would benefit from continued OT      Plan: Continue per plan of care  Precautions:   Past Medical History:   Diagnosis Date    Abdominal cyst     Anemia     Hx     Chronic pain disorder     abdominal    Diabetes mellitus (HCC)     Diverticulitis     GI bleed     History of transfusion 2016    5 units    Lightheadedness     Multiple lesions on computed tomography of brain and spine     Spleen laceration        Subjective: 010      Objective: Completed exercises and activities to increase ROM, strength, coordination and function    See treatment diary below   Graston technique to the R UE with sweeping motion between radial and ulna bones  Vibration with ball for forearm to wrist  Grasp and release of large clothes pegs while placing on board  Weight bearing on table top - push/relax x ~ 10 times  Dynamic activity with LUE while engage in proprioceptive/dynamic input R on rolling stool      Patient given support for his Oculus Quest to trial at home - should improve his coordination      Precautions          Exercise Diary        Clothes pegs Easy 2 x 20 Firm 2 x 26      putty Pushing/button removal       Weight bearing Ball pushing       Large geometric peg/pegboard Able to manipulate 3 pegs       Weight pulley  triceps #5 x15  IE#5 x 15  ER#5 x 15  Prot#5 x 15  Biceps#5 x 15                                                                                                                                     Vision Screening Recording:   vision screen: With Glasses  near acuity: R 20/200  L 20/70  binocularity far: esotropia  binocularity near: esotropia  red green fusion: suppression  near point of convergence: 6" with 10" conv break  deny string: Misalignment  Suppression far and near  Pursuits: Jerky with undershooting in horizontal plain  Saccades: Inaccurate with both eye undershooting in horizontal plain  ocular ROM: Limited in R/L upper quadrants  visual perceptual midline shift:     Vision: ST  - Pt will increase oculomotor control for improved saccades, con/divergent tasks for improved reading, board to table tasks with minimal increase in symptoms 4 weeks  - Pt will tolerate multi-modal envt x 15 min with 80% accuracy of cog load and min increase of symptoms of diplopia 4 weeks  Vision: LT  - Pt will increase oculomotor control for improved dynamic activities with head turns, board/screen to table tasks symptom free  - Pt will increase oculomotor control for WNL saccades, con/divergent tasks symptom free         Assessment: Tolerated treatment well  Patient would benefit from continued OT with Vision perception training     Very ataxic movement- difficulty to manipulate items  Tolerated increased weights well      Plan: Continue per plan of care

## 2019-12-19 NOTE — PROGRESS NOTES
Progress Note     Today's date: 2019  Patient name: Duane Roth  : 1970  MRN: [id number]  Referring provider: Jackie Ibrahim MD  Dx:   Encounter Diagnosis     ICD-10-CM    1  Spasticity R25 2    2  Right sided weakness R53 1                   Subjective: Pt presents to PT w/ his own walker today  Reports he is back on baclofen  Objective: See treatment diary below    5x STS: 11 72 sec --> 9 76 sec  Kennedy56--> 42/56  TUG (RW): 13 3 sec --> 13 96 sec  TUG (SPC): 13 7 sec -->12 78 sec (gait belt)  TUG (no AD): 13 8 sec--> 12 9 sec (gait belt)  Gait speed (RW): 0 88 m/s or 2 9 ft/sec --> 0 96 m/s or 3 17 ft/sec  Gait speed [facility]): 0 62 m/s or 2 07 ft/sec--> 0 86 m/s or 2 84 ft/sec  6MWT: 900 ft w/ RW      Interventions:   Ambulation in AlterG x20 minutes @ 80%  - 0 5 mph for first 5 minutes, focus on heel strike and gait mechanics components  - 1 5 mph for last 10 minutes, focus on more fluid gait mechanics        Assessment:   Pt demonstrates improvements in all outcome measures, although TUG w/ RW remains about the same as last time  TUG, Gait speed, and Kennedy are still indicative of high fall risk  Based on 6MWT score he scores below age norms indicating impaired cardiovascular endurance  Gait analysis continues to reveal exaggerated R heel strike with impaired roll over into flat foot  R knee hyperextension has improved somewhat  Rose Cote appears to be helpful in improving gait pattern, although plan to resume use of NMES for gait training as well  He has met 3 out of 5 short term goals at this time  Briseida Gayle requires continued skilled physical therapy to address ataxic gait and general weakness to promote safe and optimal level of function  Goals  STG 30 days    1  Patient will improve static balance with feet together eyes closed on firm surface to 30 seconds indicating reduction in fall risk - NOT MET  2  Patient will perform 6MWT with LRAD to assess cardiovascular endurance  -MET  3  Patient will display 2 3  second improvement with overall score of 11 seconds (using SPC/ or no AD) with TUG test or lower with noted improvement being Minimal Detectable Change pre current research standards with fall risk   NOT MET  4  Patient will achieve 38/56 LYONS score with minimal improve by 6 points or more demonstrating Minimal Detectable change per current research standards for this objective test which assess fall risk  -MET  5  Patient will achieve   59 ft/sec improvement with score of  2 66 ft/sec (using SPC) with 10 Meter Walk test, demonstrating Minimal Detectable change per current research standards for this objective test which assess fall risk  -MET , 2 84 FT/SEC      LT days   1  Patient will score low risk for falls with 3/4 fall risk measures   2  Patient will be able to ambulate 1,000 feet without AD during 6 minute walk test w/ SPC    3  Patient will be able to perform floor transfer without physical assistance   4  Patient will be able to carry objects without loss of balance  5  Patient will be able to ambulate outdoors without any loss of balance    Plan: Continue plan of care  Continue use of AlterG  POC expires 20        Precautions:   Past Medical History:   Diagnosis Date    Abdominal cyst     Anemia     Hx     Chronic pain disorder     abdominal    Diabetes mellitus ([address] )     Diverticulitis     GI bleed     History of transfusion 2016    5 units    Lightheadedness     Multiple lesions on computed tomography of brain and spine     Spleen laceration

## 2019-12-20 ENCOUNTER — APPOINTMENT (OUTPATIENT)
Dept: PHYSICAL THERAPY | Facility: CLINIC | Age: 49
End: 2019-12-20
Payer: COMMERCIAL

## 2019-12-24 ENCOUNTER — OFFICE VISIT (OUTPATIENT)
Dept: PHYSICAL THERAPY | Facility: CLINIC | Age: 49
End: 2019-12-24
Payer: COMMERCIAL

## 2019-12-24 ENCOUNTER — OFFICE VISIT (OUTPATIENT)
Dept: OCCUPATIONAL THERAPY | Facility: CLINIC | Age: 49
End: 2019-12-24
Payer: COMMERCIAL

## 2019-12-24 DIAGNOSIS — R25.2 SPASTICITY: ICD-10-CM

## 2019-12-24 DIAGNOSIS — R53.1 RIGHT SIDED WEAKNESS: Primary | ICD-10-CM

## 2019-12-24 DIAGNOSIS — R53.1 RIGHT SIDED WEAKNESS: ICD-10-CM

## 2019-12-24 DIAGNOSIS — R25.2 SPASTICITY: Primary | ICD-10-CM

## 2019-12-24 PROCEDURE — 97140 MANUAL THERAPY 1/> REGIONS: CPT

## 2019-12-24 PROCEDURE — 97530 THERAPEUTIC ACTIVITIES: CPT

## 2019-12-24 PROCEDURE — 97112 NEUROMUSCULAR REEDUCATION: CPT | Performed by: PHYSICAL THERAPIST

## 2019-12-24 PROCEDURE — 97110 THERAPEUTIC EXERCISES: CPT | Performed by: PHYSICAL THERAPIST

## 2019-12-24 NOTE — PROGRESS NOTES
Daily Note     Today's date: 2019  Patient name: Jose Alberto Gomez  : 1970  MRN: [id number]  Referring provider: Jm Mccarthy MD  Dx:   Encounter Diagnosis     ICD-10-CM    1  Spasticity R25 2    2  Right sided weakness R53 1        Start Time: 945  Stop Time:   Total time in clinic (min): 45 minutes    Subjective: Wendi Marroquin presents to session with his own RW  No new complaints  Objective: See treatment diary below    16# weighted vest + 4# ankle weights:    -STS with airex - 20 reps  -8" step taps, BHR's - 20 reps  -Step ups w/ 1 LE on 4" step and other LE up to 12" step - 20 reps each  -Standing core rotation with 10# med ball - 20 reps  -Standing shoulder flex to 90 deg with 9# dumbbell - 20 reps  -Stepping over 6" ivy fwd,lat, bwd (colored dots as targets) - 20 reps each  -Sidestepping w/ cone taps, BHR's - 10 ft x 10       Assessment: Celestino tolerated session well today w/ use of weighted vest and ankle weights  Good stability during standing core exercises - minimal sway and no LOB noted  He did well w/ use of colored dots as visual targets during stepping exercises  Will resume NMES next visit  Wendi Marroquin requires continued skilled physical therapy to address ataxic gait and general weakness to promote safe and optimal level of function  Plan: Continue plan of care  NMES next visit        Precautions:   Past Medical History:   Diagnosis Date    Abdominal cyst     Anemia     Hx     Chronic pain disorder     abdominal    Diabetes mellitus ([address] )     Diverticulitis     GI bleed     History of transfusion 2016    5 units    Lightheadedness     Multiple lesions on computed tomography of brain and spine     Spleen laceration

## 2019-12-24 NOTE — PROGRESS NOTES
OT Re-Evaluation     Today's date: 2019  Patient name: Kelley Sierra  : 1970  MRN: [id number]  Referring provider: Zully Santo MD  Dx:   Encounter Diagnosis     ICD-10-CM    1  Right sided weakness R53 1    2  Spasticity R25 2                     Subjective Evaluation    Pain  Current pain ratin  At best pain ratin  At worst pain ratin  Location: R u/e    Treatments  Current treatment: occupational therapy  Patient Goals  Patient goals for therapy: increased motion, increased strength and independence with ADLs/IADLs  Patient goal: Improved coordination in R hand and improved visual perception  Objective     Neurological Testing     Sensation     Wrist/Hand     Right   Diminished: light touch    Additional Neurological Details  3 61mmHg on all fingertips except 4 31mmHg on middle and small fingers  With noted improvement  Impaired stereognosis  Strength/Myotome Testing     Left Wrist/Hand      (2nd hand position)     Trial 1: 108    Thumb Strength  Key/Lateral Pinch     [place] 1: 25  Palmar/Three-Point Pinch     Trial 1: 25    Right Wrist/Hand   Wrist extension: 4+  Wrist flexion: 4+     (2nd hand position)     Trial 1: 80    Thumb Strength   Key/Lateral Pinch     Trial 1: 19    Additional Strength Details  Unable to coordinate a tip pinch  Tests     Additional Tests Details  COORDINATION: Patient demonstrates incoordination in the shoulder/R U/E as well as the R hand  He demonstrates some ataxic motion and mild increased tone that impairs hand and U/E function  Nine Hole Peg test   Placement:  R=Unable  L=22sec   R= unable      L =22  Removal:      R=23sec   L=8sec     R= 18             L  =7          Vision Screening Recording:   vision screen: With Glasses  near acuity: R 20/200  L 20/70  binocularity far: esotropia  binocularity near: esotropia  red green fusion: suppression  near point of convergence: 6" with 10" conv break  deny string: Misalignment  Suppression far and near  Pursuits: Jerky with undershooting in horizontal plain  Saccades: Inaccurate with both eye undershooting in horizontal plain  ocular ROM: Limited in R/L upper quadrants  visual perceptual midline shift:     Vision: ST  - Pt will increase oculomotor control for improved saccades, con/divergent tasks for improved reading, board to table tasks with minimal increase in symptoms 4 weeks  - Pt will tolerate multi-modal envt x 15 min with 80% accuracy of cog load and min increase of symptoms of diplopia 4 weeks  Vision: LT  - Pt will increase oculomotor control for improved dynamic activities with head turns, board/screen to table tasks symptom free  - Pt will increase oculomotor control for WNL saccades, con/divergent tasks symptom free      Subjective: 010      Objective: Completed exercises and activities to increase ROM, strength, coordination and function  See treatment diary below   Graston technique to the R UE with sweeping motion between radial and ulna bones  Vibration with ball for forearm to wrist  Weight bearing on table top - push/relax x ~ 10 times  Dynamic activity with LUE while engage in proprioceptive/dynamic input R on rolling stool      Patient given support for his Oculus Quest to trial at home - should improve his coordination 12/18     Precautions          Exercise Diary  11/12 12/19 12/24     Clothes pegs Easy 2 x 20 Firm 2 x 26 Firm 2 x 26     putty Pushing/button removal       Weight bearing Ball pushing       Large geometric peg/pegboard Able to manipulate 3 pegs       Weight pulley  triceps #5 x15  IE#5 x 15  ER#5 x 15  Prot#5 x 15  Biceps#5 x 15                                                                                                                                        Home Program:See Media Section for details  Supine ex with 1lb wt  See handout  Pink sponge  See handout    Precautions: Fall risk      Assessment: Patient tolerated session well  Needed reminder to engage in his HEP    Plan: Continue Occupational Therapy ~2x/week to  improve ROM, strength coordination  and function

## 2019-12-27 ENCOUNTER — OFFICE VISIT (OUTPATIENT)
Dept: PHYSICAL THERAPY | Facility: CLINIC | Age: 49
End: 2019-12-27
Payer: COMMERCIAL

## 2019-12-27 DIAGNOSIS — R53.1 RIGHT SIDED WEAKNESS: ICD-10-CM

## 2019-12-27 DIAGNOSIS — R25.2 SPASTICITY: Primary | ICD-10-CM

## 2019-12-27 PROCEDURE — 97112 NEUROMUSCULAR REEDUCATION: CPT | Performed by: PHYSICAL THERAPIST

## 2019-12-27 PROCEDURE — 97110 THERAPEUTIC EXERCISES: CPT | Performed by: PHYSICAL THERAPIST

## 2019-12-27 NOTE — PROGRESS NOTES
Daily Note     Today's date: 2019  Patient name: Purnima Barriga  : 1970  MRN: [id number]  Referring provider: Jeramie Coates MD  Dx:   Encounter Diagnosis     ICD-10-CM    1  Spasticity R25 2    2  Right sided weakness R53 1                   Subjective: Ventura Klein presents to session with his own RW  No new complaints  Objective: See treatment diary below    16# weighted vest + 4# ankle weights:    -STS with airex, holding 10# med ball - 20 reps  -8" step taps, BHR's - 20 reps  -Standing core rotation with 10# med ball - 20 reps  -Standing shoulder flex to 90 deg with 10# med ball - 20 reps  -Step ups w/ 1 LE on 4" step and other LE up to 12" step - 20 reps each  -Stepping over 9" ivy fwd,lat, bwd (colored dots as targets) - 20 reps each  -Sidestepping w/ cone taps, BHR's - 10 ft x 10       Assessment: Celestino tolerated session well today w/ continuation of weighted vest and ankle weights  Improved stability with use of weights - discussed pt ordering ankle weights for HEP @ home  Tactile cueing required to prevent hyperextension R knee  Ventura Klein requires continued skilled physical therapy to address ataxic gait and general weakness to promote safe and optimal level of function  Plan: Continue plan of care        Precautions:   Past Medical History:   Diagnosis Date    Abdominal cyst     Anemia     Hx     Chronic pain disorder     abdominal    Diabetes mellitus ([address] )     Diverticulitis     GI bleed     History of transfusion 2016    5 units    Lightheadedness     Multiple lesions on computed tomography of brain and spine     Spleen laceration

## 2019-12-31 ENCOUNTER — OFFICE VISIT (OUTPATIENT)
Dept: OCCUPATIONAL THERAPY | Facility: CLINIC | Age: 49
End: 2019-12-31
Payer: COMMERCIAL

## 2019-12-31 ENCOUNTER — OFFICE VISIT (OUTPATIENT)
Dept: PHYSICAL THERAPY | Facility: CLINIC | Age: 49
End: 2019-12-31
Payer: COMMERCIAL

## 2019-12-31 DIAGNOSIS — R53.1 RIGHT SIDED WEAKNESS: ICD-10-CM

## 2019-12-31 DIAGNOSIS — R25.2 SPASTICITY: Primary | ICD-10-CM

## 2019-12-31 DIAGNOSIS — R53.1 RIGHT SIDED WEAKNESS: Primary | ICD-10-CM

## 2019-12-31 DIAGNOSIS — R25.2 SPASTICITY: ICD-10-CM

## 2019-12-31 PROCEDURE — 97530 THERAPEUTIC ACTIVITIES: CPT

## 2019-12-31 PROCEDURE — 97112 NEUROMUSCULAR REEDUCATION: CPT | Performed by: PHYSICAL THERAPIST

## 2019-12-31 PROCEDURE — 97140 MANUAL THERAPY 1/> REGIONS: CPT

## 2019-12-31 PROCEDURE — 97110 THERAPEUTIC EXERCISES: CPT | Performed by: PHYSICAL THERAPIST

## 2019-12-31 PROCEDURE — 97116 GAIT TRAINING THERAPY: CPT | Performed by: PHYSICAL THERAPIST

## 2019-12-31 NOTE — PROGRESS NOTES
Daily Note     Today's date: 2019  Patient name: Jacob Sapp  : 1970  MRN: [id number]  Referring provider: Nirmal Maurer MD  Dx:   Encounter Diagnosis     ICD-10-CM    1  Spasticity R25 2    2  Right sided weakness R53 1                   Subjective: Son Guerra presents to session with his own RW  No new complaints  Objective: See treatment diary below    16# weighted vest + 4# ankle weights:    -STS, holding 10# med ball - 20 reps  -6" and 12" step taps, BHR's - 20 reps  -Standing core rotation with 10# med ball - 20 reps  -Standing shoulder flex to 90 deg with 10# med ball - 20 reps  -Step ups w/ 1 LE on 6" step and other LE up to 12" step - 20 reps each  -Sidestepping w/ cone taps, BHR's - 10 ft x 10   -Stepping over 9" ivy fwd,lat, bwd (colored dots as targets) - 20 reps each  -Resisted gait w/ red band - walking forward and laterally out from pole to retrieve cones   -Ambulation w/ RW 2 laps around PT gym   -Heel raises - 20 reps      Assessment: Celestino tolerated session well today w/ continuation of weighted vest and ankle weights  Added in resistance band walking and he tolerated well, w/ increased difficulty controlling backwards movement  Pt fatigued by end of session, and reports fatigue usually lasts for 2 hrs following PT sessions  Son Guerra requires continued skilled physical therapy to address ataxic gait and general weakness to promote safe and optimal level of function  Plan: Continue plan of care        Precautions:   Past Medical History:   Diagnosis Date    Abdominal cyst     Anemia     Hx     Chronic pain disorder     abdominal    Diabetes mellitus ([address]     Diverticulitis     GI bleed     History of transfusion 2016    5 units    Lightheadedness     Multiple lesions on computed tomography of brain and spine     Spleen laceration

## 2019-12-31 NOTE — PROGRESS NOTES
Daily Note     Today's date: 2019  Patient name: Wero Yates  : 1970  MRN: [id number]  Referring provider: Eric Back MD  Dx:   Encounter Diagnosis     ICD-10-CM    1  Right sided weakness R53 1    2  Spasticity R25 2               Assessment: Tolerated treatment fair  Patient would benefit from continued OT      Plan: Continue per plan of care  Precautions:   Past Medical History:   Diagnosis Date    Abdominal cyst     Anemia     Hx     Chronic pain disorder     abdominal    Diabetes mellitus (HCC)     Diverticulitis     GI bleed     History of transfusion     5 units    Lightheadedness     Multiple lesions on computed tomography of brain and spine     Spleen laceration        Subjective: 010      Objective: Completed exercises and activities to increase ROM, strength, coordination and function    See treatment diary below   Graston technique to the R UE with sweeping motion between radial and ulna bones  Vibration to forearm to wrist  Grasp and release of large clothes pegs   Dynamic activity with LUE while engage in proprioceptive/dynamic input R on rolling stool  Metronome for coordination      Patient given support for his Oculus Quest to trial at home - should improve his coordination      Precautions        Exercise Diary      Clothes pegs Easy 2 x 20 Firm 2 x 26 Firm 2 x 26 Firm 2 x 26    putty Pushing/button removal       Weight bearing Ball pushing       Large geometric peg/pegboard Able to manipulate 3 pegs       Weight pulley  triceps #5 x15  IE#5 x 15  ER#5 x 15  Prot#5 x 15  Biceps#5 x 15    triceps #5 x15  IE#5 x 15  ER#5 x 15  Prot#5 x 15  Biceps#5 x 15    metronome    Rhythm and timing x ~ 5 mins                                                                                                                           Vision Screening Recording:   vision screen: With Glasses  near acuity: R 20/200  L 20/70  binocularity far: esotropia  binocularity near: esotropia  red green fusion: suppression  near point of convergence: 6" with 10" conv break  deny string: Misalignment  Suppression far and near  Pursuits: Jerky with undershooting in horizontal plain  Saccades: Inaccurate with both eye undershooting in horizontal plain  ocular ROM: Limited in R/L upper quadrants  visual perceptual midline shift:     Vision: ST  - Pt will increase oculomotor control for improved saccades, con/divergent tasks for improved reading, board to table tasks with minimal increase in symptoms 4 weeks  - Pt will tolerate multi-modal envt x 15 min with 80% accuracy of cog load and min increase of symptoms of diplopia 4 weeks  Vision: LT  - Pt will increase oculomotor control for improved dynamic activities with head turns, board/screen to table tasks symptom free  - Pt will increase oculomotor control for WNL saccades, con/divergent tasks symptom free         Assessment: Tolerated treatment well  Patient would benefit from continued OT with Vision perception training     Very ataxic movement- difficulty to manipulate items        Plan: Continue per plan of care

## 2020-01-02 ENCOUNTER — APPOINTMENT (OUTPATIENT)
Dept: OCCUPATIONAL THERAPY | Facility: CLINIC | Age: 50
End: 2020-01-02
Payer: COMMERCIAL

## 2020-01-02 ENCOUNTER — APPOINTMENT (OUTPATIENT)
Dept: PHYSICAL THERAPY | Facility: CLINIC | Age: 50
End: 2020-01-02
Payer: COMMERCIAL

## 2020-01-07 ENCOUNTER — APPOINTMENT (OUTPATIENT)
Dept: OCCUPATIONAL THERAPY | Facility: CLINIC | Age: 50
End: 2020-01-07
Payer: COMMERCIAL

## 2020-01-07 ENCOUNTER — OFFICE VISIT (OUTPATIENT)
Dept: PHYSICAL THERAPY | Facility: CLINIC | Age: 50
End: 2020-01-07
Payer: COMMERCIAL

## 2020-01-07 DIAGNOSIS — R25.2 SPASTICITY: Primary | ICD-10-CM

## 2020-01-07 DIAGNOSIS — R53.1 RIGHT SIDED WEAKNESS: ICD-10-CM

## 2020-01-07 PROCEDURE — 97112 NEUROMUSCULAR REEDUCATION: CPT | Performed by: PHYSICAL THERAPIST

## 2020-01-09 ENCOUNTER — OFFICE VISIT (OUTPATIENT)
Dept: PHYSICAL THERAPY | Facility: CLINIC | Age: 50
End: 2020-01-09
Payer: COMMERCIAL

## 2020-01-09 ENCOUNTER — OFFICE VISIT (OUTPATIENT)
Dept: OCCUPATIONAL THERAPY | Facility: CLINIC | Age: 50
End: 2020-01-09
Payer: COMMERCIAL

## 2020-01-09 DIAGNOSIS — R53.1 RIGHT SIDED WEAKNESS: ICD-10-CM

## 2020-01-09 DIAGNOSIS — R25.2 SPASTICITY: Primary | ICD-10-CM

## 2020-01-09 DIAGNOSIS — R25.2 SPASTICITY: ICD-10-CM

## 2020-01-09 DIAGNOSIS — R53.1 RIGHT SIDED WEAKNESS: Primary | ICD-10-CM

## 2020-01-09 PROCEDURE — 97110 THERAPEUTIC EXERCISES: CPT | Performed by: PHYSICAL THERAPIST

## 2020-01-09 PROCEDURE — 97140 MANUAL THERAPY 1/> REGIONS: CPT

## 2020-01-09 PROCEDURE — 97112 NEUROMUSCULAR REEDUCATION: CPT | Performed by: PHYSICAL THERAPIST

## 2020-01-09 PROCEDURE — 97530 THERAPEUTIC ACTIVITIES: CPT

## 2020-01-09 PROCEDURE — 97110 THERAPEUTIC EXERCISES: CPT

## 2020-01-09 NOTE — PROGRESS NOTES
Daily Note     Today's date: 2020  Patient name: Alivia Bermudez  : 1970  MRN: [id number]  Referring provider: Radha Lazo MD  Dx:   Encounter Diagnosis     ICD-10-CM    1  Spasticity R25 2    2  Right sided weakness R53 1                   Subjective: Karen Net presents w/ RW, no issues since last session     Objective: See treatment diary below    16# weighted vest + 4# ankle weights:    -STS, holding 10# med ball - 20 reps  -6" and 12" step taps, BHR's - 20 reps  -Heel raises - 20 reps  -Standing hip abduction - 20 reps each  -Standing hip extension- 20 reps each  -Standing core rotation with 10# med ball - 20 reps  -Standing shoulder flex to 90 deg with 10# med ball - 20 reps  -Standing PNF chops w/ 10# med all - 20 reps each way  -Step ups w/ 1 LE on 6" step and other LE up to 12" step - 20 reps each  -Sidestepping w/ cone taps, BHR's - 10 ft x 10   -Stepping over 9" ivy fwd,lat, bwd (colored dots as targets) - 20 reps each  -Resisted gait w/ red band - walking forward and laterally out from pole to retrieve cones       Assessment: improvement in coordination with targets used to improve accuray secondary to ataxia  Challenged with ball roll with cone weave but was able to slow speed to improve performance  Improvement in static standing without UE support  Maribell Patel requires continued skilled physical therapy to address ataxic gait and general weakness to promote safe and optimal level of function  Plan: Continue plan of care        Precautions:   Past Medical History:   Diagnosis Date    Abdominal cyst     Anemia     Hx     Chronic pain disorder     abdominal    Diabetes mellitus ([facility] [address]     Diverticulitis     GI bleed     History of transfusion 2016    5 units    Lightheadedness     Multiple lesions on computed tomography of brain and spine     Spleen laceration

## 2020-01-09 NOTE — PROGRESS NOTES
Daily Note     Today's date: 2020  Patient name: Jose Alberto Gomez  : 1970  MRN: [id number]  Referring provider: Jm Mccarthy MD  Dx:   Encounter Diagnosis     ICD-10-CM    1  Right sided weakness R53 1    2  Spasticity R25 2               Assessment: Tolerated treatment fair  Patient would benefit from continued OT      Plan: Continue per plan of care  Precautions:   Past Medical History:   Diagnosis Date    Abdominal cyst     Anemia     Hx     Chronic pain disorder     abdominal    Diabetes mellitus (HCC)     Diverticulitis     GI bleed     History of transfusion     5 units    Lightheadedness     Multiple lesions on computed tomography of brain and spine     Spleen laceration        Subjective: 010      Objective: Completed exercises and activities to increase ROM, strength, coordination and function    See treatment diary below   Graston technique to the R UE with sweeping motion between radial and ulna bones  Vibration to forearm to wrist dispersed through ball   Grasp and release of large clothes pegs   Dynamic activity with LUE while engage in proprioceptive/dynamic input R on rolling stool      Patient given support for his Oculus Quest to trial at home - should improve his coordination      Precautions        Exercise Diary  20   Clothes pegs Easy 2 x 20 Firm 2 x 26 Firm 2 x 26 Firm 2 x 26 Firm 2 x 26   putty Pushing/button removal       Weight bearing Ball pushing    On moving stool    Large geometric peg/pegboard Able to manipulate 3 pegs       Weight pulley  triceps #5 x15  IE#5 x 15  ER#5 x 15  Prot#5 x 15  Biceps#5 x 15    triceps #5 x15  IE#5 x 15  ER#5 x 15  Prot#5 x 15  Biceps#5 x 15 triceps #5 x15  IE#5 x 15  ER#5 x 15  Prot#5 x 15  Biceps#5 x 15   metronome    Rhythm and timing x ~ 5 mins                                                                                                                           Vision Screening Recording:   vision screen: With Glasses  near acuity: R 20/200  L 20/70  binocularity far: esotropia  binocularity near: esotropia  red green fusion: suppression  near point of convergence: 6" with 10" conv break  deny string: Misalignment  Suppression far and near  Pursuits: Jerky with undershooting in horizontal plain  Saccades: Inaccurate with both eye undershooting in horizontal plain  ocular ROM: Limited in R/L upper quadrants  visual perceptual midline shift:     Vision: ST  - Pt will increase oculomotor control for improved saccades, con/divergent tasks for improved reading, board to table tasks with minimal increase in symptoms 4 weeks  - Pt will tolerate multi-modal envt x 15 min with 80% accuracy of cog load and min increase of symptoms of diplopia 4 weeks  Vision: LT  - Pt will increase oculomotor control for improved dynamic activities with head turns, board/screen to table tasks symptom free  - Pt will increase oculomotor control for WNL saccades, con/divergent tasks symptom free         Assessment: Tolerated treatment well  Patient would benefit from continued OT with Vision perception training     Very ataxic movement- difficulty to manipulate items        Plan: Continue per plan of care

## 2020-01-13 ENCOUNTER — APPOINTMENT (OUTPATIENT)
Dept: OCCUPATIONAL THERAPY | Facility: CLINIC | Age: 50
End: 2020-01-13
Payer: COMMERCIAL

## 2020-01-13 ENCOUNTER — APPOINTMENT (OUTPATIENT)
Dept: PHYSICAL THERAPY | Facility: CLINIC | Age: 50
End: 2020-01-13
Payer: COMMERCIAL

## 2020-01-13 DIAGNOSIS — R25.2 SPASTICITY: ICD-10-CM

## 2020-01-13 DIAGNOSIS — E11.9 TYPE 2 DIABETES MELLITUS WITHOUT COMPLICATION, WITH LONG-TERM CURRENT USE OF INSULIN (HCC): ICD-10-CM

## 2020-01-13 DIAGNOSIS — Z79.4 TYPE 2 DIABETES MELLITUS WITHOUT COMPLICATION, WITH LONG-TERM CURRENT USE OF INSULIN (HCC): ICD-10-CM

## 2020-01-14 ENCOUNTER — APPOINTMENT (OUTPATIENT)
Dept: PHYSICAL THERAPY | Facility: CLINIC | Age: 50
End: 2020-01-14
Payer: COMMERCIAL

## 2020-01-14 DIAGNOSIS — E11.9 TYPE 2 DIABETES MELLITUS WITHOUT COMPLICATION, WITH LONG-TERM CURRENT USE OF INSULIN (HCC): Primary | ICD-10-CM

## 2020-01-14 DIAGNOSIS — Z79.4 TYPE 2 DIABETES MELLITUS WITHOUT COMPLICATION, WITH LONG-TERM CURRENT USE OF INSULIN (HCC): Primary | ICD-10-CM

## 2020-01-14 DIAGNOSIS — R25.2 SPASTICITY: ICD-10-CM

## 2020-01-14 RX ORDER — BACLOFEN 10 MG/1
TABLET ORAL
Qty: 30 TABLET | Refills: 0 | Status: SHIPPED | OUTPATIENT
Start: 2020-01-14 | End: 2020-02-10

## 2020-01-14 RX ORDER — PEN NEEDLE, DIABETIC 30 GX3/16"
NEEDLE, DISPOSABLE MISCELLANEOUS DAILY
Qty: 100 EACH | Refills: 0 | Status: SHIPPED | OUTPATIENT
Start: 2020-01-14 | End: 2020-04-30 | Stop reason: SDUPTHER

## 2020-01-14 RX ORDER — BACLOFEN 10 MG/1
5 TABLET ORAL 3 TIMES DAILY
Qty: 30 TABLET | Refills: 0 | OUTPATIENT
Start: 2020-01-14

## 2020-01-16 ENCOUNTER — OFFICE VISIT (OUTPATIENT)
Dept: PHYSICAL THERAPY | Facility: CLINIC | Age: 50
End: 2020-01-16
Payer: COMMERCIAL

## 2020-01-16 ENCOUNTER — OFFICE VISIT (OUTPATIENT)
Dept: OCCUPATIONAL THERAPY | Facility: CLINIC | Age: 50
End: 2020-01-16
Payer: COMMERCIAL

## 2020-01-16 DIAGNOSIS — R53.1 RIGHT SIDED WEAKNESS: ICD-10-CM

## 2020-01-16 DIAGNOSIS — R25.2 SPASTICITY: Primary | ICD-10-CM

## 2020-01-16 PROCEDURE — 97530 THERAPEUTIC ACTIVITIES: CPT

## 2020-01-16 PROCEDURE — 97110 THERAPEUTIC EXERCISES: CPT

## 2020-01-16 PROCEDURE — 97110 THERAPEUTIC EXERCISES: CPT | Performed by: PHYSICAL THERAPIST

## 2020-01-16 PROCEDURE — 97140 MANUAL THERAPY 1/> REGIONS: CPT

## 2020-01-16 PROCEDURE — 97112 NEUROMUSCULAR REEDUCATION: CPT | Performed by: PHYSICAL THERAPIST

## 2020-01-16 NOTE — PROGRESS NOTES
Daily Note     Today's date: 2020  Patient name: Alon Gonzalez  : 1970  MRN: [id number]  Referring provider: Migdalia Weston MD  Dx:   Encounter Diagnosis     ICD-10-CM    1  Spasticity R25 2    2  Right sided weakness R53 1                   Subjective: Gregg Ochoa presents w/ RW, feels like his balance is good some days and bad other days  Double vision is worse today  Read about prism glasses online and might be getting them from his optometrist      Objective: See treatment diary below    16# weighted vest + 4# ankle weights:    -STS, holding 10# med ball - 20 reps  -12" step taps, BHR's - 20 reps  -Heel raises - 20 reps  -Standing core rotation with 10# med ball - 20 reps  -Standing shoulder flex to 90 deg with 10# med ball - 20 reps  -Standing PNF chops w/ 10# med all - 20 reps each way  -Step ups w/ 1 LE on 6" step and other LE up to 12" step - 20 reps each  -Sidestepping w/ cone taps, BHR's - 10 ft x 10   -Stepping over 9" ivy fwd,lat, bwd (colored dots as targets) - 20 reps each    NOT PERFORMED TODAY:  -Resisted gait w/ red band - walking forward and laterally out from pole to retrieve cones       Assessment: Held resistance band walking today due to increased instability/imbalance today  Overall he tolerated session well w/ close guarding from therapist  Pt fatigued end of session  Gregg Ochoa requires continued skilled physical therapy to address ataxic gait and general weakness to promote safe and optimal level of function  Plan: Continue plan of care  POC expires 20        Precautions:   Past Medical History:   Diagnosis Date    Abdominal cyst     Anemia     Hx     Chronic pain disorder     abdominal    Diabetes mellitus ([address] )     Diverticulitis     GI bleed     History of transfusion 2016    5 units    Lightheadedness     Multiple lesions on computed tomography of brain and spine     Spleen laceration

## 2020-01-16 NOTE — PROGRESS NOTES
Daily Note     Today's date: 2020  Patient name: Daryl Greco  : 1970  MRN: [id number]  Referring provider: Socorro Balderas MD  Dx:   Encounter Diagnosis     ICD-10-CM    1  Spasticity R25 2    2  Right sided weakness R53 1               Assessment: Tolerated treatment fair  Patient would benefit from continued OT      Plan: Continue per plan of care  Precautions:   Past Medical History:   Diagnosis Date    Abdominal cyst     Anemia     Hx     Chronic pain disorder     abdominal    Diabetes mellitus (HCC)     Diverticulitis     GI bleed     History of transfusion 2016    5 units    Lightheadedness     Multiple lesions on computed tomography of brain and spine     Spleen laceration        Subjective: 010      Objective: Completed Initiated treatment with Ultrasound at 3 3MHz , 100%, 1w/cm2, 5cm2 head, x 5 min to forearm/hand with  Graston technique to the R UE with sweeping motion between radial and ulna bones  exercises and activities to increase ROM, strength, coordination and function     Vibration to forearm to wrist dispersed through ball   Grasp and release of large clothes pegs     Patient given support for his Oculus Quest to trial at home - should improve his coordination      Precautions        Exercise Diary  20   Clothes pegs Easy 2 x 20 Firm 2 x 26 Firm 2 x 26 Firm 2 x 26 Firm 2 x 26   putty          Weight bearing Ball pushing    On moving stool    Large geometric peg/pegboard Able to manipulate 3 pegs       Weight pulley triceps #5 x15  IE#5 x 15  ER#5 x 15  Prot#5 x 15  Biceps#5 x 15 triceps #5 x15  IE#5 x 15  ER#5 x 15  Prot#5 x 15  Biceps#5 x 15    triceps #5 x15  IE#5 x 15  ER#5 x 15  Prot#5 x 15  Biceps#5 x 15 triceps #5 x15  IE#5 x 15  ER#5 x 15  Prot#5 x 15  Biceps#5 x 15   metronome    Rhythm and timing x ~ 5 mins Vision Screening Recording:   vision screen: With Glasses  near acuity: R 20/200  L 20/70  binocularity far: esotropia  binocularity near: esotropia  red green fusion: suppression  near point of convergence: 6" with 10" conv break  deny string: Misalignment  Suppression far and near  Pursuits: Jerky with undershooting in horizontal plain  Saccades: Inaccurate with both eye undershooting in horizontal plain  ocular ROM: Limited in R/L upper quadrants  visual perceptual midline shift:     Vision: ST  - Pt will increase oculomotor control for improved saccades, con/divergent tasks for improved reading, board to table tasks with minimal increase in symptoms 4 weeks  - Pt will tolerate multi-modal envt x 15 min with 80% accuracy of cog load and min increase of symptoms of diplopia 4 weeks  Vision: LT  - Pt will increase oculomotor control for improved dynamic activities with head turns, board/screen to table tasks symptom free  - Pt will increase oculomotor control for WNL saccades, con/divergent tasks symptom free         Assessment: Tolerated treatment well  Patient would benefit from continued OT with Vision perception training     Very ataxic movement- difficulty to manipulate items        Plan: Continue per plan of care

## 2020-01-21 ENCOUNTER — APPOINTMENT (OUTPATIENT)
Dept: OCCUPATIONAL THERAPY | Facility: CLINIC | Age: 50
End: 2020-01-21
Payer: COMMERCIAL

## 2020-01-21 ENCOUNTER — APPOINTMENT (OUTPATIENT)
Dept: PHYSICAL THERAPY | Facility: CLINIC | Age: 50
End: 2020-01-21
Payer: COMMERCIAL

## 2020-01-23 ENCOUNTER — OFFICE VISIT (OUTPATIENT)
Dept: PHYSICAL THERAPY | Facility: CLINIC | Age: 50
End: 2020-01-23
Payer: COMMERCIAL

## 2020-01-23 DIAGNOSIS — R25.2 SPASTICITY: Primary | ICD-10-CM

## 2020-01-23 DIAGNOSIS — R53.1 RIGHT SIDED WEAKNESS: ICD-10-CM

## 2020-01-23 PROCEDURE — 97112 NEUROMUSCULAR REEDUCATION: CPT | Performed by: PHYSICAL THERAPIST

## 2020-01-23 PROCEDURE — 97110 THERAPEUTIC EXERCISES: CPT | Performed by: PHYSICAL THERAPIST

## 2020-01-23 NOTE — PROGRESS NOTES
Daily Note     Today's date: 2020  Patient name: Jailene Law  : 1970  MRN: [id number]  Referring provider: Stas Gomez MD  Dx:   Encounter Diagnosis     ICD-10-CM    1  Spasticity R25 2    2  Right sided weakness R53 1                   Subjective: Darshan Becerra presents w/ RW, no new complaints  Objective: See treatment diary below    16# weighted vest + 4# ankle weights:    -STS, holding 15# med ball - 20 reps  -12" step taps, BHR's - 20 reps  -Heel raises - 20 reps  -Standing core rotation with 15# med ball - 20 reps  -Standing shoulder flex to 90 deg with 15# med ball - 20 reps  -Standing PNF chops w/ 15# med all - 20 reps each way  -Step ups w/ 1 LE on 6" step and other LE up to 12" step - 20 reps each  -Sidestepping w/ cone taps, BHR's - 10 ft x 10   -Stepping over 9" ivy fwd,lat, bwd (colored dots as targets) - 20 reps each    NOT PERFORMED TODAY:  -Resisted gait w/ red band - walking forward and laterally out from pole to retrieve cones       Assessment: Pt more fatigued today with above exercises- increased rest breaks  Progressed to 15# med ball, which may contributed to increased fatigue  Darshan Becerra requires continued skilled physical therapy to address ataxic gait and general weakness to promote safe and optimal level of function  Plan: Continue plan of care  POC expires 20        Precautions:   Past Medical History:   Diagnosis Date    Abdominal cyst     Anemia     Hx     Chronic pain disorder     abdominal    Diabetes mellitus ([facility] [address] )     Diverticulitis     GI bleed     History of transfusion 2016    5 units    Lightheadedness     Multiple lesions on computed tomography of brain and spine     Spleen laceration

## 2020-01-28 ENCOUNTER — OFFICE VISIT (OUTPATIENT)
Dept: PHYSICAL THERAPY | Facility: CLINIC | Age: 50
End: 2020-01-28
Payer: COMMERCIAL

## 2020-01-28 DIAGNOSIS — R53.1 RIGHT SIDED WEAKNESS: ICD-10-CM

## 2020-01-28 DIAGNOSIS — R25.2 SPASTICITY: Primary | ICD-10-CM

## 2020-01-28 PROCEDURE — 97112 NEUROMUSCULAR REEDUCATION: CPT | Performed by: PHYSICAL THERAPIST

## 2020-01-28 PROCEDURE — 97164 PT RE-EVAL EST PLAN CARE: CPT | Performed by: PHYSICAL THERAPIST

## 2020-01-28 NOTE — PROGRESS NOTES
PT Re-evaluation     Today's date: 2020  Patient name: Juve James  : 1970  MRN: [id number]  Referring provider: Morena Orr MD  Dx:   Encounter Diagnosis     ICD-10-CM    1  Spasticity R25 2    2  Right sided weakness R53 1                   Subjective: Pt reports he forgot to wear R knee brace today  Denies pain     Pt goals= improve balance and coordination, walk w/ no AD    Objective: See treatment diary below     Initial Evaluation  19 Progress Note  19 Re-evaluation  20    5x STS 11 72 sec 9 76 sec 8 05 sec    TUG RW:13 3 sec  SPC:13 7 sec  None:13 8 sec RW:13 96 sec  SPC:12 78 sec  None:12 9 sec RW:9 55 sec  SPC:10 68 sec  None: 9 73 sec    Kennedy 32/56 42/56 45/56    Gait speed RW:0 88 m/s    SPC:0 62 m/s RW: 0 96 m/s    SPC: 0 86 m/s RW: 1 15 m/s  SPC:0 83 m/s  None: 0 90 m/s     6MWT  900 ft w/ RW 1,000 ft w/ RW    mCTSIB   FTEO firm: 30 sec  FTEC firm: 7 sec  FTEO foam: 30 sec  FTEC foam: 1 sec      Interventions today:  16# weighted vest + 8# ankle weights  -Alternating step taps 6" and 12" step, 30 reps each   -Step ups w/ 1 LE on 6" step and other LE up to 12" step - 30 reps each      Assessment:   Pt demonstrates improvements in all outcome measures above  He scores low fall risk based on all TUG conditions above, and his gait speed improved to > 1 0 m/s with use of RW  6MWT improved by 100 ft as well, although did not meet MDC of 190 ft, he did meet LTG for this test  He is still challenged w/ foam surfaces and eyes closed indicating impaired vestibular component of balance  He continues to present w/ ataxic gait pattern and R ankle instability  He requires continued skilled PT to meet LTG's and pt personal goal of improving ambulation without AD or with LRAD  Trinidad Price requires continued skilled physical therapy to address ataxic gait and general weakness to promote safe and optimal level of function  Goals  STG 30 days    1   Patient will improve static balance with feet together eyes closed on firm surface to 30 seconds indicating reduction in fall risk - NOT MET  2  Patient will perform 6MWT with LRAD to assess cardiovascular endurance  -MET  3  Patient will display 2 3  second improvement with overall score of 11 seconds (using SPC/ or no AD) with TUG test or lower with noted improvement being Minimal Detectable Change pre current research standards with fall risk  -MET  4  Patient will achieve 38/56 LYONS score with minimal improve by 6 points or more demonstrating Minimal Detectable change per current research standards for this objective test which assess fall risk  -MET  5  Patient will achieve   59 ft/sec improvement with score of  2 66 ft/sec (using SPC) with 10 Meter Walk test, demonstrating Minimal Detectable change per current research standards for this objective test which assess fall risk  -MET , 2 84 FT/SEC      LT days   1  Patient will score low risk for falls with 3/4 fall risk measures - NOT MET  2  Patient will be able to ambulate 1,000 feet without AD during 6 minute walk test w/ SPC  -MET  3  Patient will be able to perform floor transfer without physical assistance -NOT MET  4  Patient will be able to carry objects without loss of balance- NOT MET  5  Patient will be able to ambulate outdoors without any loss of balance- NOT MET    Plan: Continue plan of care, 2x/week  POC 20- 20        Precautions:   Past Medical History:   Diagnosis Date    Abdominal cyst     Anemia     Hx     Chronic pain disorder     abdominal    Diabetes mellitus ([address] )     Diverticulitis     GI bleed     History of transfusion     5 units    Lightheadedness     Multiple lesions on computed tomography of brain and spine     Spleen laceration

## 2020-01-30 ENCOUNTER — OFFICE VISIT (OUTPATIENT)
Dept: PHYSICAL THERAPY | Facility: CLINIC | Age: 50
End: 2020-01-30
Payer: COMMERCIAL

## 2020-01-30 ENCOUNTER — OFFICE VISIT (OUTPATIENT)
Dept: OCCUPATIONAL THERAPY | Facility: CLINIC | Age: 50
End: 2020-01-30
Payer: COMMERCIAL

## 2020-01-30 DIAGNOSIS — R53.1 RIGHT SIDED WEAKNESS: ICD-10-CM

## 2020-01-30 DIAGNOSIS — R53.1 RIGHT SIDED WEAKNESS: Primary | ICD-10-CM

## 2020-01-30 DIAGNOSIS — R25.2 SPASTICITY: Primary | ICD-10-CM

## 2020-01-30 DIAGNOSIS — R25.2 SPASTICITY: ICD-10-CM

## 2020-01-30 PROCEDURE — 97116 GAIT TRAINING THERAPY: CPT | Performed by: PHYSICAL THERAPIST

## 2020-01-30 PROCEDURE — 97530 THERAPEUTIC ACTIVITIES: CPT

## 2020-01-30 PROCEDURE — 97112 NEUROMUSCULAR REEDUCATION: CPT | Performed by: PHYSICAL THERAPIST

## 2020-01-30 PROCEDURE — 97110 THERAPEUTIC EXERCISES: CPT

## 2020-01-30 NOTE — PROGRESS NOTES
Daily Note     Today's date: 2020  Patient name: Daryl Greco  : 1970  MRN: [id number]  Referring provider: Socorro Balderas MD  Dx:   Encounter Diagnosis     ICD-10-CM    1  Right sided weakness R53 1    2  Spasticity R25 2               Assessment: Tolerated treatment fair  Patient would benefit from continued OT      Plan: Continue per plan of care  Precautions:   Past Medical History:   Diagnosis Date    Abdominal cyst     Anemia     Hx     Chronic pain disorder     abdominal    Diabetes mellitus (HCC)     Diverticulitis     GI bleed     History of transfusion 2016    5 units    Lightheadedness     Multiple lesions on computed tomography of brain and spine     Spleen laceration        Subjective: 010      Objective: Completed Initiated treatment with Ultrasound at 3 3MHz , 100%, 1w/cm2, 5cm2 head, x 5 min to forearm/hand with  Graston technique to the R UE with sweeping motion between radial and ulna bones  exercises and activities to increase ROM, strength, coordination and function     Vibration to forearm to wrist dispersed through ball   Grasp and release of large clothes pegs     Patient given support for his Oculus Quest to trial at home - should improve his coordination      Precautions        Exercise Diary  20   Clothes pegs Easy 2 x 20 Firm 2 x 26 Firm 2 x 26 Firm 2 x 26 Firm 2 x 26   putty          Weight bearing Ball pushing On moving stool   On moving stool    Large geometric peg/pegboard Able to manipulate 3 pegs       Weight pulley triceps #5 x15  IE#5 x 15  ER#5 x 15  Prot#5 x 15  Biceps#5 x 15 triceps #5 x15  IE#5 x 15  ER#5 x 15  Prot#5 x 15  Biceps#5 x 15    triceps #5 x15  IE#5 x 15  ER#5 x 15  Prot#5 x 15  Biceps#5 x 15 triceps #5 x15  IE#5 x 15  ER#5 x 15  Prot#5 x 15  Biceps#5 x 15   metronome    Rhythm and timing x ~ 5 mins Vision Screening Recording:   vision screen: With Glasses  near acuity: R 20/200  L 20/70  binocularity far: esotropia  binocularity near: esotropia  red green fusion: suppression  near point of convergence: 6" with 10" conv break  deny string: Misalignment  Suppression far and near  Pursuits: Jerky with undershooting in horizontal plain  Saccades: Inaccurate with both eye undershooting in horizontal plain  ocular ROM: Limited in R/L upper quadrants  visual perceptual midline shift:     Vision: ST  - Pt will increase oculomotor control for improved saccades, con/divergent tasks for improved reading, board to table tasks with minimal increase in symptoms 4 weeks  - Pt will tolerate multi-modal envt x 15 min with 80% accuracy of cog load and min increase of symptoms of diplopia 4 weeks  Vision: LT  - Pt will increase oculomotor control for improved dynamic activities with head turns, board/screen to table tasks symptom free  - Pt will increase oculomotor control for WNL saccades, con/divergent tasks symptom free         Assessment: Tolerated treatment well  Patient would benefit from continued OT with Vision perception training     Very ataxic movement- difficulty to manipulate items        Plan: Continue per plan of care

## 2020-01-30 NOTE — PROGRESS NOTES
Daily Note     Today's date: 2020  Patient name: Karen Fenton  : 1970  MRN: [id number]  Referring provider: Alma Rosa Guthrie MD  Dx:   Encounter Diagnosis     ICD-10-CM    1  Spasticity R25 2    2  Right sided weakness R53 1                   Subjective: Keren Rush presents w/ RW, no new complaints  Objective: See treatment diary below    16# weighted vest + 10# ankle weights (ankle weights removed for SOLO STEP):    -STS, holding 15# med ball - 30 reps  -12" step taps, BHR's - 30 reps  -Heel raises - 30 reps  -Step ups w/ 1 LE on 6" step and other LE up to 12" step - 30 reps each  -SOLO STEP: gait training w/ SPC- 4 laps around   -SOLO STEP: 9" hurdles w/ SPC forward, lateral, backwards  -Planks x 30 sec, 5 reps  -Side planks on knees x 15 sec , 3 reps on right and 1 rep on left         Assessment: Focused on gait training w/ SPC today in Solo Step harness  Most challenged w/ backwards hurdles using SPC, haptic touch against wall  He fatigues w/ use of weighted vest  Good for w/ planks- more challenged with side plank on the [place]  requires continued skilled physical therapy to address ataxic gait and general weakness to promote safe and optimal level of function  Plan: Continue plan of care  Gait training w/ SPC  POC expires 20        Precautions:   Past Medical History:   Diagnosis Date    Abdominal cyst     Anemia     Hx     Chronic pain disorder     abdominal    Diabetes mellitus ([address] )     Diverticulitis     GI bleed     History of transfusion 2016    5 units    Lightheadedness     Multiple lesions on computed tomography of brain and spine     Spleen laceration

## 2020-02-04 ENCOUNTER — OFFICE VISIT (OUTPATIENT)
Dept: PHYSICAL THERAPY | Facility: CLINIC | Age: 50
End: 2020-02-04
Payer: COMMERCIAL

## 2020-02-04 ENCOUNTER — OFFICE VISIT (OUTPATIENT)
Dept: OCCUPATIONAL THERAPY | Facility: CLINIC | Age: 50
End: 2020-02-04
Payer: COMMERCIAL

## 2020-02-04 DIAGNOSIS — R53.1 RIGHT SIDED WEAKNESS: Primary | ICD-10-CM

## 2020-02-04 DIAGNOSIS — R25.2 SPASTICITY: ICD-10-CM

## 2020-02-04 DIAGNOSIS — R53.1 RIGHT SIDED WEAKNESS: ICD-10-CM

## 2020-02-04 DIAGNOSIS — R25.2 SPASTICITY: Primary | ICD-10-CM

## 2020-02-04 PROCEDURE — 97530 THERAPEUTIC ACTIVITIES: CPT

## 2020-02-04 PROCEDURE — 97112 NEUROMUSCULAR REEDUCATION: CPT | Performed by: PHYSICAL THERAPIST

## 2020-02-04 PROCEDURE — 97116 GAIT TRAINING THERAPY: CPT | Performed by: PHYSICAL THERAPIST

## 2020-02-04 NOTE — PROGRESS NOTES
Daily Note     Today's date: 2020  Patient name: Juve James  : 1970  MRN: [id number]  Referring provider: Morena Orr MD  Dx:   Encounter Diagnosis     ICD-10-CM    1  Spasticity R25 2    2  Right sided weakness R53 1                   Subjective: Trinidad Priec presents w/ RW, no new complaints  Objective: See treatment diary below    16# weighted vest + 10# ankle weights (ankle weights removed for SOLO STEP):    -STS, holding 10# med ball - 30 reps  -Standing core rotation w/ 10# med ball- 20 reps  -Standing shoulder flexion to 90 deg w/ 10# med ball- 20 reps   -12" step taps, BHR's - 30 reps  -Heel raises - 30 reps  -SOLO STEP: gait training w/ SPC- 4 laps around   -SOLO STEP: 9" hurdles w/ SPC forward, lateral, backwards (lowered hurdles to 6" for backwards)    Assessment: Pt tolerated treatment session well today  Minor LOB when ambulating with SPC but demonstrates improved righting reactions  Challenged w/ foot clearance going backwards over hurdles, even when lowered to 6"  Fatigued by end of session  Trinidad Price requires continued skilled physical therapy to address ataxic gait and general weakness to promote safe and optimal level of function  Plan: Continue plan of care  Gait training w/ SPC  POC expires 20        Precautions:   Past Medical History:   Diagnosis Date    Abdominal cyst     Anemia     Hx     Chronic pain disorder     abdominal    Diabetes mellitus ([address] )     Diverticulitis     GI bleed     History of transfusion 2016    5 units    Lightheadedness     Multiple lesions on computed tomography of brain and spine     Spleen laceration

## 2020-02-04 NOTE — PROGRESS NOTES
Daily Note     Today's date: 2020  Patient name: Roxanne Vazquez  : 1970  MRN: [id number]  Referring provider: Tab Lucas MD  Dx:   Encounter Diagnosis     ICD-10-CM    1  Right sided weakness R53 1    2  Spasticity R25 2               Assessment: Tolerated treatment fair  Patient would benefit from continued OT      Plan: Continue per plan of care  Precautions:   Past Medical History:   Diagnosis Date    Abdominal cyst     Anemia     Hx     Chronic pain disorder     abdominal    Diabetes mellitus (HCC)     Diverticulitis     GI bleed     History of transfusion 2016    5 units    Lightheadedness     Multiple lesions on computed tomography of brain and spine     Spleen laceration        Subjective: 010      Objective: Initiated treatment with vibration through forearm/hand Graston technique to the R UE with sweeping motion between radial and ulna bones  exercises and activities to increase ROM, strength, coordination and function     Vibration to forearm to wrist  Grasp and release of large clothes pegs     Patient given support for his Oculus Quest to trial at home - should improve his coordination    Suggested using [employer] for HEP to improve rhythm and timing  Precautions        Exercise Diary  20   Clothes pegs Easy 2 x 20 Firm 2 x 26 Firm 2 x 26 Firm 2 x 26 Firm 2 x 26   putty          Weight bearing Ball pushing On moving stool   On moving stool    Large geometric peg/pegboard Able to manipulate 3 pegs       Weight pulley triceps #5 x15  IE#5 x 15  ER#5 x 15  Prot#5 x 15  Biceps#5 x 15 triceps #5 x15  IE#5 x 15  ER#5 x 15  Prot#5 x 15  Biceps#5 x 15   triceps #5 x15  IE#5 x 15  ER#5 x 15  Prot#5 x 15  Biceps#5 x 15 triceps #5 x15  IE#5 x 15  ER#5 x 15  Prot#5 x 15  Biceps#5 x 15 triceps #5 x15  IE#5 x 15  ER#5 x 15  Prot#5 x 15  Biceps#5 x 15   metronome   Rhythm and timing x ~ 6 mins Rhythm and timing x ~ 5 mins    bends   Orange therabar x 20                                                                                                                    Vision Screening Recording:   vision screen: With Glasses  near acuity: R 20/200  L 20/70  binocularity far: esotropia  binocularity near: esotropia  red green fusion: suppression  near point of convergence: 6" with 10" conv break  deny string: Misalignment  Suppression far and near  Pursuits: Jerky with undershooting in horizontal plain  Saccades: Inaccurate with both eye undershooting in horizontal plain  ocular ROM: Limited in R/L upper quadrants  visual perceptual midline shift:     Vision: ST  - Pt will increase oculomotor control for improved saccades, con/divergent tasks for improved reading, board to table tasks with minimal increase in symptoms 4 weeks  - Pt will tolerate multi-modal envt x 15 min with 80% accuracy of cog load and min increase of symptoms of diplopia 4 weeks  Vision: LT  - Pt will increase oculomotor control for improved dynamic activities with head turns, board/screen to table tasks symptom free  - Pt will increase oculomotor control for WNL saccades, con/divergent tasks symptom free         Assessment: Tolerated treatment well  Patient would benefit from continued OT with Vision perception training     Very ataxic movement- difficulty to manipulate items        Plan: Continue per plan of care

## 2020-02-06 ENCOUNTER — APPOINTMENT (OUTPATIENT)
Dept: PHYSICAL THERAPY | Facility: CLINIC | Age: 50
End: 2020-02-06
Payer: COMMERCIAL

## 2020-02-10 DIAGNOSIS — R25.2 SPASTICITY: ICD-10-CM

## 2020-02-10 RX ORDER — BACLOFEN 10 MG/1
TABLET ORAL
Qty: 30 TABLET | Refills: 0 | Status: SHIPPED | OUTPATIENT
Start: 2020-02-10 | End: 2020-03-02 | Stop reason: SDUPTHER

## 2020-02-11 ENCOUNTER — OFFICE VISIT (OUTPATIENT)
Dept: PHYSICAL THERAPY | Facility: CLINIC | Age: 50
End: 2020-02-11
Payer: COMMERCIAL

## 2020-02-11 DIAGNOSIS — R53.1 RIGHT SIDED WEAKNESS: ICD-10-CM

## 2020-02-11 DIAGNOSIS — R25.2 SPASTICITY: Primary | ICD-10-CM

## 2020-02-11 PROCEDURE — 97112 NEUROMUSCULAR REEDUCATION: CPT | Performed by: PHYSICAL THERAPIST

## 2020-02-11 PROCEDURE — 97116 GAIT TRAINING THERAPY: CPT | Performed by: PHYSICAL THERAPIST

## 2020-02-11 NOTE — PROGRESS NOTES
Daily Note     Today's date: 2020  Patient name: Jose Alberto Gomez  : 1970  MRN: [id number]  Referring provider: Jm Mccarthy MD  Dx:   Encounter Diagnosis     ICD-10-CM    1  Spasticity R25 2    2  Right sided weakness R53 1                   Subjective: Wendi Marroquin presents w/ RW, no new complaints  Objective: See treatment diary below    16# weighted vest + 4# ankle weights (ankle weights removed for SOLO STEP):    -STS, holding 15# med ball - 30 reps  -Standing core rotation w/ 15# med ball- 20 reps  -Standing shoulder flexion to 90 deg w/ 15# med ball- 20 reps   -12" step taps to targets, BHR's - 30 reps  -Heel raises - 30 reps    -SOLO STEP: gait training w/ SPC- forward, lateral, backwards  -SOLO STEP: 9" hurdles w/ SPC forward, lateral  -SOLO STEP: cone taps w/ SPC     Assessment: Pt tolerated treatment session well today  Improved stability and balance when negotiating hurdles  He does demonstrate improved righting reactions to correct minor LOB when ambulating w/ SPC  Wendi Marroquin requires continued skilled physical therapy to address ataxic gait and general weakness to promote safe and optimal level of function  Plan: Continue plan of care  Gait training w/ SPC  POC expires 20        Precautions:   Past Medical History:   Diagnosis Date    Abdominal cyst     Anemia     Hx     Chronic pain disorder     abdominal    [address] [facility] [address] [address]     Diverticulitis     GI bleed     History of transfusion 2016    5 units    Lightheadedness     Multiple lesions on computed tomography of brain and spine     Spleen laceration

## 2020-02-13 ENCOUNTER — OFFICE VISIT (OUTPATIENT)
Dept: PHYSICAL THERAPY | Facility: CLINIC | Age: 50
End: 2020-02-13
Payer: COMMERCIAL

## 2020-02-13 ENCOUNTER — OFFICE VISIT (OUTPATIENT)
Dept: OCCUPATIONAL THERAPY | Facility: CLINIC | Age: 50
End: 2020-02-13
Payer: COMMERCIAL

## 2020-02-13 DIAGNOSIS — R53.1 RIGHT SIDED WEAKNESS: ICD-10-CM

## 2020-02-13 DIAGNOSIS — R53.1 RIGHT SIDED WEAKNESS: Primary | ICD-10-CM

## 2020-02-13 DIAGNOSIS — R25.2 SPASTICITY: ICD-10-CM

## 2020-02-13 DIAGNOSIS — R25.2 SPASTICITY: Primary | ICD-10-CM

## 2020-02-13 PROCEDURE — 97116 GAIT TRAINING THERAPY: CPT | Performed by: PHYSICAL THERAPIST

## 2020-02-13 PROCEDURE — 97112 NEUROMUSCULAR REEDUCATION: CPT | Performed by: PHYSICAL THERAPIST

## 2020-02-13 PROCEDURE — 97140 MANUAL THERAPY 1/> REGIONS: CPT

## 2020-02-13 PROCEDURE — 97530 THERAPEUTIC ACTIVITIES: CPT

## 2020-02-13 NOTE — PROGRESS NOTES
Daily Note     Today's date: 2020  Patient name: Dallin Dhillon  : 1970  MRN: [id number]  Referring provider: Tiffany Sena MD  Dx:   Encounter Diagnosis     ICD-10-CM    1  Right sided weakness R53 1    2  Spasticity R25 2               Assessment: Tolerated treatment fair  Patient would benefit from continued OT      Plan: Continue per plan of care  Precautions:   Past Medical History:   Diagnosis Date    Abdominal cyst     Anemia     Hx     Chronic pain disorder     abdominal    Diabetes mellitus (HCC)     Diverticulitis     GI bleed     History of transfusion     5 units    Lightheadedness     Multiple lesions on computed tomography of brain and spine     Spleen laceration        Subjective: 010      Objective: Initiated treatment with vibration through forearm/hand Graston technique to the R UE with sweeping motion between radial and ulna bones  exercises and activities to increase ROM, strength, coordination and function     Vibration to forearm to wrist  Grasp and release of large clothes pegs     Patient given support for his Oculus Quest to trial at home - should improve his coordination    Suggested using WEB METRONOME for HEP to improve rhythm and timing  Precautions        Exercise Diary  20   Clothes pegs Easy 2 x 20 Firm 2 x 26 Firm 2 x 26 Firm 2 x 26 Firm 2 x 26   putty      Weight bearing through putty    Weight bearing Ball pushing On moving stool  On Moving stool On moving stool    Large geometric peg/pegboard Able to manipulate 3 pegs       Weight pulley triceps #5 x15  IE#5 x 15  ER#5 x 15  Prot#5 x 15  Biceps#5 x 15 triceps #5 x15  IE#5 x 15  ER#5 x 15  Prot#5 x 15  Biceps#5 x 15   triceps #5 x15  IE#5 x 15  ER#5 x 15  Prot#5 x 15  Biceps#5 x 15 triceps #5 x15  IE#5 x 15  ER#5 x 15  Prot#5 x 15  Biceps#5 x 15 triceps #5 x15  IE#5 x 15  ER#5 x 15  Prot#5 x 15  Biceps#5 x 15   metronome   Rhythm and timing x ~ 6 mins Rhythm and timing x ~ 5 mins    bends   [place] therabar x 20 [place] therabar x 20                                                                                                                   Vision Screening Recording:   vision screen: With Glasses  near acuity: R 20/200  L 20/70  binocularity far: esotropia  binocularity near: esotropia  red green fusion: suppression  near point of convergence: 6" with 10" conv break  deny string: Misalignment  Suppression far and near  Pursuits: Jerky with undershooting in horizontal plain  Saccades: Inaccurate with both eye undershooting in horizontal plain  ocular ROM: Limited in R/L upper quadrants  visual perceptual midline shift:     Vision: ST  - Pt will increase oculomotor control for improved saccades, con/divergent tasks for improved reading, board to table tasks with minimal increase in symptoms 4 weeks  - Pt will tolerate multi-modal envt x 15 min with 80% accuracy of cog load and min increase of symptoms of diplopia 4 weeks  Vision: LT  - Pt will increase oculomotor control for improved dynamic activities with head turns, board/screen to table tasks symptom free  - Pt will increase oculomotor control for WNL saccades, con/divergent tasks symptom free         Assessment: Tolerated treatment well  Patient would benefit from continued OT with Vision perception training     Very ataxic movement- difficulty to manipulate items        Plan: Continue per plan of care

## 2020-02-13 NOTE — PROGRESS NOTES
Daily Note     Today's date: 2020  Patient name: Alivia Bermudez  : 1970  MRN: [id number]  Referring provider: Radha Lazo MD  Dx:   Encounter Diagnosis     ICD-10-CM    1  Spasticity R25 2    2  Right sided weakness R53 1                   Subjective: Karen Net presents w/ RW  No complaints  Objective: See treatment diary below    16# weighted vest + 4# ankle weights (ankle weights removed for SOLO STEP):    -STS, holding 15# med ball - 30 reps  -Standing core rotation w/ 15# med ball- 20 reps  -12" step taps to targets, BHR's - 30 reps  -Heel raises - 30 reps  -Eccentric heel raises off edge of step- 30 reps    -SOLO STEP: gait training w/ SPC- 10 laps around track  -SOLO STEP: 9" hurdles w/ SPC forward, lateral, backwards       Assessment: Pt tolerated treatment session well today  Improved activity tolerance to ambulating w/ SPC- able to do 10 laps consecutively  Progressed to eccentric heel raise  Karen Net requires continued skilled physical therapy to address ataxic gait and general weakness to promote safe and optimal level of function  Plan: Continue plan of care  Gait training w/ SPC  POC expires 20        Precautions:   Past Medical History:   Diagnosis Date    Abdominal cyst     Anemia     Hx     Chronic pain disorder     abdominal    Diabetes mellitus ([facility] [address]     Diverticulitis     GI bleed     History of transfusion 2016    5 units    Lightheadedness     Multiple lesions on computed tomography of brain and spine     Spleen laceration

## 2020-02-17 ENCOUNTER — APPOINTMENT (OUTPATIENT)
Dept: OCCUPATIONAL THERAPY | Facility: CLINIC | Age: 50
End: 2020-02-17
Payer: COMMERCIAL

## 2020-02-18 ENCOUNTER — OFFICE VISIT (OUTPATIENT)
Dept: OCCUPATIONAL THERAPY | Facility: CLINIC | Age: 50
End: 2020-02-18
Payer: COMMERCIAL

## 2020-02-18 ENCOUNTER — OFFICE VISIT (OUTPATIENT)
Dept: PHYSICAL THERAPY | Facility: CLINIC | Age: 50
End: 2020-02-18
Payer: COMMERCIAL

## 2020-02-18 DIAGNOSIS — R25.2 SPASTICITY: Primary | ICD-10-CM

## 2020-02-18 DIAGNOSIS — R53.1 RIGHT SIDED WEAKNESS: ICD-10-CM

## 2020-02-18 DIAGNOSIS — R53.1 RIGHT SIDED WEAKNESS: Primary | ICD-10-CM

## 2020-02-18 DIAGNOSIS — R25.2 SPASTICITY: ICD-10-CM

## 2020-02-18 PROCEDURE — 97530 THERAPEUTIC ACTIVITIES: CPT

## 2020-02-18 PROCEDURE — 97116 GAIT TRAINING THERAPY: CPT | Performed by: PHYSICAL THERAPIST

## 2020-02-18 PROCEDURE — 97112 NEUROMUSCULAR REEDUCATION: CPT | Performed by: PHYSICAL THERAPIST

## 2020-02-18 NOTE — PROGRESS NOTES
Daily Note     Today's date: 2020  Patient name: Kelley Sierra  : 1970  MRN: [id number]  Referring provider: Zully Santo MD  Dx:   Encounter Diagnosis     ICD-10-CM    1  Spasticity R25 2    2  Right sided weakness R53 1                   Subjective: Breanna [place] presents w/ RW from OT session  Objective: See treatment diary below    16# weighted vest + 4# ankle weights (ankle weights removed for SOLO STEP):    -STS, holding 15# med ball - 30 reps  -Standing core rotation w/ 15# med ball- 20 reps  -12" step taps to targets, BHR's - 30 reps  -Step ups w/ 1 foot on 6" step and other foot stepping up to 12" step- 30 reps each  -Eccentric heel raises off edge of step, w/ tennis ball between heels- 30 reps  - Med ball slams w/ 10# med ball - 10 reps       -SOLO STEP: forward, lateral, and backwards walking w/ SPC   -SOLO STEP: 9" hurdles w/ SPC forward, lateral       Assessment: Pt tolerated treatment session well today  Added in tennis ball squeeze during heel raises for post tib activation  Also added in med ball slams for core strengthening and balance  He will require continued gait training w/ SPC due to ongoing instances of sway and LOB  Breanna [place] requires continued skilled physical therapy to address ataxic gait and general weakness to promote safe and optimal level of function  Plan: Continue plan of care  Gait training w/ SPC  POC expires 20        Precautions:   Past Medical History:   Diagnosis Date    Abdominal cyst     Anemia     Hx     Chronic pain disorder     abdominal    Diabetes mellitus ([facility] [address]     Diverticulitis     GI bleed     History of transfusion 2016    5 units    Lightheadedness     Multiple lesions on computed tomography of brain and spine     Spleen laceration

## 2020-02-18 NOTE — PROGRESS NOTES
Daily Note     Today's date: 2020  Patient name: Sarah Reyes  : 1970  MRN: [id number]  Referring provider: Gopal Syed MD  Dx:   Encounter Diagnosis     ICD-10-CM    1  Right sided weakness R53 1    2  Spasticity R25 2             Precautions:   Past Medical History:   Diagnosis Date    Abdominal cyst     Anemia     Hx     Chronic pain disorder     abdominal    Diabetes mellitus ([address] )     Diverticulitis     GI bleed     History of transfusion 2016    5 units    Lightheadedness     Multiple lesions on computed tomography of brain and spine     Spleen laceration        Subjective: 010      Objective: Initiated treatment with vibration through forearm/hand Graston technique to the R UE with sweeping motion between radial and ulna bones  exercises and activities to increase ROM, strength, coordination and function     Vibration to forearm to wrist  Grasp and release of large clothes pegs   Complete activity in stance for tracking reaching and grasping and placing cards  Patient given support for his Oculus Quest to trial at home - should improve his coordination    Suggested using WEB METRONOME for HEP to improve rhythm and timing  Precautions        Exercise Diary  20   Clothes pegs Easy 2 x 20 Firm 2 x 26 Firm 2 x 26 Firm 2 x 26 Firm 2 x 26   putty      Weight bearing through putty Weight bearing through putty   Weight bearing Ball pushing On moving stool  On Moving stool On moving stool    Large geometric peg/pegboard Able to manipulate 3 pegs       Weight pulley triceps #5 x15  IE#5 x 15  ER#5 x 15  Prot#5 x 15  Biceps#5 x 15 triceps #5 x15  IE#5 x 15  ER#5 x 15  Prot#5 x 15  Biceps#5 x 15   triceps #5 x15  IE#5 x 15  ER#5 x 15  Prot#5 x 15  Biceps#5 x 15 triceps #5 x15  IE#5 x 15  ER#5 x 15  Prot#5 x 15  Biceps#5 x 15 triceps #5 x15  IE#5 x 15  ER#5 x 15  Prot#5 x 15  Biceps#5 x 15   metronome   Rhythm and timing x ~ 6 mins Rhythm and timing x ~ 5 mins bends   [place] therabar x 20 Orange therabar x 20 Orange therabar x 20                                                                                                                  Vision Screening Recording:   vision screen: With Glasses  near acuity: R 20/200  L 20/70  binocularity far: esotropia  binocularity near: esotropia  red green fusion: suppression  near point of convergence: 6" with 10" conv break  deny string: Misalignment  Suppression far and near  Pursuits: Jerky with undershooting in horizontal plain  Saccades: Inaccurate with both eye undershooting in horizontal plain  ocular ROM: Limited in R/L upper quadrants  visual perceptual midline shift:     Vision: ST  - Pt will increase oculomotor control for improved saccades, con/divergent tasks for improved reading, board to table tasks with minimal increase in symptoms 4 weeks  - Pt will tolerate multi-modal envt x 15 min with 80% accuracy of cog load and min increase of symptoms of diplopia 4 weeks  Vision: LT  - Pt will increase oculomotor control for improved dynamic activities with head turns, board/screen to table tasks symptom free  - Pt will increase oculomotor control for WNL saccades, con/divergent tasks symptom free         Assessment: Tolerated treatment well  Patient would benefit from continued OT with Vision perception training     Very ataxic movement- difficulty to manipulate items           Assessment: Tolerated treatment fair  Patient would benefit from continued OT    Plan: Continue per plan of care

## 2020-02-20 ENCOUNTER — OFFICE VISIT (OUTPATIENT)
Dept: OCCUPATIONAL THERAPY | Facility: CLINIC | Age: 50
End: 2020-02-20
Payer: COMMERCIAL

## 2020-02-20 ENCOUNTER — OFFICE VISIT (OUTPATIENT)
Dept: PHYSICAL THERAPY | Facility: CLINIC | Age: 50
End: 2020-02-20
Payer: COMMERCIAL

## 2020-02-20 DIAGNOSIS — R53.1 RIGHT SIDED WEAKNESS: ICD-10-CM

## 2020-02-20 DIAGNOSIS — R53.1 RIGHT SIDED WEAKNESS: Primary | ICD-10-CM

## 2020-02-20 DIAGNOSIS — R25.2 SPASTICITY: Primary | ICD-10-CM

## 2020-02-20 DIAGNOSIS — R25.2 SPASTICITY: ICD-10-CM

## 2020-02-20 PROCEDURE — 97112 NEUROMUSCULAR REEDUCATION: CPT | Performed by: PHYSICAL THERAPIST

## 2020-02-20 PROCEDURE — 97530 THERAPEUTIC ACTIVITIES: CPT

## 2020-02-20 PROCEDURE — 97116 GAIT TRAINING THERAPY: CPT | Performed by: PHYSICAL THERAPIST

## 2020-02-20 PROCEDURE — 97110 THERAPEUTIC EXERCISES: CPT

## 2020-02-20 NOTE — PROGRESS NOTES
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  He continues to demonstrate improvements in outcome measures each month  Goal was to progress independence and safety w/ SPC - his gait speed increased and he was able to perform 6MWT with [facility] today  He does demonstrate continued path deviation and veering with SPC, he also has increased instability with more distracted/busy environments  Have not been able to practice ambulating outdoors w/ [facility] due to poor weather conditions  RW continues to be primary AD for community mobility at this time, but he is progressing w/ SPC  He has not had any falls and he is able to perform floor transfers independently  He is still very challenged w/ eyes closed conditions of mCTSIB, indicating impairments in vestibular component of balance  He has met 4 of 5 STG's , and only 1 of 5 LTG's  He requires continued skilled PT to improve balance and increase independence w/ SPC  Impairments: abnormal coordination, abnormal gait, abnormal muscle tone, abnormal movement, impaired balance, impaired physical strength, lacks appropriate home exercise program and safety issue  Understanding of Dx/Px/POC: good   Prognosis: good    Goals  Goals  STG 30 days    1  Patient will improve static balance with feet together eyes closed on firm surface to 30 seconds indicating reduction in fall risk -NOT MET  2  Patient will perform 6MWT with LRAD to assess cardiovascular endurance  -MET  3   Patient will display 2 3  second improvement with overall score of 11 seconds (using SPC/ or no AD) with TUG test or lower with noted improvement being Minimal Detectable Change pre current research standards with fall risk   -MET  4  Patient will achieve 38/56 LYONS score with minimal improve by 6 points or more demonstrating Minimal Detectable change per current research standards for this objective test which assess fall risk  -MET  5  Patient will achieve   59 ft/sec improvement with score of  2 66 ft/sec (using SPC) with 10 Meter Walk test, demonstrating Minimal Detectable change per current research standards for this objective test which assess fall risk  -MET      LT days   1  Patient will score low risk for falls with 3/4 fall risk measures -NOT MET  2  Patient will be able to ambulate 1,000 feet without AD during 6 minute walk test w/ SPC  -NOT MET  3  Patient will be able to perform floor transfer without physical assistance -MET  4  Patient will be able to carry objects without loss of balance - NOT MET  5   Patient will be able to ambulate outdoors without any loss of balance - NOT MET    Cut off score   All date taken from [facility] or Rehab Measures    LYONS test: 46/56                                              5 x STS Test:  MDC: 6 points                                                  MDC: 2 3 seconds   age norms                                                                 Age Norms   61-76 year old = M: 54, F: 54                        61-76 year old: 11 4 seconds   66-77 year old = M 47,  F: 50                       71-76 year old: 12 6 seconds    80-80 year old = M46,   F: 53                       80-80 year old: 14 8 seconds     TUG test:                                                                     10 Meter Walk Test:  MDC: 4 14 seconds       MDC:  59 ft/sec  Cut off score for Falls                                                  Age Norms  > 13 5 seconds community dwelling adults                20-29; M: 4 56 ft/sec F: 4 62 ft/sec  > 32 2 Frail Elderly 30-39: M 4 76 ft/sec  F: 4 68 ft/sec          40-49: M: 4 79 ft/sec  F: 4 62 ft/sec  6 Minute Walk Test      50-59: M: 4 76 ft/sec  F: 4 56 ft/sec  MDC: 190 feet       60-69: M: 4 56 ft/sec  F: 4 26 ft/sec  Age Norms       70-+    M: 4 36 ft/sec  F: 4 16 ft/sec  60-69:    M: 1876 F:   36-96:    M: 1729 F:   80-89 +: M: 80 F; 1286     Plan  Patient would benefit from: skilled physical therapy  Planned modality interventions: biofeedback and electrical stimulation/Russian stimulation  Planned therapy interventions: activity modification, balance, balance/weight bearing training, behavior modification, body mechanics training, breathing training, community reintegration, flexibility, gait training, graded activity, graded exercise, graded motor, home exercise program, functional ROM exercises, transfer training, therapeutic training, therapeutic exercise, therapeutic activities, stretching, strengthening, postural training, patient education, neuromuscular re-education and manual therapy  Frequency: 2x week  Duration in weeks: 12  Plan of Care beginning date: 2020  Plan of Care expiration date: 2020  Treatment plan discussed with: patient        Subjective Evaluation    History of Present Illness  Mechanism of injury: 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 , resulting in stroke-like symptoms (R sided impairments)  He is still taking baclofen  At home he does not use AD - and he uses RW in the community  He goes in 2 weeks to neuro-optometrist and will potentially receive prism glasses  Denies any falls over past month        Pain  No pain reported  Current pain ratin  At best pain ratin  At worst pain ratin    Social Support  Steps to enter house: yes  Stairs in house: yes (12)   Lives in: multiple-level home  Lives with: parents    Employment status: not working ([employer] )  Hand dominance: left    Treatments  Previous treatment: physical therapy (a few months ago)  Patient Goals  Patient goals for therapy: improved balance, increased motion, increased strength, independence with ADLs/IADLs and return to sport/leisure activities  Patient goal: get full control of R side back , walk without AD safely no loss of balance        Objective        Initial Evaluation  11/4/19 Progress Note  12/19/19 Re-evaluation  1/27/20 Progress Note  2/20/20   5x STS 11 72 sec 9 76 sec 8 05 sec 8 62 sec   TUG RW:13 3 sec  SPC:13 7 sec  None:13 8 sec RW:13 96 sec  SPC:12 78 sec  None:12 9 sec RW:9 55 sec  SPC:10 68 sec  None: 9 73 sec RW:9 48 sec  SPC:9 73 sec  None: 9 68 sec   Kennedy 32/56 42/56 45/56 45/56   Gait speed RW:0 88 m/s    SPC:0 62 m/s RW: 0 96 m/s    SPC: 0 86 m/s RW: 1 15 m/s  SPC:0 83 m/s  None: 0 90 m/s  RW: 1 16 m/s  SPC: 1 0 m/s  None: 0 98 m/s   6MWT  900 ft w/ RW 1,000 ft w/  ft w/ SPC   mCTSIB   FTEO firm: 30 sec  FTEC firm: 7 sec  FTEO foam: 30 sec  FTEC foam: 1 sec FTEO firm: 30 sec  FTEC firm: 10 sec  FTEO foam: 30 sec  FTEC foam: 1 sec     Interventions 2/20:  -SOLO STEP: 6" ivy negotiation forward   initially nonreciprocal but progressed to reciprocal , w/ SPC        Precautions:   Past Medical History:   Diagnosis Date    Abdominal cyst     Anemia     Hx     Chronic pain disorder     abdominal    Diabetes mellitus ([address] )     Diverticulitis     GI bleed     History of transfusion 2016    5 units    Lightheadedness     Multiple lesions on computed tomography of brain and spine     Spleen laceration

## 2020-02-25 ENCOUNTER — OFFICE VISIT (OUTPATIENT)
Dept: PHYSICAL THERAPY | Facility: CLINIC | Age: 50
End: 2020-02-25
Payer: COMMERCIAL

## 2020-02-25 DIAGNOSIS — R53.1 RIGHT SIDED WEAKNESS: ICD-10-CM

## 2020-02-25 DIAGNOSIS — R25.2 SPASTICITY: Primary | ICD-10-CM

## 2020-02-25 PROCEDURE — 97116 GAIT TRAINING THERAPY: CPT | Performed by: PHYSICAL THERAPIST

## 2020-02-25 PROCEDURE — 97112 NEUROMUSCULAR REEDUCATION: CPT | Performed by: PHYSICAL THERAPIST

## 2020-02-25 NOTE — PROGRESS NOTES
Daily Note     Today's date: 2020  Patient name: Juve James  : 1970  MRN: [id number]  Referring provider: Morena Orr MD  Dx:   Encounter Diagnosis     ICD-10-CM    1  Spasticity R25 2    2  Right sided weakness R53 1                   Subjective: Trinidad Price reports feeling good  Objective: See treatment diary below    20# weighted vest:    -STS, holding 15# med ball - 20 reps  -Standing core rotation w/ 15# med ball- 20 reps  -Step ups w/ 1 foot on 6" step and other foot stepping up to 12" step- 30 reps each  -Eccentric heel raises off edge of step, w/ tennis ball between heels- 30 reps  -Med ball slams w/ 10# med ball - 10 reps   -SOLO STEP: forward, lateral, and backwards walking w/ SPC   -SOLO STEP: 9" hurdles w/ SPC forward, lateral       Assessment: Pt tolerated treatment session well today  Increased difficulty with backwards walking, step to pattern noted  He continues to get fatigued with med ball slams- limited to 10 reps  He will require continued gait training w/ SPC due to ongoing instances of sway and LOB  Trinidad Price requires continued skilled physical therapy to address ataxic gait and general weakness to promote safe and optimal level of function  Plan: Continue plan of care  Gait training w/ SPC  POC expires 20        Precautions:   Past Medical History:   Diagnosis Date    Abdominal cyst     Anemia     Hx     Chronic pain disorder     abdominal    Diabetes mellitus ([facility] [address]     Diverticulitis     GI bleed     History of transfusion 2016    5 units    Lightheadedness     Multiple lesions on computed tomography of brain and spine     Spleen laceration

## 2020-02-27 ENCOUNTER — OFFICE VISIT (OUTPATIENT)
Dept: PHYSICAL THERAPY | Facility: CLINIC | Age: 50
End: 2020-02-27
Payer: COMMERCIAL

## 2020-02-27 ENCOUNTER — OFFICE VISIT (OUTPATIENT)
Dept: OCCUPATIONAL THERAPY | Facility: CLINIC | Age: 50
End: 2020-02-27
Payer: COMMERCIAL

## 2020-02-27 DIAGNOSIS — R25.2 SPASTICITY: Primary | ICD-10-CM

## 2020-02-27 DIAGNOSIS — R53.1 RIGHT SIDED WEAKNESS: ICD-10-CM

## 2020-02-27 DIAGNOSIS — R53.1 RIGHT SIDED WEAKNESS: Primary | ICD-10-CM

## 2020-02-27 DIAGNOSIS — R25.2 SPASTICITY: ICD-10-CM

## 2020-02-27 PROCEDURE — 97110 THERAPEUTIC EXERCISES: CPT

## 2020-02-27 PROCEDURE — 97112 NEUROMUSCULAR REEDUCATION: CPT

## 2020-02-27 PROCEDURE — 97116 GAIT TRAINING THERAPY: CPT

## 2020-02-27 PROCEDURE — 97530 THERAPEUTIC ACTIVITIES: CPT

## 2020-02-27 NOTE — PROGRESS NOTES
Daily Note     Today's date: 2020  Patient name: Roxanne Vazquez  : 1970  MRN: [id number]  Referring provider: Tab Lucas MD  Dx:   Encounter Diagnosis     ICD-10-CM    1  Spasticity R25 2    2  Right sided weakness R53 1                   Subjective: Myla Ohara reports no new complaints and is overall "feeling pretty good" today  Objective: See treatment diary below    20# weighted vest:    -STS, holding 15# med ball - 2 sets, 20 reps  -Standing core rotation w/ 15# med ball- 20 reps  -Standing chops w/ 15# med ball- 20 reps each  -Med ball rainbow slams w/ 10# med ball - 10 reps bilaterally  -Eccentric heel raises off edge of step, w/ tennis ball between heels- 30 reps  -Step ups w/ 1 foot on 6" step and other foot stepping up to 12" step with cone tap and green TB around knees- 20    SOLO Step Circuit (2x)  - Step Up/Down with SPC: 4"  - Med ball lat toss into wall (10 reps)  - Jose De Jesus with SPC: 6"  - Step onto foam pad, shoot mini basketball into hoop (5 reps)  *perform circuit in reverse      Assessment: Patient able to tolerate treatment session well today with continued fatigue  He was challenged with rainbow med ball slams requiring him to reach laterally outside his My, with 1 LoB and ability to maintain balance with UE support  He demonstrated fair negotiation with SPC and had difficulty with 180 degree turn on foam surface  He will continue to benefit from skilled physical therapy to address ataxic gait and general weakness to promote safe and optimal level of function  Plan: Continue plan of care  Gait training w/ SPC  POC expires 20        Precautions:   Past Medical History:   Diagnosis Date    Abdominal cyst     Anemia     Hx     Chronic pain disorder     abdominal    Diabetes mellitus ([address]     Diverticulitis     GI bleed     History of transfusion 2016    5 units    Lightheadedness     Multiple lesions on computed tomography of brain and spine     Spleen laceration

## 2020-02-27 NOTE — PROGRESS NOTES
Daily Note     Today's date: 2020  Patient name: Triston Handley  : 1970  MRN: [id number]  Referring provider: Aylin Blanco MD  Dx:   Encounter Diagnosis     ICD-10-CM    1  Right sided weakness R53 1    2  Spasticity R25 2             Precautions:   Past Medical History:   Diagnosis Date    Abdominal cyst     Anemia     Hx     Chronic pain disorder     abdominal    Diabetes mellitus ([address] )     Diverticulitis     GI bleed     History of transfusion 2016    5 units    Lightheadedness     Multiple lesions on computed tomography of brain and spine     Spleen laceration        Subjective: 010      Objective: Initiated treatment with vibration through forearm/hand/wrist  exercises and activities to increase ROM, strength, coordination and function     Grasp and release of large clothes pegs   Patient given support for his Oculus Quest to trial at home - should improve his coordination    Suggested using WEB METRONOME for HEP to improve rhythm and timing  Patient 10 mins late due to traffic  Precautions        Exercise Diary  20   Clothes pegs Firm 2 x 26 Firm 2 x 26 Firm 2 x 26 Firm 2 x 26 Firm 2 x 26   putty      Weight bearing through putty Weight bearing through putty   Weight bearing      On Moving stool On moving stool    Large geometric peg/pegboard          Weight pulley triceps #5 x15  IE#5 x 15  ER#5 x 15  Prot#5 x 15  Biceps#5 x 15 triceps #5 x15  IE#5 x 15  ER#5 x 15  Prot#5 x 15  Biceps#5 x 15   triceps #5 x15  IE#5 x 15  ER#5 x 15  Prot#5 x 15  Biceps#5 x 15 triceps #5 x15  IE#5 x 15  ER#5 x 15  Prot#5 x 15  Biceps#5 x 15 triceps #5 x15  IE#5 x 15  ER#5 x 15  Prot#5 x 15  Biceps#5 x 15   metronome Rhythm and timing x ~ 6 mins Rhythm and timing x ~ 6 mins    Rhythm and timing x ~ 6 mins Rhythm and timing x ~ 5 mins    bends [place] therabar x 20 Orange therabar x 20 Orange therabar x 20 Orange therabar x 20 Orange therabar x 20   Wrist weight  Ext 3lb x 15  Ext 3lb x 15  RD/UD 3lb x 15                                                                                                             Vision Screening Recording:   vision screen: With Glasses  near acuity: R 20/200  L 20/70  binocularity far: esotropia  binocularity near: esotropia  red green fusion: suppression  near point of convergence: 6" with 10" conv break  deny string: Misalignment  Suppression far and near  Pursuits: Jerky with undershooting in horizontal plain  Saccades: Inaccurate with both eye undershooting in horizontal plain  ocular ROM: Limited in R/L upper quadrants  visual perceptual midline shift:     Vision: ST  - Pt will increase oculomotor control for improved saccades, con/divergent tasks for improved reading, board to table tasks with minimal increase in symptoms 4 weeks  - Pt will tolerate multi-modal envt x 15 min with 80% accuracy of cog load and min increase of symptoms of diplopia 4 weeks  Vision: LT  - Pt will increase oculomotor control for improved dynamic activities with head turns, board/screen to table tasks symptom free  - Pt will increase oculomotor control for WNL saccades, con/divergent tasks symptom free         Assessment: Tolerated treatment well  Patient would benefit from continued OT with Vision perception training     Very ataxic movement- difficulty to manipulate items       Plan: Continue per plan of care

## 2020-02-28 DIAGNOSIS — R25.2 SPASTICITY: ICD-10-CM

## 2020-03-01 RX ORDER — BACLOFEN 10 MG/1
TABLET ORAL
Qty: 30 TABLET | Refills: 0 | OUTPATIENT
Start: 2020-03-01

## 2020-03-02 DIAGNOSIS — R25.2 SPASTICITY: ICD-10-CM

## 2020-03-02 RX ORDER — BACLOFEN 10 MG/1
5 TABLET ORAL 3 TIMES DAILY
Qty: 15 TABLET | Refills: 0 | Status: SHIPPED | OUTPATIENT
Start: 2020-03-02 | End: 2020-03-11 | Stop reason: SDUPTHER

## 2020-03-02 NOTE — TELEPHONE ENCOUNTER
Patient scheduled an appt  for Wed 3/11  Needs a refill on his Baclofin 10mg sent to [facility]

## 2020-03-03 ENCOUNTER — APPOINTMENT (OUTPATIENT)
Dept: OCCUPATIONAL THERAPY | Facility: CLINIC | Age: 50
End: 2020-03-03
Payer: COMMERCIAL

## 2020-03-05 ENCOUNTER — APPOINTMENT (OUTPATIENT)
Dept: OCCUPATIONAL THERAPY | Facility: CLINIC | Age: 50
End: 2020-03-05
Payer: COMMERCIAL

## 2020-03-10 ENCOUNTER — OFFICE VISIT (OUTPATIENT)
Dept: OCCUPATIONAL THERAPY | Facility: CLINIC | Age: 50
End: 2020-03-10
Payer: COMMERCIAL

## 2020-03-10 DIAGNOSIS — R25.2 SPASTICITY: ICD-10-CM

## 2020-03-10 DIAGNOSIS — R53.1 RIGHT SIDED WEAKNESS: Primary | ICD-10-CM

## 2020-03-10 PROCEDURE — 97530 THERAPEUTIC ACTIVITIES: CPT

## 2020-03-10 PROCEDURE — 97110 THERAPEUTIC EXERCISES: CPT

## 2020-03-10 NOTE — PROGRESS NOTES
Daily Note     Today's date: 3/10/2020  Patient name: Sarah Coyle  : 1970  MRN: [id number]  Referring provider: Destinee Leigh MD  Dx:   Encounter Diagnosis     ICD-10-CM    1  Right sided weakness R53 1    2  Spasticity R25 2             Precautions:   Past Medical History:   Diagnosis Date    Abdominal cyst     Anemia     Hx     Chronic pain disorder     abdominal    Diabetes mellitus ([address] )     Diverticulitis     GI bleed     History of transfusion 2016    5 units    Lightheadedness     Multiple lesions on computed tomography of brain and spine     Spleen laceration        Subjective: 010      Objective: Initiated treatment with vibration through forearm/hand/wrist  exercises and activities to increase ROM, strength, coordination and function     Grasp and release of large clothes pegs   Patient given support for his Oculus Quest to trial at home - should improve his coordination    Suggested using WEB METRONOME for HEP to improve rhythm and timing  Repeat suggestion for WEB Metronome- given printout for website  Precautions        Exercise Diary  2/20 3/10 2/4 2/13 2/18/20   Clothes pegs Firm 2 x 26 Firm 2 x 26 Firm 2 x 26 Firm 2 x 26 Firm 2 x 26   putty      Weight bearing through putty Weight bearing through putty   Weight bearing  On moving stool  On Moving stool On moving stool    Large geometric peg/pegboard          Weight pulley triceps #5 x15  IE#5 x 15  ER#5 x 15  Prot#5 x 15  Biceps#5 x 15 triceps #6 x15  IE#6 x 15  ER#6 x 15  Prot#6 x 15  Biceps# x 15   triceps #5 x15  IE#5 x 15  ER#5 x 15  Prot#5 x 15  Biceps#5 x 15 triceps #5 x15  IE#5 x 15  ER#5 x 15  Prot#5 x 15  Biceps#5 x 15 triceps #5 x15  IE#5 x 15  ER#5 x 15  Prot#5 x 15  Biceps#5 x 15   metronome Rhythm and timing x ~ 6 mins Rhythm and timing x ~ 6 mins Rhythm and timing x ~ 6 mins Rhythm and timing x ~ 5 mins    bends [place] therabar x 20 Orange therabar x 20 Orange therabar x 20 Orange therabar x 20 [employer] therabar x 20                                                                                                                  Vision Screening Recording:   vision screen: With Glasses  near acuity: R 20/200  L 20/70  binocularity far: esotropia  binocularity near: esotropia  red green fusion: suppression  near point of convergence: 6" with 10" conv break  deny string: Misalignment  Suppression far and near  Pursuits: Jerky with undershooting in horizontal plain  Saccades: Inaccurate with both eye undershooting in horizontal plain  ocular ROM: Limited in R/L upper quadrants  visual perceptual midline shift:     Vision: ST  - Pt will increase oculomotor control for improved saccades, con/divergent tasks for improved reading, board to table tasks with minimal increase in symptoms 4 weeks  - Pt will tolerate multi-modal envt x 15 min with 80% accuracy of cog load and min increase of symptoms of diplopia 4 weeks  Vision: LT  - Pt will increase oculomotor control for improved dynamic activities with head turns, board/screen to table tasks symptom free  - Pt will increase oculomotor control for WNL saccades, con/divergent tasks symptom free         Assessment: Tolerated treatment well  Patient would benefit from continued OT with Vision perception training     Very ataxic movement- difficulty to manipulate items       Plan: Continue per plan of care  Patient was called to ask about v-therapy; however he has not returned the call    He will be discharged at this time and may resume after COVID-19

## 2020-03-11 ENCOUNTER — OFFICE VISIT (OUTPATIENT)
Dept: FAMILY MEDICINE CLINIC | Facility: CLINIC | Age: 50
End: 2020-03-11
Payer: COMMERCIAL

## 2020-03-11 VITALS
RESPIRATION RATE: 18 BRPM | WEIGHT: 166 LBS | DIASTOLIC BLOOD PRESSURE: 100 MMHG | TEMPERATURE: 97.8 F | HEART RATE: 104 BPM | SYSTOLIC BLOOD PRESSURE: 164 MMHG | HEIGHT: 69 IN | BODY MASS INDEX: 24.59 KG/M2

## 2020-03-11 DIAGNOSIS — R53.1 RIGHT SIDED WEAKNESS: ICD-10-CM

## 2020-03-11 DIAGNOSIS — Z91.19 NONCOMPLIANCE: ICD-10-CM

## 2020-03-11 DIAGNOSIS — Z79.4 TYPE 2 DIABETES MELLITUS WITHOUT COMPLICATION, WITH LONG-TERM CURRENT USE OF INSULIN (HCC): ICD-10-CM

## 2020-03-11 DIAGNOSIS — E11.9 TYPE 2 DIABETES MELLITUS WITHOUT COMPLICATION, WITH LONG-TERM CURRENT USE OF INSULIN (HCC): ICD-10-CM

## 2020-03-11 DIAGNOSIS — R25.2 SPASTICITY: ICD-10-CM

## 2020-03-11 DIAGNOSIS — E78.5 HYPERLIPIDEMIA, UNSPECIFIED HYPERLIPIDEMIA TYPE: Chronic | ICD-10-CM

## 2020-03-11 DIAGNOSIS — D50.9 IRON DEFICIENCY ANEMIA, UNSPECIFIED IRON DEFICIENCY ANEMIA TYPE: Chronic | ICD-10-CM

## 2020-03-11 DIAGNOSIS — I10 BENIGN ESSENTIAL HYPERTENSION: Primary | Chronic | ICD-10-CM

## 2020-03-11 PROCEDURE — 83036 HEMOGLOBIN GLYCOSYLATED A1C: CPT | Performed by: FAMILY MEDICINE

## 2020-03-11 PROCEDURE — 4010F ACE/ARB THERAPY RXD/TAKEN: CPT | Performed by: FAMILY MEDICINE

## 2020-03-11 PROCEDURE — 3008F BODY MASS INDEX DOCD: CPT | Performed by: FAMILY MEDICINE

## 2020-03-11 PROCEDURE — 2022F DILAT RTA XM EVC RTNOPTHY: CPT | Performed by: FAMILY MEDICINE

## 2020-03-11 PROCEDURE — 3077F SYST BP >= 140 MM HG: CPT | Performed by: FAMILY MEDICINE

## 2020-03-11 PROCEDURE — 3044F HG A1C LEVEL LT 7.0%: CPT | Performed by: FAMILY MEDICINE

## 2020-03-11 PROCEDURE — 99214 OFFICE O/P EST MOD 30 MIN: CPT | Performed by: FAMILY MEDICINE

## 2020-03-11 PROCEDURE — 3080F DIAST BP >= 90 MM HG: CPT | Performed by: FAMILY MEDICINE

## 2020-03-11 RX ORDER — LISINOPRIL 10 MG/1
10 TABLET ORAL DAILY
Qty: 30 TABLET | Refills: 1 | Status: SHIPPED | OUTPATIENT
Start: 2020-03-11 | End: 2021-08-28 | Stop reason: SDUPTHER

## 2020-03-11 RX ORDER — BACLOFEN 10 MG/1
TABLET ORAL
Qty: 30 TABLET | Refills: 1 | Status: SHIPPED | OUTPATIENT
Start: 2020-03-11 | End: 2020-04-20

## 2020-03-11 RX ORDER — ATORVASTATIN CALCIUM 10 MG/1
10 TABLET, FILM COATED ORAL DAILY
Qty: 30 TABLET | Refills: 1 | Status: SHIPPED | OUTPATIENT
Start: 2020-03-11 | End: 2020-04-10 | Stop reason: SDUPTHER

## 2020-03-11 NOTE — PROGRESS NOTES
Chief Complaint   Patient presents with    Diabetes     Multiple chronic problems        Patient ID: Marah Wolf is a 44 y o  male  HPI  Pt is seeing for f/u DM2, HTN, HLD, h/o anemia, R sided weakness after brain abscesses drainage  -  Stopped all meds except for Baclofen 10 mg in am and Lantus     The following portions of the patient's history were reviewed and updated as appropriate: allergies, current medications, past family history, past medical history, past social history, past surgical history and problem list     Review of Systems   Constitutional: Negative  Respiratory: Negative  Cardiovascular: Negative  Gastrointestinal: Negative  Genitourinary: Negative  Musculoskeletal: Negative  Skin: Negative  Neurological: Positive for speech difficulty (minimal residual ) and weakness  + muscle spasticity in am    Hematological: Negative  Psychiatric/Behavioral: Negative  Negative for dysphoric mood  The patient is not nervous/anxious  Current Outpatient Medications   Medication Sig Dispense Refill    atorvastatin (LIPITOR) 10 mg tablet Take 10 mg by mouth daily      baclofen 10 mg tablet TAKE ONE TABLET DAILY 30 tablet 1    insulin glargine (BASAGLAR KWIKPEN) 100 units/mL injection pen Inject 20 Units under the skin daily 2 pen 6    Insulin Pen Needle (PEN NEEDLES) 32G X 4 MM MISC by Does not apply route daily 100 each 0    lisinopril (ZESTRIL) 10 mg tablet Take 10 mg by mouth daily      Melatonin 5 MG TABS Take by mouth       No current facility-administered medications for this visit  Objective:    /100 (BP Location: Right arm, Patient Position: Sitting, Cuff Size: Standard)   Pulse 104   Temp 97 8 °F (36 6 °C) (Tympanic)   Resp 18   Ht 5' 9" (1 753 m)   Wt 75 3 kg (166 lb)   BMI 24 51 kg/m²        Physical Exam   Constitutional: He is oriented to person, place, and time  No distress  Cardiovascular: Normal rate     Pulmonary/Chest: Breath sounds normal  No respiratory distress  He has no wheezes  He has no rales  Musculoskeletal: Normal range of motion  He exhibits no edema, tenderness or deformity  Using a cane    Neurological: He is alert and oriented to person, place, and time  R side weakness    Skin: No pallor  Psychiatric: He has a normal mood and affect  Judgment normal          Labs in chart were reviewed   AIC 5  3     Assessment/Plan:         Diagnoses and all orders for this visit:    Benign essential hypertension  -     Comprehensive metabolic panel; Future  -     Lipid panel; Future  -     Comprehensive metabolic panel  -     Lipid panel  -     lisinopril (ZESTRIL) 10 mg tablet; Take 1 tablet (10 mg total) by mouth daily    Spasticity  -     baclofen 10 mg tablet; TAKE ONE TABLET DAILY    Type 2 diabetes mellitus without complication, with long-term current use of insulin ([facility])  -     Cancel: Microalbumin / creatinine urine ratio; Future  -     POCT hemoglobin A1c  -     Microalbumin / creatinine urine ratio; Future  -     Microalbumin / creatinine urine ratio  -     atorvastatin (LIPITOR) 10 mg tablet; Take 1 tablet (10 mg total) by mouth daily    Right sided weakness    Iron deficiency anemia, unspecified iron deficiency anemia type  -     CBC; Future  -     CBC    Hyperlipidemia, unspecified hyperlipidemia type  -     atorvastatin (LIPITOR) 10 mg tablet;  Take 1 tablet (10 mg total) by mouth daily    Noncompliance        Rey of non compliance with meds including death d/w pt  -  Was advised to restart meds     rto in 1 m                     Hemal Rodriguez MD

## 2020-03-12 ENCOUNTER — APPOINTMENT (OUTPATIENT)
Dept: OCCUPATIONAL THERAPY | Facility: CLINIC | Age: 50
End: 2020-03-12
Payer: COMMERCIAL

## 2020-03-12 PROBLEM — Z91.199 NONCOMPLIANCE: Status: ACTIVE | Noted: 2020-03-12

## 2020-03-12 PROBLEM — Z91.19 NONCOMPLIANCE: Status: ACTIVE | Noted: 2020-03-12

## 2020-03-12 LAB — SL AMB POCT HEMOGLOBIN AIC: 5.3 (ref ?–6.5)

## 2020-03-12 PROCEDURE — 3044F HG A1C LEVEL LT 7.0%: CPT | Performed by: FAMILY MEDICINE

## 2020-03-17 ENCOUNTER — APPOINTMENT (OUTPATIENT)
Dept: OCCUPATIONAL THERAPY | Facility: CLINIC | Age: 50
End: 2020-03-17
Payer: COMMERCIAL

## 2020-03-19 ENCOUNTER — APPOINTMENT (OUTPATIENT)
Dept: OCCUPATIONAL THERAPY | Facility: CLINIC | Age: 50
End: 2020-03-19
Payer: COMMERCIAL

## 2020-03-25 ENCOUNTER — TELEPHONE (OUTPATIENT)
Dept: OCCUPATIONAL THERAPY | Facility: CLINIC | Age: 50
End: 2020-03-25

## 2020-03-31 ENCOUNTER — APPOINTMENT (OUTPATIENT)
Dept: OCCUPATIONAL THERAPY | Facility: CLINIC | Age: 50
End: 2020-03-31
Payer: COMMERCIAL

## 2020-04-10 ENCOUNTER — TELEMEDICINE (OUTPATIENT)
Dept: FAMILY MEDICINE CLINIC | Facility: CLINIC | Age: 50
End: 2020-04-10
Payer: COMMERCIAL

## 2020-04-10 VITALS — DIASTOLIC BLOOD PRESSURE: 105 MMHG | SYSTOLIC BLOOD PRESSURE: 170 MMHG

## 2020-04-10 DIAGNOSIS — Z79.4 TYPE 2 DIABETES MELLITUS WITHOUT COMPLICATION, WITH LONG-TERM CURRENT USE OF INSULIN (HCC): ICD-10-CM

## 2020-04-10 DIAGNOSIS — R25.2 SPASTICITY: ICD-10-CM

## 2020-04-10 DIAGNOSIS — E11.9 TYPE 2 DIABETES MELLITUS WITHOUT COMPLICATION, WITH LONG-TERM CURRENT USE OF INSULIN (HCC): ICD-10-CM

## 2020-04-10 DIAGNOSIS — I10 BENIGN ESSENTIAL HYPERTENSION: Primary | Chronic | ICD-10-CM

## 2020-04-10 DIAGNOSIS — E78.5 HYPERLIPIDEMIA, UNSPECIFIED HYPERLIPIDEMIA TYPE: Chronic | ICD-10-CM

## 2020-04-10 PROCEDURE — 99213 OFFICE O/P EST LOW 20 MIN: CPT | Performed by: FAMILY MEDICINE

## 2020-04-10 PROCEDURE — 3077F SYST BP >= 140 MM HG: CPT | Performed by: FAMILY MEDICINE

## 2020-04-10 PROCEDURE — 3080F DIAST BP >= 90 MM HG: CPT | Performed by: FAMILY MEDICINE

## 2020-04-10 RX ORDER — ATORVASTATIN CALCIUM 10 MG/1
10 TABLET, FILM COATED ORAL DAILY
Qty: 30 TABLET | Refills: 1 | Status: SHIPPED | OUTPATIENT
Start: 2020-04-10 | End: 2021-08-28 | Stop reason: SDUPTHER

## 2020-04-10 RX ORDER — AMLODIPINE BESYLATE 5 MG/1
5 TABLET ORAL DAILY
Qty: 30 TABLET | Refills: 6 | Status: SHIPPED | OUTPATIENT
Start: 2020-04-10 | End: 2021-08-28 | Stop reason: SDUPTHER

## 2020-04-20 DIAGNOSIS — R25.2 SPASTICITY: ICD-10-CM

## 2020-04-20 RX ORDER — BACLOFEN 10 MG/1
TABLET ORAL
Qty: 30 TABLET | Refills: 1 | Status: SHIPPED | OUTPATIENT
Start: 2020-04-20 | End: 2020-04-30 | Stop reason: SDUPTHER

## 2020-04-30 DIAGNOSIS — Z79.4 TYPE 2 DIABETES MELLITUS WITHOUT COMPLICATION, WITH LONG-TERM CURRENT USE OF INSULIN (HCC): ICD-10-CM

## 2020-04-30 DIAGNOSIS — E11.9 TYPE 2 DIABETES MELLITUS WITHOUT COMPLICATION, WITH LONG-TERM CURRENT USE OF INSULIN (HCC): ICD-10-CM

## 2020-04-30 DIAGNOSIS — R25.2 SPASTICITY: ICD-10-CM

## 2020-05-01 RX ORDER — BACLOFEN 10 MG/1
TABLET ORAL
Qty: 30 TABLET | Refills: 1 | Status: SHIPPED | OUTPATIENT
Start: 2020-05-01 | End: 2020-08-05 | Stop reason: SDUPTHER

## 2020-05-01 RX ORDER — PEN NEEDLE, DIABETIC 30 GX3/16"
NEEDLE, DISPOSABLE MISCELLANEOUS DAILY
Qty: 100 EACH | Refills: 0 | Status: SHIPPED | OUTPATIENT
Start: 2020-05-01 | End: 2020-08-06 | Stop reason: SDUPTHER

## 2020-08-05 DIAGNOSIS — R25.2 SPASTICITY: ICD-10-CM

## 2020-08-05 RX ORDER — BACLOFEN 10 MG/1
TABLET ORAL
Qty: 30 TABLET | Refills: 1 | Status: SHIPPED | OUTPATIENT
Start: 2020-08-05 | End: 2020-10-06

## 2020-08-06 DIAGNOSIS — Z79.4 TYPE 2 DIABETES MELLITUS WITHOUT COMPLICATION, WITH LONG-TERM CURRENT USE OF INSULIN (HCC): ICD-10-CM

## 2020-08-06 DIAGNOSIS — E11.9 TYPE 2 DIABETES MELLITUS WITHOUT COMPLICATION, WITH LONG-TERM CURRENT USE OF INSULIN (HCC): ICD-10-CM

## 2020-08-06 RX ORDER — PEN NEEDLE, DIABETIC 32GX 5/32"
NEEDLE, DISPOSABLE MISCELLANEOUS
Qty: 100 EACH | Refills: 0 | Status: SHIPPED | OUTPATIENT
Start: 2020-08-06 | End: 2020-11-04

## 2020-10-06 DIAGNOSIS — R25.2 SPASTICITY: ICD-10-CM

## 2020-10-06 RX ORDER — BACLOFEN 10 MG/1
TABLET ORAL
Qty: 30 TABLET | Refills: 0 | Status: SHIPPED | OUTPATIENT
Start: 2020-10-06 | End: 2020-11-04

## 2020-11-04 DIAGNOSIS — Z79.4 TYPE 2 DIABETES MELLITUS WITHOUT COMPLICATION, WITH LONG-TERM CURRENT USE OF INSULIN (HCC): ICD-10-CM

## 2020-11-04 DIAGNOSIS — E11.9 TYPE 2 DIABETES MELLITUS WITHOUT COMPLICATION, WITH LONG-TERM CURRENT USE OF INSULIN (HCC): ICD-10-CM

## 2020-11-04 DIAGNOSIS — R25.2 SPASTICITY: ICD-10-CM

## 2020-11-04 RX ORDER — PEN NEEDLE, DIABETIC 32GX 5/32"
NEEDLE, DISPOSABLE MISCELLANEOUS
Qty: 100 EACH | Refills: 0 | Status: SHIPPED | OUTPATIENT
Start: 2020-11-04 | End: 2021-03-08

## 2020-11-04 RX ORDER — BACLOFEN 10 MG/1
TABLET ORAL
Qty: 30 TABLET | Refills: 0 | Status: SHIPPED | OUTPATIENT
Start: 2020-11-04 | End: 2020-12-02

## 2020-12-02 DIAGNOSIS — R25.2 SPASTICITY: ICD-10-CM

## 2020-12-02 RX ORDER — BACLOFEN 10 MG/1
TABLET ORAL
Qty: 30 TABLET | Refills: 0 | Status: SHIPPED | OUTPATIENT
Start: 2020-12-02 | End: 2020-12-29

## 2020-12-29 DIAGNOSIS — R25.2 SPASTICITY: ICD-10-CM

## 2020-12-29 RX ORDER — BACLOFEN 10 MG/1
TABLET ORAL
Qty: 10 TABLET | Refills: 0 | Status: SHIPPED | OUTPATIENT
Start: 2020-12-29 | End: 2021-02-26

## 2021-01-08 ENCOUNTER — TELEMEDICINE (OUTPATIENT)
Dept: FAMILY MEDICINE CLINIC | Facility: CLINIC | Age: 51
End: 2021-01-08
Payer: COMMERCIAL

## 2021-01-08 DIAGNOSIS — R19.7 DIARRHEA, UNSPECIFIED TYPE: Primary | ICD-10-CM

## 2021-01-08 DIAGNOSIS — R53.83 FATIGUE, UNSPECIFIED TYPE: ICD-10-CM

## 2021-01-08 PROCEDURE — 99212 OFFICE O/P EST SF 10 MIN: CPT | Performed by: FAMILY MEDICINE

## 2021-01-08 NOTE — PROGRESS NOTES
COVID-19 Virtual Visit     Assessment/Plan:    Problem List Items Addressed This Visit     None      Visit Diagnoses     Diarrhea, unspecified type    -  Primary    Relevant Orders    Novel Coronavirus (COVID-19), PCR [facility] - Collected at [facility]    Fatigue, unspecified type        Relevant Orders    Novel Coronavirus (COVID-19), PCR [facility] - Collected at [facility]         Disposition:     I referred patient to one of our centralized sites for a COVID-19 swab  I have spent 5 minutes directly with the patient  Encounter provider Tommie Ortiz MD    Provider located at [address] [place], [place] [id number]    Recent Visits  No visits were found meeting these conditions  Showing recent visits within past 7 days and meeting all other requirements     Today's Visits  Date Type Provider Dept   01/08/21 Telemedicine Tommie Ortiz MD [address] [place] [address] today's visits and meeting all other requirements     Future Appointments  No visits were found meeting these conditions  Showing future appointments within next 150 days and meeting all other requirements      This virtual check-in was done via [employer] and patient was informed that this is a secure, HIPAA-compliant platform  He agrees to proceed  Patient agrees to participate in a virtual check in via telephone or video visit instead of presenting to the office to address urgent/immediate medical needs  Patient is aware this is a billable service  After connecting through [facility], the patient was identified by name and date of birth  Alivia Bermudez was informed that this was a telemedicine visit and that the exam was being conducted confidentially over secure lines  My office door was closed  No one else was in the room  Alivia Bermudez acknowledged consent and understanding of privacy and security of the telemedicine visit   I informed the patient that I have reviewed his record in Epic and presented the opportunity for him to ask any questions regarding the visit today  The patient agreed to participate  Subjective:   Genaro Vitale is a 44 y o  male who is concerned about COVID-19  Patient's symptoms include chills, fatigue, malaise, nasal congestion, abdominal pain, diarrhea and headache  Patient denies fever, rhinorrhea, sore throat, anosmia, loss of taste, cough, shortness of breath, chest tightness, nausea, vomiting and myalgias  Date of symptom onset: 1/4/2021    Exposure:   Contact with a person who is under investigation (PUI) for or who is positive for COVID-19 within the last 14 days?: No    Hospitalized recently for fever and/or lower respiratory symptoms?: No      Currently a healthcare worker that is involved in direct patient care?: No      Works in a special setting where the risk of COVID-19 transmission may be high? (this may include long-term care, correctional and [facility] facilities; homeless shelters; assisted-living facilities and group homes ): No      Resident in a special setting where the risk of COVID-19 transmission may be high? (this may include long-term care, correctional and [facility] facilities; homeless shelters; assisted-living facilities and group homes ): No      No results found for: [facility] [address] [place] [address], [address] [place] [address],[address] [address]  Past Medical History:   Diagnosis Date    Abdominal cyst     Anemia     Hx     Chronic pain disorder     abdominal    Diabetes mellitus ([address] )     Diverticulitis     GI bleed     History of transfusion 2016    5 units    Lightheadedness     Multiple lesions on computed tomography of brain and spine     Spleen laceration      Past Surgical History:   Procedure Laterality Date    ABDOMINAL SURGERY  2016    lap removal of mass post spleen injury    COLONOSCOPY N/A 5/24/2017    Procedure: COLONOSCOPY;  Surgeon: Du Smith MD;  [facility];   Service:  EGD AND COLONOSCOPY N/A 3/15/2017    Procedure: EGD AND COLONOSCOPY;  Surgeon: Bozena Ferreira MD;  Location: [place] [address] GI LAB; Service:     OTHER SURGICAL HISTORY      per [employer]-had spleen surgery; no other details    UPPER GASTROINTESTINAL ENDOSCOPY      for gastric bleeding     Current Outpatient Medications   Medication Sig Dispense Refill    amLODIPine (NORVASC) 5 mg tablet Take 1 tablet (5 mg total) by mouth daily 30 tablet 6    atorvastatin (LIPITOR) 10 mg tablet Take 1 tablet (10 mg total) by mouth daily 30 tablet 1    baclofen 10 mg tablet take 1 tablet by mouth once daily 10 tablet 0    BD Pen Needle Beba 2nd Gen 32G X 4 MM MISC use 1 PEN NEEDLE to inject MEDICATION subcutaneously four times a day 100 each 0    insulin glargine (Basaglar KwikPen) 100 units/mL injection pen Inject 20 Units under the skin daily 2 pen 5    lisinopril (ZESTRIL) 10 mg tablet Take 1 tablet (10 mg total) by mouth daily 30 tablet 1    Melatonin 5 MG TABS Take by mouth       No current facility-administered medications for this visit  No Known Allergies    Review of Systems   Constitutional: Positive for chills and fatigue  Negative for fever  HENT: Positive for congestion  Negative for rhinorrhea and sore throat  Respiratory: Negative for cough, chest tightness and shortness of breath  Gastrointestinal: Positive for abdominal pain and diarrhea  Negative for nausea and vomiting  Musculoskeletal: Negative for myalgias  Neurological: Positive for headaches  Objective: There were no vitals filed for this visit  Physical Exam  VIRTUAL VISIT DISCLAIMER    Hellen Gaspar acknowledges that he has consented to an online visit or consultation   He understands that the online visit is based solely on information provided by him, and that, in the absence of a face-to-face physical evaluation by the physician, the diagnosis he receives is both limited and provisional in terms of accuracy and completeness  This is not intended to replace a full medical face-to-face evaluation by the physician  Sabrina Sánchez understands and accepts these terms

## 2021-01-27 ENCOUNTER — TELEMEDICINE (OUTPATIENT)
Dept: FAMILY MEDICINE CLINIC | Facility: CLINIC | Age: 51
End: 2021-01-27

## 2021-01-27 ENCOUNTER — TELEPHONE (OUTPATIENT)
Dept: FAMILY MEDICINE CLINIC | Facility: CLINIC | Age: 51
End: 2021-01-27

## 2021-01-27 DIAGNOSIS — Z91.19 NON-COMPLIANCE: Primary | ICD-10-CM

## 2021-01-28 DIAGNOSIS — R25.2 SPASTICITY: ICD-10-CM

## 2021-01-28 RX ORDER — BACLOFEN 10 MG/1
TABLET ORAL
Qty: 10 TABLET | Refills: 0 | OUTPATIENT
Start: 2021-01-28

## 2021-02-18 NOTE — TELEPHONE ENCOUNTER
Left another message for patient to call back to schedule diabetes check also letter was mailed 2 weeks ago to patient

## 2021-02-26 DIAGNOSIS — R25.2 SPASTICITY: ICD-10-CM

## 2021-02-26 RX ORDER — BACLOFEN 10 MG/1
TABLET ORAL
Qty: 10 TABLET | Refills: 0 | Status: SHIPPED | OUTPATIENT
Start: 2021-02-26 | End: 2021-08-28 | Stop reason: SDUPTHER

## 2021-03-06 DIAGNOSIS — E11.9 TYPE 2 DIABETES MELLITUS WITHOUT COMPLICATION, WITH LONG-TERM CURRENT USE OF INSULIN (HCC): ICD-10-CM

## 2021-03-06 DIAGNOSIS — Z79.4 TYPE 2 DIABETES MELLITUS WITHOUT COMPLICATION, WITH LONG-TERM CURRENT USE OF INSULIN (HCC): ICD-10-CM

## 2021-03-08 RX ORDER — PEN NEEDLE, DIABETIC 32GX 5/32"
NEEDLE, DISPOSABLE MISCELLANEOUS
Qty: 100 EACH | Refills: 0 | Status: SHIPPED | OUTPATIENT
Start: 2021-03-08 | End: 2021-07-06

## 2021-04-05 DIAGNOSIS — R25.2 SPASTICITY: ICD-10-CM

## 2021-04-06 RX ORDER — BACLOFEN 10 MG/1
TABLET ORAL
Qty: 10 TABLET | Refills: 0 | OUTPATIENT
Start: 2021-04-06

## 2021-05-03 ENCOUNTER — TELEPHONE (OUTPATIENT)
Dept: FAMILY MEDICINE CLINIC | Facility: CLINIC | Age: 51
End: 2021-05-03

## 2021-05-03 DIAGNOSIS — Z79.4 TYPE 2 DIABETES MELLITUS WITHOUT COMPLICATION, WITH LONG-TERM CURRENT USE OF INSULIN (HCC): ICD-10-CM

## 2021-05-03 DIAGNOSIS — E11.9 TYPE 2 DIABETES MELLITUS WITHOUT COMPLICATION, WITH LONG-TERM CURRENT USE OF INSULIN (HCC): ICD-10-CM

## 2021-05-03 RX ORDER — INSULIN GLARGINE 100 [IU]/ML
INJECTION, SOLUTION SUBCUTANEOUS
Qty: 6 ML | Refills: 0 | Status: SHIPPED | OUTPATIENT
Start: 2021-05-03 | End: 2021-06-02

## 2021-05-20 ENCOUNTER — TELEPHONE (OUTPATIENT)
Dept: FAMILY MEDICINE CLINIC | Facility: CLINIC | Age: 51
End: 2021-05-20

## 2021-06-02 DIAGNOSIS — E11.9 TYPE 2 DIABETES MELLITUS WITHOUT COMPLICATION, WITH LONG-TERM CURRENT USE OF INSULIN (HCC): ICD-10-CM

## 2021-06-02 DIAGNOSIS — Z79.4 TYPE 2 DIABETES MELLITUS WITHOUT COMPLICATION, WITH LONG-TERM CURRENT USE OF INSULIN (HCC): ICD-10-CM

## 2021-06-02 DIAGNOSIS — R25.2 SPASTICITY: ICD-10-CM

## 2021-06-02 RX ORDER — BACLOFEN 10 MG/1
TABLET ORAL
Qty: 10 TABLET | Refills: 0 | OUTPATIENT
Start: 2021-06-02

## 2021-06-02 RX ORDER — INSULIN GLARGINE 100 [IU]/ML
INJECTION, SOLUTION SUBCUTANEOUS
Qty: 6 ML | Refills: 0 | Status: SHIPPED | OUTPATIENT
Start: 2021-06-02 | End: 2021-07-03 | Stop reason: SDUPTHER

## 2021-06-09 ENCOUNTER — TELEPHONE (OUTPATIENT)
Dept: FAMILY MEDICINE CLINIC | Facility: CLINIC | Age: 51
End: 2021-06-09

## 2021-06-09 NOTE — TELEPHONE ENCOUNTER
Patient has not been seen since 3/2020 in office and has "no showed" several appointments - please reach out to patient as he is overdue for DM check   Thank you

## 2021-07-03 ENCOUNTER — TELEPHONE (OUTPATIENT)
Dept: FAMILY MEDICINE CLINIC | Facility: CLINIC | Age: 51
End: 2021-07-03

## 2021-07-03 DIAGNOSIS — Z79.4 TYPE 2 DIABETES MELLITUS WITHOUT COMPLICATION, WITH LONG-TERM CURRENT USE OF INSULIN (HCC): ICD-10-CM

## 2021-07-03 DIAGNOSIS — E11.9 TYPE 2 DIABETES MELLITUS WITHOUT COMPLICATION, WITH LONG-TERM CURRENT USE OF INSULIN (HCC): ICD-10-CM

## 2021-07-03 RX ORDER — INSULIN GLARGINE 100 [IU]/ML
20 INJECTION, SOLUTION SUBCUTANEOUS DAILY
Qty: 6 ML | Refills: 0 | OUTPATIENT
Start: 2021-07-03

## 2021-07-03 NOTE — TELEPHONE ENCOUNTER
Dr Elise Rios,    Patient did schedule an appointment for 7/26  Patient states that he is out of his insulin  Explained to patient that we have been trying to get a hold of him and even sent him a letter to schedule appointment  Advised that I would sent the message to doctor  Pharmacy is [facility] in [place]

## 2021-07-03 NOTE — TELEPHONE ENCOUNTER
Left message to return call for appt  for pt in order for med to be renewed he needs to be seen by doctor   He has not been seen by doctor physically  In well over 6 months

## 2021-07-06 RX ORDER — PEN NEEDLE, DIABETIC 32GX 5/32"
NEEDLE, DISPOSABLE MISCELLANEOUS
Qty: 100 EACH | Refills: 0 | Status: SHIPPED | OUTPATIENT
Start: 2021-07-06 | End: 2021-10-15

## 2021-07-16 ENCOUNTER — RA CDI HCC (OUTPATIENT)
Dept: OTHER | Facility: HOSPITAL | Age: 51
End: 2021-07-16

## 2021-07-16 NOTE — PROGRESS NOTES
[facility]  coding opportunities             Chart reviewed, (number of) suggestions sent to provider: 1                  Patients insurance company: [employer] (Medicare and Commercial for [employer] and [employer])     Visit status: Patient canceled the appointment     Provider never responded to [employer]  coding request     [facility]  coding opportunities             Chart reviewed, (number of) suggestions sent to provider: 1      [id number]  Type 2 diabetes mellitus without complication, with long-term current use of insulin ([facility] [employer] )     Please assess at you upcoming visit on 7/26/2021            Patients insurance company: [employer] (Medicare and Commercial for [employer] and [employer])

## 2021-07-26 ENCOUNTER — TELEPHONE (OUTPATIENT)
Dept: FAMILY MEDICINE CLINIC | Facility: CLINIC | Age: 51
End: 2021-07-26

## 2021-07-26 NOTE — TELEPHONE ENCOUNTER
LMOM for patient to call and reschedule today's missed appointment  Per Dr Missy Gomez patient needs DM appointment in order to refill future meds

## 2021-08-03 DIAGNOSIS — Z79.4 TYPE 2 DIABETES MELLITUS WITHOUT COMPLICATION, WITH LONG-TERM CURRENT USE OF INSULIN (HCC): ICD-10-CM

## 2021-08-03 DIAGNOSIS — E11.9 TYPE 2 DIABETES MELLITUS WITHOUT COMPLICATION, WITH LONG-TERM CURRENT USE OF INSULIN (HCC): ICD-10-CM

## 2021-08-04 RX ORDER — INSULIN GLARGINE 100 [IU]/ML
20 INJECTION, SOLUTION SUBCUTANEOUS DAILY
Qty: 6 ML | Refills: 0 | Status: SHIPPED | OUTPATIENT
Start: 2021-08-04 | End: 2021-08-28 | Stop reason: SDUPTHER

## 2021-08-28 ENCOUNTER — OFFICE VISIT (OUTPATIENT)
Dept: FAMILY MEDICINE CLINIC | Facility: CLINIC | Age: 51
End: 2021-08-28
Payer: COMMERCIAL

## 2021-08-28 VITALS
SYSTOLIC BLOOD PRESSURE: 180 MMHG | BODY MASS INDEX: 22.81 KG/M2 | DIASTOLIC BLOOD PRESSURE: 100 MMHG | OXYGEN SATURATION: 99 % | TEMPERATURE: 97.9 F | HEART RATE: 103 BPM | RESPIRATION RATE: 17 BRPM | HEIGHT: 69 IN | WEIGHT: 154 LBS

## 2021-08-28 DIAGNOSIS — R25.2 SPASTICITY: ICD-10-CM

## 2021-08-28 DIAGNOSIS — R41.3 MEMORY DIFFICULTY: ICD-10-CM

## 2021-08-28 DIAGNOSIS — Z91.19 NONCOMPLIANCE: ICD-10-CM

## 2021-08-28 DIAGNOSIS — Z79.4 TYPE 2 DIABETES MELLITUS WITHOUT COMPLICATION, WITH LONG-TERM CURRENT USE OF INSULIN (HCC): Primary | ICD-10-CM

## 2021-08-28 DIAGNOSIS — E78.5 HYPERLIPIDEMIA, UNSPECIFIED HYPERLIPIDEMIA TYPE: Chronic | ICD-10-CM

## 2021-08-28 DIAGNOSIS — I10 BENIGN ESSENTIAL HYPERTENSION: Chronic | ICD-10-CM

## 2021-08-28 DIAGNOSIS — E11.9 TYPE 2 DIABETES MELLITUS WITHOUT COMPLICATION, WITH LONG-TERM CURRENT USE OF INSULIN (HCC): Primary | ICD-10-CM

## 2021-08-28 DIAGNOSIS — R53.1 RIGHT SIDED WEAKNESS: ICD-10-CM

## 2021-08-28 PROBLEM — D50.9 IRON DEFICIENCY ANEMIA: Chronic | Status: RESOLVED | Noted: 2017-04-14 | Resolved: 2021-08-28

## 2021-08-28 LAB — SL AMB POCT HEMOGLOBIN AIC: 4.6 (ref ?–6.5)

## 2021-08-28 PROCEDURE — 3044F HG A1C LEVEL LT 7.0%: CPT | Performed by: FAMILY MEDICINE

## 2021-08-28 PROCEDURE — 4010F ACE/ARB THERAPY RXD/TAKEN: CPT | Performed by: FAMILY MEDICINE

## 2021-08-28 PROCEDURE — 99214 OFFICE O/P EST MOD 30 MIN: CPT | Performed by: FAMILY MEDICINE

## 2021-08-28 PROCEDURE — 83036 HEMOGLOBIN GLYCOSYLATED A1C: CPT | Performed by: FAMILY MEDICINE

## 2021-08-28 PROCEDURE — 3725F SCREEN DEPRESSION PERFORMED: CPT | Performed by: FAMILY MEDICINE

## 2021-08-28 PROCEDURE — 3008F BODY MASS INDEX DOCD: CPT | Performed by: FAMILY MEDICINE

## 2021-08-28 RX ORDER — LISINOPRIL 10 MG/1
10 TABLET ORAL DAILY
Qty: 30 TABLET | Refills: 5 | Status: SHIPPED | OUTPATIENT
Start: 2021-08-28 | End: 2022-07-12

## 2021-08-28 RX ORDER — DONEPEZIL HYDROCHLORIDE 5 MG/1
5 TABLET, FILM COATED ORAL
Qty: 30 TABLET | Refills: 5 | Status: SHIPPED | OUTPATIENT
Start: 2021-08-28 | End: 2022-07-12

## 2021-08-28 RX ORDER — ATORVASTATIN CALCIUM 10 MG/1
10 TABLET, FILM COATED ORAL DAILY
Qty: 30 TABLET | Refills: 6 | Status: SHIPPED | OUTPATIENT
Start: 2021-08-28 | End: 2022-07-12

## 2021-08-28 RX ORDER — BACLOFEN 10 MG/1
5 TABLET ORAL 2 TIMES DAILY
Qty: 60 TABLET | Refills: 6 | Status: SHIPPED | OUTPATIENT
Start: 2021-08-28 | End: 2022-01-31 | Stop reason: SDUPTHER

## 2021-08-28 RX ORDER — INSULIN GLARGINE 100 [IU]/ML
10 INJECTION, SOLUTION SUBCUTANEOUS DAILY
Qty: 3 ML | Refills: 6 | Status: ON HOLD | OUTPATIENT
Start: 2021-08-28 | End: 2022-07-12

## 2021-08-28 RX ORDER — AMLODIPINE BESYLATE 5 MG/1
5 TABLET ORAL DAILY
Qty: 30 TABLET | Refills: 6 | Status: SHIPPED | OUTPATIENT
Start: 2021-08-28 | End: 2022-07-12

## 2021-08-28 NOTE — PROGRESS NOTES
Chief Complaint   Patient presents with    Diabetes    Follow-up     chronic conditions         Patient ID: Reema Evans is a 46 y o  male  HPI  Pt is seeing for f/u DM2, HTN, HLD, residual R side weakness with spasticity after craniotomy for multiple brain abscesses  -  Non compliant -  Last OV 18 m ago -  Stopped all meds except for Tauna Rily - does not check BP or BG at home     The following portions of the patient's history were reviewed and updated as appropriate: allergies, current medications, past family history, past medical history, past social history, past surgical history and problem list     Review of Systems   Constitutional: Negative for fatigue, fever and unexpected weight change  HENT: Negative for congestion, ear discharge, ear pain, hearing loss, rhinorrhea, sinus pressure, sore throat and trouble swallowing  Eyes: Negative  Respiratory: Negative  Cardiovascular: Negative  Gastrointestinal: Negative  Endocrine: Negative  Genitourinary: Negative  Musculoskeletal: Positive for gait problem  Skin: Negative  Neurological: Positive for weakness (R side )  Negative for dizziness, syncope, speech difficulty, light-headedness and numbness  + spasticity    Hematological: Negative  Psychiatric/Behavioral: Positive for decreased concentration (memory issues )  Negative for sleep disturbance and suicidal ideas  The patient is not nervous/anxious and is not hyperactive          Current Outpatient Medications   Medication Sig Dispense Refill    Basaglar KwikPen 100 units/mL injection pen INJECT 20 UNITS UNDER THE SKIN DAILY 6 mL 0    BD Pen Needle Beba 2nd Gen 32G X 4 MM MISC use 1 PEN NEEDLE to inject MEDICATION subcutaneously four times a day 100 each 0    Melatonin 5 MG TABS Take by mouth      amLODIPine (NORVASC) 5 mg tablet Take 1 tablet (5 mg total) by mouth daily 30 tablet 6    atorvastatin (LIPITOR) 10 mg tablet Take 1 tablet (10 mg total) by mouth daily 30 tablet 6    baclofen 10 mg tablet Take 0 5 tablets (5 mg total) by mouth 2 (two) times a day 60 tablet 6           lisinopril (ZESTRIL) 10 mg tablet Take 1 tablet (10 mg total) by mouth daily 30 tablet 5     No current facility-administered medications for this visit  Objective:    BP (!) 180/100 (BP Location: Right arm, Patient Position: Sitting, Cuff Size: Standard)   Pulse 103   Temp 97 9 °F (36 6 °C) (Temporal)   Resp 17   Ht 5' 9" (1 753 m)   Wt 69 9 kg (154 lb)   SpO2 99%   BMI 22 74 kg/m²        Physical Exam  Constitutional:       General: He is not in acute distress  Appearance: He is not ill-appearing  Cardiovascular:      Rate and Rhythm: Normal rate and regular rhythm  Heart sounds: No murmur heard  Pulmonary:      Effort: Pulmonary effort is normal  No respiratory distress  Breath sounds: Decreased breath sounds present  No wheezing, rhonchi or rales  Abdominal:      Palpations: Abdomen is soft  Tenderness: There is no abdominal tenderness  Musculoskeletal:      Right lower leg: No edema  Left lower leg: No edema  Neurological:      Mental Status: He is alert and oriented to person, place, and time  Motor: Weakness (R side) present  Gait: Gait abnormal (uisng a cane )  Psychiatric:         Mood and Affect: Mood normal          Thought Content: Thought content normal          Judgment: Judgment normal            Labs in chart were reviewed  Recent Results (from the past 672 hour(s))   POCT hemoglobin A1c    Collection Time: 08/28/21  8:33 AM   Result Value Ref Range    Hemoglobin A1C 4 6 6 5       Assessment/Plan:         Diagnoses and all orders for this visit:    Type 2 diabetes mellitus without complication, with long-term current use of insulin ([facility])  -     POCT hemoglobin A1c  -     Comprehensive metabolic panel; Future  -     Microalbumin / creatinine urine ratio;  Future  -     Comprehensive metabolic panel  - Microalbumin / creatinine urine ratio  -     atorvastatin (LIPITOR) 10 mg tablet; Take 1 tablet (10 mg total) by mouth daily  -   Will decrease from 20 U to  insulin glargine (Basaglar KwikPen) 100 units/mL injection pen; Inject 10 Units under the skin daily  Declined foot exam   Noncompliance    Hyperlipidemia, unspecified hyperlipidemia type  -     Lipid panel; Future  -     Lipid panel  -    Will restart meds atorvastatin (LIPITOR) 10 mg tablet; Take 1 tablet (10 mg total) by mouth daily    Spasticity  -   Will restart  baclofen 10 mg tablet; Take 0 5 tablets (5 mg total) by mouth 2 (two) times a day  -     Ambulatory referral to Physical Therapy; Future    Benign essential hypertension  -     TSH, 3rd generation; Future  -     TSH, 3rd generation  Will restart meds  -     amLODIPine (NORVASC) 5 mg tablet; Take 1 tablet (5 mg total) by mouth daily  -     lisinopril (ZESTRIL) 10 mg tablet; Take 1 tablet (10 mg total) by mouth daily  Pt to call in 2 wks with BP log   Right sided weakness  -     Ambulatory referral to Physical Therapy; Future    Memory difficulty  -     donepezil (ARICEPT) 5 mg tablet;  Take 1 tablet (5 mg total) by mouth daily at bedtime            rto in 6 m                 Ravi Lantigua MD

## 2021-10-15 DIAGNOSIS — Z79.4 TYPE 2 DIABETES MELLITUS WITHOUT COMPLICATION, WITH LONG-TERM CURRENT USE OF INSULIN (HCC): ICD-10-CM

## 2021-10-15 DIAGNOSIS — E11.9 TYPE 2 DIABETES MELLITUS WITHOUT COMPLICATION, WITH LONG-TERM CURRENT USE OF INSULIN (HCC): ICD-10-CM

## 2021-10-15 RX ORDER — PEN NEEDLE, DIABETIC 32GX 5/32"
NEEDLE, DISPOSABLE MISCELLANEOUS
Qty: 100 EACH | Refills: 6 | Status: ON HOLD | OUTPATIENT
Start: 2021-10-15 | End: 2022-07-12 | Stop reason: SDUPTHER

## 2022-01-31 DIAGNOSIS — R25.2 SPASTICITY: ICD-10-CM

## 2022-01-31 RX ORDER — BACLOFEN 10 MG/1
5 TABLET ORAL 2 TIMES DAILY
Qty: 60 TABLET | Refills: 6 | Status: SHIPPED | OUTPATIENT
Start: 2022-01-31

## 2022-07-10 ENCOUNTER — HOSPITAL ENCOUNTER (OUTPATIENT)
Facility: HOSPITAL | Age: 52
Setting detail: OBSERVATION
Discharge: HOME | End: 2022-07-12
Attending: EMERGENCY MEDICINE | Admitting: INTERNAL MEDICINE
Payer: COMMERCIAL

## 2022-07-10 ENCOUNTER — APPOINTMENT (EMERGENCY)
Dept: RADIOLOGY | Facility: HOSPITAL | Age: 52
End: 2022-07-10
Payer: COMMERCIAL

## 2022-07-10 DIAGNOSIS — E11.9 TYPE 2 DIABETES MELLITUS WITHOUT COMPLICATION, WITH LONG-TERM CURRENT USE OF INSULIN (HCC): ICD-10-CM

## 2022-07-10 DIAGNOSIS — R29.90 STROKE-LIKE SYMPTOMS: ICD-10-CM

## 2022-07-10 DIAGNOSIS — R53.1 ACUTE RIGHT-SIDED WEAKNESS: ICD-10-CM

## 2022-07-10 DIAGNOSIS — F10.929 ACUTE ALCOHOL INTOXICATION (HCC): ICD-10-CM

## 2022-07-10 DIAGNOSIS — I10 BENIGN ESSENTIAL HYPERTENSION: Chronic | ICD-10-CM

## 2022-07-10 DIAGNOSIS — R41.3 MEMORY DIFFICULTY: ICD-10-CM

## 2022-07-10 DIAGNOSIS — F10.929 ALCOHOL INTOXICATION (HCC): ICD-10-CM

## 2022-07-10 DIAGNOSIS — Z79.4 TYPE 2 DIABETES MELLITUS WITHOUT COMPLICATION, WITH LONG-TERM CURRENT USE OF INSULIN (HCC): ICD-10-CM

## 2022-07-10 DIAGNOSIS — Z91.199 NONCOMPLIANCE: ICD-10-CM

## 2022-07-10 DIAGNOSIS — R25.2 SPASTICITY: ICD-10-CM

## 2022-07-10 DIAGNOSIS — Z98.890 S/P CRANIOTOMY: Chronic | ICD-10-CM

## 2022-07-10 DIAGNOSIS — F17.200 SMOKER: ICD-10-CM

## 2022-07-10 DIAGNOSIS — R41.82 ALTERED MENTAL STATUS: Primary | ICD-10-CM

## 2022-07-10 PROCEDURE — 82077 ASSAY SPEC XCP UR&BREATH IA: CPT | Performed by: EMERGENCY MEDICINE

## 2022-07-10 PROCEDURE — 36415 COLL VENOUS BLD VENIPUNCTURE: CPT | Performed by: EMERGENCY MEDICINE

## 2022-07-10 PROCEDURE — 82550 ASSAY OF CK (CPK): CPT | Performed by: NURSE PRACTITIONER

## 2022-07-10 PROCEDURE — 99285 EMERGENCY DEPT VISIT HI MDM: CPT | Performed by: EMERGENCY MEDICINE

## 2022-07-10 PROCEDURE — 85730 THROMBOPLASTIN TIME PARTIAL: CPT | Performed by: EMERGENCY MEDICINE

## 2022-07-10 PROCEDURE — 85025 COMPLETE CBC W/AUTO DIFF WBC: CPT | Performed by: EMERGENCY MEDICINE

## 2022-07-10 PROCEDURE — 80053 COMPREHEN METABOLIC PANEL: CPT | Performed by: EMERGENCY MEDICINE

## 2022-07-10 PROCEDURE — 83735 ASSAY OF MAGNESIUM: CPT | Performed by: EMERGENCY MEDICINE

## 2022-07-10 PROCEDURE — 84484 ASSAY OF TROPONIN QUANT: CPT | Performed by: EMERGENCY MEDICINE

## 2022-07-10 PROCEDURE — 82553 CREATINE MB FRACTION: CPT | Performed by: NURSE PRACTITIONER

## 2022-07-10 PROCEDURE — 85610 PROTHROMBIN TIME: CPT | Performed by: EMERGENCY MEDICINE

## 2022-07-10 PROCEDURE — 99285 EMERGENCY DEPT VISIT HI MDM: CPT

## 2022-07-11 ENCOUNTER — APPOINTMENT (EMERGENCY)
Dept: RADIOLOGY | Facility: HOSPITAL | Age: 52
End: 2022-07-11
Payer: COMMERCIAL

## 2022-07-11 ENCOUNTER — APPOINTMENT (OUTPATIENT)
Dept: RADIOLOGY | Facility: HOSPITAL | Age: 52
End: 2022-07-11
Payer: COMMERCIAL

## 2022-07-11 ENCOUNTER — APPOINTMENT (OUTPATIENT)
Dept: NON INVASIVE DIAGNOSTICS | Facility: HOSPITAL | Age: 52
End: 2022-07-11
Payer: COMMERCIAL

## 2022-07-11 PROBLEM — R29.90 STROKE-LIKE SYMPTOMS: Status: ACTIVE | Noted: 2022-07-11

## 2022-07-11 PROBLEM — Z87.19 HISTORY OF GI BLEED: Status: ACTIVE | Noted: 2022-07-11
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
## 2022-07-11 PROBLEM — R41.3 MEMORY DIFFICULTY: Status: ACTIVE | Noted: 2022-07-11

## 2022-07-11 PROBLEM — F10.929 ALCOHOL INTOXICATION (HCC): Status: ACTIVE | Noted: 2022-07-11

## 2022-07-11 PROBLEM — G93.40 ACUTE ENCEPHALOPATHY: Status: ACTIVE | Noted: 2022-07-11

## 2022-07-11 LAB
2HR DELTA HS TROPONIN: 0 NG/L
4HR DELTA HS TROPONIN: 2 NG/L
ALBUMIN SERPL BCP-MCNC: 4.4 G/DL (ref 3.5–5)
ALP SERPL-CCNC: 58 U/L (ref 46–116)
ALT SERPL W P-5'-P-CCNC: 50 U/L (ref 12–78)
AMPHETAMINES SERPL QL SCN: NEGATIVE
ANION GAP SERPL CALCULATED.3IONS-SCNC: 11 MMOL/L (ref 4–13)
ANION GAP SERPL CALCULATED.3IONS-SCNC: 13 MMOL/L (ref 4–13)
AORTIC ROOT: 3.8 CM
APICAL FOUR CHAMBER EJECTION FRACTION: 56 %
APTT PPP: 30 SECONDS (ref 23–37)
AST SERPL W P-5'-P-CCNC: 26 U/L (ref 5–45)
ATRIAL RATE: 101 BPM
BACTERIA UR QL AUTO: ABNORMAL /HPF
BARBITURATES UR QL: NEGATIVE
BASOPHILS # BLD AUTO: 0.01 THOUSANDS/ΜL (ref 0–0.1)
BASOPHILS NFR BLD AUTO: 0 % (ref 0–1)
BENZODIAZ UR QL: NEGATIVE
BILIRUB SERPL-MCNC: 0.51 MG/DL (ref 0.2–1)
BILIRUB UR QL STRIP: NEGATIVE
BUN SERPL-MCNC: 12 MG/DL (ref 5–25)
BUN SERPL-MCNC: 12 MG/DL (ref 5–25)
CALCIUM SERPL-MCNC: 8.1 MG/DL (ref 8.3–10.1)
CALCIUM SERPL-MCNC: 8.4 MG/DL (ref 8.3–10.1)
CARDIAC TROPONIN I PNL SERPL HS: 4 NG/L
CARDIAC TROPONIN I PNL SERPL HS: 4 NG/L
CARDIAC TROPONIN I PNL SERPL HS: 6 NG/L
CHLORIDE SERPL-SCNC: 107 MMOL/L (ref 100–108)
CHLORIDE SERPL-SCNC: 108 MMOL/L (ref 100–108)
CHOLEST SERPL-MCNC: 150 MG/DL
CK MB SERPL-MCNC: 1.6 % (ref 0–2.5)
CK MB SERPL-MCNC: 3.5 NG/ML (ref 0–5)
CK SERPL-CCNC: 213 U/L (ref 39–308)
CLARITY UR: CLEAR
CO2 SERPL-SCNC: 23 MMOL/L (ref 21–32)
CO2 SERPL-SCNC: 25 MMOL/L (ref 21–32)
COCAINE UR QL: NEGATIVE
COLOR UR: ABNORMAL
CREAT SERPL-MCNC: 0.54 MG/DL (ref 0.6–1.3)
CREAT SERPL-MCNC: 0.69 MG/DL (ref 0.6–1.3)
E WAVE DECELERATION TIME: 184 MS
EOSINOPHIL # BLD AUTO: 0.04 THOUSAND/ΜL (ref 0–0.61)
EOSINOPHIL NFR BLD AUTO: 1 % (ref 0–6)
ERYTHROCYTE [DISTWIDTH] IN BLOOD BY AUTOMATED COUNT: 14.4 % (ref 11.6–15.1)
EST. AVERAGE GLUCOSE BLD GHB EST-MCNC: 94 MG/DL
ETHANOL SERPL-MCNC: 257 MG/DL (ref 0–3)
FRACTIONAL SHORTENING: 37 (ref 28–44)
GFR SERPL CREATININE-BSD FRML MDRD: 107 ML/MIN/1.73SQ M
GFR SERPL CREATININE-BSD FRML MDRD: 119 ML/MIN/1.73SQ M
GLUCOSE SERPL-MCNC: 120 MG/DL (ref 65–140)
GLUCOSE SERPL-MCNC: 126 MG/DL (ref 65–140)
GLUCOSE SERPL-MCNC: 129 MG/DL (ref 65–140)
GLUCOSE SERPL-MCNC: 171 MG/DL (ref 65–140)
GLUCOSE SERPL-MCNC: 360 MG/DL (ref 65–140)
GLUCOSE SERPL-MCNC: 55 MG/DL (ref 65–140)
GLUCOSE SERPL-MCNC: 55 MG/DL (ref 65–140)
GLUCOSE SERPL-MCNC: 61 MG/DL (ref 65–140)
GLUCOSE SERPL-MCNC: 68 MG/DL (ref 65–140)
GLUCOSE SERPL-MCNC: 84 MG/DL (ref 65–140)
GLUCOSE UR STRIP-MCNC: NEGATIVE MG/DL
HBA1C MFR BLD: 4.9 %
HCT VFR BLD AUTO: 41.8 % (ref 36.5–49.3)
HDLC SERPL-MCNC: 46 MG/DL
HGB BLD-MCNC: 14.7 G/DL (ref 12–17)
HGB UR QL STRIP.AUTO: ABNORMAL
HYALINE CASTS #/AREA URNS LPF: ABNORMAL /LPF
IMM GRANULOCYTES # BLD AUTO: 0.03 THOUSAND/UL (ref 0–0.2)
IMM GRANULOCYTES NFR BLD AUTO: 0 % (ref 0–2)
INR PPP: 0.98 (ref 0.84–1.19)
INTERVENTRICULAR SEPTUM IN DIASTOLE (PARASTERNAL SHORT AXIS VIEW): 1.3 CM
INTERVENTRICULAR SEPTUM: 1.3 CM (ref 0.6–1.1)
KETONES UR STRIP-MCNC: NEGATIVE MG/DL
LAAS-AP2: 18.1 CM2
LAAS-AP4: 14.8 CM2
LDLC SERPL CALC-MCNC: 26 MG/DL (ref 0–100)
LEFT ATRIUM SIZE: 3.2 CM
LEFT INTERNAL DIMENSION IN SYSTOLE: 2.6 CM (ref 2.1–4)
LEFT VENTRICULAR INTERNAL DIMENSION IN DIASTOLE: 4.1 CM (ref 3.5–6)
LEFT VENTRICULAR POSTERIOR WALL IN END DIASTOLE: 1.2 CM
LEFT VENTRICULAR STROKE VOLUME: 51 ML
LEUKOCYTE ESTERASE UR QL STRIP: NEGATIVE
LVSV (TEICH): 51 ML
LYMPHOCYTES # BLD AUTO: 1.15 THOUSANDS/ΜL (ref 0.6–4.47)
LYMPHOCYTES NFR BLD AUTO: 17 % (ref 14–44)
MAGNESIUM SERPL-MCNC: 1.7 MG/DL (ref 1.6–2.6)
MAGNESIUM SERPL-MCNC: 2.1 MG/DL (ref 1.6–2.6)
MCH RBC QN AUTO: 37.7 PG (ref 26.8–34.3)
MCHC RBC AUTO-ENTMCNC: 35.2 G/DL (ref 31.4–37.4)
MCV RBC AUTO: 107 FL (ref 82–98)
METHADONE UR QL: NEGATIVE
MONOCYTES # BLD AUTO: 0.43 THOUSAND/ΜL (ref 0.17–1.22)
MONOCYTES NFR BLD AUTO: 6 % (ref 4–12)
MV E'TISSUE VEL-SEP: 10 CM/S
MV PEAK A VEL: 0.65 M/S
MV PEAK E VEL: 58 CM/S
MV STENOSIS PRESSURE HALF TIME: 53 MS
MV VALVE AREA P 1/2 METHOD: 4.15
NEUTROPHILS # BLD AUTO: 5.33 THOUSANDS/ΜL (ref 1.85–7.62)
NEUTS SEG NFR BLD AUTO: 76 % (ref 43–75)
NITRITE UR QL STRIP: NEGATIVE
NON-SQ EPI CELLS URNS QL MICRO: ABNORMAL /HPF
NRBC BLD AUTO-RTO: 0 /100 WBCS
OPIATES UR QL SCN: NEGATIVE
OXYCODONE+OXYMORPHONE UR QL SCN: NEGATIVE
P AXIS: 59 DEGREES
PCP UR QL: NEGATIVE
PH UR STRIP.AUTO: 5.5 [PH]
PLATELET # BLD AUTO: 197 THOUSANDS/UL (ref 149–390)
PMV BLD AUTO: 9.1 FL (ref 8.9–12.7)
POTASSIUM SERPL-SCNC: 3.4 MMOL/L (ref 3.5–5.3)
POTASSIUM SERPL-SCNC: 3.5 MMOL/L (ref 3.5–5.3)
PR INTERVAL: 164 MS
PROT SERPL-MCNC: 7.3 G/DL (ref 6.4–8.2)
PROT UR STRIP-MCNC: NEGATIVE MG/DL
PROTHROMBIN TIME: 12.8 SECONDS (ref 11.6–14.5)
QRS AXIS: 18 DEGREES
QRSD INTERVAL: 88 MS
QT INTERVAL: 364 MS
QTC INTERVAL: 471 MS
RBC # BLD AUTO: 3.9 MILLION/UL (ref 3.88–5.62)
RBC #/AREA URNS AUTO: ABNORMAL /HPF
RIGHT ATRIUM AREA SYSTOLE A4C: 10.9 CM2
RIGHT VENTRICLE ID DIMENSION: 2.7 CM
SL CV LEFT ATRIUM LENGTH A2C: 4.9 CM
SL CV PED ECHO LEFT VENTRICLE DIASTOLIC VOLUME (MOD BIPLANE) 2D: 75 ML
SL CV PED ECHO LEFT VENTRICLE SYSTOLIC VOLUME (MOD BIPLANE) 2D: 25 ML
SODIUM SERPL-SCNC: 143 MMOL/L (ref 136–145)
SODIUM SERPL-SCNC: 144 MMOL/L (ref 136–145)
SP GR UR STRIP.AUTO: <=1.005 (ref 1–1.03)
T WAVE AXIS: 24 DEGREES
THC UR QL: NEGATIVE
TRIGL SERPL-MCNC: 391 MG/DL
UROBILINOGEN UR QL STRIP.AUTO: 0.2 E.U./DL
VENTRICULAR RATE: 101 BPM
WBC # BLD AUTO: 6.99 THOUSAND/UL (ref 4.31–10.16)
WBC #/AREA URNS AUTO: ABNORMAL /HPF

## 2022-07-11 PROCEDURE — 93005 ELECTROCARDIOGRAM TRACING: CPT

## 2022-07-11 PROCEDURE — 71045 X-RAY EXAM CHEST 1 VIEW: CPT

## 2022-07-11 PROCEDURE — 97116 GAIT TRAINING THERAPY: CPT

## 2022-07-11 PROCEDURE — 80307 DRUG TEST PRSMV CHEM ANLYZR: CPT | Performed by: EMERGENCY MEDICINE

## 2022-07-11 PROCEDURE — 99219 PR INITIAL OBSERVATION CARE/DAY 50 MINUTES: CPT | Performed by: NURSE PRACTITIONER

## 2022-07-11 PROCEDURE — 36415 COLL VENOUS BLD VENIPUNCTURE: CPT | Performed by: EMERGENCY MEDICINE

## 2022-07-11 PROCEDURE — 97167 OT EVAL HIGH COMPLEX 60 MIN: CPT

## 2022-07-11 PROCEDURE — G1004 CDSM NDSC: HCPCS

## 2022-07-11 PROCEDURE — 83735 ASSAY OF MAGNESIUM: CPT | Performed by: NURSE PRACTITIONER

## 2022-07-11 PROCEDURE — 84484 ASSAY OF TROPONIN QUANT: CPT | Performed by: NURSE PRACTITIONER

## 2022-07-11 PROCEDURE — 93010 ELECTROCARDIOGRAM REPORT: CPT | Performed by: INTERNAL MEDICINE

## 2022-07-11 PROCEDURE — 70498 CT ANGIOGRAPHY NECK: CPT

## 2022-07-11 PROCEDURE — 97163 PT EVAL HIGH COMPLEX 45 MIN: CPT

## 2022-07-11 PROCEDURE — 70496 CT ANGIOGRAPHY HEAD: CPT

## 2022-07-11 PROCEDURE — 92610 EVALUATE SWALLOWING FUNCTION: CPT

## 2022-07-11 PROCEDURE — 99223 1ST HOSP IP/OBS HIGH 75: CPT | Performed by: INTERNAL MEDICINE

## 2022-07-11 PROCEDURE — 82948 REAGENT STRIP/BLOOD GLUCOSE: CPT

## 2022-07-11 PROCEDURE — 70553 MRI BRAIN STEM W/O & W/DYE: CPT

## 2022-07-11 PROCEDURE — 93306 TTE W/DOPPLER COMPLETE: CPT

## 2022-07-11 PROCEDURE — 83036 HEMOGLOBIN GLYCOSYLATED A1C: CPT | Performed by: NURSE PRACTITIONER

## 2022-07-11 PROCEDURE — 80048 BASIC METABOLIC PNL TOTAL CA: CPT | Performed by: NURSE PRACTITIONER

## 2022-07-11 PROCEDURE — A9585 GADOBUTROL INJECTION: HCPCS | Performed by: NURSE PRACTITIONER

## 2022-07-11 PROCEDURE — 80061 LIPID PANEL: CPT | Performed by: NURSE PRACTITIONER

## 2022-07-11 PROCEDURE — 99205 OFFICE O/P NEW HI 60 MIN: CPT | Performed by: PSYCHIATRY & NEUROLOGY

## 2022-07-11 PROCEDURE — 93306 TTE W/DOPPLER COMPLETE: CPT | Performed by: INTERNAL MEDICINE

## 2022-07-11 PROCEDURE — 81001 URINALYSIS AUTO W/SCOPE: CPT | Performed by: EMERGENCY MEDICINE

## 2022-07-11 PROCEDURE — 84484 ASSAY OF TROPONIN QUANT: CPT | Performed by: EMERGENCY MEDICINE

## 2022-07-11 RX ORDER — INSULIN LISPRO 100 [IU]/ML
1-5 INJECTION, SOLUTION INTRAVENOUS; SUBCUTANEOUS EVERY 6 HOURS SCHEDULED
Status: DISCONTINUED | OUTPATIENT
Start: 2022-07-11 | End: 2022-07-12 | Stop reason: HOSPADM

## 2022-07-11 RX ORDER — POTASSIUM CHLORIDE 14.9 MG/ML
20 INJECTION INTRAVENOUS
Status: DISCONTINUED | OUTPATIENT
Start: 2022-07-11 | End: 2022-07-11

## 2022-07-11 RX ORDER — ASPIRIN 81 MG/1
81 TABLET ORAL DAILY
Status: DISCONTINUED | OUTPATIENT
Start: 2022-07-11 | End: 2022-07-12 | Stop reason: HOSPADM

## 2022-07-11 RX ORDER — ATORVASTATIN CALCIUM 80 MG/1
80 TABLET, FILM COATED ORAL
Status: DISCONTINUED | OUTPATIENT
Start: 2022-07-11 | End: 2022-07-12 | Stop reason: HOSPADM

## 2022-07-11 RX ORDER — FAMOTIDINE 10 MG/ML
20 INJECTION, SOLUTION INTRAVENOUS EVERY 12 HOURS SCHEDULED
Status: DISCONTINUED | OUTPATIENT
Start: 2022-07-11 | End: 2022-07-12 | Stop reason: HOSPADM

## 2022-07-11 RX ORDER — ASPIRIN 325 MG
325 TABLET ORAL ONCE
Status: COMPLETED | OUTPATIENT
Start: 2022-07-11 | End: 2022-07-11

## 2022-07-11 RX ORDER — DEXTROSE, SODIUM CHLORIDE, AND POTASSIUM CHLORIDE 5; .45; .15 G/100ML; G/100ML; G/100ML
75 INJECTION INTRAVENOUS CONTINUOUS
Status: DISPENSED | OUTPATIENT
Start: 2022-07-11 | End: 2022-07-11

## 2022-07-11 RX ORDER — ONDANSETRON 2 MG/ML
4 INJECTION INTRAMUSCULAR; INTRAVENOUS EVERY 6 HOURS PRN
Status: DISCONTINUED | OUTPATIENT
Start: 2022-07-11 | End: 2022-07-12 | Stop reason: HOSPADM

## 2022-07-11 RX ORDER — INSULIN GLARGINE 100 [IU]/ML
10 INJECTION, SOLUTION SUBCUTANEOUS
Status: DISCONTINUED | OUTPATIENT
Start: 2022-07-11 | End: 2022-07-12 | Stop reason: HOSPADM

## 2022-07-11 RX ORDER — ATORVASTATIN CALCIUM 80 MG/1
80 TABLET, FILM COATED ORAL
Status: DISCONTINUED | OUTPATIENT
Start: 2022-07-11 | End: 2022-07-11

## 2022-07-11 RX ORDER — SODIUM CHLORIDE 9 MG/ML
75 INJECTION, SOLUTION INTRAVENOUS CONTINUOUS
Status: DISCONTINUED | OUTPATIENT
Start: 2022-07-11 | End: 2022-07-11

## 2022-07-11 RX ORDER — BACLOFEN 10 MG/1
5 TABLET ORAL 2 TIMES DAILY
Status: DISCONTINUED | OUTPATIENT
Start: 2022-07-11 | End: 2022-07-12 | Stop reason: HOSPADM

## 2022-07-11 RX ORDER — ASPIRIN 81 MG/1
81 TABLET, CHEWABLE ORAL DAILY
Status: DISCONTINUED | OUTPATIENT
Start: 2022-07-11 | End: 2022-07-11

## 2022-07-11 RX ORDER — ENOXAPARIN SODIUM 100 MG/ML
40 INJECTION SUBCUTANEOUS DAILY
Status: DISCONTINUED | OUTPATIENT
Start: 2022-07-11 | End: 2022-07-12 | Stop reason: HOSPADM

## 2022-07-11 RX ORDER — DEXTROSE MONOHYDRATE 25 G/50ML
25 INJECTION, SOLUTION INTRAVENOUS ONCE
Status: COMPLETED | OUTPATIENT
Start: 2022-07-11 | End: 2022-07-11

## 2022-07-11 RX ORDER — POTASSIUM CHLORIDE 14.9 MG/ML
20 INJECTION INTRAVENOUS ONCE
Status: COMPLETED | OUTPATIENT
Start: 2022-07-11 | End: 2022-07-11

## 2022-07-11 RX ORDER — DEXTROSE MONOHYDRATE 25 G/50ML
25 INJECTION, SOLUTION INTRAVENOUS ONCE
Status: DISCONTINUED | OUTPATIENT
Start: 2022-07-11 | End: 2022-07-11

## 2022-07-11 RX ORDER — FOLIC ACID 1 MG/1
1 TABLET ORAL DAILY
Status: DISCONTINUED | OUTPATIENT
Start: 2022-07-11 | End: 2022-07-12 | Stop reason: HOSPADM

## 2022-07-11 RX ORDER — ATORVASTATIN CALCIUM 80 MG/1
80 TABLET, FILM COATED ORAL EVERY EVENING
Status: DISCONTINUED | OUTPATIENT
Start: 2022-07-11 | End: 2022-07-11

## 2022-07-11 RX ORDER — LANOLIN ALCOHOL/MO/W.PET/CERES
100 CREAM (GRAM) TOPICAL DAILY
Status: DISCONTINUED | OUTPATIENT
Start: 2022-07-11 | End: 2022-07-12 | Stop reason: HOSPADM

## 2022-07-11 RX ORDER — ACETAMINOPHEN 325 MG/1
650 TABLET ORAL EVERY 6 HOURS PRN
Status: DISCONTINUED | OUTPATIENT
Start: 2022-07-11 | End: 2022-07-12 | Stop reason: HOSPADM

## 2022-07-11 RX ADMIN — Medication 1 TABLET: at 10:57

## 2022-07-11 RX ADMIN — GADOBUTROL 7 ML: 604.72 INJECTION INTRAVENOUS at 10:12

## 2022-07-11 RX ADMIN — IOHEXOL 70 ML: 300 INJECTION, SOLUTION INTRAVENOUS at 00:21

## 2022-07-11 RX ADMIN — ATORVASTATIN CALCIUM 80 MG: 80 TABLET, FILM COATED ORAL at 16:13

## 2022-07-11 RX ADMIN — ASPIRIN 325 MG: 325 TABLET ORAL at 01:52

## 2022-07-11 RX ADMIN — BACLOFEN 5 MG: 10 TABLET ORAL at 18:14

## 2022-07-11 RX ADMIN — BACLOFEN 5 MG: 10 TABLET ORAL at 10:57

## 2022-07-11 RX ADMIN — ATORVASTATIN CALCIUM 80 MG: 80 TABLET, FILM COATED ORAL at 03:51

## 2022-07-11 RX ADMIN — THIAMINE HCL TAB 100 MG 100 MG: 100 TAB at 10:56

## 2022-07-11 RX ADMIN — FAMOTIDINE 20 MG: 10 INJECTION, SOLUTION INTRAVENOUS at 03:46

## 2022-07-11 RX ADMIN — POTASSIUM CHLORIDE 20 MEQ: 14.9 INJECTION, SOLUTION INTRAVENOUS at 03:49

## 2022-07-11 RX ADMIN — INSULIN GLARGINE 10 UNITS: 100 INJECTION, SOLUTION SUBCUTANEOUS at 21:37

## 2022-07-11 RX ADMIN — ASPIRIN 81 MG: 81 TABLET, COATED ORAL at 10:57

## 2022-07-11 RX ADMIN — DEXTROSE MONOHYDRATE 25 ML: 25 INJECTION, SOLUTION INTRAVENOUS at 04:03

## 2022-07-11 RX ADMIN — INSULIN LISPRO 3 UNITS: 100 INJECTION, SOLUTION INTRAVENOUS; SUBCUTANEOUS at 17:12

## 2022-07-11 RX ADMIN — DEXTROSE, SODIUM CHLORIDE, AND POTASSIUM CHLORIDE 75 ML/HR: 5; .45; .15 INJECTION INTRAVENOUS at 03:42

## 2022-07-11 RX ADMIN — FOLIC ACID 1 MG: 1 TABLET ORAL at 10:57

## 2022-07-11 RX ADMIN — DEXTROSE MONOHYDRATE 25 ML: 25 INJECTION, SOLUTION INTRAVENOUS at 06:45

## 2022-07-11 RX ADMIN — FAMOTIDINE 20 MG: 10 INJECTION, SOLUTION INTRAVENOUS at 21:38

## 2022-07-11 RX ADMIN — NICOTINE 1 PATCH: 7 PATCH, EXTENDED RELEASE TRANSDERMAL at 11:07

## 2022-07-11 RX ADMIN — ENOXAPARIN SODIUM 40 MG: 40 INJECTION SUBCUTANEOUS at 11:07

## 2022-07-11 RX ADMIN — FAMOTIDINE 20 MG: 10 INJECTION, SOLUTION INTRAVENOUS at 11:09

## 2022-07-11 NOTE — ASSESSMENT & PLAN NOTE
Rodriguez Rodney onto buttocks in kitchen, unwitnessed  No LOC  Denies any pain  · Suspect secondary to alcohol intoxication versus acute CVA versus both    · Check total CK  · Fall precautions

## 2022-07-11 NOTE — ASSESSMENT & PLAN NOTE
Pedro Jolly onto buttocks in kitchen, unwitnessed  No LOC  Denies any pain    · Suspect secondary to alcohol intoxication plus concurrent baclofen use  · total CK within normal limits

## 2022-07-11 NOTE — CASE MANAGEMENT
Case Management Assessment & Discharge Planning Note    Patient name Ron Mane  Location [address] David Pedro2-* MRN [id number]  : 1970 Date 2022       Current Admission Date: 7/10/2022  Current Admission Diagnosis:Stroke-like symptoms   Patient Active Problem List    Diagnosis Date Noted    Alcohol intoxication ([facility] [address] ) 2022    Fall 2022    Stroke-like symptoms 2022    Acute encephalopathy 2022    Memory difficulty 2022    History of GI bleed 2022    Noncompliance 2020    Spasticity 2020    Right sided weakness 10/18/2019    Hyperlipidemia 2019    S/P craniotomy 2019    Type 2 diabetes mellitus without complication, with long-term current use of insulin [facility] 2018    Tobacco use disorder 2018    Depression 2017    Benign essential hypertension 2012      LOS (days): 0  Geometric Mean LOS (GMLOS) (days):   Days to GMLOS:     OBJECTIVE:        Current admission status: Observation  Referral Reason: Stroke, Other (Discharge planning)    Preferred Pharmacy:   [address], [facility]   Phone: [phone] Fax: [phone]    Primary Care Provider: Gracy Sauceda MD    Primary Insurance: [address] [place] [address]  Secondary Insurance:     ASS[facility]  [facility]  Proxies    There are no active Health Care Proxies on file  Obs Notice Signed: 22 (Notice reviewed with and signed by patient    Copy given to patient and copy placed in scan bin for chart )    Readmission Root Cause  30 Day Readmission: No    Patient Information  Admitted from[de-identified] Home  Mental Status: Alert  During Assessment patient was accompanied by: Not accompanied during assessment  Assessment information provided by[de-identified] Patient  Primary Caregiver: Family  Caregiver's Name[de-identified] Marycarmen Bolanos (mother)  Caregiver's Relationship to Patient[de-identified] Family Member  Caregiver's Telephone Number[de-identified] [phone]  Support Systems: Parent  [place] of Residence: [facility] Morena Badillo do you live in?: [address] [place] [address] entry access options   Select all that apply : Ramp  Type of Current Residence: 2 story home  Upon entering residence, is there a bedroom on the main floor (no further steps)?: No  A bedroom is located on the following floor levels of residence (select all that apply):: 2nd Floor  Upon entering residence, is there a bathroom on the main floor (no further steps)?: No  Indicate which floors of current residence have a bathroom (select all the apply):: 2nd Floor  Number of steps to 2nd floor from main floor: One Flight  In the last 12 months, was there a time when you were not able to pay the mortgage or rent on time?: No  In the last 12 months, how many places have you lived?: 1  In the last 12 months, was there a time when you did not have a steady place to sleep or slept in a shelter (including now)?: No  Homeless/housing insecurity resource given?: N/A  Living Arrangements: Lives w/ Parent(s)    Activities of Daily Living Prior to Admission  Functional Status: Independent  Completes ADLs independently?: Yes  Ambulates independently?: Yes  Does patient use assisted devices?: Yes  Assisted Devices (DME) used: Straight Cane  Does patient currently own DME?: Yes  What DME does the patient currently own?: Jinny Vásquez  Does patient currently have [facility]?: No     Patient Information Continued  Income Source: Unemployed  Does patient have prescription coverage?: Yes  Within the past 12 months, you worried that your food would run out before you got the money to buy more : Never true  Within the past 12 months, the food you bought just didn't last and you didn't have money to get more : Never true  Food insecurity resource given?: N/A  Does patient receive dialysis treatments?: No  Does patient have a history of substance abuse?: Yes  Historical substance use preference: Alcohol/ETOH  Is patient currently in treatment for substance abuse?: No  Patient declined treatment information  Does patient have a history of Mental Health Diagnosis?: Yes (Depression, memory loss)    Means of Transportation  Means of Transport to Appts[de-identified] Family transport  In the past 12 months, has lack of transportation kept you from medical appointments or from getting medications?: No  In the past 12 months, has lack of transportation kept you from meetings, work, or from getting things needed for daily living?: No  Was application for public transport provided?: N/A    DISCHARGE DETAILS:    Discharge planning discussed with[de-identified] Patient and mother  Freedom of Choice: Yes  Comments - Freedom of Choice: FOC reviewed with patient and mother Khoi Guajardo    Patient and his mother are in agreement with referrals to [facility] agencies for home PT  CM contacted family/caregiver?: Yes  Were Treatment Team discharge recommendations reviewed with patient/caregiver?: Yes  Did patient/caregiver verbalize understanding of patient care needs?: Yes  Were patient/caregiver advised of the risks associated with not following Treatment Team discharge recommendations?: Yes    Contacts  Patient Contacts: Mehrdad Rubio (mother)  Relationship to Patient[de-identified] Family  Contact Method: Phone  Phone Number: [phone]  Reason/Outcome: Emergency Contact, Discharge [address]         Is the patient interested in [facility] at discharge?: Yes  [address] requested[de-identified] Occupational Therapy, Physical [address] Name[de-identified] ANNABEL ignacia Marcelo [address] Provider[de-identified] PCP  Home Health Services Needed[de-identified] Evaluate Functional Status and Safety, Gait/ADL Training, Strengthening/Theraputic Exercises to Improve Function  Homebound Criteria Met[de-identified] Requires the Assistance of Another Person for Safe Ambulation or to Leave the Home, Uses an Assist Device (i e  cane, walker, etc)  Supporting Clincal Findings[de-identified] Fatigues Easliy in [employer], Limited Endurance    DME Referral Provided  Referral made for DME?: No    Other Referral/Resources/Interventions Provided:  Interventions: HHC    Would you like to participate in our \[facility]\"" [facility] service program?  : No - Declined    Treatment Team Recommendation: Home with [address]  Discharge Destination Plan[de-identified] Home with [place] at Discharge : Family      SW spoke with patient at bedside to introduce role of CM and conduct assessment  Patient lives with his parents and relies on family for transportation  He uses a cane when outside and does own a walker  He stated that his plan is to return home at discharge and shared that his symptoms may have been due to drinking alcohol with his baclofen  SW spoke by phone with patient's mother Leilani Tabatha Duke Hence stated that it has been difficult to have patient live with her and her  for the past two years  She does not feel that he does enough for himself and that he drinks too much  Leilani Hence asked about other housing options and SW explained that patient would like need to pursue emergency housing if he is unable to afford to move out on his own  It is not clear whether patient would be capable of living on his own  Leilani Hence inquired about the possibility of patient going to [facility] and SW explained that this would be a short-term option  Leilani Hence did state that she is open to referrals for home PT per the treatment team recommendation  SW also provided printed information about [address] and housing services and left them in the room per Millicent's request   Ashley Waller will continue to follow

## 2022-07-11 NOTE — ASSESSMENT & PLAN NOTE
- Ethanol level 257 on admission    - Encourage cessation    - Thaimine and folic acid as per medicine team    - Monitor for signs/symtpoms of withdrawal

## 2022-07-11 NOTE — PLAN OF CARE
Problem: Potential for Falls  Goal: Patient will remain free of falls  Description: INTERVENTIONS:  - Educate patient/family on patient safety including physical limitations  - Instruct patient to call for assistance with activity   - Consult OT/PT to assist with strengthening/mobility   - Keep Call bell within reach  - Keep bed low and locked with side rails adjusted as appropriate  - Keep care items and personal belongings within reach  - Initiate and maintain comfort rounds  - Make Fall Risk Sign visible to staff  - Offer Toileting every 2 Hours, in advance of need  - Initiate/Maintain bed alarm  - Obtain necessary fall risk management equipment: yellow socks  - Apply yellow socks and bracelet for high fall risk patients  - Consider moving patient to room near nurses station  Outcome: Progressing     Problem: MOBILITY - ADULT  Goal: Maintain or return to baseline ADL function  Description: INTERVENTIONS:  -  Assess patient's ability to carry out ADLs; assess patient's baseline for ADL function and identify physical deficits which impact ability to perform ADLs (bathing, care of mouth/teeth, toileting, grooming, dressing, etc )  - Assess/evaluate cause of self-care deficits   - Assess range of motion  - Assess patient's mobility; develop plan if impaired  - Assess patient's need for assistive devices and provide as appropriate  - Encourage maximum independence but intervene and supervise when necessary  - Involve family in performance of ADLs  - Assess for home care needs following discharge   - Consider OT consult to assist with ADL evaluation and planning for discharge  - Provide patient education as appropriate  Outcome: Progressing  Goal: Maintains/Returns to pre admission functional level  Description: INTERVENTIONS:  - Perform BMAT or MOVE assessment daily    - Set and communicate daily mobility goal to care team and patient/family/caregiver     - Collaborate with rehabilitation services on mobility goals if consulted  - Perform Range of Motion 2 times a day  - Reposition patient every 2 hours  - Dangle patient 2 times a day  - Stand patient 2 times a day  - Ambulate patient 2 times a day  - Out of bed to chair 2 times a day   - Out of bed for meals 2 times a day  - Out of bed for toileting  - Record patient progress and toleration of activity level   Outcome: Progressing     Problem: Neurological Deficit  Goal: Neurological status is stable or improving  Description: Interventions:  - Monitor and assess patient's level of consciousness, motor function, sensory function, and level of assistance needed for ADLs  - Monitor and report changes from baseline  Collaborate with interdisciplinary team to initiate plan and implement interventions as ordered  - Provide and maintain a safe environment  - Consider seizure precautions  - Consider fall precautions  - Consider aspiration precautions  - Consider bleeding precautions  Outcome: Progressing     Problem: Activity Intolerance/Impaired Mobility  Goal: Mobility/activity is maintained at optimum level for patient  Description: Interventions:  - Assess and monitor patient  barriers to mobility and need for assistive/adaptive devices  - Assess patient's emotional response to limitations  - Collaborate with interdisciplinary team and initiate plans and interventions as ordered  - Encourage independent activity per ability   - Maintain proper body alignment  - Perform active/passive rom as tolerated/ordered  - Plan activities to conserve energy   - Turn patient as appropriate  Outcome: Progressing     Problem: Potential for Aspiration  Goal: Non-ventilated patient's risk of aspiration is minimized  Description: Assess and monitor vital signs, respiratory status, and labs (WBC)  Monitor for signs of aspiration (tachypnea, cough, rales, wheezing, cyanosis, fever)  - Assess and monitor patient's ability to swallow    - Place patient up in chair to eat if possible  - HOB up at 90 degrees to eat if unable to get patient up into chair   - Supervise patient during oral intake  - Instruct patient/ family to take small bites  - Instruct patient/ family to take small single sips when taking liquids  - Follow patient-specific strategies generated by speech pathologist   Outcome: Progressing     Problem: Nutrition  Goal: Nutrition/Hydration status is improving  Description: Monitor and assess patient's nutrition/hydration status for malnutrition (ex- brittle hair, bruises, dry skin, pale skin and conjunctiva, muscle wasting, smooth red tongue, and disorientation)  Collaborate with interdisciplinary team and initiate plan and interventions as ordered  Monitor patient's weight and dietary intake as ordered or per policy  Utilize nutrition screening tool and intervene per policy  Determine patient's food preferences and provide high-protein, high-caloric foods as appropriate  - Assist patient with eating   - Allow adequate time for meals   - Encourage patient to take dietary supplement as ordered  - Collaborate with clinical nutritionist   - Include patient/family/caregiver in decisions related to nutrition  Outcome: Progressing     Problem: Nutrition/Hydration-ADULT  Goal: Nutrient/Hydration intake appropriate for improving, restoring or maintaining nutritional needs  Description: Monitor and assess patient's nutrition/hydration status for malnutrition  Collaborate with interdisciplinary team and initiate plan and interventions as ordered  Monitor patient's weight and dietary intake as ordered or per policy  Utilize nutrition screening tool and intervene as necessary  Determine patient's food preferences and provide high-protein, high-caloric foods as appropriate       INTERVENTIONS:  - Monitor oral intake, urinary output, labs, and treatment plans  - Assess nutrition and hydration status and recommend course of action  - Evaluate amount of meals eaten  - Assist patient with eating if necessary   - Allow adequate time for meals  - Recommend/ encourage appropriate diets, oral nutritional supplements, and vitamin/mineral supplements  - Order, calculate, and assess calorie counts as needed  - Recommend, monitor, and adjust tube feedings and TPN/PPN based on assessed needs  - Assess need for intravenous fluids  - Provide specific nutrition/hydration education as appropriate  - Include patient/family/caregiver in decisions related to nutrition  Outcome: Progressing     Problem: Prexisting or High Potential for Compromised Skin Integrity  Goal: Skin integrity is maintained or improved  Description: INTERVENTIONS:  - Identify patients at risk for skin breakdown  - Assess and monitor skin integrity  - Assess and monitor nutrition and hydration status  - Monitor labs   - Assess for incontinence   - Turn and reposition patient  - Assist with mobility/ambulation  - Relieve pressure over bony prominences  - Avoid friction and shearing  - Provide appropriate hygiene as needed including keeping skin clean and dry  - Evaluate need for skin moisturizer/barrier cream  - Collaborate with interdisciplinary team   - Patient/family teaching  - Consider wound care consult   Outcome: Progressing

## 2022-07-11 NOTE — H&P
[facility] 1970, 45 y o  male MRN: [id number]  Unit/Bed#: [address] [place] [address] Encounter: [id number]  Primary Care Provider: Javed Albarran MD   Date and time admitted to hospital: 7/10/2022 11:48 PM    * Stroke-like symptoms  Assessment & Plan  Patient presents with unwitnessed fall in the kitchen, could not get up for about 2 hours,confusion  Noticed to have slurred speech and left facial droop when patient was getting into ambulance per mother  Patient was found sitting on the floor in the kitchen after parents heard a "thud" from kitchen  Parents denies LOC  Last known normal around 630pm   · History of craniotomy secondary to brain abscess in 1/2019  Has residual right-sided weakness, right eye double vision per mother  Patient was using walker for a while,but not anymore  · Alcohol level 257  History of alcohol abuse per parents  Drinks [place] 2-3 times per week now  · Symptoms may be related to alcohol intoxication, need to rule out CVA  · Will follow stroke pathway  · CTA head and neck unremarkable per ED provider, pending final read  · EKG sinus tach, rate 101  · Check lipid panel, A1c  · MRI of the brain in a m  · 2D echo with bubble study  · Telemetry  · Patient was given full-dose aspirin, Lipitor 80 mg p o  Daily in ED  Will continue baby aspirin daily and Lipitor 80mg p o  Daily  · Allow permissive hypertension  · Consult neurology    Alcohol intoxication ([facility] [address] )  Assessment & Plan  History of alcohol abuse but no history of alcohol withdrawal per parents  · Follows CIWA protocol  · Complete alcohol cessation    [address] onto buttocks in kitchen, unwitnessed  No LOC  Denies any pain  · Suspect secondary to alcohol intoxication versus acute CVA versus both  · Check total CK  · Fall precautions    Acute encephalopathy  Assessment & Plan  Suspect due to alcohol intoxication versus acute CVA versus both    · UA no evidence of infection  · UDS negative  · Monitor mental status        History of GI bleed  Assessment & Plan  · Ordered EC aspirin  · GI prophylaxis while NPO      Memory difficulty  Assessment & Plan  Noncompliant with Aricept at home  · Likely secondary to history of brain abscess and craniotomy    Hyperlipidemia  Assessment & Plan  Noncompliant with Lipitor at home  · Lipid panel in a m  · Given Lipitor as above    Type 2 diabetes mellitus without complication, with long-term current use of insulin [facility])  Assessment & Plan  Lab Results   Component Value Date    HGBA1C 4 6 08/28/2021       Recent Labs     07/11/22  0304   POCGLU 61*       Blood Sugar Average: Last 72 hrs:  (P) 61   Patient is on Basaglar 10 units subQ HS  · Substitute Basaglar with Lantus 10 units subQ HS  · SSI  · Check A1c    Tobacco use disorder  Assessment & Plan  Nicotine patch, smoking cessation    S/P craniotomy  Assessment & Plan  Secondary to brain abscess in 1/2019  With residual right-sided weakness, right eye double vision and spasticity  · Continue baclofen  Benign essential hypertension  Assessment & Plan  Patient noncompliant with medications at home  · BP acceptable  · Monitor    VTE Prophylaxis: Enoxaparin (Lovenox)  / reason for no mechanical VTE prophylaxis On Lovenox   Code Status:  Full code  POLST: POLST form is not discussed and not completed at this time  Anticipated Length of Stay:  Patient will be admitted on an Observation basis with an anticipated length of stay of  < 2 midnights  Justification for Hospital Stay:  Stroke-like symptoms, alcohol intoxication    Total Time for Visit, including Counseling / Coordination of Care: 45 minutes  Greater than 50% of this total time spent on direct patient counseling and coordination of care  Chief Complaint:   Fall, confusion, slurred speech and left facial droop      History of Present Illness:    Mark Serrano is a 45 y o  male with PMH of brain abscess, status post craniotomy, type 2 diabetes, hypertension, hyperlipidemia, alcohol abuse, nicotine abuse who presents with unwitnessed fall in the kitchen, could not get up for about 2 hours,confusion  Noticed to have slurred speech and left facial droop when patient was getting into ambulance per mother  Patient was found sitting on the floor in the kitchen after parents heard a "thud" from kitchen around 9pm Parents denies LOC  Last known normal around 630pm Mother reports patient has right-sided weakness and right eye double vision post craniotomy  No slurred speech and facial droops post craniotomy  Patient used to use a walker but not anymore  Patient reports some difficulty speaking, denies trouble swallowing  Patient denies headache, dizziness, chest pain, SOB, nausea vomiting or any pain  Patient denies weakness to extremities  Patient is a poor historian due to confusion  I did speak to patient's parents on the phone  Review of Systems:    Review of Systems   Unable to perform ROS: Mental status change (Spoke to parents)   Constitutional:        Fall onto buttocks in kitchen, could not get up for 2 hours   Neurological:        Slurred speech, left facial droop   Psychiatric/Behavioral: Positive for confusion  All other systems reviewed and are negative  Past Medical and Surgical History:     Past Medical History:   Diagnosis Date    Abdominal cyst     Anemia     Hx     Chronic pain disorder     abdominal    Diabetes mellitus ([facility] [address] )     Diverticulitis     GI bleed     History of transfusion 2016    5 units    Lightheadedness     Multiple brain abscesses 2020    Multiple lesions on computed tomography of brain and spine     Spleen laceration        Past Surgical History:   Procedure Laterality Date    ABDOMINAL SURGERY  2016    lap removal of mass post spleen injury    COLONOSCOPY N/A 05/24/2017    Procedure: COLONOSCOPY;  Surgeon: Christiano Rawls MD;  Location: [facility] GI LAB;   Service:    Altagracia Martinez CRANIOTOMY      EGD AND COLONOSCOPY N/A 03/15/2017    Procedure: EGD AND COLONOSCOPY;  Surgeon: Kourtney Ledbetter MD;  Location[facility]; Service:     OTHER SURGICAL HISTORY      per Allscripts-had spleen surgery; no other details    UPPER GASTROINTESTINAL ENDOSCOPY      for gastric bleeding       Meds/Allergies:    Prior to Admission medications    Medication Sig Start Date End Date Taking? Authorizing Provider   baclofen 10 mg tablet Take 0 5 tablets (5 mg total) by mouth 2 (two) times a day 1/31/22  Yes Sarah Viera MD   Melatonin 5 MG TABS Take by mouth   Yes Historical Provider, MD   amLODIPine (NORVASC) 5 mg tablet Take 1 tablet (5 mg total) by mouth daily  Patient not taking: Reported on 7/11/2022 8/28/21   Sarah Viera MD   atorvastatin (LIPITOR) 10 mg tablet Take 1 tablet (10 mg total) by mouth daily  Patient not taking: Reported on 7/11/2022 8/28/21   Sarah Viera MD   BD Pen Needle Beba 2nd Gen 32G X 4 MM MISC use 1 PEN NEEDLE to inject MEDICATION subcutaneously four times a day 10/15/21   Sarah Viera MD   donepezil (ARICEPT) 5 mg tablet Take 1 tablet (5 mg total) by mouth daily at bedtime  Patient not taking: Reported on 7/11/2022 8/28/21   Sarah Viera MD   insulin glargine (Basaglar KwikPen) 100 units/mL injection pen Inject 10 Units under the skin daily  Patient taking differently: Inject 10 Units under the skin daily at bedtime 8/28/21 9/27/21  Sarah Viera MD   lisinopril (ZESTRIL) 10 mg tablet Take 1 tablet (10 mg total) by mouth daily  Patient not taking: Reported on 7/11/2022 8/28/21   Sarah Viera MD     I have reviewed home medications with patient family member      Allergies: No Known Allergies    Social History:     Marital Status: Single   Occupation:  Disabled  Patient Pre-hospital Living Situation:  Lives with parents  Patient Pre-hospital Level of Mobility:  Independent  Patient Pre-hospital Diet Restrictions:  Diabetic diet  Substance Use History:   Social History     Substance and Sexual Activity   Alcohol Use Yes    Alcohol/week: 0 0 - 1 0 standard drinks    Comment: ocassionally     Social History     Tobacco Use   Smoking Status Current Some Day Smoker    Packs/day: 0 25    Years: 5 00    Pack years: 1 25    Types: Cigarettes    Last attempt to quit: 2016    Years since quittin 2   Smokeless Tobacco Current User    Types: Chew   Tobacco Comment    1 pack/2-3 wks     Social History     Substance and Sexual Activity   Drug Use No       Family History:    non-contributory    Physical Exam:     Vitals:   Blood Pressure: 158/98 (22)  Pulse: 91 (22)  Temperature: 98 1 °F (36 7 °C) (22)  Temp Source: Oral (22)  Respirations: 17 (22)  Height: 5' 9" (175 3 cm) (22)  Weight - Scale: 67 6 kg (149 lb 0 5 oz) (22)  SpO2: 93 % (22)    Physical Exam  Vitals and nursing note reviewed  Constitutional:       Appearance: He is well-developed  HENT:      Head: Normocephalic and atraumatic  Eyes:      Extraocular Movements: Extraocular movements intact  Pupils: Pupils are equal, round, and reactive to light  Neck:      Thyroid: No thyromegaly  Vascular: No JVD  Trachea: No tracheal deviation  Cardiovascular:      Rate and Rhythm: Normal rate and regular rhythm  Heart sounds: Normal heart sounds  Pulmonary:      Effort: Pulmonary effort is normal  No respiratory distress  Breath sounds: Normal breath sounds  No wheezing or rales  Comments: Breath sounds clear diminished bilateral, on room air, respirations easy  Abdominal:      General: Bowel sounds are normal  There is no distension  Palpations: Abdomen is soft  Tenderness: There is no abdominal tenderness  There is no guarding  Musculoskeletal:         General: No swelling or deformity  Cervical back: Neck supple        Right lower leg: No edema  Left lower leg: No edema  Skin:     General: Skin is warm and dry  Neurological:      Mental Status: He is alert  Comments: Patient awake alert, oriented to place and person, follows commands  Left facial droop, mild right-sided weakness 4/5 on exam,rest extremity strength 5/5  Psychiatric:         Judgment: Judgment normal          Additional Data:     Lab Results: I have personally reviewed pertinent reports  Results from last 7 days   Lab Units 07/10/22  2357   WBC Thousand/uL 6 99   HEMOGLOBIN g/dL 14 7   HEMATOCRIT % 41 8   PLATELETS Thousands/uL 197   NEUTROS PCT % 76*   LYMPHS PCT % 17   MONOS PCT % 6   EOS PCT % 1     Results from last 7 days   Lab Units 07/10/22  2357   POTASSIUM mmol/L 3 5   CHLORIDE mmol/L 108   CO2 mmol/L 23   BUN mg/dL 12   CREATININE mg/dL 0 69   CALCIUM mg/dL 8 4   ALK PHOS U/L 58   ALT U/L 50   AST U/L 26     Results from last 7 days   Lab Units 07/10/22  2357   INR  0 98       Imaging: I have personally reviewed pertinent reports  Chest x-ray reviewed    EKG, Pathology, and Other Studies Reviewed on Admission:   · EKG:  Sinus tach    Allscripts Records Reviewed: Yes     ** Please Note: Dragon 360 Dictation voice to text software may have been used in the creation of this document   **

## 2022-07-11 NOTE — ASSESSMENT & PLAN NOTE
History of alcohol abuse but no history of alcohol withdrawal per parents  · Was on CIWA protocol  No signs of withdrawal  · Complete alcohol cessation discussed but patient does not seem interested    Discussed to at least cut down on the amount of alcohol he consumes

## 2022-07-11 NOTE — ASSESSMENT & PLAN NOTE
Lab Results   Component Value Date    HGBA1C 4 6 08/28/2021       No results for input(s): POCGLU in the last 72 hours  Blood Sugar Average: Last 72 hrs:     Patient is on Basaglar 10 units subQ HS    · Substitute Basaglar with Lantus 10 units subQ HS  · SSI  · Check A1c

## 2022-07-11 NOTE — ASSESSMENT & PLAN NOTE
Lab Results   Component Value Date    HGBA1C 4 9 07/11/2022       Recent Labs     07/12/22  0602 07/12/22  0703 07/12/22  1206 07/12/22  1613   POCGLU 91 80 196* 148*     Patient is on Basaglar 10 units subQ HS, decreased to 7 units on discharge as patient noted to have low blood sugars in the a m  at times  · Patient/mother advised to check blood sugars at home and if it starts to run high, he can go back to the 10 units

## 2022-07-11 NOTE — ASSESSMENT & PLAN NOTE
Suspect due to alcohol intoxication versus acute CVA versus both    · UA no evidence of infection  · UDS negative  · Monitor mental status

## 2022-07-11 NOTE — ED PROVIDER NOTES
History  Chief Complaint   Patient presents with    Altered Mental Status     Patient c/o fall and could not get up, slurring words, facial grimacing started prior to arrival, patient alert, unsure of date and president, aware he is in the hospital      Patient ER with EMS with altered mental status  Patient's mother states that she last saw him normal at 6:30p  He came down at approx 11p, attempted to making himself some food and fell  Per medics, patient leaning to the right with slurred speech  Patient has a prior medical history of diabetes, hypertension, hyperlipidemia, brain abscesses, diverticulitis, chronic abdominal pain, anemia  History provided by:  Patient, EMS personnel and parent  History limited by:  Acuity of condition and mental status change   used: No        Prior to Admission Medications   Prescriptions Last Dose Informant Patient Reported? Taking?    BD Pen Needle Beba 2nd Gen 32G X 4 MM MISC   No No   Sig: use 1 PEN NEEDLE to inject MEDICATION subcutaneously four times a day   Melatonin 5 MG TABS  Self Yes No   Sig: Take by mouth   amLODIPine (NORVASC) 5 mg tablet Not Taking at Unknown time  No No   Sig: Take 1 tablet (5 mg total) by mouth daily   Patient not taking: Reported on 7/11/2022   atorvastatin (LIPITOR) 10 mg tablet Not Taking at Unknown time  No No   Sig: Take 1 tablet (10 mg total) by mouth daily   Patient not taking: Reported on 7/11/2022   baclofen 10 mg tablet   No No   Sig: Take 0 5 tablets (5 mg total) by mouth 2 (two) times a day   donepezil (ARICEPT) 5 mg tablet Not Taking at Unknown time  No No   Sig: Take 1 tablet (5 mg total) by mouth daily at bedtime   Patient not taking: Reported on 7/11/2022   insulin glargine (Basaglar KwikPen) 100 units/mL injection pen   No No   Sig: Inject 10 Units under the skin daily   Patient taking differently: Inject 10 Units under the skin daily at bedtime   lisinopril (ZESTRIL) 10 mg tablet Not Taking at Unknown time  No No   Sig: Take 1 tablet (10 mg total) by mouth daily   Patient not taking: Reported on 2022      Facility-Administered Medications: None       Past Medical History:   Diagnosis Date    Abdominal cyst     Anemia     Hx     Chronic pain disorder     abdominal    Diabetes mellitus ([facility] [address] )     Diverticulitis     GI bleed     History of transfusion     5 units    Lightheadedness     Multiple brain abscesses     Multiple lesions on computed tomography of brain and spine     Spleen laceration        Past Surgical History:   Procedure Laterality Date    ABDOMINAL SURGERY      lap removal of mass post spleen injury    COLONOSCOPY N/A 2017    Procedure: COLONOSCOPY;  Surgeon: Sharmin Garza MD;  Location: [facility]; Service:    Carlin Pichardo EGD AND COLONOSCOPY N/A 03/15/2017    Procedure: EGD AND COLONOSCOPY;  Surgeon: Sharmin Garza MD;  Location: [facility]; Service:     OTHER SURGICAL HISTORY      per [employer]-had spleen surgery; no other details    UPPER GASTROINTESTINAL ENDOSCOPY      for gastric bleeding       Family History   Problem Relation Age of Onset    No Known Problems Mother     Diabetes Father     No Known Problems Sister     No Known Problems Brother      I have reviewed and agree with the history as documented  E-Cigarette/Vaping    E-Cigarette Use Never User      E-Cigarette/Vaping Substances     Social History     Tobacco Use    Smoking status: Current Some Day Smoker     Packs/day: 0 25     Years: 5 00     Pack years: 1 25     Types: Cigarettes     Last attempt to quit: 2016     Years since quittin 2    Smokeless tobacco: Current User     Types: Chew    Tobacco comment: 1 pack/2-3 wks   Vaping Use    Vaping Use: Never used   Substance Use Topics    Alcohol use: Yes     Alcohol/week: 0 0 - 1 0 standard drinks     Comment: ocassionally    Drug use: No       Review of Systems   Constitutional: Negative for chills and fever  Respiratory: Negative for cough, shortness of breath and wheezing  Cardiovascular: Negative for chest pain and palpitations  Gastrointestinal: Negative for abdominal pain, constipation, diarrhea, nausea and vomiting  Genitourinary: Negative for dysuria, flank pain, hematuria and urgency  Musculoskeletal: Negative for back pain  Skin: Negative for color change and rash  Neurological: Positive for speech difficulty and weakness  All other systems reviewed and are negative  Physical Exam  Physical Exam  Vitals and nursing note reviewed  Constitutional:       Appearance: He is well-developed  HENT:      Head: Normocephalic and atraumatic  Eyes:      Extraocular Movements: Extraocular movements intact  Pupils: Pupils are equal, round, and reactive to light  Cardiovascular:      Rate and Rhythm: Normal rate and regular rhythm  Heart sounds: Normal heart sounds  Pulmonary:      Effort: Pulmonary effort is normal       Breath sounds: Normal breath sounds  Abdominal:      General: Bowel sounds are normal  There is no distension  Palpations: Abdomen is soft  There is no mass  Tenderness: There is no abdominal tenderness  There is no guarding or rebound  Skin:     General: Skin is warm and dry  Capillary Refill: Capillary refill takes less than 2 seconds  Neurological:      Mental Status: He is alert  He is disoriented and confused  GCS: GCS eye subscore is 4  GCS verbal subscore is 4  GCS motor subscore is 6  Psychiatric:         Behavior: Behavior normal          Thought Content:  Thought content normal          Judgment: Judgment normal          Vital Signs  ED Triage Vitals [07/10/22 2351]   Temperature Pulse Respirations Blood Pressure SpO2   98 8 °F (37 1 °C) (!) 106 18 148/89 94 %      Temp Source Heart Rate Source Patient Position - Orthostatic VS BP Location FiO2 (%)   Tympanic Monitor Lying Right arm --      Pain Score       --           Vitals: 07/11/22 0045 07/11/22 0100 07/11/22 0145 07/11/22 0200   BP: 148/84 146/84 148/86 (!) 149/102   Pulse: (!) 108 94 98 92   Patient Position - Orthostatic VS:   Lying Lying         Visual Acuity  Visual Acuity    Flowsheet Row Most Recent Value   L Pupil Size (mm) 4   R Pupil Size (mm) 4          ED Medications  Medications   atorvastatin (LIPITOR) tablet 80 mg (has no administration in time range)   iohexol (OMNIPAQUE) 300 mg/mL injection 50 mL (70 mL Intravenous Given 7/11/22 0021)   aspirin tablet 325 mg (325 mg Oral Given 7/11/22 0152)       Diagnostic Studies  Results Reviewed     Procedure Component Value Units Date/Time    HS Troponin I 2hr [[id number]] Collected: 07/11/22 0156    Lab Status: In process Specimen: Blood from Hand, Left Updated: 07/11/22 0158    Rapid drug screen, urine [[id number]]  (Normal) Collected: 07/11/22 0045    Lab Status: Final result Specimen: Urine, Clean Catch Updated: 07/11/22 0119     Amph/Meth UR Negative     Barbiturate Ur Negative     Benzodiazepine Urine Negative     Cocaine Urine Negative     Methadone Urine Negative     Opiate Urine Negative     PCP Ur Negative     THC Urine Negative     Oxycodone Urine Negative    Narrative:      FOR MEDICAL PURPOSES ONLY  IF CONFIRMATION NEEDED PLEASE CONTACT THE LAB WITHIN 5 DAYS      Drug Screen Cutoff Levels:  AMPHETAMINE/METHAMPHETAMINES  1000 ng/mL  BARBITURATES     200 ng/mL  BENZODIAZEPINES     200 ng/mL  COCAINE      300 ng/mL  METHADONE      300 ng/mL  OPIATES      300 ng/mL  PHENCYCLIDINE     25 ng/mL  THC       50 ng/mL  OXYCODONE      100 ng/mL    Urine Microscopic [[id number]]  (Abnormal) Collected: 07/11/22 0045    Lab Status: Final result Specimen: Urine, Clean Catch Updated: 07/11/22 0118     RBC, UA 0-1 /hpf      WBC, UA 0-1 /hpf      Epithelial Cells Occasional /hpf      Bacteria, UA None Seen /hpf      Hyaline Casts, UA 0-1 /lpf     UA (URINE) with reflex to Scope [[id number]]  (Abnormal) Collected: 07/11/22 0045    Lab Status: Final result Specimen: Urine, Clean Catch Updated: 07/11/22 0105     Color, UA Light Yellow     Clarity, UA Clear     Specific Gravity, UA <=1 005     pH, UA 5 5     Leukocytes, UA Negative     Nitrite, UA Negative     Protein, UA Negative mg/dl      Glucose, UA Negative mg/dl      Ketones, UA Negative mg/dl      Urobilinogen, UA 0 2 E U /dl      Bilirubin, UA Negative     Occult Blood, UA Trace-Intact    Ethanol [[id number]]  (Abnormal) Collected: 07/10/22 2357    Lab Status: Final result Specimen: Blood from Arm, Right Updated: 07/11/22 0048     Ethanol Lvl 257 mg/dL     HS Troponin I 4hr [[id number]]     Lab Status: No result Specimen: Blood     HS Troponin 0hr (reflex protocol) [[id number]]  (Normal) Collected: 07/10/22 2357    Lab Status: Final result Specimen: Blood from Arm, Right Updated: 07/11/22 0034     hs TnI 0hr 4 ng/L     Comprehensive metabolic panel [[id number]] Collected: 07/10/22 2357    Lab Status: Final result Specimen: Blood from Arm, Right Updated: 07/11/22 0028     Sodium 144 mmol/L      Potassium 3 5 mmol/L      Chloride 108 mmol/L      CO2 23 mmol/L      ANION GAP 13 mmol/L      BUN 12 mg/dL      Creatinine 0 69 mg/dL      Glucose 84 mg/dL      Calcium 8 4 mg/dL      AST 26 U/L      ALT 50 U/L      Alkaline Phosphatase 58 U/L      Total Protein 7 3 g/dL      Albumin 4 4 g/dL      Total Bilirubin 0 51 mg/dL      eGFR 107 ml/min/1 73sq m     Narrative:      Oneal guidelines for Chronic Kidney Disease (CKD):     Stage 1 with normal or high GFR (GFR > 90 mL/min/1 73 square meters)    Stage 2 Mild CKD (GFR = 60-89 mL/min/1 73 square meters)    Stage 3A Moderate CKD (GFR = 45-59 mL/min/1 73 square meters)    Stage 3B Moderate CKD (GFR = 30-44 mL/min/1 73 square meters)    Stage 4 Severe CKD (GFR = 15-29 mL/min/1 73 square meters)    Stage 5 End Stage CKD (GFR <15 mL/min/1 73 square meters)  Note: GFR calculation is accurate only with a steady state creatinine Magnesium [[id number]]  (Normal) Collected: 07/10/22 2357    Lab Status: Final result Specimen: Blood from Arm, Right Updated: 07/11/22 0028     Magnesium 2 1 mg/dL     Protime-INR [[id number]]  (Normal) Collected: 07/10/22 2357    Lab Status: Final result Specimen: Blood from Arm, Right Updated: 07/11/22 0021     Protime 12 8 seconds      INR 0 98    APTT [[id number]]  (Normal) Collected: 07/10/22 2357    Lab Status: Final result Specimen: Blood from Arm, Right Updated: 07/11/22 0021     PTT 30 seconds     CBC and differential [[id number]]  (Abnormal) Collected: 07/10/22 2357    Lab Status: Final result Specimen: Blood from Arm, Right Updated: 07/11/22 0008     WBC 6 99 Thousand/uL      RBC 3 90 Million/uL      Hemoglobin 14 7 g/dL      Hematocrit 41 8 %       fL      MCH 37 7 pg      MCHC 35 2 g/dL      RDW 14 4 %      MPV 9 1 fL      Platelets 897 Thousands/uL      nRBC 0 /100 WBCs      Neutrophils Relative 76 %      Immat GRANS % 0 %      Lymphocytes Relative 17 %      Monocytes Relative 6 %      Eosinophils Relative 1 %      Basophils Relative 0 %      Neutrophils Absolute 5 33 Thousands/µL      Immature Grans Absolute 0 03 Thousand/uL      Lymphocytes Absolute 1 15 Thousands/µL      Monocytes Absolute 0 43 Thousand/µL      Eosinophils Absolute 0 04 Thousand/µL      Basophils Absolute 0 01 Thousands/µL                  XR chest 1 view portable    (Results Pending)   CTA head and neck with and without contrast    (Results Pending)              Procedures  ECG 12 Lead Documentation Only    Date/Time: 7/11/2022 12:25 AM  Performed by: Wen Turner DO  Authorized by: Wen Turner DO     Indications / Diagnosis:  Weakness  ECG reviewed by me, the ED Provider: yes    Patient location:  ED  Previous ECG:     Comparison to cardiac monitor: Yes    Interpretation:     Interpretation: normal    Rate:     ECG rate:  101bpm    ECG rate assessment: tachycardic    Rhythm:                 Rhythm: sinus tachycardia    Ectopy:     Ectopy: none    QRS:     QRS axis:  Normal  Conduction:     Conduction: normal    ST segments:     ST segments:  Normal  T waves:     T waves: normal               ED Course                                          Stroke Assessment     Row Name 07/10/22 2340             NIH Stroke Scale    Interval Baseline      Level of Consciousness (1a ) 0      LOC Questions (1b ) 1      LOC Commands (1c ) 0      Best Gaze (2 ) 0      Visual (3 ) 0      Facial Palsy (4 ) 2      Motor Arm, Left (5a ) 0      Motor Arm, Right (5b ) 2      Motor Leg, Left (6a ) 0      Motor Leg, Right (6b ) 0      Limb Ataxia (7 ) 1      Sensory (8 ) 0      Best Language (9 ) 0      Dysarthria (10 ) 1      Extinction and Inattention (11 ) (Formerly Neglect) 0      Total 7              Flowsheet Row Most Recent Value   TPA Decision Options    TPA Decision Patient not a TPA candidate  Patient is not a candidate options Unclear time of onset outside appropriate time window  SBIRT 20yo+    Flowsheet Row Most Recent Value   SBIRT (23 yo +)    In order to provide better care to our patients, we are screening all of our patients for alcohol and drug use  Would it be okay to ask you these screening questions? No Filed at: 07/11/2022 0015                    MDM  Number of Diagnoses or Management Options  Acute alcohol intoxication ([facility] [address] ): new and requires workup  Acute right-sided weakness: new and requires workup  Altered mental status: new and requires workup     Amount and/or Complexity of Data Reviewed  Clinical lab tests: ordered and reviewed  Tests in the radiology section of CPT®: ordered and reviewed    Risk of Complications, Morbidity, and/or Mortality  Presenting problems: high  Diagnostic procedures: high  Management options: high    Patient Progress  Patient progress: improved      Disposition  Final diagnoses:    Altered mental status   Acute right-sided weakness   Acute alcohol intoxication ([facility] [address] ) Time reflects when diagnosis was documented in both MDM as applicable and the Disposition within this note     Time User Action Codes Description Comment    7/11/2022  1:32 AM Rylee Mclaughlinter O Add [R41 82] Altered mental status     7/11/2022  1:33 AM Rylee Donovan O Add [R53 1] Acute right-sided weakness     7/11/2022  1:33 AM Rylee Donovan O Add [F10 929] Acute alcohol intoxication [facility])       ED Disposition     ED Disposition   Admit    Condition   Stable    Date/Time   Mon Jul 11, 2022  1:32 AM    Comment   Case was discussed with Maribel COBB and the patient's admission status was agreed to be Admission Status: observation status to the service of Dr Maria Luz Pereira   Follow-up Information    None         Patient's Medications   Discharge Prescriptions    No medications on file       No discharge procedures on file      PDMP Review     None          ED Provider  Electronically Signed by           Doritaor DO Ventura  07/11/22 0206

## 2022-07-11 NOTE — ASSESSMENT & PLAN NOTE
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, right eye double vision and spasticity    · Continue baclofen

## 2022-07-11 NOTE — ASSESSMENT & PLAN NOTE
45year old male with DM2, HTN, HLD, prior strep angiosus brain abscesses s/p craniotomy with residual R sided weakness admitted with altered mental status, fall and worsened slurred speech  Patient was noted to have ethanol level of 257 on admission  He also reports that he thinks he took an additional dose of Baclofen yesterday  MRI brain with and without contrast completed and no acute findings noted  Strong suspicion encephalopathy was in setting of alcohol intoxication as well as possible contribution from taking too much Baclofen as patient reports that he accidentally took extra dose of medication  Plan:   - Encourage alcohol cessation    - Encourage patient to be mindful of medication dosing and to consider using pill box to ensure that he is taking appropriate doses of medication

## 2022-07-11 NOTE — ASSESSMENT & PLAN NOTE
- Lipid panel reviewed, total cholesterol 150, triglycerides 391, HDL 46, LDL 26    - Continue on home dose of atorvastatin 10 mg QPM

## 2022-07-11 NOTE — PHYSICAL THERAPY NOTE
PHYSICAL THERAPY EVALUATION/TREATMENT       07/11/22 1116   PT Last Visit   PT Visit Date 07/11/22   Note Type   Note type Evaluation   Pain Assessment   Pain Assessment Tool 0-10   Pain Score No Pain   Hospital Pain Intervention(s) Repositioned   Multiple Pain Sites No   Restrictions/Precautions   Weight Bearing Precautions Per Order No   Other Precautions Fall Risk   Home Living   Type of [address]   [address] [facility]   Additional Comments Pt reports walker at home but father uses full time   Prior Function   Level of [place] Independent with ADLs and functional mobility   Lives With [employer] Help From Family   ADL Assistance Independent   IADLs Independent   General   Additional Pertinent History Pt is a 45year-old male who was admitted to the hospital on 7/10/22 due to AMS with fall at home  Pt with significant PMH for alcohol intoxication, brain abcess 2019   Family/Caregiver Present No   Cognition   Overall Cognitive Status WFL   Arousal/Participation Alert   Orientation Level Oriented X4   Memory Within functional limits   Following Commands Follows all commands and directions without difficulty   Subjective   Subjective "I am feeling a little better but I haven't been out of bed and I usually wear sneakers for support "   RLE Assessment   RLE Assessment WFL   LLE Assessment   LLE Assessment WFL   Coordination   Movements are Fluid and Coordinated 1   Finger to Nose & Finger to Finger  Intact   Heel to Shin Intact   Bed Mobility   Rolling R 7  Independent   Rolling L 7  Independent   Supine to Sit 7  Independent   Sit to Supine 7  Independent   Transfers   Sit to Stand 4  Minimal assistance   Additional items Assist x 1;Verbal cues   Stand to Sit 5  Supervision   Additional items Assist x 1   Ambulation/Elevation   Gait pattern Wide ANDRIY; Short stride  (Patient with unsteadiness upon standing with signficant posterior lean - unable to progress forward without AD/cane )   Gait Assistance 4  Minimal assist   Additional items Assist x 1;Verbal cues   Assistive Device Rolling walker   Distance 60 feet   Balance   Static Sitting Good   Dynamic Sitting Fair +   Static Standing Poor +   Dynamic Standing Poor   Ambulatory Poor   Activity Tolerance   Activity Tolerance Patient tolerated treatment well;Patient limited by fatigue   Medical Staff Made Aware yes   Nurse Made Aware GUNNAR Malik   Assessment   Prognosis Good   Problem List Decreased strength;Decreased endurance; Impaired balance;Decreased mobility; Decreased safety awareness   Assessment Patient seen for Physical Therapy evaluation  Patient admitted with Stroke-like symptoms  Comorbidities affecting patient's physical performance include: alcohol abuse, diabetes, falls and hypertension  Personal factors affecting patient at time of initial evaluation include: lives in 2 story house, ambulating with assistive device and positive fall history  Prior to admission, patient was independent with functional mobility with no AD when in the house, uses cane out of the house on uneven terrain, independent with ADLS, independent with IADLS, ambulating community distances and unemployed  Please find objective findings from Physical Therapy assessment regarding body systems outlined above with impairments and limitations including weakness, decreased ROM, impaired balance, decreased endurance, impaired coordination, gait deviations, decreased activity tolerance, decreased functional mobility tolerance and fall risk  The Barthel Index was used as a functional outcome tool presenting with a score of Barthel Index Score: 45 today indicating marked limitations of functional mobility and ADLS  Patient's clinical presentation is currently unstable/unpredictable as seen in patient's presentation of increased fall risk and decreased endurance   Pt would benefit from continued Physical Therapy treatment to address deficits as defined above and maximize level of functional mobility  As demonstrated by objective findings, the assigned level of complexity for this evaluation is high  The patient's AM-PAC Basic Mobility Inpatient Short Form Raw Score is 18  A Raw score of greater than 16 suggests the patient may benefit from discharge to home  Recommending home PT  Goals   Patient Goals To go home   STG Expiration Date 07/18/22   Short Term Goal #1 Pt will be able to perform all bed mobility/transfers IND   Short Term Goal #2 Pt will ambulate x 150 feet with appropriate device ; assess stairs for home with Superiviosn   LTG Expiration Date 07/25/22   Long Term Goal #1 Pt will ambulate x 250 feet with appropriate device with Supervision   Long Term Goal #2 Pt will negotiate x 1 flight of stairs with access second level of home with [address] [place] [address]   Treatment/Interventions ADL retraining;Functional transfer training;LE strengthening/ROM; Therapeutic exercise;Gait training;Spoke to nursing   PT Frequency 5-7x/wk   Recommendation   PT Discharge Recommendation Home with home health rehabilitation   Equipment Recommended [address] [facility] Recommended Wheeled walker   AM-PAC Basic Mobility Inpatient   Turning in Bed Without Bedrails 4   Lying on Back to Sitting on Edge of Flat Bed 4   Moving Bed to Chair 3   Standing Up From Chair 3   Walk in Room 3   Climb 3-5 Stairs 1   Basic Mobility Inpatient Raw Score 18   Basic Mobility Standardized Score 41 05   Highest Level Of Mobility   JH-HLM Goal 6: Walk 10 steps or more   JH-HLM Achieved 7: Walk 25 feet or more   Modified Charis Scale   Modified [place] Scale 4   Barthel Index   Feeding 10   Bathing 0   Grooming Score 5   Dressing Score 5   Bladder Score 10   Bowels Score 10   Toilet Use Score 0   Transfers (Bed/Chair) Score 5   Mobility (Level Surface) Score 0   Stairs Score 0   Barthel Index Score 45   Additional Treatment Session   Start Time 1105   End Time 1115   Treatment Assessment S: "I am feeling much better today " O: Pt received sitting in bedside chair with all needs within reach  A: Pt agreeable to participate in PT session  Pt able to perform bed mobility with no difficulties but presents with signficant unsteadiness upon standing  Attempted to stand/ambulate without device + straight cane but patient unable to progress forward  Able to progress with use of RW but continues to present with unsteadiness and requires Min A for safety, intermittent Supervision as sesssion progressed  Equipment Use walker   End of Consult   Patient Position at End of Consult Supine;Bed/Chair alarm activated; All needs within reach   [facility] Insurance Number  Tonie Terry [id number]

## 2022-07-11 NOTE — CONSULTS
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 admitted with altered mental status, fall and worsened slurred speech  Patient was noted to have ethanol level of 257 on admission  He also reports that he thinks he took an additional dose of Baclofen yesterday  MRI brain with and without contrast completed and no acute findings noted  Strong suspicion encephalopathy was in setting of alcohol intoxication as well as possible contribution from taking too much Baclofen as patient reports that he accidentally took extra dose of medication  Plan:   - Encourage alcohol cessation    - Encourage patient to be mindful of medication dosing and to consider using pill box to ensure that he is taking appropriate doses of medication       Alcohol intoxication ([facility] [address] )  Assessment & Plan  - Ethanol level 257 on admission    - Encourage cessation    - Thaimine and folic acid as per medicine team    - Monitor for signs/symtpoms of withdrawal     Type 2 diabetes mellitus without complication, with long-term current use of insulin [facility])  Assessment & Plan  Lab Results   Component Value Date    HGBA1C 4 9 07/11/2022       Recent Labs     07/11/22  0610 07/11/22  0634 07/11/22  0727 07/11/22  1100   POCGLU 55* 55* 129 68       Blood Sugar Average: Last 72 hrs:  (P) 73 6     - Goal euglycemia    - Management as per medicine team     Benign essential hypertension  Assessment & Plan  - Goal normotension    - Management as per medicine team     S/P craniotomy  Assessment & Plan  - Lipid panel reviewed, total cholesterol 150, triglycerides 391, HDL 46, LDL 26    - Continue on home dose of atorvastatin 10 mg QPM     Tobacco use disorder  Assessment & Plan  - Encourage cessation  Yelitza Hand will not need outpatient follow up with Neurology  He will not require outpatient neurological testing  History of Present Illness     Reason for Consult / Principal Problem: Stroke-like symptoms  HPI: Yelitza Hand is a 45 y o  left handed male with DM2, HTN, HLD, prior strep angiosus brain abscesses s/p craniotomy with residual right sided weakness who presents with altered mental status  To review, patient was seen by PCP in August 2021 and at that time was noted to be noncompliant with medical treatment and had stopped his medications except for insulin  During office visit at that time, tona lorenzo recommended to restart atorvastatin 10 mg QPM, continue with insulin for diabetes and also restart Amlodipine 5 mg QD and Lisinopril 10 mg QD  He was referred to PT and also was started on Aricept 5 mg QHS  Patient presented to the hospital on July 10, 2022 with altered mental status, slurred speech and fall  He was noted to have ethanol level 257 on admission  Patient reports that he fell at home and had a difficult time getting up and that his mother was concerned and had him brought to the hospital  He notes he feels at his baseline this morning and notes that his speech is somewhat slurred at baseline since his prior brain abscess and surgery  He notes that he drank 4 White Claw seltzers yesterday but denies drinking ETOH every day  He also notes that he thinks he may have accidentally taken extra Baclofen yesterday  He notes he thinks he forgot that he had already taken it and took another dose of the medication  He reports he feels completely at baseline now and notes that he does have RUE weakness and spasticity as well as double vision and slurred speech at baseline  Inpatient consult to Neurology  Consult performed by: hSo Chavis PA-C  Consult ordered by: TIM Loco          Review of Systems   Constitutional: Negative for chills, fatigue and fever  HENT: Negative for trouble swallowing  Eyes: Positive for visual disturbance  Respiratory: Negative for shortness of breath  Cardiovascular: Negative for chest pain  Gastrointestinal: Negative for abdominal pain, nausea and vomiting  Genitourinary: Negative for difficulty urinating and dysuria  Musculoskeletal: Negative for back pain, gait problem and neck pain  Skin: Negative for rash  Neurological: Positive for speech difficulty and weakness  Negative for dizziness, facial asymmetry, light-headedness, numbness and headaches  Psychiatric/Behavioral: Negative for confusion  Historical Information   Past Medical History:   Diagnosis Date    Abdominal cyst     Anemia     Hx     Chronic pain disorder     abdominal    Diabetes mellitus ([address] )     Diverticulitis     GI bleed     History of transfusion 2016    5 units    Lightheadedness     Multiple brain abscesses 2020    Multiple lesions on computed tomography of brain and spine     Spleen laceration      Past Surgical History:   Procedure Laterality Date    ABDOMINAL SURGERY  2016    lap removal of mass post spleen injury    COLONOSCOPY N/A 05/24/2017    Procedure: COLONOSCOPY;  Surgeon: Aaron Spaulding MD;  Location: [facility]; Service:    [place] EGD AND COLONOSCOPY N/A 03/15/2017    Procedure: EGD AND COLONOSCOPY;  Surgeon: Aaron Spaulding MD;  Location: [facility];   Service:     OTHER SURGICAL HISTORY      per [employer]-had spleen surgery; no other details    UPPER GASTROINTESTINAL ENDOSCOPY      for gastric bleeding     Social History   Social History     Substance and Sexual Activity   Alcohol Use Yes    Alcohol/week: 0 0 - 1 0 standard drinks    Comment: ocassionally     Social History     Substance and Sexual Activity   Drug Use No     E-Cigarette/Vaping    E-Cigarette Use Never User      E-Cigarette/Vaping Substances     Social History     Tobacco Use   Smoking Status Current Some Day Smoker    Packs/day: 0 25    Years: 5 00    Pack years: 1 25    Types: Cigarettes    Last attempt to quit: 2016    Years since quittin 2   Smokeless Tobacco Current User    Types: Chew   Tobacco Comment    1 pack/2-3 wks     Family History:   Family History   Problem Relation Age of Onset    No Known Problems Mother     Diabetes Father     No Known Problems Sister     No Known Problems Brother        Review of previous medical records was completed  Please see HPI  Meds/Allergies   Scheduled Meds:  Current Facility-Administered Medications   Medication Dose Route Frequency Provider Last Rate    acetaminophen  650 mg Oral Q6H PRN Cuanastacia Lewis, CHLOENP      aspirin  81 mg Oral Daily Cuiamada Lewis, CHLOENP      atorvastatin  80 mg Oral Daily With Ricky & Lilia, TIM      baclofen  5 mg Oral BID Cuanastacia Lewis, TIM      dextrose 5 % and sodium chloride 0 45 % with KCl 20 mEq/L  75 mL/hr Intravenous Continuous Cuiaydenn Joshua, CRNP 75 mL/hr (22)    enoxaparin  40 mg Subcutaneous Daily TIM Myles      famotidine  20 mg Intravenous Q12H [address] Cuiaydenn Joshua, CRNP      folic acid  1 mg Oral Daily Cuiamada Lewis, CRBROOKE      insulin glargine  10 Units Subcutaneous HS Cuanastacia Lewis, CRBROOKE      insulin lispro  1-5 Units Subcutaneous Q6H [address] Cuiamada Lewis, CRBROOKE      multivitamin-minerals  1 tablet Oral Daily Cuanastacia Lewis, CHLOENP      nicotine  1 patch Transdermal Daily TIM Myles      ondansetron  4 mg Intravenous Q6H PRN Cuanastacia Lewis, CRNP      thiamine  100 mg Oral Daily Cuiamada Lewis, TIM       Continuous Infusions:dextrose 5 % and sodium chloride 0 45 % with KCl 20 mEq/L, 75 mL/hr, Last Rate: 75 mL/hr (22)      PRN Meds:   acetaminophen    ondansetron    No Known Allergies    Objective   Vitals:Blood pressure (!) 165/104, pulse 93, temperature 98 5 °F (36 9 °C), temperature source Oral, resp  rate 19, height 5' 9" (1 753 m), weight 67 6 kg (149 lb 0 5 oz), SpO2 96 %  ,Body mass index is 22 01 kg/m²  Intake/Output Summary (Last 24 hours) at 7/11/2022 0925  Last data filed at 7/11/2022 0536  Gross per 24 hour   Intake 1940 ml   Output 500 ml   Net 1440 ml       Invasive Devices: Invasive Devices  Report    Peripheral Intravenous Line  Duration           Peripheral IV 07/11/22 Left Forearm <1 day                Physical Exam  Constitutional:       General: He is not in acute distress  Appearance: Normal appearance  He is not ill-appearing, toxic-appearing or diaphoretic  HENT:      Head: Normocephalic and atraumatic  Mouth/Throat:      Mouth: Mucous membranes are moist       Pharynx: Oropharynx is clear  No oropharyngeal exudate or posterior oropharyngeal erythema  Eyes:      General: No scleral icterus  Right eye: No discharge  Left eye: No discharge  Extraocular Movements: Extraocular movements intact  Conjunctiva/sclera: Conjunctivae normal       Pupils: Pupils are equal, round, and reactive to light  Cardiovascular:      Rate and Rhythm: Normal rate and regular rhythm  Pulmonary:      Effort: Pulmonary effort is normal  No respiratory distress  Breath sounds: Normal breath sounds  Abdominal:      General: There is no distension  Palpations: Abdomen is soft  Tenderness: There is no abdominal tenderness  Musculoskeletal:         General: Normal range of motion  Cervical back: Normal range of motion and neck supple  Right lower leg: No edema  Left lower leg: No edema  Skin:     General: Skin is warm and dry  Findings: No erythema or rash  Neurological:      Mental Status: He is alert and oriented to person, place, and time  Coordination: Finger-Nose-Finger Test abnormal (RUE)  Heel to [place] Tae Test normal       Deep Tendon Reflexes:      Reflex Scores:       Bicep reflexes are 2+ on the right side and 2+ on the left side         Brachioradialis reflexes are 2+ on the right side and 2+ on the left side        Patellar reflexes are 2+ on the right side and 2+ on the left side  Achilles reflexes are 2+ on the right side and 2+ on the left side  Psychiatric:         Mood and Affect: Mood normal          Speech: Speech is slurred  Behavior: Behavior normal        Neurologic Exam     Mental Status   Oriented to person, place, and time  Oriented to person  Oriented to place  Oriented to time  Attention: normal  Concentration: normal    Speech: slurred   Level of consciousness: alert  Able to name object  Able to repeat  Normal comprehension  Able to spell word "world" forward but not backward  Able to perform simple calculations  Cranial Nerves     CN III, IV, VI   Pupils are equal, round, and reactive to light  Nystagmus: none   Diplopia: none  Ophthalmoparesis: none  Upgaze: normal  Downgaze: normal  Conjugate gaze: present    CN V   Right facial sensation deficit: none  Left facial sensation deficit: none    CN VII   Right facial weakness: none  Left facial weakness: none    CN VIII   Hearing: intact    CN IX, X   Palate: symmetric    CN XI   Right sternocleidomastoid strength: normal  Left sternocleidomastoid strength: normal    CN XII   Tongue: not atrophic  Fasciculations: absent  Tongue deviation: none  Patient with poor visual acuity B/L and this limits ability to reliably assess visual fields  Motor Exam   Muscle bulk: normal  Overall muscle tone: normal  Right arm tone: increased  Left arm tone: normal  Right arm pronator drift: absent  Left arm pronator drift: absent  Right leg tone: normal  Left leg tone: normalMotor strength 5/5 B/L UE and LE except for R deltoid 4+/5       Sensory Exam   Right arm light touch: normal  Left arm light touch: normal  Right leg light touch: normal  Left leg light touch: normal  Right arm vibration: decreased from wrist  Left arm vibration: normal  Right leg vibration: normal  Left leg vibration: normal    Gait, Coordination, and Reflexes Gait  Gait: (Deferred for safety )    Coordination   Finger to nose coordination: abnormal (RUE)  Heel to shin coordination: normal    Tremor   Resting tremor: absent  Intention tremor: absent  Action tremor: absent    Reflexes   Right brachioradialis: 2+  Left brachioradialis: 2+  Right biceps: 2+  Left biceps: 2+  Right patellar: 2+  Left patellar: 2+  Right achilles: 2+  Left achilles: 2+  Right plantar: normal  Left plantar: normal      Lab Results:  Recent Results (from the past 24 hour(s))   CBC and differential    Collection Time: 07/10/22 11:57 PM   Result Value Ref Range    WBC 6 99 4 31 - 10 16 Thousand/uL    RBC 3 90 3 88 - 5 62 Million/uL    Hemoglobin 14 7 12 0 - 17 0 g/dL    Hematocrit 41 8 36 5 - 49 3 %     (H) 82 - 98 fL    MCH 37 7 (H) 26 8 - 34 3 pg    MCHC 35 2 31 4 - 37 4 g/dL    RDW 14 4 11 6 - 15 1 %    MPV 9 1 8 9 - 12 7 fL    Platelets 062 022 - 586 Thousands/uL    nRBC 0 /100 WBCs    Neutrophils Relative 76 (H) 43 - 75 %    Immat GRANS % 0 0 - 2 %    Lymphocytes Relative 17 14 - 44 %    Monocytes Relative 6 4 - 12 %    Eosinophils Relative 1 0 - 6 %    Basophils Relative 0 0 - 1 %    Neutrophils Absolute 5 33 1 85 - 7 62 Thousands/µL    Immature Grans Absolute 0 03 0 00 - 0 20 Thousand/uL    Lymphocytes Absolute 1 15 0 60 - 4 47 Thousands/µL    Monocytes Absolute 0 43 0 17 - 1 22 Thousand/µL    Eosinophils Absolute 0 04 0 00 - 0 61 Thousand/µL    Basophils Absolute 0 01 0 00 - 0 10 Thousands/µL   Protime-INR    Collection Time: 07/10/22 11:57 PM   Result Value Ref Range    Protime 12 8 11 6 - 14 5 seconds    INR 0 98 0 84 - 1 19   APTT    Collection Time: 07/10/22 11:57 PM   Result Value Ref Range    PTT 30 23 - 37 seconds   Comprehensive metabolic panel    Collection Time: 07/10/22 11:57 PM   Result Value Ref Range    Sodium 144 136 - 145 mmol/L    Potassium 3 5 3 5 - 5 3 mmol/L    Chloride 108 100 - 108 mmol/L    CO2 23 21 - 32 mmol/L    ANION GAP 13 4 - 13 mmol/L    BUN 12 5 - 25 mg/dL    Creatinine 0 69 0 60 - 1 30 mg/dL    Glucose 84 65 - 140 mg/dL    Calcium 8 4 8 3 - 10 1 mg/dL    AST 26 5 - 45 U/L    ALT 50 12 - 78 U/L    Alkaline Phosphatase 58 46 - 116 U/L    Total Protein 7 3 6 4 - 8 2 g/dL    Albumin 4 4 3 5 - 5 0 g/dL    Total Bilirubin 0 51 0 20 - 1 00 mg/dL    eGFR 107 ml/min/1 73sq m   HS Troponin 0hr (reflex protocol)    Collection Time: 07/10/22 11:57 PM   Result Value Ref Range    hs TnI 0hr 4 "Refer to ACS Flowchart"- see link ng/L   Magnesium    Collection Time: 07/10/22 11:57 PM   Result Value Ref Range    Magnesium 2 1 1 6 - 2 6 mg/dL   Ethanol    Collection Time: 07/10/22 11:57 PM   Result Value Ref Range    Ethanol Lvl 257 (H) 0 - 3 mg/dL   CK (with reflex to MB)    Collection Time: 07/10/22 11:57 PM   Result Value Ref Range    Total  39 - 308 U/L   CKMB    Collection Time: 07/10/22 11:57 PM   Result Value Ref Range    CK-MB Index 1 6 0 0 - 2 5 %    CK-MB 3 5 0 0 - 5 0 ng/mL   Rapid drug screen, urine    Collection Time: 07/11/22 12:45 AM   Result Value Ref Range    Amph/Meth UR Negative Negative    Barbiturate Ur Negative Negative    Benzodiazepine Urine Negative Negative    Cocaine Urine Negative Negative    Methadone Urine Negative Negative    Opiate Urine Negative Negative    PCP Ur Negative Negative    THC Urine Negative Negative    Oxycodone Urine Negative Negative   UA (URINE) with reflex to Scope    Collection Time: 07/11/22 12:45 AM   Result Value Ref Range    Color, UA Light Yellow     Clarity, UA Clear     Specific Gravity, UA <=1 005 1 000 - 1 030    pH, UA 5 5 5 0, 5 5, 6 0, 6 5, 7 0, 7 5, 8 0, 8 5, 9 0    Leukocytes, UA Negative Negative    Nitrite, UA Negative Negative    Protein, UA Negative Negative mg/dl    Glucose, UA Negative Negative mg/dl    Ketones, UA Negative Negative mg/dl    Urobilinogen, UA 0 2 0 2, 1 0 E U /dl E U /dl    Bilirubin, UA Negative Negative    Occult Blood, UA Trace-Intact (A) Negative   Urine Microscopic    Collection Time: 07/11/22 12:45 AM   Result Value Ref Range    RBC, UA 0-1 None Seen, 0-1, 1-2, 2-4, 0-5 /hpf    WBC, UA 0-1 None Seen, 0-1, 1-2, 0-5, 2-4 /hpf    Epithelial Cells Occasional None Seen, Occasional /hpf    Bacteria, UA None Seen None Seen, Occasional /hpf    Hyaline Casts, UA 0-1 (A) (none) /lpf   HS Troponin I 2hr    Collection Time: 07/11/22  1:56 AM   Result Value Ref Range    hs TnI 2hr 4 "Refer to ACS Flowchart"- see link ng/L    Delta 2hr hsTnI 0 <20 ng/L   Fingerstick Glucose (POCT)    Collection Time: 07/11/22  3:04 AM   Result Value Ref Range    POC Glucose 61 (L) 65 - 140 mg/dl   Lipid Panel with Direct LDL reflex    Collection Time: 07/11/22  4:17 AM   Result Value Ref Range    Cholesterol 150 See Comment mg/dL    Triglycerides 391 (H) See Comment mg/dL    HDL, Direct 46 >=40 mg/dL    LDL Calculated 26 0 - 100 mg/dL   Basic metabolic panel    Collection Time: 07/11/22  4:17 AM   Result Value Ref Range    Sodium 143 136 - 145 mmol/L    Potassium 3 4 (L) 3 5 - 5 3 mmol/L    Chloride 107 100 - 108 mmol/L    CO2 25 21 - 32 mmol/L    ANION GAP 11 4 - 13 mmol/L    BUN 12 5 - 25 mg/dL    Creatinine 0 54 (L) 0 60 - 1 30 mg/dL    Glucose 120 65 - 140 mg/dL    Calcium 8 1 (L) 8 3 - 10 1 mg/dL    eGFR 119 ml/min/1 73sq m   Magnesium    Collection Time: 07/11/22  4:17 AM   Result Value Ref Range    Magnesium 1 7 1 6 - 2 6 mg/dL   HS Troponin I 4hr    Collection Time: 07/11/22  4:22 AM   Result Value Ref Range    hs TnI 4hr 6 "Refer to ACS Flowchart"- see link ng/L    Delta 4hr hsTnI 2 <20 ng/L   Fingerstick Glucose (POCT)    Collection Time: 07/11/22  6:10 AM   Result Value Ref Range    POC Glucose 55 (L) 65 - 140 mg/dl   Fingerstick Glucose (POCT)    Collection Time: 07/11/22  6:34 AM   Result Value Ref Range    POC Glucose 55 (L) 65 - 140 mg/dl   Fingerstick Glucose (POCT)    Collection Time: 07/11/22  7:27 AM   Result Value Ref Range    POC Glucose 129 65 - 140 mg/dl       Imaging Studies: I have personally reviewed pertinent reports  and I have personally reviewed pertinent films in PACS  CTA head and neck with and without contrast- No acute intracranial abnormality  Negative CTA head and neck for large vessel occlusion, dissection, aneurysm, or high-grade stenosis  MRI brain with and without contrast- No acute intracranial pathology  Stable encephalomalacia, sequela of remote infection       VTE Prophylaxis: Enoxaparin (Lovenox)

## 2022-07-11 NOTE — ASSESSMENT & PLAN NOTE
Patient presents with unwitnessed fall in the kitchen, could not get up for about 2 hours,confusion  Noticed to have slurred speech and left facial droop when patient was getting into ambulance per mother  Patient was found sitting on the floor in the kitchen after parents heard a "thud" from kitchen  Parents denies LOC  Last known normal around 630pm   · History of craniotomy secondary to brain abscess in 1/2019  Has residual right-sided weakness, right eye double vision per mother  Patient was using walker for a while,but not anymore  · Alcohol level 257  History of alcohol abuse per parents  Drinks White [employer]aw Trevor 2-3 times per week now  · Symptoms may be related to alcohol intoxication, need to rule out CVA  · Will follow stroke pathway  · CTA head and neck unremarkable per ED provider, pending final read  · EKG sinus tach, rate 101  · Check lipid panel, A1c  · MRI of the brain in a m  · 2D echo with bubble study  · Telemetry  · Patient was given full-dose aspirin, Lipitor 80 mg p o  Daily in ED  Will continue baby aspirin daily and Lipitor 80mg p o   Daily  · Allow permissive hypertension  · Consult neurology

## 2022-07-11 NOTE — ASSESSMENT & PLAN NOTE
History of alcohol abuse but no history of alcohol withdrawal per parents    · Follows [facility]WA protocol  · Complete alcohol cessation

## 2022-07-11 NOTE — OCCUPATIONAL THERAPY NOTE
OT EVALUATION       07/11/22 1425   Note Type   Note type Evaluation   Restrictions/Precautions   Other Precautions Chair Alarm; Bed Alarm; Fall Risk   Pain Assessment   Pain Assessment Tool 0-10   Pain Score No Pain   Home Living   Type of Home House   Home Layout Two level;Bed/bath upstairs; Ramped entrance  (full handicap bathroom on 1st fl; 1/2 bath on 2nd)   Bathroom Shower/Tub Tub/shower unit   Bathroom Toilet Standard   Bathroom Equipment Grab bars in shower; Shower chair;Hand-held shower;Grab bars around toilet   Bathroom Accessibility Accessible via [employer]   [address] [facility]   Additional Comments Pt ambulates independently in the home, with [facility] outside   Prior Function   Level of [place] Independent with ADLs and functional mobility; Needs assistance with IADLs   Lives With Family  (mother and father)   Trudee Sleek Help From Family   ADL Assistance Independent   IADLs Needs assistance   Falls in the last 6 months 1 to 4   Comments pt cooks; mother does shopping, cleaning, and laundry   Psychosocial   Psychosocial (WDL) WDL   Subjective   Subjective "I feel almost back to my normal"   ADL   Where Assessed Edge of bed   Eating Assistance 7  [address]  [facility] Deficit Setup;Steadying;Verbal cueing;Supervision/safety; Increased time to complete   Additional Comments for all ADLS due to decreased strength, decreased endurance and decreased balance at this time   Bed Mobility   Supine to Sit 7  Independent   Sit to Supine 7  Independent   Transfers   Sit to Stand 4  Minimal assistance   Additional items Assist x 1;Verbal cues   Stand to Sit 4  Minimal assistance   Additional items Assist x 1;Verbal cues   Stand pivot 4  Minimal assistance Additional items Assist x 1;Verbal cues  (with RW)   Balance   Static Sitting Good   Dynamic Sitting Fair +   Static Standing Poor +   Dynamic Standing Poor   Activity Tolerance   Activity Tolerance Patient limited by fatigue   Nurse Jaylene Ash   Hand Function   Gross Motor Coordination Functional   Fine Motor Coordination Functional   Sensation   Light Touch No apparent deficits   Vision-Basic Assessment   Current Vision Wears glasses only for reading   Visual History Other (Comment);Surgical intervention  (craniotomy secondary to brain abscess in 1/2019  Has residual right eye double vision and decreased peripheral vision)   Patient Visual Report Diplopia   Cognition   Overall Cognitive Status WFL   Arousal/Participation Alert; Cooperative   Attention Within functional limits   Orientation Level Oriented X4   Memory Within functional limits   Following Commands Follows all commands and directions without difficulty   Assessment   Limitation Decreased ADL status; Decreased Safe judgement during ADL;Decreased endurance;Visual deficit; Decreased self-care trans;Decreased high-level ADLs  (decreased balance and mobility)   Prognosis Good   Assessment Patient evaluated by Occupational Therapy  Patient admitted with Stroke-like symptoms; MRI negative for acute CVA  The patients occupational profile, medical and therapy history includes a extensive additional review of physical, cognitive, or psychosocial history related to current functional performance  Comorbidities affecting functional mobility and ADLS include: brain abscess, status post craniotomy 2019, residual right-sided weakness, right eye double vision, type 2 diabetes, hypertension, hyperlipidemia, alcohol abuse, nicotine abuse, chronic pain, diverticulitis, and falls   Prior to admission, patient was independent with functional mobility with SPC outside, independent with ADLS and independent with IADLS  The evaluation identifies the following performance deficits: weakness, impaired balance, decreased endurance, increased fall risk, new onset of impairment of functional mobility, decreased ADLS, decreased IADLS, decreased activity tolerance, decreased safety awareness, impaired tone, decreased strength and visual deficits, that result in activity limitations and/or participation restrictions  This evaluation requires clinical decision making of high complexity, because the patient presents with comorbidites that affect occupational performance and required significant modification of tasks or assistance with consideration of multiple treatment options  The Barthel Index was used as a functional outcome tool presenting with a score of Barthel Index Score: 50, indicating marked limitations of functional mobility and ADLS  The patient's raw score on the -PAC Daily Activity inpatient short form is 20, standardized score is 42 03, greater than 39 4  Patients at this level are likely to benefit from DC to home  Please refer to the recommendation of the Occupational Therapist for safe DC planning  Patient will benefit from skilled Occupational Therapy services to address above deficits and facilitate a safe return to prior level of function     Goals   Patient Goals "start therapy again"   STG Time Frame   (1-7)   Short Term Goal #1 Patient will increase standing tolerance to 4 minutes during ADL task to decrease assistance level and decrease fall risk; Patient will increase functional mobility to and from bathroom with rolling walker with supervision to increase performance with ADLS and to use a toilet; Patient will tolerate 10 minutes of UE ROM/strengthening to increase general activity tolerance and performance in ADLS/IADLS; Patient will improve functional activity tolerance to 10 minutes of sustained functional tasks to increase participation in basic self-care and decrease assistance level;  Patient will be able to to verbalize understanding and perform energy conservation/proper body mechanics during ADLS and functional mobility at least 75% of the time with minimal cueing to decrease signs of fatigue and increase stamina to return to prior level of function;  Patient will increase static standing balance to good to improve postural stability and decrease fall risk during standing ADLS and transfers  LTG Time Frame   (8-14)   Long Term Goal #1 Patient will increase standing tolerance to 7 minutes during ADL task to decrease assistance level and decrease fall risk; Patient will increase functional mobility to and from bathroom with assistive device independently to increase performance with ADLS and to use a toilet; Patient will tolerate 15 minutes of UE ROM/strengthening to increase general activity tolerance and performance in ADLS/IADLS; Patient will improve functional activity tolerance to 15 minutes of sustained functional tasks to increase participation in basic self-care and decrease assistance level;  Patient will be able to to verbalize understanding and perform energy conservation/proper body mechanics during ADLS and functional mobility at least 90% of the time with no cueing to decrease signs of fatigue and increase stamina to return to prior level of function; Patient will increase dynamic standing balance to fair+ to improve postural stability and decrease fall risk during standing ADLS and transfers  Pt will score >/= 23/24 on AM-PAC Daily Activity Inpatient scale to promote safe independence with ADLs and functional mobility; Pt will score >/= 70/100 on Barthel Index in order to decrease caregiver assistance needed and increase ability to perform ADLs and functional mobility  Functional Transfer Goals   Pt Will Perform All Functional Transfers With mod indep; With assistive devices; With good judgment/safety  (LTG-Independent)   ADL Goals   Pt Will Perform All ADL's At edge of bed; With stand by assist;With good judgment/safety; With setup; With cues  (LTG-Independent standing or sitting)   Plan   Treatment Interventions ADL retraining;Functional transfer training;UE strengthening/ROM; Endurance training;Patient/family training;Equipment evaluation/education; Compensatory technique education; Energy conservation; Activityengagement   Goal Expiration Date 07/25/22   OT Frequency 3-5x/wk   Recommendation   OT Discharge Recommendation Home with home health rehabilitation   AM-PAC Daily Activity Inpatient   Lower Body Dressing 3   Bathing 3   Toileting 3   Upper Body Dressing 4   Grooming 3   Eating 4   Daily Activity Raw Score 20   Daily Activity Standardized Score (Calc for Raw Score >=11) 42 03   AM-PAC Applied Cognition Inpatient   Following a Speech/Presentation 4   Understanding Ordinary Conversation 4   Taking Medications 4   Remembering Where Things Are Placed or Put Away 4   Remembering List of 4-5 Errands 4   Taking Care of Complicated Tasks 3   Applied Cognition Raw Score 23   Applied Cognition Standardized Score 53 08   Barthel Index   Feeding 10   Bathing 0   Grooming Score 0   Dressing Score 5   Bladder Score 10   Bowels Score 10   Toilet Use Score 5   Transfers (Bed/Chair) Score 10   Mobility (Level Surface) Score 0   Stairs Score 0   Barthel Index Score 50   Licensure   [place] License Number  Lobo Sharad [address], OTR/L, [place] Lic# [id number]

## 2022-07-11 NOTE — ASSESSMENT & PLAN NOTE
Suspect due to alcohol intoxication  · UA no evidence of infection  · UDS negative  · Back at baseline

## 2022-07-11 NOTE — ASSESSMENT & PLAN NOTE
Patient presented with unwitnessed fall in the kitchen, could not get up for about 2 hours, confused  Noticed to have slurred speech and left facial droop when patient was getting into ambulance per mother  Patient was found sitting on the floor in the kitchen after parents heard a "thud" from kitchen  Last known normal around 630pm   · Alcohol level 257  Drinks [place] 2-3 times per week now  · Symptoms may be related to alcohol intoxication with concurrent baclofen use  · MRI negative for acute pathology  · CTA head and neck unremarkable  · 2D echo showed normal EF with negative bubble study  · Patient was given full-dose aspirin, Lipitor 80 mg p o  Daily in ED and was continued on baby aspirin daily and Lipitor 80mg p o  Daily while here    No indication for these meds on discharge

## 2022-07-11 NOTE — PLAN OF CARE
Problem: Potential for Falls  Goal: Patient will remain free of falls  Description: INTERVENTIONS:  - Educate patient/family on patient safety including physical limitations  - Instruct patient to call for assistance with activity   - Consult OT/PT to assist with strengthening/mobility   - Keep Call bell within reach  - Keep bed low and locked with side rails adjusted as appropriate  - Keep care items and personal belongings within reach  - Initiate and maintain comfort rounds  - Make Fall Risk Sign visible to staff  - Offer Toileting every 2 Hours, in advance of need  - Initiate/Maintain bed alarm  - Obtain necessary fall risk management equipment: yellow socks  - Apply yellow socks and bracelet for high fall risk patients  - Consider moving patient to room near nurses station  Outcome: Progressing     Problem: MOBILITY - ADULT  Goal: Maintain or return to baseline ADL function  Description: INTERVENTIONS:  -  Assess patient's ability to carry out ADLs; assess patient's baseline for ADL function and identify physical deficits which impact ability to perform ADLs (bathing, care of mouth/teeth, toileting, grooming, dressing, etc )  - Assess/evaluate cause of self-care deficits   - Assess range of motion  - Assess patient's mobility; develop plan if impaired  - Assess patient's need for assistive devices and provide as appropriate  - Encourage maximum independence but intervene and supervise when necessary  - Involve family in performance of ADLs  - Assess for home care needs following discharge   - Consider OT consult to assist with ADL evaluation and planning for discharge  - Provide patient education as appropriate  Outcome: Progressing  Goal: Maintains/Returns to pre admission functional level  Description: INTERVENTIONS:  - Perform BMAT or MOVE assessment daily    - Set and communicate daily mobility goal to care team and patient/family/caregiver     - Collaborate with rehabilitation services on mobility goals if consulted  - Perform Range of Motion 2 times a day  - Reposition patient every 2 hours  - Dangle patient 2 times a day  - Stand patient 2 times a day  - Ambulate patient 2 times a day  - Out of bed to chair 2 times a day   - Out of bed for meals 2 times a day  - Out of bed for toileting  - Record patient progress and toleration of activity level   Outcome: Progressing     Problem: Neurological Deficit  Goal: Neurological status is stable or improving  Description: Interventions:  - Monitor and assess patient's level of consciousness, motor function, sensory function, and level of assistance needed for ADLs  - Monitor and report changes from baseline  Collaborate with interdisciplinary team to initiate plan and implement interventions as ordered  - Provide and maintain a safe environment  - Consider seizure precautions  - Consider fall precautions  - Consider aspiration precautions  - Consider bleeding precautions  Outcome: Progressing     Problem: Activity Intolerance/Impaired Mobility  Goal: Mobility/activity is maintained at optimum level for patient  Description: Interventions:  - Assess and monitor patient  barriers to mobility and need for assistive/adaptive devices  - Assess patient's emotional response to limitations  - Collaborate with interdisciplinary team and initiate plans and interventions as ordered  - Encourage independent activity per ability   - Maintain proper body alignment  - Perform active/passive rom as tolerated/ordered  - Plan activities to conserve energy   - Turn patient as appropriate  Outcome: Progressing     Problem: Potential for Aspiration  Goal: Non-ventilated patient's risk of aspiration is minimized  Description: Assess and monitor vital signs, respiratory status, and labs (WBC)  Monitor for signs of aspiration (tachypnea, cough, rales, wheezing, cyanosis, fever)  - Assess and monitor patient's ability to swallow    - Place patient up in chair to eat if possible  - HOB up at 90 degrees to eat if unable to get patient up into chair   - Supervise patient during oral intake  - Instruct patient/ family to take small bites  - Instruct patient/ family to take small single sips when taking liquids  - Follow patient-specific strategies generated by speech pathologist   Outcome: Progressing     Problem: Nutrition  Goal: Nutrition/Hydration status is improving  Description: Monitor and assess patient's nutrition/hydration status for malnutrition (ex- brittle hair, bruises, dry skin, pale skin and conjunctiva, muscle wasting, smooth red tongue, and disorientation)  Collaborate with interdisciplinary team and initiate plan and interventions as ordered  Monitor patient's weight and dietary intake as ordered or per policy  Utilize nutrition screening tool and intervene per policy  Determine patient's food preferences and provide high-protein, high-caloric foods as appropriate  - Assist patient with eating   - Allow adequate time for meals   - Encourage patient to take dietary supplement as ordered  - Collaborate with clinical nutritionist   - Include patient/family/caregiver in decisions related to nutrition    Outcome: Progressing

## 2022-07-11 NOTE — SPEECH THERAPY NOTE
Speech-Language Pathology Evaluation      Patient Name: Shyanne Amos    [id number] Date: 7/11/2022     Problem List  Principal Problem:    Stroke-like symptoms  Active Problems:    Benign essential hypertension    S/P craniotomy    Tobacco use disorder    Type 2 diabetes mellitus without complication, with long-term current use of insulin (HCC)    Hyperlipidemia    Alcohol intoxication ([facility] [address] )    Fall    Acute encephalopathy    Memory difficulty    History of GI bleed      Past Medical History  Past Medical History:   Diagnosis Date    Abdominal cyst     Anemia     Hx     Chronic pain disorder     abdominal    [facility]     Diverticulitis     GI bleed     History of transfusion 2016    5 units    Lightheadedness     Multiple brain abscesses 2020    Multiple lesions on computed tomography of brain and spine     Spleen laceration        Past Surgical History  Past Surgical History:   Procedure Laterality Date    ABDOMINAL SURGERY  2016    lap removal of mass post spleen injury    COLONOSCOPY N/A 05/24/2017    Procedure: COLONOSCOPY;  Surgeon: Sharmin Garza MD;  Location: [facility] GI LAB; Service:    Carlin Pichardo EGD AND COLONOSCOPY N/A 03/15/2017    Procedure: EGD AND COLONOSCOPY;  Surgeon: Sharmin Garza MD;  Location: [facility] GI LAB; Service:     OTHER SURGICAL HISTORY      per [employer]-had spleen surgery; no other details    UPPER GASTROINTESTINAL ENDOSCOPY      for gastric bleeding       Summary   Pt presented with functional appearing oral and pharyngeal stage swallowing skills with food and liquids (reported h/o inability to swallow whole pills so pill crushed) and no s/s R facial weakness, dysarthria or dysphonia      Recommended Diet: regular diet and thin liquids   Recommended Form of Meds: crushed with puree   Aspiration precautions: must be alert, upright and able to feed himself  Other Recommendations: Continue frequent oral care        Current Medical Status  Alyx Wells Roberto Elkins is a 45 y o  male with PMH of brain abscess (is s/p craniotomy with residual right-sided weakness, right eye double vision and spasticity), type 2 diabetes, hypertension, hyperlipidemia, alcohol abuse, nicotine abuse who presents with unwitnessed fall in the kitchen, could not get up for about 2 hours,confusion  Noticed to have slurred speech and left facial droop when patient was getting into ambulance per mother  Mother reports patient has right-sided weakness and right eye double vision post craniotomy  No slurred speech and facial droops post craniotomy  DX: fall, stroke-like symptoms, alcohol intoxication (h/o abuse and oin CIWA protocol), acute encephalopathy, h/o GI bleed, h/o memory dysfunction  Pt NPO for order for SLP Swallowing Evaluation at this time  Current Precautions:  Fall     Allergies:  No known food allergies    Past medical history:  Please see H&P for details    Special Studies:  7/11 MRI of brain: No acute intracranial pathology  Stable encephalomalacia, sequela of remote infection  7/11 CXR: No acute cardiopulmonary disease  Social/Education/Vocational Hx:  Pt lives w/parents    Swallow Information   Current Risks for Dysphagia & Aspiration: AMS  Current Diet: NPO   Baseline Diet: regular diet and thin liquids      Baseline Assessment   Behavior/Cognition: A & O to self, place, yr    Speech/Language Status: able to follow commands and express needs  +s/s impaired memory (initially unable to recall name of hospital were he had his craniotomy and wherehe received rehab services)  Patient Positioning: upright in bed  Pain Status/Interventions/Response to Interventions:  No report of or nonverbal indications of pain         Swallow Mechanism Exam  Facial: min asymmetric appearance (reduced L eye closure)  Labial: WFL  Lingual: WFL  Velum: symmetrical  Mandible: adequate ROM  Dentition: adequate  Vocal quality:clear/adequate   Respiratory Status: on RA         Consistencies Assessed and Performance   Materials administered included thin liquid (indv and successive sips), cookies (few thompson)    Oral Stage:   Mastication was adequate with the materials administered today  Bolus formation and transfer were functional with no significant oral residue noted  No overt s/s reduced oral control  Pharyngeal Stage:   Swallow Mechanics:  Swallowing initiation appeared prompt  Laryngeal rise was palpated and judged to be within functional limits  No coughing, throat clearing, change in vocal quality or respiratory status noted today despite challenge (eg pt took 5-6ozs via sequential sips with no s/s    Esophageal Concerns: none reported    Summary and Recommendations (see above)    Results Reviewed with: patient and RN     Treatment Recommended: None at this time     Thank you for this referral   Please do not hesitate to contact me with any questions or concerns      Jesusita Holliday [facility]   Speech Pathologist  [place] License [id number]  [place] License [id number]  Available via [facility] Text

## 2022-07-12 ENCOUNTER — TELEPHONE (OUTPATIENT)
Dept: FAMILY MEDICINE CLINIC | Facility: CLINIC | Age: 52
End: 2022-07-12

## 2022-07-12 VITALS
DIASTOLIC BLOOD PRESSURE: 95 MMHG | HEIGHT: 69 IN | RESPIRATION RATE: 18 BRPM | TEMPERATURE: 98.2 F | SYSTOLIC BLOOD PRESSURE: 160 MMHG | WEIGHT: 149.03 LBS | HEART RATE: 93 BPM | BODY MASS INDEX: 22.07 KG/M2 | OXYGEN SATURATION: 95 %

## 2022-07-12 PROBLEM — G93.40 ACUTE ENCEPHALOPATHY: Status: RESOLVED | Noted: 2022-07-11 | Resolved: 2022-07-12

## 2022-07-12 PROBLEM — R47.81 SLURRED SPEECH: Status: ACTIVE | Noted: 2022-07-11

## 2022-07-12 LAB
GLUCOSE SERPL-MCNC: 148 MG/DL (ref 65–140)
GLUCOSE SERPL-MCNC: 196 MG/DL (ref 65–140)
GLUCOSE SERPL-MCNC: 80 MG/DL (ref 65–140)
GLUCOSE SERPL-MCNC: 91 MG/DL (ref 65–140)
GLUCOSE SERPL-MCNC: 99 MG/DL (ref 65–140)

## 2022-07-12 PROCEDURE — 82948 REAGENT STRIP/BLOOD GLUCOSE: CPT

## 2022-07-12 PROCEDURE — 99217 PR OBSERVATION CARE DISCHARGE MANAGEMENT: CPT | Performed by: FAMILY MEDICINE

## 2022-07-12 PROCEDURE — 97116 GAIT TRAINING THERAPY: CPT

## 2022-07-12 RX ORDER — MULTIVITAMIN
1 TABLET ORAL DAILY
Qty: 30 TABLET | Refills: 0 | Status: SHIPPED | OUTPATIENT
Start: 2022-07-12 | End: 2022-10-21

## 2022-07-12 RX ORDER — FOLIC ACID 1 MG/1
1 TABLET ORAL DAILY
Qty: 30 TABLET | Refills: 0 | Status: SHIPPED | OUTPATIENT
Start: 2022-07-13 | End: 2022-10-21

## 2022-07-12 RX ORDER — PEN NEEDLE, DIABETIC 32GX 5/32"
NEEDLE, DISPOSABLE MISCELLANEOUS
Qty: 100 EACH | Refills: 0 | Status: SHIPPED | OUTPATIENT
Start: 2022-07-12 | End: 2022-10-21

## 2022-07-12 RX ORDER — LANOLIN ALCOHOL/MO/W.PET/CERES
100 CREAM (GRAM) TOPICAL DAILY
Qty: 30 TABLET | Refills: 0 | Status: SHIPPED | OUTPATIENT
Start: 2022-07-13 | End: 2022-10-21

## 2022-07-12 RX ORDER — INSULIN GLARGINE 100 [IU]/ML
7 INJECTION, SOLUTION SUBCUTANEOUS
Start: 2022-07-12 | End: 2022-10-15

## 2022-07-12 RX ADMIN — ENOXAPARIN SODIUM 40 MG: 40 INJECTION SUBCUTANEOUS at 10:50

## 2022-07-12 RX ADMIN — FOLIC ACID 1 MG: 1 TABLET ORAL at 10:51

## 2022-07-12 RX ADMIN — FAMOTIDINE 20 MG: 10 INJECTION, SOLUTION INTRAVENOUS at 10:51

## 2022-07-12 RX ADMIN — ASPIRIN 81 MG: 81 TABLET, COATED ORAL at 10:51

## 2022-07-12 RX ADMIN — THIAMINE HCL TAB 100 MG 100 MG: 100 TAB at 10:51

## 2022-07-12 RX ADMIN — BACLOFEN 5 MG: 10 TABLET ORAL at 10:48

## 2022-07-12 RX ADMIN — NICOTINE 1 PATCH: 7 PATCH, EXTENDED RELEASE TRANSDERMAL at 10:50

## 2022-07-12 RX ADMIN — Medication 1 TABLET: at 10:51

## 2022-07-12 RX ADMIN — INSULIN LISPRO 1 UNITS: 100 INJECTION, SOLUTION INTRAVENOUS; SUBCUTANEOUS at 12:17

## 2022-07-12 NOTE — PLAN OF CARE
Problem: Potential for Falls  Goal: Patient will remain free of falls  Description: INTERVENTIONS:  - Educate patient/family on patient safety including physical limitations  - Instruct patient to call for assistance with activity   - Consult OT/PT to assist with strengthening/mobility   - Keep Call bell within reach  - Keep bed low and locked with side rails adjusted as appropriate  - Keep care items and personal belongings within reach  - Initiate and maintain comfort rounds  - Make Fall Risk Sign visible to staff  - Offer Toileting every 2 Hours, in advance of need  - Initiate/Maintain bed alarm  - Obtain necessary fall risk management equipment: yellow socks  - Apply yellow socks and bracelet for high fall risk patients  - Consider moving patient to room near nurses station  Outcome: Progressing     Problem: MOBILITY - ADULT  Goal: Maintain or return to baseline ADL function  Description: INTERVENTIONS:  -  Assess patient's ability to carry out ADLs; assess patient's baseline for ADL function and identify physical deficits which impact ability to perform ADLs (bathing, care of mouth/teeth, toileting, grooming, dressing, etc )  - Assess/evaluate cause of self-care deficits   - Assess range of motion  - Assess patient's mobility; develop plan if impaired  - Assess patient's need for assistive devices and provide as appropriate  - Encourage maximum independence but intervene and supervise when necessary  - Involve family in performance of ADLs  - Assess for home care needs following discharge   - Consider OT consult to assist with ADL evaluation and planning for discharge  - Provide patient education as appropriate  Outcome: Progressing     Problem: Neurological Deficit  Goal: Neurological status is stable or improving  Description: Interventions:  - Monitor and assess patient's level of consciousness, motor function, sensory function, and level of assistance needed for ADLs     - Monitor and report changes from baseline  Collaborate with interdisciplinary team to initiate plan and implement interventions as ordered  - Provide and maintain a safe environment  - Consider seizure precautions  - Consider fall precautions  - Consider aspiration precautions  - Consider bleeding precautions  Outcome: Progressing     Problem: Potential for Aspiration  Goal: Non-ventilated patient's risk of aspiration is minimized  Description: Assess and monitor vital signs, respiratory status, and labs (WBC)  Monitor for signs of aspiration (tachypnea, cough, rales, wheezing, cyanosis, fever)  - Assess and monitor patient's ability to swallow  - Place patient up in chair to eat if possible  - HOB up at 90 degrees to eat if unable to get patient up into chair   - Supervise patient during oral intake  - Instruct patient/ family to take small bites  - Instruct patient/ family to take small single sips when taking liquids  - Follow patient-specific strategies generated by speech pathologist   Outcome: Progressing     Problem: Nutrition  Goal: Nutrition/Hydration status is improving  Description: Monitor and assess patient's nutrition/hydration status for malnutrition (ex- brittle hair, bruises, dry skin, pale skin and conjunctiva, muscle wasting, smooth red tongue, and disorientation)  Collaborate with interdisciplinary team and initiate plan and interventions as ordered  Monitor patient's weight and dietary intake as ordered or per policy  Utilize nutrition screening tool and intervene per policy  Determine patient's food preferences and provide high-protein, high-caloric foods as appropriate  - Assist patient with eating   - Allow adequate time for meals   - Encourage patient to take dietary supplement as ordered  - Collaborate with clinical nutritionist   - Include patient/family/caregiver in decisions related to nutrition    Outcome: Progressing     Problem: Nutrition/Hydration-ADULT  Goal: Nutrient/Hydration intake appropriate for improving, restoring or maintaining nutritional needs  Description: Monitor and assess patient's nutrition/hydration status for malnutrition  Collaborate with interdisciplinary team and initiate plan and interventions as ordered  Monitor patient's weight and dietary intake as ordered or per policy  Utilize nutrition screening tool and intervene as necessary  Determine patient's food preferences and provide high-protein, high-caloric foods as appropriate       INTERVENTIONS:  - Monitor oral intake, urinary output, labs, and treatment plans  - Assess nutrition and hydration status and recommend course of action  - Evaluate amount of meals eaten  - Assist patient with eating if necessary   - Allow adequate time for meals  - Recommend/ encourage appropriate diets, oral nutritional supplements, and vitamin/mineral supplements  - Order, calculate, and assess calorie counts as needed  - Recommend, monitor, and adjust tube feedings and TPN/PPN based on assessed needs  - Assess need for intravenous fluids  - Provide specific nutrition/hydration education as appropriate  - Include patient/family/caregiver in decisions related to nutrition  Outcome: Progressing     Problem: Prexisting or High Potential for Compromised Skin Integrity  Goal: Skin integrity is maintained or improved  Description: INTERVENTIONS:  - Identify patients at risk for skin breakdown  - Assess and monitor skin integrity  - Assess and monitor nutrition and hydration status  - Monitor labs   - Assess for incontinence   - Turn and reposition patient  - Assist with mobility/ambulation  - Relieve pressure over bony prominences  - Avoid friction and shearing  - Provide appropriate hygiene as needed including keeping skin clean and dry  - Evaluate need for skin moisturizer/barrier cream  - Collaborate with interdisciplinary team   - Patient/family teaching  - Consider wound care consult   Outcome: Progressing

## 2022-07-12 NOTE — NURSING NOTE
The patient's mother at the bedside visiting had questions regarding availability of medications listed on the patient's discharge AVS MD notified to clarify order that was faxed to the pharmacy  The patient is discharged to home accompanied by his mother offering no complaints in stable condition

## 2022-07-12 NOTE — PLAN OF CARE
Problem: PHYSICAL THERAPY ADULT  Goal: Performs mobility at highest level of function for planned discharge setting  See evaluation for individualized goals  Description: Treatment/Interventions: ADL retraining, Functional transfer training, LE strengthening/ROM, Therapeutic exercise, Bed mobility, Gait training, Spoke to nursing  Equipment Recommended: [place]       See flowsheet documentation for full assessment, interventions and recommendations  Outcome: Progressing  Note: Prognosis: Good  Problem List: Decreased strength, Decreased endurance, Impaired balance, Decreased mobility, Decreased coordination  Assessment: Pt agreable to PT session this AM  Able to progress transfers/ambulation with Supervision/intermittent Min A to avoid falls  Pt able to ambulate with RW with gait deviations as mentioned above - minor unsteadiness noted but able to attend with cues  Able to progress to negotiate stairs - able to safely perform with intermittent verbal cues  Once returned to room reviewed exercise to perform as able  PT Discharge Recommendation: Home with home health rehabilitation    See flowsheet documentation for full assessment

## 2022-07-12 NOTE — PHYSICAL THERAPY NOTE
PT TREATMENT       07/12/22 1201   PT Last Visit   PT Visit Date 07/12/22   Note Type   Note Type Treatment   Pain Assessment   Pain Assessment Tool 0-10   Pain Score No Pain   Multiple Pain Sites No   Restrictions/Precautions   Weight Bearing Precautions Per Order No   Other Precautions Chair Alarm; Bed Alarm; Fall Risk   General   Chart Reviewed Yes   Family/Caregiver Present No   Cognition   Overall Cognitive Status WFL   Arousal/Participation Cooperative   Attention Within functional limits   Orientation Level Oriented X4   Memory Within functional limits   Following Commands Follows all commands and directions without difficulty   Subjective   Subjective "I feel better today  My mom brought my shoes and the cane but it isn't the right one "   Bed Mobility   Rolling R 7  Independent   Rolling L 7  Independent   Supine to Sit 7  Independent   Sit to Supine 7  Independent   Transfers   Sit to Stand 5  Supervision   Additional items Verbal cues   Stand to Sit 5  Supervision   Additional items Verbal cues   Stand pivot 5  Supervision   Additional items Assist x 1   Ambulation/Elevation   Gait pattern Wide ANDRIY; Short stride  (Excessive R knee extension in stance phase with intermittent LOB to R side - requires Min A to stabilize but otherwise able to ambulate with supervision using RW)   Gait Assistance 4  Minimal assist   Additional items Assist x 1;Verbal cues   Assistive Device Rolling walker   Distance 150 feet   Stair Management Assistance 5  Supervision   Additional items Assist x 1;Verbal cues   Stair Management Technique Alternating pattern; Two rails   Number of Stairs 3   Balance   Static Sitting Good   Dynamic Sitting Fair +   Static Standing Fair   Dynamic Standing Fair -   Ambulatory Poor +   Activity Tolerance   Activity Tolerance Patient tolerated treatment well;Patient limited by fatigue   Medical Staff Made Aware yes   Nurse Made Aware GUNNAR Lester   Exercises   Neuro re-ed Reviewed exercise to perform throughout the day - patient does not wish to complete at this moment secondary to fatigue  Assessment   Prognosis Good   Problem List Decreased strength;Decreased endurance; Impaired balance;Decreased mobility; Decreased coordination   Assessment Pt agreable to PT session this AM  Able to progress transfers/ambulation with Supervision/intermittent Min A to avoid falls  Pt able to ambulate with RW with gait deviations as mentioned above - minor unsteadiness noted but able to attend with cues  Able to progress to negotiate stairs - able to safely perform with intermittent verbal cues  Once returned to room reviewed exercise to perform as able  The patient's AM-PAC Basic Mobility Inpatient Short Form Raw Score is 20  A Raw score of greater than 16 suggests the patient may benefit from discharge to home  Goals   Patient Goals To get better and go home   Plan   Treatment/Interventions ADL retraining;Functional transfer training;LE strengthening/ROM; Therapeutic exercise; Bed mobility;Gait training;Spoke to nursing   PT Frequency 5-7x/wk   Recommendation   PT Discharge Recommendation Home with home health rehabilitation   Equipment Recommended [place] walker   AM-PAC Basic Mobility Inpatient   Turning in Bed Without Bedrails 4   Lying on Back to Sitting on Edge of Flat Bed 4   Moving Bed to Chair 3   Standing Up From Chair 3   Walk in Room 3   Climb 3-5 Stairs 3   Basic Mobility Inpatient Raw Score 20   Basic Mobility Standardized Score 43 99   Highest Level Of Mobility   JH-HLM Goal 6: Walk 10 steps or more   JH-HLM Achieved 7: Walk 25 feet or more   End of Consult   Patient Position at End of Consult Supine;Bed/Chair alarm activated; All needs within reach   [facility] [place] Insurance Number  Tonie [place] [id number]

## 2022-07-12 NOTE — DISCHARGE SUMMARY
Discharge Summary - [facility] Internal Medicine    Patient Information: Rosario Goldsmith 45 y o  male MRN: [id number]  Unit/Bed#: [address] 402-01 Encounter: [id number]    Discharging Physician / Practitioner: Neel Sawyer DO  PCP: Javed Albarran MD  Admission Date: 7/10/2022  Discharge Date: 07/12/22    Reason for Admission: Altered Mental Status (Patient c/o fall and could not get up, slurring words, facial grimacing started prior to arrival, patient alert, unsure of date and president, aware he is in the hospital )      Discharge Diagnoses:     Principal Problem:    Stroke-like symptoms  Active Problems:    Benign essential hypertension    S/P craniotomy    Tobacco use disorder    Type 2 diabetes mellitus without complication, with long-term current use of insulin ([address] )    Hyperlipidemia    Alcohol intoxication ([address] )    Fall    Acute encephalopathy    Memory difficulty    History of GI bleed  Resolved Problems:    * No resolved hospital problems  *        * Slurred speech  Assessment & Plan  Patient presented with unwitnessed fall in the kitchen, could not get up for about 2 hours, confused  Noticed to have slurred speech and left facial droop when patient was getting into ambulance per mother  Patient was found sitting on the floor in the kitchen after parents heard a "thud" from kitchen  Last known normal around 630pm   · Alcohol level 257  Drinks [place] 2-3 times per week now  · Symptoms may be related to alcohol intoxication with concurrent baclofen use  · MRI negative for acute pathology  · CTA head and neck unremarkable  · 2D echo showed normal EF with negative bubble study  · Patient was given full-dose aspirin, Lipitor 80 mg p o  Daily in ED and was continued on baby aspirin daily and Lipitor 80mg p o  Daily while here    No indication for these meds on discharge    Alcohol intoxication ([address] )  Assessment & Plan  History of alcohol abuse but no history of alcohol withdrawal per parents  · Was on CIWA protocol  No signs of withdrawal  · Complete alcohol cessation discussed but patient does not seem interested  Discussed to at least cut down on the amount of alcohol he consumes    [address] onto buttocks in kitchen, unwitnessed  No LOC  Denies any pain  · Suspect secondary to alcohol intoxication plus concurrent baclofen use  · total CK within normal limits    History of GI bleed  Assessment & Plan  · No active bleeding while here      Memory difficulty  Assessment & Plan  Noncompliant with Aricept at home  · Likely due to history of brain abscess and craniotomy    Hyperlipidemia  Assessment & Plan  Noncompliant with Lipitor at home  · Lipid panel - LDL 26    Type 2 diabetes mellitus without complication, with long-term current use of insulin [facility])  Assessment & Plan  Lab Results   Component Value Date    HGBA1C 4 9 07/11/2022       Recent Labs     07/12/22  0602 07/12/22  0703 07/12/22  1206 07/12/22  1613   POCGLU 91 80 196* 148*     Patient is on Basaglar 10 units subQ HS, decreased to 7 units on discharge as patient noted to have low blood sugars in the a m  at times  · Patient/mother advised to check blood sugars at home and if it starts to run high, he can go back to the 10 units    Tobacco use disorder  Assessment & Plan  Nicotine patch  Complete smoking cessation discussed    S/P craniotomy  Assessment & Plan  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, right eye double vision and spasticity  · Continue baclofen    Benign essential hypertension  Assessment & Plan  Patient noncompliant with medications at home    · REstart lisinopril as BPs not ideal    Acute encephalopathy-resolved as of 7/12/2022  Assessment & Plan  Suspect due to alcohol intoxication  · UA no evidence of infection  · UDS negative  · Back at baseline          Consultations During Hospital Stay:  [facility] TO CASE MANAGEMENT  IP CONSULT TO NUTRITION SERVICES    Procedures Performed:     · Echo    Significant Findings:     · Refer to hospital course and above listed diagnosis related plan for details    Imaging while in hospital:    CTA head and neck with and without contrast    Result Date: 7/11/2022  Narrative: CTA NECK AND BRAIN WITH AND WITHOUT CONTRAST INDICATION: right sided weakness   Altered Mental Status (Patient c/o fall and could not get up, slurring words, facial grimacing started prior to arrival, patient alert, unsure of date and president, aware he is in the hospital ) COMPARISON:   CT head with and without contrast September 23, 2019  MRI brain with and without contrast April 22, 2019 TECHNIQUE:  Routine CT imaging of the Brain without contrast   Post contrast imaging was performed after administration of iodinated contrast through the neck and brain  Post contrast axial 0 625 mm images timed to opacify the arterial system  3D rendering was performed on an independent workstation  MIP reconstructions performed  Coronal reconstructions were performed of the noncontrast portion of the brain  Radiation dose length product (DLP) for this visit:  1475 56 mGy-cm   This examination, like all CT scans performed in the [facility], was performed utilizing techniques to minimize radiation dose exposure, including the use of iterative reconstruction and automated exposure control  IV Contrast:  70 mL of iohexol (OMNIPAQUE)  IMAGE QUALITY:   Diagnostic FINDINGS: NONCONTRAST BRAIN PARENCHYMA: Postsurgical changes of left suboccipital craniotomy  Unchanged focal encephalomalacia and gliosis due to prior catheter tract in right frontal lobe  No intracranial mass, mass effect or midline shift  No CT signs of acute infarction  No acute parenchymal hemorrhage  VENTRICLES AND EXTRA-AXIAL SPACES:  Normal for the patient's age  VISUALIZED ORBITS AND PARANASAL SINUSES:  Unremarkable   CERVICAL VASCULATURE AORTIC ARCH AND GREAT VESSELS:  Minimal calcified atherosclerotic disease of aortic arch  Normal great vessel origins  Normal visualized subclavian vessels  RIGHT VERTEBRAL ARTERY CERVICAL SEGMENT:  Normal origin  The vessel is normal and dominant in caliber throughout the neck  LEFT VERTEBRAL ARTERY CERVICAL SEGMENT:  Normal origin  The vessel is mildly hypoplastic in caliber throughout the neck  RIGHT EXTRACRANIAL CAROTID SEGMENT:  Normal caliber common carotid artery  Normal bifurcation and cervical internal carotid artery  No stenosis or dissection  LEFT EXTRACRANIAL CAROTID SEGMENT:  Normal caliber common carotid artery  Normal bifurcation and cervical internal carotid artery  No stenosis or dissection  NASCET criteria was used to determine the degree of internal carotid artery diameter stenosis  INTRACRANIAL VASCULATURE INTERNAL CAROTID ARTERIES:  Normal enhancement of the intracranial portions of the internal carotid arteries  Minimal arterial calcifications in bilateral ICA cavernous segment  Normal ophthalmic artery origins  Normal ICA terminus  ANTERIOR CIRCULATION:  Symmetric A1 segments and anterior cerebral arteries with normal enhancement  Normal anterior communicating artery  MIDDLE CEREBRAL ARTERY CIRCULATION:  M1 segment and middle cerebral artery branches demonstrate normal enhancement bilaterally  DISTAL VERTEBRAL ARTERIES:  Patent dominant right and mildly hypoplastic left distal vertebral arteries  Posterior inferior cerebellar artery origins are normal  Normal vertebral basilar junction  BASILAR ARTERY:  Basilar artery is normal in caliber  Normal superior cerebellar arteries  POSTERIOR CEREBRAL ARTERIES: Both posterior cerebral arteries arises from the basilar tip  Both arteries demonstrate normal enhancement  Infundibular origin of right posterior communicating artery  Tiny posterior communicating arteries   VENOUS STRUCTURES:  Normal  NON VASCULAR ANATOMY BONY STRUCTURES:  No acute osseous abnormality  Right frontal reny hole  Left suboccipital craniotomy  Mild multilevel degenerative changes of cervical spine  Multiple scattered dental caries  SOFT TISSUES OF THE NECK:  Unremarkable  THORACIC INLET:  Normal      Impression: No acute intracranial abnormality  Negative CTA head and neck for large vessel occlusion, dissection, aneurysm, or high-grade stenosis  Additional chronic/incidental findings as detailed above  I agree with preliminary report provided by [employer] services  Workstation performed: [id number]     XR chest 1 view portable    Result Date: 7/11/2022  Narrative: CHEST INDICATION:   ms changes  COMPARISON:  None EXAM PERFORMED/VIEWS:  XR CHEST PORTABLE FINDINGS: Cardiomediastinal silhouette appears unremarkable  The lungs are clear  Right hemidiaphragm elevation  No pneumothorax or pleural effusion  Upper left abdomen and vascular coil Osseous structures appear within normal limits for patient age  Impression: No acute cardiopulmonary disease  Workstation performed: [id number]     MRI brain w wo contrast    Result Date: 7/11/2022  Narrative: MRI BRAIN WITH AND WITHOUT CONTRAST INDICATION: Slurred speech, left facial droop, rule out CVA  History of brain abscess in 2019  COMPARISON:  CTA performed earlier today, MRI of brain dated 4/22/2019 and 1/3/2019  TECHNIQUE: Sagittal T1, axial T2, axial FLAIR, axial T1, axial [place], axial diffusion  Sagittal, axial T1 postcontrast   Axial bravo postcontrast with coronal reconstructions  IV Contrast:  7 mL of Gadobutrol injection (SINGLE-DOSE)  IMAGE QUALITY:   Diagnostic  FINDINGS: BRAIN PARENCHYMA: No restricted diffusion to indicate an acute infarct  No acute hemorrhage, mass, mass effect or herniation  Stable encephalomalacia in the posterior left callum that is related to abscess seen on MRI 1/4/2019  Stable tract of encephalomalacia and gliosis in the left cerebellum extending from small craniotomy toward the posterior left callum  Tract from prior ventriculostomy is seen in the right frontal lobe  Stable stable small foci of encephalomalacia and gliosis in the left frontal, right parietal and right occipital lobes that are also related to prior infection  Tiny nonspecific focus of FLAIR hyperintensity in the right corona radiata  Postcontrast imaging of the brain demonstrates no abnormal enhancement  VENTRICLES:  Normal for the patient's age  SELLA AND PITUITARY GLAND:  Normal  ORBITS:  Normal  PARANASAL SINUSES:  Minimal scattered mucosal thickening  VASCULATURE:  Evaluation of the major intracranial vasculature demonstrates appropriate flow voids  CALVARIUM AND SKULL BASE: Stable small left suboccipital craniotomy  EXTRACRANIAL SOFT TISSUES:  Normal      Impression: No acute intracranial pathology  Stable encephalomalacia, sequela of remote infection Workstation performed: [id number]     Echo complete w/ contrast if indicated    Result Date: 7/11/2022  Narrative: [facility]  Left Ventricle: Left ventricular cavity size is normal  Wall thickness is mild to moderately increased  Systolic function is normal   Estimated ejection fraction is 65%  Although no diagnostic regional wall motion abnormality was identified, this possibility cannot be completely excluded on the basis of this study  Diastolic function is normal    Right Ventricle: Systolic function is normal    Atrial Septum: No interatrial communication seen with agitated saline/bubble study   Tricuspid Valve: There is trace regurgitation  There is no indirect evidence of pulmonary hypertension    Aorta: The aortic root is mildly dilated  Consider other imaging modalities for more accurate sizing if clinically indicated         Incidental Findings:   · none    Test Results Pending at Discharge (will require follow up):   · As per After Visit Summary     Outpatient Tests Requested:  · none    Complications:  Refer to hospital course and above listed diagnosis related plan, if any    JOSE ALEXANDRE Course:     Nan Medrano is a 45 y o  male patient who originally presented to the hospital on 7/10/2022 due to Unwitnessed fall in the kitchen  He could not get up for about 2 hours and was noted to be confused  He was also noted to have slurred speech and left facial drooping per mother went getting into the ambulance  Patient has residual right-sided weakness along with right eye double vision post craniotomy  Patient was admitted and seen by Neurology  Underwent stroke workup which was negative  Cleared by Neurology prior to discharge  Discharge plan discussed with patient and his mother over the phone    Please see above list of diagnoses and related plan for additional information  Condition at Discharge: stable     Discharge Day Visit / Exam:     Subjective:  States he is doing well and feels ready to go home  Vitals: Blood Pressure: 160/95 (07/12/22 1220)  Pulse: 93 (07/12/22 1220)  Temperature: 98 2 °F (36 8 °C) (07/12/22 1220)  Temp Source: Oral (07/12/22 1220)  Respirations: 18 (07/12/22 1220)  Height: 5' 9" (175 3 cm) (07/11/22 0600)  Weight - Scale: 67 6 kg (149 lb 0 5 oz) (07/11/22 0600)  SpO2: 95 % (07/12/22 1220)  Exam:   Physical Exam  Constitutional:       General: He is not in acute distress  Appearance: He is not diaphoretic  HENT:      Head: Normocephalic and atraumatic  Eyes:      Conjunctiva/sclera: Conjunctivae normal    Cardiovascular:      Rate and Rhythm: Normal rate and regular rhythm  Pulmonary:      Effort: Pulmonary effort is normal  No respiratory distress  Breath sounds: No wheezing, rhonchi or rales  Abdominal:      General: Bowel sounds are normal  There is no distension  Palpations: Abdomen is soft  Tenderness: There is no abdominal tenderness  Neurological:      Mental Status: He is alert           Discharge instructions/Information to patient and family:(Discharge Medications and Follow up):   See after visit summary for information provided to patient and family  Provisions for Follow-Up Care:  See after visit summary for information related to follow-up care and any pertinent home health orders  Disposition: Home with [facility]    Planned Readmission:  No     Discharge Statement:  I spent > 30 minutes discharging the patient  This time was spent on the day of discharge  I had direct contact with the patient on the day of discharge  Greater than 50% of the total time was spent examining patient, answering all patient questions, arranging and discussing plan of care with patient as well as directly providing post-discharge instructions  Additional time then spent on discharge activities  Discharge Medications:  See after visit summary for reconciled discharge medications provided to patient and family  ** Please Note: "This note has been constructed using a voice recognition system  Therefore there may be syntax, spelling, and/or grammatical errors   Please call if you have any questions  "**

## 2022-07-12 NOTE — UTILIZATION REVIEW
Initial Clinical Review    Admission: Date/Time/Statement:   Admission Orders (From admission, onward)     Ordered        07/11/22 0133  Place in Observation  Once                      Orders Placed This Encounter   Procedures    Place in Observation     Standing Status:   Standing     Number of Occurrences:   1     Order Specific Question:   Level of Care     Answer:   Med Surg [16]     ED Arrival Information     Expected   -    Arrival   7/10/2022 23:39    Acuity   Emergent            Means of arrival   Ambulance    Escorted by   [facility]    Admission type   Emergency            Arrival complaint   stroke symptoms           Chief Complaint   Patient presents with    Altered Mental Status     Patient c/o fall and could not get up, slurring words, facial grimacing started prior to arrival, patient alert, unsure of date and president, aware he is in the hospital        Initial Presentation: 45 y o  male to ER from home with EMS with altered mental status  Patient's mother states that she last saw him normal at 6:30p  He came down at approx 11p, attempted to making himself some food and fell  Per medics, patient leaning to the right with slurred speech  Patient has a prior medical history of diabetes, hypertension, hyperlipidemia, brain abscesses, diverticulitis, chronic abdominal pain, anemia  Acute Toxic Encepalopathy and fall likely Due to Alcohol intoxication r/o CVA vs fall due to Cardiogenic origin/syncope - Will plan for MRI BRAIN, TTE, NEURO CONSULT, PT EVAL, monitoring on tele, CIWA protocol, continue to monitor and follow closely     Per neuro: Patient is s/p craniotomy in 2019 for extensive cerebral abscess  He has chronic diplopia, incoordination, spasticity since  He takes baclofen for spasticity and at times takes 2 tabs at same time  For past 2 days he was experimenting with new type of alcoholic drink at home and may have had a little too much   Reason of his initial symptoms was likely interaction of baclofen and alcohol intoxication  There is no evidence of new process on MRI brain  ED Triage Vitals   Temperature Pulse Respirations Blood Pressure SpO2   07/10/22 2351 07/10/22 2351 07/10/22 2351 07/10/22 2351 07/10/22 2351   98 8 °F (37 1 °C) (!) 106 18 148/89 94 %      Temp Source Heart Rate Source Patient Position - Orthostatic VS BP Location FiO2 (%)   07/10/22 2351 07/10/22 2351 07/10/22 2351 07/10/22 2351 --   Tympanic Monitor Lying Right arm       Pain Score       07/11/22 0242       No Pain          Wt Readings from Last 1 Encounters:   07/11/22 67 6 kg (149 lb 0 5 oz)     Additional Vital Signs:   Date/Time Temp Pulse Resp BP MAP (mmHg) SpO2 O2 Device Patient Position - Orthostatic VS   07/12/22 02:08:08 97 9 °F (36 6 °C) 83 19 156/90 112 96 % -- --   07/11/22 22:15:28 98 2 °F (36 8 °C) 84 18 147/96 113 96 % -- --   07/11/22 18:53:44 98 8 °F (37 1 °C) 101 18 147/96 113 97 % -- --   07/11/22 1547 98 6 °F (37 °C) 104 18 142/96 111 95 % None (Room air) Sitting   07/11/22 15:46:35 98 6 °F (37 °C) 104 18 142/96 111 97 % -- --   07/11/22 13:26:17 98 4 °F (36 9 °C) 83 18 173/105 Abnormal  128 96 % None (Room air) Sitting    Patient Position - Orthostatic VS: Patient is sitting up in the bed at 07/11/22 1326   07/11/22 1119 98 2 °F (36 8 °C) 93 17 177/107 Abnormal  130 96 % None (Room air) Sitting     Pertinent Labs/Diagnostic Test Results:   MRI brain w wo contrast   Final Result by Gerson Lewis MD (07/11 5799)      No acute intracranial pathology  Stable encephalomalacia, sequela of remote infection      Workstation performed: [id number]         CTA head and neck with and without contrast   Final Result by Iain Guillermo MD (07/11 9162)      No acute intracranial abnormality  Negative CTA head and neck for large vessel occlusion, dissection, aneurysm, or high-grade stenosis  Additional chronic/incidental findings as detailed above        I agree with preliminary report provided by teleradiology services  Workstation performed: [id number]         XR chest 1 view portable   Final Result by Jamir Zafar MD (52/74 1644)      No acute cardiopulmonary disease                    Workstation performed: [id number]               Results from last 7 days   Lab Units 07/10/22  2357   WBC Thousand/uL 6 99   HEMOGLOBIN g/dL 14 7   HEMATOCRIT % 41 8   PLATELETS Thousands/uL 197   NEUTROS ABS Thousands/µL 5 33         Results from last 7 days   Lab Units 07/11/22  0417 07/10/22  2357   SODIUM mmol/L 143 144   POTASSIUM mmol/L 3 4* 3 5   CHLORIDE mmol/L 107 108   CO2 mmol/L 25 23   ANION GAP mmol/L 11 13   BUN mg/dL 12 12   CREATININE mg/dL 0 54* 0 69   EGFR ml/min/1 73sq m 119 107   CALCIUM mg/dL 8 1* 8 4   MAGNESIUM mg/dL 1 7 2 1     Results from last 7 days   Lab Units 07/10/22  2357   AST U/L 26   ALT U/L 50   ALK PHOS U/L 58   TOTAL PROTEIN g/dL 7 3   ALBUMIN g/dL 4 4   TOTAL BILIRUBIN mg/dL 0 51     Results from last 7 days   Lab Units 07/12/22  0703 07/12/22  0602 07/12/22  0015 07/11/22  2026 07/11/22  1555 07/11/22  1255 07/11/22  1100 07/11/22  0727 07/11/22  0634 07/11/22  0610 07/11/22  0304   POC GLUCOSE mg/dl 80 91 99 171* 360* 126 68 129 55* 55* 61*     Results from last 7 days   Lab Units 07/11/22  0417 07/10/22  2357   GLUCOSE RANDOM mg/dL 120 84         Results from last 7 days   Lab Units 07/11/22  0417   HEMOGLOBIN A1C % 4 9   EAG mg/dl 94     BETA-HYDROXYBUTYRATE   Date Value Ref Range Status   09/24/2019 2 6 (H) <0 6 mmol/L Final                  Results from last 7 days   Lab Units 07/10/22  2357   CK TOTAL U/L 213   CK MB INDEX % 1 6   CK MB ng/mL 3 5     Results from last 7 days   Lab Units 07/11/22  0422 07/11/22  0156 07/10/22  2357   HS TNI 0HR ng/L  --   --  4   HS TNI 2HR ng/L  --  4  --    HSTNI D2 ng/L  --  0  --    HS TNI 4HR ng/L 6  --   --    HSTNI D4 ng/L 2  --   --          Results from last 7 days   Lab Units 07/10/22  2357   PROTIME seconds 12 8   INR  0 98   PTT seconds 30                                                     Results from last 7 days   Lab Units 07/11/22  0045   CLARITY UA  Clear   COLOR UA  Light Yellow   SPEC GRAV UA  <=1 005   PH UA  5 5   GLUCOSE UA mg/dl Negative   KETONES UA mg/dl Negative   BLOOD UA  Trace-Intact*   PROTEIN UA mg/dl Negative   NITRITE UA  Negative   BILIRUBIN UA  Negative   UROBILINOGEN UA E U /dl 0 2   LEUKOCYTES UA  Negative   WBC UA /hpf 0-1   RBC UA /hpf 0-1   BACTERIA UA /hpf None Seen   EPITHELIAL CELLS WET PREP /hpf Occasional             Results from last 7 days   Lab Units 07/11/22  0045   AMPH/METH  Negative   BARBITURATE UR  Negative   BENZODIAZEPINE UR  Negative   COCAINE UR  Negative   METHADONE URINE  Negative   OPIATE UR  Negative   PCP UR  Negative   THC UR  Negative     Results from last 7 days   Lab Units 07/10/22  2357   ETHANOL LVL mg/dL 257*                               ED Treatment:   Medication Administration from 07/10/2022 2339 to 07/11/2022 0240       Date/Time Order Dose Route Action     07/11/2022 0021 iohexol (OMNIPAQUE) 300 mg/mL injection 50 mL 70 mL Intravenous Given     07/11/2022 0152 aspirin tablet 325 mg 325 mg Oral Given        Past Medical History:   Diagnosis Date    Abdominal cyst     Anemia     Hx     Chronic pain disorder     abdominal    Diabetes mellitus ([facility] [address] )     Diverticulitis     GI bleed     History of transfusion 2016    5 units    Lightheadedness     Multiple brain abscesses 2020    Multiple lesions on computed tomography of brain and spine     Spleen laceration      Present on Admission:   Benign essential hypertension   Hyperlipidemia   Tobacco use disorder      Admitting Diagnosis: Altered mental status [R41 82]  Acute alcohol intoxication ([address] ) [F10 929]  Acute right-sided weakness [R53 1]  Stroke-like symptoms [R29 90]  Age/Sex: 45 y o  male  Admission Orders:  accuchecks  Telemetry  Neuro checks:Every 1 hour x 4 hours, then every 2 hours x 8 hours, then every 4 hours x 72 hours  Pt/ot/st eval & tx  Consult neuro  Aspiration precautions  CIWA protocol    Scheduled Medications:  aspirin, 81 mg, Oral, Daily  atorvastatin, 80 mg, Oral, Daily With Dinner  baclofen, 5 mg, Oral, BID  enoxaparin, 40 mg, Subcutaneous, Daily  famotidine, 20 mg, Intravenous, X45X JEF  folic acid, 1 mg, Oral, Daily  insulin glargine, 10 Units, Subcutaneous, HS  insulin lispro, 1-5 Units, Subcutaneous, Q6H [facility]  multivitamin-minerals, 1 tablet, Oral, Daily  nicotine, 1 patch, Transdermal, Daily  thiamine, 100 mg, Oral, Daily      Continuous IV Infusions:     PRN Meds:  acetaminophen, 650 mg, Oral, Q6H PRN  ondansetron, 4 mg, Intravenous, Q6H PRN        Network Utilization Review Department  ATTENTION: Please call with any questions or concerns to [phone] and carefully listen to the prompts so that you are directed to the right person  All voicemails are confidential   Nabeel Snow all requests for admission clinical reviews, approved or denied determinations and any other requests to dedicated fax number below belonging to the campus where the patient is receiving treatment   List of dedicated fax numbers for the Facilities:  [address] DENIALS (Administrative/Medical Necessity) [phone]   [address] (Maternity/NICU/Pediatrics) [phone] [address] [place] [address] [address] [facility] [place] Nora Madridoniel Josias [address]134 [address] [address]   [address] [phone]   [place] [address] [place] [address] [phone]

## 2022-07-12 NOTE — DISCHARGE INSTRUCTIONS
Stop drinking alcohol or at least cut down on the amount of alcohol you consume    Do not mix alcohol with any of your medications    Stop smoking    Your morning blood sugars were noted to be on the low side at times here  Your Gustavo Pickles has been decreased to 7 units at bedtime    Check your blood sugars at home and if it starts to run high again, you can increase it to the 10 units as you normally take

## 2022-07-12 NOTE — PLAN OF CARE
Problem: Potential for Falls  Goal: Patient will remain free of falls  Description: INTERVENTIONS:  - Educate patient/family on patient safety including physical limitations  - Instruct patient to call for assistance with activity   - Consult OT/PT to assist with strengthening/mobility   - Keep Call bell within reach  - Keep bed low and locked with side rails adjusted as appropriate  - Keep care items and personal belongings within reach  - Initiate and maintain comfort rounds  - Make Fall Risk Sign visible to staff  - Offer Toileting every 2 Hours, in advance of need  - Initiate/Maintain bed alarm  - Obtain necessary fall risk management equipment: yellow socks  - Apply yellow socks and bracelet for high fall risk patients  - Consider moving patient to room near nurses station  Outcome: Adequate for Discharge     Problem: MOBILITY - ADULT  Goal: Maintain or return to baseline ADL function  Description: INTERVENTIONS:  -  Assess patient's ability to carry out ADLs; assess patient's baseline for ADL function and identify physical deficits which impact ability to perform ADLs (bathing, care of mouth/teeth, toileting, grooming, dressing, etc )  - Assess/evaluate cause of self-care deficits   - Assess range of motion  - Assess patient's mobility; develop plan if impaired  - Assess patient's need for assistive devices and provide as appropriate  - Encourage maximum independence but intervene and supervise when necessary  - Involve family in performance of ADLs  - Assess for home care needs following discharge   - Consider OT consult to assist with ADL evaluation and planning for discharge  - Provide patient education as appropriate  Outcome: Adequate for Discharge  Goal: Maintains/Returns to pre admission functional level  Description: INTERVENTIONS:  - Perform BMAT or MOVE assessment daily    - Set and communicate daily mobility goal to care team and patient/family/caregiver     - Collaborate with rehabilitation services on mobility goals if consulted  - Perform Range of Motion 2 times a day  - Reposition patient every 2 hours  - Dangle patient 2 times a day  - Stand patient 2 times a day  - Ambulate patient 2 times a day  - Out of bed to chair 2 times a day   - Out of bed for meals 2 times a day  - Out of bed for toileting  - Record patient progress and toleration of activity level   Outcome: Adequate for Discharge     Problem: Neurological Deficit  Goal: Neurological status is stable or improving  Description: Interventions:  - Monitor and assess patient's level of consciousness, motor function, sensory function, and level of assistance needed for ADLs  - Monitor and report changes from baseline  Collaborate with interdisciplinary team to initiate plan and implement interventions as ordered  - Provide and maintain a safe environment  - Consider seizure precautions  - Consider fall precautions  - Consider aspiration precautions  - Consider bleeding precautions  Outcome: Adequate for Discharge     Problem: Activity Intolerance/Impaired Mobility  Goal: Mobility/activity is maintained at optimum level for patient  Description: Interventions:  - Assess and monitor patient  barriers to mobility and need for assistive/adaptive devices  - Assess patient's emotional response to limitations  - Collaborate with interdisciplinary team and initiate plans and interventions as ordered  - Encourage independent activity per ability   - Maintain proper body alignment  - Perform active/passive rom as tolerated/ordered  - Plan activities to conserve energy   - Turn patient as appropriate  Outcome: Adequate for Discharge     Problem: Potential for Aspiration  Goal: Non-ventilated patient's risk of aspiration is minimized  Description: Assess and monitor vital signs, respiratory status, and labs (WBC)  Monitor for signs of aspiration (tachypnea, cough, rales, wheezing, cyanosis, fever)      - Assess and monitor patient's ability to swallow  - Place patient up in chair to eat if possible  - HOB up at 90 degrees to eat if unable to get patient up into chair   - Supervise patient during oral intake  - Instruct patient/ family to take small bites  - Instruct patient/ family to take small single sips when taking liquids  - Follow patient-specific strategies generated by speech pathologist   Outcome: Adequate for Discharge     Problem: Nutrition  Goal: Nutrition/Hydration status is improving  Description: Monitor and assess patient's nutrition/hydration status for malnutrition (ex- brittle hair, bruises, dry skin, pale skin and conjunctiva, muscle wasting, smooth red tongue, and disorientation)  Collaborate with interdisciplinary team and initiate plan and interventions as ordered  Monitor patient's weight and dietary intake as ordered or per policy  Utilize nutrition screening tool and intervene per policy  Determine patient's food preferences and provide high-protein, high-caloric foods as appropriate  - Assist patient with eating   - Allow adequate time for meals   - Encourage patient to take dietary supplement as ordered  - Collaborate with clinical nutritionist   - Include patient/family/caregiver in decisions related to nutrition  Outcome: Adequate for Discharge     Problem: Nutrition/Hydration-ADULT  Goal: Nutrient/Hydration intake appropriate for improving, restoring or maintaining nutritional needs  Description: Monitor and assess patient's nutrition/hydration status for malnutrition  Collaborate with interdisciplinary team and initiate plan and interventions as ordered  Monitor patient's weight and dietary intake as ordered or per policy  Utilize nutrition screening tool and intervene as necessary  Determine patient's food preferences and provide high-protein, high-caloric foods as appropriate       INTERVENTIONS:  - Monitor oral intake, urinary output, labs, and treatment plans  - Assess nutrition and hydration status and recommend course of action  - Evaluate amount of meals eaten  - Assist patient with eating if necessary   - Allow adequate time for meals  - Recommend/ encourage appropriate diets, oral nutritional supplements, and vitamin/mineral supplements  - Order, calculate, and assess calorie counts as needed  - Recommend, monitor, and adjust tube feedings and TPN/PPN based on assessed needs  - Assess need for intravenous fluids  - Provide specific nutrition/hydration education as appropriate  - Include patient/family/caregiver in decisions related to nutrition  Outcome: Adequate for Discharge     Problem: Prexisting or High Potential for Compromised Skin Integrity  Goal: Skin integrity is maintained or improved  Description: INTERVENTIONS:  - Identify patients at risk for skin breakdown  - Assess and monitor skin integrity  - Assess and monitor nutrition and hydration status  - Monitor labs   - Assess for incontinence   - Turn and reposition patient  - Assist with mobility/ambulation  - Relieve pressure over bony prominences  - Avoid friction and shearing  - Provide appropriate hygiene as needed including keeping skin clean and dry  - Evaluate need for skin moisturizer/barrier cream  - Collaborate with interdisciplinary team   - Patient/family teaching  - Consider wound care consult   Outcome: Adequate for Discharge

## 2022-07-13 ENCOUNTER — TRANSITIONAL CARE MANAGEMENT (OUTPATIENT)
Dept: FAMILY MEDICINE CLINIC | Facility: CLINIC | Age: 52
End: 2022-07-13

## 2022-07-13 RX ORDER — LISINOPRIL 10 MG/1
10 TABLET ORAL DAILY
Qty: 30 TABLET | Refills: 0 | Status: SHIPPED | OUTPATIENT
Start: 2022-07-13 | End: 2022-10-21 | Stop reason: SDUPTHER

## 2022-07-19 NOTE — TELEPHONE ENCOUNTER
I informed Trinidad Willoughby Dr will sign orders and she will have someone schedule an appt for pt

## 2022-10-15 DIAGNOSIS — E11.9 TYPE 2 DIABETES MELLITUS WITHOUT COMPLICATION, WITH LONG-TERM CURRENT USE OF INSULIN (HCC): ICD-10-CM

## 2022-10-15 DIAGNOSIS — Z79.4 TYPE 2 DIABETES MELLITUS WITHOUT COMPLICATION, WITH LONG-TERM CURRENT USE OF INSULIN (HCC): ICD-10-CM

## 2022-10-15 RX ORDER — INSULIN GLARGINE 100 [IU]/ML
INJECTION, SOLUTION SUBCUTANEOUS
Qty: 3 ML | Refills: 0 | Status: SHIPPED | OUTPATIENT
Start: 2022-10-15 | End: 2022-10-21

## 2022-10-21 ENCOUNTER — OFFICE VISIT (OUTPATIENT)
Dept: FAMILY MEDICINE CLINIC | Facility: CLINIC | Age: 52
End: 2022-10-21
Payer: COMMERCIAL

## 2022-10-21 VITALS
DIASTOLIC BLOOD PRESSURE: 90 MMHG | SYSTOLIC BLOOD PRESSURE: 160 MMHG | TEMPERATURE: 97.9 F | RESPIRATION RATE: 16 BRPM | WEIGHT: 148 LBS | HEART RATE: 92 BPM | HEIGHT: 69 IN | BODY MASS INDEX: 21.92 KG/M2

## 2022-10-21 DIAGNOSIS — E11.9 TYPE 2 DIABETES MELLITUS WITHOUT COMPLICATION, WITH LONG-TERM CURRENT USE OF INSULIN (HCC): ICD-10-CM

## 2022-10-21 DIAGNOSIS — R26.2 AMBULATORY DYSFUNCTION: ICD-10-CM

## 2022-10-21 DIAGNOSIS — R25.2 SPASTICITY: ICD-10-CM

## 2022-10-21 DIAGNOSIS — I10 BENIGN ESSENTIAL HYPERTENSION: Primary | Chronic | ICD-10-CM

## 2022-10-21 DIAGNOSIS — Z79.4 TYPE 2 DIABETES MELLITUS WITHOUT COMPLICATION, WITH LONG-TERM CURRENT USE OF INSULIN (HCC): ICD-10-CM

## 2022-10-21 DIAGNOSIS — R53.1 RIGHT SIDED WEAKNESS: ICD-10-CM

## 2022-10-21 PROCEDURE — 99214 OFFICE O/P EST MOD 30 MIN: CPT | Performed by: FAMILY MEDICINE

## 2022-10-21 PROCEDURE — 83036 HEMOGLOBIN GLYCOSYLATED A1C: CPT | Performed by: FAMILY MEDICINE

## 2022-10-21 RX ORDER — BACLOFEN 10 MG/1
5 TABLET ORAL 2 TIMES DAILY
Qty: 60 TABLET | Refills: 6 | Status: SHIPPED | OUTPATIENT
Start: 2022-10-21

## 2022-10-21 RX ORDER — LISINOPRIL 10 MG/1
10 TABLET ORAL DAILY
Qty: 90 TABLET | Refills: 1 | Status: SHIPPED | OUTPATIENT
Start: 2022-10-21

## 2022-10-21 NOTE — PROGRESS NOTES
Chief Complaint   Patient presents with   • Diabetes     A1C 5 1   • Hypertension   • Follow-up     Chronic conditions         Patient ID: Ronna Pina is a 45 y o  male  HPI  Pt is seeing for f/u DM2 and HTN -  Last Hg AIC 4 9 -  Still on basaglar 10 U -  Did not f/u over 1 y -  Does not take Lisinopril - seen with his mother     The following portions of the patient's history were reviewed and updated as appropriate: allergies, current medications, past family history, past medical history, past social history, past surgical history and problem list     Review of Systems   Constitutional: Positive for fatigue  Negative for fever and unexpected weight change  HENT: Negative for congestion, ear discharge, ear pain, hearing loss, rhinorrhea, sinus pressure, sore throat and trouble swallowing  Eyes: Negative  Respiratory: Negative  Cardiovascular: Negative  Gastrointestinal: Negative  Endocrine: Negative  Genitourinary: Negative  Musculoskeletal: Positive for gait problem  Skin: Negative  Neurological: Positive for weakness (R side )  Negative for dizziness, syncope, speech difficulty, light-headedness and numbness  + spasticity    Hematological: Negative  Psychiatric/Behavioral: Positive for decreased concentration (memory issues )  Negative for sleep disturbance and suicidal ideas  The patient is not nervous/anxious and is not hyperactive          Current Outpatient Medications   Medication Sig Dispense Refill   • baclofen 10 mg tablet Take 0 5 tablets (5 mg total) by mouth 2 (two) times a day 60 tablet 6   • Basaglar KwikPen 100 units/mL SOPN inject 10 UNITS subcutaneously once daily 3 mL 0   • Insulin Pen Needle (BD Pen Needle Beba 2nd Gen) 32G X 4 MM MISC Inject under the skin daily at bedtime 100 each 0   • lisinopril (ZESTRIL) 10 mg tablet Take 1 tablet (10 mg total) by mouth daily (Patient not taking: Reported on 10/21/2022) 30 tablet 0   • Melatonin 5 MG TABS Take by mouth (Patient not taking: Reported on 10/21/2022)       No current facility-administered medications for this visit  Objective:    /90 (BP Location: Left arm, Patient Position: Sitting, Cuff Size: Standard)   Pulse 92   Temp 97 9 °F (36 6 °C) (Temporal)   Resp 16   Ht 5' 9" (1 753 m)   Wt 67 1 kg (148 lb)   BMI 21 86 kg/m²        Physical Exam  Constitutional:       General: He is not in acute distress  Appearance: He is not ill-appearing  Cardiovascular:      Rate and Rhythm: Normal rate and regular rhythm  Heart sounds: No murmur heard  Pulmonary:      Effort: Pulmonary effort is normal  No respiratory distress  Breath sounds: Decreased breath sounds present  No wheezing, rhonchi or rales  Abdominal:      Palpations: Abdomen is soft  Tenderness: There is no abdominal tenderness  Musculoskeletal:      Right lower leg: No edema  Left lower leg: No edema  Neurological:      Mental Status: He is alert and oriented to person, place, and time  Motor: Weakness (R side) present  Gait: Gait abnormal (uisng a cane )  Psychiatric:         Mood and Affect: Mood normal          Thought Content: Thought content normal          Judgment: Judgment normal              Recent Results (from the past 672 hour(s))   POCT hemoglobin A1c    Collection Time: 10/24/22 12:43 PM   Result Value Ref Range    Hemoglobin A1C 5 1 6 5         Assessment/Plan:         Diagnoses and all orders for this visit:    Benign essential hypertension  -     lisinopril (ZESTRIL) 10 mg tablet; Take 1 tablet (10 mg total) by mouth daily    Right sided weakness  -     Ambulatory Referral to Neurology; Future  -     Ambulatory Referral to Physical Therapy; Future    Ambulatory dysfunction  -     Ambulatory Referral to Neurology; Future  -     Ambulatory Referral to Physical Therapy; Future    Spasticity  -     baclofen 10 mg tablet;  Take 0 5 tablets (5 mg total) by mouth 2 (two) times a day  - Ambulatory Referral to Physical Therapy;  Future    Type 2 diabetes mellitus without complication, with long-term current use of insulin (HCC)  -     POCT hemoglobin A1c  Will stop insulin     rto in [address] [facility]

## 2022-10-24 LAB — SL AMB POCT HEMOGLOBIN AIC: 5.1 (ref ?–6.5)

## 2023-03-07 ENCOUNTER — TELEPHONE (OUTPATIENT)
Dept: NEUROLOGY | Facility: CLINIC | Age: 53
End: 2023-03-07

## 2023-03-27 ENCOUNTER — CONSULT (OUTPATIENT)
Dept: NEUROLOGY | Facility: CLINIC | Age: 53
End: 2023-03-27

## 2023-03-27 VITALS
TEMPERATURE: 96.9 F | DIASTOLIC BLOOD PRESSURE: 103 MMHG | SYSTOLIC BLOOD PRESSURE: 164 MMHG | WEIGHT: 146 LBS | BODY MASS INDEX: 21.62 KG/M2 | HEIGHT: 69 IN | HEART RATE: 106 BPM | OXYGEN SATURATION: 98 %

## 2023-03-27 DIAGNOSIS — R53.1 RIGHT SIDED WEAKNESS: ICD-10-CM

## 2023-03-27 DIAGNOSIS — R25.2 SPASTICITY: ICD-10-CM

## 2023-03-27 DIAGNOSIS — R26.89 BALANCE PROBLEMS: Primary | ICD-10-CM

## 2023-03-27 DIAGNOSIS — G06.0: ICD-10-CM

## 2023-03-27 RX ORDER — BACLOFEN 10 MG/1
10 TABLET ORAL 2 TIMES DAILY
Qty: 60 TABLET | Refills: 6 | Status: SHIPPED | OUTPATIENT
Start: 2023-03-27

## 2023-03-27 NOTE — PROGRESS NOTES
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  He does have a lot of residual effects since including right sided weakness, poor vision, mild dysarthria, word finding, dyskinesia  He lives with his mother  He's on baclofen 5mg bid  He does report major problem walking  He walks wide based with a cane  His MRI brain from July 2022 had revealed  stable encephalomalacia in the posterior left callum that is related to abscess seen on MRI 1/4/2019  Stable tract of   encephalomalacia and gliosis in the left cerebellum extending from small craniotomy toward the posterior left callum  Tract from prior ventriculostomy is seen in the right frontal lobe  Stable stable small foci of encephalomalacia and gliosis in the left   frontal, right parietal and right occipital lobes that are also related to prior infection  Tiny nonspecific focus of FLAIR hyperintensity in the right corona radiata           Past Medical History:   Diagnosis Date   • Abdominal cyst    • Anemia     Hx    • Chronic pain disorder     abdominal   • Diabetes mellitus ([facility] [address] )    • Diverticulitis    • GI bleed    • History of transfusion 2016    5 units   • Lightheadedness    • Multiple brain abscesses 2020   • Multiple lesions on computed tomography of brain and spine    • Spleen laceration                Current Outpatient Medications on File Prior to Visit Medication Sig Dispense Refill   • lisinopril (ZESTRIL) 10 mg tablet Take 1 tablet (10 mg total) by mouth daily 90 tablet 1   • [DISCONTINUED] baclofen 10 mg tablet Take 0 5 tablets (5 mg total) by mouth 2 (two) times a day 60 tablet 6     No current facility-administered medications on file prior to visit  No Known Allergies      Family History   Problem Relation Age of Onset   • No Known Problems Mother    • Diabetes Father    • No Known Problems Sister    • No Known Problems Brother                 Past Surgical History:   Procedure Laterality Date   • ABDOMINAL SURGERY      lap removal of mass post spleen injury   • COLONOSCOPY N/A 2017    Procedure: COLONOSCOPY;  Surgeon: Dottie Britt MD;  Location: [facility] GI LAB; Service:    • CRANIOTOMY     • EGD AND COLONOSCOPY N/A 03/15/2017    Procedure: EGD AND COLONOSCOPY;  Surgeon: Dottie Britt MD;  Location: [facility] GI LAB; Service:    • OTHER SURGICAL HISTORY      per Allscripts-had spleen surgery; no other details   • UPPER GASTROINTESTINAL ENDOSCOPY      for gastric bleeding           Social History     Socioeconomic History   • Marital status: Single     Spouse name: Not on file   • Number of children: Not on file   • Years of education: Not on file   • Highest education level: Not on file   Occupational History   • Not on file   Tobacco Use   • Smoking status: Some Days     Packs/day: 0 25     Years: 5 00     Pack years: 1 25     Types: Cigarettes     Last attempt to quit: 2016     Years since quittin 9   • Smokeless tobacco: Current     Types: Chew   • Tobacco comments:     1 pack/2-3 wks   Vaping Use   • Vaping Use: Never used   Substance and Sexual Activity   • Alcohol use:  Yes     Alcohol/week: 0 0 - 1 0 standard drinks     Comment: ocassionally   • Drug use: No   • Sexual activity: Not on file   Other Topics Concern   • Not on file   Social History Narrative    Caffeine use    Feels safe at home     Social Determinants of Health "    Financial Resource Strain: Not on file   Food Insecurity: No Food Insecurity   • Worried About [facility] in the Last Year: Never true   • Ran Out of Food in the Last Year: Never true   Transportation Needs: No Transportation Needs   • Lack of Transportation (Medical): No   • Lack of Transportation (Non-Medical):  No   Physical Activity: Not on file   Stress: Not on file   Social Connections: Not on file   Intimate Partner Violence: Not on file   Housing Stability: Low Risk    • Unable to Pay for Housing in the Last Year: No   • Number of Places Lived in the Last Year: 1   • Unstable Housing in the Last Year: No       Review of Systems  Refer to positive review of systems in HPI  Constitutional- No fever  Eyes- No visual change  ENT- Hearing normal  CV- No chest pain  Resp- No Shortness of breath  GI- No diarrhea  - Bladder normal  MS- No Arthritis   Skin- No rash  Psych- No depression  Endo- No DM  Heme- No nodes    PHYSICAL EXAM:    Vitals:    03/27/23 1140   BP: (!) 164/103   BP Location: Right arm   Patient Position: Sitting   Cuff Size: Standard   Pulse: (!) 106   Temp: (!) 96 9 °F (36 1 °C)   TempSrc: Tympanic   SpO2: 98%   Weight: 66 2 kg (146 lb)   Height: 5' 9\" (1 753 m)         Appearance: No Acute Distress  Ophthalmoscopic: Disc Flat, Normal fundus  Carotid/Heart/Peripheral Vascular: No Bruits, RRR  Orientation: Awake, Alert, and Oriented x 3  Mental status:  Memory: Registation 3/3 Recall 3/3  Attention: Normal  Knowledge: Appropriate  Language: No aphasia  Speech: No dysarthria  Cranial Nerves:  2 No Visual Defect on Confrontation; Pupils round, equal, reactive to light  3,4,6 Extraocular Movements Intact; no nystagmus  5 Facial Sensation Intact  7 No facial asymmetry  8 Intact hearing  9,10 Palate symmetric, normal gag  11 Good shoulder shrug  12 Tongue Midline  Gait: wide based, unsteady, uses cane  Coordination: No ataxia with finger to nose testing and heel to shin testing  Sensory: " Intact, Symmetric to Pinprick, Light Touch, Vibration, and Joint Position  Muscle Tone: mild spasticity in RLE  Muscle exam  Arm Right Left Leg Right Left   Deltoid 5/5 5/5 Iliopsoas 5/5 5/5   Biceps 5/5 5/5 Quads 5/5 5/5   Triceps 5/5 5/5 Hamstrings 5/5 5/5   Wrist Extension 5/5 5/5 Ankle Dorsi Flexion 5/5 5/5   Wrist Flexion 5/5 5/5 Ankle Plantar Flexion 5/5 5/5   Interossei 5/5 5/5 Ankle Eversion 5/5 5/5   APB 5/5 5/5 Ankle Inversion 5/5 5/5       Reflexes   RJ BJ TJ KJ AJ Plantars Alvarez's   Right 2+ 2+ 2+ 2+ 2+ Downgoing Not present   Left 2+ 2+ 2+ 2+ 2+ Downgoing Not present         Personal review of        MRI brain    Assessment/Plan:     1  Balance problems        2  Right sided weakness  Ambulatory Referral to Neurology      3  Abscess of brainstem        4  Spasticity  baclofen 10 mg tablet        Patient's prior abscesses in brain were mainly in left cerebellum and left pontine region which both would affect his balance  He has mild spasticity in RLE but it's not too much  Will optimize baclofen to 10mg bid to see if it helps against him locking up when ambulating  Recommend PT/OT  Supportive care  He does play some video games to keep himself active  Encouraged word finding and puzzles as well  Counseling Documentation:  The patient and/or patient's family were  counseled regarding diagnostic results  Instructions for management,risk factor reductions,prognosis of disease were discussed  Patient and family were educated regarding impressions,risks and benefits of treatment options,importance of compliance with treatment  Total time of encounter: 40 min   More than 50% of time was spent in counseling and coordination of care of patient  DEVAN Zelaya    [facility]  [address]  [address]

## 2023-04-05 ENCOUNTER — HOSPITAL ENCOUNTER (EMERGENCY)
Facility: HOSPITAL | Age: 53
Discharge: HOME | End: 2023-04-06
Attending: EMERGENCY MEDICINE

## 2023-04-05 ENCOUNTER — APPOINTMENT (EMERGENCY)
Dept: RADIOLOGY | Facility: HOSPITAL | Age: 53
End: 2023-04-05

## 2023-04-05 DIAGNOSIS — R41.82 ALTERED MENTAL STATUS: Primary | ICD-10-CM

## 2023-04-05 DIAGNOSIS — T42.8X1A: ICD-10-CM

## 2023-04-05 LAB
ALBUMIN SERPL BCP-MCNC: 4.7 G/DL (ref 3.5–5)
ALP SERPL-CCNC: 49 U/L (ref 34–104)
ALT SERPL W P-5'-P-CCNC: 102 U/L (ref 7–52)
AMPHETAMINES SERPL QL SCN: NEGATIVE
ANION GAP SERPL CALCULATED.3IONS-SCNC: 17 MMOL/L (ref 4–13)
AST SERPL W P-5'-P-CCNC: 66 U/L (ref 13–39)
BACTERIA UR QL AUTO: ABNORMAL /HPF
BARBITURATES UR QL: NEGATIVE
BASOPHILS # BLD AUTO: 0.01 THOUSANDS/ÂΜL (ref 0–0.1)
BASOPHILS NFR BLD AUTO: 0 % (ref 0–1)
BENZODIAZ UR QL: NEGATIVE
BILIRUB SERPL-MCNC: 0.7 MG/DL (ref 0.2–1)
BILIRUB UR QL STRIP: ABNORMAL
BUN SERPL-MCNC: 19 MG/DL (ref 5–25)
CALCIUM SERPL-MCNC: 8.8 MG/DL (ref 8.4–10.2)
CHLORIDE SERPL-SCNC: 106 MMOL/L (ref 96–108)
CLARITY UR: CLEAR
CO2 SERPL-SCNC: 11 MMOL/L (ref 21–32)
COCAINE UR QL: NEGATIVE
COLOR UR: ABNORMAL
CREAT SERPL-MCNC: 1.24 MG/DL (ref 0.6–1.3)
EOSINOPHIL # BLD AUTO: 0.01 THOUSAND/ÂΜL (ref 0–0.61)
EOSINOPHIL NFR BLD AUTO: 0 % (ref 0–6)
ERYTHROCYTE [DISTWIDTH] IN BLOOD BY AUTOMATED COUNT: 14.6 % (ref 11.6–15.1)
ETHANOL SERPL-MCNC: <10 MG/DL
GFR SERPL CREATININE-BSD FRML MDRD: 65 ML/MIN/1.73SQ M
GLUCOSE SERPL-MCNC: 79 MG/DL (ref 65–140)
GLUCOSE SERPL-MCNC: 80 MG/DL (ref 65–140)
GLUCOSE UR STRIP-MCNC: NEGATIVE MG/DL
HCT VFR BLD AUTO: 40.5 % (ref 36.5–49.3)
HGB BLD-MCNC: 13.5 G/DL (ref 12–17)
HGB UR QL STRIP.AUTO: ABNORMAL
IMM GRANULOCYTES # BLD AUTO: 0.03 THOUSAND/UL (ref 0–0.2)
IMM GRANULOCYTES NFR BLD AUTO: 0 % (ref 0–2)
KETONES UR STRIP-MCNC: ABNORMAL MG/DL
LEUKOCYTE ESTERASE UR QL STRIP: NEGATIVE
LYMPHOCYTES # BLD AUTO: 0.39 THOUSANDS/ÂΜL (ref 0.6–4.47)
LYMPHOCYTES NFR BLD AUTO: 5 % (ref 14–44)
MCH RBC QN AUTO: 38.8 PG (ref 26.8–34.3)
MCHC RBC AUTO-ENTMCNC: 33.3 G/DL (ref 31.4–37.4)
MCV RBC AUTO: 116 FL (ref 82–98)
METHADONE UR QL: NEGATIVE
MONOCYTES # BLD AUTO: 0.31 THOUSAND/ÂΜL (ref 0.17–1.22)
MONOCYTES NFR BLD AUTO: 4 % (ref 4–12)
NEUTROPHILS # BLD AUTO: 6.6 THOUSANDS/ÂΜL (ref 1.85–7.62)
NEUTS SEG NFR BLD AUTO: 91 % (ref 43–75)
NITRITE UR QL STRIP: NEGATIVE
NON-SQ EPI CELLS URNS QL MICRO: ABNORMAL /HPF
NRBC BLD AUTO-RTO: 0 /100 WBCS
OPIATES UR QL SCN: NEGATIVE
OXYCODONE+OXYMORPHONE UR QL SCN: NEGATIVE
PCP UR QL: NEGATIVE
PH UR STRIP.AUTO: 5.5 [PH]
PLATELET # BLD AUTO: 206 THOUSANDS/UL (ref 149–390)
PMV BLD AUTO: 9.3 FL (ref 8.9–12.7)
POTASSIUM SERPL-SCNC: 3.7 MMOL/L (ref 3.5–5.3)
PROT SERPL-MCNC: 6.8 G/DL (ref 6.4–8.4)
PROT UR STRIP-MCNC: ABNORMAL MG/DL
RBC # BLD AUTO: 3.48 MILLION/UL (ref 3.88–5.62)
RBC #/AREA URNS AUTO: ABNORMAL /HPF
SODIUM SERPL-SCNC: 134 MMOL/L (ref 135–147)
SP GR UR STRIP.AUTO: >=1.03 (ref 1–1.03)
THC UR QL: NEGATIVE
UROBILINOGEN UR QL STRIP.AUTO: 0.2 E.U./DL
WBC # BLD AUTO: 7.35 THOUSAND/UL (ref 4.31–10.16)
WBC #/AREA URNS AUTO: ABNORMAL /HPF

## 2023-04-05 RX ADMIN — SODIUM CHLORIDE 500 ML: 0.9 INJECTION, SOLUTION INTRAVENOUS at 21:07

## 2023-04-06 VITALS
SYSTOLIC BLOOD PRESSURE: 158 MMHG | DIASTOLIC BLOOD PRESSURE: 102 MMHG | TEMPERATURE: 98.3 F | RESPIRATION RATE: 18 BRPM | HEART RATE: 96 BPM | WEIGHT: 146 LBS | OXYGEN SATURATION: 98 % | BODY MASS INDEX: 21.56 KG/M2

## 2023-04-06 LAB
APAP SERPL-MCNC: 11 UG/ML (ref 10–20)
ATRIAL RATE: 106 BPM
P AXIS: 78 DEGREES
PR INTERVAL: 154 MS
QRS AXIS: 32 DEGREES
QRSD INTERVAL: 86 MS
QT INTERVAL: 364 MS
QTC INTERVAL: 483 MS
SALICYLATES SERPL-MCNC: <5 MG/DL (ref 3–20)
T WAVE AXIS: 50 DEGREES
VENTRICULAR RATE: 106 BPM
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
## 2023-04-06 NOTE — DISCHARGE INSTRUCTIONS
Get blood drawn tomorrow please and call your doctor to be checked in the office within the next day  You need to have your levels rechecked  You need to discuss with them about misusing your medicine  Your mother should control access to all medications in the house  Return to the ER if any problems or worsening symptoms

## 2023-09-13 ENCOUNTER — TELEPHONE (OUTPATIENT)
Dept: NEUROLOGY | Facility: CLINIC | Age: 53
End: 2023-09-13

## 2023-11-14 DIAGNOSIS — I10 BENIGN ESSENTIAL HYPERTENSION: Chronic | ICD-10-CM

## 2023-11-14 RX ORDER — LISINOPRIL 10 MG/1
TABLET ORAL
Qty: 30 TABLET | Refills: 0 | Status: SHIPPED | OUTPATIENT
Start: 2023-11-14

## 2023-12-28 DIAGNOSIS — R25.2 SPASTICITY: ICD-10-CM

## 2023-12-28 RX ORDER — BACLOFEN 10 MG/1
10 TABLET ORAL 2 TIMES DAILY
Qty: 60 TABLET | Refills: 6 | Status: SHIPPED | OUTPATIENT
Start: 2023-12-28

## 2024-01-01 ENCOUNTER — APPOINTMENT (INPATIENT)
Dept: RADIOLOGY | Facility: HOSPITAL | Age: 54
DRG: 963 | End: 2024-01-01
Payer: MEDICARE

## 2024-01-01 ENCOUNTER — APPOINTMENT (INPATIENT)
Dept: MRI IMAGING | Facility: HOSPITAL | Age: 54
DRG: 963 | End: 2024-01-01
Payer: MEDICARE

## 2024-01-01 ENCOUNTER — APPOINTMENT (INPATIENT)
Dept: NEUROLOGY | Facility: HOSPITAL | Age: 54
DRG: 963 | End: 2024-01-01
Attending: PSYCHIATRY & NEUROLOGY
Payer: MEDICARE

## 2024-01-01 ENCOUNTER — APPOINTMENT (INPATIENT)
Dept: CT IMAGING | Facility: HOSPITAL | Age: 54
DRG: 963 | End: 2024-01-01
Payer: MEDICARE

## 2024-01-01 ENCOUNTER — APPOINTMENT (INPATIENT)
Dept: GASTROENTEROLOGY | Facility: HOSPITAL | Age: 54
DRG: 963 | End: 2024-01-01
Payer: MEDICARE

## 2024-01-01 ENCOUNTER — APPOINTMENT (INPATIENT)
Dept: NEUROLOGY | Facility: HOSPITAL | Age: 54
DRG: 963 | End: 2024-01-01
Attending: STUDENT IN AN ORGANIZED HEALTH CARE EDUCATION/TRAINING PROGRAM
Payer: MEDICARE

## 2024-01-01 ENCOUNTER — APPOINTMENT (EMERGENCY)
Dept: RADIOLOGY | Facility: HOSPITAL | Age: 54
DRG: 963 | End: 2024-01-01
Payer: MEDICARE

## 2024-01-01 ENCOUNTER — HOSPITAL ENCOUNTER (INPATIENT)
Facility: HOSPITAL | Age: 54
LOS: 13 days | DRG: 963 | End: 2024-11-02
Attending: SURGERY | Admitting: SURGERY
Payer: MEDICARE

## 2024-01-01 ENCOUNTER — APPOINTMENT (INPATIENT)
Dept: GASTROENTEROLOGY | Facility: HOSPITAL | Age: 54
DRG: 963 | End: 2024-01-01
Attending: STUDENT IN AN ORGANIZED HEALTH CARE EDUCATION/TRAINING PROGRAM
Payer: MEDICARE

## 2024-01-01 ENCOUNTER — APPOINTMENT (EMERGENCY)
Dept: CT IMAGING | Facility: HOSPITAL | Age: 54
DRG: 963 | End: 2024-01-01
Payer: MEDICARE

## 2024-01-01 ENCOUNTER — APPOINTMENT (INPATIENT)
Dept: NEUROLOGY | Facility: HOSPITAL | Age: 54
DRG: 963 | End: 2024-01-01
Payer: MEDICARE

## 2024-01-01 VITALS
DIASTOLIC BLOOD PRESSURE: 98 MMHG | WEIGHT: 184.3 LBS | SYSTOLIC BLOOD PRESSURE: 222 MMHG | BODY MASS INDEX: 27.3 KG/M2 | RESPIRATION RATE: 28 BRPM | HEIGHT: 69 IN | TEMPERATURE: 98.7 F | OXYGEN SATURATION: 5 %

## 2024-01-01 DIAGNOSIS — G40.901 STATUS EPILEPTICUS (HCC): ICD-10-CM

## 2024-01-01 DIAGNOSIS — J96.01 ACUTE RESPIRATORY FAILURE WITH HYPOXIA (HCC): ICD-10-CM

## 2024-01-01 DIAGNOSIS — J18.9 PNEUMONIA INVOLVING RIGHT LUNG: ICD-10-CM

## 2024-01-01 DIAGNOSIS — J80 ARDS (ADULT RESPIRATORY DISTRESS SYNDROME) (HCC): ICD-10-CM

## 2024-01-01 DIAGNOSIS — R56.9 SEIZURE (HCC): ICD-10-CM

## 2024-01-01 DIAGNOSIS — S36.039A LACERATION OF SPLEEN, INITIAL ENCOUNTER: ICD-10-CM
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
## 2024-01-01 DIAGNOSIS — F39 MOOD DISORDER (HCC): ICD-10-CM

## 2024-01-01 DIAGNOSIS — S06.4XAA EPIDURAL HEMATOMA (HCC): ICD-10-CM

## 2024-01-01 DIAGNOSIS — G92.8 TOXIC METABOLIC ENCEPHALOPATHY: ICD-10-CM

## 2024-01-01 DIAGNOSIS — S12.401A CLOSED NONDISPLACED FRACTURE OF FIFTH CERVICAL VERTEBRA, UNSPECIFIED FRACTURE MORPHOLOGY, INITIAL ENCOUNTER (HCC): ICD-10-CM

## 2024-01-01 DIAGNOSIS — I95.9 HYPOTENSION: ICD-10-CM

## 2024-01-01 LAB
ABO GROUP BLD BPU: NORMAL
ABO GROUP BLD: NORMAL
ALBUMIN SERPL BCG-MCNC: 2 G/DL (ref 3.5–5)
ALBUMIN SERPL BCG-MCNC: 2.2 G/DL (ref 3.5–5)
ALBUMIN SERPL BCG-MCNC: 2.4 G/DL (ref 3.5–5)
ALBUMIN SERPL BCG-MCNC: 2.9 G/DL (ref 3.5–5)
ALBUMIN SERPL BCG-MCNC: 3 G/DL (ref 3.5–5)
ALBUMIN SERPL BCG-MCNC: 3.8 G/DL (ref 3.5–5)
ALBUMIN SERPL BCG-MCNC: 3.8 G/DL (ref 3.5–5)
ALP SERPL-CCNC: 139 U/L (ref 34–104)
ALP SERPL-CCNC: 204 U/L (ref 34–104)
ALP SERPL-CCNC: 230 U/L (ref 34–104)
ALP SERPL-CCNC: 38 U/L (ref 34–104)
ALP SERPL-CCNC: 43 U/L (ref 34–104)
ALP SERPL-CCNC: 56 U/L (ref 34–104)
ALP SERPL-CCNC: 57 U/L (ref 34–104)
ALT SERPL W P-5'-P-CCNC: 16 U/L (ref 7–52)
ALT SERPL W P-5'-P-CCNC: 30 U/L (ref 7–52)
ALT SERPL W P-5'-P-CCNC: 32 U/L (ref 7–52)
ALT SERPL W P-5'-P-CCNC: 5 U/L (ref 7–52)
ALT SERPL W P-5'-P-CCNC: 6 U/L (ref 7–52)
ALT SERPL W P-5'-P-CCNC: 8 U/L (ref 7–52)
ALT SERPL W P-5'-P-CCNC: 8 U/L (ref 7–52)
AMMONIA PLAS-SCNC: 30 UMOL/L (ref 18–72)
AMMONIA PLAS-SCNC: 39 UMOL/L (ref 18–72)
AMPHETAMINES SERPL QL SCN: NEGATIVE
ANION GAP SERPL CALCULATED.3IONS-SCNC: 12 MMOL/L (ref 4–13)
ANION GAP SERPL CALCULATED.3IONS-SCNC: 4 MMOL/L (ref 4–13)
ANION GAP SERPL CALCULATED.3IONS-SCNC: 5 MMOL/L (ref 4–13)
ANION GAP SERPL CALCULATED.3IONS-SCNC: 6 MMOL/L (ref 4–13)
ANION GAP SERPL CALCULATED.3IONS-SCNC: 7 MMOL/L (ref 4–13)
ANION GAP SERPL CALCULATED.3IONS-SCNC: 9 MMOL/L (ref 4–13)
ANISOCYTOSIS BLD QL SMEAR: PRESENT
APAP SERPL-MCNC: 5 UG/ML (ref 10–20)
APRV VENT TC%: 100
APTT PPP: 43 SECONDS (ref 23–34)
ARTERIAL PATENCY WRIST A: NO
AST SERPL W P-5'-P-CCNC: 15 U/L (ref 13–39)
AST SERPL W P-5'-P-CCNC: 17 U/L (ref 13–39)
AST SERPL W P-5'-P-CCNC: 31 U/L (ref 13–39)
AST SERPL W P-5'-P-CCNC: 32 U/L (ref 13–39)
AST SERPL W P-5'-P-CCNC: 37 U/L (ref 13–39)
AST SERPL W P-5'-P-CCNC: 7 U/L (ref 13–39)
AST SERPL W P-5'-P-CCNC: 8 U/L (ref 13–39)
ATRIAL RATE: 125 BPM
B PARAP IS1001 DNA NPH QL NAA+NON-PROBE: NOT DETECTED
B PERT.PT PRMT NPH QL NAA+NON-PROBE: NOT DETECTED
BACTERIA BLD CULT: NORMAL
BACTERIA BRONCH AEROBE CULT: ABNORMAL
BACTERIA BRONCH AEROBE CULT: ABNORMAL
BACTERIA SPT RESP CULT: ABNORMAL
BACTERIA SPT RESP CULT: ABNORMAL
BACTERIA UR CULT: NORMAL
BACTERIA UR QL AUTO: ABNORMAL /HPF
BACTERIA UR QL AUTO: NORMAL /HPF
BARBITURATES UR QL: NEGATIVE
BASE EXCESS BLDA CALC-SCNC: -0.2 MMOL/L
BASE EXCESS BLDA CALC-SCNC: -0.6 MMOL/L
BASE EXCESS BLDA CALC-SCNC: -0.7 MMOL/L
BASE EXCESS BLDA CALC-SCNC: -1.1 MMOL/L
BASE EXCESS BLDA CALC-SCNC: -1.5 MMOL/L
BASE EXCESS BLDA CALC-SCNC: -1.8 MMOL/L
BASE EXCESS BLDA CALC-SCNC: -2 MMOL/L
BASE EXCESS BLDA CALC-SCNC: -3.1 MMOL/L
BASE EXCESS BLDA CALC-SCNC: -4 MMOL/L (ref -2–3)
BASE EXCESS BLDA CALC-SCNC: 0.3 MMOL/L
BASE EXCESS BLDA CALC-SCNC: 0.6 MMOL/L
BASE EXCESS BLDA CALC-SCNC: 1 MMOL/L (ref -2–3)
BASE EXCESS BLDA CALC-SCNC: 1 MMOL/L (ref -2–3)
BASE EXCESS BLDA CALC-SCNC: 2 MMOL/L (ref -2–3)
BASE EXCESS BLDA CALC-SCNC: 3 MMOL/L (ref -2–3)
BASE EXCESS BLDA CALC-SCNC: 5 MMOL/L (ref -2–3)
BASOPHILS # BLD AUTO: 0.01 THOUSANDS/ÂΜL (ref 0–0.1)
BASOPHILS # BLD AUTO: 0.02 THOUSANDS/ÂΜL (ref 0–0.1)
BASOPHILS # BLD MANUAL: 0 THOUSAND/UL (ref 0–0.1)
BASOPHILS # BLD MANUAL: 0.3 THOUSAND/UL (ref 0–0.1)
BASOPHILS NFR BLD AUTO: 0 % (ref 0–1)
BASOPHILS NFR BLD AUTO: 0 % (ref 0–1)
BASOPHILS NFR MAR MANUAL: 0 % (ref 0–1)
BASOPHILS NFR MAR MANUAL: 1 % (ref 0–1)
BENZODIAZ UR QL: NEGATIVE
BILIRUB DIRECT SERPL-MCNC: 0.09 MG/DL (ref 0–0.2)
BILIRUB DIRECT SERPL-MCNC: 0.14 MG/DL (ref 0–0.2)
BILIRUB DIRECT SERPL-MCNC: 0.2 MG/DL (ref 0–0.2)
BILIRUB SERPL-MCNC: 0.29 MG/DL (ref 0.2–1)
BILIRUB SERPL-MCNC: 0.32 MG/DL (ref 0.2–1)
BILIRUB SERPL-MCNC: 0.37 MG/DL (ref 0.2–1)
BILIRUB SERPL-MCNC: 0.55 MG/DL (ref 0.2–1)
BILIRUB SERPL-MCNC: 0.63 MG/DL (ref 0.2–1)
BILIRUB SERPL-MCNC: 0.71 MG/DL (ref 0.2–1)
BILIRUB SERPL-MCNC: 0.78 MG/DL (ref 0.2–1)
BILIRUB UR QL STRIP: NEGATIVE
BILIRUB UR QL STRIP: NEGATIVE
BLD GP AB SCN SERPL QL: NEGATIVE
BLD SMEAR INTERP: NORMAL
BPU ID: NORMAL
BUN SERPL-MCNC: 10 MG/DL (ref 5–25)
BUN SERPL-MCNC: 11 MG/DL (ref 5–25)
BUN SERPL-MCNC: 11 MG/DL (ref 5–25)
BUN SERPL-MCNC: 12 MG/DL (ref 5–25)
BUN SERPL-MCNC: 21 MG/DL (ref 5–25)
BUN SERPL-MCNC: 24 MG/DL (ref 5–25)
BUN SERPL-MCNC: 24 MG/DL (ref 5–25)
BUN SERPL-MCNC: 26 MG/DL (ref 5–25)
BUN SERPL-MCNC: 27 MG/DL (ref 5–25)
BUN SERPL-MCNC: 29 MG/DL (ref 5–25)
BUN SERPL-MCNC: 33 MG/DL (ref 5–25)
BUN SERPL-MCNC: 37 MG/DL (ref 5–25)
BUN SERPL-MCNC: 38 MG/DL (ref 5–25)
BUN SERPL-MCNC: 4 MG/DL (ref 5–25)
BUN SERPL-MCNC: 5 MG/DL (ref 5–25)
BUN SERPL-MCNC: 5 MG/DL (ref 5–25)
BUN SERPL-MCNC: 6 MG/DL (ref 5–25)
BUN SERPL-MCNC: 7 MG/DL (ref 5–25)
BUN SERPL-MCNC: 8 MG/DL (ref 5–25)
BUN SERPL-MCNC: 9 MG/DL (ref 5–25)
C DIFF TOX GENS STL QL NAA+PROBE: NEGATIVE
C PNEUM DNA NPH QL NAA+NON-PROBE: NOT DETECTED
CA-I BLD-SCNC: 0.94 MMOL/L (ref 1.12–1.32)
CA-I BLD-SCNC: 1.01 MMOL/L (ref 1.12–1.32)
CA-I BLD-SCNC: 1.05 MMOL/L (ref 1.12–1.32)
CA-I BLD-SCNC: 1.09 MMOL/L (ref 1.12–1.32)
CA-I BLD-SCNC: 1.1 MMOL/L (ref 1.12–1.32)
CA-I BLD-SCNC: 1.1 MMOL/L (ref 1.12–1.32)
CA-I BLD-SCNC: 1.11 MMOL/L (ref 1.12–1.32)
CA-I BLD-SCNC: 1.12 MMOL/L (ref 1.12–1.32)
CA-I BLD-SCNC: 1.12 MMOL/L (ref 1.12–1.32)
CA-I BLD-SCNC: 1.14 MMOL/L (ref 1.12–1.32)
CA-I BLD-SCNC: 1.19 MMOL/L (ref 1.12–1.32)
CA-I BLD-SCNC: 1.21 MMOL/L (ref 1.12–1.32)
CA-I BLD-SCNC: 1.21 MMOL/L (ref 1.12–1.32)
CA-I BLD-SCNC: 1.22 MMOL/L (ref 1.12–1.32)
CA-I BLD-SCNC: 1.28 MMOL/L (ref 1.12–1.32)
CA-I BLD-SCNC: 1.33 MMOL/L (ref 1.12–1.32)
CA-I BLD-SCNC: 1.34 MMOL/L (ref 1.12–1.32)
CALCIUM ALBUM COR SERPL-MCNC: 11.4 MG/DL (ref 8.3–10.1)
CALCIUM ALBUM COR SERPL-MCNC: 8.7 MG/DL (ref 8.3–10.1)
CALCIUM SERPL-MCNC: 5.7 MG/DL (ref 8.4–10.2)
CALCIUM SERPL-MCNC: 7.4 MG/DL (ref 8.4–10.2)
CALCIUM SERPL-MCNC: 7.4 MG/DL (ref 8.4–10.2)
CALCIUM SERPL-MCNC: 7.5 MG/DL (ref 8.4–10.2)
CALCIUM SERPL-MCNC: 7.6 MG/DL (ref 8.4–10.2)
CALCIUM SERPL-MCNC: 7.7 MG/DL (ref 8.4–10.2)
CALCIUM SERPL-MCNC: 7.7 MG/DL (ref 8.4–10.2)
CALCIUM SERPL-MCNC: 7.8 MG/DL (ref 8.4–10.2)
CALCIUM SERPL-MCNC: 7.9 MG/DL (ref 8.4–10.2)
CALCIUM SERPL-MCNC: 8 MG/DL (ref 8.4–10.2)
CALCIUM SERPL-MCNC: 8.1 MG/DL (ref 8.4–10.2)
CALCIUM SERPL-MCNC: 8.2 MG/DL (ref 8.4–10.2)
CALCIUM SERPL-MCNC: 8.2 MG/DL (ref 8.4–10.2)
CALCIUM SERPL-MCNC: 8.4 MG/DL (ref 8.4–10.2)
CALCIUM SERPL-MCNC: 8.7 MG/DL (ref 8.4–10.2)
CALCIUM SERPL-MCNC: 8.8 MG/DL (ref 8.4–10.2)
CALCIUM SERPL-MCNC: 8.8 MG/DL (ref 8.4–10.2)
CALCIUM SERPL-MCNC: 9 MG/DL (ref 8.4–10.2)
CALCIUM SERPL-MCNC: 9.2 MG/DL (ref 8.4–10.2)
CALCIUM SERPL-MCNC: 9.2 MG/DL (ref 8.4–10.2)
CALCIUM SERPL-MCNC: 9.8 MG/DL (ref 8.4–10.2)
CHLORIDE SERPL-SCNC: 100 MMOL/L (ref 96–108)
CHLORIDE SERPL-SCNC: 101 MMOL/L (ref 96–108)
CHLORIDE SERPL-SCNC: 101 MMOL/L (ref 96–108)
CHLORIDE SERPL-SCNC: 102 MMOL/L (ref 96–108)
CHLORIDE SERPL-SCNC: 103 MMOL/L (ref 96–108)
CHLORIDE SERPL-SCNC: 103 MMOL/L (ref 96–108)
CHLORIDE SERPL-SCNC: 105 MMOL/L (ref 96–108)
CHLORIDE SERPL-SCNC: 107 MMOL/L (ref 96–108)
CHLORIDE SERPL-SCNC: 108 MMOL/L (ref 96–108)
CHLORIDE SERPL-SCNC: 108 MMOL/L (ref 96–108)
CHLORIDE SERPL-SCNC: 109 MMOL/L (ref 96–108)
CHLORIDE SERPL-SCNC: 110 MMOL/L (ref 96–108)
CHLORIDE SERPL-SCNC: 111 MMOL/L (ref 96–108)
CHLORIDE SERPL-SCNC: 111 MMOL/L (ref 96–108)
CHLORIDE SERPL-SCNC: 112 MMOL/L (ref 96–108)
CHLORIDE SERPL-SCNC: 114 MMOL/L (ref 96–108)
CHLORIDE SERPL-SCNC: 96 MMOL/L (ref 96–108)
CHLORIDE SERPL-SCNC: 98 MMOL/L (ref 96–108)
CHLORIDE SERPL-SCNC: 98 MMOL/L (ref 96–108)
CK SERPL-CCNC: 100 U/L (ref 39–308)
CLARITY UR: CLEAR
CLARITY UR: CLEAR
CO2 SERPL-SCNC: 18 MMOL/L (ref 21–32)
CO2 SERPL-SCNC: 21 MMOL/L (ref 21–32)
CO2 SERPL-SCNC: 23 MMOL/L (ref 21–32)
CO2 SERPL-SCNC: 24 MMOL/L (ref 21–32)
CO2 SERPL-SCNC: 25 MMOL/L (ref 21–32)
CO2 SERPL-SCNC: 25 MMOL/L (ref 21–32)
CO2 SERPL-SCNC: 26 MMOL/L (ref 21–32)
CO2 SERPL-SCNC: 28 MMOL/L (ref 21–32)
CO2 SERPL-SCNC: 29 MMOL/L (ref 21–32)
CO2 SERPL-SCNC: 30 MMOL/L (ref 21–32)
CO2 SERPL-SCNC: 31 MMOL/L (ref 21–32)
CO2 SERPL-SCNC: 32 MMOL/L (ref 21–32)
CO2 SERPL-SCNC: 34 MMOL/L (ref 21–32)
COCAINE UR QL: NEGATIVE
COLOR UR: ABNORMAL
COLOR UR: YELLOW
CREAT SERPL-MCNC: 0.37 MG/DL (ref 0.6–1.3)
CREAT SERPL-MCNC: 0.44 MG/DL (ref 0.6–1.3)
CREAT SERPL-MCNC: 0.45 MG/DL (ref 0.6–1.3)
CREAT SERPL-MCNC: 0.49 MG/DL (ref 0.6–1.3)
CREAT SERPL-MCNC: 0.49 MG/DL (ref 0.6–1.3)
CREAT SERPL-MCNC: 0.5 MG/DL (ref 0.6–1.3)
CREAT SERPL-MCNC: 0.52 MG/DL (ref 0.6–1.3)
CREAT SERPL-MCNC: 0.52 MG/DL (ref 0.6–1.3)
CREAT SERPL-MCNC: 0.55 MG/DL (ref 0.6–1.3)
CREAT SERPL-MCNC: 0.56 MG/DL (ref 0.6–1.3)
CREAT SERPL-MCNC: 0.56 MG/DL (ref 0.6–1.3)
CREAT SERPL-MCNC: 0.58 MG/DL (ref 0.6–1.3)
CREAT SERPL-MCNC: 0.59 MG/DL (ref 0.6–1.3)
CREAT SERPL-MCNC: 0.63 MG/DL (ref 0.6–1.3)
CREAT SERPL-MCNC: 0.75 MG/DL (ref 0.6–1.3)
CREAT SERPL-MCNC: 0.78 MG/DL (ref 0.6–1.3)
CREAT SERPL-MCNC: 0.9 MG/DL (ref 0.6–1.3)
CREAT SERPL-MCNC: 1.03 MG/DL (ref 0.6–1.3)
CREAT SERPL-MCNC: 1.04 MG/DL (ref 0.6–1.3)
CREAT SERPL-MCNC: 1.06 MG/DL (ref 0.6–1.3)
CREAT SERPL-MCNC: 1.1 MG/DL (ref 0.6–1.3)
CREAT SERPL-MCNC: 1.11 MG/DL (ref 0.6–1.3)
CREAT SERPL-MCNC: 1.12 MG/DL (ref 0.6–1.3)
CREAT SERPL-MCNC: 1.14 MG/DL (ref 0.6–1.3)
CREAT SERPL-MCNC: 1.15 MG/DL (ref 0.6–1.3)
CROSSMATCH: NORMAL
D DIMER PPP FEU-MCNC: 0.8 UG/ML FEU
DEPRECATED AT III PPP: 118 % OF NORMAL (ref 92–136)
DOHLE BOD BLD QL SMEAR: PRESENT
DOHLE BOD BLD QL SMEAR: PRESENT
EOSINOPHIL # BLD AUTO: 0.07 THOUSAND/ÂΜL (ref 0–0.61)
EOSINOPHIL # BLD AUTO: 0.13 THOUSAND/ÂΜL (ref 0–0.61)
EOSINOPHIL # BLD MANUAL: 0 THOUSAND/UL (ref 0–0.4)
EOSINOPHIL # BLD MANUAL: 0 THOUSAND/UL (ref 0–0.4)
EOSINOPHIL # BLD MANUAL: 0.24 THOUSAND/UL (ref 0–0.4)
EOSINOPHIL # BLD MANUAL: 0.3 THOUSAND/UL (ref 0–0.4)
EOSINOPHIL # BLD MANUAL: 0.38 THOUSAND/UL (ref 0–0.4)
EOSINOPHIL NFR BLD AUTO: 1 % (ref 0–6)
EOSINOPHIL NFR BLD AUTO: 2 % (ref 0–6)
EOSINOPHIL NFR BLD MANUAL: 0 % (ref 0–6)
EOSINOPHIL NFR BLD MANUAL: 0 % (ref 0–6)
EOSINOPHIL NFR BLD MANUAL: 1 % (ref 0–6)
ERYTHROCYTE [DISTWIDTH] IN BLOOD BY AUTOMATED COUNT: 14.8 % (ref 11.6–15.1)
ERYTHROCYTE [DISTWIDTH] IN BLOOD BY AUTOMATED COUNT: 15.3 % (ref 11.6–15.1)
ERYTHROCYTE [DISTWIDTH] IN BLOOD BY AUTOMATED COUNT: 15.3 % (ref 11.6–15.1)
ERYTHROCYTE [DISTWIDTH] IN BLOOD BY AUTOMATED COUNT: 15.7 % (ref 11.6–15.1)
ERYTHROCYTE [DISTWIDTH] IN BLOOD BY AUTOMATED COUNT: 15.8 % (ref 11.6–15.1)
ERYTHROCYTE [DISTWIDTH] IN BLOOD BY AUTOMATED COUNT: 15.8 % (ref 11.6–15.1)
ERYTHROCYTE [DISTWIDTH] IN BLOOD BY AUTOMATED COUNT: 17.9 % (ref 11.6–15.1)
ERYTHROCYTE [DISTWIDTH] IN BLOOD BY AUTOMATED COUNT: 18.2 % (ref 11.6–15.1)
ERYTHROCYTE [DISTWIDTH] IN BLOOD BY AUTOMATED COUNT: 18.6 % (ref 11.6–15.1)
ERYTHROCYTE [DISTWIDTH] IN BLOOD BY AUTOMATED COUNT: 18.6 % (ref 11.6–15.1)
ERYTHROCYTE [DISTWIDTH] IN BLOOD BY AUTOMATED COUNT: 18.8 % (ref 11.6–15.1)
ERYTHROCYTE [DISTWIDTH] IN BLOOD BY AUTOMATED COUNT: 19 % (ref 11.6–15.1)
ERYTHROCYTE [DISTWIDTH] IN BLOOD BY AUTOMATED COUNT: 19.4 % (ref 11.6–15.1)
ERYTHROCYTE [DISTWIDTH] IN BLOOD BY AUTOMATED COUNT: 20.3 % (ref 11.6–15.1)
ERYTHROCYTE [DISTWIDTH] IN BLOOD BY AUTOMATED COUNT: 20.4 % (ref 11.6–15.1)
ERYTHROCYTE [DISTWIDTH] IN BLOOD BY AUTOMATED COUNT: 20.7 % (ref 11.6–15.1)
EST. AVERAGE GLUCOSE BLD GHB EST-MCNC: 137 MG/DL
ETHANOL SERPL-MCNC: <10 MG/DL
ETHANOL SERPL-MCNC: <10 MG/DL
FDP BLD QL AGGL: <10
FENTANYL UR QL SCN: NEGATIVE
FIBRINOGEN PPP-MCNC: 724 MG/DL (ref 206–523)
FIO2 GAS DIL.REBREATH: 10 L
FIO2 GAS DIL.REBREATH: 100 L
FIO2 GAS DIL.REBREATH: 100 L
FIO2 GAS DIL.REBREATH: 36 L
FIO2 GAS DIL.REBREATH: 80 L
FIO2: 80 %
FLUAV RNA NPH QL NAA+NON-PROBE: NOT DETECTED
FLUAV RNA RESP QL NAA+PROBE: NEGATIVE
FLUBV RNA NPH QL NAA+NON-PROBE: NOT DETECTED
FLUBV RNA RESP QL NAA+PROBE: NEGATIVE
FOLATE SERPL-MCNC: >22.3 NG/ML
GFR SERPL CREATININE-BSD FRML MDRD: 100 ML/MIN/1.73SQ M
GFR SERPL CREATININE-BSD FRML MDRD: 102 ML/MIN/1.73SQ M
GFR SERPL CREATININE-BSD FRML MDRD: 110 ML/MIN/1.73SQ M
GFR SERPL CREATININE-BSD FRML MDRD: 113 ML/MIN/1.73SQ M
GFR SERPL CREATININE-BSD FRML MDRD: 113 ML/MIN/1.73SQ M
GFR SERPL CREATININE-BSD FRML MDRD: 115 ML/MIN/1.73SQ M
GFR SERPL CREATININE-BSD FRML MDRD: 115 ML/MIN/1.73SQ M
GFR SERPL CREATININE-BSD FRML MDRD: 116 ML/MIN/1.73SQ M
GFR SERPL CREATININE-BSD FRML MDRD: 119 ML/MIN/1.73SQ M
GFR SERPL CREATININE-BSD FRML MDRD: 119 ML/MIN/1.73SQ M
GFR SERPL CREATININE-BSD FRML MDRD: 121 ML/MIN/1.73SQ M
GFR SERPL CREATININE-BSD FRML MDRD: 122 ML/MIN/1.73SQ M
GFR SERPL CREATININE-BSD FRML MDRD: 122 ML/MIN/1.73SQ M
GFR SERPL CREATININE-BSD FRML MDRD: 126 ML/MIN/1.73SQ M
GFR SERPL CREATININE-BSD FRML MDRD: 127 ML/MIN/1.73SQ M
GFR SERPL CREATININE-BSD FRML MDRD: 137 ML/MIN/1.73SQ M
GFR SERPL CREATININE-BSD FRML MDRD: 70 ML/MIN/1.73SQ M
GFR SERPL CREATININE-BSD FRML MDRD: 71 ML/MIN/1.73SQ M
GFR SERPL CREATININE-BSD FRML MDRD: 73 ML/MIN/1.73SQ M
GFR SERPL CREATININE-BSD FRML MDRD: 73 ML/MIN/1.73SQ M
GFR SERPL CREATININE-BSD FRML MDRD: 74 ML/MIN/1.73SQ M
GFR SERPL CREATININE-BSD FRML MDRD: 78 ML/MIN/1.73SQ M
GFR SERPL CREATININE-BSD FRML MDRD: 79 ML/MIN/1.73SQ M
GFR SERPL CREATININE-BSD FRML MDRD: 80 ML/MIN/1.73SQ M
GFR SERPL CREATININE-BSD FRML MDRD: 95 ML/MIN/1.73SQ M
GLUCOSE SERPL-MCNC: 111 MG/DL (ref 65–140)
GLUCOSE SERPL-MCNC: 111 MG/DL (ref 65–140)
GLUCOSE SERPL-MCNC: 117 MG/DL (ref 65–140)
GLUCOSE SERPL-MCNC: 117 MG/DL (ref 65–140)
GLUCOSE SERPL-MCNC: 118 MG/DL (ref 65–140)
GLUCOSE SERPL-MCNC: 119 MG/DL (ref 65–140)
GLUCOSE SERPL-MCNC: 119 MG/DL (ref 65–140)
GLUCOSE SERPL-MCNC: 120 MG/DL (ref 65–140)
GLUCOSE SERPL-MCNC: 122 MG/DL (ref 65–140)
GLUCOSE SERPL-MCNC: 130 MG/DL (ref 65–140)
GLUCOSE SERPL-MCNC: 132 MG/DL (ref 65–140)
GLUCOSE SERPL-MCNC: 132 MG/DL (ref 65–140)
GLUCOSE SERPL-MCNC: 133 MG/DL (ref 65–140)
GLUCOSE SERPL-MCNC: 135 MG/DL (ref 65–140)
GLUCOSE SERPL-MCNC: 135 MG/DL (ref 65–140)
GLUCOSE SERPL-MCNC: 136 MG/DL (ref 65–140)
GLUCOSE SERPL-MCNC: 138 MG/DL (ref 65–140)
GLUCOSE SERPL-MCNC: 139 MG/DL (ref 65–140)
GLUCOSE SERPL-MCNC: 140 MG/DL (ref 65–140)
GLUCOSE SERPL-MCNC: 143 MG/DL (ref 65–140)
GLUCOSE SERPL-MCNC: 145 MG/DL (ref 65–140)
GLUCOSE SERPL-MCNC: 147 MG/DL (ref 65–140)
GLUCOSE SERPL-MCNC: 149 MG/DL (ref 65–140)
GLUCOSE SERPL-MCNC: 151 MG/DL (ref 65–140)
GLUCOSE SERPL-MCNC: 151 MG/DL (ref 65–140)
GLUCOSE SERPL-MCNC: 153 MG/DL (ref 65–140)
GLUCOSE SERPL-MCNC: 154 MG/DL (ref 65–140)
GLUCOSE SERPL-MCNC: 155 MG/DL (ref 65–140)
GLUCOSE SERPL-MCNC: 156 MG/DL (ref 65–140)
GLUCOSE SERPL-MCNC: 159 MG/DL (ref 65–140)
GLUCOSE SERPL-MCNC: 161 MG/DL (ref 65–140)
GLUCOSE SERPL-MCNC: 161 MG/DL (ref 65–140)
GLUCOSE SERPL-MCNC: 165 MG/DL (ref 65–140)
GLUCOSE SERPL-MCNC: 166 MG/DL (ref 65–140)
GLUCOSE SERPL-MCNC: 167 MG/DL (ref 65–140)
GLUCOSE SERPL-MCNC: 169 MG/DL (ref 65–140)
GLUCOSE SERPL-MCNC: 169 MG/DL (ref 65–140)
GLUCOSE SERPL-MCNC: 176 MG/DL (ref 65–140)
GLUCOSE SERPL-MCNC: 177 MG/DL (ref 65–140)
GLUCOSE SERPL-MCNC: 177 MG/DL (ref 65–140)
GLUCOSE SERPL-MCNC: 179 MG/DL (ref 65–140)
GLUCOSE SERPL-MCNC: 189 MG/DL (ref 65–140)
GLUCOSE SERPL-MCNC: 194 MG/DL (ref 65–140)
GLUCOSE SERPL-MCNC: 196 MG/DL (ref 65–140)
GLUCOSE SERPL-MCNC: 197 MG/DL (ref 65–140)
GLUCOSE SERPL-MCNC: 197 MG/DL (ref 65–140)
GLUCOSE SERPL-MCNC: 198 MG/DL (ref 65–140)
GLUCOSE SERPL-MCNC: 198 MG/DL (ref 65–140)
GLUCOSE SERPL-MCNC: 199 MG/DL (ref 65–140)
GLUCOSE SERPL-MCNC: 207 MG/DL (ref 65–140)
GLUCOSE SERPL-MCNC: 207 MG/DL (ref 65–140)
GLUCOSE SERPL-MCNC: 209 MG/DL (ref 65–140)
GLUCOSE SERPL-MCNC: 209 MG/DL (ref 65–140)
GLUCOSE SERPL-MCNC: 210 MG/DL (ref 65–140)
GLUCOSE SERPL-MCNC: 212 MG/DL (ref 65–140)
GLUCOSE SERPL-MCNC: 213 MG/DL (ref 65–140)
GLUCOSE SERPL-MCNC: 215 MG/DL (ref 65–140)
GLUCOSE SERPL-MCNC: 216 MG/DL (ref 65–140)
GLUCOSE SERPL-MCNC: 220 MG/DL (ref 65–140)
GLUCOSE SERPL-MCNC: 222 MG/DL (ref 65–140)
GLUCOSE SERPL-MCNC: 224 MG/DL (ref 65–140)
GLUCOSE SERPL-MCNC: 225 MG/DL (ref 65–140)
GLUCOSE SERPL-MCNC: 226 MG/DL (ref 65–140)
GLUCOSE SERPL-MCNC: 232 MG/DL (ref 65–140)
GLUCOSE SERPL-MCNC: 233 MG/DL (ref 65–140)
GLUCOSE SERPL-MCNC: 234 MG/DL (ref 65–140)
GLUCOSE SERPL-MCNC: 235 MG/DL (ref 65–140)
GLUCOSE SERPL-MCNC: 240 MG/DL (ref 65–140)
GLUCOSE SERPL-MCNC: 242 MG/DL (ref 65–140)
GLUCOSE SERPL-MCNC: 242 MG/DL (ref 65–140)
GLUCOSE SERPL-MCNC: 245 MG/DL (ref 65–140)
GLUCOSE SERPL-MCNC: 250 MG/DL (ref 65–140)
GLUCOSE SERPL-MCNC: 258 MG/DL (ref 65–140)
GLUCOSE SERPL-MCNC: 259 MG/DL (ref 65–140)
GLUCOSE SERPL-MCNC: 272 MG/DL (ref 65–140)
GLUCOSE SERPL-MCNC: 286 MG/DL (ref 65–140)
GLUCOSE SERPL-MCNC: 295 MG/DL (ref 65–140)
GLUCOSE SERPL-MCNC: 296 MG/DL (ref 65–140)
GLUCOSE SERPL-MCNC: 297 MG/DL (ref 65–140)
GLUCOSE SERPL-MCNC: 307 MG/DL (ref 65–140)
GLUCOSE SERPL-MCNC: 313 MG/DL (ref 65–140)
GLUCOSE SERPL-MCNC: 315 MG/DL (ref 65–140)
GLUCOSE SERPL-MCNC: 316 MG/DL (ref 65–140)
GLUCOSE SERPL-MCNC: 318 MG/DL (ref 65–140)
GLUCOSE SERPL-MCNC: 323 MG/DL (ref 65–140)
GLUCOSE SERPL-MCNC: 341 MG/DL (ref 65–140)
GLUCOSE SERPL-MCNC: 344 MG/DL (ref 65–140)
GLUCOSE SERPL-MCNC: 367 MG/DL (ref 65–140)
GLUCOSE SERPL-MCNC: 419 MG/DL (ref 65–140)
GLUCOSE SERPL-MCNC: 85 MG/DL (ref 65–140)
GLUCOSE SERPL-MCNC: 94 MG/DL (ref 65–140)
GLUCOSE SERPL-MCNC: 97 MG/DL (ref 65–140)
GLUCOSE UR STRIP-MCNC: ABNORMAL MG/DL
GLUCOSE UR STRIP-MCNC: ABNORMAL MG/DL
GRAM STN SPEC: ABNORMAL
HADV DNA NPH QL NAA+NON-PROBE: NOT DETECTED
HBA1C MFR BLD: 6.4 %
HCO3 BLDA-SCNC: 21.8 MMOL/L (ref 22–28)
HCO3 BLDA-SCNC: 22.2 MMOL/L (ref 22–28)
HCO3 BLDA-SCNC: 24.2 MMOL/L (ref 22–28)
HCO3 BLDA-SCNC: 24.7 MMOL/L (ref 22–28)
HCO3 BLDA-SCNC: 24.9 MMOL/L (ref 22–28)
HCO3 BLDA-SCNC: 26.7 MMOL/L (ref 22–28)
HCO3 BLDA-SCNC: 26.7 MMOL/L (ref 22–28)
HCO3 BLDA-SCNC: 26.8 MMOL/L (ref 22–28)
HCO3 BLDA-SCNC: 27 MMOL/L (ref 22–28)
HCO3 BLDA-SCNC: 27.6 MMOL/L (ref 22–28)
HCO3 BLDA-SCNC: 27.6 MMOL/L (ref 24–30)
HCO3 BLDA-SCNC: 27.9 MMOL/L (ref 22–28)
HCO3 BLDA-SCNC: 28.3 MMOL/L (ref 22–28)
HCO3 BLDA-SCNC: 28.5 MMOL/L (ref 24–30)
HCO3 BLDA-SCNC: 29.1 MMOL/L (ref 22–28)
HCO3 BLDA-SCNC: 32.9 MMOL/L (ref 24–30)
HCOV 229E RNA NPH QL NAA+NON-PROBE: NOT DETECTED
HCOV HKU1 RNA NPH QL NAA+NON-PROBE: NOT DETECTED
HCOV NL63 RNA NPH QL NAA+NON-PROBE: NOT DETECTED
HCOV OC43 RNA NPH QL NAA+NON-PROBE: NOT DETECTED
HCT VFR BLD AUTO: 16.1 % (ref 36.5–49.3)
HCT VFR BLD AUTO: 20.1 % (ref 36.5–49.3)
HCT VFR BLD AUTO: 20.4 % (ref 36.5–49.3)
HCT VFR BLD AUTO: 21 % (ref 36.5–49.3)
HCT VFR BLD AUTO: 21.6 % (ref 36.5–49.3)
HCT VFR BLD AUTO: 22 % (ref 36.5–49.3)
HCT VFR BLD AUTO: 23.5 % (ref 36.5–49.3)
HCT VFR BLD AUTO: 24.1 % (ref 36.5–49.3)
HCT VFR BLD AUTO: 24.5 % (ref 36.5–49.3)
HCT VFR BLD AUTO: 24.5 % (ref 36.5–49.3)
HCT VFR BLD AUTO: 24.7 % (ref 36.5–49.3)
HCT VFR BLD AUTO: 25.1 % (ref 36.5–49.3)
HCT VFR BLD AUTO: 25.3 % (ref 36.5–49.3)
HCT VFR BLD AUTO: 25.4 % (ref 36.5–49.3)
HCT VFR BLD AUTO: 25.7 % (ref 36.5–49.3)
HCT VFR BLD AUTO: 25.9 % (ref 36.5–49.3)
HCT VFR BLD AUTO: 26 % (ref 36.5–49.3)
HCT VFR BLD AUTO: 26.8 % (ref 36.5–49.3)
HCT VFR BLD AUTO: 27.4 % (ref 36.5–49.3)
HCT VFR BLD AUTO: 28 % (ref 36.5–49.3)
HCT VFR BLD AUTO: 28.4 % (ref 36.5–49.3)
HCT VFR BLD AUTO: 28.9 % (ref 36.5–49.3)
HCT VFR BLD AUTO: 31.4 % (ref 36.5–49.3)
HCT VFR BLD AUTO: 33.3 % (ref 36.5–49.3)
HCT VFR BLD AUTO: 33.3 % (ref 36.5–49.3)
HCT VFR BLD AUTO: 33.7 % (ref 36.5–49.3)
HCT VFR BLD CALC: 20 % (ref 36.5–49.3)
HCT VFR BLD CALC: 21 % (ref 36.5–49.3)
HCT VFR BLD CALC: 22 % (ref 36.5–49.3)
HCT VFR BLD CALC: 24 % (ref 36.5–49.3)
HCT VFR BLD CALC: 25 % (ref 36.5–49.3)
HCT VFR BLD CALC: 26 % (ref 36.5–49.3)
HCT VFR BLD CALC: 28 % (ref 36.5–49.3)
HCT VFR BLD CALC: 29 % (ref 36.5–49.3)
HCT VFR BLD CALC: 35 % (ref 36.5–49.3)
HEMOCCULT STL QL: NEGATIVE
HEMOCCULT STL QL: NORMAL
HEMOCCULT STL QL: NORMAL
HGB BLD-MCNC: 11 G/DL (ref 12–17)
HGB BLD-MCNC: 11.2 G/DL (ref 12–17)
HGB BLD-MCNC: 11.4 G/DL (ref 12–17)
HGB BLD-MCNC: 11.5 G/DL (ref 12–17)
HGB BLD-MCNC: 5.3 G/DL (ref 12–17)
HGB BLD-MCNC: 6.2 G/DL (ref 12–17)
HGB BLD-MCNC: 6.5 G/DL (ref 12–17)
HGB BLD-MCNC: 6.7 G/DL (ref 12–17)
HGB BLD-MCNC: 6.7 G/DL (ref 12–17)
HGB BLD-MCNC: 6.9 G/DL (ref 12–17)
HGB BLD-MCNC: 7.1 G/DL (ref 12–17)
HGB BLD-MCNC: 7.5 G/DL (ref 12–17)
HGB BLD-MCNC: 7.5 G/DL (ref 12–17)
HGB BLD-MCNC: 7.6 G/DL (ref 12–17)
HGB BLD-MCNC: 7.9 G/DL (ref 12–17)
HGB BLD-MCNC: 8 G/DL (ref 12–17)
HGB BLD-MCNC: 8 G/DL (ref 12–17)
HGB BLD-MCNC: 8.3 G/DL (ref 12–17)
HGB BLD-MCNC: 8.4 G/DL (ref 12–17)
HGB BLD-MCNC: 8.5 G/DL (ref 12–17)
HGB BLD-MCNC: 8.6 G/DL (ref 12–17)
HGB BLD-MCNC: 8.7 G/DL (ref 12–17)
HGB BLD-MCNC: 8.7 G/DL (ref 12–17)
HGB BLD-MCNC: 9.5 G/DL (ref 12–17)
HGB BLD-MCNC: 9.8 G/DL (ref 12–17)
HGB BLDA-MCNC: 11.9 G/DL (ref 12–17)
HGB BLDA-MCNC: 6.8 G/DL (ref 12–17)
HGB BLDA-MCNC: 7.1 G/DL (ref 12–17)
HGB BLDA-MCNC: 7.5 G/DL (ref 12–17)
HGB BLDA-MCNC: 8.2 G/DL (ref 12–17)
HGB BLDA-MCNC: 8.5 G/DL (ref 12–17)
HGB BLDA-MCNC: 8.8 G/DL (ref 12–17)
HGB BLDA-MCNC: 9.5 G/DL (ref 12–17)
HGB BLDA-MCNC: 9.9 G/DL (ref 12–17)
HGB UR QL STRIP.AUTO: NEGATIVE
HGB UR QL STRIP.AUTO: NEGATIVE
HMPV RNA NPH QL NAA+NON-PROBE: NOT DETECTED
HOROWITZ INDEX BLDA+IHG-RTO: 100 MM[HG]
HOROWITZ INDEX BLDA+IHG-RTO: 100 MM[HG]
HPIV1 RNA NPH QL NAA+NON-PROBE: NOT DETECTED
HPIV2 RNA NPH QL NAA+NON-PROBE: NOT DETECTED
HPIV3 RNA NPH QL NAA+NON-PROBE: NOT DETECTED
HPIV4 RNA NPH QL NAA+NON-PROBE: NOT DETECTED
HYDROCODONE UR QL SCN: NEGATIVE
HYPERCHROMIA BLD QL SMEAR: PRESENT
HYPERCHROMIA BLD QL SMEAR: PRESENT
I-TIME: 1
IMM GRANULOCYTES # BLD AUTO: 0.03 THOUSAND/UL (ref 0–0.2)
IMM GRANULOCYTES # BLD AUTO: 0.05 THOUSAND/UL (ref 0–0.2)
IMM GRANULOCYTES NFR BLD AUTO: 0 % (ref 0–2)
IMM GRANULOCYTES NFR BLD AUTO: 1 % (ref 0–2)
INR PPP: 1.12 (ref 0.85–1.19)
INR PPP: 1.41 (ref 0.85–1.19)
KETONES UR STRIP-MCNC: NEGATIVE MG/DL
KETONES UR STRIP-MCNC: NEGATIVE MG/DL
L PNEUMO1 AG UR QL IA.RAPID: NEGATIVE
LACOSAMIDE SERPL-MCNC: 6.98 UG/ML (ref 1–20)
LACTATE SERPL-SCNC: 0.8 MMOL/L (ref 0.5–2)
LACTATE SERPL-SCNC: 1.4 MMOL/L (ref 0.5–2)
LACTATE SERPL-SCNC: 9 MMOL/L (ref 0.5–2)
LEUKOCYTE ESTERASE UR QL STRIP: NEGATIVE
LEUKOCYTE ESTERASE UR QL STRIP: NEGATIVE
LG PLATELETS BLD QL SMEAR: PRESENT
LYMPHOCYTES # BLD AUTO: 0.22 THOUSAND/UL (ref 0.6–4.47)
LYMPHOCYTES # BLD AUTO: 0.48 THOUSAND/UL (ref 0.6–4.47)
LYMPHOCYTES # BLD AUTO: 0.48 THOUSANDS/ÂΜL (ref 0.6–4.47)
LYMPHOCYTES # BLD AUTO: 0.91 THOUSAND/UL (ref 0.6–4.47)
LYMPHOCYTES # BLD AUTO: 1.02 THOUSANDS/ÂΜL (ref 0.6–4.47)
LYMPHOCYTES # BLD AUTO: 1.07 THOUSAND/UL (ref 0.6–4.47)
LYMPHOCYTES # BLD AUTO: 2 % (ref 14–44)
LYMPHOCYTES # BLD AUTO: 2 % (ref 14–44)
LYMPHOCYTES # BLD AUTO: 3 % (ref 14–44)
LYMPHOCYTES # BLD AUTO: 3.44 THOUSAND/UL (ref 0.6–4.47)
LYMPHOCYTES # BLD AUTO: 6 % (ref 14–44)
LYMPHOCYTES # BLD AUTO: 9 % (ref 14–44)
LYMPHOCYTES NFR BLD AUTO: 14 % (ref 14–44)
LYMPHOCYTES NFR BLD AUTO: 7 % (ref 14–44)
M PNEUMO DNA NPH QL NAA+NON-PROBE: NOT DETECTED
MACROCYTES BLD QL AUTO: PRESENT
MACROCYTES BLD QL AUTO: PRESENT
MAGNESIUM SERPL-MCNC: 1.8 MG/DL (ref 1.9–2.7)
MAGNESIUM SERPL-MCNC: 1.8 MG/DL (ref 1.9–2.7)
MAGNESIUM SERPL-MCNC: 1.9 MG/DL (ref 1.9–2.7)
MAGNESIUM SERPL-MCNC: 1.9 MG/DL (ref 1.9–2.7)
MAGNESIUM SERPL-MCNC: 2 MG/DL (ref 1.9–2.7)
MAGNESIUM SERPL-MCNC: 2.1 MG/DL (ref 1.9–2.7)
MAGNESIUM SERPL-MCNC: 2.5 MG/DL (ref 1.9–2.7)
MAGNESIUM SERPL-MCNC: 2.6 MG/DL (ref 1.9–2.7)
MAGNESIUM SERPL-MCNC: 2.6 MG/DL (ref 1.9–2.7)
MAGNESIUM SERPL-MCNC: 2.7 MG/DL (ref 1.9–2.7)
MAGNESIUM SERPL-MCNC: 2.7 MG/DL (ref 1.9–2.7)
MAGNESIUM SERPL-MCNC: 2.8 MG/DL (ref 1.9–2.7)
MCH RBC QN AUTO: 31.6 PG (ref 26.8–34.3)
MCH RBC QN AUTO: 31.6 PG (ref 26.8–34.3)
MCH RBC QN AUTO: 31.7 PG (ref 26.8–34.3)
MCH RBC QN AUTO: 32 PG (ref 26.8–34.3)
MCH RBC QN AUTO: 32.1 PG (ref 26.8–34.3)
MCH RBC QN AUTO: 32.1 PG (ref 26.8–34.3)
MCH RBC QN AUTO: 32.5 PG (ref 26.8–34.3)
MCH RBC QN AUTO: 33 PG (ref 26.8–34.3)
MCH RBC QN AUTO: 33.1 PG (ref 26.8–34.3)
MCH RBC QN AUTO: 33.3 PG (ref 26.8–34.3)
MCH RBC QN AUTO: 33.4 PG (ref 26.8–34.3)
MCH RBC QN AUTO: 33.5 PG (ref 26.8–34.3)
MCH RBC QN AUTO: 33.8 PG (ref 26.8–34.3)
MCH RBC QN AUTO: 33.9 PG (ref 26.8–34.3)
MCH RBC QN AUTO: 34 PG (ref 26.8–34.3)
MCH RBC QN AUTO: 34.1 PG (ref 26.8–34.3)
MCHC RBC AUTO-ENTMCNC: 30.1 G/DL (ref 31.4–37.4)
MCHC RBC AUTO-ENTMCNC: 30.2 G/DL (ref 31.4–37.4)
MCHC RBC AUTO-ENTMCNC: 30.4 G/DL (ref 31.4–37.4)
MCHC RBC AUTO-ENTMCNC: 31 G/DL (ref 31.4–37.4)
MCHC RBC AUTO-ENTMCNC: 31.1 G/DL (ref 31.4–37.4)
MCHC RBC AUTO-ENTMCNC: 31.5 G/DL (ref 31.4–37.4)
MCHC RBC AUTO-ENTMCNC: 32.5 G/DL (ref 31.4–37.4)
MCHC RBC AUTO-ENTMCNC: 32.7 G/DL (ref 31.4–37.4)
MCHC RBC AUTO-ENTMCNC: 32.8 G/DL (ref 31.4–37.4)
MCHC RBC AUTO-ENTMCNC: 32.8 G/DL (ref 31.4–37.4)
MCHC RBC AUTO-ENTMCNC: 33.3 G/DL (ref 31.4–37.4)
MCHC RBC AUTO-ENTMCNC: 33.6 G/DL (ref 31.4–37.4)
MCHC RBC AUTO-ENTMCNC: 33.9 G/DL (ref 31.4–37.4)
MCHC RBC AUTO-ENTMCNC: 34 G/DL (ref 31.4–37.4)
MCHC RBC AUTO-ENTMCNC: 34.1 G/DL (ref 31.4–37.4)
MCHC RBC AUTO-ENTMCNC: 35 G/DL (ref 31.4–37.4)
MCV RBC AUTO: 100 FL (ref 82–98)
MCV RBC AUTO: 100 FL (ref 82–98)
MCV RBC AUTO: 101 FL (ref 82–98)
MCV RBC AUTO: 102 FL (ref 82–98)
MCV RBC AUTO: 103 FL (ref 82–98)
MCV RBC AUTO: 104 FL (ref 82–98)
MCV RBC AUTO: 104 FL (ref 82–98)
MCV RBC AUTO: 108 FL (ref 82–98)
MCV RBC AUTO: 97 FL (ref 82–98)
MCV RBC AUTO: 98 FL (ref 82–98)
MCV RBC AUTO: 98 FL (ref 82–98)
METAMYELOCYTE ABSOLUTE CT: 0.36 THOUSAND/UL (ref 0–0.1)
METAMYELOCYTE ABSOLUTE CT: 0.97 THOUSAND/UL (ref 0–0.1)
METAMYELOCYTE ABSOLUTE CT: 1.15 THOUSAND/UL (ref 0–0.1)
METAMYELOCYTE ABSOLUTE CT: 1.52 THOUSAND/UL (ref 0–0.1)
METAMYELOCYTES NFR BLD MANUAL: 2 % (ref 0–1)
METAMYELOCYTES NFR BLD MANUAL: 3 % (ref 0–1)
METAMYELOCYTES NFR BLD MANUAL: 4 % (ref 0–1)
METAMYELOCYTES NFR BLD MANUAL: 5 % (ref 0–1)
METHADONE UR QL: NEGATIVE
MICROCYTES BLD QL AUTO: PRESENT
MONOCYTES # BLD AUTO: 0.11 THOUSAND/UL (ref 0–1.22)
MONOCYTES # BLD AUTO: 0.48 THOUSAND/ÂΜL (ref 0.17–1.22)
MONOCYTES # BLD AUTO: 0.59 THOUSAND/ÂΜL (ref 0.17–1.22)
MONOCYTES # BLD AUTO: 0.61 THOUSAND/UL (ref 0–1.22)
MONOCYTES # BLD AUTO: 0.71 THOUSAND/UL (ref 0–1.22)
MONOCYTES # BLD AUTO: 0.97 THOUSAND/UL (ref 0–1.22)
MONOCYTES # BLD AUTO: 1.53 THOUSAND/UL (ref 0–1.22)
MONOCYTES NFR BLD AUTO: 7 % (ref 4–12)
MONOCYTES NFR BLD AUTO: 8 % (ref 4–12)
MONOCYTES NFR BLD: 1 % (ref 4–12)
MONOCYTES NFR BLD: 2 % (ref 4–12)
MONOCYTES NFR BLD: 4 % (ref 4–12)
MRSA NOSE QL CULT: ABNORMAL
MRSA NOSE QL CULT: ABNORMAL
MYELOCYTE ABSOLUTE CT: 0.24 THOUSAND/UL (ref 0–0.1)
MYELOCYTE ABSOLUTE CT: 0.54 THOUSAND/UL (ref 0–0.1)
MYELOCYTE ABSOLUTE CT: 0.61 THOUSAND/UL (ref 0–0.1)
MYELOCYTE ABSOLUTE CT: 1.53 THOUSAND/UL (ref 0–0.1)
MYELOCYTES NFR BLD MANUAL: 1 % (ref 0–1)
MYELOCYTES NFR BLD MANUAL: 2 % (ref 0–1)
MYELOCYTES NFR BLD MANUAL: 3 % (ref 0–1)
MYELOCYTES NFR BLD MANUAL: 4 % (ref 0–1)
NEUTROPHILS # BLD AUTO: 5.3 THOUSANDS/ÂΜL (ref 1.85–7.62)
NEUTROPHILS # BLD AUTO: 6.28 THOUSANDS/ÂΜL (ref 1.85–7.62)
NEUTROPHILS # BLD MANUAL: 10.68 THOUSAND/UL (ref 1.85–7.62)
NEUTROPHILS # BLD MANUAL: 15.19 THOUSAND/UL (ref 1.85–7.62)
NEUTROPHILS # BLD MANUAL: 21.33 THOUSAND/UL (ref 1.85–7.62)
NEUTROPHILS # BLD MANUAL: 26.15 THOUSAND/UL (ref 1.85–7.62)
NEUTROPHILS # BLD MANUAL: 30.19 THOUSAND/UL (ref 1.85–7.62)
NEUTS BAND NFR BLD MANUAL: 1 % (ref 0–8)
NEUTS BAND NFR BLD MANUAL: 10 % (ref 0–8)
NEUTS BAND NFR BLD MANUAL: 5 % (ref 0–8)
NEUTS BAND NFR BLD MANUAL: 6 % (ref 0–8)
NEUTS BAND NFR BLD MANUAL: 8 % (ref 0–8)
NEUTS SEG NFR BLD AUTO: 74 % (ref 43–75)
NEUTS SEG NFR BLD AUTO: 75 % (ref 43–75)
NEUTS SEG NFR BLD AUTO: 78 % (ref 43–75)
NEUTS SEG NFR BLD AUTO: 78 % (ref 43–75)
NEUTS SEG NFR BLD AUTO: 79 % (ref 43–75)
NEUTS SEG NFR BLD AUTO: 85 % (ref 43–75)
NEUTS SEG NFR BLD AUTO: 96 % (ref 43–75)
NITRITE UR QL STRIP: NEGATIVE
NITRITE UR QL STRIP: NEGATIVE
NON-SQ EPI CELLS URNS QL MICRO: ABNORMAL /HPF
NON-SQ EPI CELLS URNS QL MICRO: NORMAL /HPF
NRBC BLD AUTO-RTO: 0 /100 WBCS
NRBC BLD AUTO-RTO: 0 /100 WBCS
NRBC BLD AUTO-RTO: 1 /100 WBC (ref 0–2)
NRBC BLD AUTO-RTO: 2 /100 WBC (ref 0–2)
O2 CT BLDA-SCNC: 10.2 ML/DL (ref 16–23)
O2 CT BLDA-SCNC: 10.3 ML/DL (ref 16–23)
O2 CT BLDA-SCNC: 10.3 ML/DL (ref 16–23)
O2 CT BLDA-SCNC: 10.9 ML/DL (ref 16–23)
O2 CT BLDA-SCNC: 11 ML/DL (ref 16–23)
O2 CT BLDA-SCNC: 11.2 ML/DL (ref 16–23)
O2 CT BLDA-SCNC: 11.4 ML/DL (ref 16–23)
O2 CT BLDA-SCNC: 11.7 ML/DL (ref 16–23)
O2 CT BLDA-SCNC: 16.3 ML/DL (ref 16–23)
O2 CT BLDA-SCNC: 17.5 ML/DL (ref 16–23)
OPIATES UR QL SCN: NEGATIVE
OXYCODONE+OXYMORPHONE UR QL SCN: NEGATIVE
OXYHGB MFR BLDA: 81.7 % (ref 94–97)
OXYHGB MFR BLDA: 85.8 % (ref 94–97)
OXYHGB MFR BLDA: 85.9 % (ref 94–97)
OXYHGB MFR BLDA: 86 % (ref 94–97)
OXYHGB MFR BLDA: 86.9 % (ref 94–97)
OXYHGB MFR BLDA: 90.2 % (ref 94–97)
OXYHGB MFR BLDA: 91.9 % (ref 94–97)
OXYHGB MFR BLDA: 93.2 % (ref 94–97)
OXYHGB MFR BLDA: 93.6 % (ref 94–97)
OXYHGB MFR BLDA: 95.2 % (ref 94–97)
P AXIS: 70 DEGREES
PCO2 BLD: 100.1 MM HG (ref 42–50)
PCO2 BLD: 147 MM HG
PCO2 BLD: 23 MMOL/L (ref 21–32)
PCO2 BLD: 26 MMOL/L (ref 21–32)
PCO2 BLD: 29 MMOL/L (ref 21–32)
PCO2 BLD: 30 MMOL/L (ref 21–32)
PCO2 BLD: 31 MMOL/L (ref 21–32)
PCO2 BLD: 35.6 MM HG (ref 36–44)
PCO2 BLD: 36 MMOL/L (ref 21–32)
PCO2 BLD: 38.6 MM HG (ref 42–50)
PCO2 BLD: 43.2 MM HG (ref 36–44)
PCO2 BLD: 55.2 MM HG (ref 42–50)
PCO2 BLD: 57.4 MM HG (ref 36–44)
PCO2 BLD: >103 MM HG (ref 36–44)
PCO2 BLD: >103 MM HG (ref 42–50)
PCO2 BLDA: 40.8 MM HG (ref 36–44)
PCO2 BLDA: 47.2 MM HG (ref 36–44)
PCO2 BLDA: 52.7 MM HG (ref 36–44)
PCO2 BLDA: 55.7 MM HG (ref 36–44)
PCO2 BLDA: 58.2 MM HG (ref 36–44)
PCO2 BLDA: 60.4 MM HG (ref 36–44)
PCO2 BLDA: 62.4 MM HG (ref 36–44)
PCO2 BLDA: 64.7 MM HG (ref 36–44)
PCO2 BLDA: 65.3 MM HG (ref 36–44)
PCO2 BLDA: 66.1 MM HG (ref 36–44)
PCP UR QL: NEGATIVE
PEEP RESPIRATORY: 14 CM[H2O]
PEEP RESPIRATORY: 24 CM[H2O]
PH BLD: 6.93 [PH] (ref 7.3–7.4)
PH BLD: 7.05 [PH] (ref 7.35–7.45)
PH BLD: 7.12 [PH] (ref 7.3–7.4)
PH BLD: 7.31 [PH] (ref 7.35–7.45)
PH BLD: 7.31 [PH] (ref 7.35–7.45)
PH BLD: 7.31 [PH] (ref 7.3–7.4)
PH BLD: 7.45 [PH] (ref 7.35–7.45)
PH BLD: 7.48 [PH] (ref 7.3–7.4)
PH BLDA: 7.24 [PH] (ref 7.35–7.45)
PH BLDA: 7.25 [PH] (ref 7.35–7.45)
PH BLDA: 7.25 [PH] (ref 7.35–7.45)
PH BLDA: 7.26 [PH] (ref 7.35–7.45)
PH BLDA: 7.26 [PH] (ref 7.35–7.45)
PH BLDA: 7.28 [PH] (ref 7.35–7.45)
PH BLDA: 7.29 [PH] (ref 7.35–7.45)
PH BLDA: 7.3 [PH] (ref 7.35–7.45)
PH BLDA: 7.33 [PH] (ref 7.35–7.45)
PH BLDA: 7.35 [PH] (ref 7.35–7.45)
PH UR STRIP.AUTO: 5.5 [PH]
PH UR STRIP.AUTO: 7 [PH]
PHOSPHATE SERPL-MCNC: 2 MG/DL (ref 2.7–4.5)
PHOSPHATE SERPL-MCNC: 2.1 MG/DL (ref 2.7–4.5)
PHOSPHATE SERPL-MCNC: 2.1 MG/DL (ref 2.7–4.5)
PHOSPHATE SERPL-MCNC: 2.2 MG/DL (ref 2.7–4.5)
PHOSPHATE SERPL-MCNC: 2.7 MG/DL (ref 2.7–4.5)
PHOSPHATE SERPL-MCNC: 2.8 MG/DL (ref 2.7–4.5)
PHOSPHATE SERPL-MCNC: 2.8 MG/DL (ref 2.7–4.5)
PHOSPHATE SERPL-MCNC: 3 MG/DL (ref 2.7–4.5)
PHOSPHATE SERPL-MCNC: 3.1 MG/DL (ref 2.7–4.5)
PHOSPHATE SERPL-MCNC: 3.7 MG/DL (ref 2.7–4.5)
PHOSPHATE SERPL-MCNC: 3.8 MG/DL (ref 2.7–4.5)
PHOSPHATE SERPL-MCNC: 7.2 MG/DL (ref 2.7–4.5)
PLATELET # BLD AUTO: 122 THOUSANDS/UL (ref 149–390)
PLATELET # BLD AUTO: 129 THOUSANDS/UL (ref 149–390)
PLATELET # BLD AUTO: 129 THOUSANDS/UL (ref 149–390)
PLATELET # BLD AUTO: 146 THOUSANDS/UL (ref 149–390)
PLATELET # BLD AUTO: 172 THOUSANDS/UL (ref 149–390)
PLATELET # BLD AUTO: 176 THOUSANDS/UL (ref 149–390)
PLATELET # BLD AUTO: 182 THOUSANDS/UL (ref 149–390)
PLATELET # BLD AUTO: 192 THOUSANDS/UL (ref 149–390)
PLATELET # BLD AUTO: 214 THOUSANDS/UL (ref 149–390)
PLATELET # BLD AUTO: 235 THOUSANDS/UL (ref 149–390)
PLATELET # BLD AUTO: 252 THOUSANDS/UL (ref 149–390)
PLATELET # BLD AUTO: 271 THOUSANDS/UL (ref 149–390)
PLATELET # BLD AUTO: 313 THOUSANDS/UL (ref 149–390)
PLATELET # BLD AUTO: 314 THOUSANDS/UL (ref 149–390)
PLATELET # BLD AUTO: 317 THOUSANDS/UL (ref 149–390)
PLATELET # BLD AUTO: 332 THOUSANDS/UL (ref 149–390)
PLATELET # BLD AUTO: 375 THOUSANDS/UL (ref 149–390)
PLATELET BLD QL SMEAR: ADEQUATE
PLOW: 0
PMV BLD AUTO: 10 FL (ref 8.9–12.7)
PMV BLD AUTO: 10.1 FL (ref 8.9–12.7)
PMV BLD AUTO: 10.8 FL (ref 8.9–12.7)
PMV BLD AUTO: 10.9 FL (ref 8.9–12.7)
PMV BLD AUTO: 9.2 FL (ref 8.9–12.7)
PMV BLD AUTO: 9.3 FL (ref 8.9–12.7)
PMV BLD AUTO: 9.3 FL (ref 8.9–12.7)
PMV BLD AUTO: 9.4 FL (ref 8.9–12.7)
PMV BLD AUTO: 9.6 FL (ref 8.9–12.7)
PMV BLD AUTO: 9.7 FL (ref 8.9–12.7)
PMV BLD AUTO: 9.9 FL (ref 8.9–12.7)
PO2 BLD: 144 MM HG (ref 75–129)
PO2 BLD: 157 MM HG (ref 75–129)
PO2 BLD: 22 MM HG (ref 75–129)
PO2 BLD: 37 MM HG (ref 35–45)
PO2 BLD: 37 MM HG (ref 35–45)
PO2 BLD: 38 MM HG (ref 35–45)
PO2 BLD: 41 MM HG (ref 35–45)
PO2 BLD: 52 MM HG (ref 75–129)
PO2 BLD: 60 MM HG (ref 75–129)
PO2 BLDA: 51.5 MM HG (ref 75–129)
PO2 BLDA: 54.8 MM HG (ref 75–129)
PO2 BLDA: 57 MM HG (ref 75–129)
PO2 BLDA: 57.1 MM HG (ref 75–129)
PO2 BLDA: 58.7 MM HG (ref 75–129)
PO2 BLDA: 68 MM HG (ref 75–129)
PO2 BLDA: 69 MM HG (ref 75–129)
PO2 BLDA: 73.8 MM HG (ref 75–129)
PO2 BLDA: 80.9 MM HG (ref 75–129)
PO2 BLDA: 93.2 MM HG (ref 75–129)
POLYCHROMASIA BLD QL SMEAR: PRESENT
POTASSIUM BLD-SCNC: 3.5 MMOL/L (ref 3.5–5.3)
POTASSIUM BLD-SCNC: 3.6 MMOL/L (ref 3.5–5.3)
POTASSIUM BLD-SCNC: 3.7 MMOL/L (ref 3.5–5.3)
POTASSIUM BLD-SCNC: 3.9 MMOL/L (ref 3.5–5.3)
POTASSIUM BLD-SCNC: 4 MMOL/L (ref 3.5–5.3)
POTASSIUM BLD-SCNC: 4.6 MMOL/L (ref 3.5–5.3)
POTASSIUM BLD-SCNC: 4.7 MMOL/L (ref 3.5–5.3)
POTASSIUM SERPL-SCNC: 3.4 MMOL/L (ref 3.5–5.3)
POTASSIUM SERPL-SCNC: 3.5 MMOL/L (ref 3.5–5.3)
POTASSIUM SERPL-SCNC: 3.5 MMOL/L (ref 3.5–5.3)
POTASSIUM SERPL-SCNC: 3.6 MMOL/L (ref 3.5–5.3)
POTASSIUM SERPL-SCNC: 3.7 MMOL/L (ref 3.5–5.3)
POTASSIUM SERPL-SCNC: 3.8 MMOL/L (ref 3.5–5.3)
POTASSIUM SERPL-SCNC: 3.8 MMOL/L (ref 3.5–5.3)
POTASSIUM SERPL-SCNC: 3.9 MMOL/L (ref 3.5–5.3)
POTASSIUM SERPL-SCNC: 4.1 MMOL/L (ref 3.5–5.3)
POTASSIUM SERPL-SCNC: 4.2 MMOL/L (ref 3.5–5.3)
POTASSIUM SERPL-SCNC: 4.3 MMOL/L (ref 3.5–5.3)
POTASSIUM SERPL-SCNC: 4.4 MMOL/L (ref 3.5–5.3)
POTASSIUM SERPL-SCNC: 4.4 MMOL/L (ref 3.5–5.3)
POTASSIUM SERPL-SCNC: 4.5 MMOL/L (ref 3.5–5.3)
POTASSIUM SERPL-SCNC: 4.6 MMOL/L (ref 3.5–5.3)
POTASSIUM SERPL-SCNC: 5.3 MMOL/L (ref 3.5–5.3)
POTASSIUM SERPL-SCNC: 5.5 MMOL/L (ref 3.5–5.3)
PR INTERVAL: 156 MS
PRESSURE CONTROL: 12
PRESSURE CONTROL: 12
PROCALCITONIN SERPL-MCNC: 0.2 NG/ML
PROCALCITONIN SERPL-MCNC: 0.46 NG/ML
PROCALCITONIN SERPL-MCNC: 0.55 NG/ML
PROCALCITONIN SERPL-MCNC: 1.74 NG/ML
PROCALCITONIN SERPL-MCNC: 5.26 NG/ML
PROCALCITONIN SERPL-MCNC: 5.77 NG/ML
PROT SERPL-MCNC: 5.3 G/DL (ref 6.4–8.4)
PROT SERPL-MCNC: 5.3 G/DL (ref 6.4–8.4)
PROT SERPL-MCNC: 5.4 G/DL (ref 6.4–8.4)
PROT SERPL-MCNC: 5.4 G/DL (ref 6.4–8.4)
PROT SERPL-MCNC: 5.6 G/DL (ref 6.4–8.4)
PROT SERPL-MCNC: 6.1 G/DL (ref 6.4–8.4)
PROT SERPL-MCNC: 6.1 G/DL (ref 6.4–8.4)
PROT UR STRIP-MCNC: ABNORMAL MG/DL
PROT UR STRIP-MCNC: ABNORMAL MG/DL
PROTHROMBIN TIME: 15.1 SECONDS (ref 12.3–15)
PROTHROMBIN TIME: 18 SECONDS (ref 12.3–15)
PS VENT FIO2: 100
PS VENT PEEP: 6
QRS AXIS: 47 DEGREES
QRSD INTERVAL: 82 MS
QT INTERVAL: 300 MS
QTC INTERVAL: 433 MS
RBC # BLD AUTO: 1.96 MILLION/UL (ref 3.88–5.62)
RBC # BLD AUTO: 2 MILLION/UL (ref 3.88–5.62)
RBC # BLD AUTO: 2 MILLION/UL (ref 3.88–5.62)
RBC # BLD AUTO: 2.25 MILLION/UL (ref 3.88–5.62)
RBC # BLD AUTO: 2.37 MILLION/UL (ref 3.88–5.62)
RBC # BLD AUTO: 2.46 MILLION/UL (ref 3.88–5.62)
RBC # BLD AUTO: 2.49 MILLION/UL (ref 3.88–5.62)
RBC # BLD AUTO: 2.5 MILLION/UL (ref 3.88–5.62)
RBC # BLD AUTO: 2.51 MILLION/UL (ref 3.88–5.62)
RBC # BLD AUTO: 2.55 MILLION/UL (ref 3.88–5.62)
RBC # BLD AUTO: 2.64 MILLION/UL (ref 3.88–5.62)
RBC # BLD AUTO: 2.68 MILLION/UL (ref 3.88–5.62)
RBC # BLD AUTO: 2.89 MILLION/UL (ref 3.88–5.62)
RBC # BLD AUTO: 3.25 MILLION/UL (ref 3.88–5.62)
RBC # BLD AUTO: 3.29 MILLION/UL (ref 3.88–5.62)
RBC # BLD AUTO: 3.44 MILLION/UL (ref 3.88–5.62)
RBC #/AREA URNS AUTO: ABNORMAL /HPF
RBC #/AREA URNS AUTO: NORMAL /HPF
RBC MORPH BLD: PRESENT
RH BLD: NEGATIVE
RSV RNA NPH QL NAA+NON-PROBE: NOT DETECTED
RSV RNA RESP QL NAA+PROBE: NEGATIVE
RV+EV RNA NPH QL NAA+NON-PROBE: NOT DETECTED
S PNEUM AG UR QL: NEGATIVE
SALICYLATES SERPL-MCNC: 8 MG/DL (ref 3–20)
SAO2 % BLD FROM PO2: 56 % (ref 60–85)
SAO2 % BLD FROM PO2: 64 % (ref 60–85)
SAO2 % BLD FROM PO2: 76 % (ref 60–85)
SAO2 % BLD FROM PO2: 83 % (ref 60–85)
SAO2 % BLD FROM PO2: 87 % (ref 60–85)
SAO2 % BLD FROM PO2: 99 % (ref 60–85)
SARS-COV-2 RNA NPH QL NAA+NON-PROBE: NOT DETECTED
SARS-COV-2 RNA RESP QL NAA+PROBE: NEGATIVE
SODIUM BLD-SCNC: 128 MMOL/L (ref 136–145)
SODIUM BLD-SCNC: 129 MMOL/L (ref 136–145)
SODIUM BLD-SCNC: 133 MMOL/L (ref 136–145)
SODIUM BLD-SCNC: 138 MMOL/L (ref 136–145)
SODIUM BLD-SCNC: 140 MMOL/L (ref 136–145)
SODIUM BLD-SCNC: 142 MMOL/L (ref 136–145)
SODIUM BLD-SCNC: 143 MMOL/L (ref 136–145)
SODIUM BLD-SCNC: 145 MMOL/L (ref 136–145)
SODIUM BLD-SCNC: 147 MMOL/L (ref 136–145)
SODIUM SERPL-SCNC: 129 MMOL/L (ref 135–147)
SODIUM SERPL-SCNC: 129 MMOL/L (ref 135–147)
SODIUM SERPL-SCNC: 131 MMOL/L (ref 135–147)
SODIUM SERPL-SCNC: 132 MMOL/L (ref 135–147)
SODIUM SERPL-SCNC: 132 MMOL/L (ref 135–147)
SODIUM SERPL-SCNC: 133 MMOL/L (ref 135–147)
SODIUM SERPL-SCNC: 135 MMOL/L (ref 135–147)
SODIUM SERPL-SCNC: 137 MMOL/L (ref 135–147)
SODIUM SERPL-SCNC: 137 MMOL/L (ref 135–147)
SODIUM SERPL-SCNC: 138 MMOL/L (ref 135–147)
SODIUM SERPL-SCNC: 139 MMOL/L (ref 135–147)
SODIUM SERPL-SCNC: 140 MMOL/L (ref 135–147)
SODIUM SERPL-SCNC: 140 MMOL/L (ref 135–147)
SODIUM SERPL-SCNC: 141 MMOL/L (ref 135–147)
SODIUM SERPL-SCNC: 144 MMOL/L (ref 135–147)
SODIUM SERPL-SCNC: 145 MMOL/L (ref 135–147)
SODIUM SERPL-SCNC: 148 MMOL/L (ref 135–147)
SODIUM SERPL-SCNC: 149 MMOL/L (ref 135–147)
SP GR UR STRIP.AUTO: 1.03 (ref 1–1.03)
SP GR UR STRIP.AUTO: 1.05 (ref 1–1.03)
SPECIMEN EXPIRATION DATE: NORMAL
SPECIMEN SOURCE: ABNORMAL
STOMATOCYTES BLD QL SMEAR: PRESENT
T WAVE AXIS: 33 DEGREES
TC VENT FIO2: 80
THC UR QL: NEGATIVE
THIGH: 26
TLOW: 0
TPA PPP QL CHRO: 86 % OF NORMAL (ref 77–138)
TRIGL SERPL-MCNC: 153 MG/DL
TRIGL SERPL-MCNC: 202 MG/DL
TRIGL SERPL-MCNC: 538 MG/DL
TSH SERPL DL<=0.05 MIU/L-ACNC: 1.26 UIU/ML (ref 0.45–4.5)
UNIT DISPENSE STATUS: NORMAL
UNIT PRODUCT CODE: NORMAL
UNIT PRODUCT VOLUME: 300 ML
UNIT PRODUCT VOLUME: 300 ML
UNIT PRODUCT VOLUME: 350 ML
UNIT RH: NORMAL
UROBILINOGEN UR STRIP-ACNC: 8 MG/DL
UROBILINOGEN UR STRIP-ACNC: <2 MG/DL
VALPROATE SERPL-MCNC: 219 UG/ML (ref 50–100)
VANCOMYCIN SERPL-MCNC: 13.7 UG/ML (ref 10–20)
VANCOMYCIN SERPL-MCNC: 26.3 UG/ML (ref 10–20)
VENT - PS: ABNORMAL
VENT APRV: 12
VENT TC: 100 %
VENT- AC: AC
VENT- AC: AC
VENT- APRV: ABNORMAL
VENTRICULAR RATE: 125 BPM
VIT B12 SERPL-MCNC: 151 PG/ML (ref 180–914)
WBC # BLD AUTO: 11.01 THOUSAND/UL (ref 4.31–10.16)
WBC # BLD AUTO: 13.6 THOUSAND/UL (ref 4.31–10.16)
WBC # BLD AUTO: 17.87 THOUSAND/UL (ref 4.31–10.16)
WBC # BLD AUTO: 18.3 THOUSAND/UL (ref 4.31–10.16)
WBC # BLD AUTO: 18.46 THOUSAND/UL (ref 4.31–10.16)
WBC # BLD AUTO: 21.17 THOUSAND/UL (ref 4.31–10.16)
WBC # BLD AUTO: 24.24 THOUSAND/UL (ref 4.31–10.16)
WBC # BLD AUTO: 30.41 THOUSAND/UL (ref 4.31–10.16)
WBC # BLD AUTO: 38.21 THOUSAND/UL (ref 4.31–10.16)
WBC # BLD AUTO: 6.43 THOUSAND/UL (ref 4.31–10.16)
WBC # BLD AUTO: 7.1 THOUSAND/UL (ref 4.31–10.16)
WBC # BLD AUTO: 7.27 THOUSAND/UL (ref 4.31–10.16)
WBC # BLD AUTO: 7.29 THOUSAND/UL (ref 4.31–10.16)
WBC # BLD AUTO: 7.36 THOUSAND/UL (ref 4.31–10.16)
WBC # BLD AUTO: 9.27 THOUSAND/UL (ref 4.31–10.16)
WBC # BLD AUTO: 9.68 THOUSAND/UL (ref 4.31–10.16)
WBC #/AREA URNS AUTO: ABNORMAL /HPF
WBC #/AREA URNS AUTO: NORMAL /HPF

## 2024-01-01 PROCEDURE — NC001 PR NO CHARGE: Performed by: NURSE PRACTITIONER

## 2024-01-01 PROCEDURE — 84145 PROCALCITONIN (PCT): CPT

## 2024-01-01 PROCEDURE — 85014 HEMATOCRIT: CPT

## 2024-01-01 PROCEDURE — 80048 BASIC METABOLIC PNL TOTAL CA: CPT

## 2024-01-01 PROCEDURE — 81001 URINALYSIS AUTO W/SCOPE: CPT

## 2024-01-01 PROCEDURE — 84295 ASSAY OF SERUM SODIUM: CPT

## 2024-01-01 PROCEDURE — 86900 BLOOD TYPING SEROLOGIC ABO: CPT

## 2024-01-01 PROCEDURE — 85027 COMPLETE CBC AUTOMATED: CPT

## 2024-01-01 PROCEDURE — 0W9930Z DRAINAGE OF RIGHT PLEURAL CAVITY WITH DRAINAGE DEVICE, PERCUTANEOUS APPROACH: ICD-10-PCS | Performed by: SURGERY

## 2024-01-01 PROCEDURE — 99291 CRITICAL CARE FIRST HOUR: CPT | Performed by: PSYCHIATRY & NEUROLOGY

## 2024-01-01 PROCEDURE — 82330 ASSAY OF CALCIUM: CPT

## 2024-01-01 PROCEDURE — 85018 HEMOGLOBIN: CPT

## 2024-01-01 PROCEDURE — 31500 INSERT EMERGENCY AIRWAY: CPT | Performed by: SURGERY

## 2024-01-01 PROCEDURE — 82948 REAGENT STRIP/BLOOD GLUCOSE: CPT

## 2024-01-01 PROCEDURE — 80143 DRUG ASSAY ACETAMINOPHEN: CPT

## 2024-01-01 PROCEDURE — 84100 ASSAY OF PHOSPHORUS: CPT

## 2024-01-01 PROCEDURE — 99291 CRITICAL CARE FIRST HOUR: CPT | Performed by: PHYSICIAN ASSISTANT

## 2024-01-01 PROCEDURE — 94003 VENT MGMT INPAT SUBQ DAY: CPT

## 2024-01-01 PROCEDURE — 82803 BLOOD GASES ANY COMBINATION: CPT

## 2024-01-01 PROCEDURE — 82805 BLOOD GASES W/O2 SATURATION: CPT | Performed by: PHYSICIAN ASSISTANT

## 2024-01-01 PROCEDURE — EDAIR PR ED AIR: Performed by: EMERGENCY MEDICINE

## 2024-01-01 PROCEDURE — 74177 CT ABD & PELVIS W/CONTRAST: CPT

## 2024-01-01 PROCEDURE — 31622 DX BRONCHOSCOPE/WASH: CPT | Performed by: SURGERY

## 2024-01-01 PROCEDURE — 80048 BASIC METABOLIC PNL TOTAL CA: CPT | Performed by: NURSE PRACTITIONER

## 2024-01-01 PROCEDURE — 99291 CRITICAL CARE FIRST HOUR: CPT | Performed by: SURGERY

## 2024-01-01 PROCEDURE — 93010 ELECTROCARDIOGRAM REPORT: CPT | Performed by: INTERNAL MEDICINE

## 2024-01-01 PROCEDURE — NC001 PR NO CHARGE: Performed by: PSYCHIATRY & NEUROLOGY

## 2024-01-01 PROCEDURE — 82272 OCCULT BLD FECES 1-3 TESTS: CPT

## 2024-01-01 PROCEDURE — 87449 NOS EACH ORGANISM AG IA: CPT

## 2024-01-01 PROCEDURE — 94150 VITAL CAPACITY TEST: CPT

## 2024-01-01 PROCEDURE — 83735 ASSAY OF MAGNESIUM: CPT

## 2024-01-01 PROCEDURE — 85610 PROTHROMBIN TIME: CPT | Performed by: SURGERY

## 2024-01-01 PROCEDURE — 86923 COMPATIBILITY TEST ELECTRIC: CPT

## 2024-01-01 PROCEDURE — 85007 BL SMEAR W/DIFF WBC COUNT: CPT | Performed by: NURSE PRACTITIONER

## 2024-01-01 PROCEDURE — 4A133B1 MONITORING OF ARTERIAL PRESSURE, PERIPHERAL, PERCUTANEOUS APPROACH: ICD-10-PCS | Performed by: SURGERY

## 2024-01-01 PROCEDURE — 82550 ASSAY OF CK (CPK): CPT

## 2024-01-01 PROCEDURE — 80179 DRUG ASSAY SALICYLATE: CPT

## 2024-01-01 PROCEDURE — 0B958ZZ DRAINAGE OF RIGHT MIDDLE LOBE BRONCHUS, VIA NATURAL OR ARTIFICIAL OPENING ENDOSCOPIC: ICD-10-PCS | Performed by: SURGERY

## 2024-01-01 PROCEDURE — 87040 BLOOD CULTURE FOR BACTERIA: CPT

## 2024-01-01 PROCEDURE — 99233 SBSQ HOSP IP/OBS HIGH 50: CPT | Performed by: STUDENT IN AN ORGANIZED HEALTH CARE EDUCATION/TRAINING PROGRAM

## 2024-01-01 PROCEDURE — 94669 MECHANICAL CHEST WALL OSCILL: CPT

## 2024-01-01 PROCEDURE — 94760 N-INVAS EAR/PLS OXIMETRY 1: CPT

## 2024-01-01 PROCEDURE — 82746 ASSAY OF FOLIC ACID SERUM: CPT | Performed by: PHYSICIAN ASSISTANT

## 2024-01-01 PROCEDURE — 84145 PROCALCITONIN (PCT): CPT | Performed by: NURSE PRACTITIONER

## 2024-01-01 PROCEDURE — 94640 AIRWAY INHALATION TREATMENT: CPT

## 2024-01-01 PROCEDURE — 70450 CT HEAD/BRAIN W/O DYE: CPT

## 2024-01-01 PROCEDURE — 31500 INSERT EMERGENCY AIRWAY: CPT

## 2024-01-01 PROCEDURE — 84145 PROCALCITONIN (PCT): CPT | Performed by: PHYSICIAN ASSISTANT

## 2024-01-01 PROCEDURE — 71045 X-RAY EXAM CHEST 1 VIEW: CPT

## 2024-01-01 PROCEDURE — 70496 CT ANGIOGRAPHY HEAD: CPT

## 2024-01-01 PROCEDURE — 95715 VEEG EA 12-26HR INTMT MNTR: CPT

## 2024-01-01 PROCEDURE — 87147 CULTURE TYPE IMMUNOLOGIC: CPT

## 2024-01-01 PROCEDURE — 4A133J1 MONITORING OF ARTERIAL PULSE, PERIPHERAL, PERCUTANEOUS APPROACH: ICD-10-PCS | Performed by: SURGERY

## 2024-01-01 PROCEDURE — 90471 IMMUNIZATION ADMIN: CPT

## 2024-01-01 PROCEDURE — 0B968ZZ DRAINAGE OF RIGHT LOWER LOBE BRONCHUS, VIA NATURAL OR ARTIFICIAL OPENING ENDOSCOPIC: ICD-10-PCS | Performed by: SURGERY

## 2024-01-01 PROCEDURE — 99233 SBSQ HOSP IP/OBS HIGH 50: CPT | Performed by: PSYCHIATRY & NEUROLOGY

## 2024-01-01 PROCEDURE — 85384 FIBRINOGEN ACTIVITY: CPT | Performed by: SURGERY

## 2024-01-01 PROCEDURE — 82947 ASSAY GLUCOSE BLOOD QUANT: CPT

## 2024-01-01 PROCEDURE — 87070 CULTURE OTHR SPECIMN AEROBIC: CPT | Performed by: PHYSICIAN ASSISTANT

## 2024-01-01 PROCEDURE — 85027 COMPLETE CBC AUTOMATED: CPT | Performed by: NURSE PRACTITIONER

## 2024-01-01 PROCEDURE — 72040 X-RAY EXAM NECK SPINE 2-3 VW: CPT

## 2024-01-01 PROCEDURE — 86901 BLOOD TYPING SEROLOGIC RH(D): CPT

## 2024-01-01 PROCEDURE — 84132 ASSAY OF SERUM POTASSIUM: CPT

## 2024-01-01 PROCEDURE — 99292 CRITICAL CARE ADDL 30 MIN: CPT | Performed by: SURGERY

## 2024-01-01 PROCEDURE — NC001 PR NO CHARGE: Performed by: SURGERY

## 2024-01-01 PROCEDURE — 92950 HEART/LUNG RESUSCITATION CPR: CPT | Performed by: SURGERY

## 2024-01-01 PROCEDURE — 5A12012 PERFORMANCE OF CARDIAC OUTPUT, SINGLE, MANUAL: ICD-10-PCS | Performed by: SURGERY

## 2024-01-01 PROCEDURE — 0BJ08ZZ INSPECTION OF TRACHEOBRONCHIAL TREE, VIA NATURAL OR ARTIFICIAL OPENING ENDOSCOPIC: ICD-10-PCS | Performed by: SURGERY

## 2024-01-01 PROCEDURE — 95712 VEEG 2-12 HR INTMT MNTR: CPT

## 2024-01-01 PROCEDURE — 85007 BL SMEAR W/DIFF WBC COUNT: CPT | Performed by: PHYSICIAN ASSISTANT

## 2024-01-01 PROCEDURE — 85362 FIBRIN DEGRADATION PRODUCTS: CPT | Performed by: SURGERY

## 2024-01-01 PROCEDURE — 87081 CULTURE SCREEN ONLY: CPT

## 2024-01-01 PROCEDURE — 85730 THROMBOPLASTIN TIME PARTIAL: CPT | Performed by: SURGERY

## 2024-01-01 PROCEDURE — 94645 CONT INHLJ TX EACH ADDL HOUR: CPT

## 2024-01-01 PROCEDURE — 84478 ASSAY OF TRIGLYCERIDES: CPT | Performed by: PHYSICIAN ASSISTANT

## 2024-01-01 PROCEDURE — C9254 INJECTION, LACOSAMIDE: HCPCS | Performed by: NURSE PRACTITIONER

## 2024-01-01 PROCEDURE — 87186 SC STD MICRODIL/AGAR DIL: CPT | Performed by: PHYSICIAN ASSISTANT

## 2024-01-01 PROCEDURE — 97530 THERAPEUTIC ACTIVITIES: CPT

## 2024-01-01 PROCEDURE — 80235 DRUG ASSAY LACOSAMIDE: CPT

## 2024-01-01 PROCEDURE — 80053 COMPREHEN METABOLIC PANEL: CPT | Performed by: PHYSICIAN ASSISTANT

## 2024-01-01 PROCEDURE — 71260 CT THORAX DX C+: CPT

## 2024-01-01 PROCEDURE — 86901 BLOOD TYPING SEROLOGIC RH(D): CPT | Performed by: SURGERY

## 2024-01-01 PROCEDURE — 36556 INSERT NON-TUNNEL CV CATH: CPT | Performed by: SURGERY

## 2024-01-01 PROCEDURE — 87086 URINE CULTURE/COLONY COUNT: CPT

## 2024-01-01 PROCEDURE — 85018 HEMOGLOBIN: CPT | Performed by: STUDENT IN AN ORGANIZED HEALTH CARE EDUCATION/TRAINING PROGRAM

## 2024-01-01 PROCEDURE — 82330 ASSAY OF CALCIUM: CPT | Performed by: PHYSICIAN ASSISTANT

## 2024-01-01 PROCEDURE — 99285 EMERGENCY DEPT VISIT HI MDM: CPT

## 2024-01-01 PROCEDURE — 83605 ASSAY OF LACTIC ACID: CPT | Performed by: PHYSICIAN ASSISTANT

## 2024-01-01 PROCEDURE — 32551 INSERTION OF CHEST TUBE: CPT | Performed by: SURGERY

## 2024-01-01 PROCEDURE — A9585 GADOBUTROL INJECTION: HCPCS | Performed by: SURGERY

## 2024-01-01 PROCEDURE — 85420 FIBRINOLYTIC PLASMINOGEN: CPT | Performed by: SURGERY

## 2024-01-01 PROCEDURE — 85018 HEMOGLOBIN: CPT | Performed by: NURSE PRACTITIONER

## 2024-01-01 PROCEDURE — 82805 BLOOD GASES W/O2 SATURATION: CPT | Performed by: NURSE PRACTITIONER

## 2024-01-01 PROCEDURE — 82077 ASSAY SPEC XCP UR&BREATH IA: CPT | Performed by: SURGERY

## 2024-01-01 PROCEDURE — 36416 COLLJ CAPILLARY BLOOD SPEC: CPT

## 2024-01-01 PROCEDURE — 82140 ASSAY OF AMMONIA: CPT

## 2024-01-01 PROCEDURE — 95700 EEG CONT REC W/VID EEG TECH: CPT

## 2024-01-01 PROCEDURE — 85014 HEMATOCRIT: CPT | Performed by: NURSE PRACTITIONER

## 2024-01-01 PROCEDURE — 80076 HEPATIC FUNCTION PANEL: CPT | Performed by: INTERNAL MEDICINE

## 2024-01-01 PROCEDURE — 84443 ASSAY THYROID STIM HORMONE: CPT | Performed by: PHYSICIAN ASSISTANT

## 2024-01-01 PROCEDURE — 5A1955Z RESPIRATORY VENTILATION, GREATER THAN 96 CONSECUTIVE HOURS: ICD-10-PCS | Performed by: SURGERY

## 2024-01-01 PROCEDURE — 95720 EEG PHY/QHP EA INCR W/VEEG: CPT | Performed by: STUDENT IN AN ORGANIZED HEALTH CARE EDUCATION/TRAINING PROGRAM

## 2024-01-01 PROCEDURE — 87205 SMEAR GRAM STAIN: CPT

## 2024-01-01 PROCEDURE — 84478 ASSAY OF TRIGLYCERIDES: CPT

## 2024-01-01 PROCEDURE — 80076 HEPATIC FUNCTION PANEL: CPT | Performed by: SURGERY

## 2024-01-01 PROCEDURE — 95720 EEG PHY/QHP EA INCR W/VEEG: CPT | Performed by: PSYCHIATRY & NEUROLOGY

## 2024-01-01 PROCEDURE — 85379 FIBRIN DEGRADATION QUANT: CPT | Performed by: SURGERY

## 2024-01-01 PROCEDURE — 99292 CRITICAL CARE ADDL 30 MIN: CPT | Performed by: PHYSICIAN ASSISTANT

## 2024-01-01 PROCEDURE — 80053 COMPREHEN METABOLIC PANEL: CPT

## 2024-01-01 PROCEDURE — 97760 ORTHOTIC MGMT&TRAING 1ST ENC: CPT

## 2024-01-01 PROCEDURE — 83036 HEMOGLOBIN GLYCOSYLATED A1C: CPT | Performed by: PHYSICIAN ASSISTANT

## 2024-01-01 PROCEDURE — 36620 INSERTION CATHETER ARTERY: CPT

## 2024-01-01 PROCEDURE — NC001 PR NO CHARGE: Performed by: PHYSICIAN ASSISTANT

## 2024-01-01 PROCEDURE — 0W9930Z DRAINAGE OF RIGHT PLEURAL CAVITY WITH DRAINAGE DEVICE, PERCUTANEOUS APPROACH: ICD-10-PCS | Performed by: STUDENT IN AN ORGANIZED HEALTH CARE EDUCATION/TRAINING PROGRAM

## 2024-01-01 PROCEDURE — 87186 SC STD MICRODIL/AGAR DIL: CPT

## 2024-01-01 PROCEDURE — 85049 AUTOMATED PLATELET COUNT: CPT | Performed by: SURGERY

## 2024-01-01 PROCEDURE — 93308 TTE F-UP OR LMTD: CPT | Performed by: SURGERY

## 2024-01-01 PROCEDURE — 93005 ELECTROCARDIOGRAM TRACING: CPT

## 2024-01-01 PROCEDURE — 76705 ECHO EXAM OF ABDOMEN: CPT | Performed by: SURGERY

## 2024-01-01 PROCEDURE — 82805 BLOOD GASES W/O2 SATURATION: CPT

## 2024-01-01 PROCEDURE — 87205 SMEAR GRAM STAIN: CPT | Performed by: PHYSICIAN ASSISTANT

## 2024-01-01 PROCEDURE — 30233N1 TRANSFUSION OF NONAUTOLOGOUS RED BLOOD CELLS INTO PERIPHERAL VEIN, PERCUTANEOUS APPROACH: ICD-10-PCS | Performed by: SURGERY

## 2024-01-01 PROCEDURE — 82140 ASSAY OF AMMONIA: CPT | Performed by: SURGERY

## 2024-01-01 PROCEDURE — 70553 MRI BRAIN STEM W/O & W/DYE: CPT

## 2024-01-01 PROCEDURE — 99223 1ST HOSP IP/OBS HIGH 75: CPT | Performed by: INTERNAL MEDICINE

## 2024-01-01 PROCEDURE — P9016 RBC LEUKOCYTES REDUCED: HCPCS

## 2024-01-01 PROCEDURE — 82805 BLOOD GASES W/O2 SATURATION: CPT | Performed by: SURGERY

## 2024-01-01 PROCEDURE — 99233 SBSQ HOSP IP/OBS HIGH 50: CPT | Performed by: NEUROLOGICAL SURGERY

## 2024-01-01 PROCEDURE — 86850 RBC ANTIBODY SCREEN: CPT

## 2024-01-01 PROCEDURE — 86850 RBC ANTIBODY SCREEN: CPT | Performed by: SURGERY

## 2024-01-01 PROCEDURE — 97167 OT EVAL HIGH COMPLEX 60 MIN: CPT

## 2024-01-01 PROCEDURE — 70498 CT ANGIOGRAPHY NECK: CPT

## 2024-01-01 PROCEDURE — 85300 ANTITHROMBIN III ACTIVITY: CPT | Performed by: SURGERY

## 2024-01-01 PROCEDURE — 36415 COLL VENOUS BLD VENIPUNCTURE: CPT | Performed by: SURGERY

## 2024-01-01 PROCEDURE — 0241U HB NFCT DS VIR RESP RNA 4 TRGT: CPT

## 2024-01-01 PROCEDURE — 87070 CULTURE OTHR SPECIMN AEROBIC: CPT

## 2024-01-01 PROCEDURE — 32551 INSERTION OF CHEST TUBE: CPT | Performed by: PHYSICIAN ASSISTANT

## 2024-01-01 PROCEDURE — 99223 1ST HOSP IP/OBS HIGH 75: CPT | Performed by: SURGERY

## 2024-01-01 PROCEDURE — 83605 ASSAY OF LACTIC ACID: CPT

## 2024-01-01 PROCEDURE — C1751 CATH, INF, PER/CENT/MIDLINE: HCPCS

## 2024-01-01 PROCEDURE — 85025 COMPLETE CBC W/AUTO DIFF WBC: CPT

## 2024-01-01 PROCEDURE — 70486 CT MAXILLOFACIAL W/O DYE: CPT

## 2024-01-01 PROCEDURE — 87147 CULTURE TYPE IMMUNOLOGIC: CPT | Performed by: PHYSICIAN ASSISTANT

## 2024-01-01 PROCEDURE — 99223 1ST HOSP IP/OBS HIGH 75: CPT | Performed by: PHYSICIAN ASSISTANT

## 2024-01-01 PROCEDURE — 99222 1ST HOSP IP/OBS MODERATE 55: CPT | Performed by: PSYCHIATRY & NEUROLOGY

## 2024-01-01 PROCEDURE — 94668 MNPJ CHEST WALL SBSQ: CPT

## 2024-01-01 PROCEDURE — 86900 BLOOD TYPING SEROLOGIC ABO: CPT | Performed by: SURGERY

## 2024-01-01 PROCEDURE — 83735 ASSAY OF MAGNESIUM: CPT | Performed by: PHYSICIAN ASSISTANT

## 2024-01-01 PROCEDURE — 99232 SBSQ HOSP IP/OBS MODERATE 35: CPT | Performed by: SURGERY

## 2024-01-01 PROCEDURE — 31624 DX BRONCHOSCOPE/LAVAGE: CPT | Performed by: SURGERY

## 2024-01-01 PROCEDURE — 94664 DEMO&/EVAL PT USE INHALER: CPT

## 2024-01-01 PROCEDURE — 87493 C DIFF AMPLIFIED PROBE: CPT

## 2024-01-01 PROCEDURE — 80307 DRUG TEST PRSMV CHEM ANLYZR: CPT | Performed by: SURGERY

## 2024-01-01 PROCEDURE — 94002 VENT MGMT INPAT INIT DAY: CPT

## 2024-01-01 PROCEDURE — 0BH17EZ INSERTION OF ENDOTRACHEAL AIRWAY INTO TRACHEA, VIA NATURAL OR ARTIFICIAL OPENING: ICD-10-PCS | Performed by: SURGERY

## 2024-01-01 PROCEDURE — 73560 X-RAY EXAM OF KNEE 1 OR 2: CPT

## 2024-01-01 PROCEDURE — 03HY32Z INSERTION OF MONITORING DEVICE INTO UPPER ARTERY, PERCUTANEOUS APPROACH: ICD-10-PCS | Performed by: SURGERY

## 2024-01-01 PROCEDURE — 84100 ASSAY OF PHOSPHORUS: CPT | Performed by: PHYSICIAN ASSISTANT

## 2024-01-01 PROCEDURE — 80202 ASSAY OF VANCOMYCIN: CPT | Performed by: SURGERY

## 2024-01-01 PROCEDURE — 97163 PT EVAL HIGH COMPLEX 45 MIN: CPT

## 2024-01-01 PROCEDURE — 0202U NFCT DS 22 TRGT SARS-COV-2: CPT

## 2024-01-01 PROCEDURE — 85027 COMPLETE CBC AUTOMATED: CPT | Performed by: STUDENT IN AN ORGANIZED HEALTH CARE EDUCATION/TRAINING PROGRAM

## 2024-01-01 PROCEDURE — 82077 ASSAY SPEC XCP UR&BREATH IA: CPT

## 2024-01-01 PROCEDURE — 82607 VITAMIN B-12: CPT | Performed by: PHYSICIAN ASSISTANT

## 2024-01-01 PROCEDURE — 85027 COMPLETE CBC AUTOMATED: CPT | Performed by: PHYSICIAN ASSISTANT

## 2024-01-01 PROCEDURE — 02HV33Z INSERTION OF INFUSION DEVICE INTO SUPERIOR VENA CAVA, PERCUTANEOUS APPROACH: ICD-10-PCS | Performed by: SURGERY

## 2024-01-01 PROCEDURE — 85610 PROTHROMBIN TIME: CPT | Performed by: PHYSICIAN ASSISTANT

## 2024-01-01 PROCEDURE — 73030 X-RAY EXAM OF SHOULDER: CPT

## 2024-01-01 PROCEDURE — 90715 TDAP VACCINE 7 YRS/> IM: CPT

## 2024-01-01 PROCEDURE — 83605 ASSAY OF LACTIC ACID: CPT | Performed by: NURSE PRACTITIONER

## 2024-01-01 PROCEDURE — 80164 ASSAY DIPROPYLACETIC ACD TOT: CPT

## 2024-01-01 PROCEDURE — 80048 BASIC METABOLIC PNL TOTAL CA: CPT | Performed by: STUDENT IN AN ORGANIZED HEALTH CARE EDUCATION/TRAINING PROGRAM

## 2024-01-01 PROCEDURE — 02H633Z INSERTION OF INFUSION DEVICE INTO RIGHT ATRIUM, PERCUTANEOUS APPROACH: ICD-10-PCS | Performed by: SURGERY

## 2024-01-01 PROCEDURE — 36600 WITHDRAWAL OF ARTERIAL BLOOD: CPT

## 2024-01-01 PROCEDURE — 94644 CONT INHLJ TX 1ST HOUR: CPT

## 2024-01-01 PROCEDURE — 72125 CT NECK SPINE W/O DYE: CPT

## 2024-01-01 PROCEDURE — 80076 HEPATIC FUNCTION PANEL: CPT | Performed by: NURSE PRACTITIONER

## 2024-01-01 RX ORDER — PROPOFOL 10 MG/ML
150 INJECTION, EMULSION INTRAVENOUS CONTINUOUS
Status: DISCONTINUED | OUTPATIENT
Start: 2024-01-01 | End: 2024-01-01 | Stop reason: SDUPTHER

## 2024-01-01 RX ORDER — MAGNESIUM SULFATE HEPTAHYDRATE 40 MG/ML
2 INJECTION, SOLUTION INTRAVENOUS ONCE
Status: COMPLETED | OUTPATIENT
Start: 2024-01-01 | End: 2024-01-01

## 2024-01-01 RX ORDER — EPINEPHRINE 0.1 MG/ML
INJECTION INTRAVENOUS CODE/TRAUMA/SEDATION MEDICATION
Status: COMPLETED | OUTPATIENT
Start: 2024-01-01 | End: 2024-01-01

## 2024-01-01 RX ORDER — METRONIDAZOLE 500 MG/100ML
500 INJECTION, SOLUTION INTRAVENOUS ONCE
Status: DISCONTINUED | OUTPATIENT
Start: 2024-01-01 | End: 2024-01-01

## 2024-01-01 RX ORDER — OXYCODONE HCL 5 MG/5 ML
5 SOLUTION, ORAL ORAL EVERY 4 HOURS PRN
Status: CANCELLED | OUTPATIENT
Start: 2024-01-01

## 2024-01-01 RX ORDER — SODIUM CHLORIDE FOR INHALATION 3 %
4 VIAL, NEBULIZER (ML) INHALATION
Status: DISCONTINUED | OUTPATIENT
Start: 2024-01-01 | End: 2024-01-01

## 2024-01-01 RX ORDER — FOLIC ACID 1 MG/1
1 TABLET ORAL DAILY
Status: DISCONTINUED | OUTPATIENT
Start: 2024-01-01 | End: 2024-01-01

## 2024-01-01 RX ORDER — PROPOFOL 10 MG/ML
5-50 INJECTION, EMULSION INTRAVENOUS
Status: DISCONTINUED | OUTPATIENT
Start: 2024-01-01 | End: 2024-01-01

## 2024-01-01 RX ORDER — LACOSAMIDE 10 MG/ML
200 SOLUTION ORAL EVERY 12 HOURS SCHEDULED
Status: DISCONTINUED | OUTPATIENT
Start: 2024-01-01 | End: 2024-01-01

## 2024-01-01 RX ORDER — SODIUM CHLORIDE, SODIUM GLUCONATE, SODIUM ACETATE, POTASSIUM CHLORIDE, MAGNESIUM CHLORIDE, SODIUM PHOSPHATE, DIBASIC, AND POTASSIUM PHOSPHATE .53; .5; .37; .037; .03; .012; .00082 G/100ML; G/100ML; G/100ML; G/100ML; G/100ML; G/100ML; G/100ML
1000 INJECTION, SOLUTION INTRAVENOUS ONCE
Status: DISCONTINUED | OUTPATIENT
Start: 2024-01-01 | End: 2024-01-01

## 2024-01-01 RX ORDER — MIDAZOLAM HYDROCHLORIDE 2 MG/2ML
1 INJECTION, SOLUTION INTRAMUSCULAR; INTRAVENOUS ONCE
Status: COMPLETED | OUTPATIENT
Start: 2024-01-01 | End: 2024-01-01

## 2024-01-01 RX ORDER — SODIUM CHLORIDE, SODIUM GLUCONATE, SODIUM ACETATE, POTASSIUM CHLORIDE, MAGNESIUM CHLORIDE, SODIUM PHOSPHATE, DIBASIC, AND POTASSIUM PHOSPHATE .53; .5; .37; .037; .03; .012; .00082 G/100ML; G/100ML; G/100ML; G/100ML; G/100ML; G/100ML; G/100ML
1000 INJECTION, SOLUTION INTRAVENOUS ONCE
Status: COMPLETED | OUTPATIENT
Start: 2024-01-01 | End: 2024-01-01

## 2024-01-01 RX ORDER — CEFAZOLIN SODIUM 2 G/50ML
2000 SOLUTION INTRAVENOUS EVERY 8 HOURS
Status: DISCONTINUED | OUTPATIENT
Start: 2024-01-01 | End: 2024-01-01

## 2024-01-01 RX ORDER — INSULIN GLARGINE 100 [IU]/ML
25 INJECTION, SOLUTION SUBCUTANEOUS
Status: DISCONTINUED | OUTPATIENT
Start: 2024-01-01 | End: 2024-01-01

## 2024-01-01 RX ORDER — HYDROCORTISONE SODIUM SUCCINATE 100 MG/2ML
50 INJECTION INTRAMUSCULAR; INTRAVENOUS EVERY 6 HOURS SCHEDULED
Status: DISCONTINUED | OUTPATIENT
Start: 2024-01-01 | End: 2024-01-01

## 2024-01-01 RX ORDER — PROPOFOL 10 MG/ML
2 INJECTION, EMULSION INTRAVENOUS ONCE
Status: DISCONTINUED | OUTPATIENT
Start: 2024-01-01 | End: 2024-01-01 | Stop reason: SDUPTHER

## 2024-01-01 RX ORDER — OXYCODONE HCL 5 MG/5 ML
5 SOLUTION, ORAL ORAL EVERY 6 HOURS SCHEDULED
Status: DISCONTINUED | OUTPATIENT
Start: 2024-01-01 | End: 2024-01-01

## 2024-01-01 RX ORDER — OXYCODONE HYDROCHLORIDE 5 MG/1
5 TABLET ORAL EVERY 4 HOURS PRN
Status: DISCONTINUED | OUTPATIENT
Start: 2024-01-01 | End: 2024-01-01

## 2024-01-01 RX ORDER — EPINEPHRINE 1 MG/ML
INJECTION, SOLUTION, CONCENTRATE INTRAVENOUS
Status: DISCONTINUED
Start: 2024-01-01 | End: 2024-01-01 | Stop reason: HOSPADM

## 2024-01-01 RX ORDER — ALBUMIN HUMAN 50 G/1000ML
12.5 SOLUTION INTRAVENOUS ONCE
Status: DISCONTINUED | OUTPATIENT
Start: 2024-01-01 | End: 2024-01-01

## 2024-01-01 RX ORDER — TIZANIDINE 2 MG/1
4 TABLET ORAL
Status: DISCONTINUED | OUTPATIENT
Start: 2024-01-01 | End: 2024-01-01

## 2024-01-01 RX ORDER — ACETAMINOPHEN 325 MG/1
975 TABLET ORAL EVERY 8 HOURS SCHEDULED
Status: DISCONTINUED | OUTPATIENT
Start: 2024-01-01 | End: 2024-01-01

## 2024-01-01 RX ORDER — LANOLIN ALCOHOL/MO/W.PET/CERES
100 CREAM (GRAM) TOPICAL DAILY
Status: DISCONTINUED | OUTPATIENT
Start: 2024-01-01 | End: 2024-01-01

## 2024-01-01 RX ORDER — ALBUMIN HUMAN 50 G/1000ML
SOLUTION INTRAVENOUS
Status: COMPLETED
Start: 2024-01-01 | End: 2024-01-01

## 2024-01-01 RX ORDER — ROCURONIUM BROMIDE 10 MG/ML
100 INJECTION, SOLUTION INTRAVENOUS ONCE
Status: COMPLETED | OUTPATIENT
Start: 2024-01-01 | End: 2024-01-01

## 2024-01-01 RX ORDER — DEXMEDETOMIDINE HYDROCHLORIDE 4 UG/ML
.1-.7 INJECTION, SOLUTION INTRAVENOUS
Status: DISCONTINUED | OUTPATIENT
Start: 2024-01-01 | End: 2024-01-01

## 2024-01-01 RX ORDER — HYDROMORPHONE HCL IN WATER/PF 6 MG/30 ML
0.2 PATIENT CONTROLLED ANALGESIA SYRINGE INTRAVENOUS EVERY 2 HOUR PRN
Status: DISCONTINUED | OUTPATIENT
Start: 2024-01-01 | End: 2024-01-01

## 2024-01-01 RX ORDER — ONDANSETRON 2 MG/ML
4 INJECTION INTRAMUSCULAR; INTRAVENOUS EVERY 4 HOURS PRN
Status: DISCONTINUED | OUTPATIENT
Start: 2024-01-01 | End: 2024-01-01

## 2024-01-01 RX ORDER — AMOXICILLIN 250 MG
1 CAPSULE ORAL
Status: DISCONTINUED | OUTPATIENT
Start: 2024-01-01 | End: 2024-01-01

## 2024-01-01 RX ORDER — CHLORHEXIDINE GLUCONATE ORAL RINSE 1.2 MG/ML
15 SOLUTION DENTAL EVERY 12 HOURS SCHEDULED
Status: DISCONTINUED | OUTPATIENT
Start: 2024-01-01 | End: 2024-01-01

## 2024-01-01 RX ORDER — GADOBUTROL 604.72 MG/ML
7 INJECTION INTRAVENOUS
Status: COMPLETED | OUTPATIENT
Start: 2024-01-01 | End: 2024-01-01

## 2024-01-01 RX ORDER — LORAZEPAM 2 MG/ML
2 INJECTION INTRAMUSCULAR ONCE
Status: DISCONTINUED | OUTPATIENT
Start: 2024-01-01 | End: 2024-01-01

## 2024-01-01 RX ORDER — CALCIUM GLUCONATE 20 MG/ML
1 INJECTION, SOLUTION INTRAVENOUS ONCE
Status: COMPLETED | OUTPATIENT
Start: 2024-01-01 | End: 2024-01-01

## 2024-01-01 RX ORDER — SODIUM CHLORIDE, SODIUM GLUCONATE, SODIUM ACETATE, POTASSIUM CHLORIDE, MAGNESIUM CHLORIDE, SODIUM PHOSPHATE, DIBASIC, AND POTASSIUM PHOSPHATE .53; .5; .37; .037; .03; .012; .00082 G/100ML; G/100ML; G/100ML; G/100ML; G/100ML; G/100ML; G/100ML
500 INJECTION, SOLUTION INTRAVENOUS ONCE
Status: COMPLETED | OUTPATIENT
Start: 2024-01-01 | End: 2024-01-01

## 2024-01-01 RX ORDER — DEXTROSE MONOHYDRATE AND SODIUM CHLORIDE 5; .9 G/100ML; G/100ML
125 INJECTION, SOLUTION INTRAVENOUS CONTINUOUS
Status: DISCONTINUED | OUTPATIENT
Start: 2024-01-01 | End: 2024-01-01

## 2024-01-01 RX ORDER — LEVETIRACETAM 500 MG/5ML
2000 INJECTION, SOLUTION, CONCENTRATE INTRAVENOUS EVERY 12 HOURS SCHEDULED
Status: DISCONTINUED | OUTPATIENT
Start: 2024-01-01 | End: 2024-01-01

## 2024-01-01 RX ORDER — POTASSIUM CHLORIDE 29.8 MG/ML
40 INJECTION INTRAVENOUS ONCE
Status: COMPLETED | OUTPATIENT
Start: 2024-01-01 | End: 2024-01-01

## 2024-01-01 RX ORDER — NICOTINE 21 MG/24HR
21 PATCH, TRANSDERMAL 24 HOURS TRANSDERMAL DAILY
Status: DISCONTINUED | OUTPATIENT
Start: 2024-01-01 | End: 2024-01-01

## 2024-01-01 RX ORDER — ACETAMINOPHEN 325 MG/1
975 TABLET ORAL ONCE
Status: COMPLETED | OUTPATIENT
Start: 2024-01-01 | End: 2024-01-01

## 2024-01-01 RX ORDER — SODIUM CHLORIDE, SODIUM LACTATE, POTASSIUM CHLORIDE, CALCIUM CHLORIDE 600; 310; 30; 20 MG/100ML; MG/100ML; MG/100ML; MG/100ML
100 INJECTION, SOLUTION INTRAVENOUS CONTINUOUS
Status: DISCONTINUED | OUTPATIENT
Start: 2024-01-01 | End: 2024-01-01

## 2024-01-01 RX ORDER — ACETAMINOPHEN 10 MG/ML
1000 INJECTION, SOLUTION INTRAVENOUS EVERY 6 HOURS PRN
Status: DISCONTINUED | OUTPATIENT
Start: 2024-01-01 | End: 2024-01-01

## 2024-01-01 RX ORDER — METRONIDAZOLE 500 MG/100ML
500 INJECTION, SOLUTION INTRAVENOUS ONCE
Status: COMPLETED | OUTPATIENT
Start: 2024-01-01 | End: 2024-01-01

## 2024-01-01 RX ORDER — METHOCARBAMOL 750 MG/1
750 TABLET, FILM COATED ORAL EVERY 6 HOURS SCHEDULED
Status: DISCONTINUED | OUTPATIENT
Start: 2024-01-01 | End: 2024-01-01

## 2024-01-01 RX ORDER — FENTANYL CITRATE-0.9 % NACL/PF 10 MCG/ML
37.5 PLASTIC BAG, INJECTION (ML) INTRAVENOUS CONTINUOUS
Status: DISCONTINUED | OUTPATIENT
Start: 2024-01-01 | End: 2024-01-01

## 2024-01-01 RX ORDER — LEVETIRACETAM 500 MG/5ML
1500 INJECTION, SOLUTION, CONCENTRATE INTRAVENOUS ONCE
Status: COMPLETED | OUTPATIENT
Start: 2024-01-01 | End: 2024-01-01

## 2024-01-01 RX ORDER — MIDAZOLAM HYDROCHLORIDE 2 MG/2ML
5 INJECTION, SOLUTION INTRAMUSCULAR; INTRAVENOUS ONCE
Status: COMPLETED | OUTPATIENT
Start: 2024-01-01 | End: 2024-01-01

## 2024-01-01 RX ORDER — FENTANYL CITRATE 50 UG/ML
100 INJECTION, SOLUTION INTRAMUSCULAR; INTRAVENOUS ONCE
Status: COMPLETED | OUTPATIENT
Start: 2024-01-01 | End: 2024-01-01

## 2024-01-01 RX ORDER — SODIUM CHLORIDE, SODIUM GLUCONATE, SODIUM ACETATE, POTASSIUM CHLORIDE, MAGNESIUM CHLORIDE, SODIUM PHOSPHATE, DIBASIC, AND POTASSIUM PHOSPHATE .53; .5; .37; .037; .03; .012; .00082 G/100ML; G/100ML; G/100ML; G/100ML; G/100ML; G/100ML; G/100ML
75 INJECTION, SOLUTION INTRAVENOUS CONTINUOUS
Status: DISPENSED | OUTPATIENT
Start: 2024-01-01 | End: 2024-01-01

## 2024-01-01 RX ORDER — ACETAMINOPHEN 160 MG/5ML
650 SUSPENSION ORAL EVERY 6 HOURS
Status: DISCONTINUED | OUTPATIENT
Start: 2024-01-01 | End: 2024-01-01

## 2024-01-01 RX ORDER — LEVETIRACETAM 500 MG/1
500 TABLET ORAL EVERY 12 HOURS SCHEDULED
Status: DISCONTINUED | OUTPATIENT
Start: 2024-01-01 | End: 2024-01-01

## 2024-01-01 RX ORDER — DEXMEDETOMIDINE HYDROCHLORIDE 4 UG/ML
.1-.5 INJECTION, SOLUTION INTRAVENOUS
Status: DISCONTINUED | OUTPATIENT
Start: 2024-01-01 | End: 2024-01-01

## 2024-01-01 RX ORDER — CALCIUM CHLORIDE 100 MG/ML
SYRINGE (ML) INTRAVENOUS CODE/TRAUMA/SEDATION MEDICATION
Status: COMPLETED | OUTPATIENT
Start: 2024-01-01 | End: 2024-01-01

## 2024-01-01 RX ORDER — INSULIN GLARGINE 100 [IU]/ML
10 INJECTION, SOLUTION SUBCUTANEOUS
Status: DISCONTINUED | OUTPATIENT
Start: 2024-01-01 | End: 2024-01-01

## 2024-01-01 RX ORDER — LORAZEPAM 2 MG/ML
2 INJECTION INTRAMUSCULAR ONCE
Status: COMPLETED | OUTPATIENT
Start: 2024-01-01 | End: 2024-01-01

## 2024-01-01 RX ORDER — FENTANYL CITRATE 50 UG/ML
50 INJECTION, SOLUTION INTRAMUSCULAR; INTRAVENOUS
Status: DISCONTINUED | OUTPATIENT
Start: 2024-01-01 | End: 2024-01-01

## 2024-01-01 RX ORDER — ACETAMINOPHEN 10 MG/ML
1000 INJECTION, SOLUTION INTRAVENOUS ONCE
Status: COMPLETED | OUTPATIENT
Start: 2024-01-01 | End: 2024-01-01

## 2024-01-01 RX ORDER — OXYCODONE HCL 5 MG/5 ML
2.5 SOLUTION, ORAL ORAL EVERY 4 HOURS PRN
Status: CANCELLED | OUTPATIENT
Start: 2024-01-01

## 2024-01-01 RX ORDER — NICOTINE 21 MG/24HR
14 PATCH, TRANSDERMAL 24 HOURS TRANSDERMAL DAILY
Status: DISCONTINUED | OUTPATIENT
Start: 2024-01-01 | End: 2024-01-01

## 2024-01-01 RX ORDER — INSULIN LISPRO 100 [IU]/ML
2-12 INJECTION, SOLUTION INTRAVENOUS; SUBCUTANEOUS EVERY 6 HOURS SCHEDULED
Status: DISCONTINUED | OUTPATIENT
Start: 2024-01-01 | End: 2024-01-01

## 2024-01-01 RX ORDER — ACETAMINOPHEN 160 MG/5ML
325 SUSPENSION ORAL ONCE
Status: COMPLETED | OUTPATIENT
Start: 2024-01-01 | End: 2024-01-01

## 2024-01-01 RX ORDER — PANTOPRAZOLE SODIUM 40 MG/1
40 TABLET, DELAYED RELEASE ORAL
Status: DISCONTINUED | OUTPATIENT
Start: 2024-01-01 | End: 2024-01-01

## 2024-01-01 RX ORDER — METHOCARBAMOL 100 MG/ML
750 INJECTION, SOLUTION INTRAMUSCULAR; INTRAVENOUS EVERY 8 HOURS SCHEDULED
Status: DISCONTINUED | OUTPATIENT
Start: 2024-01-01 | End: 2024-01-01

## 2024-01-01 RX ORDER — CLOBAZAM 10 MG/1
5 TABLET ORAL 3 TIMES DAILY
Status: DISCONTINUED | OUTPATIENT
Start: 2024-01-01 | End: 2024-01-01

## 2024-01-01 RX ORDER — LABETALOL HYDROCHLORIDE 5 MG/ML
10 INJECTION, SOLUTION INTRAVENOUS EVERY 6 HOURS PRN
Status: DISCONTINUED | OUTPATIENT
Start: 2024-01-01 | End: 2024-01-01

## 2024-01-01 RX ORDER — LACOSAMIDE 10 MG/ML
200 INJECTION, SOLUTION INTRAVENOUS EVERY 12 HOURS
Status: DISCONTINUED | OUTPATIENT
Start: 2024-01-01 | End: 2024-01-01

## 2024-01-01 RX ORDER — ENOXAPARIN SODIUM 100 MG/ML
30 INJECTION SUBCUTANEOUS EVERY 12 HOURS
Status: DISCONTINUED | OUTPATIENT
Start: 2024-01-01 | End: 2024-01-01

## 2024-01-01 RX ORDER — LEVALBUTEROL INHALATION SOLUTION 1.25 MG/3ML
1.25 SOLUTION RESPIRATORY (INHALATION)
Status: DISCONTINUED | OUTPATIENT
Start: 2024-01-01 | End: 2024-01-01

## 2024-01-01 RX ORDER — SODIUM BICARBONATE 84 MG/ML
INJECTION, SOLUTION INTRAVENOUS CODE/TRAUMA/SEDATION MEDICATION
Status: COMPLETED | OUTPATIENT
Start: 2024-01-01 | End: 2024-01-01

## 2024-01-01 RX ORDER — ENOXAPARIN SODIUM 100 MG/ML
30 INJECTION SUBCUTANEOUS EVERY 12 HOURS SCHEDULED
Status: DISCONTINUED | OUTPATIENT
Start: 2024-01-01 | End: 2024-01-01

## 2024-01-01 RX ORDER — PROPOFOL 10 MG/ML
150 INJECTION, EMULSION INTRAVENOUS ONCE
Status: DISCONTINUED | OUTPATIENT
Start: 2024-01-01 | End: 2024-01-01

## 2024-01-01 RX ORDER — FENTANYL CITRATE 50 UG/ML
50 INJECTION, SOLUTION INTRAMUSCULAR; INTRAVENOUS ONCE
Status: COMPLETED | OUTPATIENT
Start: 2024-01-01 | End: 2024-01-01

## 2024-01-01 RX ORDER — ALBUMIN (HUMAN) 12.5 G/50ML
25 SOLUTION INTRAVENOUS ONCE
Status: DISCONTINUED | OUTPATIENT
Start: 2024-01-01 | End: 2024-01-01

## 2024-01-01 RX ORDER — QUETIAPINE FUMARATE 25 MG/1
12.5 TABLET, FILM COATED ORAL
Status: DISCONTINUED | OUTPATIENT
Start: 2024-01-01 | End: 2024-01-01

## 2024-01-01 RX ORDER — ROCURONIUM BROMIDE 10 MG/ML
1 INJECTION, SOLUTION INTRAVENOUS ONCE
Status: COMPLETED | OUTPATIENT
Start: 2024-01-01 | End: 2024-01-01

## 2024-01-01 RX ORDER — METRONIDAZOLE 500 MG/100ML
500 INJECTION, SOLUTION INTRAVENOUS EVERY 8 HOURS
Status: DISCONTINUED | OUTPATIENT
Start: 2024-01-01 | End: 2024-01-01

## 2024-01-01 RX ORDER — FENTANYL CITRATE-0.9 % NACL/PF 10 MCG/ML
25 PLASTIC BAG, INJECTION (ML) INTRAVENOUS CONTINUOUS
Status: DISCONTINUED | OUTPATIENT
Start: 2024-01-01 | End: 2024-01-01

## 2024-01-01 RX ORDER — CALCIUM CARB/VITAMIN D3/VIT K1 500-500-40
15 TABLET,CHEWABLE ORAL DAILY
Status: DISCONTINUED | OUTPATIENT
Start: 2024-01-01 | End: 2024-01-01

## 2024-01-01 RX ORDER — MIDAZOLAM HYDROCHLORIDE 2 MG/2ML
2 INJECTION, SOLUTION INTRAMUSCULAR; INTRAVENOUS ONCE
Status: COMPLETED | OUTPATIENT
Start: 2024-01-01 | End: 2024-01-01

## 2024-01-01 RX ORDER — FUROSEMIDE 10 MG/ML
20 INJECTION INTRAMUSCULAR; INTRAVENOUS ONCE
Status: COMPLETED | OUTPATIENT
Start: 2024-01-01 | End: 2024-01-01

## 2024-01-01 RX ORDER — HYDRALAZINE HYDROCHLORIDE 20 MG/ML
20 INJECTION INTRAMUSCULAR; INTRAVENOUS EVERY 6 HOURS PRN
Status: DISCONTINUED | OUTPATIENT
Start: 2024-01-01 | End: 2024-01-01

## 2024-01-01 RX ORDER — SODIUM CHLORIDE, SODIUM GLUCONATE, SODIUM ACETATE, POTASSIUM CHLORIDE, MAGNESIUM CHLORIDE, SODIUM PHOSPHATE, DIBASIC, AND POTASSIUM PHOSPHATE .53; .5; .37; .037; .03; .012; .00082 G/100ML; G/100ML; G/100ML; G/100ML; G/100ML; G/100ML; G/100ML
125 INJECTION, SOLUTION INTRAVENOUS CONTINUOUS
Status: DISCONTINUED | OUTPATIENT
Start: 2024-01-01 | End: 2024-01-01

## 2024-01-01 RX ORDER — LACOSAMIDE 100 MG/1
200 TABLET ORAL EVERY 12 HOURS SCHEDULED
Status: DISCONTINUED | OUTPATIENT
Start: 2024-01-01 | End: 2024-01-01 | Stop reason: SDUPTHER

## 2024-01-01 RX ORDER — INSULIN LISPRO 100 [IU]/ML
12 INJECTION, SOLUTION INTRAVENOUS; SUBCUTANEOUS ONCE
Status: COMPLETED | OUTPATIENT
Start: 2024-01-01 | End: 2024-01-01

## 2024-01-01 RX ORDER — HYDRALAZINE HYDROCHLORIDE 20 MG/ML
10 INJECTION INTRAMUSCULAR; INTRAVENOUS EVERY 6 HOURS PRN
Status: DISCONTINUED | OUTPATIENT
Start: 2024-01-01 | End: 2024-01-01

## 2024-01-01 RX ORDER — LEVETIRACETAM 100 MG/ML
2000 SOLUTION ORAL EVERY 12 HOURS SCHEDULED
Status: DISCONTINUED | OUTPATIENT
Start: 2024-01-01 | End: 2024-01-01

## 2024-01-01 RX ORDER — INSULIN LISPRO 100 [IU]/ML
4-20 INJECTION, SOLUTION INTRAVENOUS; SUBCUTANEOUS EVERY 6 HOURS SCHEDULED
Status: DISCONTINUED | OUTPATIENT
Start: 2024-01-01 | End: 2024-01-01

## 2024-01-01 RX ORDER — LEVETIRACETAM 500 MG/5ML
4440 INJECTION, SOLUTION, CONCENTRATE INTRAVENOUS ONCE
Status: COMPLETED | OUTPATIENT
Start: 2024-01-01 | End: 2024-01-01

## 2024-01-01 RX ORDER — MIDAZOLAM HYDROCHLORIDE 2 MG/2ML
10 INJECTION, SOLUTION INTRAMUSCULAR; INTRAVENOUS ONCE
Status: COMPLETED | OUTPATIENT
Start: 2024-01-01 | End: 2024-01-01

## 2024-01-01 RX ORDER — INSULIN LISPRO 100 [IU]/ML
5 INJECTION, SOLUTION INTRAVENOUS; SUBCUTANEOUS EVERY 6 HOURS
Status: DISCONTINUED | OUTPATIENT
Start: 2024-01-01 | End: 2024-01-01

## 2024-01-01 RX ORDER — PHENYLEPHRINE HYDROCHLORIDE 10 MG/ML
INJECTION INTRAVENOUS
Status: COMPLETED
Start: 2024-01-01 | End: 2024-01-01

## 2024-01-01 RX ORDER — ALBUTEROL SULFATE 0.83 MG/ML
2.5 SOLUTION RESPIRATORY (INHALATION) EVERY 6 HOURS PRN
Status: DISCONTINUED | OUTPATIENT
Start: 2024-01-01 | End: 2024-01-01

## 2024-01-01 RX ORDER — MIDAZOLAM HYDROCHLORIDE 2 MG/2ML
INJECTION, SOLUTION INTRAMUSCULAR; INTRAVENOUS
Status: COMPLETED
Start: 2024-01-01 | End: 2024-01-01

## 2024-01-01 RX ORDER — LABETALOL HYDROCHLORIDE 5 MG/ML
20 INJECTION, SOLUTION INTRAVENOUS EVERY 6 HOURS PRN
Status: DISCONTINUED | OUTPATIENT
Start: 2024-01-01 | End: 2024-01-01

## 2024-01-01 RX ORDER — LACOSAMIDE 10 MG/ML
400 INJECTION, SOLUTION INTRAVENOUS ONCE
Status: COMPLETED | OUTPATIENT
Start: 2024-01-01 | End: 2024-01-01

## 2024-01-01 RX ORDER — LORAZEPAM 2 MG/ML
0.5 INJECTION INTRAMUSCULAR ONCE AS NEEDED
Status: COMPLETED | OUTPATIENT
Start: 2024-01-01 | End: 2024-01-01

## 2024-01-01 RX ORDER — ATORVASTATIN CALCIUM 10 MG/1
10 TABLET, FILM COATED ORAL
Status: DISCONTINUED | OUTPATIENT
Start: 2024-01-01 | End: 2024-01-01

## 2024-01-01 RX ORDER — ACETYLCYSTEINE 200 MG/ML
3 SOLUTION ORAL; RESPIRATORY (INHALATION)
Status: DISCONTINUED | OUTPATIENT
Start: 2024-01-01 | End: 2024-01-01

## 2024-01-01 RX ORDER — OXYCODONE HCL 5 MG/5 ML
5 SOLUTION, ORAL ORAL EVERY 6 HOURS SCHEDULED
Status: CANCELLED | OUTPATIENT
Start: 2024-01-01

## 2024-01-01 RX ORDER — MIDAZOLAM HYDROCHLORIDE 2 MG/2ML
INJECTION, SOLUTION INTRAMUSCULAR; INTRAVENOUS
Status: DISPENSED
Start: 2024-01-01 | End: 2024-01-01

## 2024-01-01 RX ORDER — LEVETIRACETAM 500 MG/5ML
500 INJECTION, SOLUTION, CONCENTRATE INTRAVENOUS EVERY 12 HOURS SCHEDULED
Status: DISCONTINUED | OUTPATIENT
Start: 2024-01-01 | End: 2024-01-01

## 2024-01-01 RX ORDER — SODIUM CHLORIDE, SODIUM GLUCONATE, SODIUM ACETATE, POTASSIUM CHLORIDE, MAGNESIUM CHLORIDE, SODIUM PHOSPHATE, DIBASIC, AND POTASSIUM PHOSPHATE .53; .5; .37; .037; .03; .012; .00082 G/100ML; G/100ML; G/100ML; G/100ML; G/100ML; G/100ML; G/100ML
75 INJECTION, SOLUTION INTRAVENOUS CONTINUOUS
Status: DISCONTINUED | OUTPATIENT
Start: 2024-01-01 | End: 2024-01-01

## 2024-01-01 RX ORDER — LORAZEPAM 2 MG/ML
0.5 INJECTION INTRAMUSCULAR ONCE
Status: COMPLETED | OUTPATIENT
Start: 2024-01-01 | End: 2024-01-01

## 2024-01-01 RX ORDER — PROPOFOL 10 MG/ML
150 INJECTION, EMULSION INTRAVENOUS CONTINUOUS
Status: DISCONTINUED | OUTPATIENT
Start: 2024-01-01 | End: 2024-01-01

## 2024-01-01 RX ORDER — GINSENG 100 MG
1 CAPSULE ORAL 2 TIMES DAILY
Status: DISCONTINUED | OUTPATIENT
Start: 2024-01-01 | End: 2024-01-01

## 2024-01-01 RX ORDER — BISACODYL 10 MG
10 SUPPOSITORY, RECTAL RECTAL
Status: DISCONTINUED | OUTPATIENT
Start: 2024-01-01 | End: 2024-01-01

## 2024-01-01 RX ORDER — FUROSEMIDE 10 MG/ML
40 INJECTION INTRAMUSCULAR; INTRAVENOUS ONCE
Status: DISCONTINUED | OUTPATIENT
Start: 2024-01-01 | End: 2024-01-01

## 2024-01-01 RX ORDER — INSULIN LISPRO 100 [IU]/ML
1-6 INJECTION, SOLUTION INTRAVENOUS; SUBCUTANEOUS
Status: DISCONTINUED | OUTPATIENT
Start: 2024-01-01 | End: 2024-01-01

## 2024-01-01 RX ORDER — METOPROLOL TARTRATE 1 MG/ML
5 INJECTION, SOLUTION INTRAVENOUS ONCE
Status: COMPLETED | OUTPATIENT
Start: 2024-01-01 | End: 2024-01-01

## 2024-01-01 RX ORDER — LEVETIRACETAM 500 MG/1
2000 TABLET ORAL EVERY 12 HOURS SCHEDULED
Status: DISCONTINUED | OUTPATIENT
Start: 2024-01-01 | End: 2024-01-01 | Stop reason: SDUPTHER

## 2024-01-01 RX ORDER — ALBUMIN HUMAN 50 G/1000ML
12.5 SOLUTION INTRAVENOUS ONCE
Status: COMPLETED | OUTPATIENT
Start: 2024-01-01 | End: 2024-01-01

## 2024-01-01 RX ORDER — DEXTROSE, SODIUM CHLORIDE, SODIUM LACTATE, POTASSIUM CHLORIDE, AND CALCIUM CHLORIDE 5; .6; .31; .03; .02 G/100ML; G/100ML; G/100ML; G/100ML; G/100ML
100 INJECTION, SOLUTION INTRAVENOUS CONTINUOUS
Status: DISCONTINUED | OUTPATIENT
Start: 2024-01-01 | End: 2024-01-01

## 2024-01-01 RX ORDER — LIDOCAINE HYDROCHLORIDE 10 MG/ML
INJECTION, SOLUTION EPIDURAL; INFILTRATION; INTRACAUDAL; PERINEURAL
Status: COMPLETED
Start: 2024-01-01 | End: 2024-01-01

## 2024-01-01 RX ORDER — AMOXICILLIN 250 MG
1 CAPSULE ORAL 2 TIMES DAILY
Status: DISCONTINUED | OUTPATIENT
Start: 2024-01-01 | End: 2024-01-01

## 2024-01-01 RX ORDER — LEVETIRACETAM 500 MG/5ML
4440 INJECTION, SOLUTION, CONCENTRATE INTRAVENOUS EVERY 12 HOURS SCHEDULED
Status: DISCONTINUED | OUTPATIENT
Start: 2024-01-01 | End: 2024-01-01

## 2024-01-01 RX ORDER — INSULIN LISPRO 100 [IU]/ML
1-6 INJECTION, SOLUTION INTRAVENOUS; SUBCUTANEOUS EVERY 6 HOURS SCHEDULED
Status: DISCONTINUED | OUTPATIENT
Start: 2024-01-01 | End: 2024-01-01

## 2024-01-01 RX ADMIN — CEFAZOLIN SODIUM 2000 MG: 2 SOLUTION INTRAVENOUS at 07:08

## 2024-01-01 RX ADMIN — LEVETIRACETAM 2000 MG: 100 SOLUTION ORAL at 20:19

## 2024-01-01 RX ADMIN — ENOXAPARIN SODIUM 30 MG: 30 INJECTION SUBCUTANEOUS at 21:27

## 2024-01-01 RX ADMIN — LEVALBUTEROL HYDROCHLORIDE 1.25 MG: 1.25 SOLUTION RESPIRATORY (INHALATION) at 19:53

## 2024-01-01 RX ADMIN — FENTANYL CITRATE 50 MCG: 50 INJECTION INTRAMUSCULAR; INTRAVENOUS at 15:00

## 2024-01-01 RX ADMIN — ENOXAPARIN SODIUM 30 MG: 30 INJECTION SUBCUTANEOUS at 21:00

## 2024-01-01 RX ADMIN — VANCOMYCIN HYDROCHLORIDE 1750 MG: 1 INJECTION, POWDER, LYOPHILIZED, FOR SOLUTION INTRAVENOUS at 02:58

## 2024-01-01 RX ADMIN — CLOBAZAM 5 MG: 10 TABLET ORAL at 21:06

## 2024-01-01 RX ADMIN — GLYCERIN 1 DROP: .002; .002; .01 SOLUTION/ DROPS OPHTHALMIC at 08:12

## 2024-01-01 RX ADMIN — SODIUM CHLORIDE, SODIUM GLUCONATE, SODIUM ACETATE, POTASSIUM CHLORIDE, MAGNESIUM CHLORIDE, SODIUM PHOSPHATE, DIBASIC, AND POTASSIUM PHOSPHATE 20 ML/HR: .53; .5; .37; .037; .03; .012; .00082 INJECTION, SOLUTION INTRAVENOUS at 21:56

## 2024-01-01 RX ADMIN — Medication 20 MG: at 08:58

## 2024-01-01 RX ADMIN — FENTANYL CITRATE 50 MCG: 50 INJECTION INTRAMUSCULAR; INTRAVENOUS at 02:14

## 2024-01-01 RX ADMIN — INSULIN GLARGINE 25 UNITS: 100 INJECTION, SOLUTION SUBCUTANEOUS at 21:21

## 2024-01-01 RX ADMIN — INSULIN LISPRO 5 UNITS: 100 INJECTION, SOLUTION INTRAVENOUS; SUBCUTANEOUS at 12:18

## 2024-01-01 RX ADMIN — FENTANYL CITRATE 50 MCG: 50 INJECTION INTRAMUSCULAR; INTRAVENOUS at 22:29

## 2024-01-01 RX ADMIN — NICOTINE 21 MG: 21 PATCH, EXTENDED RELEASE TRANSDERMAL at 08:17

## 2024-01-01 RX ADMIN — Medication 15 ML: at 08:11

## 2024-01-01 RX ADMIN — LABETALOL HYDROCHLORIDE 10 MG: 5 INJECTION, SOLUTION INTRAVENOUS at 20:10

## 2024-01-01 RX ADMIN — NICARDIPINE HYDROCHLORIDE 7.5 MG/HR: 2.5 INJECTION, SOLUTION INTRAVENOUS at 18:58

## 2024-01-01 RX ADMIN — BACITRACIN 1 LARGE APPLICATION: 500 OINTMENT TOPICAL at 08:06

## 2024-01-01 RX ADMIN — CHLORHEXIDINE GLUCONATE 15 ML: 1.2 RINSE ORAL at 20:53

## 2024-01-01 RX ADMIN — POTASSIUM PHOSPHATE, MONOBASIC AND POTASSIUM PHOSPHATE, DIBASIC 30 MMOL: 224; 236 INJECTION, SOLUTION, CONCENTRATE INTRAVENOUS at 11:33

## 2024-01-01 RX ADMIN — FENTANYL CITRATE 50 MCG: 50 INJECTION INTRAMUSCULAR; INTRAVENOUS at 19:42

## 2024-01-01 RX ADMIN — PROPOFOL 10 MCG/KG/MIN: 10 INJECTION, EMULSION INTRAVENOUS at 14:21

## 2024-01-01 RX ADMIN — INSULIN LISPRO 3 UNITS: 100 INJECTION, SOLUTION INTRAVENOUS; SUBCUTANEOUS at 05:49

## 2024-01-01 RX ADMIN — CHLORHEXIDINE GLUCONATE 15 ML: 1.2 RINSE ORAL at 21:00

## 2024-01-01 RX ADMIN — ROCURONIUM BROMIDE 72 MG: 10 INJECTION INTRAVENOUS at 14:05

## 2024-01-01 RX ADMIN — FENTANYL CITRATE 50 MCG: 50 INJECTION INTRAMUSCULAR; INTRAVENOUS at 04:04

## 2024-01-01 RX ADMIN — ACETYLCYSTEINE 600 MG: 200 SOLUTION ORAL; RESPIRATORY (INHALATION) at 20:22

## 2024-01-01 RX ADMIN — ACETYLCYSTEINE 600 MG: 200 SOLUTION ORAL; RESPIRATORY (INHALATION) at 01:17

## 2024-01-01 RX ADMIN — BACITRACIN 1 LARGE APPLICATION: 500 OINTMENT TOPICAL at 17:09

## 2024-01-01 RX ADMIN — FENTANYL CITRATE 50 MCG: 50 INJECTION INTRAMUSCULAR; INTRAVENOUS at 05:53

## 2024-01-01 RX ADMIN — ALBUTEROL SULFATE 2.5 MG: 2.5 SOLUTION RESPIRATORY (INHALATION) at 13:11

## 2024-01-01 RX ADMIN — ENOXAPARIN SODIUM 30 MG: 30 INJECTION SUBCUTANEOUS at 21:31

## 2024-01-01 RX ADMIN — ACETAMINOPHEN 650 MG: 650 SUSPENSION ORAL at 20:07

## 2024-01-01 RX ADMIN — MIDAZOLAM 9 MG/HR: 5 INJECTION INTRAMUSCULAR; INTRAVENOUS at 18:14

## 2024-01-01 RX ADMIN — INSULIN GLARGINE 25 UNITS: 100 INJECTION, SOLUTION SUBCUTANEOUS at 21:27

## 2024-01-01 RX ADMIN — CEFAZOLIN SODIUM 2000 MG: 2 SOLUTION INTRAVENOUS at 21:21

## 2024-01-01 RX ADMIN — LORAZEPAM 2 MG: 2 INJECTION INTRAMUSCULAR; INTRAVENOUS at 23:15

## 2024-01-01 RX ADMIN — CHLORHEXIDINE GLUCONATE 15 ML: 1.2 RINSE ORAL at 08:06

## 2024-01-01 RX ADMIN — ACETAMINOPHEN 650 MG: 650 SUSPENSION ORAL at 21:01

## 2024-01-01 RX ADMIN — FENTANYL CITRATE 50 MCG: 50 INJECTION INTRAMUSCULAR; INTRAVENOUS at 03:15

## 2024-01-01 RX ADMIN — CEFEPIME 2000 MG: 2 INJECTION, POWDER, FOR SOLUTION INTRAVENOUS at 06:25

## 2024-01-01 RX ADMIN — ASPIRIN 81 MG: 81 TABLET, COATED ORAL at 13:08

## 2024-01-01 RX ADMIN — OXYCODONE HYDROCHLORIDE 5 MG: 5 SOLUTION ORAL at 05:46

## 2024-01-01 RX ADMIN — INSULIN LISPRO 1 UNITS: 100 INJECTION, SOLUTION INTRAVENOUS; SUBCUTANEOUS at 13:14

## 2024-01-01 RX ADMIN — ACETAMINOPHEN 975 MG: 325 TABLET ORAL at 13:00

## 2024-01-01 RX ADMIN — ALTEPLASE 2 MG: 2.2 INJECTION, POWDER, LYOPHILIZED, FOR SOLUTION INTRAVENOUS at 21:36

## 2024-01-01 RX ADMIN — Medication 100 MG: at 08:05

## 2024-01-01 RX ADMIN — EPOPROSTENOL 50 NG/KG/MIN: 1.5 INJECTION, POWDER, LYOPHILIZED, FOR SOLUTION INTRAVENOUS at 03:16

## 2024-01-01 RX ADMIN — CLOBAZAM 5 MG: 10 TABLET ORAL at 20:02

## 2024-01-01 RX ADMIN — INSULIN GLARGINE 10 UNITS: 100 INJECTION, SOLUTION SUBCUTANEOUS at 14:01

## 2024-01-01 RX ADMIN — LABETALOL HYDROCHLORIDE 10 MG: 5 INJECTION, SOLUTION INTRAVENOUS at 07:22

## 2024-01-01 RX ADMIN — EPOPROSTENOL 50 NG/KG/MIN: 1.5 INJECTION, POWDER, LYOPHILIZED, FOR SOLUTION INTRAVENOUS at 03:13

## 2024-01-01 RX ADMIN — ALBUTEROL SULFATE 2.5 MG: 2.5 SOLUTION RESPIRATORY (INHALATION) at 19:34

## 2024-01-01 RX ADMIN — NICOTINE 21 MG: 21 PATCH, EXTENDED RELEASE TRANSDERMAL at 08:27

## 2024-01-01 RX ADMIN — Medication 25 MCG/HR: at 20:38

## 2024-01-01 RX ADMIN — PROPOFOL 50 MCG/KG/MIN: 10 INJECTION, EMULSION INTRAVENOUS at 23:34

## 2024-01-01 RX ADMIN — ACETAMINOPHEN 650 MG: 650 SUSPENSION ORAL at 16:01

## 2024-01-01 RX ADMIN — FENTANYL CITRATE 50 MCG: 50 INJECTION INTRAMUSCULAR; INTRAVENOUS at 05:24

## 2024-01-01 RX ADMIN — FENTANYL CITRATE 50 MCG: 50 INJECTION INTRAMUSCULAR; INTRAVENOUS at 08:26

## 2024-01-01 RX ADMIN — SODIUM CHLORIDE, SODIUM GLUCONATE, SODIUM ACETATE, POTASSIUM CHLORIDE, MAGNESIUM CHLORIDE, SODIUM PHOSPHATE, DIBASIC, AND POTASSIUM PHOSPHATE 500 ML: .53; .5; .37; .037; .03; .012; .00082 INJECTION, SOLUTION INTRAVENOUS at 05:48

## 2024-01-01 RX ADMIN — ACETAMINOPHEN 650 MG: 650 SUSPENSION ORAL at 15:46

## 2024-01-01 RX ADMIN — LACOSAMIDE ORAL SOLUTION 200 MG: 10 SOLUTION ORAL at 20:01

## 2024-01-01 RX ADMIN — ACETYLCYSTEINE 600 MG: 200 SOLUTION ORAL; RESPIRATORY (INHALATION) at 03:01

## 2024-01-01 RX ADMIN — GLYCERIN 1 DROP: .002; .002; .01 SOLUTION/ DROPS OPHTHALMIC at 17:57

## 2024-01-01 RX ADMIN — IOHEXOL 100 ML: 350 INJECTION, SOLUTION INTRAVENOUS at 09:14

## 2024-01-01 RX ADMIN — ATORVASTATIN CALCIUM 10 MG: 10 TABLET, FILM COATED ORAL at 15:32

## 2024-01-01 RX ADMIN — OXYCODONE HYDROCHLORIDE 5 MG: 5 SOLUTION ORAL at 00:30

## 2024-01-01 RX ADMIN — CEFAZOLIN SODIUM 2000 MG: 2 SOLUTION INTRAVENOUS at 05:12

## 2024-01-01 RX ADMIN — ACETAMINOPHEN 650 MG: 650 SUSPENSION ORAL at 03:20

## 2024-01-01 RX ADMIN — ACETAMINOPHEN 650 MG: 650 SUSPENSION ORAL at 04:02

## 2024-01-01 RX ADMIN — FENTANYL CITRATE 50 MCG: 50 INJECTION INTRAMUSCULAR; INTRAVENOUS at 08:33

## 2024-01-01 RX ADMIN — ATORVASTATIN CALCIUM 10 MG: 10 TABLET, FILM COATED ORAL at 15:34

## 2024-01-01 RX ADMIN — ALBUTEROL SULFATE 2.5 MG: 2.5 SOLUTION RESPIRATORY (INHALATION) at 20:22

## 2024-01-01 RX ADMIN — METRONIDAZOLE 500 MG: 500 INJECTION, SOLUTION INTRAVENOUS at 12:00

## 2024-01-01 RX ADMIN — ACETYLCYSTEINE 600 MG: 200 SOLUTION ORAL; RESPIRATORY (INHALATION) at 07:34

## 2024-01-01 RX ADMIN — Medication 100 MG: at 08:06

## 2024-01-01 RX ADMIN — Medication 100 MG: at 09:06

## 2024-01-01 RX ADMIN — DEXTROSE AND SODIUM CHLORIDE 125 ML/HR: 5; .9 INJECTION, SOLUTION INTRAVENOUS at 22:59

## 2024-01-01 RX ADMIN — SODIUM CHLORIDE 6 UNITS/HR: 9 INJECTION, SOLUTION INTRAVENOUS at 07:18

## 2024-01-01 RX ADMIN — OXYCODONE HYDROCHLORIDE 5 MG: 5 SOLUTION ORAL at 05:45

## 2024-01-01 RX ADMIN — HYDRALAZINE HYDROCHLORIDE 10 MG: 20 INJECTION, SOLUTION INTRAMUSCULAR; INTRAVENOUS at 02:19

## 2024-01-01 RX ADMIN — ATORVASTATIN CALCIUM 10 MG: 10 TABLET, FILM COATED ORAL at 17:39

## 2024-01-01 RX ADMIN — NICARDIPINE HYDROCHLORIDE 7.5 MG/HR: 2.5 INJECTION, SOLUTION INTRAVENOUS at 17:05

## 2024-01-01 RX ADMIN — Medication 15 ML: at 08:08

## 2024-01-01 RX ADMIN — ACETAMINOPHEN 1000 MG: 10 INJECTION INTRAVENOUS at 23:49

## 2024-01-01 RX ADMIN — Medication 50 MCG/HR: at 19:04

## 2024-01-01 RX ADMIN — GLYCERIN 1 DROP: .002; .002; .01 SOLUTION/ DROPS OPHTHALMIC at 08:09

## 2024-01-01 RX ADMIN — GLYCERIN 1 DROP: .002; .002; .01 SOLUTION/ DROPS OPHTHALMIC at 18:17

## 2024-01-01 RX ADMIN — LEVETIRACETAM 2000 MG: 100 SOLUTION ORAL at 20:02

## 2024-01-01 RX ADMIN — PROPOFOL 50 MCG/KG/MIN: 10 INJECTION, EMULSION INTRAVENOUS at 14:51

## 2024-01-01 RX ADMIN — FOLIC ACID 1 MG: 1 TABLET ORAL at 09:06

## 2024-01-01 RX ADMIN — SODIUM CHLORIDE SOLN NEBU 3% 4 ML: 3 NEBU SOLN at 14:18

## 2024-01-01 RX ADMIN — METOROPROLOL TARTRATE 5 MG: 5 INJECTION, SOLUTION INTRAVENOUS at 18:00

## 2024-01-01 RX ADMIN — CALCIUM CHLORIDE 1 G: 100 INJECTION PARENTERAL at 09:20

## 2024-01-01 RX ADMIN — ACETAMINOPHEN 650 MG: 650 SUSPENSION ORAL at 09:36

## 2024-01-01 RX ADMIN — CHLORHEXIDINE GLUCONATE 15 ML: 1.2 RINSE ORAL at 08:05

## 2024-01-01 RX ADMIN — POTASSIUM PHOSPHATE, MONOBASIC AND POTASSIUM PHOSPHATE, DIBASIC 30 MMOL: 224; 236 INJECTION, SOLUTION, CONCENTRATE INTRAVENOUS at 07:47

## 2024-01-01 RX ADMIN — BACITRACIN 1 LARGE APPLICATION: 500 OINTMENT TOPICAL at 19:06

## 2024-01-01 RX ADMIN — LACOSAMIDE ORAL SOLUTION 200 MG: 10 SOLUTION ORAL at 08:19

## 2024-01-01 RX ADMIN — BACITRACIN 1 LARGE APPLICATION: 500 OINTMENT TOPICAL at 08:12

## 2024-01-01 RX ADMIN — DEXMEDETOMIDINE HYDROCHLORIDE 0.2 MCG/KG/HR: 4 INJECTION, SOLUTION INTRAVENOUS at 08:35

## 2024-01-01 RX ADMIN — SODIUM CHLORIDE 1250 MG: 0.9 INJECTION, SOLUTION INTRAVENOUS at 03:20

## 2024-01-01 RX ADMIN — CHLORHEXIDINE GLUCONATE 15 ML: 1.2 RINSE ORAL at 20:06

## 2024-01-01 RX ADMIN — PIPERACILLIN SODIUM AND TAZOBACTAM SODIUM 4.5 G: 36; 4.5 INJECTION, POWDER, LYOPHILIZED, FOR SOLUTION INTRAVENOUS at 06:04

## 2024-01-01 RX ADMIN — HYDRALAZINE HYDROCHLORIDE 10 MG: 20 INJECTION, SOLUTION INTRAMUSCULAR; INTRAVENOUS at 07:09

## 2024-01-01 RX ADMIN — LEVETIRACETAM 2000 MG: 100 INJECTION, SOLUTION INTRAVENOUS at 20:05

## 2024-01-01 RX ADMIN — FENTANYL CITRATE 50 MCG: 50 INJECTION INTRAMUSCULAR; INTRAVENOUS at 16:20

## 2024-01-01 RX ADMIN — ENOXAPARIN SODIUM 30 MG: 30 INJECTION SUBCUTANEOUS at 09:08

## 2024-01-01 RX ADMIN — HYDROMORPHONE HYDROCHLORIDE 0.2 MG: 0.2 INJECTION, SOLUTION INTRAMUSCULAR; INTRAVENOUS; SUBCUTANEOUS at 21:31

## 2024-01-01 RX ADMIN — Medication 15 ML: at 08:22

## 2024-01-01 RX ADMIN — INSULIN LISPRO 1 UNITS: 100 INJECTION, SOLUTION INTRAVENOUS; SUBCUTANEOUS at 00:16

## 2024-01-01 RX ADMIN — CHLORHEXIDINE GLUCONATE 15 ML: 1.2 RINSE ORAL at 08:22

## 2024-01-01 RX ADMIN — ACETAMINOPHEN 975 MG: 325 TABLET ORAL at 13:08

## 2024-01-01 RX ADMIN — DEXMEDETOMIDINE HYDROCHLORIDE 0.5 MCG/KG/HR: 4 INJECTION, SOLUTION INTRAVENOUS at 05:50

## 2024-01-01 RX ADMIN — NICARDIPINE HYDROCHLORIDE 2.5 MG/HR: 2.5 INJECTION, SOLUTION INTRAVENOUS at 21:56

## 2024-01-01 RX ADMIN — PIPERACILLIN SODIUM AND TAZOBACTAM SODIUM 4.5 G: 36; 4.5 INJECTION, POWDER, LYOPHILIZED, FOR SOLUTION INTRAVENOUS at 21:47

## 2024-01-01 RX ADMIN — NICARDIPINE HYDROCHLORIDE 10 MG/HR: 2.5 INJECTION, SOLUTION INTRAVENOUS at 04:05

## 2024-01-01 RX ADMIN — PIPERACILLIN SODIUM AND TAZOBACTAM SODIUM 4.5 G: 36; 4.5 INJECTION, POWDER, LYOPHILIZED, FOR SOLUTION INTRAVENOUS at 11:00

## 2024-01-01 RX ADMIN — ALBUTEROL SULFATE 2.5 MG: 2.5 SOLUTION RESPIRATORY (INHALATION) at 07:43

## 2024-01-01 RX ADMIN — FENTANYL CITRATE 50 MCG: 50 INJECTION INTRAMUSCULAR; INTRAVENOUS at 16:00

## 2024-01-01 RX ADMIN — Medication 15 ML: at 09:00

## 2024-01-01 RX ADMIN — INSULIN LISPRO 2 UNITS: 100 INJECTION, SOLUTION INTRAVENOUS; SUBCUTANEOUS at 13:01

## 2024-01-01 RX ADMIN — LEVETIRACETAM 2000 MG: 100 SOLUTION ORAL at 11:59

## 2024-01-01 RX ADMIN — ACETAMINOPHEN 975 MG: 325 TABLET ORAL at 13:01

## 2024-01-01 RX ADMIN — CLOBAZAM 5 MG: 10 TABLET ORAL at 09:31

## 2024-01-01 RX ADMIN — PROPOFOL 50 MCG/KG/MIN: 10 INJECTION, EMULSION INTRAVENOUS at 00:41

## 2024-01-01 RX ADMIN — CHLORHEXIDINE GLUCONATE 15 ML: 1.2 RINSE ORAL at 08:17

## 2024-01-01 RX ADMIN — ENOXAPARIN SODIUM 30 MG: 30 INJECTION SUBCUTANEOUS at 22:14

## 2024-01-01 RX ADMIN — FOLIC ACID 1 MG: 1 TABLET ORAL at 11:37

## 2024-01-01 RX ADMIN — CALCIUM GLUCONATE 1 G: 20 INJECTION, SOLUTION INTRAVENOUS at 09:47

## 2024-01-01 RX ADMIN — CLOBAZAM 5 MG: 10 TABLET ORAL at 08:11

## 2024-01-01 RX ADMIN — ACETAMINOPHEN 325 MG: 650 SUSPENSION ORAL at 14:15

## 2024-01-01 RX ADMIN — ACETYLCYSTEINE 600 MG: 200 SOLUTION ORAL; RESPIRATORY (INHALATION) at 13:15

## 2024-01-01 RX ADMIN — PROPOFOL 50 MCG/KG/MIN: 10 INJECTION, EMULSION INTRAVENOUS at 22:03

## 2024-01-01 RX ADMIN — CLOBAZAM 5 MG: 10 TABLET ORAL at 15:27

## 2024-01-01 RX ADMIN — CLOBAZAM 5 MG: 10 TABLET ORAL at 15:34

## 2024-01-01 RX ADMIN — CHLORHEXIDINE GLUCONATE 15 ML: 1.2 RINSE ORAL at 08:59

## 2024-01-01 RX ADMIN — NICOTINE 21 MG: 21 PATCH, EXTENDED RELEASE TRANSDERMAL at 08:39

## 2024-01-01 RX ADMIN — LORAZEPAM 2 MG: 2 INJECTION INTRAMUSCULAR; INTRAVENOUS at 07:17

## 2024-01-01 RX ADMIN — CHLORHEXIDINE GLUCONATE 15 ML: 1.2 RINSE ORAL at 20:05

## 2024-01-01 RX ADMIN — FENTANYL CITRATE 50 MCG: 50 INJECTION INTRAMUSCULAR; INTRAVENOUS at 21:51

## 2024-01-01 RX ADMIN — NICARDIPINE HYDROCHLORIDE 7.5 MG/HR: 2.5 INJECTION, SOLUTION INTRAVENOUS at 20:47

## 2024-01-01 RX ADMIN — METHOCARBAMOL TABLETS 750 MG: 750 TABLET, COATED ORAL at 05:28

## 2024-01-01 RX ADMIN — FENTANYL CITRATE 50 MCG: 50 INJECTION INTRAMUSCULAR; INTRAVENOUS at 20:38

## 2024-01-01 RX ADMIN — DEXMEDETOMIDINE HYDROCHLORIDE 0.2 MCG/KG/HR: 4 INJECTION, SOLUTION INTRAVENOUS at 07:30

## 2024-01-01 RX ADMIN — ACETAMINOPHEN 650 MG: 650 SUSPENSION ORAL at 15:30

## 2024-01-01 RX ADMIN — Medication 25 MCG/HR: at 05:03

## 2024-01-01 RX ADMIN — ENOXAPARIN SODIUM 30 MG: 30 INJECTION SUBCUTANEOUS at 21:01

## 2024-01-01 RX ADMIN — LEVALBUTEROL HYDROCHLORIDE 1.25 MG: 1.25 SOLUTION RESPIRATORY (INHALATION) at 14:17

## 2024-01-01 RX ADMIN — INSULIN LISPRO 8 UNITS: 100 INJECTION, SOLUTION INTRAVENOUS; SUBCUTANEOUS at 17:46

## 2024-01-01 RX ADMIN — OXYCODONE HYDROCHLORIDE 5 MG: 5 SOLUTION ORAL at 17:14

## 2024-01-01 RX ADMIN — INSULIN GLARGINE 25 UNITS: 100 INJECTION, SOLUTION SUBCUTANEOUS at 21:06

## 2024-01-01 RX ADMIN — PROPOFOL 144 MG: 10 INJECTION, EMULSION INTRAVENOUS at 16:00

## 2024-01-01 RX ADMIN — OXYCODONE HYDROCHLORIDE 5 MG: 5 SOLUTION ORAL at 23:46

## 2024-01-01 RX ADMIN — ACETAMINOPHEN 650 MG: 650 SUSPENSION ORAL at 21:06

## 2024-01-01 RX ADMIN — METHOCARBAMOL TABLETS 750 MG: 750 TABLET, COATED ORAL at 17:16

## 2024-01-01 RX ADMIN — DEXMEDETOMIDINE HYDROCHLORIDE 0.3 MCG/KG/HR: 4 INJECTION, SOLUTION INTRAVENOUS at 12:40

## 2024-01-01 RX ADMIN — PIPERACILLIN SODIUM AND TAZOBACTAM SODIUM 4.5 G: 36; 4.5 INJECTION, POWDER, LYOPHILIZED, FOR SOLUTION INTRAVENOUS at 22:14

## 2024-01-01 RX ADMIN — LACOSAMIDE ORAL SOLUTION 200 MG: 10 SOLUTION ORAL at 20:19

## 2024-01-01 RX ADMIN — PROPOFOL 50 MCG/KG/MIN: 10 INJECTION, EMULSION INTRAVENOUS at 13:07

## 2024-01-01 RX ADMIN — INSULIN LISPRO 12 UNITS: 100 INJECTION, SOLUTION INTRAVENOUS; SUBCUTANEOUS at 00:05

## 2024-01-01 RX ADMIN — FOLIC ACID 1 MG: 1 TABLET ORAL at 09:32

## 2024-01-01 RX ADMIN — Medication 100 MG: at 08:16

## 2024-01-01 RX ADMIN — BACITRACIN 1 LARGE APPLICATION: 500 OINTMENT TOPICAL at 17:44

## 2024-01-01 RX ADMIN — FOLIC ACID 1 MG: 1 TABLET ORAL at 09:57

## 2024-01-01 RX ADMIN — ACETYLCYSTEINE 600 MG: 200 SOLUTION ORAL; RESPIRATORY (INHALATION) at 02:39

## 2024-01-01 RX ADMIN — GLYCERIN 1 DROP: .002; .002; .01 SOLUTION/ DROPS OPHTHALMIC at 09:06

## 2024-01-01 RX ADMIN — MIDAZOLAM HYDROCHLORIDE 2 MG: 1 INJECTION, SOLUTION INTRAMUSCULAR; INTRAVENOUS at 13:47

## 2024-01-01 RX ADMIN — MIDAZOLAM 2 MG: 1 INJECTION INTRAMUSCULAR; INTRAVENOUS at 15:07

## 2024-01-01 RX ADMIN — OXYCODONE HYDROCHLORIDE 5 MG: 5 TABLET ORAL at 15:52

## 2024-01-01 RX ADMIN — ATORVASTATIN CALCIUM 10 MG: 10 TABLET, FILM COATED ORAL at 15:30

## 2024-01-01 RX ADMIN — INSULIN LISPRO 16 UNITS: 100 INJECTION, SOLUTION INTRAVENOUS; SUBCUTANEOUS at 23:55

## 2024-01-01 RX ADMIN — ENOXAPARIN SODIUM 30 MG: 30 INJECTION SUBCUTANEOUS at 09:10

## 2024-01-01 RX ADMIN — PIPERACILLIN SODIUM AND TAZOBACTAM SODIUM 4.5 G: 36; 4.5 INJECTION, POWDER, LYOPHILIZED, FOR SOLUTION INTRAVENOUS at 14:14

## 2024-01-01 RX ADMIN — LIDOCAINE HYDROCHLORIDE 300 MG: 10 INJECTION, SOLUTION EPIDURAL; INFILTRATION; INTRACAUDAL; PERINEURAL at 17:40

## 2024-01-01 RX ADMIN — CHLORHEXIDINE GLUCONATE 15 ML: 1.2 RINSE ORAL at 21:01

## 2024-01-01 RX ADMIN — NICOTINE 21 MG: 21 PATCH, EXTENDED RELEASE TRANSDERMAL at 08:07

## 2024-01-01 RX ADMIN — PANTOPRAZOLE SODIUM 40 MG: 40 TABLET, DELAYED RELEASE ORAL at 05:28

## 2024-01-01 RX ADMIN — INSULIN LISPRO 12 UNITS: 100 INJECTION, SOLUTION INTRAVENOUS; SUBCUTANEOUS at 01:32

## 2024-01-01 RX ADMIN — BACITRACIN 1 LARGE APPLICATION: 500 OINTMENT TOPICAL at 08:13

## 2024-01-01 RX ADMIN — NICARDIPINE HYDROCHLORIDE 9 MG/HR: 2.5 INJECTION, SOLUTION INTRAVENOUS at 06:09

## 2024-01-01 RX ADMIN — FOLIC ACID 1 MG: 1 TABLET ORAL at 08:16

## 2024-01-01 RX ADMIN — GLYCERIN 1 DROP: .002; .002; .01 SOLUTION/ DROPS OPHTHALMIC at 08:00

## 2024-01-01 RX ADMIN — LEVETIRACETAM 4440 MG: 100 INJECTION, SOLUTION INTRAVENOUS at 22:02

## 2024-01-01 RX ADMIN — LEVETIRACETAM 500 MG: 500 TABLET, FILM COATED ORAL at 15:00

## 2024-01-01 RX ADMIN — FENTANYL CITRATE 50 MCG: 50 INJECTION INTRAMUSCULAR; INTRAVENOUS at 02:55

## 2024-01-01 RX ADMIN — CEFAZOLIN SODIUM 2000 MG: 2 SOLUTION INTRAVENOUS at 05:45

## 2024-01-01 RX ADMIN — SENNOSIDES AND DOCUSATE SODIUM 1 TABLET: 50; 8.6 TABLET ORAL at 09:31

## 2024-01-01 RX ADMIN — INSULIN LISPRO 8 UNITS: 100 INJECTION, SOLUTION INTRAVENOUS; SUBCUTANEOUS at 06:10

## 2024-01-01 RX ADMIN — VANCOMYCIN HYDROCHLORIDE 1750 MG: 1 INJECTION, POWDER, LYOPHILIZED, FOR SOLUTION INTRAVENOUS at 15:14

## 2024-01-01 RX ADMIN — EPOPROSTENOL 50 NG/KG/MIN: 1.5 INJECTION, POWDER, LYOPHILIZED, FOR SOLUTION INTRAVENOUS at 20:22

## 2024-01-01 RX ADMIN — FENTANYL CITRATE 50 MCG: 50 INJECTION INTRAMUSCULAR; INTRAVENOUS at 19:03

## 2024-01-01 RX ADMIN — PROPOFOL 50 MCG/KG/MIN: 10 INJECTION, EMULSION INTRAVENOUS at 15:14

## 2024-01-01 RX ADMIN — ALBUTEROL SULFATE 2.5 MG: 2.5 SOLUTION RESPIRATORY (INHALATION) at 14:05

## 2024-01-01 RX ADMIN — ALBUTEROL SULFATE 2.5 MG: 2.5 SOLUTION RESPIRATORY (INHALATION) at 07:36

## 2024-01-01 RX ADMIN — CHLORHEXIDINE GLUCONATE 15 ML: 1.2 RINSE ORAL at 20:19

## 2024-01-01 RX ADMIN — FENTANYL CITRATE 50 MCG: 50 INJECTION INTRAMUSCULAR; INTRAVENOUS at 01:13

## 2024-01-01 RX ADMIN — Medication 20 MG: at 08:18

## 2024-01-01 RX ADMIN — ACETAMINOPHEN 650 MG: 650 SUSPENSION ORAL at 08:46

## 2024-01-01 RX ADMIN — LEVETIRACETAM 2000 MG: 100 SOLUTION ORAL at 21:30

## 2024-01-01 RX ADMIN — NOREPINEPHRINE BITARTRATE 30 MCG/MIN: 1 INJECTION INTRAVENOUS at 11:06

## 2024-01-01 RX ADMIN — MULTIPLE VITAMINS W/ MINERALS TAB 1 TABLET: TAB ORAL at 08:02

## 2024-01-01 RX ADMIN — INSULIN LISPRO 2 UNITS: 100 INJECTION, SOLUTION INTRAVENOUS; SUBCUTANEOUS at 18:36

## 2024-01-01 RX ADMIN — METHOCARBAMOL TABLETS 750 MG: 750 TABLET, COATED ORAL at 17:34

## 2024-01-01 RX ADMIN — LEVETIRACETAM 2000 MG: 100 SOLUTION ORAL at 08:13

## 2024-01-01 RX ADMIN — MULTIPLE VITAMINS W/ MINERALS TAB 1 TABLET: TAB ORAL at 09:57

## 2024-01-01 RX ADMIN — ACETAMINOPHEN 650 MG: 650 SUSPENSION ORAL at 04:28

## 2024-01-01 RX ADMIN — ATORVASTATIN CALCIUM 10 MG: 10 TABLET, FILM COATED ORAL at 17:08

## 2024-01-01 RX ADMIN — LACOSAMIDE ORAL SOLUTION 200 MG: 10 SOLUTION ORAL at 12:47

## 2024-01-01 RX ADMIN — NICARDIPINE HYDROCHLORIDE 5 MG/HR: 2.5 INJECTION, SOLUTION INTRAVENOUS at 08:22

## 2024-01-01 RX ADMIN — Medication 100 MG: at 11:37

## 2024-01-01 RX ADMIN — GLYCERIN 1 DROP: .002; .002; .01 SOLUTION/ DROPS OPHTHALMIC at 17:09

## 2024-01-01 RX ADMIN — INSULIN LISPRO 12 UNITS: 100 INJECTION, SOLUTION INTRAVENOUS; SUBCUTANEOUS at 06:01

## 2024-01-01 RX ADMIN — HYDRALAZINE HYDROCHLORIDE 20 MG: 20 INJECTION INTRAMUSCULAR; INTRAVENOUS at 19:24

## 2024-01-01 RX ADMIN — POTASSIUM PHOSPHATE, MONOBASIC AND POTASSIUM PHOSPHATE, DIBASIC 30 MMOL: 224; 236 INJECTION, SOLUTION, CONCENTRATE INTRAVENOUS at 07:34

## 2024-01-01 RX ADMIN — PROPOFOL 50 MCG/KG/MIN: 10 INJECTION, EMULSION INTRAVENOUS at 03:03

## 2024-01-01 RX ADMIN — FOLIC ACID 1 MG: 1 TABLET ORAL at 08:06

## 2024-01-01 RX ADMIN — ENOXAPARIN SODIUM 30 MG: 30 INJECTION SUBCUTANEOUS at 21:21

## 2024-01-01 RX ADMIN — LACOSAMIDE ORAL SOLUTION 200 MG: 10 SOLUTION ORAL at 21:00

## 2024-01-01 RX ADMIN — LEVETIRACETAM 2000 MG: 100 SOLUTION ORAL at 08:31

## 2024-01-01 RX ADMIN — LABETALOL HYDROCHLORIDE 10 MG: 5 INJECTION, SOLUTION INTRAVENOUS at 01:53

## 2024-01-01 RX ADMIN — PROPOFOL 50 MCG/KG/MIN: 10 INJECTION, EMULSION INTRAVENOUS at 09:08

## 2024-01-01 RX ADMIN — SODIUM CHLORIDE, SODIUM LACTATE, POTASSIUM CHLORIDE, AND CALCIUM CHLORIDE 1000 ML: .6; .31; .03; .02 INJECTION, SOLUTION INTRAVENOUS at 18:02

## 2024-01-01 RX ADMIN — NICOTINE 14 MG: 14 PATCH, EXTENDED RELEASE TRANSDERMAL at 08:20

## 2024-01-01 RX ADMIN — OXYCODONE HYDROCHLORIDE 5 MG: 5 SOLUTION ORAL at 12:05

## 2024-01-01 RX ADMIN — HYDRALAZINE HYDROCHLORIDE 20 MG: 20 INJECTION INTRAMUSCULAR; INTRAVENOUS at 08:21

## 2024-01-01 RX ADMIN — LACOSAMIDE ORAL SOLUTION 200 MG: 10 SOLUTION ORAL at 20:31

## 2024-01-01 RX ADMIN — LACOSAMIDE ORAL SOLUTION 200 MG: 10 SOLUTION ORAL at 08:28

## 2024-01-01 RX ADMIN — QUETIAPINE FUMARATE 12.5 MG: 25 TABLET ORAL at 21:00

## 2024-01-01 RX ADMIN — ACETAMINOPHEN 650 MG: 650 SUSPENSION ORAL at 09:29

## 2024-01-01 RX ADMIN — METHOCARBAMOL TABLETS 750 MG: 750 TABLET, COATED ORAL at 13:00

## 2024-01-01 RX ADMIN — GLYCERIN 1 DROP: .002; .002; .01 SOLUTION/ DROPS OPHTHALMIC at 17:40

## 2024-01-01 RX ADMIN — CLOBAZAM 5 MG: 10 TABLET ORAL at 21:01

## 2024-01-01 RX ADMIN — CHLORHEXIDINE GLUCONATE 15 ML: 1.2 RINSE ORAL at 21:30

## 2024-01-01 RX ADMIN — PROPOFOL 50 MCG/KG/MIN: 10 INJECTION, EMULSION INTRAVENOUS at 16:20

## 2024-01-01 RX ADMIN — EPOPROSTENOL 50 NG/KG/MIN: 1.5 INJECTION, POWDER, LYOPHILIZED, FOR SOLUTION INTRAVENOUS at 01:14

## 2024-01-01 RX ADMIN — LACOSAMIDE ORAL SOLUTION 200 MG: 10 SOLUTION ORAL at 09:31

## 2024-01-01 RX ADMIN — ACETYLCYSTEINE 600 MG: 200 SOLUTION ORAL; RESPIRATORY (INHALATION) at 07:43

## 2024-01-01 RX ADMIN — SODIUM CHLORIDE 4 UNITS/HR: 9 INJECTION, SOLUTION INTRAVENOUS at 08:37

## 2024-01-01 RX ADMIN — Medication 100 MG: at 08:02

## 2024-01-01 RX ADMIN — CLOBAZAM 5 MG: 10 TABLET ORAL at 09:00

## 2024-01-01 RX ADMIN — NICARDIPINE HYDROCHLORIDE 12.5 MG/HR: 2.5 INJECTION, SOLUTION INTRAVENOUS at 09:29

## 2024-01-01 RX ADMIN — OXYCODONE HYDROCHLORIDE 5 MG: 5 SOLUTION ORAL at 17:09

## 2024-01-01 RX ADMIN — Medication 20 MG: at 11:34

## 2024-01-01 RX ADMIN — LEVALBUTEROL HYDROCHLORIDE 1.25 MG: 1.25 SOLUTION RESPIRATORY (INHALATION) at 13:15

## 2024-01-01 RX ADMIN — EPOPROSTENOL 50 NG/KG/MIN: 1.5 INJECTION, POWDER, LYOPHILIZED, FOR SOLUTION INTRAVENOUS at 14:29

## 2024-01-01 RX ADMIN — FOSPHENYTOIN SODIUM 1425 MG PE: 50 INJECTION, SOLUTION INTRAMUSCULAR; INTRAVENOUS at 11:23

## 2024-01-01 RX ADMIN — BACITRACIN 1 LARGE APPLICATION: 500 OINTMENT TOPICAL at 08:23

## 2024-01-01 RX ADMIN — GLYCERIN 1 DROP: .002; .002; .01 SOLUTION/ DROPS OPHTHALMIC at 17:44

## 2024-01-01 RX ADMIN — CHLORHEXIDINE GLUCONATE 15 ML: 1.2 RINSE ORAL at 21:07

## 2024-01-01 RX ADMIN — PIPERACILLIN SODIUM AND TAZOBACTAM SODIUM 4.5 G: 36; 4.5 INJECTION, POWDER, LYOPHILIZED, FOR SOLUTION INTRAVENOUS at 05:46

## 2024-01-01 RX ADMIN — ACETAMINOPHEN 650 MG: 650 SUSPENSION ORAL at 21:24

## 2024-01-01 RX ADMIN — LEVETIRACETAM 2000 MG: 100 SOLUTION ORAL at 20:46

## 2024-01-01 RX ADMIN — PIPERACILLIN SODIUM AND TAZOBACTAM SODIUM 4.5 G: 36; 4.5 INJECTION, POWDER, LYOPHILIZED, FOR SOLUTION INTRAVENOUS at 15:14

## 2024-01-01 RX ADMIN — ALBUTEROL SULFATE 2.5 MG: 2.5 SOLUTION RESPIRATORY (INHALATION) at 02:39

## 2024-01-01 RX ADMIN — OXYCODONE HYDROCHLORIDE 5 MG: 5 SOLUTION ORAL at 00:21

## 2024-01-01 RX ADMIN — CLOBAZAM 5 MG: 10 TABLET ORAL at 20:46

## 2024-01-01 RX ADMIN — FOLIC ACID 1 MG: 1 TABLET ORAL at 08:05

## 2024-01-01 RX ADMIN — ACETAMINOPHEN 975 MG: 325 TABLET ORAL at 21:23

## 2024-01-01 RX ADMIN — PROPOFOL 50 MCG/KG/MIN: 10 INJECTION, EMULSION INTRAVENOUS at 20:29

## 2024-01-01 RX ADMIN — CLOBAZAM 5 MG: 10 TABLET ORAL at 11:34

## 2024-01-01 RX ADMIN — NICARDIPINE HYDROCHLORIDE 5 MG/HR: 2.5 INJECTION, SOLUTION INTRAVENOUS at 03:30

## 2024-01-01 RX ADMIN — LACOSAMIDE ORAL SOLUTION 200 MG: 10 SOLUTION ORAL at 08:11

## 2024-01-01 RX ADMIN — FENTANYL CITRATE 50 MCG: 50 INJECTION INTRAMUSCULAR; INTRAVENOUS at 01:44

## 2024-01-01 RX ADMIN — GLYCERIN 1 DROP: .002; .002; .01 SOLUTION/ DROPS OPHTHALMIC at 08:59

## 2024-01-01 RX ADMIN — METRONIDAZOLE 500 MG: 500 INJECTION, SOLUTION INTRAVENOUS at 17:47

## 2024-01-01 RX ADMIN — HYDRALAZINE HYDROCHLORIDE 10 MG: 20 INJECTION, SOLUTION INTRAMUSCULAR; INTRAVENOUS at 19:45

## 2024-01-01 RX ADMIN — METHOCARBAMOL TABLETS 750 MG: 750 TABLET, COATED ORAL at 00:51

## 2024-01-01 RX ADMIN — Medication 20 MG: at 08:05

## 2024-01-01 RX ADMIN — CLOBAZAM 5 MG: 10 TABLET ORAL at 17:39

## 2024-01-01 RX ADMIN — Medication 100 MG: at 09:57

## 2024-01-01 RX ADMIN — MAGNESIUM SULFATE HEPTAHYDRATE 2 G: 40 INJECTION, SOLUTION INTRAVENOUS at 07:34

## 2024-01-01 RX ADMIN — METHOCARBAMOL 750 MG: 100 INJECTION INTRAMUSCULAR; INTRAVENOUS at 06:10

## 2024-01-01 RX ADMIN — LACOSAMIDE ORAL SOLUTION 200 MG: 10 SOLUTION ORAL at 20:46

## 2024-01-01 RX ADMIN — ENOXAPARIN SODIUM 30 MG: 30 INJECTION SUBCUTANEOUS at 09:31

## 2024-01-01 RX ADMIN — NICARDIPINE HYDROCHLORIDE 7.5 MG/HR: 2.5 INJECTION, SOLUTION INTRAVENOUS at 13:11

## 2024-01-01 RX ADMIN — ACETAMINOPHEN 650 MG: 650 SUSPENSION ORAL at 15:34

## 2024-01-01 RX ADMIN — CEFAZOLIN SODIUM 2000 MG: 2 SOLUTION INTRAVENOUS at 14:03

## 2024-01-01 RX ADMIN — QUETIAPINE FUMARATE 12.5 MG: 25 TABLET ORAL at 21:23

## 2024-01-01 RX ADMIN — ACETAMINOPHEN 975 MG: 325 TABLET ORAL at 06:17

## 2024-01-01 RX ADMIN — LEVETIRACETAM 2000 MG: 100 SOLUTION ORAL at 21:02

## 2024-01-01 RX ADMIN — BACITRACIN 1 LARGE APPLICATION: 500 OINTMENT TOPICAL at 08:21

## 2024-01-01 RX ADMIN — OXYCODONE HYDROCHLORIDE 5 MG: 5 SOLUTION ORAL at 17:39

## 2024-01-01 RX ADMIN — Medication 15 ML: at 09:36

## 2024-01-01 RX ADMIN — CEFAZOLIN SODIUM 2000 MG: 2 SOLUTION INTRAVENOUS at 21:24

## 2024-01-01 RX ADMIN — NICARDIPINE HYDROCHLORIDE 7.5 MG/HR: 2.5 INJECTION, SOLUTION INTRAVENOUS at 22:36

## 2024-01-01 RX ADMIN — PROPOFOL 50 MCG/KG/MIN: 10 INJECTION, EMULSION INTRAVENOUS at 21:50

## 2024-01-01 RX ADMIN — CEFAZOLIN SODIUM 2000 MG: 2 SOLUTION INTRAVENOUS at 21:27

## 2024-01-01 RX ADMIN — MIDAZOLAM HYDROCHLORIDE 1 MG: 1 INJECTION, SOLUTION INTRAMUSCULAR; INTRAVENOUS at 02:49

## 2024-01-01 RX ADMIN — SENNOSIDES AND DOCUSATE SODIUM 1 TABLET: 8.6; 5 TABLET ORAL at 21:01

## 2024-01-01 RX ADMIN — NICARDIPINE HYDROCHLORIDE 7.5 MG/HR: 2.5 INJECTION, SOLUTION INTRAVENOUS at 01:31

## 2024-01-01 RX ADMIN — INSULIN LISPRO 2 UNITS: 100 INJECTION, SOLUTION INTRAVENOUS; SUBCUTANEOUS at 17:57

## 2024-01-01 RX ADMIN — HYDROMORPHONE HYDROCHLORIDE 0.2 MG: 0.2 INJECTION, SOLUTION INTRAMUSCULAR; INTRAVENOUS; SUBCUTANEOUS at 07:35

## 2024-01-01 RX ADMIN — SODIUM CHLORIDE, SODIUM GLUCONATE, SODIUM ACETATE, POTASSIUM CHLORIDE, MAGNESIUM CHLORIDE, SODIUM PHOSPHATE, DIBASIC, AND POTASSIUM PHOSPHATE 1000 ML: .53; .5; .37; .037; .03; .012; .00082 INJECTION, SOLUTION INTRAVENOUS at 17:12

## 2024-01-01 RX ADMIN — INSULIN LISPRO 3 UNITS: 100 INJECTION, SOLUTION INTRAVENOUS; SUBCUTANEOUS at 12:14

## 2024-01-01 RX ADMIN — ATORVASTATIN CALCIUM 10 MG: 10 TABLET, FILM COATED ORAL at 16:48

## 2024-01-01 RX ADMIN — NICARDIPINE HYDROCHLORIDE 7.5 MG/HR: 2.5 INJECTION, SOLUTION INTRAVENOUS at 00:16

## 2024-01-01 RX ADMIN — BACITRACIN 1 LARGE APPLICATION: 500 OINTMENT TOPICAL at 18:23

## 2024-01-01 RX ADMIN — FOLIC ACID 1 MG: 1 TABLET ORAL at 08:12

## 2024-01-01 RX ADMIN — Medication 50 MCG/HR: at 14:56

## 2024-01-01 RX ADMIN — ACETAMINOPHEN 650 MG: 650 SUSPENSION ORAL at 21:30

## 2024-01-01 RX ADMIN — OXYCODONE HYDROCHLORIDE 5 MG: 5 SOLUTION ORAL at 17:32

## 2024-01-01 RX ADMIN — Medication 100 MG: at 08:12

## 2024-01-01 RX ADMIN — Medication 100 MG: at 08:59

## 2024-01-01 RX ADMIN — ACETAMINOPHEN 650 MG: 650 SUSPENSION ORAL at 03:46

## 2024-01-01 RX ADMIN — INSULIN LISPRO 5 UNITS: 100 INJECTION, SOLUTION INTRAVENOUS; SUBCUTANEOUS at 09:58

## 2024-01-01 RX ADMIN — FOLIC ACID 1 MG: 1 TABLET ORAL at 08:28

## 2024-01-01 RX ADMIN — SODIUM CHLORIDE 8 UNITS/HR: 9 INJECTION, SOLUTION INTRAVENOUS at 19:30

## 2024-01-01 RX ADMIN — GLYCERIN 1 DROP: .002; .002; .01 SOLUTION/ DROPS OPHTHALMIC at 08:48

## 2024-01-01 RX ADMIN — LEVALBUTEROL HYDROCHLORIDE 1.25 MG: 1.25 SOLUTION RESPIRATORY (INHALATION) at 01:33

## 2024-01-01 RX ADMIN — ATORVASTATIN CALCIUM 10 MG: 10 TABLET, FILM COATED ORAL at 16:00

## 2024-01-01 RX ADMIN — VALPROATE SODIUM 2972 MG: 100 INJECTION, SOLUTION INTRAVENOUS at 03:56

## 2024-01-01 RX ADMIN — INSULIN LISPRO 1 UNITS: 100 INJECTION, SOLUTION INTRAVENOUS; SUBCUTANEOUS at 18:14

## 2024-01-01 RX ADMIN — TETANUS TOXOID, REDUCED DIPHTHERIA TOXOID AND ACELLULAR PERTUSSIS VACCINE, ADSORBED 0.5 ML: 5; 2.5; 8; 8; 2.5 SUSPENSION INTRAMUSCULAR at 01:22

## 2024-01-01 RX ADMIN — SODIUM BICARBONATE 100 ML/HR: 84 INJECTION, SOLUTION INTRAVENOUS at 10:22

## 2024-01-01 RX ADMIN — GLYCERIN 1 DROP: .002; .002; .01 SOLUTION/ DROPS OPHTHALMIC at 08:05

## 2024-01-01 RX ADMIN — SODIUM CHLORIDE, SODIUM GLUCONATE, SODIUM ACETATE, POTASSIUM CHLORIDE, MAGNESIUM CHLORIDE, SODIUM PHOSPHATE, DIBASIC, AND POTASSIUM PHOSPHATE 75 ML/HR: .53; .5; .37; .037; .03; .012; .00082 INJECTION, SOLUTION INTRAVENOUS at 08:35

## 2024-01-01 RX ADMIN — CLOBAZAM 5 MG: 10 TABLET ORAL at 15:16

## 2024-01-01 RX ADMIN — FENTANYL CITRATE 50 MCG: 50 INJECTION INTRAMUSCULAR; INTRAVENOUS at 02:19

## 2024-01-01 RX ADMIN — ACETAMINOPHEN 650 MG: 650 SUSPENSION ORAL at 20:59

## 2024-01-01 RX ADMIN — CLOBAZAM 5 MG: 10 TABLET ORAL at 09:36

## 2024-01-01 RX ADMIN — FENTANYL CITRATE 100 MCG: 50 INJECTION INTRAMUSCULAR; INTRAVENOUS at 05:01

## 2024-01-01 RX ADMIN — PIPERACILLIN SODIUM AND TAZOBACTAM SODIUM 4.5 G: 36; 4.5 INJECTION, POWDER, LYOPHILIZED, FOR SOLUTION INTRAVENOUS at 06:09

## 2024-01-01 RX ADMIN — LACOSAMIDE ORAL SOLUTION 200 MG: 10 SOLUTION ORAL at 08:05

## 2024-01-01 RX ADMIN — INSULIN LISPRO 1 UNITS: 100 INJECTION, SOLUTION INTRAVENOUS; SUBCUTANEOUS at 17:54

## 2024-01-01 RX ADMIN — ACETAMINOPHEN 650 MG: 650 SUSPENSION ORAL at 03:13

## 2024-01-01 RX ADMIN — ATORVASTATIN CALCIUM 10 MG: 10 TABLET, FILM COATED ORAL at 16:29

## 2024-01-01 RX ADMIN — LABETALOL HYDROCHLORIDE 10 MG: 5 INJECTION, SOLUTION INTRAVENOUS at 01:25

## 2024-01-01 RX ADMIN — METRONIDAZOLE 500 MG: 500 INJECTION, SOLUTION INTRAVENOUS at 10:24

## 2024-01-01 RX ADMIN — ALBUTEROL SULFATE 2.5 MG: 2.5 SOLUTION RESPIRATORY (INHALATION) at 15:08

## 2024-01-01 RX ADMIN — PROPOFOL 30 MCG/KG/MIN: 10 INJECTION, EMULSION INTRAVENOUS at 14:15

## 2024-01-01 RX ADMIN — GLYCERIN 1 DROP: .002; .002; .01 SOLUTION/ DROPS OPHTHALMIC at 08:20

## 2024-01-01 RX ADMIN — LACOSAMIDE 400 MG: 10 INJECTION INTRAVENOUS at 00:17

## 2024-01-01 RX ADMIN — LACOSAMIDE ORAL SOLUTION 200 MG: 10 SOLUTION ORAL at 21:01

## 2024-01-01 RX ADMIN — FENTANYL CITRATE 50 MCG: 50 INJECTION INTRAMUSCULAR; INTRAVENOUS at 07:26

## 2024-01-01 RX ADMIN — NICARDIPINE HYDROCHLORIDE 5 MG/HR: 2.5 INJECTION, SOLUTION INTRAVENOUS at 03:28

## 2024-01-01 RX ADMIN — PROPOFOL 50 MCG/KG/MIN: 10 INJECTION, EMULSION INTRAVENOUS at 09:38

## 2024-01-01 RX ADMIN — MIDAZOLAM HYDROCHLORIDE 5 MG: 1 INJECTION, SOLUTION INTRAMUSCULAR; INTRAVENOUS at 11:06

## 2024-01-01 RX ADMIN — ACETAMINOPHEN 650 MG: 650 SUSPENSION ORAL at 02:58

## 2024-01-01 RX ADMIN — LABETALOL HYDROCHLORIDE 10 MG: 5 INJECTION, SOLUTION INTRAVENOUS at 23:15

## 2024-01-01 RX ADMIN — PROPOFOL 50 MCG/KG/MIN: 10 INJECTION, EMULSION INTRAVENOUS at 06:08

## 2024-01-01 RX ADMIN — OXYCODONE HYDROCHLORIDE 5 MG: 5 SOLUTION ORAL at 12:32

## 2024-01-01 RX ADMIN — ACETAMINOPHEN 650 MG: 650 SUSPENSION ORAL at 04:22

## 2024-01-01 RX ADMIN — INSULIN LISPRO 1 UNITS: 100 INJECTION, SOLUTION INTRAVENOUS; SUBCUTANEOUS at 05:38

## 2024-01-01 RX ADMIN — CLOBAZAM 5 MG: 10 TABLET ORAL at 21:00

## 2024-01-01 RX ADMIN — GLYCERIN 1 DROP: .002; .002; .01 SOLUTION/ DROPS OPHTHALMIC at 19:06

## 2024-01-01 RX ADMIN — NICARDIPINE HYDROCHLORIDE 7.5 MG/HR: 2.5 INJECTION, SOLUTION INTRAVENOUS at 23:27

## 2024-01-01 RX ADMIN — ALBUTEROL SULFATE 2.5 MG: 2.5 SOLUTION RESPIRATORY (INHALATION) at 01:27

## 2024-01-01 RX ADMIN — MAGNESIUM SULFATE HEPTAHYDRATE 2 G: 40 INJECTION, SOLUTION INTRAVENOUS at 11:33

## 2024-01-01 RX ADMIN — OXYCODONE HYDROCHLORIDE 5 MG: 5 TABLET ORAL at 17:33

## 2024-01-01 RX ADMIN — IOHEXOL 100 ML: 350 INJECTION, SOLUTION INTRAVENOUS at 00:30

## 2024-01-01 RX ADMIN — ACETAMINOPHEN 650 MG: 650 SUSPENSION ORAL at 15:26

## 2024-01-01 RX ADMIN — VASOPRESSIN 0.04 UNITS/MIN: 20 INJECTION INTRAVENOUS at 11:07

## 2024-01-01 RX ADMIN — LORAZEPAM 0.5 MG: 2 INJECTION INTRAMUSCULAR; INTRAVENOUS at 13:28

## 2024-01-01 RX ADMIN — NICOTINE 21 MG: 21 PATCH, EXTENDED RELEASE TRANSDERMAL at 09:06

## 2024-01-01 RX ADMIN — VANCOMYCIN HYDROCHLORIDE 1750 MG: 1 INJECTION, POWDER, LYOPHILIZED, FOR SOLUTION INTRAVENOUS at 03:46

## 2024-01-01 RX ADMIN — SODIUM CHLORIDE SOLN NEBU 3% 4 ML: 3 NEBU SOLN at 19:53

## 2024-01-01 RX ADMIN — SODIUM BICARBONATE 50 MEQ: 84 INJECTION INTRAVENOUS at 09:20

## 2024-01-01 RX ADMIN — CEFEPIME 2000 MG: 2 INJECTION, POWDER, FOR SOLUTION INTRAVENOUS at 07:47

## 2024-01-01 RX ADMIN — OXYCODONE HYDROCHLORIDE 5 MG: 5 SOLUTION ORAL at 23:55

## 2024-01-01 RX ADMIN — PROPOFOL 50 MCG/KG/MIN: 10 INJECTION, EMULSION INTRAVENOUS at 13:11

## 2024-01-01 RX ADMIN — CLOBAZAM 5 MG: 10 TABLET ORAL at 15:47

## 2024-01-01 RX ADMIN — LEVETIRACETAM 2000 MG: 100 SOLUTION ORAL at 09:00

## 2024-01-01 RX ADMIN — SODIUM CHLORIDE, SODIUM GLUCONATE, SODIUM ACETATE, POTASSIUM CHLORIDE, MAGNESIUM CHLORIDE, SODIUM PHOSPHATE, DIBASIC, AND POTASSIUM PHOSPHATE 75 ML/HR: .53; .5; .37; .037; .03; .012; .00082 INJECTION, SOLUTION INTRAVENOUS at 02:40

## 2024-01-01 RX ADMIN — LEVETIRACETAM 2000 MG: 100 SOLUTION ORAL at 08:08

## 2024-01-01 RX ADMIN — NICOTINE 21 MG: 21 PATCH, EXTENDED RELEASE TRANSDERMAL at 13:00

## 2024-01-01 RX ADMIN — ACETAMINOPHEN 649.6 MG: 650 SUSPENSION ORAL at 08:59

## 2024-01-01 RX ADMIN — PROPOFOL 150 MG: 10 INJECTION, EMULSION INTRAVENOUS at 14:21

## 2024-01-01 RX ADMIN — INSULIN LISPRO 2 UNITS: 100 INJECTION, SOLUTION INTRAVENOUS; SUBCUTANEOUS at 06:08

## 2024-01-01 RX ADMIN — PROPOFOL 50 MCG/KG/MIN: 10 INJECTION, EMULSION INTRAVENOUS at 16:26

## 2024-01-01 RX ADMIN — LEVETIRACETAM 2000 MG: 100 INJECTION, SOLUTION INTRAVENOUS at 08:39

## 2024-01-01 RX ADMIN — OXYCODONE HYDROCHLORIDE 5 MG: 5 SOLUTION ORAL at 05:53

## 2024-01-01 RX ADMIN — NICARDIPINE HYDROCHLORIDE 7.5 MG/HR: 2.5 INJECTION, SOLUTION INTRAVENOUS at 14:42

## 2024-01-01 RX ADMIN — LEVALBUTEROL HYDROCHLORIDE 1.25 MG: 1.25 SOLUTION RESPIRATORY (INHALATION) at 08:06

## 2024-01-01 RX ADMIN — LACOSAMIDE 200 MG: 10 INJECTION INTRAVENOUS at 23:52

## 2024-01-01 RX ADMIN — CHLORHEXIDINE GLUCONATE 15 ML: 1.2 RINSE ORAL at 08:38

## 2024-01-01 RX ADMIN — CLOBAZAM 5 MG: 10 TABLET ORAL at 15:30

## 2024-01-01 RX ADMIN — FENTANYL CITRATE 50 MCG: 50 INJECTION INTRAMUSCULAR; INTRAVENOUS at 00:56

## 2024-01-01 RX ADMIN — LACOSAMIDE 200 MG: 10 INJECTION INTRAVENOUS at 11:11

## 2024-01-01 RX ADMIN — GLYCERIN 1 DROP: .002; .002; .01 SOLUTION/ DROPS OPHTHALMIC at 09:31

## 2024-01-01 RX ADMIN — ENOXAPARIN SODIUM 30 MG: 30 INJECTION SUBCUTANEOUS at 09:36

## 2024-01-01 RX ADMIN — POTASSIUM CHLORIDE 40 MEQ: 29.8 INJECTION, SOLUTION INTRAVENOUS at 08:41

## 2024-01-01 RX ADMIN — LORAZEPAM 0.5 MG: 2 INJECTION INTRAMUSCULAR; INTRAVENOUS at 11:34

## 2024-01-01 RX ADMIN — ACETAMINOPHEN 975 MG: 325 TABLET ORAL at 11:34

## 2024-01-01 RX ADMIN — LEVALBUTEROL HYDROCHLORIDE 1.25 MG: 1.25 SOLUTION RESPIRATORY (INHALATION) at 07:17

## 2024-01-01 RX ADMIN — PROPOFOL 50 MCG/KG/MIN: 10 INJECTION, EMULSION INTRAVENOUS at 01:37

## 2024-01-01 RX ADMIN — GADOBUTROL 7 ML: 604.72 INJECTION INTRAVENOUS at 12:20

## 2024-01-01 RX ADMIN — GLYCERIN 1 DROP: .002; .002; .01 SOLUTION/ DROPS OPHTHALMIC at 14:57

## 2024-01-01 RX ADMIN — GLYCERIN 1 DROP: .002; .002; .01 SOLUTION/ DROPS OPHTHALMIC at 09:09

## 2024-01-01 RX ADMIN — GLYCERIN 1 DROP: .002; .002; .01 SOLUTION/ DROPS OPHTHALMIC at 17:14

## 2024-01-01 RX ADMIN — FENTANYL CITRATE 100 MCG: 50 INJECTION INTRAMUSCULAR; INTRAVENOUS at 14:43

## 2024-01-01 RX ADMIN — PROPOFOL 5 MCG/KG/MIN: 10 INJECTION, EMULSION INTRAVENOUS at 08:32

## 2024-01-01 RX ADMIN — FENTANYL CITRATE 50 MCG: 50 INJECTION INTRAMUSCULAR; INTRAVENOUS at 20:06

## 2024-01-01 RX ADMIN — METHOCARBAMOL TABLETS 750 MG: 750 TABLET, COATED ORAL at 23:15

## 2024-01-01 RX ADMIN — BACITRACIN 1 LARGE APPLICATION: 500 OINTMENT TOPICAL at 17:52

## 2024-01-01 RX ADMIN — PROPOFOL 50 MCG/KG/MIN: 10 INJECTION, EMULSION INTRAVENOUS at 18:14

## 2024-01-01 RX ADMIN — CALCIUM GLUCONATE 1 G: 20 INJECTION, SOLUTION INTRAVENOUS at 09:45

## 2024-01-01 RX ADMIN — ACETAMINOPHEN 650 MG: 650 SUSPENSION ORAL at 10:24

## 2024-01-01 RX ADMIN — Medication 15 ML: at 08:12

## 2024-01-01 RX ADMIN — OXYCODONE HYDROCHLORIDE 5 MG: 5 SOLUTION ORAL at 12:17

## 2024-01-01 RX ADMIN — NICARDIPINE HYDROCHLORIDE 5 MG/HR: 2.5 INJECTION, SOLUTION INTRAVENOUS at 05:39

## 2024-01-01 RX ADMIN — ACETYLCYSTEINE 600 MG: 200 SOLUTION ORAL; RESPIRATORY (INHALATION) at 19:33

## 2024-01-01 RX ADMIN — CLOBAZAM 5 MG: 10 TABLET ORAL at 20:31

## 2024-01-01 RX ADMIN — CALCIUM GLUCONATE 1 G: 20 INJECTION, SOLUTION INTRAVENOUS at 16:27

## 2024-01-01 RX ADMIN — LACOSAMIDE ORAL SOLUTION 200 MG: 10 SOLUTION ORAL at 21:07

## 2024-01-01 RX ADMIN — ROCURONIUM BROMIDE 100 MG: 10 INJECTION INTRAVENOUS at 11:08

## 2024-01-01 RX ADMIN — LEVALBUTEROL HYDROCHLORIDE 1.25 MG: 1.25 SOLUTION RESPIRATORY (INHALATION) at 01:47

## 2024-01-01 RX ADMIN — QUETIAPINE FUMARATE 12.5 MG: 25 TABLET ORAL at 22:16

## 2024-01-01 RX ADMIN — ALBUMIN HUMAN 12.5 G: 50 SOLUTION INTRAVENOUS at 01:39

## 2024-01-01 RX ADMIN — ACETAMINOPHEN 650 MG: 650 SUSPENSION ORAL at 08:55

## 2024-01-01 RX ADMIN — PROPOFOL 50 MCG/KG/MIN: 10 INJECTION, EMULSION INTRAVENOUS at 17:15

## 2024-01-01 RX ADMIN — SENNOSIDES AND DOCUSATE SODIUM 1 TABLET: 8.6; 5 TABLET ORAL at 22:17

## 2024-01-01 RX ADMIN — ALBUTEROL SULFATE 2.5 MG: 2.5 SOLUTION RESPIRATORY (INHALATION) at 07:44

## 2024-01-01 RX ADMIN — NICOTINE 21 MG: 21 PATCH, EXTENDED RELEASE TRANSDERMAL at 08:06

## 2024-01-01 RX ADMIN — HYDROMORPHONE HYDROCHLORIDE 0.2 MG: 0.2 INJECTION, SOLUTION INTRAMUSCULAR; INTRAVENOUS; SUBCUTANEOUS at 20:11

## 2024-01-01 RX ADMIN — GLYCERIN 1 DROP: .002; .002; .01 SOLUTION/ DROPS OPHTHALMIC at 17:32

## 2024-01-01 RX ADMIN — ALBUTEROL SULFATE 2.5 MG: 2.5 SOLUTION RESPIRATORY (INHALATION) at 07:34

## 2024-01-01 RX ADMIN — MIDAZOLAM 9 MG/HR: 5 INJECTION INTRAMUSCULAR; INTRAVENOUS at 06:43

## 2024-01-01 RX ADMIN — FENTANYL CITRATE 50 MCG: 50 INJECTION INTRAMUSCULAR; INTRAVENOUS at 11:06

## 2024-01-01 RX ADMIN — OXYCODONE HYDROCHLORIDE 5 MG: 5 SOLUTION ORAL at 00:06

## 2024-01-01 RX ADMIN — OXYCODONE HYDROCHLORIDE 5 MG: 5 TABLET ORAL at 23:15

## 2024-01-01 RX ADMIN — LABETALOL HYDROCHLORIDE 10 MG: 5 INJECTION, SOLUTION INTRAVENOUS at 14:53

## 2024-01-01 RX ADMIN — ACETYLCYSTEINE 600 MG: 200 SOLUTION ORAL; RESPIRATORY (INHALATION) at 20:09

## 2024-01-01 RX ADMIN — INSULIN LISPRO 2 UNITS: 100 INJECTION, SOLUTION INTRAVENOUS; SUBCUTANEOUS at 23:53

## 2024-01-01 RX ADMIN — OXYCODONE HYDROCHLORIDE 5 MG: 5 SOLUTION ORAL at 06:10

## 2024-01-01 RX ADMIN — SODIUM CHLORIDE, SODIUM GLUCONATE, SODIUM ACETATE, POTASSIUM CHLORIDE, MAGNESIUM CHLORIDE, SODIUM PHOSPHATE, DIBASIC, AND POTASSIUM PHOSPHATE 1000 ML: .53; .5; .37; .037; .03; .012; .00082 INJECTION, SOLUTION INTRAVENOUS at 18:19

## 2024-01-01 RX ADMIN — ACETAMINOPHEN 650 MG: 650 SUSPENSION ORAL at 15:05

## 2024-01-01 RX ADMIN — LORAZEPAM 2 MG: 2 INJECTION INTRAMUSCULAR; INTRAVENOUS at 22:01

## 2024-01-01 RX ADMIN — OXYCODONE HYDROCHLORIDE 5 MG: 5 TABLET ORAL at 09:10

## 2024-01-01 RX ADMIN — PROPOFOL 20 MCG/KG/MIN: 10 INJECTION, EMULSION INTRAVENOUS at 23:46

## 2024-01-01 RX ADMIN — EPINEPHRINE 1 MG: 0.1 INJECTION INTRAVENOUS at 09:20

## 2024-01-01 RX ADMIN — FENTANYL CITRATE 50 MCG: 50 INJECTION INTRAMUSCULAR; INTRAVENOUS at 00:48

## 2024-01-01 RX ADMIN — LORAZEPAM 2 MG: 2 INJECTION INTRAMUSCULAR; INTRAVENOUS at 04:20

## 2024-01-01 RX ADMIN — CLOBAZAM 5 MG: 10 TABLET ORAL at 08:06

## 2024-01-01 RX ADMIN — CHLORHEXIDINE GLUCONATE 15 ML: 1.2 RINSE ORAL at 09:37

## 2024-01-01 RX ADMIN — Medication 20 MG: at 08:29

## 2024-01-01 RX ADMIN — DEXMEDETOMIDINE HYDROCHLORIDE 0.5 MCG/KG/HR: 4 INJECTION, SOLUTION INTRAVENOUS at 08:26

## 2024-01-01 RX ADMIN — INSULIN LISPRO 5 UNITS: 100 INJECTION, SOLUTION INTRAVENOUS; SUBCUTANEOUS at 06:01

## 2024-01-01 RX ADMIN — FOLIC ACID 1 MG: 1 TABLET ORAL at 08:02

## 2024-01-01 RX ADMIN — FOLIC ACID 1 MG: 1 TABLET ORAL at 08:59

## 2024-01-01 RX ADMIN — CLOBAZAM 5 MG: 10 TABLET ORAL at 08:29

## 2024-01-01 RX ADMIN — PROPOFOL 50 MCG/KG/MIN: 10 INJECTION, EMULSION INTRAVENOUS at 21:21

## 2024-01-01 RX ADMIN — LEVETIRACETAM 1500 MG: 100 INJECTION, SOLUTION INTRAVENOUS at 11:27

## 2024-01-01 RX ADMIN — ACETYLCYSTEINE 600 MG: 200 SOLUTION ORAL; RESPIRATORY (INHALATION) at 19:24

## 2024-01-01 RX ADMIN — SENNOSIDES AND DOCUSATE SODIUM 1 TABLET: 8.6; 5 TABLET ORAL at 17:08

## 2024-01-01 RX ADMIN — LEVETIRACETAM 2000 MG: 100 SOLUTION ORAL at 20:31

## 2024-01-01 RX ADMIN — ACETYLCYSTEINE 600 MG: 200 SOLUTION ORAL; RESPIRATORY (INHALATION) at 13:11

## 2024-01-01 RX ADMIN — PHENYLEPHRINE HYDROCHLORIDE 50 MG: 10 INJECTION INTRAVENOUS at 11:08

## 2024-01-01 RX ADMIN — DEXMEDETOMIDINE HYDROCHLORIDE 0.5 MCG/KG/HR: 4 INJECTION, SOLUTION INTRAVENOUS at 22:41

## 2024-01-01 RX ADMIN — ACETYLCYSTEINE 600 MG: 200 SOLUTION ORAL; RESPIRATORY (INHALATION) at 07:45

## 2024-01-01 RX ADMIN — SODIUM CHLORIDE, SODIUM GLUCONATE, SODIUM ACETATE, POTASSIUM CHLORIDE, MAGNESIUM CHLORIDE, SODIUM PHOSPHATE, DIBASIC, AND POTASSIUM PHOSPHATE 500 ML: .53; .5; .37; .037; .03; .012; .00082 INJECTION, SOLUTION INTRAVENOUS at 17:41

## 2024-01-01 RX ADMIN — PROPOFOL 50 MCG/KG/MIN: 10 INJECTION, EMULSION INTRAVENOUS at 06:36

## 2024-01-01 RX ADMIN — GLYCERIN 1 DROP: .002; .002; .01 SOLUTION/ DROPS OPHTHALMIC at 18:23

## 2024-01-01 RX ADMIN — MIDAZOLAM 9 MG/HR: 5 INJECTION INTRAMUSCULAR; INTRAVENOUS at 18:25

## 2024-01-01 RX ADMIN — INSULIN LISPRO 8 UNITS: 100 INJECTION, SOLUTION INTRAVENOUS; SUBCUTANEOUS at 17:51

## 2024-01-01 RX ADMIN — ENOXAPARIN SODIUM 30 MG: 30 INJECTION SUBCUTANEOUS at 17:33

## 2024-01-01 RX ADMIN — BACITRACIN 1 LARGE APPLICATION: 500 OINTMENT TOPICAL at 08:29

## 2024-01-01 RX ADMIN — ACETAMINOPHEN 650 MG: 650 SUSPENSION ORAL at 15:15

## 2024-01-01 RX ADMIN — CLOBAZAM 5 MG: 10 TABLET ORAL at 08:16

## 2024-01-01 RX ADMIN — SENNOSIDES AND DOCUSATE SODIUM 1 TABLET: 50; 8.6 TABLET ORAL at 17:54

## 2024-01-01 RX ADMIN — METHOCARBAMOL TABLETS 750 MG: 750 TABLET, COATED ORAL at 17:33

## 2024-01-01 RX ADMIN — BISACODYL 10 MG: 10 SUPPOSITORY RECTAL at 11:34

## 2024-01-01 RX ADMIN — GLYCERIN 1 DROP: .002; .002; .01 SOLUTION/ DROPS OPHTHALMIC at 08:29

## 2024-01-01 RX ADMIN — LABETALOL HYDROCHLORIDE 20 MG: 5 INJECTION, SOLUTION INTRAVENOUS at 20:20

## 2024-01-01 RX ADMIN — SODIUM CHLORIDE SOLN NEBU 3% 4 ML: 3 NEBU SOLN at 01:47

## 2024-01-01 RX ADMIN — Medication 20 MG: at 09:31

## 2024-01-01 RX ADMIN — MIDAZOLAM HYDROCHLORIDE 10 MG: 1 INJECTION, SOLUTION INTRAMUSCULAR; INTRAVENOUS at 09:52

## 2024-01-01 RX ADMIN — NICARDIPINE HYDROCHLORIDE 9 MG/HR: 2.5 INJECTION, SOLUTION INTRAVENOUS at 03:05

## 2024-01-01 RX ADMIN — BACITRACIN 1 LARGE APPLICATION: 500 OINTMENT TOPICAL at 17:53

## 2024-01-01 RX ADMIN — ENOXAPARIN SODIUM 30 MG: 30 INJECTION SUBCUTANEOUS at 22:24

## 2024-01-01 RX ADMIN — LABETALOL HYDROCHLORIDE 10 MG: 5 INJECTION, SOLUTION INTRAVENOUS at 03:27

## 2024-01-01 RX ADMIN — FENTANYL CITRATE 50 MCG: 50 INJECTION INTRAMUSCULAR; INTRAVENOUS at 20:41

## 2024-01-01 RX ADMIN — FOLIC ACID 1 MG: 1 TABLET ORAL at 08:19

## 2024-01-01 RX ADMIN — LACOSAMIDE ORAL SOLUTION 200 MG: 10 SOLUTION ORAL at 20:05

## 2024-01-01 RX ADMIN — NICOTINE 21 MG: 21 PATCH, EXTENDED RELEASE TRANSDERMAL at 08:02

## 2024-01-01 RX ADMIN — INSULIN LISPRO 5 UNITS: 100 INJECTION, SOLUTION INTRAVENOUS; SUBCUTANEOUS at 17:47

## 2024-01-01 RX ADMIN — Medication 15 ML: at 11:45

## 2024-01-01 RX ADMIN — Medication 100 MG: at 08:27

## 2024-01-01 RX ADMIN — ENOXAPARIN SODIUM 30 MG: 30 INJECTION SUBCUTANEOUS at 09:29

## 2024-01-01 RX ADMIN — FENTANYL CITRATE 50 MCG: 50 INJECTION INTRAMUSCULAR; INTRAVENOUS at 04:29

## 2024-01-01 RX ADMIN — PROPOFOL 50 MCG/KG/MIN: 10 INJECTION, EMULSION INTRAVENOUS at 15:00

## 2024-01-01 RX ADMIN — INSULIN GLARGINE 10 UNITS: 100 INJECTION, SOLUTION SUBCUTANEOUS at 21:35

## 2024-01-01 RX ADMIN — NICARDIPINE HYDROCHLORIDE 15 MG/HR: 2.5 INJECTION, SOLUTION INTRAVENOUS at 11:46

## 2024-01-01 RX ADMIN — MIDAZOLAM 7 MG/HR: 5 INJECTION INTRAMUSCULAR; INTRAVENOUS at 15:39

## 2024-01-01 RX ADMIN — ATORVASTATIN CALCIUM 10 MG: 10 TABLET, FILM COATED ORAL at 15:46

## 2024-01-01 RX ADMIN — ENOXAPARIN SODIUM 30 MG: 30 INJECTION SUBCUTANEOUS at 21:24

## 2024-01-01 RX ADMIN — CLOBAZAM 5 MG: 10 TABLET ORAL at 17:08

## 2024-01-01 RX ADMIN — MIDAZOLAM 9 MG/HR: 5 INJECTION INTRAMUSCULAR; INTRAVENOUS at 05:15

## 2024-01-01 RX ADMIN — CHLORHEXIDINE GLUCONATE 15 ML: 1.2 RINSE ORAL at 08:19

## 2024-01-01 RX ADMIN — CHLORHEXIDINE GLUCONATE 15 ML: 1.2 RINSE ORAL at 22:01

## 2024-01-01 RX ADMIN — NICARDIPINE HYDROCHLORIDE 10 MG/HR: 2.5 INJECTION, SOLUTION INTRAVENOUS at 10:45

## 2024-01-01 RX ADMIN — CHLORHEXIDINE GLUCONATE 15 ML: 1.2 RINSE ORAL at 08:09

## 2024-01-01 RX ADMIN — CHLORHEXIDINE GLUCONATE 15 ML: 1.2 RINSE ORAL at 20:31

## 2024-01-01 RX ADMIN — NICOTINE 21 MG: 21 PATCH, EXTENDED RELEASE TRANSDERMAL at 08:09

## 2024-01-01 RX ADMIN — OXYCODONE HYDROCHLORIDE 5 MG: 5 SOLUTION ORAL at 20:05

## 2024-01-01 RX ADMIN — FENTANYL CITRATE 50 MCG: 50 INJECTION INTRAMUSCULAR; INTRAVENOUS at 06:04

## 2024-01-01 RX ADMIN — SENNOSIDES AND DOCUSATE SODIUM 1 TABLET: 8.6; 5 TABLET ORAL at 17:44

## 2024-01-01 RX ADMIN — INSULIN LISPRO 2 UNITS: 100 INJECTION, SOLUTION INTRAVENOUS; SUBCUTANEOUS at 18:37

## 2024-01-01 RX ADMIN — CLOBAZAM 5 MG: 10 TABLET ORAL at 08:05

## 2024-01-01 RX ADMIN — SENNOSIDES AND DOCUSATE SODIUM 1 TABLET: 8.6; 5 TABLET ORAL at 08:11

## 2024-01-01 RX ADMIN — SODIUM CHLORIDE, SODIUM GLUCONATE, SODIUM ACETATE, POTASSIUM CHLORIDE, MAGNESIUM CHLORIDE, SODIUM PHOSPHATE, DIBASIC, AND POTASSIUM PHOSPHATE 500 ML: .53; .5; .37; .037; .03; .012; .00082 INJECTION, SOLUTION INTRAVENOUS at 10:30

## 2024-01-01 RX ADMIN — GLYCERIN 1 DROP: .002; .002; .01 SOLUTION/ DROPS OPHTHALMIC at 17:34

## 2024-01-01 RX ADMIN — INSULIN LISPRO 2 UNITS: 100 INJECTION, SOLUTION INTRAVENOUS; SUBCUTANEOUS at 00:06

## 2024-01-01 RX ADMIN — SODIUM CHLORIDE 11 UNITS/HR: 9 INJECTION, SOLUTION INTRAVENOUS at 21:47

## 2024-01-01 RX ADMIN — BACITRACIN 1 LARGE APPLICATION: 500 OINTMENT TOPICAL at 18:18

## 2024-01-01 RX ADMIN — ACETYLCYSTEINE 600 MG: 200 SOLUTION ORAL; RESPIRATORY (INHALATION) at 01:47

## 2024-01-01 RX ADMIN — Medication 100 MG: at 08:19

## 2024-01-01 RX ADMIN — LEVETIRACETAM 2000 MG: 100 SOLUTION ORAL at 20:05

## 2024-01-01 RX ADMIN — FENTANYL CITRATE 50 MCG: 50 INJECTION INTRAMUSCULAR; INTRAVENOUS at 17:10

## 2024-01-01 RX ADMIN — NICOTINE 21 MG: 21 PATCH, EXTENDED RELEASE TRANSDERMAL at 08:59

## 2024-01-01 RX ADMIN — ENOXAPARIN SODIUM 30 MG: 30 INJECTION SUBCUTANEOUS at 05:29

## 2024-01-01 RX ADMIN — ENOXAPARIN SODIUM 30 MG: 30 INJECTION SUBCUTANEOUS at 10:34

## 2024-01-01 RX ADMIN — PROPOFOL 50 MCG/KG/MIN: 10 INJECTION, EMULSION INTRAVENOUS at 10:38

## 2024-01-01 RX ADMIN — SODIUM BICARBONATE 50 MEQ: 84 INJECTION INTRAVENOUS at 09:23

## 2024-01-01 RX ADMIN — LEVETIRACETAM 2000 MG: 100 SOLUTION ORAL at 08:20

## 2024-01-01 RX ADMIN — MIDAZOLAM 4 MG/HR: 5 INJECTION INTRAMUSCULAR; INTRAVENOUS at 06:22

## 2024-01-01 RX ADMIN — PANTOPRAZOLE SODIUM 40 MG: 40 TABLET, DELAYED RELEASE ORAL at 06:08

## 2024-01-01 RX ADMIN — FENTANYL CITRATE 50 MCG: 50 INJECTION INTRAMUSCULAR; INTRAVENOUS at 21:01

## 2024-01-01 RX ADMIN — INSULIN LISPRO 12 UNITS: 100 INJECTION, SOLUTION INTRAVENOUS; SUBCUTANEOUS at 11:04

## 2024-01-01 RX ADMIN — FENTANYL CITRATE 50 MCG: 50 INJECTION INTRAMUSCULAR; INTRAVENOUS at 02:04

## 2024-01-01 RX ADMIN — CLOBAZAM 5 MG: 10 TABLET ORAL at 20:19

## 2024-01-01 RX ADMIN — CEFAZOLIN SODIUM 2000 MG: 2 SOLUTION INTRAVENOUS at 14:56

## 2024-01-01 RX ADMIN — FENTANYL CITRATE 50 MCG: 50 INJECTION INTRAMUSCULAR; INTRAVENOUS at 19:09

## 2024-01-01 RX ADMIN — SODIUM CHLORIDE, SODIUM GLUCONATE, SODIUM ACETATE, POTASSIUM CHLORIDE, MAGNESIUM CHLORIDE, SODIUM PHOSPHATE, DIBASIC, AND POTASSIUM PHOSPHATE 500 ML: .53; .5; .37; .037; .03; .012; .00082 INJECTION, SOLUTION INTRAVENOUS at 03:40

## 2024-01-01 RX ADMIN — LEVETIRACETAM 2000 MG: 100 SOLUTION ORAL at 21:06

## 2024-01-01 RX ADMIN — CHLORHEXIDINE GLUCONATE 15 ML: 1.2 RINSE ORAL at 08:12

## 2024-01-01 RX ADMIN — HYDROMORPHONE HYDROCHLORIDE 0.2 MG: 0.2 INJECTION, SOLUTION INTRAMUSCULAR; INTRAVENOUS; SUBCUTANEOUS at 02:19

## 2024-01-01 RX ADMIN — CLOBAZAM 5 MG: 10 TABLET ORAL at 16:01

## 2024-01-01 RX ADMIN — Medication 20 MG: at 08:11

## 2024-01-01 RX ADMIN — CHLORHEXIDINE GLUCONATE 15 ML: 1.2 RINSE ORAL at 08:29

## 2024-01-01 RX ADMIN — ACETYLCYSTEINE 600 MG: 200 SOLUTION ORAL; RESPIRATORY (INHALATION) at 15:08

## 2024-01-01 RX ADMIN — LABETALOL HYDROCHLORIDE 10 MG: 5 INJECTION, SOLUTION INTRAVENOUS at 02:59

## 2024-01-01 RX ADMIN — CLOBAZAM 5 MG: 10 TABLET ORAL at 20:05

## 2024-01-01 RX ADMIN — PROPOFOL 50 MCG/KG/MIN: 10 INJECTION, EMULSION INTRAVENOUS at 04:42

## 2024-01-01 RX ADMIN — EPOPROSTENOL 50 NG/KG/MIN: 1.5 INJECTION, POWDER, LYOPHILIZED, FOR SOLUTION INTRAVENOUS at 07:23

## 2024-01-01 RX ADMIN — BACITRACIN 1 LARGE APPLICATION: 500 OINTMENT TOPICAL at 17:32

## 2024-01-01 RX ADMIN — NOREPINEPHRINE BITARTRATE 30 MCG/MIN: 1 INJECTION INTRAVENOUS at 11:07

## 2024-01-01 RX ADMIN — ACETAMINOPHEN 650 MG: 650 SUSPENSION ORAL at 21:00

## 2024-01-01 RX ADMIN — ENOXAPARIN SODIUM 30 MG: 30 INJECTION SUBCUTANEOUS at 21:06

## 2024-01-01 RX ADMIN — LEVETIRACETAM 2000 MG: 100 SOLUTION ORAL at 21:01

## 2024-01-01 RX ADMIN — LABETALOL HYDROCHLORIDE 10 MG: 5 INJECTION, SOLUTION INTRAVENOUS at 07:51

## 2024-01-01 RX ADMIN — MIDAZOLAM 72 MG/HR: 5 INJECTION INTRAMUSCULAR; INTRAVENOUS at 18:21

## 2024-01-01 RX ADMIN — LABETALOL HYDROCHLORIDE 10 MG: 5 INJECTION, SOLUTION INTRAVENOUS at 19:22

## 2024-01-01 RX ADMIN — FENTANYL CITRATE 50 MCG: 50 INJECTION INTRAMUSCULAR; INTRAVENOUS at 10:24

## 2024-01-01 RX ADMIN — GLYCERIN 1 DROP: .002; .002; .01 SOLUTION/ DROPS OPHTHALMIC at 08:02

## 2024-01-01 RX ADMIN — ACETAMINOPHEN 650 MG: 650 SUSPENSION ORAL at 21:27

## 2024-01-01 RX ADMIN — CEFAZOLIN SODIUM 2000 MG: 2 SOLUTION INTRAVENOUS at 14:02

## 2024-01-01 RX ADMIN — BACITRACIN 1 LARGE APPLICATION: 500 OINTMENT TOPICAL at 08:48

## 2024-01-01 RX ADMIN — PROPOFOL 50 MCG/KG/MIN: 10 INJECTION, EMULSION INTRAVENOUS at 19:20

## 2024-01-01 RX ADMIN — FENTANYL CITRATE 50 MCG: 50 INJECTION INTRAMUSCULAR; INTRAVENOUS at 08:20

## 2024-01-01 RX ADMIN — EPINEPHRINE 1 MG: 0.1 INJECTION INTRAVENOUS at 09:23

## 2024-01-01 RX ADMIN — Medication 20 MG: at 08:20

## 2024-01-01 RX ADMIN — ATORVASTATIN CALCIUM 10 MG: 10 TABLET, FILM COATED ORAL at 17:34

## 2024-01-01 RX ADMIN — PROPOFOL 50 MCG/KG/MIN: 10 INJECTION, EMULSION INTRAVENOUS at 04:54

## 2024-01-01 RX ADMIN — FENTANYL CITRATE 50 MCG: 50 INJECTION INTRAMUSCULAR; INTRAVENOUS at 00:30

## 2024-01-01 RX ADMIN — ASPIRIN 81 MG: 81 TABLET, COATED ORAL at 08:02

## 2024-01-01 RX ADMIN — DESMOPRESSIN ACETATE 29.6 MCG: 4 INJECTION, SOLUTION INTRAVENOUS; SUBCUTANEOUS at 15:00

## 2024-01-01 RX ADMIN — ENOXAPARIN SODIUM 30 MG: 30 INJECTION SUBCUTANEOUS at 09:38

## 2024-01-01 RX ADMIN — VANCOMYCIN HYDROCHLORIDE 1250 MG: 5 INJECTION, POWDER, LYOPHILIZED, FOR SOLUTION INTRAVENOUS at 07:18

## 2024-01-01 RX ADMIN — SODIUM CHLORIDE SOLN NEBU 3% 4 ML: 3 NEBU SOLN at 07:17

## 2024-01-01 RX ADMIN — GLYCERIN 1 DROP: .002; .002; .01 SOLUTION/ DROPS OPHTHALMIC at 17:53

## 2024-01-01 RX ADMIN — ENOXAPARIN SODIUM 30 MG: 30 INJECTION SUBCUTANEOUS at 09:58

## 2024-01-01 RX ADMIN — BACITRACIN 1 LARGE APPLICATION: 500 OINTMENT TOPICAL at 09:32

## 2024-01-01 RX ADMIN — FUROSEMIDE 20 MG: 10 INJECTION, SOLUTION INTRAMUSCULAR; INTRAVENOUS at 11:02

## 2024-01-01 RX ADMIN — QUETIAPINE FUMARATE 12.5 MG: 25 TABLET ORAL at 22:14

## 2024-01-01 RX ADMIN — OXYCODONE HYDROCHLORIDE 5 MG: 5 SOLUTION ORAL at 12:23

## 2024-01-01 RX ADMIN — PROPOFOL 50 MCG/KG/MIN: 10 INJECTION, EMULSION INTRAVENOUS at 02:12

## 2024-01-01 RX ADMIN — FENTANYL CITRATE 50 MCG: 50 INJECTION INTRAMUSCULAR; INTRAVENOUS at 15:07

## 2024-01-01 RX ADMIN — MIDAZOLAM HYDROCHLORIDE 10 MG: 2 INJECTION, SOLUTION INTRAMUSCULAR; INTRAVENOUS at 15:29

## 2024-01-01 RX ADMIN — LACOSAMIDE ORAL SOLUTION 200 MG: 10 SOLUTION ORAL at 08:18

## 2024-01-01 RX ADMIN — ACETAMINOPHEN 975 MG: 325 TABLET ORAL at 05:28

## 2024-01-01 RX ADMIN — CLOBAZAM 5 MG: 10 TABLET ORAL at 21:30

## 2024-01-01 RX ADMIN — DEXTROSE AND SODIUM CHLORIDE 125 ML/HR: 5; .9 INJECTION, SOLUTION INTRAVENOUS at 15:36

## 2024-01-01 RX ADMIN — SODIUM CHLORIDE 1250 MG: 0.9 INJECTION, SOLUTION INTRAVENOUS at 15:34

## 2024-01-01 RX ADMIN — LABETALOL HYDROCHLORIDE 10 MG: 5 INJECTION, SOLUTION INTRAVENOUS at 01:00

## 2024-01-01 RX ADMIN — LABETALOL HYDROCHLORIDE 10 MG: 5 INJECTION, SOLUTION INTRAVENOUS at 03:56

## 2024-01-01 RX ADMIN — CHLORHEXIDINE GLUCONATE 15 ML: 1.2 RINSE ORAL at 20:02

## 2024-01-01 RX ADMIN — ALBUTEROL SULFATE 2.5 MG: 2.5 SOLUTION RESPIRATORY (INHALATION) at 20:09

## 2024-01-01 RX ADMIN — LEVETIRACETAM 2000 MG: 100 SOLUTION ORAL at 08:22

## 2024-01-01 RX ADMIN — GLYCERIN 1 DROP: .002; .002; .01 SOLUTION/ DROPS OPHTHALMIC at 18:19

## 2024-01-01 RX ADMIN — METRONIDAZOLE 500 MG: 500 INJECTION, SOLUTION INTRAVENOUS at 02:19

## 2024-01-01 RX ADMIN — CEFEPIME 2000 MG: 2 INJECTION, POWDER, FOR SOLUTION INTRAVENOUS at 15:15

## 2024-01-01 RX ADMIN — ATORVASTATIN CALCIUM 10 MG: 10 TABLET, FILM COATED ORAL at 15:31

## 2024-01-01 RX ADMIN — CEFEPIME 2000 MG: 2 INJECTION, POWDER, FOR SOLUTION INTRAVENOUS at 23:26

## 2024-01-01 RX ADMIN — ACETAMINOPHEN 650 MG: 650 SUSPENSION ORAL at 09:31

## 2024-01-01 RX ADMIN — PROPOFOL 50 MCG/KG/MIN: 10 INJECTION, EMULSION INTRAVENOUS at 01:08

## 2024-01-01 RX ADMIN — SENNOSIDES AND DOCUSATE SODIUM 1 TABLET: 8.6; 5 TABLET ORAL at 21:23

## 2024-01-01 RX ADMIN — ALBUMIN (HUMAN) 12.5 G: 12.5 INJECTION, SOLUTION INTRAVENOUS at 01:39

## 2024-01-01 RX ADMIN — ACETYLCYSTEINE 600 MG: 200 SOLUTION ORAL; RESPIRATORY (INHALATION) at 14:05

## 2024-01-01 RX ADMIN — PANTOPRAZOLE SODIUM 40 MG: 40 TABLET, DELAYED RELEASE ORAL at 06:17

## 2024-01-01 RX ADMIN — NICARDIPINE HYDROCHLORIDE 5 MG/HR: 2.5 INJECTION, SOLUTION INTRAVENOUS at 18:37

## 2024-01-01 RX ADMIN — SODIUM CHLORIDE SOLN NEBU 3% 4 ML: 3 NEBU SOLN at 01:33

## 2024-01-01 RX ADMIN — ALBUTEROL SULFATE 2.5 MG: 2.5 SOLUTION RESPIRATORY (INHALATION) at 03:01

## 2024-01-01 RX ADMIN — BACITRACIN 1 LARGE APPLICATION: 500 OINTMENT TOPICAL at 08:59

## 2024-01-01 RX ADMIN — BACITRACIN 1 LARGE APPLICATION: 500 OINTMENT TOPICAL at 17:40

## 2024-01-01 RX ADMIN — INSULIN LISPRO 1 UNITS: 100 INJECTION, SOLUTION INTRAVENOUS; SUBCUTANEOUS at 13:34

## 2024-01-01 RX ADMIN — ALBUTEROL SULFATE 2.5 MG: 2.5 SOLUTION RESPIRATORY (INHALATION) at 01:47

## 2024-01-01 RX ADMIN — METHOCARBAMOL 750 MG: 100 INJECTION INTRAMUSCULAR; INTRAVENOUS at 00:11

## 2024-01-01 RX ADMIN — OXYCODONE HYDROCHLORIDE 5 MG: 5 SOLUTION ORAL at 11:04

## 2024-01-01 RX ADMIN — IOHEXOL 100 ML: 350 INJECTION, SOLUTION INTRAVENOUS at 10:48

## 2024-01-01 RX ADMIN — INSULIN LISPRO 1 UNITS: 100 INJECTION, SOLUTION INTRAVENOUS; SUBCUTANEOUS at 00:17

## 2024-01-01 RX ADMIN — Medication 50 MCG/HR: at 00:30

## 2024-01-01 RX ADMIN — INSULIN LISPRO 5 UNITS: 100 INJECTION, SOLUTION INTRAVENOUS; SUBCUTANEOUS at 23:55

## 2024-01-01 RX ADMIN — DEXTROSE AND SODIUM CHLORIDE 125 ML/HR: 5; .9 INJECTION, SOLUTION INTRAVENOUS at 06:21

## 2024-01-01 RX ADMIN — INSULIN GLARGINE 25 UNITS: 100 INJECTION, SOLUTION SUBCUTANEOUS at 21:24

## 2024-01-01 RX ADMIN — OXYCODONE HYDROCHLORIDE 5 MG: 5 SOLUTION ORAL at 05:12

## 2024-01-01 RX ADMIN — ACETAMINOPHEN 975 MG: 325 TABLET ORAL at 21:01

## 2024-01-01 RX ADMIN — NICOTINE 21 MG: 21 PATCH, EXTENDED RELEASE TRANSDERMAL at 08:12

## 2024-01-01 RX ADMIN — LACOSAMIDE ORAL SOLUTION 200 MG: 10 SOLUTION ORAL at 08:58

## 2024-01-01 RX ADMIN — ENOXAPARIN SODIUM 30 MG: 30 INJECTION SUBCUTANEOUS at 06:17

## 2024-01-01 RX ADMIN — METHOCARBAMOL TABLETS 750 MG: 750 TABLET, COATED ORAL at 06:17

## 2024-01-01 RX ADMIN — SODIUM CHLORIDE SOLN NEBU 3% 4 ML: 3 NEBU SOLN at 08:06

## 2024-01-01 RX ADMIN — PROPOFOL 50 MCG/KG/MIN: 10 INJECTION, EMULSION INTRAVENOUS at 08:21

## 2024-01-01 RX ADMIN — Medication 100 MG: at 09:37

## 2024-01-01 RX ADMIN — VANCOMYCIN HYDROCHLORIDE 1750 MG: 1 INJECTION, POWDER, LYOPHILIZED, FOR SOLUTION INTRAVENOUS at 07:48

## 2024-01-01 RX ADMIN — QUETIAPINE FUMARATE 12.5 MG: 25 TABLET ORAL at 21:01

## 2024-01-01 RX ADMIN — OXYCODONE HYDROCHLORIDE 5 MG: 5 SOLUTION ORAL at 23:59

## 2024-01-01 RX ADMIN — LABETALOL HYDROCHLORIDE 10 MG: 5 INJECTION, SOLUTION INTRAVENOUS at 02:21

## 2024-01-01 RX ADMIN — Medication 15 ML: at 08:31

## 2024-01-01 RX ADMIN — GLYCERIN 1 DROP: .002; .002; .01 SOLUTION/ DROPS OPHTHALMIC at 17:52

## 2024-01-01 RX ADMIN — ACETAMINOPHEN 650 MG: 650 SUSPENSION ORAL at 02:59

## 2024-01-01 RX ADMIN — LABETALOL HYDROCHLORIDE 20 MG: 5 INJECTION, SOLUTION INTRAVENOUS at 01:11

## 2024-01-01 RX ADMIN — LABETALOL HYDROCHLORIDE 10 MG: 5 INJECTION, SOLUTION INTRAVENOUS at 19:39

## 2024-01-01 RX ADMIN — IOHEXOL 85 ML: 350 INJECTION, SOLUTION INTRAVENOUS at 14:07

## 2024-01-01 RX ADMIN — ENOXAPARIN SODIUM 30 MG: 30 INJECTION SUBCUTANEOUS at 17:34

## 2024-01-01 RX ADMIN — MIDAZOLAM HYDROCHLORIDE 10 MG: 1 INJECTION, SOLUTION INTRAMUSCULAR; INTRAVENOUS at 15:29

## 2024-01-01 RX ADMIN — LACOSAMIDE ORAL SOLUTION 200 MG: 10 SOLUTION ORAL at 21:30

## 2024-01-01 RX ADMIN — PIPERACILLIN SODIUM AND TAZOBACTAM SODIUM 4.5 G: 36; 4.5 INJECTION, POWDER, LYOPHILIZED, FOR SOLUTION INTRAVENOUS at 22:28

## 2024-01-01 RX ADMIN — MULTIPLE VITAMINS W/ MINERALS TAB 1 TABLET: TAB ORAL at 09:06

## 2024-01-01 RX ADMIN — SODIUM CHLORIDE, SODIUM GLUCONATE, SODIUM ACETATE, POTASSIUM CHLORIDE, MAGNESIUM CHLORIDE, SODIUM PHOSPHATE, DIBASIC, AND POTASSIUM PHOSPHATE 1000 ML: .53; .5; .37; .037; .03; .012; .00082 INJECTION, SOLUTION INTRAVENOUS at 15:40

## 2024-01-01 RX ADMIN — LABETALOL HYDROCHLORIDE 10 MG: 5 INJECTION, SOLUTION INTRAVENOUS at 20:42

## 2024-01-01 RX ADMIN — LEVETIRACETAM 500 MG: 100 INJECTION, SOLUTION INTRAVENOUS at 08:09

## 2024-01-01 RX ADMIN — LEVETIRACETAM 2000 MG: 100 SOLUTION ORAL at 09:36

## 2024-06-05 ENCOUNTER — TELEPHONE (OUTPATIENT)
Dept: FAMILY MEDICINE CLINIC | Facility: CLINIC | Age: 54
End: 2024-06-05

## 2024-06-13 ENCOUNTER — APPOINTMENT (EMERGENCY)
Dept: RADIOLOGY | Facility: HOSPITAL | Age: 54
DRG: 870 | End: 2024-06-13
Payer: MEDICARE

## 2024-06-13 ENCOUNTER — APPOINTMENT (EMERGENCY)
Dept: CT IMAGING | Facility: HOSPITAL | Age: 54
DRG: 870 | End: 2024-06-13
Payer: MEDICARE

## 2024-06-13 ENCOUNTER — HOSPITAL ENCOUNTER (INPATIENT)
Facility: HOSPITAL | Age: 54
LOS: 14 days | Discharge: NURSING, REHAB OR LONG-TERM CARE | DRG: 870 | End: 2024-06-27
Attending: EMERGENCY MEDICINE | Admitting: INTERNAL MEDICINE
Payer: MEDICARE

## 2024-06-13 DIAGNOSIS — R65.10 SIRS (SYSTEMIC INFLAMMATORY RESPONSE SYNDROME) (HCC): ICD-10-CM

## 2024-06-13 DIAGNOSIS — Z98.890 S/P CRANIOTOMY: Chronic | ICD-10-CM

## 2024-06-13 DIAGNOSIS — J96.90 RESPIRATORY FAILURE (HCC): ICD-10-CM

## 2024-06-13 DIAGNOSIS — A41.9 SEPSIS (HCC): ICD-10-CM

## 2024-06-13 DIAGNOSIS — G93.40 ENCEPHALOPATHY: Primary | ICD-10-CM

## 2024-06-13 DIAGNOSIS — Z87.19 HISTORY OF GI BLEED: ICD-10-CM

## 2024-06-13 DIAGNOSIS — E87.29 HIGH ANION GAP METABOLIC ACIDOSIS: ICD-10-CM

## 2024-06-13 DIAGNOSIS — F32.A DEPRESSION, UNSPECIFIED DEPRESSION TYPE: Chronic | ICD-10-CM

## 2024-06-13 DIAGNOSIS — Z78.9 ALCOHOL USE: ICD-10-CM

## 2024-06-13 LAB
ALBUMIN SERPL BCG-MCNC: 5.7 G/DL (ref 3.5–5)
ALP SERPL-CCNC: 68 U/L (ref 34–104)
ALT SERPL W P-5'-P-CCNC: 21 U/L (ref 7–52)
ANION GAP SERPL CALCULATED.3IONS-SCNC: 21 MMOL/L (ref 4–13)
APAP SERPL-MCNC: <2 UG/ML (ref 10–20)
APTT PPP: 33 SECONDS (ref 23–37)
AST SERPL W P-5'-P-CCNC: 28 U/L (ref 13–39)
BACTERIA UR QL AUTO: ABNORMAL /HPF
BASE EX.OXY STD BLDV CALC-SCNC: 74.2 % (ref 60–80)
BASE EXCESS BLDA CALC-SCNC: -21.1 MMOL/L
BASE EXCESS BLDA CALC-SCNC: -23 MMOL/L (ref -2–3)
BASE EXCESS BLDA CALC-SCNC: -24 MMOL/L (ref -2–3)
BASE EXCESS BLDV CALC-SCNC: -24.2 MMOL/L
BASOPHILS # BLD MANUAL: 0 THOUSAND/UL (ref 0–0.1)
BASOPHILS NFR MAR MANUAL: 0 % (ref 0–1)
BILIRUB SERPL-MCNC: 0.64 MG/DL (ref 0.2–1)
BILIRUB UR QL STRIP: NEGATIVE
BUDDING YEAST: PRESENT
BUN SERPL-MCNC: 43 MG/DL (ref 5–25)
CA-I BLD-SCNC: 1.25 MMOL/L (ref 1.12–1.32)
CA-I BLD-SCNC: 1.28 MMOL/L (ref 1.12–1.32)
CALCIUM SERPL-MCNC: 9.5 MG/DL (ref 8.4–10.2)
CARDIAC TROPONIN I PNL SERPL HS: 20 NG/L
CHLORIDE SERPL-SCNC: 106 MMOL/L (ref 96–108)
CK SERPL-CCNC: 692 U/L (ref 39–308)
CLARITY UR: ABNORMAL
CO2 SERPL-SCNC: 9 MMOL/L (ref 21–32)
COLOR UR: ABNORMAL
CREAT SERPL-MCNC: 3.87 MG/DL (ref 0.6–1.3)
EOSINOPHIL # BLD MANUAL: 0 THOUSAND/UL (ref 0–0.4)
EOSINOPHIL NFR BLD MANUAL: 0 % (ref 0–6)
ERYTHROCYTE [DISTWIDTH] IN BLOOD BY AUTOMATED COUNT: 14.4 % (ref 11.6–15.1)
ETHANOL SERPL-MCNC: <10 MG/DL
GFR SERPL CREATININE-BSD FRML MDRD: 16 ML/MIN/1.73SQ M
GLUCOSE SERPL-MCNC: 189 MG/DL (ref 65–140)
GLUCOSE SERPL-MCNC: 198 MG/DL (ref 65–140)
GLUCOSE SERPL-MCNC: 203 MG/DL (ref 65–140)
GLUCOSE SERPL-MCNC: 246 MG/DL (ref 65–140)
GLUCOSE UR STRIP-MCNC: NEGATIVE MG/DL
GRAN CASTS #/AREA URNS LPF: ABNORMAL /[LPF]
HCO3 BLDA-SCNC: 6.9 MMOL/L (ref 22–28)
HCO3 BLDA-SCNC: 7.1 MMOL/L (ref 24–30)
HCO3 BLDA-SCNC: 7.2 MMOL/L (ref 24–30)
HCO3 BLDV-SCNC: 6.6 MMOL/L (ref 24–30)
HCT VFR BLD AUTO: 45.4 % (ref 36.5–49.3)
HCT VFR BLD CALC: 50 % (ref 36.5–49.3)
HCT VFR BLD CALC: 51 % (ref 36.5–49.3)
HGB BLD-MCNC: 15.6 G/DL (ref 12–17)
HGB BLDA-MCNC: 17 G/DL (ref 12–17)
HGB BLDA-MCNC: 17.3 G/DL (ref 12–17)
HGB UR QL STRIP.AUTO: ABNORMAL
INR PPP: 1 (ref 0.84–1.19)
KETONES UR STRIP-MCNC: ABNORMAL MG/DL
LACTATE SERPL-SCNC: 1.8 MMOL/L (ref 0.5–2)
LEUKOCYTE ESTERASE UR QL STRIP: NEGATIVE
LYMPHOCYTES # BLD AUTO: 0.31 THOUSAND/UL (ref 0.6–4.47)
LYMPHOCYTES # BLD AUTO: 2 % (ref 14–44)
MCH RBC QN AUTO: 39.7 PG (ref 26.8–34.3)
MCHC RBC AUTO-ENTMCNC: 34.4 G/DL (ref 31.4–37.4)
MCV RBC AUTO: 116 FL (ref 82–98)
MONOCYTES # BLD AUTO: 1.07 THOUSAND/UL (ref 0–1.22)
MONOCYTES NFR BLD: 7 % (ref 4–12)
NEUTROPHILS # BLD MANUAL: 13.94 THOUSAND/UL (ref 1.85–7.62)
NEUTS BAND NFR BLD MANUAL: 4 % (ref 0–8)
NEUTS SEG NFR BLD AUTO: 87 % (ref 43–75)
NITRITE UR QL STRIP: NEGATIVE
NON-SQ EPI CELLS URNS QL MICRO: ABNORMAL /HPF
O2 CT BLDA-SCNC: 19.6 ML/DL (ref 16–23)
O2 CT BLDV-SCNC: 16.2 ML/DL
OXYHGB MFR BLDA: 97.3 % (ref 94–97)
PCO2 BLD: 28.1 MM HG (ref 42–50)
PCO2 BLD: 30.4 MM HG (ref 42–50)
PCO2 BLD: 8 MMOL/L (ref 21–32)
PCO2 BLD: 8 MMOL/L (ref 21–32)
PCO2 BLDA: 22.9 MM HG (ref 36–44)
PCO2 BLDV: 29.2 MM HG (ref 42–50)
PH BLD: 6.98 [PH] (ref 7.3–7.4)
PH BLD: 7.01 [PH] (ref 7.3–7.4)
PH BLDA: 7.1 [PH] (ref 7.35–7.45)
PH BLDV: 6.97 [PH] (ref 7.3–7.4)
PH UR STRIP.AUTO: 5.5 [PH]
PLATELET # BLD AUTO: 273 THOUSANDS/UL (ref 149–390)
PLATELET BLD QL SMEAR: ADEQUATE
PMV BLD AUTO: 9 FL (ref 8.9–12.7)
PO2 BLD: 40 MM HG (ref 35–45)
PO2 BLD: 55 MM HG (ref 35–45)
PO2 BLDA: 213.7 MM HG (ref 75–129)
PO2 BLDV: 41.7 MM HG (ref 35–45)
POTASSIUM BLD-SCNC: 5 MMOL/L (ref 3.5–5.3)
POTASSIUM BLD-SCNC: 5.2 MMOL/L (ref 3.5–5.3)
POTASSIUM SERPL-SCNC: 5.1 MMOL/L (ref 3.5–5.3)
PROCALCITONIN SERPL-MCNC: 70.48 NG/ML
PROT SERPL-MCNC: 8.4 G/DL (ref 6.4–8.4)
PROT UR STRIP-MCNC: ABNORMAL MG/DL
PROTHROMBIN TIME: 13.8 SECONDS (ref 11.6–14.5)
RBC # BLD AUTO: 3.93 MILLION/UL (ref 3.88–5.62)
RBC #/AREA URNS AUTO: ABNORMAL /HPF
RBC CASTS URNS QL MICRO: ABNORMAL /LPF
RBC MORPH BLD: NORMAL
SALICYLATES SERPL-MCNC: <5 MG/DL (ref 3–20)
SAO2 % BLD FROM PO2: 51 % (ref 60–85)
SAO2 % BLD FROM PO2: 70 % (ref 60–85)
SODIUM BLD-SCNC: 136 MMOL/L (ref 136–145)
SODIUM BLD-SCNC: 136 MMOL/L (ref 136–145)
SODIUM SERPL-SCNC: 136 MMOL/L (ref 135–147)
SP GR UR STRIP.AUTO: 1.03 (ref 1–1.03)
SPECIMEN SOURCE: ABNORMAL
SPECIMEN SOURCE: ABNORMAL
UROBILINOGEN UR STRIP-ACNC: <2 MG/DL
WBC # BLD AUTO: 15.32 THOUSAND/UL (ref 4.31–10.16)
WBC #/AREA URNS AUTO: ABNORMAL /HPF

## 2024-06-13 PROCEDURE — 85014 HEMATOCRIT: CPT

## 2024-06-13 PROCEDURE — 81001 URINALYSIS AUTO W/SCOPE: CPT

## 2024-06-13 PROCEDURE — 74177 CT ABD & PELVIS W/CONTRAST: CPT

## 2024-06-13 PROCEDURE — 82077 ASSAY SPEC XCP UR&BREATH IA: CPT

## 2024-06-13 PROCEDURE — 82803 BLOOD GASES ANY COMBINATION: CPT

## 2024-06-13 PROCEDURE — 87040 BLOOD CULTURE FOR BACTERIA: CPT

## 2024-06-13 PROCEDURE — 5A1955Z RESPIRATORY VENTILATION, GREATER THAN 96 CONSECUTIVE HOURS: ICD-10-PCS | Performed by: INTERNAL MEDICINE

## 2024-06-13 PROCEDURE — 94760 N-INVAS EAR/PLS OXIMETRY 1: CPT

## 2024-06-13 PROCEDURE — 82550 ASSAY OF CK (CPK): CPT

## 2024-06-13 PROCEDURE — 71045 X-RAY EXAM CHEST 1 VIEW: CPT

## 2024-06-13 PROCEDURE — 85027 COMPLETE CBC AUTOMATED: CPT

## 2024-06-13 PROCEDURE — 80179 DRUG ASSAY SALICYLATE: CPT

## 2024-06-13 PROCEDURE — 82947 ASSAY GLUCOSE BLOOD QUANT: CPT

## 2024-06-13 PROCEDURE — 84484 ASSAY OF TROPONIN QUANT: CPT

## 2024-06-13 PROCEDURE — 80053 COMPREHEN METABOLIC PANEL: CPT

## 2024-06-13 PROCEDURE — 99291 CRITICAL CARE FIRST HOUR: CPT | Performed by: EMERGENCY MEDICINE

## 2024-06-13 PROCEDURE — 94002 VENT MGMT INPAT INIT DAY: CPT

## 2024-06-13 PROCEDURE — 85007 BL SMEAR W/DIFF WBC COUNT: CPT

## 2024-06-13 PROCEDURE — 99285 EMERGENCY DEPT VISIT HI MDM: CPT

## 2024-06-13 PROCEDURE — 84132 ASSAY OF SERUM POTASSIUM: CPT

## 2024-06-13 PROCEDURE — 82948 REAGENT STRIP/BLOOD GLUCOSE: CPT

## 2024-06-13 PROCEDURE — 72125 CT NECK SPINE W/O DYE: CPT

## 2024-06-13 PROCEDURE — 31500 INSERT EMERGENCY AIRWAY: CPT | Performed by: EMERGENCY MEDICINE

## 2024-06-13 PROCEDURE — 93005 ELECTROCARDIOGRAM TRACING: CPT

## 2024-06-13 PROCEDURE — 84295 ASSAY OF SERUM SODIUM: CPT

## 2024-06-13 PROCEDURE — 71260 CT THORAX DX C+: CPT

## 2024-06-13 PROCEDURE — 82805 BLOOD GASES W/O2 SATURATION: CPT | Performed by: EMERGENCY MEDICINE

## 2024-06-13 PROCEDURE — 85730 THROMBOPLASTIN TIME PARTIAL: CPT

## 2024-06-13 PROCEDURE — 0BH17EZ INSERTION OF ENDOTRACHEAL AIRWAY INTO TRACHEA, VIA NATURAL OR ARTIFICIAL OPENING: ICD-10-PCS | Performed by: INTERNAL MEDICINE

## 2024-06-13 PROCEDURE — 83605 ASSAY OF LACTIC ACID: CPT

## 2024-06-13 PROCEDURE — 82805 BLOOD GASES W/O2 SATURATION: CPT

## 2024-06-13 PROCEDURE — 85610 PROTHROMBIN TIME: CPT

## 2024-06-13 PROCEDURE — 82010 KETONE BODYS QUAN: CPT

## 2024-06-13 PROCEDURE — 84145 PROCALCITONIN (PCT): CPT

## 2024-06-13 PROCEDURE — 36415 COLL VENOUS BLD VENIPUNCTURE: CPT

## 2024-06-13 PROCEDURE — 82330 ASSAY OF CALCIUM: CPT

## 2024-06-13 PROCEDURE — 82140 ASSAY OF AMMONIA: CPT | Performed by: EMERGENCY MEDICINE

## 2024-06-13 PROCEDURE — 70450 CT HEAD/BRAIN W/O DYE: CPT

## 2024-06-13 PROCEDURE — 80143 DRUG ASSAY ACETAMINOPHEN: CPT

## 2024-06-13 PROCEDURE — 96360 HYDRATION IV INFUSION INIT: CPT

## 2024-06-13 RX ORDER — SUCCINYLCHOLINE/SOD CL,ISO/PF 100 MG/5ML
100 SYRINGE (ML) INTRAVENOUS ONCE
Status: COMPLETED | OUTPATIENT
Start: 2024-06-13 | End: 2024-06-13

## 2024-06-13 RX ORDER — PROPOFOL 10 MG/ML
5-50 INJECTION, EMULSION INTRAVENOUS
Status: DISCONTINUED | OUTPATIENT
Start: 2024-06-13 | End: 2024-06-21

## 2024-06-13 RX ORDER — METRONIDAZOLE 500 MG/100ML
500 INJECTION, SOLUTION INTRAVENOUS ONCE
Status: COMPLETED | OUTPATIENT
Start: 2024-06-13 | End: 2024-06-14

## 2024-06-13 RX ORDER — ENOXAPARIN SODIUM 100 MG/ML
40 INJECTION SUBCUTANEOUS DAILY
Status: DISCONTINUED | OUTPATIENT
Start: 2024-06-14 | End: 2024-06-14

## 2024-06-13 RX ORDER — CHLORHEXIDINE GLUCONATE ORAL RINSE 1.2 MG/ML
15 SOLUTION DENTAL EVERY 12 HOURS SCHEDULED
Status: DISCONTINUED | OUTPATIENT
Start: 2024-06-13 | End: 2024-06-21

## 2024-06-13 RX ORDER — MIDAZOLAM HYDROCHLORIDE 2 MG/2ML
2 INJECTION, SOLUTION INTRAMUSCULAR; INTRAVENOUS ONCE
Status: COMPLETED | OUTPATIENT
Start: 2024-06-13 | End: 2024-06-13

## 2024-06-13 RX ORDER — MIDAZOLAM HYDROCHLORIDE 2 MG/2ML
5 INJECTION, SOLUTION INTRAMUSCULAR; INTRAVENOUS ONCE
Status: COMPLETED | OUTPATIENT
Start: 2024-06-13 | End: 2024-06-13

## 2024-06-13 RX ORDER — SODIUM CHLORIDE, SODIUM GLUCONATE, SODIUM ACETATE, POTASSIUM CHLORIDE, MAGNESIUM CHLORIDE, SODIUM PHOSPHATE, DIBASIC, AND POTASSIUM PHOSPHATE .53; .5; .37; .037; .03; .012; .00082 G/100ML; G/100ML; G/100ML; G/100ML; G/100ML; G/100ML; G/100ML
1000 INJECTION, SOLUTION INTRAVENOUS ONCE
Status: COMPLETED | OUTPATIENT
Start: 2024-06-13 | End: 2024-06-14

## 2024-06-13 RX ORDER — PROPOFOL 10 MG/ML
40 INJECTION, EMULSION INTRAVENOUS ONCE
Status: COMPLETED | OUTPATIENT
Start: 2024-06-13 | End: 2024-06-13

## 2024-06-13 RX ORDER — KETAMINE HCL IN NACL, ISO-OSM 100MG/10ML
100 SYRINGE (ML) INJECTION ONCE
Status: COMPLETED | OUTPATIENT
Start: 2024-06-13 | End: 2024-06-13

## 2024-06-13 RX ORDER — MIDAZOLAM HYDROCHLORIDE 2 MG/2ML
INJECTION, SOLUTION INTRAMUSCULAR; INTRAVENOUS
Status: COMPLETED
Start: 2024-06-13 | End: 2024-06-13

## 2024-06-13 RX ADMIN — MIDAZOLAM 5 MG: 1 INJECTION INTRAMUSCULAR; INTRAVENOUS at 21:27

## 2024-06-13 RX ADMIN — IOHEXOL 100 ML: 350 INJECTION, SOLUTION INTRAVENOUS at 22:00

## 2024-06-13 RX ADMIN — CEFEPIME 1000 MG: 1 INJECTION, POWDER, FOR SOLUTION INTRAMUSCULAR; INTRAVENOUS at 23:25

## 2024-06-13 RX ADMIN — VANCOMYCIN HYDROCHLORIDE 1500 MG: 5 INJECTION, POWDER, LYOPHILIZED, FOR SOLUTION INTRAVENOUS at 23:15

## 2024-06-13 RX ADMIN — PROPOFOL 20 MCG/KG/MIN: 10 INJECTION, EMULSION INTRAVENOUS at 22:23

## 2024-06-13 RX ADMIN — Medication 100 MG: at 22:15

## 2024-06-13 RX ADMIN — MIDAZOLAM 2 MG: 1 INJECTION INTRAMUSCULAR; INTRAVENOUS at 23:03

## 2024-06-13 RX ADMIN — MIDAZOLAM HYDROCHLORIDE 5 MG: 2 INJECTION, SOLUTION INTRAMUSCULAR; INTRAVENOUS at 21:27

## 2024-06-13 RX ADMIN — PROPOFOL 40 MG: 10 INJECTION, EMULSION INTRAVENOUS at 22:41

## 2024-06-13 RX ADMIN — PROPOFOL 40 MG: 10 INJECTION, EMULSION INTRAVENOUS at 22:25

## 2024-06-13 RX ADMIN — SODIUM CHLORIDE, SODIUM GLUCONATE, SODIUM ACETATE, POTASSIUM CHLORIDE, MAGNESIUM CHLORIDE, SODIUM PHOSPHATE, DIBASIC, AND POTASSIUM PHOSPHATE 1000 ML: .53; .5; .37; .037; .03; .012; .00082 INJECTION, SOLUTION INTRAVENOUS at 23:45

## 2024-06-13 RX ADMIN — SODIUM CHLORIDE 1000 ML: 0.9 INJECTION, SOLUTION INTRAVENOUS at 21:42

## 2024-06-13 RX ADMIN — Medication 100 MG: at 22:16

## 2024-06-14 PROBLEM — Z78.9 ALCOHOL USE: Status: ACTIVE | Noted: 2024-06-14

## 2024-06-14 PROBLEM — J96.01 ACUTE RESPIRATORY FAILURE WITH HYPOXIA (HCC): Status: ACTIVE | Noted: 2024-01-01

## 2024-06-14 PROBLEM — R65.10 SIRS (SYSTEMIC INFLAMMATORY RESPONSE SYNDROME) (HCC): Status: ACTIVE | Noted: 2024-01-01

## 2024-06-14 PROBLEM — J96.01 ACUTE RESPIRATORY FAILURE WITH HYPOXIA (HCC): Status: ACTIVE | Noted: 2024-06-14

## 2024-06-14 PROBLEM — G93.41 METABOLIC ENCEPHALOPATHY: Status: ACTIVE | Noted: 2022-07-11

## 2024-06-14 PROBLEM — R65.10 SIRS (SYSTEMIC INFLAMMATORY RESPONSE SYNDROME) (HCC): Status: ACTIVE | Noted: 2024-06-14

## 2024-06-14 PROBLEM — Z78.9 ALCOHOL USE: Status: ACTIVE | Noted: 2024-01-01

## 2024-06-14 LAB
2HR DELTA HS TROPONIN: 2 NG/L
4HR DELTA HS TROPONIN: -4 NG/L
ALBUMIN SERPL BCG-MCNC: 3.9 G/DL (ref 3.5–5)
ALP SERPL-CCNC: 45 U/L (ref 34–104)
ALT SERPL W P-5'-P-CCNC: 16 U/L (ref 7–52)
AMMONIA PLAS-SCNC: 52 UMOL/L (ref 18–72)
AMPHETAMINES SERPL QL SCN: NEGATIVE
ANCILLARY VALUES: ABNORMAL
ANION GAP SERPL CALCULATED.3IONS-SCNC: 13 MMOL/L (ref 4–13)
ANION GAP SERPL CALCULATED.3IONS-SCNC: 19 MMOL/L (ref 4–13)
ANION GAP SERPL CALCULATED.3IONS-SCNC: 7 MMOL/L (ref 4–13)
ANION GAP SERPL CALCULATED.3IONS-SCNC: 9 MMOL/L (ref 4–13)
APTT PPP: 42 SECONDS (ref 23–37)
ARTERIAL PATENCY WRIST A: ABNORMAL
AST SERPL W P-5'-P-CCNC: 24 U/L (ref 13–39)
ATRIAL RATE: 115 BPM
B-OH-BUTYR SERPL-MCNC: 5.72 MMOL/L (ref 0.02–0.27)
BARBITURATES UR QL: NEGATIVE
BASE EX.OXY STD BLDV CALC-SCNC: 60 % (ref 60–80)
BASE EX.OXY STD BLDV CALC-SCNC: 68.3 % (ref 60–80)
BASE EXCESS BLDA CALC-SCNC: -3 MMOL/L (ref -2–3)
BASE EXCESS BLDA CALC-SCNC: -3.3 MMOL/L
BASE EXCESS BLDV CALC-SCNC: -15.9 MMOL/L
BASE EXCESS BLDV CALC-SCNC: -19.4 MMOL/L
BASOPHILS # BLD AUTO: 0.01 THOUSANDS/ÂΜL (ref 0–0.1)
BASOPHILS NFR BLD AUTO: 0 % (ref 0–1)
BENZODIAZ UR QL: POSITIVE
BILIRUB SERPL-MCNC: 0.38 MG/DL (ref 0.2–1)
BUN SERPL-MCNC: 25 MG/DL (ref 5–25)
BUN SERPL-MCNC: 26 MG/DL (ref 5–25)
BUN SERPL-MCNC: 33 MG/DL (ref 5–25)
BUN SERPL-MCNC: 35 MG/DL (ref 5–25)
CA-I BLD-SCNC: 0.82 MMOL/L (ref 1.12–1.32)
CA-I BLD-SCNC: 1.01 MMOL/L (ref 1.12–1.32)
CA-I BLD-SCNC: 1.02 MMOL/L (ref 1.12–1.32)
CA-I BLD-SCNC: 1.06 MMOL/L (ref 1.12–1.32)
CA-I BLD-SCNC: 1.12 MMOL/L (ref 1.12–1.32)
CA-I BLD-SCNC: 1.17 MMOL/L (ref 1.12–1.32)
CALCIUM SERPL-MCNC: 7.4 MG/DL (ref 8.4–10.2)
CALCIUM SERPL-MCNC: 7.4 MG/DL (ref 8.4–10.2)
CALCIUM SERPL-MCNC: 7.5 MG/DL (ref 8.4–10.2)
CALCIUM SERPL-MCNC: 8.4 MG/DL (ref 8.4–10.2)
CARDIAC TROPONIN I PNL SERPL HS: 16 NG/L
CARDIAC TROPONIN I PNL SERPL HS: 22 NG/L
CHLORIDE SERPL-SCNC: 107 MMOL/L (ref 96–108)
CHLORIDE SERPL-SCNC: 111 MMOL/L (ref 96–108)
CHLORIDE SERPL-SCNC: 113 MMOL/L (ref 96–108)
CHLORIDE SERPL-SCNC: 113 MMOL/L (ref 96–108)
CK SERPL-CCNC: 678 U/L (ref 39–308)
CO2 SERPL-SCNC: 12 MMOL/L (ref 21–32)
CO2 SERPL-SCNC: 15 MMOL/L (ref 21–32)
CO2 SERPL-SCNC: 20 MMOL/L (ref 21–32)
CO2 SERPL-SCNC: 21 MMOL/L (ref 21–32)
COCAINE UR QL: NEGATIVE
CORTIS SERPL-MCNC: 19.7 UG/DL
CREAT SERPL-MCNC: 0.95 MG/DL (ref 0.6–1.3)
CREAT SERPL-MCNC: 1 MG/DL (ref 0.6–1.3)
CREAT SERPL-MCNC: 1.45 MG/DL (ref 0.6–1.3)
CREAT SERPL-MCNC: 2.03 MG/DL (ref 0.6–1.3)
DS:DELIVERY SYSTEM: AC
EOSINOPHIL # BLD AUTO: 0.02 THOUSAND/ÂΜL (ref 0–0.61)
EOSINOPHIL NFR BLD AUTO: 0 % (ref 0–6)
ERYTHROCYTE [DISTWIDTH] IN BLOOD BY AUTOMATED COUNT: 15.1 % (ref 11.6–15.1)
EST. AVERAGE GLUCOSE BLD GHB EST-MCNC: 103 MG/DL
ETHANOL SERPL-MCNC: <10 MG/DL
FENTANYL UR QL SCN: NEGATIVE
FIO2 GAS DIL.REBREATH: 40 L
GFR SERPL CREATININE-BSD FRML MDRD: 35 ML/MIN/1.73SQ M
GFR SERPL CREATININE-BSD FRML MDRD: 53 ML/MIN/1.73SQ M
GFR SERPL CREATININE-BSD FRML MDRD: 83 ML/MIN/1.73SQ M
GFR SERPL CREATININE-BSD FRML MDRD: 89 ML/MIN/1.73SQ M
GLUCOSE SERPL-MCNC: 101 MG/DL (ref 65–140)
GLUCOSE SERPL-MCNC: 110 MG/DL (ref 65–140)
GLUCOSE SERPL-MCNC: 122 MG/DL (ref 65–140)
GLUCOSE SERPL-MCNC: 126 MG/DL (ref 65–140)
GLUCOSE SERPL-MCNC: 137 MG/DL (ref 65–140)
GLUCOSE SERPL-MCNC: 138 MG/DL (ref 65–140)
GLUCOSE SERPL-MCNC: 139 MG/DL (ref 65–140)
GLUCOSE SERPL-MCNC: 155 MG/DL (ref 65–140)
GLUCOSE SERPL-MCNC: 157 MG/DL (ref 65–140)
GLUCOSE SERPL-MCNC: 177 MG/DL (ref 65–140)
GLUCOSE SERPL-MCNC: 177 MG/DL (ref 65–140)
GLUCOSE SERPL-MCNC: 188 MG/DL (ref 65–140)
GLUCOSE SERPL-MCNC: 194 MG/DL (ref 65–140)
GLUCOSE SERPL-MCNC: 205 MG/DL (ref 65–140)
GLUCOSE SERPL-MCNC: 244 MG/DL (ref 65–140)
GLUCOSE SERPL-MCNC: 245 MG/DL (ref 65–140)
GLUCOSE SERPL-MCNC: 274 MG/DL (ref 65–140)
GLUCOSE SERPL-MCNC: 49 MG/DL (ref 65–140)
GLUCOSE SERPL-MCNC: 54 MG/DL (ref 65–140)
GLUCOSE SERPL-MCNC: 79 MG/DL (ref 65–140)
GLUCOSE SERPL-MCNC: >600 MG/DL (ref 65–140)
HBA1C MFR BLD: 5.2 %
HCO3 BLDA-SCNC: 17.7 MMOL/L (ref 22–28)
HCO3 BLDA-SCNC: 20 MMOL/L (ref 22–28)
HCO3 BLDV-SCNC: 10.9 MMOL/L (ref 24–30)
HCO3 BLDV-SCNC: 9.5 MMOL/L (ref 24–30)
HCT VFR BLD AUTO: 35.8 % (ref 36.5–49.3)
HCT VFR BLD CALC: 29 % (ref 36.5–49.3)
HGB BLD-MCNC: 12.2 G/DL (ref 12–17)
HGB BLDA-MCNC: 9.9 G/DL (ref 12–17)
HYDROCODONE UR QL SCN: NEGATIVE
IMM GRANULOCYTES # BLD AUTO: 0.09 THOUSAND/UL (ref 0–0.2)
IMM GRANULOCYTES NFR BLD AUTO: 1 % (ref 0–2)
INR PPP: 1.34 (ref 0.84–1.19)
LYMPHOCYTES # BLD AUTO: 0.26 THOUSANDS/ÂΜL (ref 0.6–4.47)
LYMPHOCYTES NFR BLD AUTO: 3 % (ref 14–44)
MAGNESIUM SERPL-MCNC: 2 MG/DL (ref 1.9–2.7)
MAGNESIUM SERPL-MCNC: 2 MG/DL (ref 1.9–2.7)
MAGNESIUM SERPL-MCNC: 2.4 MG/DL (ref 1.9–2.7)
MAGNESIUM SERPL-MCNC: 2.7 MG/DL (ref 1.9–2.7)
MCH RBC QN AUTO: 38.9 PG (ref 26.8–34.3)
MCHC RBC AUTO-ENTMCNC: 33.8 G/DL (ref 31.4–37.4)
MCV RBC AUTO: 115 FL (ref 82–98)
METHADONE UR QL: NEGATIVE
MONOCYTES # BLD AUTO: 0.71 THOUSAND/ÂΜL (ref 0.17–1.22)
MONOCYTES NFR BLD AUTO: 7 % (ref 4–12)
NEUTROPHILS # BLD AUTO: 8.99 THOUSANDS/ÂΜL (ref 1.85–7.62)
NEUTS SEG NFR BLD AUTO: 89 % (ref 43–75)
NRBC BLD AUTO-RTO: 0 /100 WBCS
O2 CT BLDA-SCNC: 14.5 ML/DL (ref 16–23)
O2 CT BLDV-SCNC: 11.3 ML/DL
O2 CT BLDV-SCNC: 12.4 ML/DL
OPIATES UR QL SCN: NEGATIVE
OXYCODONE+OXYMORPHONE UR QL SCN: NEGATIVE
OXYHGB MFR BLDA: 98 % (ref 94–97)
P AXIS: 84 DEGREES
PCO2 BLD: 21 MMOL/L (ref 21–32)
PCO2 BLD: 28.6 MM HG (ref 36–44)
PCO2 BLDA: 21 MM HG (ref 36–44)
PCO2 BLDV: 29.1 MM HG (ref 42–50)
PCO2 BLDV: 33.1 MM HG (ref 42–50)
PCP UR QL: NEGATIVE
PH BLD: 7.45 [PH] (ref 7.35–7.45)
PH BLDA: 7.54 [PH] (ref 7.35–7.45)
PH BLDV: 7.08 [PH] (ref 7.3–7.4)
PH BLDV: 7.19 [PH] (ref 7.3–7.4)
PHOSPHATE SERPL-MCNC: 1.2 MG/DL (ref 2.7–4.5)
PHOSPHATE SERPL-MCNC: 2.1 MG/DL (ref 2.7–4.5)
PHOSPHATE SERPL-MCNC: 2.7 MG/DL (ref 2.7–4.5)
PHOSPHATE SERPL-MCNC: <1 MG/DL (ref 2.7–4.5)
PLATELET # BLD AUTO: 162 THOUSANDS/UL (ref 149–390)
PMV BLD AUTO: 11.2 FL (ref 8.9–12.7)
PO2 BLD: 135 MM HG (ref 75–129)
PO2 BLDA: 200.9 MM HG (ref 75–129)
PO2 BLDV: 28.6 MM HG (ref 35–45)
PO2 BLDV: 35.8 MM HG (ref 35–45)
POTASSIUM BLD-SCNC: 3 MMOL/L (ref 3.5–5.3)
POTASSIUM SERPL-SCNC: 3.1 MMOL/L (ref 3.5–5.3)
POTASSIUM SERPL-SCNC: 3.2 MMOL/L (ref 3.5–5.3)
POTASSIUM SERPL-SCNC: 3.4 MMOL/L (ref 3.5–5.3)
POTASSIUM SERPL-SCNC: 4.4 MMOL/L (ref 3.5–5.3)
PR INTERVAL: 154 MS
PRESSURE SETTING: 6
PROCALCITONIN SERPL-MCNC: 23.62 NG/ML
PROT SERPL-MCNC: 5.9 G/DL (ref 6.4–8.4)
PROTHROMBIN TIME: 17.3 SECONDS (ref 11.6–14.5)
QRS AXIS: 60 DEGREES
QRSD INTERVAL: 76 MS
QT INTERVAL: 316 MS
QTC INTERVAL: 437 MS
RBC # BLD AUTO: 3.11 MILLION/UL (ref 3.88–5.62)
RESPIRATORY RATE: 26
SAMPLE SITE: ABNORMAL
SAO2 % BLD FROM PO2: 99 % (ref 60–85)
SODIUM BLD-SCNC: 143 MMOL/L (ref 136–145)
SODIUM SERPL-SCNC: 138 MMOL/L (ref 135–147)
SODIUM SERPL-SCNC: 139 MMOL/L (ref 135–147)
SODIUM SERPL-SCNC: 141 MMOL/L (ref 135–147)
SODIUM SERPL-SCNC: 142 MMOL/L (ref 135–147)
SPECIMEN SOURCE: ABNORMAL
SPECIMEN SOURCE: ABNORMAL
T WAVE AXIS: 73 DEGREES
THC UR QL: NEGATIVE
VANCOMYCIN SERPL-MCNC: 7.8 UG/ML (ref 10–20)
VENT TYPE: ABNORMAL
VENTILATION VALUE: 380
VENTRICULAR RATE: 115 BPM
WBC # BLD AUTO: 10.08 THOUSAND/UL (ref 4.31–10.16)

## 2024-06-14 PROCEDURE — 80307 DRUG TEST PRSMV CHEM ANLYZR: CPT

## 2024-06-14 PROCEDURE — 84132 ASSAY OF SERUM POTASSIUM: CPT

## 2024-06-14 PROCEDURE — 93010 ELECTROCARDIOGRAM REPORT: CPT | Performed by: INTERNAL MEDICINE

## 2024-06-14 PROCEDURE — 83036 HEMOGLOBIN GLYCOSYLATED A1C: CPT

## 2024-06-14 PROCEDURE — 82330 ASSAY OF CALCIUM: CPT

## 2024-06-14 PROCEDURE — 84100 ASSAY OF PHOSPHORUS: CPT

## 2024-06-14 PROCEDURE — 80048 BASIC METABOLIC PNL TOTAL CA: CPT

## 2024-06-14 PROCEDURE — 83735 ASSAY OF MAGNESIUM: CPT

## 2024-06-14 PROCEDURE — 94760 N-INVAS EAR/PLS OXIMETRY 1: CPT

## 2024-06-14 PROCEDURE — 80320 DRUG SCREEN QUANTALCOHOLS: CPT

## 2024-06-14 PROCEDURE — 82077 ASSAY SPEC XCP UR&BREATH IA: CPT

## 2024-06-14 PROCEDURE — 84484 ASSAY OF TROPONIN QUANT: CPT

## 2024-06-14 PROCEDURE — 82805 BLOOD GASES W/O2 SATURATION: CPT

## 2024-06-14 PROCEDURE — 82533 TOTAL CORTISOL: CPT

## 2024-06-14 PROCEDURE — 84295 ASSAY OF SERUM SODIUM: CPT

## 2024-06-14 PROCEDURE — 99223 1ST HOSP IP/OBS HIGH 75: CPT | Performed by: INTERNAL MEDICINE

## 2024-06-14 PROCEDURE — 82947 ASSAY GLUCOSE BLOOD QUANT: CPT

## 2024-06-14 PROCEDURE — 80202 ASSAY OF VANCOMYCIN: CPT | Performed by: INTERNAL MEDICINE

## 2024-06-14 PROCEDURE — 94150 VITAL CAPACITY TEST: CPT

## 2024-06-14 PROCEDURE — 36600 WITHDRAWAL OF ARTERIAL BLOOD: CPT

## 2024-06-14 PROCEDURE — 82948 REAGENT STRIP/BLOOD GLUCOSE: CPT

## 2024-06-14 PROCEDURE — 82693 ASSAY OF ETHYLENE GLYCOL: CPT

## 2024-06-14 PROCEDURE — 85730 THROMBOPLASTIN TIME PARTIAL: CPT | Performed by: EMERGENCY MEDICINE

## 2024-06-14 PROCEDURE — 82330 ASSAY OF CALCIUM: CPT | Performed by: EMERGENCY MEDICINE

## 2024-06-14 PROCEDURE — 80053 COMPREHEN METABOLIC PANEL: CPT | Performed by: EMERGENCY MEDICINE

## 2024-06-14 PROCEDURE — 82803 BLOOD GASES ANY COMBINATION: CPT

## 2024-06-14 PROCEDURE — 82550 ASSAY OF CK (CPK): CPT

## 2024-06-14 PROCEDURE — 94003 VENT MGMT INPAT SUBQ DAY: CPT

## 2024-06-14 PROCEDURE — 85025 COMPLETE CBC W/AUTO DIFF WBC: CPT | Performed by: EMERGENCY MEDICINE

## 2024-06-14 PROCEDURE — 85610 PROTHROMBIN TIME: CPT | Performed by: EMERGENCY MEDICINE

## 2024-06-14 PROCEDURE — 83735 ASSAY OF MAGNESIUM: CPT | Performed by: EMERGENCY MEDICINE

## 2024-06-14 PROCEDURE — 85014 HEMATOCRIT: CPT

## 2024-06-14 PROCEDURE — 99449 NTRPROF PH1/NTRNET/EHR 31/>: CPT | Performed by: EMERGENCY MEDICINE

## 2024-06-14 PROCEDURE — 84100 ASSAY OF PHOSPHORUS: CPT | Performed by: EMERGENCY MEDICINE

## 2024-06-14 PROCEDURE — 84145 PROCALCITONIN (PCT): CPT | Performed by: EMERGENCY MEDICINE

## 2024-06-14 RX ORDER — FENTANYL CITRATE-0.9 % NACL/PF 10 MCG/ML
75 PLASTIC BAG, INJECTION (ML) INTRAVENOUS CONTINUOUS
Status: DISCONTINUED | OUTPATIENT
Start: 2024-06-14 | End: 2024-06-14 | Stop reason: SDUPTHER

## 2024-06-14 RX ORDER — MIDAZOLAM HYDROCHLORIDE 2 MG/2ML
4 INJECTION, SOLUTION INTRAMUSCULAR; INTRAVENOUS ONCE
Status: COMPLETED | OUTPATIENT
Start: 2024-06-14 | End: 2024-06-14

## 2024-06-14 RX ORDER — MIDAZOLAM HYDROCHLORIDE 2 MG/2ML
2 INJECTION, SOLUTION INTRAMUSCULAR; INTRAVENOUS ONCE
Status: COMPLETED | OUTPATIENT
Start: 2024-06-14 | End: 2024-06-14

## 2024-06-14 RX ORDER — MIDAZOLAM HYDROCHLORIDE 2 MG/2ML
2 INJECTION, SOLUTION INTRAMUSCULAR; INTRAVENOUS EVERY 4 HOURS PRN
Status: DISPENSED | OUTPATIENT
Start: 2024-06-14 | End: 2024-06-15

## 2024-06-14 RX ORDER — MIDAZOLAM HYDROCHLORIDE 2 MG/2ML
5 INJECTION, SOLUTION INTRAMUSCULAR; INTRAVENOUS ONCE
Status: COMPLETED | OUTPATIENT
Start: 2024-06-14 | End: 2024-06-14

## 2024-06-14 RX ORDER — METRONIDAZOLE 500 MG/100ML
500 INJECTION, SOLUTION INTRAVENOUS EVERY 8 HOURS
Status: DISCONTINUED | OUTPATIENT
Start: 2024-06-14 | End: 2024-06-16

## 2024-06-14 RX ORDER — POLYETHYLENE GLYCOL 3350 17 G/17G
17 POWDER, FOR SOLUTION ORAL DAILY
Status: DISCONTINUED | OUTPATIENT
Start: 2024-06-14 | End: 2024-06-27 | Stop reason: HOSPADM

## 2024-06-14 RX ORDER — DEXTROSE, SODIUM CHLORIDE, SODIUM LACTATE, POTASSIUM CHLORIDE, AND CALCIUM CHLORIDE 5; .6; .31; .03; .02 G/100ML; G/100ML; G/100ML; G/100ML; G/100ML
100 INJECTION, SOLUTION INTRAVENOUS CONTINUOUS
Status: DISCONTINUED | OUTPATIENT
Start: 2024-06-14 | End: 2024-06-14

## 2024-06-14 RX ORDER — SODIUM CHLORIDE, SODIUM GLUCONATE, SODIUM ACETATE, POTASSIUM CHLORIDE, MAGNESIUM CHLORIDE, SODIUM PHOSPHATE, DIBASIC, AND POTASSIUM PHOSPHATE .53; .5; .37; .037; .03; .012; .00082 G/100ML; G/100ML; G/100ML; G/100ML; G/100ML; G/100ML; G/100ML
125 INJECTION, SOLUTION INTRAVENOUS CONTINUOUS
Status: DISCONTINUED | OUTPATIENT
Start: 2024-06-14 | End: 2024-06-14

## 2024-06-14 RX ORDER — CHLORHEXIDINE GLUCONATE ORAL RINSE 1.2 MG/ML
15 SOLUTION DENTAL EVERY 12 HOURS SCHEDULED
Status: DISCONTINUED | OUTPATIENT
Start: 2024-06-14 | End: 2024-06-14

## 2024-06-14 RX ORDER — FENTANYL CITRATE 50 UG/ML
50 INJECTION, SOLUTION INTRAMUSCULAR; INTRAVENOUS
Status: DISCONTINUED | OUTPATIENT
Start: 2024-06-14 | End: 2024-06-21

## 2024-06-14 RX ORDER — CALCIUM GLUCONATE 20 MG/ML
2 INJECTION, SOLUTION INTRAVENOUS ONCE
Status: COMPLETED | OUTPATIENT
Start: 2024-06-14 | End: 2024-06-14

## 2024-06-14 RX ORDER — HEPARIN SODIUM 5000 [USP'U]/ML
5000 INJECTION, SOLUTION INTRAVENOUS; SUBCUTANEOUS EVERY 8 HOURS SCHEDULED
Status: DISCONTINUED | OUTPATIENT
Start: 2024-06-14 | End: 2024-06-17

## 2024-06-14 RX ORDER — POTASSIUM CHLORIDE 14.9 MG/ML
20 INJECTION INTRAVENOUS ONCE
Status: COMPLETED | OUTPATIENT
Start: 2024-06-14 | End: 2024-06-14

## 2024-06-14 RX ORDER — SODIUM CHLORIDE, SODIUM GLUCONATE, SODIUM ACETATE, POTASSIUM CHLORIDE, MAGNESIUM CHLORIDE, SODIUM PHOSPHATE, DIBASIC, AND POTASSIUM PHOSPHATE .53; .5; .37; .037; .03; .012; .00082 G/100ML; G/100ML; G/100ML; G/100ML; G/100ML; G/100ML; G/100ML
250 INJECTION, SOLUTION INTRAVENOUS CONTINUOUS
Status: DISCONTINUED | OUTPATIENT
Start: 2024-06-14 | End: 2024-06-14

## 2024-06-14 RX ORDER — MIDAZOLAM HYDROCHLORIDE 2 MG/2ML
INJECTION, SOLUTION INTRAMUSCULAR; INTRAVENOUS
Status: COMPLETED
Start: 2024-06-14 | End: 2024-06-14

## 2024-06-14 RX ORDER — VANCOMYCIN HYDROCHLORIDE 1 G/200ML
15 INJECTION, SOLUTION INTRAVENOUS ONCE
Status: COMPLETED | OUTPATIENT
Start: 2024-06-14 | End: 2024-06-15

## 2024-06-14 RX ORDER — FENTANYL CITRATE-0.9 % NACL/PF 10 MCG/ML
25 PLASTIC BAG, INJECTION (ML) INTRAVENOUS CONTINUOUS
Status: DISCONTINUED | OUTPATIENT
Start: 2024-06-14 | End: 2024-06-19

## 2024-06-14 RX ORDER — DEXTROSE MONOHYDRATE 25 G/50ML
12.5 INJECTION, SOLUTION INTRAVENOUS ONCE
Status: COMPLETED | OUTPATIENT
Start: 2024-06-14 | End: 2024-06-14

## 2024-06-14 RX ORDER — SODIUM CHLORIDE, SODIUM GLUCONATE, SODIUM ACETATE, POTASSIUM CHLORIDE, MAGNESIUM CHLORIDE, SODIUM PHOSPHATE, DIBASIC, AND POTASSIUM PHOSPHATE .53; .5; .37; .037; .03; .012; .00082 G/100ML; G/100ML; G/100ML; G/100ML; G/100ML; G/100ML; G/100ML
1000 INJECTION, SOLUTION INTRAVENOUS ONCE
Status: COMPLETED | OUTPATIENT
Start: 2024-06-14 | End: 2024-06-14

## 2024-06-14 RX ORDER — INSULIN LISPRO 100 [IU]/ML
1-6 INJECTION, SOLUTION INTRAVENOUS; SUBCUTANEOUS EVERY 6 HOURS SCHEDULED
Status: DISCONTINUED | OUTPATIENT
Start: 2024-06-14 | End: 2024-06-14

## 2024-06-14 RX ORDER — DEXTROSE MONOHYDRATE 25 G/50ML
INJECTION, SOLUTION INTRAVENOUS
Status: COMPLETED
Start: 2024-06-14 | End: 2024-06-14

## 2024-06-14 RX ORDER — VANCOMYCIN HYDROCHLORIDE 1 G/200ML
15 INJECTION, SOLUTION INTRAVENOUS DAILY PRN
Status: DISCONTINUED | OUTPATIENT
Start: 2024-06-14 | End: 2024-06-15

## 2024-06-14 RX ORDER — MIDAZOLAM HYDROCHLORIDE 2 MG/2ML
5 INJECTION, SOLUTION INTRAMUSCULAR; INTRAVENOUS ONCE
Status: DISCONTINUED | OUTPATIENT
Start: 2024-06-14 | End: 2024-06-14

## 2024-06-14 RX ORDER — DEXTROSE, SODIUM CHLORIDE, SODIUM LACTATE, POTASSIUM CHLORIDE, AND CALCIUM CHLORIDE 5; .6; .31; .03; .02 G/100ML; G/100ML; G/100ML; G/100ML; G/100ML
250 INJECTION, SOLUTION INTRAVENOUS CONTINUOUS
Status: DISCONTINUED | OUTPATIENT
Start: 2024-06-14 | End: 2024-06-14

## 2024-06-14 RX ORDER — DEXMEDETOMIDINE HYDROCHLORIDE 4 UG/ML
.1-.7 INJECTION, SOLUTION INTRAVENOUS
Status: DISCONTINUED | OUTPATIENT
Start: 2024-06-14 | End: 2024-06-14

## 2024-06-14 RX ADMIN — DIBASIC SODIUM PHOSPHATE, MONOBASIC POTASSIUM PHOSPHATE AND MONOBASIC SODIUM PHOSPHATE 2 TABLET: 852; 155; 130 TABLET ORAL at 11:50

## 2024-06-14 RX ADMIN — MIDAZOLAM 2 MG: 1 INJECTION INTRAMUSCULAR; INTRAVENOUS at 11:41

## 2024-06-14 RX ADMIN — CHLORHEXIDINE GLUCONATE 15 ML: 1.2 RINSE ORAL at 08:13

## 2024-06-14 RX ADMIN — SODIUM CHLORIDE, SODIUM GLUCONATE, SODIUM ACETATE, POTASSIUM CHLORIDE, MAGNESIUM CHLORIDE, SODIUM PHOSPHATE, DIBASIC, AND POTASSIUM PHOSPHATE 1000 ML: .53; .5; .37; .037; .03; .012; .00082 INJECTION, SOLUTION INTRAVENOUS at 01:37

## 2024-06-14 RX ADMIN — THIAMINE HYDROCHLORIDE 500 MG: 100 INJECTION, SOLUTION INTRAMUSCULAR; INTRAVENOUS at 15:25

## 2024-06-14 RX ADMIN — FENTANYL CITRATE 50 MCG: 50 INJECTION INTRAMUSCULAR; INTRAVENOUS at 02:20

## 2024-06-14 RX ADMIN — HEPARIN SODIUM 5000 UNITS: 5000 INJECTION INTRAVENOUS; SUBCUTANEOUS at 13:15

## 2024-06-14 RX ADMIN — POTASSIUM CHLORIDE 20 MEQ: 14.9 INJECTION, SOLUTION INTRAVENOUS at 09:59

## 2024-06-14 RX ADMIN — SODIUM PHOSPHATE, MONOBASIC, MONOHYDRATE AND SODIUM PHOSPHATE, DIBASIC, ANHYDROUS 30 MMOL: 142; 276 INJECTION, SOLUTION INTRAVENOUS at 11:49

## 2024-06-14 RX ADMIN — SODIUM BICARBONATE 125 ML/HR: 84 INJECTION, SOLUTION INTRAVENOUS at 03:14

## 2024-06-14 RX ADMIN — SODIUM CHLORIDE, SODIUM LACTATE, POTASSIUM CHLORIDE, AND CALCIUM CHLORIDE 1000 ML: .6; .31; .03; .02 INJECTION, SOLUTION INTRAVENOUS at 07:57

## 2024-06-14 RX ADMIN — VANCOMYCIN HYDROCHLORIDE 1000 MG: 1 INJECTION, SOLUTION INTRAVENOUS at 18:01

## 2024-06-14 RX ADMIN — METRONIDAZOLE 500 MG: 500 INJECTION, SOLUTION INTRAVENOUS at 16:41

## 2024-06-14 RX ADMIN — MIDAZOLAM 5 MG: 1 INJECTION INTRAMUSCULAR; INTRAVENOUS at 02:53

## 2024-06-14 RX ADMIN — HEPARIN SODIUM 5000 UNITS: 5000 INJECTION INTRAVENOUS; SUBCUTANEOUS at 05:29

## 2024-06-14 RX ADMIN — CALCIUM GLUCONATE 2 G: 20 INJECTION, SOLUTION INTRAVENOUS at 15:17

## 2024-06-14 RX ADMIN — SODIUM CHLORIDE 0.5 UNITS/HR: 9 INJECTION, SOLUTION INTRAVENOUS at 14:25

## 2024-06-14 RX ADMIN — METRONIDAZOLE 500 MG: 500 INJECTION, SOLUTION INTRAVENOUS at 00:03

## 2024-06-14 RX ADMIN — INSULIN LISPRO 2 UNITS: 100 INJECTION, SOLUTION INTRAVENOUS; SUBCUTANEOUS at 05:33

## 2024-06-14 RX ADMIN — FOMEPIZOLE 1000 MG: 1 INJECTION, SOLUTION INTRAVENOUS at 14:22

## 2024-06-14 RX ADMIN — CEFEPIME 1000 MG: 1 INJECTION, POWDER, FOR SOLUTION INTRAMUSCULAR; INTRAVENOUS at 11:50

## 2024-06-14 RX ADMIN — FENTANYL CITRATE 50 MCG: 50 INJECTION INTRAMUSCULAR; INTRAVENOUS at 03:23

## 2024-06-14 RX ADMIN — DEXTROSE MONOHYDRATE 12.5 G: 25 INJECTION, SOLUTION INTRAVENOUS at 13:14

## 2024-06-14 RX ADMIN — FENTANYL CITRATE 50 MCG: 50 INJECTION INTRAMUSCULAR; INTRAVENOUS at 22:25

## 2024-06-14 RX ADMIN — SODIUM CHLORIDE, SODIUM GLUCONATE, SODIUM ACETATE, POTASSIUM CHLORIDE, MAGNESIUM CHLORIDE, SODIUM PHOSPHATE, DIBASIC, AND POTASSIUM PHOSPHATE 125 ML/HR: .53; .5; .37; .037; .03; .012; .00082 INJECTION, SOLUTION INTRAVENOUS at 02:56

## 2024-06-14 RX ADMIN — PROPOFOL 50 MCG/KG/MIN: 10 INJECTION, EMULSION INTRAVENOUS at 10:02

## 2024-06-14 RX ADMIN — SODIUM CHLORIDE, SODIUM GLUCONATE, SODIUM ACETATE, POTASSIUM CHLORIDE, MAGNESIUM CHLORIDE, SODIUM PHOSPHATE, DIBASIC, AND POTASSIUM PHOSPHATE 125 ML/HR: .53; .5; .37; .037; .03; .012; .00082 INJECTION, SOLUTION INTRAVENOUS at 11:50

## 2024-06-14 RX ADMIN — METRONIDAZOLE 500 MG: 500 INJECTION, SOLUTION INTRAVENOUS at 08:14

## 2024-06-14 RX ADMIN — CHLORHEXIDINE GLUCONATE 15 ML: 1.2 RINSE ORAL at 20:47

## 2024-06-14 RX ADMIN — PROPOFOL 50 MCG/KG/MIN: 10 INJECTION, EMULSION INTRAVENOUS at 05:02

## 2024-06-14 RX ADMIN — POLYETHYLENE GLYCOL 3350 17 G: 17 POWDER, FOR SOLUTION ORAL at 08:08

## 2024-06-14 RX ADMIN — DEXTROSE, SODIUM CHLORIDE, SODIUM LACTATE, POTASSIUM CHLORIDE, AND CALCIUM CHLORIDE 250 ML/HR: 5; .6; .31; .03; .02 INJECTION, SOLUTION INTRAVENOUS at 07:58

## 2024-06-14 RX ADMIN — DEXMEDETOMIDINE HYDROCHLORIDE 0.2 MCG/KG/HR: 4 INJECTION, SOLUTION INTRAVENOUS at 12:59

## 2024-06-14 RX ADMIN — MIDAZOLAM 4 MG: 1 INJECTION INTRAMUSCULAR; INTRAVENOUS at 01:23

## 2024-06-14 RX ADMIN — FENTANYL CITRATE 50 MCG: 50 INJECTION INTRAMUSCULAR; INTRAVENOUS at 05:02

## 2024-06-14 RX ADMIN — MIDAZOLAM HYDROCHLORIDE 2 MG: 2 INJECTION, SOLUTION INTRAMUSCULAR; INTRAVENOUS at 00:19

## 2024-06-14 RX ADMIN — MIDAZOLAM 2 MG: 1 INJECTION INTRAMUSCULAR; INTRAVENOUS at 22:46

## 2024-06-14 RX ADMIN — PROPOFOL 50 MCG/KG/MIN: 10 INJECTION, EMULSION INTRAVENOUS at 01:22

## 2024-06-14 RX ADMIN — DEXTROSE, SODIUM CHLORIDE, SODIUM LACTATE, POTASSIUM CHLORIDE, AND CALCIUM CHLORIDE 250 ML/HR: 5; .6; .31; .03; .02 INJECTION, SOLUTION INTRAVENOUS at 13:07

## 2024-06-14 RX ADMIN — SODIUM BICARBONATE 50 MEQ: 84 INJECTION INTRAVENOUS at 02:51

## 2024-06-14 RX ADMIN — CEFEPIME 1000 MG: 1 INJECTION, POWDER, FOR SOLUTION INTRAMUSCULAR; INTRAVENOUS at 23:31

## 2024-06-14 RX ADMIN — POTASSIUM CHLORIDE 20 MEQ: 14.9 INJECTION, SOLUTION INTRAVENOUS at 21:59

## 2024-06-14 RX ADMIN — MIDAZOLAM 2 MG: 1 INJECTION INTRAMUSCULAR; INTRAVENOUS at 06:01

## 2024-06-14 RX ADMIN — POTASSIUM PHOSPHATE, MONOBASIC AND POTASSIUM PHOSPHATE, DIBASIC 12 MMOL: 224; 236 INJECTION, SOLUTION, CONCENTRATE INTRAVENOUS at 22:27

## 2024-06-14 RX ADMIN — PROPOFOL 20 MCG/KG/MIN: 10 INJECTION, EMULSION INTRAVENOUS at 17:33

## 2024-06-14 RX ADMIN — MIDAZOLAM 2 MG: 1 INJECTION INTRAMUSCULAR; INTRAVENOUS at 00:19

## 2024-06-14 RX ADMIN — POTASSIUM PHOSPHATE, MONOBASIC AND POTASSIUM PHOSPHATE, DIBASIC 21 MMOL: 224; 236 INJECTION, SOLUTION, CONCENTRATE INTRAVENOUS at 18:47

## 2024-06-14 RX ADMIN — METRONIDAZOLE 500 MG: 500 INJECTION, SOLUTION INTRAVENOUS at 23:31

## 2024-06-14 RX ADMIN — SODIUM CHLORIDE 6.5 UNITS/HR: 9 INJECTION, SOLUTION INTRAVENOUS at 08:37

## 2024-06-14 RX ADMIN — Medication 50 MCG/HR: at 01:22

## 2024-06-14 RX ADMIN — HEPARIN SODIUM 5000 UNITS: 5000 INJECTION INTRAVENOUS; SUBCUTANEOUS at 21:12

## 2024-06-14 RX ADMIN — MIDAZOLAM 2 MG: 1 INJECTION INTRAMUSCULAR; INTRAVENOUS at 04:07

## 2024-06-14 RX ADMIN — THIAMINE HYDROCHLORIDE 500 MG: 100 INJECTION, SOLUTION INTRAMUSCULAR; INTRAVENOUS at 21:12

## 2024-06-14 NOTE — ASSESSMENT & PLAN NOTE
VBG on arrival - 7.0/28/40/7/-24  Repeat VBG - 6.9/30/55/7/-24  AG - 29  Lactic - 1.8  Etiology unclear Sepsis vs Baclofen intoxication  COMA panel - Negative  S/p 1L Isolyte in ED    Plan:  Give additional 1L isolyte in ICU  Check VBG 1h after  Continue mechanical ventilation with high RR to aide in compensation  If no improvement after additional liter bolus + ventilation will give Bicarb and gtt  Check UDS  BMP BID  Check BHB

## 2024-06-14 NOTE — QUICK NOTE
Gave pt's mother a call, was sent to voicemail, left VM asking her to call back when she gets a chance.

## 2024-06-14 NOTE — ASSESSMENT & PLAN NOTE
sCr on arrival - 3.87  Baseline - 1.24 (from 4/52023)  Likely d/t sepsis/dehydration  CK - 692  Received 1 L bolus in ED  Wiseman catheter placed in ED    Plan:  Give additional 1L isolyte bolus on arrival to ICU  Continue Isolyte gtt @ 125cc/hr  Check CK  Hold home Lisinopril  Maintain wiseman catheter  Monitor accurate I&O  Monitor and replete electrolytes as indicated  Avoid nephrotoxins and hypotension  BMP in AM
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
## 2024-06-14 NOTE — ASSESSMENT & PLAN NOTE
Present on arrival, as evidence by - Tachycardia, Tachypnea, Leukocytosis   Suspected source - Unknown at this time  CXR and CT imaging without identifiable source.   Lactate - 1.8  WBC - 15  Procalcitonin - 70  UA: Negative leuks/nitrites, occasional bacteria 2-4 WBC  Blood culture x 2 drawn in ED  He received 1L Isolyte  Started on Cefepime/Flagyl/Vancomycin    Plan:  Continue ABX: Cefepime/Flagyl/Vancomycin  Follow up blood cultures  Trend WBC  Trend fever curve

## 2024-06-14 NOTE — PHYSICAL THERAPY NOTE
Physical Therapy Cancellation Note       06/14/24 1032   Note Type   Note type Cancelled Session   Cancel Reasons Medical status   Additional Comments PT consult received and chart reviewed. Per ICU mobility rounds, pt is not appropriate for participation in PT evaluation on this date. Will hold and f/u as able and appropriate.       Poornima Edgar, PT, DPT   Available via [employer]  NPI # [id number]  [place] License - [id number]  6/14/2024

## 2024-06-14 NOTE — ASSESSMENT & PLAN NOTE
S/p craniotomy 1/2019 at [facility], done secondary to abscess/lesions.  CTH no acute abnormalities, post-operative findings  Baseline independent   Metronidazole Pregnancy And Lactation Text: This medication is Pregnancy Category B and considered safe during pregnancy.  It is also excreted in breast milk.

## 2024-06-14 NOTE — ED PROCEDURE NOTE
Procedure  Intubation    Date/Time: 6/13/2024 11:56 PM    Performed by: Alvino Shaw DO  Authorized by: Alvino Shaw DO    Patient location:  ED  Other Assisting Provider: Yes (comment) (Dr. Rivas, Dr. Barroso)    Consent:     Consent obtained:  Emergent situation  Universal protocol:     Patient identity confirmed:  Hospital-assigned identification number  Pre-procedure details:     Patient status:  Altered mental status    Pretreatment medications:  Ketamine and midazolam    Paralytics:  Succinylcholine  Indications:     Indications for intubation: airway protection    Procedure details:     Preoxygenation:  Bag valve mask    CPR in progress: no      Intubation method:  Oral    Oral intubation technique:  Glidescope    Laryngoscope size: Hyper Angulated S3.    Tube size (mm):  7.5    Tube type:  Cuffed    Number of attempts:  2    Ventilation between attempts: yes      Tube visualized through cords: yes    Placement assessment:     ETT to lip:  23    Tube secured with:  ETT mckeon    Breath sounds:  Equal and absent over the epigastrium    Placement verification: chest rise, condensation, CXR verification, equal breath sounds and ETCO2 detector      CXR findings:  ETT in proper place  Post-procedure details:     Patient tolerance of procedure:  Tolerated well, no immediate complications                   Alvino Shaw DO  06/13/24 [id number]

## 2024-06-14 NOTE — OCCUPATIONAL THERAPY NOTE
Occupational Therapy Cancellation Note     Patient Name: Colin Dumont  Today's Date: 6/14/2024 06/14/24 1020   Note Type   Note type Cancelled Session   Cancel Reasons Medical status   Additional Comments OT consult received and chart reviewed. Per ICU mobility rounds, patient is not appropriate for participation in OT evaluation at this time. Will hold and f/u as able and appropriate.     Sheila Oliveira MS OTR/L   [place] Licensure# [id number]

## 2024-06-14 NOTE — H&P
[facility]  H&P  Name: Colin Dumont 54 y.o. male I MRN: [id number]  Unit/Bed#: ICU 13 I Date of Admission: 6/13/2024   Date of Service: 6/14/2024 I Hospital Day: 1      Assessment & Plan   Acute respiratory failure with hypoxia (HCC)  Assessment & Plan  Pt presented to ED altered, agitated. Received Versed 5mg in ED and became somnolent after.  Intubated for airway protection and profound metabolic acidosis  VBG 6.9/30/55/7/ BE -24  VENT: AC/VC 30/380/40/6  CXR - ETT 5cm above roque, will advance by 1 cm on arrival to ICU    Plan:  Continue mechanical ventilation  Continue sedation, goal RASS -2  Propofol gtt  Fentanyl gtt  Respiratory protocol  Vent bundle  CAM-ICU  Daily SAT/SBT    Metabolic encephalopathy  Assessment & Plan  Pt presented to ED  CTH - no acute intracranial abnormalities, previous post-operative changes.  History of previous psychotic episodes d/t excessive intake of Baclofen  Per patients mother she did refil his Baclofen today however is nto sure how many are left.   COMA panel - negative  Check RUDS now  Hx ETOH use check alcohol level now, possible withdrawal symptoms  Pt did require multiple doses of versed while on Proprofol d/t agitation   Continue mechanical ventilation    High anion gap metabolic acidosis  Assessment & Plan  VBG on arrival - 7.0/28/40/7/-24  Repeat VBG - 6.9/30/55/7/-24  AG - 29  Lactic - 1.8  Etiology unclear Sepsis vs Baclofen intoxication  COMA panel - Negative  S/p 1L Isolyte in ED    Plan:  Give additional 1L isolyte in ICU  Check VBG 1h after  Continue mechanical ventilation with high RR to aide in compensation  If no improvement after additional liter bolus + ventilation will give Bicarb and gtt  Check UDS  BMP BID  Check BHB    BELLA (acute kidney injury) (HCC)  Assessment & Plan  sCr on arrival - 3.87  Baseline - 1.24 (from 4/52023)  Likely d/t sepsis/dehydration  CK - 692  Received 1 L bolus in ED  Brandt catheter placed in  ED    Plan:  Give additional 1L isolyte bolus on arrival to ICU  Continue Isolyte gtt @ 125cc/hr  Check CK  Hold home Lisinopril  Maintain wiseman catheter  Monitor accurate I&O  Monitor and replete electrolytes as indicated  Avoid nephrotoxins and hypotension  BMP in AM      S/P craniotomy  Assessment & Plan  S/p craniotomy 1/2019 at [facility], done secondary to abscess/lesions.  CTH no acute abnormalities, post-operative findings  Baseline independent    Benign essential hypertension  Assessment & Plan  Home regimen: Lisinopril 10mg daily  Hold home Lisinopril  VS per protocol    Alcohol use  Assessment & Plan  Hx ETOH use in the past, family unclear if currently drink and if so how much. Unknown time of last drink.   Currently intubated/sedated  CIWA protocol when extubated  Encourage cessation    SIRS (systemic inflammatory response syndrome) (HCC)  Assessment & Plan  Present on arrival, as evidence by - Tachycardia, Tachypnea, Leukocytosis   Suspected source - Unknown at this time  CXR and CT imaging without identifiable source.   Lactate - 1.8  WBC - 15  Procalcitonin - 70  UA: Negative leuks/nitrites, occasional bacteria 2-4 WBC  Blood culture x 2 drawn in ED  He received 1L Isolyte  Started on Cefepime/Flagyl/Vancomycin    Plan:  Continue ABX: Cefepime/Flagyl/Vancomycin  Follow up blood cultures  Trend WBC  Trend fever curve    Spasticity  Assessment & Plan  Hx spasticity s/p craniotomy, home regimen: Baclofen 10mg  Hold Baclofen for now, restart when appropriate.    Type II diabetes mellitus (HCC)  Assessment & Plan    Lab Results   Component Value Date    HGBA1C 5.1 10/24/2022     Previously took Basaglar 10 units daily, stopped in 2019  Check glucose q6h for now  SSI every 6 hours  Goal glucose 140-180  Hypoglycemia protocol  Check HbA1c  NPO for now.  Check BHB        History of Present Illness     HPI: Colin Dumont is a 54 y.o. male with a PMHx: HTN, HLD, s/p craniotomy in 2019, T2DM,  ETHO use/abuse who who presents with AMS, found hitting head off wall and agitated, not responding to verbal commands. In ED initial Vbg showed pH 7.0/28/40/7 he was given 1L bolus. He continued to be agitated unable to remain still for CT scan, was given 5 mg IV versed. After CT scan RR mid teens, VBG 6.9/30/55/7, he was subsequently intubated at this time. Labs showed WBC 15, procal 70, lactic 1.8, , sCr 3.87, UA unremarkable. CTH no acute findings, CT chest/abd/pelvis showed no acute findings. Blood cultures were drawn, he received cefepime/vancomycin/flagyl in ED. After intubation he require Versed 2mg IV x 2 dose while on Propofol @ 50mcg. Of note per chart review in 2019 similar episode of psychosis and metabolic acidosis which was secondary to excessive Baclofen use. Per patients mother she did refill his Baclofen today, unsure how many are remaining. Pt intubated, unable to participate in exam.    History obtained from chart review.  Review of Systems: Review of Systems   Unable to perform ROS: Intubated     Disposition: Critical care  Historical Information   Past Medical History:  No date: Abdominal cyst  No date: Anemia      Comment:  Hx   No date: Chronic pain disorder      Comment:  abdominal  No date: Diabetes mellitus (HCC)  No date: Diverticulitis  No date: GI bleed  2016: History of transfusion      Comment:  5 units  No date: Lightheadedness  2020: Multiple brain abscesses  No date: Multiple lesions on computed tomography of brain and spine  No date: Spleen laceration Past Surgical History:  2016: ABDOMINAL SURGERY      Comment:  lap removal of mass post spleen injury  05/24/2017: COLONOSCOPY; N/A      Comment:  Procedure: COLONOSCOPY;  Surgeon: Hammad Galindo MD;                 Location: [facility];  Service:   No date: CRANIOTOMY  03/15/2017: EGD AND COLONOSCOPY; N/A      Comment:  Procedure: EGD AND COLONOSCOPY;  Surgeon: Hammad Galindo MD;  Location: [facility];   Service:   No date: OTHER SURGICAL HISTORY      Comment:  per [employer]-had spleen surgery; no other details  No date: UPPER GASTROINTESTINAL ENDOSCOPY      Comment:  for gastric bleeding   Current Outpatient Medications   Medication Instructions    baclofen 10 mg, Oral, 2 times daily    lisinopril (ZESTRIL) 10 mg tablet take 1 tablet by mouth once daily    No Known Allergies   Social History     Tobacco Use    Smoking status: Some Days     Current packs/day: 0.00     Average packs/day: 0.3 packs/day for 5.0 years (1.3 ttl pk-yrs)     Types: Cigarettes     Start date: 2011     Last attempt to quit: 2016     Years since quittin.1    Smokeless tobacco: Current     Types: Chew    Tobacco comments:     1 pack/2-3 wks   Vaping Use    Vaping status: Never Used   Substance Use Topics    Alcohol use: Yes     Alcohol/week: 0.0 - 1.0 standard drinks of alcohol     Comment: ocassionally    Drug use: No    Family History   Problem Relation Age of Onset    No Known Problems Mother     Diabetes Father     No Known Problems Sister     No Known Problems Brother         Objective                            Vitals I/O      Most Recent Min/Max in 24hrs   Temp 99.3 °F (37.4 °C) Temp  Min: 98.4 °F (36.9 °C)  Max: 100.1 °F (37.8 °C)   Pulse (!) 107 Pulse  Min: 105  Max: 118   Resp (!) 25 Resp  Min: 11  Max: 49   /62 BP  Min: 114/58  Max: 175/79   O2 Sat 100 % SpO2  Min: 92 %  Max: 100 %      Intake/Output Summary (Last 24 hours) at 2024  Last data filed at 2024 0222  Gross per 24 hour   Intake 2373.19 ml   Output 450 ml   Net 1923.19 ml       Diet NPO    Invasive Monitoring           Physical Exam   Physical Exam  Vitals and nursing note reviewed.   Eyes:      Pupils: Pupils are equal, round, and reactive to light.   Feet:      Comments: Bilateral great toe black  Generalized poor hygeine  Skin:     General: Skin is warm and dry.      Capillary Refill: Capillary refill takes more than 3 seconds.       Comments: Feet red/ruberous, calf discolored    HENT:      Head: Normocephalic and atraumatic.      Mouth/Throat:      Mouth: Mucous membranes are dry.   Cardiovascular:      Rate and Rhythm: Normal rate and regular rhythm.      Pulses:           Radial pulses are 2+ on the right side and 2+ on the left side.        Dorsalis pedis pulses are 1+ on the right side and 1+ on the left side.      Heart sounds: Normal heart sounds.   Musculoskeletal:      Cervical back: Full passive range of motion without pain, normal range of motion and neck supple.   Abdominal: General: Bowel sounds are normal.      Palpations: Abdomen is soft.      Tenderness: There is no abdominal tenderness.   Constitutional:       General: He is not in acute distress.     Appearance: He is underweight. He is ill-appearing.      Interventions: He is sedated, intubated and restrained.   Pulmonary:      Effort: Pulmonary effort is normal. He is intubated.      Breath sounds: Normal breath sounds.   Psychiatric:      Comments: JANAE  Intermittently agitated   Neurological:      Mental Status: He is unresponsive.      GCS: GCS eye subscore is 1. GCS verbal subscore is 1. GCS motor subscore is 1.      Comments: GCS 3T   Genitourinary/Anorectal:     Comments: Urethral catheter  Brandt present.        Diagnostic Studies      EKG: NSR  Imaging:  I have personally reviewed pertinent reports.   and I have personally reviewed pertinent films in PACS     Medications:  Scheduled PRN   cefepime, 2,000 mg, Q12H  chlorhexidine, 15 mL, Q12H JEF  heparin (porcine), 5,000 Units, Q8H JEF  insulin lispro, 1-6 Units, Q6H JEF  metroNIDAZOLE, 500 mg, Q8H  midazolam, 5 mg, Once  vancomycin, 15 mg/kg, Q12H      fentaNYL, 50 mcg, Q1H PRN  midazolam, 2 mg, Q4H PRN       Continuous    fentaNYL, 50 mcg/hr, Last Rate: 50 mcg/hr (06/14/24 0122)  multi-electrolyte, 125 mL/hr  propofol, 5-50 mcg/kg/min, Last Rate: 50 mcg/kg/min (06/14/24 0122)         Labs:    CBC    Recent Labs      06/13/24 2133 06/13/24 2217   WBC 15.32*  --    HGB 15.6 17.0   HCT 45.4 50*     --    BANDSPCT 4  --      BMP    Recent Labs     06/13/24 2133 06/13/24 2217   SODIUM 136  --    K 5.1  --      --    CO2 9* 8*   AGAP 21*  --    BUN 43*  --    CREATININE 3.87*  --    CALCIUM 9.5  --        Coags    Recent Labs     06/13/24 2133   INR 1.00   PTT 33        Additional Electrolytes  Recent Labs     06/13/24 2118 06/13/24 2217   CAIONIZED 1.25 1.28          Blood Gas    Recent Labs     06/13/24  2325   PHART 7.097*   [id number] 22.9*   PO2ART 213.7*   OSW1WBG 6.9*   BEART -21.1     Recent Labs     06/13/24 2133   PHVEN 6.973*   XGI7UZC 29.2*   PO2VEN 41.7   FZE5DGF 6.6*   BEVEN -24.2   [id number] 74.2    LFTs  Recent Labs     06/13/24 2133   ALT 21   AST 28   ALKPHOS 68   ALB 5.7*   TBILI 0.64       Infectious  Recent Labs     06/13/24 2133   PROCALCITONI 70.48*     Glucose  Recent Labs     06/13/24 2133   GLUC 203*        Anticipated Length of Stay is > 2 midnights  TIM Padilla

## 2024-06-14 NOTE — ASSESSMENT & PLAN NOTE
Pt presented to ED altered, agitated. Received Versed 5mg in ED and became somnolent after.  Intubated for airway protection and profound metabolic acidosis  VBG 6.9/30/55/7/ BE -24  VENT: AC/VC 30/380/40/6  CXR - ETT 5cm above roque, will advance by 1 cm on arrival to ICU    Plan:  Continue mechanical ventilation  Continue sedation, goal RASS -2  Propofol gtt  Fentanyl gtt  Respiratory protocol  Vent bundle  CAM-ICU  Daily SAT/SBT   n/a

## 2024-06-14 NOTE — PROGRESS NOTES
Colin Dumont is a 54 y.o. male who is currently ordered Vancomycin IV with management by the Pharmacy Consult service.  Relevant clinical data and objective / subjective history reviewed.  Vancomycin Assessment:  Indication and Goal AUC/Trough: Pneumonia (goal -600, trough >10)  Clinical Status:  Critical Care  Micro:     Renal Function:  SCr: 1 mg/dL  CrCl: 56.9 mL/min  Renal replacement: Not on dialysis  Days of Therapy: 2  Current Dose: 1000mg (15mg/kg) pulse-dosed PRN random vanco level<15mcg/ml  Vancomycin Plan:  New Dosing: Continue currend dose  Estimated AUC: 400-600 mcg*hr/mL  Estimated Trough: >10 mcg/mL  Next Level: 06/15 @ 1000  Renal Function Monitoring: Daily BMP and UOP  Pharmacy will continue to follow closely for s/sx of nephrotoxicity, infusion reactions and appropriateness of therapy.  BMP and CBC will be ordered per protocol. We will continue to follow the patient’s culture results and clinical progress daily.    [place]ra [place], Pharmacist

## 2024-06-14 NOTE — CONSULTS
PHONE CONSULTATION - Medical Toxicology  Colin Dumont 54 y.o. male MRN: [id number]  Unit/Bed#: ICU 13 Encounter: [id number]      Reason for Consult / Principal Problem: Multiple problems    Inpatient consult to Toxicology  Consult performed by: Morteza Parr MD  Consult ordered by: Linden Plummer MD        06/14/24      ASSESSMENT:  1) Acute respiratory failure  2) Acute encephalopathy  3) Metabolic acidosis with elevated anion gap  4) BELLA  5) SIRS  6) Reported history of alcohol use disorder    DISCUSSION/ RECOMMENDATIONS:  In addition to current care measures, recommend initiation of fomepizole at this time.  Fomepizole should be continued until ethylene glycol and methanol concentrations results, regardless of pH - even if it normalizes.  Dosing is 15 mg/kg IV loading dose, followed by 10 mg/kg IV q12h.  If ethylene glycol and methanol both result negative, then fomepizole can be discontinued.  If either is positive, please reach back out to discuss further.    While I doubt presentation is due to AKA, I would still also recommend initiation of thiamine, 500 mg TID for three days.  CIWA can be initiated once the patient is extubated.    There is prior history of baclofen overdose, and so there is question of similar here.  However I would note that I would not expect baclofen overdose to cause profound metabolic acidosis, and so there is another cause of the acidosis - whether toxicological or not.      For further questions, please call [facility]  Service or Patient Access Center to reach the medical  on call.         Hx and PE limited by the dynamics of a phone consultation. I have not personally interviewed or evaluated the patient, but only advised based on the information provided to me. Primary provider is responsible for all clinical decisions.     Pertinent history, physical exam and clinical findings and course discussed: Colin Dumont is a 54 y.o. year old male who  presents with acute encephalopathy and respiratory failure, and with profound metabolic acidosis.    Review of systems and physical exam not performed by me.    Historical Information   Past Medical History:   Diagnosis Date    Abdominal cyst     Anemia     Hx     Chronic pain disorder     abdominal    Diabetes mellitus (HCC)     Diverticulitis     GI bleed     History of transfusion 2016    5 units    Lightheadedness     Multiple brain abscesses 2020    Multiple lesions on computed tomography of brain and spine     Spleen laceration      Past Surgical History:   Procedure Laterality Date    ABDOMINAL SURGERY  2016    lap removal of mass post spleen injury    COLONOSCOPY N/A 2017    Procedure: COLONOSCOPY;  Surgeon: Hammad Galindo MD;  Location: [facility] GI LAB;  Service:     CRANIOTOMY      EGD AND COLONOSCOPY N/A 03/15/2017    Procedure: EGD AND COLONOSCOPY;  Surgeon: Hammad Galindo MD;  Location: [facility] GI LAB;  Service:     OTHER SURGICAL HISTORY      per [employer]-had spleen surgery; no other details    UPPER GASTROINTESTINAL ENDOSCOPY      for gastric bleeding     Social History   Social History     Substance and Sexual Activity   Alcohol Use Yes    Alcohol/week: 0.0 - 1.0 standard drinks of alcohol    Comment: ocassionally     Social History     Substance and Sexual Activity   Drug Use No     Social History     Tobacco Use   Smoking Status Some Days    Current packs/day: 0.00    Average packs/day: 0.3 packs/day for 5.0 years (1.3 ttl pk-yrs)    Types: Cigarettes    Start date: 2011    Last attempt to quit: 2016    Years since quittin.1   Smokeless Tobacco Current    Types: Chew   Tobacco Comments    1 pack/2-3 wks     Family History   Problem Relation Age of Onset    No Known Problems Mother     Diabetes Father     No Known Problems Sister     No Known Problems Brother         Prior to Admission medications    Medication Sig Start Date End Date Taking? Authorizing Provider   baclofen 10 mg  tablet take 1 tablet by mouth twice a day 12/28/23   Kiel Crisostomo MD   lisinopril (ZESTRIL) 10 mg tablet take 1 tablet by mouth once daily 11/14/23   Stefani Garner MD       Current Facility-Administered Medications   Medication Dose Route Frequency    cefepime (MAXIPIME) 1,000 mg in dextrose 5 % 50 mL IVPB  1,000 mg Intravenous Q12H    chlorhexidine (PERIDEX) 0.12 % oral rinse 15 mL  15 mL Mouth/Throat Q12H JEF    dexmedeTOMIDine (Precedex) 400 mcg in sodium chloride 0.9% 100 mL  0.1-0.7 mcg/kg/hr Intravenous Titrated    dextrose 5 % in lactated Ringer's infusion  250 mL/hr Intravenous Continuous    fentaNYL 1000 mcg in sodium chloride 0.9% 100mL infusion  50 mcg/hr Intravenous Continuous    fentaNYL injection 50 mcg  50 mcg Intravenous Q1H PRN    heparin (porcine) subcutaneous injection 5,000 Units  5,000 Units Subcutaneous Q8H JEF    insulin regular (HumuLIN R,NovoLIN R) 1 Units/mL in sodium chloride 0.9 % 100 mL infusion  0.1-30 Units/hr Intravenous Continuous    metroNIDAZOLE (FLAGYL) IVPB (premix) 500 mg 100 mL  500 mg Intravenous Q8H    midazolam (VERSED) injection 2 mg  2 mg Intravenous Q4H PRN    polyethylene glycol (MIRALAX) packet 17 g  17 g Oral Daily    potassium chloride 20 mEq IVPB (premix)  20 mEq Intravenous Once    propofol (DIPRIVAN) 1000 mg in 100 mL infusion (premix)  5-50 mcg/kg/min Intravenous Titrated    sodium phosphate 30 mmol in dextrose 5 % 250 mL Infusion  30 mmol Intravenous Once    vancomycin (VANCOCIN) IVPB (premix in dextrose) 1,000 mg 200 mL  15 mg/kg Intravenous Daily PRN       No Known Allergies    Objective       Intake/Output Summary (Last 24 hours) at 6/14/2024 1216  Last data filed at 6/14/2024 1000  Gross per 24 hour   Intake 5708.7 ml   Output 2430 ml   Net 3278.7 ml       Invasive Devices:   Peripheral IV 04/05/23 Left Wrist (Active)       Peripheral IV 06/13/24 Distal;Right;Upper;Ventral (anterior) Arm (Active)   Site Assessment WDL 06/14/24 1000   Dressing Type  Transparent 06/14/24 1000   Line Status Infusing 06/14/24 1000   Dressing Status Clean;Dry;Intact 06/14/24 1000       Peripheral IV 06/13/24 Dorsal (posterior);Left Forearm (Active)   Site Assessment [facility] 06/14/24 1000   Dressing Type Transparent 06/14/24 1000   Line Status Infusing 06/14/24 1000   Dressing Status Clean;Dry;Intact 06/14/24 1000       Peripheral IV 06/13/24 Dorsal (posterior);Right Forearm (Active)   Site Assessment [facility] 06/14/24 1000   Dressing Type Transparent 06/14/24 1000   Line Status Infusing 06/14/24 1000   Dressing Status Clean;Dry;Intact 06/14/24 1000       Peripheral IV 06/14/24 Dorsal (posterior);Left;Proximal Forearm (Active)   Site Assessment [facility] 06/14/24 1000   Dressing Type Transparent 06/14/24 1000   Line Status Infusing 06/14/24 1000   Dressing Status Clean;Dry;Intact 06/14/24 1000       NG/OG/Enteral Tube Orogastric 16 Fr Left mouth (Active)   Placement Reverification Auscultation 06/14/24 0222   Site Assessment Clean;Dry;Intact 06/14/24 0222   External Tube Length (cm) 63 cm 06/14/24 0222   Status Suction-low intermittent 06/14/24 0604       Urethral Catheter Temperature probe 16 Fr. (Active)   Amt returned on insertion(mL) 450 mL 06/13/24 2335   Reasons to continue Urinary Catheter  Accurate I&O assessment in critically ill patients (48 hr. max) 06/13/24 2335   Site Assessment Clean;Skin intact 06/13/24 2335   Collection Container Standard drainage bag 06/13/24 2335   Securement Method Securing device (Describe) 06/13/24 2335   Output (mL) 330 mL 06/14/24 0915       ETT  Oral 7.5 mm (Active)   Secured at (cm) 25 06/14/24 1137   Measured from Teeth 06/14/24 1137   Secured Location Center 06/14/24 1137   Secured by Commercial tube mckeon 06/14/24 1137   Site Condition Dry 06/14/24 1137   Cuff Pressure (cm H2O) 22 cm H2O 06/14/24 0741   HI-LO Suction  Intermittent suction 06/14/24 1137   HI-LO Secretions Scant 06/14/24 1137   HI-LO Intervention Patent 06/14/24 1137       Vitals    Vitals:    06/14/24 0800 06/14/24 0900 06/14/24 1000 06/14/24 1100   BP: 109/55 111/57 119/62 112/58   TempSrc:       Pulse: 86 88 93 86   Resp: (!) 26 (!) 26 (!) 26 (!) 26   Patient Position - Orthostatic VS:       Temp:             EKG, Pathology, and/or Other Studies: I have personally reviewed pertinent reports.        Lab Results: I have personally reviewed pertinent reports.      Labs:  Results from last 7 days   Lab Units 06/14/24  1146 06/14/24  0216 06/13/24 2217 06/13/24  2133   WBC Thousand/uL  --  10.08  --  15.32*   HEMOGLOBIN g/dL  --  12.2  --  15.6   I STAT HEMOGLOBIN g/dl 9.9*  --    < >  --    HEMATOCRIT %  --  35.8*  --  45.4   HEMATOCRIT, ISTAT % 29*  --    < >  --    PLATELETS Thousands/uL  --  162  --  273   SEGS PCT %  --  89*  --   --    LYMPHO PCT %  --  3*  --  2*   MONO PCT %  --  7  --  7   EOS PCT %  --  0  --  0    < > = values in this interval not displayed.      Results from last 7 days   Lab Units 06/14/24  1146 06/14/24  0827 06/14/24 0443   POTASSIUM mmol/L  --  3.4* 4.4   CHLORIDE mmol/L  --  111* 107   CO2 mmol/L  --  15* 12*   CO2, I-STAT mmol/L 21  --   --    BUN mg/dL  --  33* 35*   CREATININE mg/dL  --  1.45* 2.03*   CALCIUM mg/dL  --  7.5* 7.4*   ALK PHOS U/L  --   --  45   ALT U/L  --   --  16   AST U/L  --   --  24   GLUCOSE, ISTAT mg/dl 126  --   --    MAGNESIUM mg/dL  --  2.4 2.7   PHOSPHORUS mg/dL  --  1.2* 2.7      Results from last 7 days   Lab Units 06/14/24  0443 06/13/24  2133   INR  1.34* 1.00   PTT seconds 42* 33     Results from last 7 days   Lab Units 06/13/24 2133   LACTIC ACID mmol/L 1.8     0   Lab Value Date/Time    TROPONINI <0.02 03/13/2017 1952     Results from last 7 days   Lab Units 06/14/24  0425   PH UGO  7.190*   PCO2 UGO mm Hg 29.1*   PO2 UGO mm Hg 28.6*   HCO3 UGO mmol/L 10.9*   O2 CONTENT UGO ml/dL 11.3   O2 HGB, VENOUS % 60.0     Results from last 7 days   Lab Units 06/14/24  0248 06/13/24 2133   ACETAMINOPHEN LVL ug/mL  --  <2*   ETHANOL  "LVL mg/dL <10 <10   SALICYLATE LVL mg/dL  --  <5     Invalid input(s): \"EXTPREGUR\"    Imaging Studies: I have personally reviewed pertinent reports.      Counseling / Coordination of Care  Total time spent today 31 minutes. This was a phone consultation      "

## 2024-06-14 NOTE — ED PROVIDER NOTES
History  Chief Complaint   Patient presents with    Altered Mental Status     Pt presents EMS from home with AMS, hitting head off wall and agitated. Not responding to commands on arrival. Alcohol/drug history.      Patient is a 54-year-old male who presented to the ED via EMS from home for AMS.  EMS was called by PD after his mother noticed that he was thrashing about violently, striking his head against the wall.  On EMS arrival, patient was found to have multiple empty bottles of liquor nearby,as well as disheveled, hoarding environment of his room. Patient was found to be A&Ox0 as well as highly agitated, thrashing about, not responding appropriately. He was also found to be saturating low 80% RA and was placed on NRB 15L. On arrival to ED the patient appeared same as EMS description initially but soon thereafer became even less responsive, thrashing about occasionally but at other times only responsive to sternal rub. He did appear to be protecting his airway. Due to the thrashing, trial of Versed was attempted with low threshold for intubation if signs of airway compromise. Rest of events in MDM.        Prior to Admission Medications   Prescriptions Last Dose Informant Patient Reported? Taking?   baclofen 10 mg tablet   No No   Sig: take 1 tablet by mouth twice a day   lisinopril (ZESTRIL) 10 mg tablet   No No   Sig: take 1 tablet by mouth once daily      Facility-Administered Medications: None       Past Medical History:   Diagnosis Date    Abdominal cyst     Anemia     Hx     Chronic pain disorder     abdominal    Diabetes mellitus (HCC)     Diverticulitis     GI bleed     History of transfusion 2016    5 units    Lightheadedness     Multiple brain abscesses 2020    Multiple lesions on computed tomography of brain and spine     Spleen laceration        Past Surgical History:   Procedure Laterality Date    ABDOMINAL SURGERY  2016    lap removal of mass post spleen injury    COLONOSCOPY N/A 05/24/2017     Procedure: COLONOSCOPY;  Surgeon: Hammad Galindo MD;  Location: [facility];  Service:     CRANIOTOMY      EGD AND COLONOSCOPY N/A 03/15/2017    Procedure: EGD AND COLONOSCOPY;  Surgeon: Hammad Galindo MD;  Location: [facility];  Service:     OTHER SURGICAL HISTORY      per [employer]-had spleen surgery; no other details    UPPER GASTROINTESTINAL ENDOSCOPY      for gastric bleeding       Family History   Problem Relation Age of Onset    No Known Problems Mother     Diabetes Father     No Known Problems Sister     No Known Problems Brother      I have reviewed and agree with the history as documented.    E-Cigarette/Vaping    E-Cigarette Use Never User      E-Cigarette/Vaping Substances     Social History     Tobacco Use    Smoking status: Some Days     Current packs/day: 0.00     Average packs/day: 0.3 packs/day for 5.0 years (1.3 ttl pk-yrs)     Types: Cigarettes     Start date: 2011     Last attempt to quit: 2016     Years since quittin.1    Smokeless tobacco: Current     Types: Chew    Tobacco comments:     1 pack/2-3 wks   Vaping Use    Vaping status: Never Used   Substance Use Topics    Alcohol use: Yes     Alcohol/week: 0.0 - 1.0 standard drinks of alcohol     Comment: ocassionally    Drug use: No        Review of Systems   Unable to perform ROS: Mental status change   All other systems reviewed and are negative.      Physical Exam  ED Triage Vitals [24]   Temperature Pulse Respirations Blood Pressure SpO2   100.1 °F (37.8 °C) (!) 118 (!) 11 120/57 98 %      Temp Source Heart Rate Source Patient Position - Orthostatic VS BP Location FiO2 (%)   Rectal Monitor Lying Right arm --      Pain Score       No Pain             Orthostatic Vital Signs  Vitals:    06/15/24 0630 06/15/24 0700 06/15/24 0730 06/15/24 0800   BP: 141/78 110/69 125/79 166/81   Pulse: 84 55 57 64   Patient Position - Orthostatic VS:           Physical Exam  Vitals and nursing note reviewed.   Constitutional:        Appearance: He is toxic-appearing.   HENT:      Head: Normocephalic.      Mouth/Throat:      Mouth: Mucous membranes are moist.      Pharynx: Oropharynx is clear.   Eyes:      Extraocular Movements: Extraocular movements intact.      Pupils: Pupils are equal, round, and reactive to light.   Cardiovascular:      Rate and Rhythm: Regular rhythm. Tachycardia present.      Heart sounds: Normal heart sounds.   Pulmonary:      Effort: Pulmonary effort is normal.      Breath sounds: Normal breath sounds.   Abdominal:      General: Bowel sounds are normal.      Palpations: Abdomen is soft.      Tenderness: There is no abdominal tenderness.   Skin:     General: Skin is warm and dry.      Capillary Refill: Capillary refill takes more than 3 seconds.   Neurological:      GCS: GCS eye subscore is 2. GCS verbal subscore is 2. GCS motor subscore is 4.      Comments: Responsive to sternal rub, no obvious lateralizing deficits.         ED Medications  Medications   propofol (DIPRIVAN) 1000 mg in 100 mL infusion (premix) (40 mcg/kg/min × 63 kg Intravenous Rate/Dose Change 6/15/24 0717)   chlorhexidine (PERIDEX) 0.12 % oral rinse 15 mL (15 mL Mouth/Throat Given 6/15/24 0814)   fentaNYL 1000 mcg in sodium chloride 0.9% 100mL infusion (50 mcg/hr Intravenous New Bag 6/15/24 0103)   fentaNYL injection 50 mcg (50 mcg Intravenous Given 6/15/24 0451)   heparin (porcine) subcutaneous injection 5,000 Units (5,000 Units Subcutaneous Given 6/15/24 0518)   midazolam (VERSED) injection 2 mg (2 mg Intravenous Given 6/14/24 2246)   metroNIDAZOLE (FLAGYL) IVPB (premix) 500 mg 100 mL (500 mg Intravenous New Bag 6/15/24 0703)   vancomycin (VANCOCIN) IVPB (premix in dextrose) 1,000 mg 200 mL (0 mg Intravenous Stopped 6/14/24 1910)   cefepime (MAXIPIME) 1,000 mg in dextrose 5 % 50 mL IVPB (0 mg Intravenous Stopped 6/14/24 2358)   polyethylene glycol (MIRALAX) packet 17 g (17 g Oral Given 6/15/24 0814)   fomepizole (ANTIZOL) 1,000 mg in sodium chloride  0.9 % 100 mL IVPB (0 mg Intravenous Stopped 6/14/24 1609)     Followed by   fomepizole (ANTIZOL) 600 mg in sodium chloride 0.9 % 100 mL IVPB (0 mg Intravenous Stopped 6/15/24 0234)     Followed by   fomepizole (ANTIZOL) 1,000 mg in sodium chloride 0.9 % 100 mL IVPB (has no administration in time range)   insulin regular (HumuLIN R,NovoLIN R) 1 Units/mL in sodium chloride 0.9 % 100 mL infusion (0 Units/hr Intravenous Hold 6/14/24 1951)   dextrose 5 % in lactated Ringer's infusion (50 mL/hr Intravenous New Bag 6/15/24 0225)   thiamine (VITAMIN B1) 500 mg in sodium chloride 0.9 % 50 mL IVPB (500 mg Intravenous New Bag 6/15/24 0814)   midazolam (VERSED) injection 5 mg (5 mg Intravenous Given 6/13/24 2127)   sodium chloride 0.9 % bolus 1,000 mL (0 mL Intravenous Stopped 6/13/24 2242)   iohexol (OMNIPAQUE) 350 MG/ML injection (MULTI-DOSE) 100 mL (100 mL Intravenous Given 6/13/24 2200)   Ketamine HCl 100 mg (100 mg Intravenous Given by Other 6/13/24 2215)   Succinylcholine Chloride 100 mg/5 mL syringe 100 mg (100 mg Intravenous Given by Other 6/13/24 2216)   vancomycin (VANCOCIN) 1500 mg in sodium chloride 0.9% 250 mL IVPB (0 mg Intravenous Stopped 6/14/24 0222)   midazolam (VERSED) injection 2 mg (2 mg Intravenous Given 6/13/24 2303)   cefepime (MAXIPIME) 1,000 mg in dextrose 5 % 50 mL IVPB (0 mg Intravenous Stopped 6/13/24 2355)   metroNIDAZOLE (FLAGYL) IVPB (premix) 500 mg 100 mL (500 mg Intravenous New Bag 6/14/24 0003)   multi-electrolyte (ISOLYTE-S PH 7.4) bolus 1,000 mL (1,000 mL Intravenous New Bag 6/13/24 2345)   propofol (DIPRIVAN) 200 MG/20ML bolus injection 40 mg (40 mg Intravenous Given by Other 6/13/24 2225)   propofol (DIPRIVAN) 200 MG/20ML bolus injection 40 mg (40 mg Intravenous Given by Other 6/13/24 8941)   midazolam (VERSED) injection 2 mg (2 mg Intravenous Given 6/14/24 0019)   midazolam (VERSED) injection 4 mg (4 mg Intravenous Given 6/14/24 0123)   multi-electrolyte (ISOLYTE-S PH 7.4) bolus 1,000 mL  (0 mL Intravenous Stopped 6/14/24 0222)   midazolam (VERSED) injection 5 mg (5 mg Intravenous Given 6/14/24 0253)   sodium bicarbonate 8.4 % injection 50 mEq (50 mEq Intravenous Given 6/14/24 0251)   midazolam (VERSED) injection 2 mg (2 mg Intravenous Given 6/14/24 0601)   lactated ringers bolus 1,000 mL (0 mL Intravenous Stopped 6/14/24 1000)   potassium chloride 20 mEq IVPB (premix) (0 mEq Intravenous Stopped 6/14/24 1200)   sodium phosphate 30 mmol in dextrose 5 % 250 mL Infusion (0 mmol Intravenous Stopped 6/15/24 0649)     Followed by   potassium-sodium phosphateS (K-PHOS,PHOSPHA 250) -250 mg tablet 2 tablet (2 tablets Oral Given 6/14/24 1150)   dextrose 50 % IV solution 12.5 g (12.5 g Intravenous Given 6/14/24 1314)   potassium phosphates 21 mmol in sodium chloride 0.9 % 250 mL infusion (0 mmol Intravenous Stopped 6/14/24 2358)   calcium gluconate 2 g in sodium chloride 0.9% 100 mL (premix) (0 g Intravenous Stopped 6/14/24 1800)   vancomycin (VANCOCIN) IVPB (premix in dextrose) 1,000 mg 200 mL (0 mg Intravenous Stopped 6/15/24 0649)   potassium phosphates 12 mmol in sodium chloride 0.9 % 250 mL infusion (0 mmol Intravenous Stopped 6/15/24 0240)   potassium chloride 20 mEq IVPB (premix) (0 mEq Intravenous Stopped 6/14/24 2358)   potassium chloride oral solution 40 mEq (40 mEq Oral Given 6/15/24 0518)   potassium chloride 20 mEq IVPB (premix) (0 mEq Intravenous Stopped 6/15/24 0335)   calcium gluconate 2 g in sodium chloride 0.9% 100 mL (premix) (0 g Intravenous Stopped 6/15/24 0335)     And   calcium gluconate 1 g in sodium chloride 0.9% 50 mL (premix) (0 g Intravenous Stopped 6/15/24 0359)       Diagnostic Studies  Results Reviewed       Procedure Component Value Units Date/Time    Blood culture #1 [[id number]] Collected: 06/13/24 2136    Lab Status: Preliminary result Specimen: Blood from Arm, Left Updated: 06/14/24 2301     Blood Culture No Growth at 24 hrs.    Blood culture #2 [[id number]] Collected:  06/13/24 2133    Lab Status: Preliminary result Specimen: Blood from Arm, Right Updated: 06/14/24 2301     Blood Culture No Growth at 24 hrs.    Comprehensive metabolic panel [[id number]]  (Abnormal) Collected: 06/14/24 0443    Lab Status: Final result Specimen: Blood Updated: 06/14/24 0538     Sodium 138 mmol/L      Potassium 4.4 mmol/L      Chloride 107 mmol/L      CO2 12 mmol/L      ANION GAP 19 mmol/L      BUN 35 mg/dL      Creatinine 2.03 mg/dL      Glucose 177 mg/dL      Calcium 7.4 mg/dL      AST 24 U/L      ALT 16 U/L      Alkaline Phosphatase 45 U/L      Total Protein 5.9 g/dL      Albumin 3.9 g/dL      Total Bilirubin 0.38 mg/dL      eGFR 35 ml/min/1.73sq m     Narrative:      National Kid[employer] Disease Foundation guidelines for Chronic Kidney Disease (CKD):     Stage 1 with normal or high GFR (GFR > 90 mL/min/1.73 square meters)    Stage 2 Mild CKD (GFR = 60-89 mL/min/1.73 square meters)    Stage 3A Moderate CKD (GFR = 45-59 mL/min/1.73 square meters)    Stage 3B Moderate CKD (GFR = 30-44 mL/min/1.73 square meters)    Stage 4 Severe CKD (GFR = 15-29 mL/min/1.73 square meters)    Stage 5 End Stage CKD (GFR <15 mL/min/1.73 square meters)  Note: GFR calculation is accurate only with a steady state creatinine    Procalcitonin [[id number]]  (Abnormal) Collected: 06/14/24 0443    Lab Status: Final result Specimen: Blood Updated: 06/14/24 0520     Procalcitonin 23.62 ng/ml     Magnesium [[id number]]  (Normal) Collected: 06/14/24 0443    Lab Status: Final result Specimen: Blood Updated: 06/14/24 0518     Magnesium 2.7 mg/dL     Phosphorus [[id number]]  (Normal) Collected: 06/14/24 0443    Lab Status: Final result Specimen: Blood Updated: 06/14/24 0518     Phosphorus 2.7 mg/dL     Protime-INR [[id number]]  (Abnormal) Collected: 06/14/24 0443    Lab Status: Final result Specimen: Blood Updated: 06/14/24 0517     Protime 17.3 seconds      INR 1.34    Narrative:      Verified by repeat analysis    APTT [[id number]]   (Abnormal) Collected: 06/14/24 0443    Lab Status: Final result Specimen: Blood Updated: 06/14/24 0517     PTT 42 seconds     Calcium, ionized [[id number]]  (Abnormal) Collected: 06/14/24 0445    Lab Status: Final result Specimen: Blood Updated: 06/14/24 0446     Calcium, Ionized 1.01 mmol/L     HS Troponin I 4hr [[id number]]  (Normal) Collected: 06/14/24 0248    Lab Status: Final result Specimen: Blood from Arm, Left Updated: 06/14/24 0325     hs TnI 4hr 16 ng/L      Delta 4hr hsTnI -4 ng/L     CBC and differential [[id number]]  (Abnormal) Collected: 06/14/24 0216    Lab Status: Final result Specimen: Blood from Arm, Left Updated: 06/14/24 0301     WBC 10.08 Thousand/uL      RBC 3.11 Million/uL      Hemoglobin 12.2 g/dL      Hematocrit 35.8 %       fL      MCH 38.9 pg      MCHC 33.8 g/dL      RDW 15.1 %      MPV 11.2 fL      Platelets 162 Thousands/uL      nRBC 0 /100 WBCs      Segmented % 89 %      Immature Grans % 1 %      Lymphocytes % 3 %      Monocytes % 7 %      Eosinophils Relative 0 %      Basophils Relative 0 %      Absolute Neutrophils 8.99 Thousands/µL      Absolute Immature Grans 0.09 Thousand/uL      Absolute Lymphocytes 0.26 Thousands/µL      Absolute Monocytes 0.71 Thousand/µL      Eosinophils Absolute 0.02 Thousand/µL      Basophils Absolute 0.01 Thousands/µL     Beta Hydroxybutyrate [[id number]]  (Abnormal) Collected: 06/13/24 2133    Lab Status: Final result Specimen: Blood from Arm, Right Updated: 06/14/24 0259     Beta- Hydroxybutyrate 5.72 mmol/L     HS Troponin I 2hr [[id number]]  (Normal) Collected: 06/13/24 2348    Lab Status: Final result Specimen: Blood from Arm, Left Updated: 06/14/24 0043     hs TnI 2hr 22 ng/L      Delta 2hr hsTnI 2 ng/L     Ammonia [[id number]]  (Normal) Collected: 06/13/24 2329    Lab Status: Final result Specimen: Blood Updated: 06/14/24 0007     Ammonia 52 umol/L     RBC Morphology Reflex Test [[id number]] Collected: 06/13/24 2133    Lab Status: Final result  Specimen: Blood from Arm, Right Updated: 06/14/24 0001    Blood gas, arterial [[id number]]  (Abnormal) Collected: 06/13/24 2325    Lab Status: Final result Specimen: Blood, Arterial Updated: 06/13/24 2342     pH, Arterial 7.097     pCO2, Arterial 22.9 mm Hg      pO2, Arterial 213.7 mm Hg      HCO3, Arterial 6.9 mmol/L      Base Excess, Arterial -21.1 mmol/L      O2 Content, Arterial 19.6 mL/dL      O2 HGB,Arterial  97.3 %     Urine Microscopic [[id number]]  (Abnormal) Collected: 06/13/24 2302    Lab Status: Final result Specimen: Urine, Indwelling Brandt Catheter Updated: 06/13/24 2320     RBC, UA 1-2 /hpf      WBC, UA 2-4 /hpf      Epithelial Cells Occasional /hpf      Bacteria, UA Occasional /hpf      RBC Casts, UA 5-10 /lpf      Granular Casts, UA 25-50     Budding Yeast Present    CBC and differential [[id number]]  (Abnormal) Collected: 06/13/24 2133    Lab Status: Final result Specimen: Blood from Arm, Right Updated: 06/13/24 2315     WBC 15.32 Thousand/uL      RBC 3.93 Million/uL      Hemoglobin 15.6 g/dL      Hematocrit 45.4 %       fL      MCH 39.7 pg      MCHC 34.4 g/dL      RDW 14.4 %      MPV 9.0 fL      Platelets 273 Thousands/uL     Narrative:      This is an appended report.  These results have been appended to a previously verified report.    Manual Differential(PHLEBS Do Not Order) [[id number]]  (Abnormal) Collected: 06/13/24 2133    Lab Status: Final result Specimen: Blood from Arm, Right Updated: 06/13/24 2315     Segmented % 87 %      Bands % 4 %      Lymphocytes % 2 %      Monocytes % 7 %      Eosinophils % 0 %      Basophils % 0 %      Absolute Neutrophils 13.94 Thousand/uL      Absolute Lymphocytes 0.31 Thousand/uL      Absolute Monocytes 1.07 Thousand/uL      Absolute Eosinophils 0.00 Thousand/uL      Absolute Basophils 0.00 Thousand/uL      Total Counted --     RBC Morphology Normal     Platelet Estimate Adequate    UA w Reflex to Microscopic w Reflex to Culture [[id number]]  (Abnormal)  Collected: 06/13/24 2302    Lab Status: Final result Specimen: Urine, Indwelling Brandt Catheter Updated: 06/13/24 2312     Color, UA Light Yellow     Clarity, UA Turbid     Specific Gravity, UA 1.028     pH, UA 5.5     Leukocytes, UA Negative     Nitrite, UA Negative     Protein, UA 70 (1+) mg/dl      Glucose, UA Negative mg/dl      Ketones, UA 10 (1+) mg/dl      Urobilinogen, UA <2.0 mg/dl      Bilirubin, UA Negative     Occult Blood, UA Moderate    Acetaminophen level-If concentration is detectable, please discuss with medical  on call. [[id number]]  (Abnormal) Collected: 06/13/24 2133    Lab Status: Final result Specimen: Blood from Arm, Right Updated: 06/13/24 2235     Acetaminophen Level <2 ug/mL     POCT Blood Gas (CG8+) [[id number]]  (Abnormal) Collected: 06/13/24 2217    Lab Status: Final result Specimen: Venous Updated: 06/13/24 2225     ph, Abel ISTAT 6.981     pCO2, Abel i-STAT 30.4 mm HG      pO2, Abel i-STAT 55.0 mm HG      BE, i-STAT -24 mmol/L      HCO3, Abel i-STAT 7.2 mmol/L      CO2, i-STAT 8 mmol/L      O2 Sat, i-STAT 70 %      SODIUM, I-STAT 136 mmol/l      Potassium, i-STAT 5.0 mmol/L      Calcium, Ionized i-STAT 1.28 mmol/L      Hct, i-STAT 50 %      Hgb, i-STAT 17.0 g/dl      Glucose, i-STAT 189 mg/dl      Specimen Type VENOUS    POCT Blood Gas (CG8+) [[id number]]  (Abnormal) Collected: 06/13/24 2118    Lab Status: Final result Specimen: Venous Updated: 06/13/24 2225     ph, Abel ISTAT 7.009     pCO2, Abel i-STAT 28.1 mm HG      pO2, Abel i-STAT 40.0 mm HG      BE, i-STAT -23 mmol/L      HCO3, Abel i-STAT 7.1 mmol/L      CO2, i-STAT 8 mmol/L      O2 Sat, i-STAT 51 %      SODIUM, I-STAT 136 mmol/l      Potassium, i-STAT 5.2 mmol/L      Calcium, Ionized i-STAT 1.25 mmol/L      Hct, i-STAT 51 %      Hgb, i-STAT 17.3 g/dl      Glucose, i-STAT 198 mg/dl      Specimen Type VENOUS    Salicylate level [[id number]]  (Normal) Collected: 06/13/24 2133    Lab Status: Final result Specimen: Blood from Arm,  Right Updated: 06/13/24 2223     Salicylate Lvl <5 mg/dL     Procalcitonin [[id number]]  (Abnormal) Collected: 06/13/24 2133    Lab Status: Final result Specimen: Blood from Arm, Right Updated: 06/13/24 2221     Procalcitonin 70.48 ng/ml     HS Troponin 0hr (reflex protocol) [[id number]]  (Normal) Collected: 06/13/24 2133    Lab Status: Final result Specimen: Blood from Arm, Right Updated: 06/13/24 2216     hs TnI 0hr 20 ng/L     Lactic acid [[id number]]  (Normal) Collected: 06/13/24 2133    Lab Status: Final result Specimen: Blood from Arm, Right Updated: 06/13/24 2213     LACTIC ACID 1.8 mmol/L     Narrative:      Result may be elevated if tourniquet was used during collection.    CK [[id number]]  (Abnormal) Collected: 06/13/24 2133    Lab Status: Final result Specimen: Blood from Arm, Right Updated: 06/13/24 2213     Total  U/L     Comprehensive metabolic panel [[id number]]  (Abnormal) Collected: 06/13/24 2133    Lab Status: Final result Specimen: Blood from Arm, Right Updated: 06/13/24 2213     Sodium 136 mmol/L      Potassium 5.1 mmol/L      Chloride 106 mmol/L      CO2 9 mmol/L      ANION GAP 21 mmol/L      BUN 43 mg/dL      Creatinine 3.87 mg/dL      Glucose 203 mg/dL      Calcium 9.5 mg/dL      AST 28 U/L      ALT 21 U/L      Alkaline Phosphatase 68 U/L      Total Protein 8.4 g/dL      Albumin 5.7 g/dL      Total Bilirubin 0.64 mg/dL      eGFR 16 ml/min/1.73sq m     Narrative:      National Kidney Disease Foundation guidelines for Chronic Kidney Disease (CKD):     Stage 1 with normal or high GFR (GFR > 90 mL/min/1.73 square meters)    Stage 2 Mild CKD (GFR = 60-89 mL/min/1.73 square meters)    Stage 3A Moderate CKD (GFR = 45-59 mL/min/1.73 square meters)    Stage 3B Moderate CKD (GFR = 30-44 mL/min/1.73 square meters)    Stage 4 Severe CKD (GFR = 15-29 mL/min/1.73 square meters)    Stage 5 End Stage CKD (GFR <15 mL/min/1.73 square meters)  Note: GFR calculation is accurate only with a steady state  creatinine    Ethanol [[id number]]  (Normal) Collected: 06/13/24 2133    Lab Status: Final result Specimen: Blood from Arm, Right Updated: 06/13/24 2212     Ethanol Lvl <10 mg/dL     Protime-INR [[id number]]  (Normal) Collected: 06/13/24 2133    Lab Status: Final result Specimen: Blood from Arm, Right Updated: 06/13/24 2206     Protime 13.8 seconds      INR 1.00    APTT [[id number]]  (Normal) Collected: 06/13/24 2133    Lab Status: Final result Specimen: Blood from Arm, Right Updated: 06/13/24 2206     PTT 33 seconds     Blood gas, venous [[id number]]  (Abnormal) Collected: 06/13/24 2133    Lab Status: Final result Specimen: Blood from Arm, Right Updated: 06/13/24 2152     pH, Abel 6.973     pCO2, Abel 29.2 mm Hg      pO2, Abel 41.7 mm Hg      HCO3, Abel 6.6 mmol/L      Base Excess, Abel -24.2 mmol/L      O2 Content, Abel 16.2 ml/dL      O2 HGB, VENOUS 74.2 %     Fingerstick Glucose (POCT) [[id number]]  (Abnormal) Collected: 06/13/24 2112    Lab Status: Final result Specimen: Blood Updated: 06/13/24 2114     POC Glucose 246 mg/dl                    XR chest 1 view portable   Final Result by Robel Farley MD (06/14 0815)      Endotracheal tube tip 4.5 cm above the roque.      Stable elevation of the right hemidiaphragm. No acute cardiopulmonary process.            Workstation performed: [id number]         CT head without contrast   Final Result by Sterling Agrawal DO (06/13 2331)      No acute intracranial abnormality.                  Workstation performed: [id number]         CT spine cervical without contrast   Final Result by Sterling Agrawal DO (06/13 2334)      No cervical spine fracture or traumatic malalignment.                  Workstation performed: [id number]         CT chest abdomen pelvis w contrast   Final Result by Sterling Agrawal DO (06/13 2342)      No acute findings in the chest, abdomen or pelvis.               Workstation performed: [id number]         XR chest 1 view portable   Final Result by Britton Wright MD  (06/14 0813)      No acute cardiopulmonary disease.            Workstation performed: [id number]               Procedures  ECG 12 Lead Documentation Only    Date/Time: 6/14/2024 12:42 PM    Performed by: Armin Barroso MD  Authorized by: Armin Barroso MD    Indications / Diagnosis:  AMS  ECG reviewed by me, the ED Provider: yes    Patient location:  ED  Previous ECG:     Comparison to cardiac monitor: Yes    Interpretation:     Interpretation: abnormal    Rate:     ECG rate:  115    ECG rate assessment: tachycardic    Rhythm:     Rhythm: sinus tachycardia    Ectopy:     Ectopy: none    QRS:     QRS axis:  Normal    QRS intervals:  Normal  Conduction:     Conduction: normal    ST segments:     ST segments:  Normal  T waves:     T waves: normal    Intubation    Date/Time: 6/13/2024 11:56 PM    Performed by: Armin Barroso MD  Authorized by: Armin Barroso MD    Comments:      See Alvino Shaw's airway note.        ED Course  ED Course as of 06/15/24 0923   Thu Jun 13, 2024 2133 pH, Abel(!!): 6.973   2133 Creatinine(!): 3.87  BELLA   2133 Coma Panel(!)  Negative ETOH, ASA, APAP                                       Medical Decision Making  Patient is a 54-year-old male who presented to the ED via EMS from home for AMS.  EMS was called by PD after his mother noticed that he was thrashing about violently, striking his head against the wall.  On EMS arrival, patient was found to have multiple empty bottles of liquor nearby,as well as disheveled, hoarding environment of his room. Patient was found to be A&Ox0 as well as highly agitated, thrashing about, not responding appropriately. He was also found to be saturating low 80% RA and was placed on NRB 15L. On arrival to ED the patient appeared same as EMS description initially but soon thereafer became even less responsive, thrashing about occasionally but at other times only responsive to sternal rub. He did appear to be protecting his airway. Due to the thrashing,  trial of Versed was attempted with low threshold for intubation if signs of airway compromise. Rest of events in MDM.    Ddx includes but is not limited to alcohol withdrawal, drug intoxication/OD, ingestion including chemical ingestion/exposure, intracranial process, toxic-metabolic, electrolyte abnormality, infection including sepsis from UTI/pneumonia.   As patient was becoming less responsive and with severe metabolic acidosis on vbg, decision was made to intubate and place on mechanical ventilation, see Alvino Shaw's airway note. Patient was successfully intubated and placed on RR 30 to maximize respiratory compensation. He was placed on propofol drip but due to increasing agitation required frequent uptitrating of drip as well as propofol bolus x2.  Patient was found to have pH 6.97 with metabolic acidosis pattern. CBC with WBC 15. CMP indicative of severe BELLA. , lactic 1.7.   Given the lactic acid is not high enough to explain the severe high anion gap metabolic acidosis, probability of other toxic ingestion.  Patient was admitted to critical care for further workup and management shortly after intubation.    Amount and/or Complexity of Data Reviewed  Labs: ordered. Decision-making details documented in ED Course.  Radiology: ordered.    Risk  Prescription drug management.  Decision regarding hospitalization.          Disposition  Final diagnoses:   Encephalopathy   Respiratory failure (HCC)   Sepsis (HCC)     Time reflects when diagnosis was documented in both MDM as applicable and the Disposition within this note       Time User Action Codes Description Comment    6/13/2024 11:04 PM Dany Rivas Add [G93.40] Encephalopathy     6/13/2024 11:04 PM Dany Rivas Add [J96.90] Respiratory failure (HCC)     6/13/2024 11:05 PM Dany Rivas Add [A41.9] Sepsis (HCC)     6/14/2024  2:23 AM Armani Call Add [R65.10] SIRS (systemic inflammatory response syndrome) (HCC)     6/14/2024 11:38 AM  Linden Plummer Add [E87.29] High anion gap metabolic acidosis           ED Disposition       ED Disposition   Admit    Condition   Stable    Date/Time   u Jun 13, 2024 11:06 PM    Comment   Case was discussed with critical care and the patient's admission status was agreed to be Admission Status: inpatient status to the service of Dr. Giron .               Follow-up Information    None         Current Discharge Medication List        CONTINUE these medications which have NOT CHANGED    Details   baclofen 10 mg tablet take 1 tablet by mouth twice a day  Qty: 60 tablet, Refills: 6    Associated Diagnoses: Spasticity      lisinopril (ZESTRIL) 10 mg tablet take 1 tablet by mouth once daily  Qty: 30 tablet, Refills: 0    Associated Diagnoses: Benign essential hypertension           No discharge procedures on file.    PDMP Review       None             ED Provider  Attending physically available and evaluated Colin AMADA Julia. I managed the patient along with the ED Attending.    Electronically Signed by           Armin Barroso MD  06/15/24 0921       Armin Barroso MD  06/15/24 0924

## 2024-06-14 NOTE — PROGRESS NOTES
Colin Dumont is a 54 y.o. male who is currently ordered Vancomycin IV with management by the Pharmacy Consult service.  Relevant clinical data and objective / subjective history reviewed.  Vancomycin Assessment:  Indication and Goal AUC/Trough: Pneumonia (goal -600, trough >10)  Clinical Status:  critical care  Micro:   pending   Renal Function:  SCr: 3.87 mg/dL  CrCl: 19 mL/min  Renal replacement: Not on dialysis  Days of Therapy: 1  Current Dose: 1500mg (25mg/kg) loadind dose given  Vancomycin Plan:  New Dosinmg (15mg/kg) pulse-dosed PRN random vanco level<15mcg/ml  Estimated AUC: n/a for pulse-dosing  Estimated Trough: n/a for pulse-dosing  Next Level:  @ 1800  Renal Function Monitoring: Daily BMP and UOP  Pharmacy will continue to follow closely for s/sx of nephrotoxicity, infusion reactions and appropriateness of therapy.  BMP and CBC will be ordered per protocol. We will continue to follow the patient’s culture results and clinical progress daily.    Bethany Pires, Pharmacist

## 2024-06-14 NOTE — ASSESSMENT & PLAN NOTE
Lab Results   Component Value Date    HGBA1C 5.1 10/24/2022     Previously took Basaglar 10 units daily, stopped in 2019  Check glucose q6h for now  SSI every 6 hours  Goal glucose 140-180  Hypoglycemia protocol  Check HbA1c  NPO for now.  Check BHB

## 2024-06-14 NOTE — CONSULTS
Recommend high protein, lower CHO formula at this time. Vital 1.2 @ 50 ml/hr. Water flushes of 30 ml q 4 hrs. Start at 10 ml/hr, advance by 10 ml q 4 hrs as tolerated until eventual goal rate is reached.     At goal EN regimen provides 1440 kcals (1889 total kcals with propofol), 108 g protein, 133 g CHO, and 1153 ml total free water from formula + flushes.

## 2024-06-14 NOTE — ASSESSMENT & PLAN NOTE
Pt presented to ED  CTH - no acute intracranial abnormalities, previous post-operative changes.  History of previous psychotic episodes d/t excessive intake of Baclofen  Per patients mother she did refil his Baclofen today however is nto sure how many are left.   COMA panel - negative  Check RUDS now  Hx ETOH use check alcohol level now, possible withdrawal symptoms  Pt did require multiple doses of versed while on Proprofol d/t agitation   Continue mechanical ventilation

## 2024-06-14 NOTE — ED NOTES
Patient transported to ICU w/ this RN, RT, and ED tech. Monitored and on vent.      Sheila Ziegler RN  06/14/24 0149

## 2024-06-14 NOTE — ASSESSMENT & PLAN NOTE
Hx spasticity s/p craniotomy, home regimen: Baclofen 10mg  Hold Baclofen for now, restart when appropriate.

## 2024-06-14 NOTE — ASSESSMENT & PLAN NOTE
Hx ETOH use in the past, family unclear if currently drink and if so how much. Unknown time of last drink.   Currently intubated/sedated  CIWA protocol when extubated  Encourage cessation

## 2024-06-15 LAB
ANION GAP SERPL CALCULATED.3IONS-SCNC: 6 MMOL/L (ref 4–13)
ANION GAP SERPL CALCULATED.3IONS-SCNC: 7 MMOL/L (ref 4–13)
ANION GAP SERPL CALCULATED.3IONS-SCNC: 7 MMOL/L (ref 4–13)
ANION GAP SERPL CALCULATED.3IONS-SCNC: 8 MMOL/L (ref 4–13)
ANION GAP SERPL CALCULATED.3IONS-SCNC: 9 MMOL/L (ref 4–13)
ARTERIAL PATENCY WRIST A: YES
BASE EXCESS BLDA CALC-SCNC: -6.1 MMOL/L
BUN SERPL-MCNC: 12 MG/DL (ref 5–25)
BUN SERPL-MCNC: 15 MG/DL (ref 5–25)
BUN SERPL-MCNC: 19 MG/DL (ref 5–25)
BUN SERPL-MCNC: 20 MG/DL (ref 5–25)
BUN SERPL-MCNC: 21 MG/DL (ref 5–25)
CA-I BLD-SCNC: 1.02 MMOL/L (ref 1.12–1.32)
CA-I BLD-SCNC: 1.12 MMOL/L (ref 1.12–1.32)
CA-I BLD-SCNC: 1.24 MMOL/L (ref 1.12–1.32)
CALCIUM SERPL-MCNC: 7.1 MG/DL (ref 8.4–10.2)
CALCIUM SERPL-MCNC: 8.5 MG/DL (ref 8.4–10.2)
CALCIUM SERPL-MCNC: 8.5 MG/DL (ref 8.4–10.2)
CALCIUM SERPL-MCNC: 8.9 MG/DL (ref 8.4–10.2)
CALCIUM SERPL-MCNC: 9.2 MG/DL (ref 8.4–10.2)
CHLORIDE SERPL-SCNC: 112 MMOL/L (ref 96–108)
CHLORIDE SERPL-SCNC: 114 MMOL/L (ref 96–108)
CHLORIDE SERPL-SCNC: 115 MMOL/L (ref 96–108)
CHLORIDE SERPL-SCNC: 115 MMOL/L (ref 96–108)
CHLORIDE SERPL-SCNC: 116 MMOL/L (ref 96–108)
CO2 SERPL-SCNC: 20 MMOL/L (ref 21–32)
CO2 SERPL-SCNC: 21 MMOL/L (ref 21–32)
CO2 SERPL-SCNC: 21 MMOL/L (ref 21–32)
CO2 SERPL-SCNC: 22 MMOL/L (ref 21–32)
CO2 SERPL-SCNC: 23 MMOL/L (ref 21–32)
CREAT SERPL-MCNC: 0.72 MG/DL (ref 0.6–1.3)
CREAT SERPL-MCNC: 0.82 MG/DL (ref 0.6–1.3)
CREAT SERPL-MCNC: 0.85 MG/DL (ref 0.6–1.3)
CREAT SERPL-MCNC: 0.88 MG/DL (ref 0.6–1.3)
CREAT SERPL-MCNC: 0.99 MG/DL (ref 0.6–1.3)
FOLATE SERPL-MCNC: 6.2 NG/ML
GFR SERPL CREATININE-BSD FRML MDRD: 104 ML/MIN/1.73SQ M
GFR SERPL CREATININE-BSD FRML MDRD: 84 ML/MIN/1.73SQ M
GFR SERPL CREATININE-BSD FRML MDRD: 96 ML/MIN/1.73SQ M
GFR SERPL CREATININE-BSD FRML MDRD: 97 ML/MIN/1.73SQ M
GFR SERPL CREATININE-BSD FRML MDRD: 98 ML/MIN/1.73SQ M
GLUCOSE SERPL-MCNC: 121 MG/DL (ref 65–140)
GLUCOSE SERPL-MCNC: 122 MG/DL (ref 65–140)
GLUCOSE SERPL-MCNC: 125 MG/DL (ref 65–140)
GLUCOSE SERPL-MCNC: 127 MG/DL (ref 65–140)
GLUCOSE SERPL-MCNC: 128 MG/DL (ref 65–140)
GLUCOSE SERPL-MCNC: 170 MG/DL (ref 65–140)
GLUCOSE SERPL-MCNC: 187 MG/DL (ref 65–140)
GLUCOSE SERPL-MCNC: 78 MG/DL (ref 65–140)
GLUCOSE SERPL-MCNC: 82 MG/DL (ref 65–140)
GLUCOSE SERPL-MCNC: 97 MG/DL (ref 65–140)
HCO3 BLDA-SCNC: 19 MMOL/L (ref 22–28)
HOROWITZ INDEX BLDA+IHG-RTO: 40 MM[HG]
MAGNESIUM SERPL-MCNC: 1.8 MG/DL (ref 1.9–2.7)
MAGNESIUM SERPL-MCNC: 1.8 MG/DL (ref 1.9–2.7)
MAGNESIUM SERPL-MCNC: 2 MG/DL (ref 1.9–2.7)
MAGNESIUM SERPL-MCNC: 2 MG/DL (ref 1.9–2.7)
O2 CT BLDA-SCNC: 6.8 ML/DL (ref 16–23)
OXYHGB MFR BLDA: 44.4 % (ref 94–97)
PCO2 BLDA: 36.1 MM HG (ref 36–44)
PEEP RESPIRATORY: 6 CM[H2O]
PH BLDA: 7.34 [PH] (ref 7.35–7.45)
PHOSPHATE SERPL-MCNC: 19 MG/DL (ref 2.7–4.5)
PHOSPHATE SERPL-MCNC: 2.5 MG/DL (ref 2.7–4.5)
PHOSPHATE SERPL-MCNC: 2.8 MG/DL (ref 2.7–4.5)
PHOSPHATE SERPL-MCNC: 2.9 MG/DL (ref 2.7–4.5)
PHOSPHATE SERPL-MCNC: 3.3 MG/DL (ref 2.7–4.5)
PO2 BLDA: 26.1 MM HG (ref 75–129)
POTASSIUM SERPL-SCNC: 3.3 MMOL/L (ref 3.5–5.3)
POTASSIUM SERPL-SCNC: 3.9 MMOL/L (ref 3.5–5.3)
POTASSIUM SERPL-SCNC: 4.1 MMOL/L (ref 3.5–5.3)
POTASSIUM SERPL-SCNC: 4.3 MMOL/L (ref 3.5–5.3)
POTASSIUM SERPL-SCNC: 9.6 MMOL/L (ref 3.5–5.3)
SODIUM SERPL-SCNC: 142 MMOL/L (ref 135–147)
SODIUM SERPL-SCNC: 143 MMOL/L (ref 135–147)
SODIUM SERPL-SCNC: 143 MMOL/L (ref 135–147)
SODIUM SERPL-SCNC: 144 MMOL/L (ref 135–147)
SODIUM SERPL-SCNC: 144 MMOL/L (ref 135–147)
TSH SERPL DL<=0.05 MIU/L-ACNC: 0.7 UIU/ML (ref 0.45–4.5)
VANCOMYCIN SERPL-MCNC: 9.5 UG/ML (ref 10–20)
VENT AC: 18
VENT- AC: AC
VIT B12 SERPL-MCNC: 193 PG/ML (ref 180–914)

## 2024-06-15 PROCEDURE — 84100 ASSAY OF PHOSPHORUS: CPT

## 2024-06-15 PROCEDURE — 80048 BASIC METABOLIC PNL TOTAL CA: CPT

## 2024-06-15 PROCEDURE — 0BH17EZ INSERTION OF ENDOTRACHEAL AIRWAY INTO TRACHEA, VIA NATURAL OR ARTIFICIAL OPENING: ICD-10-PCS | Performed by: INTERNAL MEDICINE

## 2024-06-15 PROCEDURE — 82948 REAGENT STRIP/BLOOD GLUCOSE: CPT

## 2024-06-15 PROCEDURE — 84443 ASSAY THYROID STIM HORMONE: CPT | Performed by: INTERNAL MEDICINE

## 2024-06-15 PROCEDURE — 83735 ASSAY OF MAGNESIUM: CPT

## 2024-06-15 PROCEDURE — 94003 VENT MGMT INPAT SUBQ DAY: CPT

## 2024-06-15 PROCEDURE — 94760 N-INVAS EAR/PLS OXIMETRY 1: CPT

## 2024-06-15 PROCEDURE — 82746 ASSAY OF FOLIC ACID SERUM: CPT

## 2024-06-15 PROCEDURE — 94150 VITAL CAPACITY TEST: CPT

## 2024-06-15 PROCEDURE — 82607 VITAMIN B-12: CPT

## 2024-06-15 PROCEDURE — 99291 CRITICAL CARE FIRST HOUR: CPT | Performed by: INTERNAL MEDICINE

## 2024-06-15 PROCEDURE — 82805 BLOOD GASES W/O2 SATURATION: CPT

## 2024-06-15 PROCEDURE — 82330 ASSAY OF CALCIUM: CPT

## 2024-06-15 PROCEDURE — 80202 ASSAY OF VANCOMYCIN: CPT | Performed by: INTERNAL MEDICINE

## 2024-06-15 RX ORDER — POTASSIUM CHLORIDE 20MEQ/15ML
40 LIQUID (ML) ORAL EVERY 4 HOURS
Status: COMPLETED | OUTPATIENT
Start: 2024-06-15 | End: 2024-06-15

## 2024-06-15 RX ORDER — POTASSIUM CHLORIDE 14.9 MG/ML
20 INJECTION INTRAVENOUS ONCE
Status: COMPLETED | OUTPATIENT
Start: 2024-06-15 | End: 2024-06-15

## 2024-06-15 RX ORDER — DEXTROSE, SODIUM CHLORIDE, SODIUM LACTATE, POTASSIUM CHLORIDE, AND CALCIUM CHLORIDE 5; .6; .31; .03; .02 G/100ML; G/100ML; G/100ML; G/100ML; G/100ML
50 INJECTION, SOLUTION INTRAVENOUS CONTINUOUS
Status: DISCONTINUED | OUTPATIENT
Start: 2024-06-15 | End: 2024-06-15

## 2024-06-15 RX ORDER — LORAZEPAM 2 MG/ML
1 INJECTION INTRAMUSCULAR EVERY 6 HOURS PRN
Status: DISCONTINUED | OUTPATIENT
Start: 2024-06-15 | End: 2024-06-17

## 2024-06-15 RX ORDER — CALCIUM GLUCONATE 20 MG/ML
1 INJECTION, SOLUTION INTRAVENOUS ONCE
Status: COMPLETED | OUTPATIENT
Start: 2024-06-15 | End: 2024-06-15

## 2024-06-15 RX ORDER — CALCIUM GLUCONATE 20 MG/ML
2 INJECTION, SOLUTION INTRAVENOUS ONCE
Status: COMPLETED | OUTPATIENT
Start: 2024-06-15 | End: 2024-06-15

## 2024-06-15 RX ORDER — MAGNESIUM SULFATE HEPTAHYDRATE 40 MG/ML
2 INJECTION, SOLUTION INTRAVENOUS ONCE
Status: COMPLETED | OUTPATIENT
Start: 2024-06-15 | End: 2024-06-16

## 2024-06-15 RX ORDER — VANCOMYCIN HYDROCHLORIDE 1 G/200ML
1000 INJECTION, SOLUTION INTRAVENOUS EVERY 12 HOURS
Status: DISCONTINUED | OUTPATIENT
Start: 2024-06-15 | End: 2024-06-16

## 2024-06-15 RX ADMIN — CEFEPIME 1000 MG: 1 INJECTION, POWDER, FOR SOLUTION INTRAMUSCULAR; INTRAVENOUS at 10:46

## 2024-06-15 RX ADMIN — Medication 50 MCG/HR: at 01:03

## 2024-06-15 RX ADMIN — POTASSIUM CHLORIDE 20 MEQ: 14.9 INJECTION, SOLUTION INTRAVENOUS at 02:15

## 2024-06-15 RX ADMIN — VANCOMYCIN HYDROCHLORIDE 1000 MG: 1 INJECTION, SOLUTION INTRAVENOUS at 12:24

## 2024-06-15 RX ADMIN — PROPOFOL 20 MCG/KG/MIN: 10 INJECTION, EMULSION INTRAVENOUS at 22:20

## 2024-06-15 RX ADMIN — FENTANYL CITRATE 50 MCG: 50 INJECTION INTRAMUSCULAR; INTRAVENOUS at 00:51

## 2024-06-15 RX ADMIN — FENTANYL CITRATE 50 MCG: 50 INJECTION INTRAMUSCULAR; INTRAVENOUS at 01:55

## 2024-06-15 RX ADMIN — POTASSIUM CHLORIDE 40 MEQ: 1.5 SOLUTION ORAL at 02:15

## 2024-06-15 RX ADMIN — PROPOFOL 35 MCG/KG/MIN: 10 INJECTION, EMULSION INTRAVENOUS at 10:12

## 2024-06-15 RX ADMIN — DEXTROSE, SODIUM CHLORIDE, SODIUM LACTATE, POTASSIUM CHLORIDE, AND CALCIUM CHLORIDE 50 ML/HR: 5; .6; .31; .03; .02 INJECTION, SOLUTION INTRAVENOUS at 02:25

## 2024-06-15 RX ADMIN — FENTANYL CITRATE 50 MCG: 50 INJECTION INTRAMUSCULAR; INTRAVENOUS at 04:51

## 2024-06-15 RX ADMIN — POTASSIUM CHLORIDE 40 MEQ: 1.5 SOLUTION ORAL at 05:18

## 2024-06-15 RX ADMIN — HEPARIN SODIUM 5000 UNITS: 5000 INJECTION INTRAVENOUS; SUBCUTANEOUS at 22:21

## 2024-06-15 RX ADMIN — FOMEPIZOLE 600 MG: 1 INJECTION, SOLUTION INTRAVENOUS at 02:02

## 2024-06-15 RX ADMIN — HEPARIN SODIUM 5000 UNITS: 5000 INJECTION INTRAVENOUS; SUBCUTANEOUS at 13:12

## 2024-06-15 RX ADMIN — THIAMINE HYDROCHLORIDE 500 MG: 100 INJECTION, SOLUTION INTRAMUSCULAR; INTRAVENOUS at 22:25

## 2024-06-15 RX ADMIN — CHLORHEXIDINE GLUCONATE 15 ML: 1.2 RINSE ORAL at 08:14

## 2024-06-15 RX ADMIN — CHLORHEXIDINE GLUCONATE 15 ML: 1.2 RINSE ORAL at 22:20

## 2024-06-15 RX ADMIN — MAGNESIUM SULFATE HEPTAHYDRATE 2 G: 40 INJECTION, SOLUTION INTRAVENOUS at 18:39

## 2024-06-15 RX ADMIN — HEPARIN SODIUM 5000 UNITS: 5000 INJECTION INTRAVENOUS; SUBCUTANEOUS at 05:18

## 2024-06-15 RX ADMIN — METRONIDAZOLE 500 MG: 500 INJECTION, SOLUTION INTRAVENOUS at 07:03

## 2024-06-15 RX ADMIN — POLYETHYLENE GLYCOL 3350 17 G: 17 POWDER, FOR SOLUTION ORAL at 08:14

## 2024-06-15 RX ADMIN — CALCIUM GLUCONATE 2 G: 20 INJECTION, SOLUTION INTRAVENOUS at 02:25

## 2024-06-15 RX ADMIN — POTASSIUM PHOSPHATE, MONOBASIC AND POTASSIUM PHOSPHATE, DIBASIC 12 MMOL: 224; 236 INJECTION, SOLUTION, CONCENTRATE INTRAVENOUS at 15:29

## 2024-06-15 RX ADMIN — PROPOFOL 45 MCG/KG/MIN: 10 INJECTION, EMULSION INTRAVENOUS at 02:02

## 2024-06-15 RX ADMIN — CALCIUM GLUCONATE 1 G: 20 INJECTION, SOLUTION INTRAVENOUS at 03:27

## 2024-06-15 RX ADMIN — THIAMINE HYDROCHLORIDE 500 MG: 100 INJECTION, SOLUTION INTRAMUSCULAR; INTRAVENOUS at 08:14

## 2024-06-15 RX ADMIN — METRONIDAZOLE 500 MG: 500 INJECTION, SOLUTION INTRAVENOUS at 15:29

## 2024-06-15 RX ADMIN — Medication 50 MCG/HR: at 22:20

## 2024-06-15 RX ADMIN — THIAMINE HYDROCHLORIDE 500 MG: 100 INJECTION, SOLUTION INTRAMUSCULAR; INTRAVENOUS at 15:47

## 2024-06-15 RX ADMIN — FOMEPIZOLE 600 MG: 1 INJECTION, SOLUTION INTRAVENOUS at 13:12

## 2024-06-15 RX ADMIN — PROPOFOL 45 MCG/KG/MIN: 10 INJECTION, EMULSION INTRAVENOUS at 05:34

## 2024-06-15 NOTE — ASSESSMENT & PLAN NOTE
Lab Results   Component Value Date    HGBA1C 5.2 06/14/2024     Previously took Basaglar 10 units daily, stopped in 2019  Check glucose q6h for now  SSI every 6 hours  Goal glucose 140-180  Hypoglycemia protocol  Tube feeds started, vital AF  On D5 currently secondary to lower glucose readings earlier in the morning, will stop once stabilized.

## 2024-06-15 NOTE — ASSESSMENT & PLAN NOTE
S/p craniotomy 1/2019 at [facility], done secondary to abscess/lesions.  CTH no acute abnormalities, post-operative findings  Baseline independent

## 2024-06-15 NOTE — ASSESSMENT & PLAN NOTE
Pt presented to ED altered, agitated. Received Versed 5mg in ED and became somnolent after.  Intubated for airway protection and profound metabolic acidosis  VBG 6.9/30/55/7/ BE -24  VENT: AC/VC 30/380/40/6  CXR - ETT 5cm above roque, will advance by 1 cm on arrival to ICU    Plan:  Continue mechanical ventilation  Continue sedation, goal RASS -2  Propofol gtt  Fentanyl gtt  Avoiding Precedex 2/2 bradycardic episodes  Respiratory protocol  Vent bundle  CAM-ICU  Daily SAT/SBT

## 2024-06-15 NOTE — ASSESSMENT & PLAN NOTE
Pt presented to ED Altered  CTH - no acute intracranial abnormalities, previous post-operative changes.  History of previous psychotic episodes d/t excessive intake of Baclofen  Per patients mother she did refil his Baclofen, however is nto sure how many are left.   COMA panel - negative  UDS neg, ethanol negative  Requiring multiple doses of Versed while on propofol for agitation    Plan:  Currently with toxicology consult, toxic ingestion versus ketoacidosis  Following methanol and ethylene glycol levels  Continue with mechanical ventilation, consider SBT later today

## 2024-06-15 NOTE — PLAN OF CARE
Problem: SAFETY,RESTRAINT: NV/NON-SELF DESTRUCTIVE BEHAVIOR  Goal: Remains free of harm/injury (restraint for non violent/non self-detsructive behavior)  Description: INTERVENTIONS:  - Instruct patient/family regarding restraint use   - Assess and monitor physiologic and psychological status   - Provide interventions and comfort measures to meet assessed patient needs   - Identify and implement measures to help patient regain control  - Assess readiness for release of restraint   6/15/2024 0115 by Arielle Fontana  Outcome: Progressing  6/15/2024 0114 by Arielle Fontana  Outcome: Progressing  Goal: Returns to optimal restraint-free functioning  Description: INTERVENTIONS:  - Assess the patient's behavior and symptoms that indicate continued need for restraint  - Identify and implement measures to help patient regain control  - Assess readiness for release of restraint   6/15/2024 0115 by Arielle Fontana  Outcome: Progressing  6/15/2024 0114 by Arielle Fontana  Outcome: Progressing     Problem: Nutrition/Hydration-ADULT  Goal: Nutrient/Hydration intake appropriate for improving, restoring or maintaining nutritional needs  Description: Monitor and assess patient's nutrition/hydration status for malnutrition. Collaborate with interdisciplinary team and initiate plan and interventions as ordered.  Monitor patient's weight and dietary intake as ordered or per policy. Utilize nutrition screening tool and intervene as necessary. Determine patient's food preferences and provide high-protein, high-caloric foods as appropriate.     INTERVENTIONS:  - Monitor oral intake, urinary output, labs, and treatment plans  - Assess nutrition and hydration status and recommend course of action  - Evaluate amount of meals eaten  - Assist patient with eating if necessary   - Allow adequate time for meals  - Recommend/ encourage appropriate diets, oral nutritional supplements, and vitamin/mineral supplements  - Order, calculate, and assess  calorie counts as needed  - Recommend, monitor, and adjust tube feedings and TPN/PPN based on assessed needs  - Assess need for intravenous fluids  - Provide specific nutrition/hydration education as appropriate  - Include patient/family/caregiver in decisions related to nutrition  6/15/2024 0115 by Arielle Fontana  Outcome: Progressing  6/15/2024 0114 by Arielle Fontana  Outcome: Progressing     Problem: Prexisting or High Potential for Compromised Skin Integrity  Goal: Skin integrity is maintained or improved  Description: INTERVENTIONS:  - Identify patients at risk for skin breakdown  - Assess and monitor skin integrity  - Assess and monitor nutrition and hydration status  - Monitor labs   - Assess for incontinence   - Turn and reposition patient  - Assist with mobility/ambulation  - Relieve pressure over bony prominences  - Avoid friction and shearing  - Provide appropriate hygiene as needed including keeping skin clean and dry  - Evaluate need for skin moisturizer/barrier cream  - Collaborate with interdisciplinary team   - Patient/family teaching  - Consider wound care consult   6/15/2024 0115 by Arielle Fontana  Outcome: Progressing  6/15/2024 0114 by Arielle Fontana  Outcome: Progressing     Problem: PAIN - ADULT  Goal: Verbalizes/displays adequate comfort level or baseline comfort level  Description: Interventions:  - Encourage patient to monitor pain and request assistance  - Assess pain using appropriate pain scale  - Administer analgesics based on type and severity of pain and evaluate response  - Implement non-pharmacological measures as appropriate and evaluate response  - Consider cultural and social influences on pain and pain management  - Notify physician/advanced practitioner if interventions unsuccessful or patient reports new pain  Outcome: Progressing     Problem: INFECTION - ADULT  Goal: Absence or prevention of progression during hospitalization  Description: INTERVENTIONS:  - Assess and monitor  for signs and symptoms of infection  - Monitor lab/diagnostic results  - Monitor all insertion sites, i.e. indwelling lines, tubes, and drains  - Monitor endotracheal if appropriate and nasal secretions for changes in amount and color  - Reader appropriate cooling/warming therapies per order  - Administer medications as ordered  - Instruct and encourage patient and family to use good hand hygiene technique  - Identify and instruct in appropriate isolation precautions for identified infection/condition  Outcome: Progressing  Goal: Absence of fever/infection during neutropenic period  Description: INTERVENTIONS:  - Monitor WBC    Outcome: Progressing     Problem: SAFETY ADULT  Goal: Patient will remain free of falls  Description: INTERVENTIONS:  - Educate patient/family on patient safety including physical limitations  - Instruct patient to call for assistance with activity   - Consult OT/PT to assist with strengthening/mobility   - Keep Call bell within reach  - Keep bed low and locked with side rails adjusted as appropriate  - Keep care items and personal belongings within reach  - Initiate and maintain comfort rounds  - Make Fall Risk Sign visible to staff  - Apply yellow socks and bracelet for high fall risk patients  - Consider moving patient to room near nurses station  Outcome: Progressing  Goal: Maintain or return to baseline ADL function  Description: INTERVENTIONS:  -  Assess patient's ability to carry out ADLs; assess patient's baseline for ADL function and identify physical deficits which impact ability to perform ADLs (bathing, care of mouth/teeth, toileting, grooming, dressing, etc.)  - Assess/evaluate cause of self-care deficits   - Assess range of motion  - Assess patient's mobility; develop plan if impaired  - Assess patient's need for assistive devices and provide as appropriate  - Encourage maximum independence but intervene and supervise when necessary  - Involve family in performance of ADLs  -  Assess for home care needs following discharge   - Consider OT consult to assist with ADL evaluation and planning for discharge  - Provide patient education as appropriate  Outcome: Progressing  Goal: Maintains/Returns to pre admission functional level  Description: INTERVENTIONS:  - Perform AM-PAC 6 Click Basic Mobility/ Daily Activity assessment daily.  - Set and communicate daily mobility goal to care team and patient/family/caregiver.   - Collaborate with rehabilitation services on mobility goals if consulted  - Out of bed for toileting  - Record patient progress and toleration of activity level   Outcome: Progressing     Problem: DISCHARGE PLANNING  Goal: Discharge to home or other facility with appropriate resources  Description: INTERVENTIONS:  - Identify barriers to discharge w/patient and caregiver  - Arrange for needed discharge resources and transportation as appropriate  - Identify discharge learning needs (meds, wound care, etc.)  - Arrange for interpretive services to assist at discharge as needed  - Refer to Case Management Department for coordinating discharge planning if the patient needs post-hospital services based on physician/advanced practitioner order or complex needs related to functional status, cognitive ability, or social support system  Outcome: Progressing     Problem: Knowledge Deficit  Goal: Patient/family/caregiver demonstrates understanding of disease process, treatment plan, medications, and discharge instructions  Description: Complete learning assessment and assess knowledge base.  Interventions:  - Provide teaching at level of understanding  - Provide teaching via preferred learning methods  Outcome: Progressing

## 2024-06-15 NOTE — ASSESSMENT & PLAN NOTE
Lab Results   Component Value Date    HGBA1C 5.2 06/14/2024   Previously took Basaglar 10 units daily, stopped in 2019    Plan  Check glucose q6h for now  SSI every 6 hours  Goal glucose 140-180  Hypoglycemia protocol  Tube feeds vital AF

## 2024-06-15 NOTE — ASSESSMENT & PLAN NOTE
VBG on arrival - 7.0/28/40/7/-24  Repeat VBG - 6.9/30/55/7/-24  AG - 29  Lactic - 1.8  Etiology unclear Sepsis vs Baclofen intoxication vs Ketosis  COMA panel - Negative  S/p 1L [place]olyte in ED  Pending ethylene glycol and methanol levels  Acidosis has resolved    Plan:  Continue mechanical ventilation  Continue fomepizole  Will continue to check BMP and electrolytes, replete as needed

## 2024-06-15 NOTE — ASSESSMENT & PLAN NOTE
sCr on arrival - 3.87  Baseline - 1.24 (from 4/52023)  Likely d/t sepsis/dehydration  CK - 692  Received 1 L bolus in ED  Wiseman catheter placed in ED  Elevated CK    Plan:  Resolved  Continuing to hold home lisinopril for now  Maintain wiseman catheter  Monitor accurate I&O  Monitor and replete electrolytes as indicated  Avoid nephrotoxins and hypotension

## 2024-06-15 NOTE — PROGRESS NOTES
Colin Dumont is a 54 y.o. male who is currently ordered Vancomycin IV with management by the Pharmacy Consult service.  Relevant clinical data and objective / subjective history reviewed.  Vancomycin Assessment:  Indication and Goal AUC/Trough: Pneumonia (goal -600, trough >10)  Clinical Status: Critical care   Micro:     Renal Function:  SCr: 0.88 mg/dL  CrCl: 75.4 mL/min  Renal replacement: Not on dialysis  Days of Therapy: 3  Current Dose: 1000 mg (15mg/kg) once as needed for vanco random level <15mcg/ml  Vancomycin Plan:  New Dosin mg IV every 12 hours  Estimated AUC: 546 mcg*hr/mL  Estimated Trough: 15.2 mcg/mL  Next Level:  at 0600  Renal Function Monitoring: Daily BMP and UOP  Pharmacy will continue to follow closely for s/sx of nephrotoxicity, infusion reactions and appropriateness of therapy.  BMP and CBC will be ordered per protocol. We will continue to follow the patient’s culture results and clinical progress daily.    Poornima Izquierdo, Pharmacist

## 2024-06-15 NOTE — PROGRESS NOTES
[facility]  Progress Note  Name: Colin Dumont I  MRN: [id number]  Unit/Bed#: ICU 13 I Date of Admission: 6/13/2024   Date of Service: 6/15/2024 I Hospital Day: 2    Assessment & Plan   Acute respiratory failure with hypoxia (HCC)  Assessment & Plan  Pt presented to ED altered, agitated. Received Versed 5mg in ED and became somnolent after.  Intubated for airway protection and profound metabolic acidosis  VBG 6.9/30/55/7/ BE -24  VENT: AC/VC 30/380/40/6  CXR - ETT 5cm above roque, will advance by 1 cm on arrival to ICU    Plan:  Continue mechanical ventilation  Continue sedation, goal RASS -2  Propofol gtt  Fentanyl gtt  Respiratory protocol  Vent bundle  CAM-ICU  Daily SAT/SBT    * Metabolic encephalopathy  Assessment & Plan  Pt presented to ED Altered  CTH - no acute intracranial abnormalities, previous post-operative changes.  History of previous psychotic episodes d/t excessive intake of Baclofen  Per patients mother she did refil his Baclofen today however is nto sure how many are left.   COMA panel - negative  UDS neg, ethanol negative  Requiring multiple doses of Versed while on propofol for agitation    Plan:  Currently with toxicology consult, between toxic ingestion versus ketoacidosis  Following methanol and ethylene glycol levels  Continue with mechanical ventilation    High anion gap metabolic acidosis  Assessment & Plan  VBG on arrival - 7.0/28/40/7/-24  Repeat VBG - 6.9/30/55/7/-24  AG - 29  Lactic - 1.8  Etiology unclear Sepsis vs Baclofen intoxication  COMA panel - Negative  S/p 1L Isolyte in ED    Plan:  Continue mechanical ventilation  Will continue to check BMP and electrolytes, replete as needed    BELLA (acute kidney injury) (HCC)  Assessment & Plan  sCr on arrival - 3.87  Baseline - 1.24 (from 4/52023)  Likely d/t sepsis/dehydration  CK - 692  Received 1 L bolus in ED  Brandt catheter placed in ED  Elevated CK    Plan:  Resolved  Continuing to hold home lisinopril  for now  Maintain wiseman catheter  Monitor accurate I&O  Monitor and replete electrolytes as indicated  Avoid nephrotoxins and hypotension  BMP in AM      S/P craniotomy  Assessment & Plan  S/p craniotomy 1/2019 at [facility], done secondary to abscess/lesions.  CTH no acute abnormalities, post-operative findings  Baseline independent    Benign essential hypertension  Assessment & Plan  Home regimen: Lisinopril 10mg daily  Hold home Lisinopril    Alcohol use  Assessment & Plan  Hx ETOH use in the past, family unclear if currently drink and if so how much. Unknown time of last drink.   Currently intubated/sedated  CIWA protocol when extubated  Encourage cessation    SIRS (systemic inflammatory response syndrome) (HCC)  Assessment & Plan  Present on arrival, as evidence by - Tachycardia, Tachypnea, Leukocytosis   Suspected source - Unknown at this time  CXR and CT imaging without identifiable source.   Lactate - 1.8  WBC - 15  Procalcitonin - 70  UA: Negative leuks/nitrites, occasional bacteria 2-4 WBC  Blood culture x 2 drawn in ED  He received 1L Isolyte  Started on Cefepime/Flagyl/Vancomycin    Plan:  Continue ABX: Cefepime/Flagyl/Vancomycin  Follow up blood cultures  Trend WBC  Trend fever curve    Spasticity  Assessment & Plan  Hx spasticity s/p craniotomy, home regimen: Baclofen 10mg  Hold Baclofen for now, restart when appropriate.    Type II diabetes mellitus (HCC)  Assessment & Plan    Lab Results   Component Value Date    HGBA1C 5.2 06/14/2024     Previously took Basaglar 10 units daily, stopped in 2019  Check glucose q6h for now  SSI every 6 hours  Goal glucose 140-180  Hypoglycemia protocol  Tube feeds started, vital AF  On D5 currently secondary to lower glucose readings earlier in the morning, will stop once stabilized.             Disposition: Critical care    ICU Core Measures     Vented Patient  VAP Bundle  VAP bundle ordered     A: Assess, Prevent, and Manage Pain Has pain been assessed?  No  Need for changes to pain regimen? No   B: Both Spontaneous Awakening Trials (SATs) and Spontaneous Breathing Trials (SBTs) Plan to perform spontaneous awakening trial today? Yes   Plan to perform spontaneous breathing trial today? Yes   Obvious barriers to extubation? No   C: Choice of Sedation RASS Goal: -4 Deep Sedation or -3 Moderate Sedation  Need for changes to sedation or analgesia regimen? No   D: Delirium CAM-ICU: Negative   E: Early Mobility  Plan for early mobility? Yes   F: Family Engagement Plan for family engagement today? Yes       Antibiotic Review: Awaiting culture results.     Review of Invasive Devices:    Brandt Plan: Voiding trial after improvement in ambulation         Prophylaxis:  VTE VTE covered by:  heparin (porcine), Subcutaneous, 5,000 Units at 06/15/24 0518       Stress Ulcer  not ordered         Significant 24hr Events     24hr events: Patient remains sedated this morning, without significant changes in physical exam when compared to previous note.  Laboratories continue to stabilize.       Subjective   Review of Systems: See HPI for Review of Systems     Objective                            Vitals I/O      Most Recent Min/Max in 24hrs   Temp 98.7 °F (37.1 °C) Temp  Min: 97.6 °F (36.4 °C)  Max: 98.7 °F (37.1 °C)   Pulse 64 Pulse  Min: 48  Max: 93   Resp (!) 8 Resp  Min: 6  Max: 35   /81 BP  Min: 86/53  Max: 166/81   O2 Sat 100 % SpO2  Min: 85 %  Max: 100 %      Intake/Output Summary (Last 24 hours) at 6/15/2024 0823  Last data filed at 6/15/2024 0539  Gross per 24 hour   Intake 6679.18 ml   Output 1655 ml   Net 5024.18 ml       Diet Enteral/Parenteral; Tube Feeding No Oral Diet; Vital AF 1.2; Continuous; 50; 30; Water; Every 4 hours    Invasive Monitoring           Physical Exam   Physical Exam  Vitals and nursing note reviewed.   Eyes:      Pupils: Pupils are equal, round, and reactive to light.   Feet:      Comments: Bilateral great toe black  Generalized poor hygeine  Skin:      General: Skin is warm and dry.      Comments: Feet red/ruberous, calf discolored    HENT:      Head: Normocephalic and atraumatic.   Cardiovascular:      Rate and Rhythm: Normal rate and regular rhythm.      Heart sounds: Normal heart sounds.   Musculoskeletal:      Cervical back: Full passive range of motion without pain, normal range of motion and neck supple.   Abdominal: General: Bowel sounds are normal.      Palpations: Abdomen is soft.   Constitutional:       Appearance: He is underweight. He is ill-appearing.      Interventions: He is sedated, intubated and restrained.   Pulmonary:      Effort: Pulmonary effort is normal. He is intubated.      Breath sounds: Normal breath sounds.   Neurological:      GCS: GCS eye subscore is 1. GCS verbal subscore is 1. GCS motor subscore is 5.      Comments: Pulls away from external stimuli   Genitourinary/Anorectal:  Brandt present.          Diagnostic Studies      EKG: Telemetry without significant findings.  Imaging: No new imaging, old imaging reviewed.  I have personally reviewed pertinent reports.       Medications:  Scheduled PRN   cefepime, 1,000 mg, Q12H  chlorhexidine, 15 mL, Q12H JEF  fomepizole (ANTIZOL) 600 mg in sodium chloride 0.9 % 100 mL IVPB, 10 mg/kg, Q12H   Followed by  [START ON 6/17/2024] fomepizole (ANTIZOL) 1,000 mg in sodium chloride 0.9 % 100 mL IVPB, 15 mg/kg, Q12H  heparin (porcine), 5,000 Units, Q8H JEF  metroNIDAZOLE, 500 mg, Q8H  polyethylene glycol, 17 g, Daily  thiamine, 500 mg, TID      fentaNYL, 50 mcg, Q1H PRN  vancomycin, 15 mg/kg, Daily PRN       Continuous    dextrose 5% lactated ringer's, 50 mL/hr, Last Rate: 50 mL/hr (06/15/24 0225)  fentaNYL, 50 mcg/hr, Last Rate: 50 mcg/hr (06/15/24 0103)  insulin regular (HumuLIN R,NovoLIN R) 1 Units/mL in sodium chloride 0.9 % 100 mL infusion, 0.3-21 Units/hr, Last Rate: Stopped (06/14/24 1951)  propofol, 5-50 mcg/kg/min, Last Rate: 40 mcg/kg/min (06/15/24 0717)         Labs:    CBC    Recent  Labs     06/13/24 2133 06/13/24  2217 06/14/24  0216 06/14/24  1146   WBC 15.32*  --  10.08  --    HGB 15.6   < > 12.2 9.9*   HCT 45.4   < > 35.8* 29*     --  162  --    BANDSPCT 4  --   --   --     < > = values in this interval not displayed.     BMP    Recent Labs     06/15/24  0103 06/15/24  0531   SODIUM 143 144   K 3.3* 4.1   * 116*   CO2 23 21   AGAP 8 7   BUN 21 19   CREATININE 0.99 0.88   CALCIUM 8.5 8.9       Coags    Recent Labs     06/13/24 2133 06/14/24  0443   INR 1.00 1.34*   PTT 33 42*        Additional Electrolytes  Recent Labs     06/14/24  2341 06/15/24  0103 06/15/24  0531   MG 1.8*  --  2.0   PHOS 19.0* 3.3 2.8   CAIONIZED 0.82*  --  1.24          Blood Gas    Recent Labs     06/14/24  1754 06/15/24  0536   PHART 7.544* 7.340*   ZGO5PDS 21.0* 36.1   PO2ART 200.9* 26.1*   QZC1VYS 17.7* 19.0*   BEART -3.3 -6.1   SOURCE Radial, Right  --      Recent Labs     06/14/24 0425 06/14/24  1754   PHVEN 7.190*  --    GFB8VPW 29.1*  --    PO2VEN 28.6*  --    ABO5OOQ 10.9*  --    BEVEN -15.9  --    D0DGNHD 60.0  --    SOURCE  --  Radial, Right    LFTs  Recent Labs     06/13/24 2133 06/14/24  0443   ALT 21 16   AST 28 24   ALKPHOS 68 45   ALB 5.7* 3.9   TBILI 0.64 0.38       Infectious  Recent Labs     06/13/24 2133 06/14/24  0443   PROCALCITONI 70.48* 23.62*     Glucose  Recent Labs     06/14/24  1814 06/14/24  2341 06/15/24  0103 06/15/24  0531   GLUC 138 122 127 125               Anmol Mcgregor MD

## 2024-06-15 NOTE — ASSESSMENT & PLAN NOTE
sCr on arrival - 3.87  Baseline - 1.24 (from 4/52023)  Likely d/t sepsis/dehydration  CK - 692  Received 1 L bolus in ED  Wiseman catheter placed in ED  Elevated CK    Plan:  Resolved  Continuing to hold home lisinopril for now  Maintain wiseman catheter  Monitor accurate I&O  Monitor and replete electrolytes as indicated  Avoid nephrotoxins and hypotension  BMP in AM

## 2024-06-15 NOTE — ASSESSMENT & PLAN NOTE
VBG on arrival - 7.0/28/40/7/-24  Repeat VBG - 6.9/30/55/7/-24  AG - 29  Lactic - 1.8  Etiology unclear Sepsis vs Baclofen intoxication  COMA panel - Negative  S/p 1L Isolyte in ED    Plan:  Continue mechanical ventilation  Will continue to check BMP and electrolytes, replete as needed

## 2024-06-15 NOTE — ASSESSMENT & PLAN NOTE
Pt presented to ED Altered  CTH - no acute intracranial abnormalities, previous post-operative changes.  History of previous psychotic episodes d/t excessive intake of Baclofen  Per patients mother she did refil his Baclofen today however is nto sure how many are left.   COMA panel - negative  UDS neg, ethanol negative  Requiring multiple doses of Versed while on propofol for agitation    Plan:  Currently with toxicology consult, between toxic ingestion versus ketoacidosis  Following methanol and ethylene glycol levels  Continue with mechanical ventilation

## 2024-06-15 NOTE — ASSESSMENT & PLAN NOTE
Pt presented to ED altered, agitated. Received Versed 5mg in ED and became somnolent after.  Intubated for airway protection and profound metabolic acidosis  VBG 6.9/30/55/7/ BE -24  VENT: AC/VC 30/380/40/6  CXR - ETT 5cm above roque, will advance by 1 cm on arrival to ICU    Plan:  Continue mechanical ventilation  Continue sedation, goal RASS -2  Propofol gtt  Fentanyl gtt  Respiratory protocol  Vent bundle  CAM-ICU  Daily SAT/SBT

## 2024-06-16 PROBLEM — N17.9 AKI (ACUTE KIDNEY INJURY) (HCC): Status: RESOLVED | Noted: 2019-09-24 | Resolved: 2024-06-16

## 2024-06-16 PROBLEM — N17.9 AKI (ACUTE KIDNEY INJURY) (HCC): Status: RESOLVED | Noted: 2019-09-24 | Resolved: 2024-01-01

## 2024-06-16 LAB
ANION GAP SERPL CALCULATED.3IONS-SCNC: 5 MMOL/L (ref 4–13)
ANION GAP SERPL CALCULATED.3IONS-SCNC: 6 MMOL/L (ref 4–13)
ANION GAP SERPL CALCULATED.3IONS-SCNC: 6 MMOL/L (ref 4–13)
ANION GAP SERPL CALCULATED.3IONS-SCNC: 7 MMOL/L (ref 4–13)
ARTERIAL PATENCY WRIST A: YES
BASE EXCESS BLDA CALC-SCNC: -0.9 MMOL/L
BASOPHILS # BLD AUTO: 0.02 THOUSANDS/ÂΜL (ref 0–0.1)
BASOPHILS NFR BLD AUTO: 0 % (ref 0–1)
BUN SERPL-MCNC: 10 MG/DL (ref 5–25)
BUN SERPL-MCNC: 10 MG/DL (ref 5–25)
BUN SERPL-MCNC: 8 MG/DL (ref 5–25)
BUN SERPL-MCNC: 8 MG/DL (ref 5–25)
CA-I BLD-SCNC: 1 MMOL/L (ref 1.12–1.32)
CA-I BLD-SCNC: 1.11 MMOL/L (ref 1.12–1.32)
CA-I BLD-SCNC: 1.16 MMOL/L (ref 1.12–1.32)
CA-I BLD-SCNC: 1.16 MMOL/L (ref 1.12–1.32)
CALCIUM SERPL-MCNC: 7.9 MG/DL (ref 8.4–10.2)
CALCIUM SERPL-MCNC: 8.5 MG/DL (ref 8.4–10.2)
CALCIUM SERPL-MCNC: 8.5 MG/DL (ref 8.4–10.2)
CALCIUM SERPL-MCNC: 8.6 MG/DL (ref 8.4–10.2)
CHLORIDE SERPL-SCNC: 108 MMOL/L (ref 96–108)
CHLORIDE SERPL-SCNC: 110 MMOL/L (ref 96–108)
CHLORIDE SERPL-SCNC: 110 MMOL/L (ref 96–108)
CHLORIDE SERPL-SCNC: 111 MMOL/L (ref 96–108)
CO2 SERPL-SCNC: 23 MMOL/L (ref 21–32)
CO2 SERPL-SCNC: 24 MMOL/L (ref 21–32)
CO2 SERPL-SCNC: 25 MMOL/L (ref 21–32)
CO2 SERPL-SCNC: 27 MMOL/L (ref 21–32)
CREAT SERPL-MCNC: 0.51 MG/DL (ref 0.6–1.3)
CREAT SERPL-MCNC: 0.6 MG/DL (ref 0.6–1.3)
CREAT SERPL-MCNC: 0.64 MG/DL (ref 0.6–1.3)
CREAT SERPL-MCNC: 0.65 MG/DL (ref 0.6–1.3)
EOSINOPHIL # BLD AUTO: 0.03 THOUSAND/ÂΜL (ref 0–0.61)
EOSINOPHIL NFR BLD AUTO: 1 % (ref 0–6)
ERYTHROCYTE [DISTWIDTH] IN BLOOD BY AUTOMATED COUNT: 15.3 % (ref 11.6–15.1)
EST. AVERAGE GLUCOSE BLD GHB EST-MCNC: 105 MG/DL
GFR SERPL CREATININE-BSD FRML MDRD: 108 ML/MIN/1.73SQ M
GFR SERPL CREATININE-BSD FRML MDRD: 109 ML/MIN/1.73SQ M
GFR SERPL CREATININE-BSD FRML MDRD: 112 ML/MIN/1.73SQ M
GFR SERPL CREATININE-BSD FRML MDRD: 120 ML/MIN/1.73SQ M
GLUCOSE SERPL-MCNC: 147 MG/DL (ref 65–140)
GLUCOSE SERPL-MCNC: 160 MG/DL (ref 65–140)
GLUCOSE SERPL-MCNC: 162 MG/DL (ref 65–140)
GLUCOSE SERPL-MCNC: 169 MG/DL (ref 65–140)
GLUCOSE SERPL-MCNC: 172 MG/DL (ref 65–140)
GLUCOSE SERPL-MCNC: 172 MG/DL (ref 65–140)
GLUCOSE SERPL-MCNC: 183 MG/DL (ref 65–140)
GLUCOSE SERPL-MCNC: 204 MG/DL (ref 65–140)
HBA1C MFR BLD: 5.3 %
HCO3 BLDA-SCNC: 22.3 MMOL/L (ref 22–28)
HCT VFR BLD AUTO: 29.7 % (ref 36.5–49.3)
HGB BLD-MCNC: 10.1 G/DL (ref 12–17)
HOROWITZ INDEX BLDA+IHG-RTO: 40 MM[HG]
IMM GRANULOCYTES # BLD AUTO: 0.02 THOUSAND/UL (ref 0–0.2)
IMM GRANULOCYTES NFR BLD AUTO: 0 % (ref 0–2)
LYMPHOCYTES # BLD AUTO: 0.35 THOUSANDS/ÂΜL (ref 0.6–4.47)
LYMPHOCYTES NFR BLD AUTO: 7 % (ref 14–44)
MAGNESIUM SERPL-MCNC: 1.6 MG/DL (ref 1.9–2.7)
MAGNESIUM SERPL-MCNC: 1.9 MG/DL (ref 1.9–2.7)
MAGNESIUM SERPL-MCNC: 2.1 MG/DL (ref 1.9–2.7)
MAGNESIUM SERPL-MCNC: 2.4 MG/DL (ref 1.9–2.7)
MCH RBC QN AUTO: 38.5 PG (ref 26.8–34.3)
MCHC RBC AUTO-ENTMCNC: 34 G/DL (ref 31.4–37.4)
MCV RBC AUTO: 113 FL (ref 82–98)
MONOCYTES # BLD AUTO: 0.39 THOUSAND/ÂΜL (ref 0.17–1.22)
MONOCYTES NFR BLD AUTO: 8 % (ref 4–12)
NEUTROPHILS # BLD AUTO: 3.93 THOUSANDS/ÂΜL (ref 1.85–7.62)
NEUTS SEG NFR BLD AUTO: 84 % (ref 43–75)
NRBC BLD AUTO-RTO: 0 /100 WBCS
O2 CT BLDA-SCNC: 16.2 ML/DL (ref 16–23)
OXYHGB MFR BLDA: 98.7 % (ref 94–97)
PCO2 BLDA: 32.1 MM HG (ref 36–44)
PEEP RESPIRATORY: 6 CM[H2O]
PH BLDA: 7.46 [PH] (ref 7.35–7.45)
PHOSPHATE SERPL-MCNC: 2.5 MG/DL (ref 2.7–4.5)
PHOSPHATE SERPL-MCNC: 2.5 MG/DL (ref 2.7–4.5)
PHOSPHATE SERPL-MCNC: 2.6 MG/DL (ref 2.7–4.5)
PHOSPHATE SERPL-MCNC: 5.6 MG/DL (ref 2.7–4.5)
PLATELET # BLD AUTO: 120 THOUSANDS/UL (ref 149–390)
PMV BLD AUTO: 9 FL (ref 8.9–12.7)
PO2 BLDA: 172.2 MM HG (ref 75–129)
POTASSIUM SERPL-SCNC: 3.4 MMOL/L (ref 3.5–5.3)
POTASSIUM SERPL-SCNC: 3.5 MMOL/L (ref 3.5–5.3)
POTASSIUM SERPL-SCNC: 4 MMOL/L (ref 3.5–5.3)
POTASSIUM SERPL-SCNC: 5.2 MMOL/L (ref 3.5–5.3)
RBC # BLD AUTO: 2.62 MILLION/UL (ref 3.88–5.62)
SODIUM SERPL-SCNC: 140 MMOL/L (ref 135–147)
SODIUM SERPL-SCNC: 142 MMOL/L (ref 135–147)
SPECIMEN SOURCE: ABNORMAL
VENT AC: 18
VENT- AC: AC
VT SETTING VENT: 300 ML
WBC # BLD AUTO: 4.74 THOUSAND/UL (ref 4.31–10.16)

## 2024-06-16 PROCEDURE — 83918 ORGANIC ACIDS TOTAL QUANT: CPT | Performed by: INTERNAL MEDICINE

## 2024-06-16 PROCEDURE — 82330 ASSAY OF CALCIUM: CPT

## 2024-06-16 PROCEDURE — 84100 ASSAY OF PHOSPHORUS: CPT

## 2024-06-16 PROCEDURE — 80048 BASIC METABOLIC PNL TOTAL CA: CPT

## 2024-06-16 PROCEDURE — 36600 WITHDRAWAL OF ARTERIAL BLOOD: CPT

## 2024-06-16 PROCEDURE — 83735 ASSAY OF MAGNESIUM: CPT

## 2024-06-16 PROCEDURE — 94760 N-INVAS EAR/PLS OXIMETRY 1: CPT

## 2024-06-16 PROCEDURE — 82805 BLOOD GASES W/O2 SATURATION: CPT | Performed by: INTERNAL MEDICINE

## 2024-06-16 PROCEDURE — 99233 SBSQ HOSP IP/OBS HIGH 50: CPT | Performed by: INTERNAL MEDICINE

## 2024-06-16 PROCEDURE — 83036 HEMOGLOBIN GLYCOSYLATED A1C: CPT

## 2024-06-16 PROCEDURE — 94003 VENT MGMT INPAT SUBQ DAY: CPT

## 2024-06-16 PROCEDURE — 85025 COMPLETE CBC W/AUTO DIFF WBC: CPT

## 2024-06-16 PROCEDURE — 94640 AIRWAY INHALATION TREATMENT: CPT

## 2024-06-16 PROCEDURE — 87147 CULTURE TYPE IMMUNOLOGIC: CPT | Performed by: INTERNAL MEDICINE

## 2024-06-16 PROCEDURE — 94150 VITAL CAPACITY TEST: CPT

## 2024-06-16 PROCEDURE — 87081 CULTURE SCREEN ONLY: CPT | Performed by: INTERNAL MEDICINE

## 2024-06-16 PROCEDURE — 82948 REAGENT STRIP/BLOOD GLUCOSE: CPT

## 2024-06-16 RX ORDER — BACLOFEN 10 MG/1
10 TABLET ORAL 2 TIMES DAILY
Status: DISCONTINUED | OUTPATIENT
Start: 2024-06-16 | End: 2024-06-27 | Stop reason: HOSPADM

## 2024-06-16 RX ORDER — ACETAMINOPHEN 160 MG/5ML
650 SUSPENSION ORAL EVERY 4 HOURS PRN
Status: DISCONTINUED | OUTPATIENT
Start: 2024-06-16 | End: 2024-06-17

## 2024-06-16 RX ORDER — VANCOMYCIN HYDROCHLORIDE 1 G/200ML
15 INJECTION, SOLUTION INTRAVENOUS EVERY 12 HOURS
Status: DISCONTINUED | OUTPATIENT
Start: 2024-06-16 | End: 2024-06-16

## 2024-06-16 RX ORDER — MAGNESIUM SULFATE HEPTAHYDRATE 40 MG/ML
2 INJECTION, SOLUTION INTRAVENOUS ONCE
Status: COMPLETED | OUTPATIENT
Start: 2024-06-16 | End: 2024-06-16

## 2024-06-16 RX ORDER — LEVALBUTEROL INHALATION SOLUTION 0.63 MG/3ML
0.31 SOLUTION RESPIRATORY (INHALATION) 4 TIMES DAILY
Status: DISCONTINUED | OUTPATIENT
Start: 2024-06-16 | End: 2024-06-16

## 2024-06-16 RX ORDER — INSULIN LISPRO 100 [IU]/ML
1-5 INJECTION, SOLUTION INTRAVENOUS; SUBCUTANEOUS EVERY 6 HOURS SCHEDULED
Status: DISCONTINUED | OUTPATIENT
Start: 2024-06-16 | End: 2024-06-19

## 2024-06-16 RX ORDER — LEVALBUTEROL INHALATION SOLUTION 1.25 MG/3ML
1.25 SOLUTION RESPIRATORY (INHALATION)
Status: DISCONTINUED | OUTPATIENT
Start: 2024-06-16 | End: 2024-06-21

## 2024-06-16 RX ORDER — BACLOFEN 10 MG/1
10 TABLET ORAL 2 TIMES DAILY
Status: DISCONTINUED | OUTPATIENT
Start: 2024-06-16 | End: 2024-06-16

## 2024-06-16 RX ORDER — LISINOPRIL 10 MG/1
10 TABLET ORAL DAILY
Status: DISCONTINUED | OUTPATIENT
Start: 2024-06-16 | End: 2024-06-27 | Stop reason: HOSPADM

## 2024-06-16 RX ORDER — DIAZEPAM 5 MG/1
5 TABLET ORAL EVERY 12 HOURS
Status: DISCONTINUED | OUTPATIENT
Start: 2024-06-16 | End: 2024-06-17

## 2024-06-16 RX ADMIN — POTASSIUM PHOSPHATE, MONOBASIC AND POTASSIUM PHOSPHATE, DIBASIC 30 MMOL: 224; 236 INJECTION, SOLUTION, CONCENTRATE INTRAVENOUS at 11:56

## 2024-06-16 RX ADMIN — IPRATROPIUM BROMIDE 0.5 MG: 0.5 SOLUTION RESPIRATORY (INHALATION) at 13:09

## 2024-06-16 RX ADMIN — METRONIDAZOLE 500 MG: 500 INJECTION, SOLUTION INTRAVENOUS at 00:29

## 2024-06-16 RX ADMIN — MAGNESIUM SULFATE HEPTAHYDRATE 2 G: 40 INJECTION, SOLUTION INTRAVENOUS at 11:32

## 2024-06-16 RX ADMIN — CEFEPIME 1000 MG: 1 INJECTION, POWDER, FOR SOLUTION INTRAMUSCULAR; INTRAVENOUS at 11:02

## 2024-06-16 RX ADMIN — FOMEPIZOLE 600 MG: 1 INJECTION, SOLUTION INTRAVENOUS at 02:16

## 2024-06-16 RX ADMIN — VANCOMYCIN HYDROCHLORIDE 1250 MG: 5 INJECTION, POWDER, LYOPHILIZED, FOR SOLUTION INTRAVENOUS at 07:58

## 2024-06-16 RX ADMIN — BACLOFEN 10 MG: 10 TABLET ORAL at 08:21

## 2024-06-16 RX ADMIN — SODIUM CHLORIDE 500 ML: 0.9 INJECTION, SOLUTION INTRAVENOUS at 16:46

## 2024-06-16 RX ADMIN — CHLORHEXIDINE GLUCONATE 15 ML: 1.2 RINSE ORAL at 08:00

## 2024-06-16 RX ADMIN — METRONIDAZOLE 500 MG: 500 INJECTION, SOLUTION INTRAVENOUS at 07:29

## 2024-06-16 RX ADMIN — FOMEPIZOLE 600 MG: 1 INJECTION, SOLUTION INTRAVENOUS at 14:04

## 2024-06-16 RX ADMIN — PROPOFOL 25 MCG/KG/MIN: 10 INJECTION, EMULSION INTRAVENOUS at 21:09

## 2024-06-16 RX ADMIN — PROPOFOL 20 MCG/KG/MIN: 10 INJECTION, EMULSION INTRAVENOUS at 11:02

## 2024-06-16 RX ADMIN — HEPARIN SODIUM 5000 UNITS: 5000 INJECTION INTRAVENOUS; SUBCUTANEOUS at 06:21

## 2024-06-16 RX ADMIN — INSULIN LISPRO 1 UNITS: 100 INJECTION, SOLUTION INTRAVENOUS; SUBCUTANEOUS at 12:04

## 2024-06-16 RX ADMIN — HEPARIN SODIUM 5000 UNITS: 5000 INJECTION INTRAVENOUS; SUBCUTANEOUS at 13:16

## 2024-06-16 RX ADMIN — THIAMINE HYDROCHLORIDE 500 MG: 100 INJECTION, SOLUTION INTRAMUSCULAR; INTRAVENOUS at 21:16

## 2024-06-16 RX ADMIN — ACETAMINOPHEN 650 MG: 650 SUSPENSION ORAL at 13:16

## 2024-06-16 RX ADMIN — CHLORHEXIDINE GLUCONATE 15 ML: 1.2 RINSE ORAL at 21:10

## 2024-06-16 RX ADMIN — Medication 50 MCG/HR: at 17:39

## 2024-06-16 RX ADMIN — THIAMINE HYDROCHLORIDE 500 MG: 100 INJECTION, SOLUTION INTRAMUSCULAR; INTRAVENOUS at 08:05

## 2024-06-16 RX ADMIN — HEPARIN SODIUM 5000 UNITS: 5000 INJECTION INTRAVENOUS; SUBCUTANEOUS at 21:10

## 2024-06-16 RX ADMIN — LISINOPRIL 10 MG: 10 TABLET ORAL at 11:57

## 2024-06-16 RX ADMIN — POLYETHYLENE GLYCOL 3350 17 G: 17 POWDER, FOR SOLUTION ORAL at 08:05

## 2024-06-16 RX ADMIN — ACETAMINOPHEN 650 MG: 650 SUSPENSION ORAL at 21:22

## 2024-06-16 RX ADMIN — LEVALBUTEROL HYDROCHLORIDE 1.25 MG: 1.25 SOLUTION RESPIRATORY (INHALATION) at 20:37

## 2024-06-16 RX ADMIN — DIAZEPAM 5 MG: 5 TABLET ORAL at 23:58

## 2024-06-16 RX ADMIN — FENTANYL CITRATE 50 MCG: 50 INJECTION INTRAMUSCULAR; INTRAVENOUS at 01:02

## 2024-06-16 RX ADMIN — INSULIN LISPRO 1 UNITS: 100 INJECTION, SOLUTION INTRAVENOUS; SUBCUTANEOUS at 17:50

## 2024-06-16 RX ADMIN — VANCOMYCIN HYDROCHLORIDE 1000 MG: 1 INJECTION, SOLUTION INTRAVENOUS at 02:07

## 2024-06-16 RX ADMIN — CEFEPIME 1000 MG: 1 INJECTION, POWDER, FOR SOLUTION INTRAMUSCULAR; INTRAVENOUS at 23:58

## 2024-06-16 RX ADMIN — VANCOMYCIN HYDROCHLORIDE 1250 MG: 5 INJECTION, POWDER, LYOPHILIZED, FOR SOLUTION INTRAVENOUS at 21:16

## 2024-06-16 RX ADMIN — INSULIN LISPRO 1 UNITS: 100 INJECTION, SOLUTION INTRAVENOUS; SUBCUTANEOUS at 07:35

## 2024-06-16 RX ADMIN — CEFEPIME 1000 MG: 1 INJECTION, POWDER, FOR SOLUTION INTRAMUSCULAR; INTRAVENOUS at 00:29

## 2024-06-16 RX ADMIN — DIAZEPAM 5 MG: 5 TABLET ORAL at 11:57

## 2024-06-16 RX ADMIN — LEVALBUTEROL HYDROCHLORIDE 1.25 MG: 1.25 SOLUTION RESPIRATORY (INHALATION) at 13:09

## 2024-06-16 RX ADMIN — BACLOFEN 10 MG: 10 TABLET ORAL at 17:48

## 2024-06-16 RX ADMIN — IPRATROPIUM BROMIDE 0.5 MG: 0.5 SOLUTION RESPIRATORY (INHALATION) at 20:37

## 2024-06-16 RX ADMIN — THIAMINE HYDROCHLORIDE 500 MG: 100 INJECTION, SOLUTION INTRAMUSCULAR; INTRAVENOUS at 15:13

## 2024-06-16 NOTE — PLAN OF CARE
Problem: SAFETY,RESTRAINT: NV/NON-SELF DESTRUCTIVE BEHAVIOR  Goal: Remains free of harm/injury (restraint for non violent/non self-detsructive behavior)  Description: INTERVENTIONS:  - Instruct patient/family regarding restraint use   - Assess and monitor physiologic and psychological status   - Provide interventions and comfort measures to meet assessed patient needs   - Identify and implement measures to help patient regain control  - Assess readiness for release of restraint   Outcome: Progressing  Goal: Returns to optimal restraint-free functioning  Description: INTERVENTIONS:  - Assess the patient's behavior and symptoms that indicate continued need for restraint  - Identify and implement measures to help patient regain control  - Assess readiness for release of restraint   Outcome: Progressing     Problem: Nutrition/Hydration-ADULT  Goal: Nutrient/Hydration intake appropriate for improving, restoring or maintaining nutritional needs  Description: Monitor and assess patient's nutrition/hydration status for malnutrition. Collaborate with interdisciplinary team and initiate plan and interventions as ordered.  Monitor patient's weight and dietary intake as ordered or per policy. Utilize nutrition screening tool and intervene as necessary. Determine patient's food preferences and provide high-protein, high-caloric foods as appropriate.     INTERVENTIONS:  - Monitor oral intake, urinary output, labs, and treatment plans  - Assess nutrition and hydration status and recommend course of action  - Evaluate amount of meals eaten  - Assist patient with eating if necessary   - Allow adequate time for meals  - Recommend/ encourage appropriate diets, oral nutritional supplements, and vitamin/mineral supplements  - Order, calculate, and assess calorie counts as needed  - Recommend, monitor, and adjust tube feedings and TPN/PPN based on assessed needs  - Assess need for intravenous fluids  - Provide specific  nutrition/hydration education as appropriate  - Include patient/family/caregiver in decisions related to nutrition  Outcome: Progressing     Problem: Prexisting or High Potential for Compromised Skin Integrity  Goal: Skin integrity is maintained or improved  Description: INTERVENTIONS:  - Identify patients at risk for skin breakdown  - Assess and monitor skin integrity  - Assess and monitor nutrition and hydration status  - Monitor labs   - Assess for incontinence   - Turn and reposition patient  - Assist with mobility/ambulation  - Relieve pressure over bony prominences  - Avoid friction and shearing  - Provide appropriate hygiene as needed including keeping skin clean and dry  - Evaluate need for skin moisturizer/barrier cream  - Collaborate with interdisciplinary team   - Patient/family teaching  - Consider wound care consult   Outcome: Progressing     Problem: PAIN - ADULT  Goal: Verbalizes/displays adequate comfort level or baseline comfort level  Description: Interventions:  - Encourage patient to monitor pain and request assistance  - Assess pain using appropriate pain scale  - Administer analgesics based on type and severity of pain and evaluate response  - Implement non-pharmacological measures as appropriate and evaluate response  - Consider cultural and social influences on pain and pain management  - Notify physician/advanced practitioner if interventions unsuccessful or patient reports new pain  Outcome: Progressing     Problem: INFECTION - ADULT  Goal: Absence or prevention of progression during hospitalization  Description: INTERVENTIONS:  - Assess and monitor for signs and symptoms of infection  - Monitor lab/diagnostic results  - Monitor all insertion sites, i.e. indwelling lines, tubes, and drains  - Monitor endotracheal if appropriate and nasal secretions for changes in amount and color  - [place] appropriate cooling/warming therapies per order  - Administer medications as ordered  - Instruct  and encourage patient and family to use good hand hygiene technique  - Identify and instruct in appropriate isolation precautions for identified infection/condition  Outcome: Progressing  Goal: Absence of fever/infection during neutropenic period  Description: INTERVENTIONS:  - Monitor WBC    Outcome: Progressing     Problem: SAFETY ADULT  Goal: Patient will remain free of falls  Description: INTERVENTIONS:  - Educate patient/family on patient safety including physical limitations  - Instruct patient to call for assistance with activity   - Consult OT/PT to assist with strengthening/mobility   - Keep Call bell within reach  - Keep bed low and locked with side rails adjusted as appropriate  - Keep care items and personal belongings within reach  - Initiate and maintain comfort rounds  - Make Fall Risk Sign visible to staff  - Offer Toileting every 2 Hours, in advance of need  - Initiate/Maintain bed alarm  - Obtain necessary fall risk management equipment:   - Apply yellow socks and bracelet for high fall risk patients  - Consider moving patient to room near nurses station  Outcome: Progressing  Goal: Maintain or return to baseline ADL function  Description: INTERVENTIONS:  -  Assess patient's ability to carry out ADLs; assess patient's baseline for ADL function and identify physical deficits which impact ability to perform ADLs (bathing, care of mouth/teeth, toileting, grooming, dressing, etc.)  - Assess/evaluate cause of self-care deficits   - Assess range of motion  - Assess patient's mobility; develop plan if impaired  - Assess patient's need for assistive devices and provide as appropriate  - Encourage maximum independence but intervene and supervise when necessary  - Involve family in performance of ADLs  - Assess for home care needs following discharge   - Consider OT consult to assist with ADL evaluation and planning for discharge  - Provide patient education as appropriate  Outcome: Progressing  Goal:  Maintains/Returns to pre admission functional level  Description: INTERVENTIONS:  - Perform AM-PAC 6 Click Basic Mobility/ Daily Activity assessment daily.  - Set and communicate daily mobility goal to care team and patient/family/caregiver.   - Collaborate with rehabilitation services on mobility goals if consulted  - Perform Range of Motion 8 times a day.  - Reposition patient every 2 hours.  - Dangle patient  times a day  - Stand patient  times a day  - Ambulate patient  times a day  - Out of bed to chair  times a day   - Out of bed for meals  times a day  - Out of bed for toileting  - Record patient progress and toleration of activity level   Outcome: Progressing     Problem: DISCHARGE PLANNING  Goal: Discharge to home or other facility with appropriate resources  Description: INTERVENTIONS:  - Identify barriers to discharge w/patient and caregiver  - Arrange for needed discharge resources and transportation as appropriate  - Identify discharge learning needs (meds, wound care, etc.)  - Arrange for interpretive services to assist at discharge as needed  - Refer to Case Management Department for coordinating discharge planning if the patient needs post-hospital services based on physician/advanced practitioner order or complex needs related to functional status, cognitive ability, or social support system  Outcome: Progressing     Problem: Knowledge Deficit  Goal: Patient/family/caregiver demonstrates understanding of disease process, treatment plan, medications, and discharge instructions  Description: Complete learning assessment and assess knowledge base.  Interventions:  - Provide teaching at level of understanding  - Provide teaching via preferred learning methods  Outcome: Progressing

## 2024-06-16 NOTE — RESPIRATORY THERAPY NOTE
SBT trial x 3, Prop held over 30 min, at most pt RR 12 and [id number] when I prompt him to breathe, SBT ended, placed back on CMV

## 2024-06-16 NOTE — PLAN OF CARE
Problem: SAFETY,RESTRAINT: NV/NON-SELF DESTRUCTIVE BEHAVIOR  Goal: Remains free of harm/injury (restraint for non violent/non self-detsructive behavior)  Description: INTERVENTIONS:  - Instruct patient/family regarding restraint use   - Assess and monitor physiologic and psychological status   - Provide interventions and comfort measures to meet assessed patient needs   - Identify and implement measures to help patient regain control  - Assess readiness for release of restraint   Outcome: Not Progressing  Goal: Returns to optimal restraint-free functioning  Description: INTERVENTIONS:  - Assess the patient's behavior and symptoms that indicate continued need for restraint  - Identify and implement measures to help patient regain control  - Assess readiness for release of restraint   Outcome: Not Progressing     Problem: Nutrition/Hydration-ADULT  Goal: Nutrient/Hydration intake appropriate for improving, restoring or maintaining nutritional needs  Description: Monitor and assess patient's nutrition/hydration status for malnutrition. Collaborate with interdisciplinary team and initiate plan and interventions as ordered.  Monitor patient's weight and dietary intake as ordered or per policy. Utilize nutrition screening tool and intervene as necessary. Determine patient's food preferences and provide high-protein, high-caloric foods as appropriate.     INTERVENTIONS:  - Monitor oral intake, urinary output, labs, and treatment plans  - Assess nutrition and hydration status and recommend course of action  - Evaluate amount of meals eaten  - Assist patient with eating if necessary   - Allow adequate time for meals  - Recommend/ encourage appropriate diets, oral nutritional supplements, and vitamin/mineral supplements  - Order, calculate, and assess calorie counts as needed  - Recommend, monitor, and adjust tube feedings and TPN/PPN based on assessed needs  - Assess need for intravenous fluids  - Provide specific  nutrition/hydration education as appropriate  - Include patient/family/caregiver in decisions related to nutrition  Outcome: Not Progressing     Problem: Prexisting or High Potential for Compromised Skin Integrity  Goal: Skin integrity is maintained or improved  Description: INTERVENTIONS:  - Identify patients at risk for skin breakdown  - Assess and monitor skin integrity  - Assess and monitor nutrition and hydration status  - Monitor labs   - Assess for incontinence   - Turn and reposition patient  - Assist with mobility/ambulation  - Relieve pressure over bony prominences  - Avoid friction and shearing  - Provide appropriate hygiene as needed including keeping skin clean and dry  - Evaluate need for skin moisturizer/barrier cream  - Collaborate with interdisciplinary team   - Patient/family teaching  - Consider wound care consult   Outcome: Not Progressing     Problem: PAIN - ADULT  Goal: Verbalizes/displays adequate comfort level or baseline comfort level  Description: Interventions:  - Encourage patient to monitor pain and request assistance  - Assess pain using appropriate pain scale  - Administer analgesics based on type and severity of pain and evaluate response  - Implement non-pharmacological measures as appropriate and evaluate response  - Consider cultural and social influences on pain and pain management  - Notify physician/advanced practitioner if interventions unsuccessful or patient reports new pain  Outcome: Not Progressing     Problem: INFECTION - ADULT  Goal: Absence or prevention of progression during hospitalization  Description: INTERVENTIONS:  - Assess and monitor for signs and symptoms of infection  - Monitor lab/diagnostic results  - Monitor all insertion sites, i.e. indwelling lines, tubes, and drains  - Monitor endotracheal if appropriate and nasal secretions for changes in amount and color  - [place] appropriate cooling/warming therapies per order  - Administer medications as ordered  -  Instruct and encourage patient and family to use good hand hygiene technique  - Identify and instruct in appropriate isolation precautions for identified infection/condition  Outcome: Not Progressing  Goal: Absence of fever/infection during neutropenic period  Description: INTERVENTIONS:  - Monitor WBC    Outcome: Not Progressing     Problem: SAFETY ADULT  Goal: Patient will remain free of falls  Description: INTERVENTIONS:  - Educate patient/family on patient safety including physical limitations  - Instruct patient to call for assistance with activity   - Consult OT/PT to assist with strengthening/mobility   - Keep Call bell within reach  - Keep bed low and locked with side rails adjusted as appropriate  - Keep care items and personal belongings within reach  - Initiate and maintain comfort rounds  - Make Fall Risk Sign visible to staff  - Offer Toileting every 2 Hours, in advance of need  - Initiate/Maintain bed alarm  - Apply yellow socks and bracelet for high fall risk patients  - Consider moving patient to room near nurses station  Outcome: Not Progressing  Goal: Maintain or return to baseline ADL function  Description: INTERVENTIONS:  -  Assess patient's ability to carry out ADLs; assess patient's baseline for ADL function and identify physical deficits which impact ability to perform ADLs (bathing, care of mouth/teeth, toileting, grooming, dressing, etc.)  - Assess/evaluate cause of self-care deficits   - Assess range of motion  - Assess patient's mobility; develop plan if impaired  - Assess patient's need for assistive devices and provide as appropriate  - Encourage maximum independence but intervene and supervise when necessary  - Involve family in performance of ADLs  - Assess for home care needs following discharge   - Consider OT consult to assist with ADL evaluation and planning for discharge  - Provide patient education as appropriate  Outcome: Not Progressing  Goal: Maintains/Returns to pre admission  functional level  Description: INTERVENTIONS:  - Perform AM-PAC 6 Click Basic Mobility/ Daily Activity assessment daily.  - Set and communicate daily mobility goal to care team and patient/family/caregiver.   - Collaborate with rehabilitation services on mobility goals if consulted  - Perform Range of Motion 3 times a day.  - Reposition patient every 2 hours.  - Dangle patient 3 times a day  - Stand patient 3 times a day  - Ambulate patient 3 times a day  - Out of bed to chair 3 times a day   - Out of bed for meals 3 times a day  - Out of bed for toileting  - Record patient progress and toleration of activity level   Outcome: Not Progressing     Problem: DISCHARGE PLANNING  Goal: Discharge to home or other facility with appropriate resources  Description: INTERVENTIONS:  - Identify barriers to discharge w/patient and caregiver  - Arrange for needed discharge resources and transportation as appropriate  - Identify discharge learning needs (meds, wound care, etc.)  - Arrange for interpretive services to assist at discharge as needed  - Refer to Case Management Department for coordinating discharge planning if the patient needs post-hospital services based on physician/advanced practitioner order or complex needs related to functional status, cognitive ability, or social support system  Outcome: Not Progressing

## 2024-06-16 NOTE — PROGRESS NOTES
Colin Dumont is a 54 y.o. male who is currently ordered Vancomycin IV with management by the Pharmacy Consult service.  Relevant clinical data and objective / subjective history reviewed.  Vancomycin Assessment:  Indication and Goal AUC/Trough: Pneumonia (goal -600, trough >10)  Clinical Status: Stable  Micro:     Renal Function:  SCr: 0.6 mg/dL  CrCl: 107.2 mL/min  Renal replacement: Not on dialysis  Days of Therapy: 4  Current Dose: 1000 mg IV every 12 hours  Vancomycin Plan:  New Dosin mg IV every 12 hours  Estimated AUC: 476 mcg*hr/mL  Estimated Trough: 11 mcg/mL  Next Level:  at 0600  Renal Function Monitoring: Daily BMP and UOP  Pharmacy will continue to follow closely for s/sx of nephrotoxicity, infusion reactions and appropriateness of therapy.  BMP and CBC will be ordered per protocol. We will continue to follow the patient’s culture results and clinical progress daily.    Poornima Izquierdo, Pharmacist

## 2024-06-16 NOTE — ASSESSMENT & PLAN NOTE
S/p craniotomy 1/2019 at [facility], done secondary to abscess/lesions.  CTH no acute abnormalities, post-operative findings  Baseline independent

## 2024-06-16 NOTE — PROGRESS NOTES
[facility]  Progress Note  Name: Colin Dumnot I  MRN: [id number]  Unit/Bed#: ICU 13 I Date of Admission: 6/13/2024   Date of Service: 6/16/2024 I Hospital Day: 3    Assessment & Plan   Acute respiratory failure with hypoxia (HCC)  Assessment & Plan  Pt presented to ED altered, agitated. Received Versed 5mg in ED and became somnolent after.  Intubated for airway protection and profound metabolic acidosis  VBG 6.9/30/55/7/ BE -24  VENT: AC/VC 30/380/40/6  CXR - ETT 5cm above roque, will advance by 1 cm on arrival to ICU    Plan:  Continue mechanical ventilation  Continue sedation, goal RASS -2  Propofol gtt  Fentanyl gtt  Avoiding Precedex 2/2 bradycardic episodes  Respiratory protocol  Vent bundle  CAM-ICU  Daily SAT/SBT    * Metabolic encephalopathy  Assessment & Plan  Pt presented to ED Altered  CTH - no acute intracranial abnormalities, previous post-operative changes.  History of previous psychotic episodes d/t excessive intake of Baclofen  Per patients mother she did refil his Baclofen, however is nto sure how many are left.   COMA panel - negative  UDS neg, ethanol negative  Requiring multiple doses of Versed while on propofol for agitation    Plan:  Currently with toxicology consult, toxic ingestion versus ketoacidosis  Following methanol and ethylene glycol levels  Continue with mechanical ventilation, consider SBT later today    High anion gap metabolic acidosis  Assessment & Plan  VBG on arrival - 7.0/28/40/7/-24  Repeat VBG - 6.9/30/55/7/-24  AG - 29  Lactic - 1.8  Etiology unclear Sepsis vs Baclofen intoxication vs Ketosis  COMA panel - Negative  S/p 1L Isolyte in ED  Pending ethylene glycol and methanol levels  Acidosis has resolved    Plan:  Continue mechanical ventilation  Continue fomepizole  Will continue to check BMP and electrolytes, replete as needed    Alcohol use  Assessment & Plan  Hx ETOH use in the past, family unclear if currently drink and if so how much.  Unknown time of last drink.   Currently intubated/sedated  CIWA protocol when extubated  Encourage cessation    Spasticity  Assessment & Plan  Hx spasticity s/p craniotomy, home regimen: Baclofen 10mg  Restart home baclofen    Type II diabetes mellitus (HCC)  Assessment & Plan    Lab Results   Component Value Date    HGBA1C 5.2 06/14/2024   Previously took Basaglar 10 units daily, stopped in 2019    Plan  Check glucose q6h for now  SSI every 6 hours  Goal glucose 140-180  Hypoglycemia protocol  Tube feeds vital AF    S/P craniotomy  Assessment & Plan  S/p craniotomy 1/2019 at [facility], done secondary to abscess/lesions.  CTH no acute abnormalities, post-operative findings  Baseline independent    Benign essential hypertension  Assessment & Plan  Home regimen: Lisinopril 10mg daily  Hold home Lisinopril    BELLA (acute kidney injury) (HCC)-resolved as of 6/16/2024  Assessment & Plan  sCr on arrival - 3.87  Baseline - 1.24 (from 4/52023)  Likely d/t sepsis/dehydration  CK - 692  Received 1 L bolus in ED  Wiseman catheter placed in ED  Elevated CK    Plan:  Resolved  Continuing to hold home lisinopril for now  Maintain wiseman catheter  Monitor accurate I&O  Monitor and replete electrolytes as indicated  Avoid nephrotoxins and hypotension               Disposition: Critical care    ICU Core Measures     Vented Patient  VAP Bundle  VAP bundle ordered     A: Assess, Prevent, and Manage Pain Has pain been assessed? Yes  Need for changes to pain regimen? No   B: Both Spontaneous Awakening Trials (SATs) and Spontaneous Breathing Trials (SBTs) Plan to perform spontaneous awakening trial today? Yes   Plan to perform spontaneous breathing trial today? Yes   Obvious barriers to extubation? Yes   C: Choice of Sedation RASS Goal: -3 Moderate Sedation or 0 Alert and Calm  Need for changes to sedation or analgesia regimen? No   D: Delirium CAM-ICU: Negative   E: Early Mobility  Plan for early mobility? Yes   F: Family  Engagement Plan for family engagement today? Yes       Antibiotic Review: Continue broad spectrum secondary to severity of illness.     Review of Invasive Devices:    Brandt Plan: Voiding trial after improvement in ambulation         Prophylaxis:  VTE VTE covered by:  heparin (porcine), Subcutaneous, 5,000 Units at 06/16/24 0621       Stress Ulcer  not ordered         Significant 24hr Events     24hr events: No events in past 24h.     Subjective   Review of Systems: See HPI for Review of Systems     Objective                            Vitals I/O      Most Recent Min/Max in 24hrs   Temp (!) 100.6 °F (38.1 °C) Temp  Min: 99.7 °F (37.6 °C)  Max: 101.1 °F (38.4 °C)   Pulse 84 Pulse  Min: 64  Max: 116   Resp 15 Resp  Min: 4  Max: 38   /76 BP  Min: 130/71  Max: 178/89   O2 Sat 100 % SpO2  Min: 99 %  Max: 100 %      Intake/Output Summary (Last 24 hours) at 6/16/2024 0743  Last data filed at 6/16/2024 0601  Gross per 24 hour   Intake 2850.37 ml   Output 3300 ml   Net -449.63 ml       Diet Enteral/Parenteral; Tube Feeding No Oral Diet; Vital AF 1.2; Continuous; 50; 30; Water; Every 4 hours    Invasive Monitoring   none        Physical Exam   Physical Exam  Vitals reviewed.   Eyes:      Pupils: Pupils are equal, round, and reactive to light.   HENT:      Head: Normocephalic and atraumatic.   Cardiovascular:      Rate and Rhythm: Normal rate and regular rhythm.      Pulses: Normal pulses.   Abdominal:      Palpations: Abdomen is soft.      Tenderness: There is no abdominal tenderness.   Constitutional:       General: He is not in acute distress.     Appearance: He is ill-appearing. He is not toxic-appearing.   Pulmonary:      Comments: Mechanically ventillated, vent sounds on auscultation  Neurological:        Cough reflex.      Comments: Opens eyes to voice, does not follow commands   Genitourinary/Anorectal:  Brandt present.          Diagnostic Studies      EKG: none in past 24h  Imaging: none in past 24h       Medications:  Scheduled PRN   baclofen, 10 mg, BID  cefepime, 1,000 mg, Q12H  chlorhexidine, 15 mL, Q12H JEF  fomepizole (ANTIZOL) 600 mg in sodium chloride 0.9 % 100 mL IVPB, 10 mg/kg, Q12H   Followed by  [START ON 6/17/2024] fomepizole (ANTIZOL) 1,000 mg in sodium chloride 0.9 % 100 mL IVPB, 15 mg/kg, Q12H  heparin (porcine), 5,000 Units, Q8H JEF  insulin lispro, 1-5 Units, Q6H JEF  metroNIDAZOLE, 500 mg, Q8H  polyethylene glycol, 17 g, Daily  thiamine, 500 mg, TID  vancomycin, 1,250 mg, Q12H      fentaNYL, 50 mcg, Q1H PRN  LORazepam, 1 mg, Q6H PRN       Continuous    fentaNYL, 50 mcg/hr, Last Rate: 50 mcg/hr (06/15/24 2220)  propofol, 5-50 mcg/kg/min, Last Rate: 20 mcg/kg/min (06/15/24 2220)         Labs:    CBC    Recent Labs     06/14/24  1146 06/16/24  0729   WBC  --  4.74   HGB 9.9* 10.1*   HCT 29* 29.7*   PLT  --  120*     BMP    Recent Labs     06/16/24  0035 06/16/24  0531   SODIUM 140 142   K 4.0 3.5   * 110*   CO2 23 25   AGAP 6 7   BUN 10 8   CREATININE 0.65 0.60   CALCIUM 8.6 8.5       Coags    No recent results     Additional Electrolytes  Recent Labs     06/16/24  0035 06/16/24  0531   MG 2.1 1.9   PHOS 2.5* 2.6*   CAIONIZED 1.16 1.16          Blood Gas    Recent Labs     06/16/24  0612   PHART 7.460*   [id number] 32.1*   PO2ART 172.2*   [id number] 22.3   BEART -0.9   SOURCE Radial, Right     Recent Labs     06/16/24  0612   SOURCE Radial, Right    LFTs  No recent results    Infectious  No recent results  Glucose  Recent Labs     06/15/24  1241 06/15/24  1727 06/16/24  0035 06/16/24  0531   GLUC 187* 170* 204* 162*               Linden Blackburn MD

## 2024-06-17 ENCOUNTER — APPOINTMENT (INPATIENT)
Dept: RADIOLOGY | Facility: HOSPITAL | Age: 54
DRG: 870 | End: 2024-06-17
Payer: MEDICARE

## 2024-06-17 LAB
ANION GAP SERPL CALCULATED.3IONS-SCNC: 4 MMOL/L (ref 4–13)
ANION GAP SERPL CALCULATED.3IONS-SCNC: 4 MMOL/L (ref 4–13)
ANION GAP SERPL CALCULATED.3IONS-SCNC: 5 MMOL/L (ref 4–13)
BASOPHILS # BLD AUTO: 0.03 THOUSANDS/ÂΜL (ref 0–0.1)
BASOPHILS NFR BLD AUTO: 1 % (ref 0–1)
BUN SERPL-MCNC: 10 MG/DL (ref 5–25)
BUN SERPL-MCNC: 12 MG/DL (ref 5–25)
BUN SERPL-MCNC: 13 MG/DL (ref 5–25)
CA-I BLD-SCNC: 1.08 MMOL/L (ref 1.12–1.32)
CA-I BLD-SCNC: >2.5 MMOL/L (ref 1.12–1.32)
CALCIUM SERPL-MCNC: 7.9 MG/DL (ref 8.4–10.2)
CALCIUM SERPL-MCNC: 8.4 MG/DL (ref 8.4–10.2)
CALCIUM SERPL-MCNC: >18 MG/DL (ref 8.4–10.2)
CHLORIDE SERPL-SCNC: 109 MMOL/L (ref 96–108)
CHLORIDE SERPL-SCNC: 110 MMOL/L (ref 96–108)
CHLORIDE SERPL-SCNC: 116 MMOL/L (ref 96–108)
CO2 SERPL-SCNC: 19 MMOL/L (ref 21–32)
CO2 SERPL-SCNC: 25 MMOL/L (ref 21–32)
CO2 SERPL-SCNC: 27 MMOL/L (ref 21–32)
CREAT SERPL-MCNC: 0.47 MG/DL (ref 0.6–1.3)
CREAT SERPL-MCNC: 0.76 MG/DL (ref 0.6–1.3)
CREAT SERPL-MCNC: 0.76 MG/DL (ref 0.6–1.3)
EOSINOPHIL # BLD AUTO: 0.06 THOUSAND/ÂΜL (ref 0–0.61)
EOSINOPHIL NFR BLD AUTO: 1 % (ref 0–6)
ERYTHROCYTE [DISTWIDTH] IN BLOOD BY AUTOMATED COUNT: 15.3 % (ref 11.6–15.1)
ETHYLENE GLYCOL SERPLBLD-MCNC: NEGATIVE UG/ML
GFR SERPL CREATININE-BSD FRML MDRD: 101 ML/MIN/1.73SQ M
GFR SERPL CREATININE-BSD FRML MDRD: 101 ML/MIN/1.73SQ M
GFR SERPL CREATININE-BSD FRML MDRD: 124 ML/MIN/1.73SQ M
GLUCOSE SERPL-MCNC: 126 MG/DL (ref 65–140)
GLUCOSE SERPL-MCNC: 151 MG/DL (ref 65–140)
GLUCOSE SERPL-MCNC: 152 MG/DL (ref 65–140)
GLUCOSE SERPL-MCNC: 153 MG/DL (ref 65–140)
GLUCOSE SERPL-MCNC: 175 MG/DL (ref 65–140)
GLUCOSE SERPL-MCNC: 182 MG/DL (ref 65–140)
GLUCOSE SERPL-MCNC: 191 MG/DL (ref 65–140)
GLUCOSE SERPL-MCNC: 224 MG/DL (ref 65–140)
GLYCOLATE SERPL-MCNC: NEGATIVE
HCT VFR BLD AUTO: 28.9 % (ref 36.5–49.3)
HGB BLD-MCNC: 9.5 G/DL (ref 12–17)
IMM GRANULOCYTES # BLD AUTO: 0.02 THOUSAND/UL (ref 0–0.2)
IMM GRANULOCYTES NFR BLD AUTO: 0 % (ref 0–2)
LYMPHOCYTES # BLD AUTO: 0.46 THOUSANDS/ÂΜL (ref 0.6–4.47)
LYMPHOCYTES NFR BLD AUTO: 8 % (ref 14–44)
MAGNESIUM SERPL-MCNC: 1.6 MG/DL (ref 1.9–2.7)
MAGNESIUM SERPL-MCNC: 2.2 MG/DL (ref 1.9–2.7)
MCH RBC QN AUTO: 38.8 PG (ref 26.8–34.3)
MCHC RBC AUTO-ENTMCNC: 32.9 G/DL (ref 31.4–37.4)
MCV RBC AUTO: 118 FL (ref 82–98)
METHANOL SERPL-MCNC: NORMAL MG/DL
MONOCYTES # BLD AUTO: 0.61 THOUSAND/ÂΜL (ref 0.17–1.22)
MONOCYTES NFR BLD AUTO: 10 % (ref 4–12)
MRSA NOSE QL CULT: ABNORMAL
MRSA NOSE QL CULT: ABNORMAL
NEUTROPHILS # BLD AUTO: 4.93 THOUSANDS/ÂΜL (ref 1.85–7.62)
NEUTS SEG NFR BLD AUTO: 80 % (ref 43–75)
NRBC BLD AUTO-RTO: 0 /100 WBCS
PHOSPHATE SERPL-MCNC: 2.2 MG/DL (ref 2.7–4.5)
PHOSPHATE SERPL-MCNC: 3.5 MG/DL (ref 2.7–4.5)
PLATELET # BLD AUTO: 134 THOUSANDS/UL (ref 149–390)
PMV BLD AUTO: 9.5 FL (ref 8.9–12.7)
POTASSIUM SERPL-SCNC: 2.9 MMOL/L (ref 3.5–5.3)
POTASSIUM SERPL-SCNC: 4.1 MMOL/L (ref 3.5–5.3)
POTASSIUM SERPL-SCNC: 4.2 MMOL/L (ref 3.5–5.3)
PROCALCITONIN SERPL-MCNC: 1.2 NG/ML
RBC # BLD AUTO: 2.45 MILLION/UL (ref 3.88–5.62)
SODIUM SERPL-SCNC: 139 MMOL/L (ref 135–147)
SODIUM SERPL-SCNC: 139 MMOL/L (ref 135–147)
SODIUM SERPL-SCNC: 141 MMOL/L (ref 135–147)
WBC # BLD AUTO: 6.11 THOUSAND/UL (ref 4.31–10.16)

## 2024-06-17 PROCEDURE — 94760 N-INVAS EAR/PLS OXIMETRY 1: CPT

## 2024-06-17 PROCEDURE — 87040 BLOOD CULTURE FOR BACTERIA: CPT

## 2024-06-17 PROCEDURE — 84100 ASSAY OF PHOSPHORUS: CPT | Performed by: INTERNAL MEDICINE

## 2024-06-17 PROCEDURE — 80048 BASIC METABOLIC PNL TOTAL CA: CPT | Performed by: INTERNAL MEDICINE

## 2024-06-17 PROCEDURE — 94003 VENT MGMT INPAT SUBQ DAY: CPT

## 2024-06-17 PROCEDURE — 84145 PROCALCITONIN (PCT): CPT

## 2024-06-17 PROCEDURE — 94150 VITAL CAPACITY TEST: CPT

## 2024-06-17 PROCEDURE — 71045 X-RAY EXAM CHEST 1 VIEW: CPT

## 2024-06-17 PROCEDURE — 83735 ASSAY OF MAGNESIUM: CPT

## 2024-06-17 PROCEDURE — 94640 AIRWAY INHALATION TREATMENT: CPT

## 2024-06-17 PROCEDURE — 82330 ASSAY OF CALCIUM: CPT | Performed by: INTERNAL MEDICINE

## 2024-06-17 PROCEDURE — NC001 PR NO CHARGE: Performed by: STUDENT IN AN ORGANIZED HEALTH CARE EDUCATION/TRAINING PROGRAM

## 2024-06-17 PROCEDURE — 82330 ASSAY OF CALCIUM: CPT

## 2024-06-17 PROCEDURE — 82948 REAGENT STRIP/BLOOD GLUCOSE: CPT

## 2024-06-17 PROCEDURE — 85025 COMPLETE CBC W/AUTO DIFF WBC: CPT | Performed by: INTERNAL MEDICINE

## 2024-06-17 PROCEDURE — 83735 ASSAY OF MAGNESIUM: CPT | Performed by: INTERNAL MEDICINE

## 2024-06-17 PROCEDURE — 80048 BASIC METABOLIC PNL TOTAL CA: CPT | Performed by: PHYSICIAN ASSISTANT

## 2024-06-17 PROCEDURE — 80048 BASIC METABOLIC PNL TOTAL CA: CPT

## 2024-06-17 PROCEDURE — 84100 ASSAY OF PHOSPHORUS: CPT

## 2024-06-17 PROCEDURE — 99232 SBSQ HOSP IP/OBS MODERATE 35: CPT | Performed by: STUDENT IN AN ORGANIZED HEALTH CARE EDUCATION/TRAINING PROGRAM

## 2024-06-17 RX ORDER — CALCIUM GLUCONATE 20 MG/ML
1 INJECTION, SOLUTION INTRAVENOUS ONCE
Status: COMPLETED | OUTPATIENT
Start: 2024-06-17 | End: 2024-06-17

## 2024-06-17 RX ORDER — HEPARIN SODIUM 5000 [USP'U]/ML
5000 INJECTION, SOLUTION INTRAVENOUS; SUBCUTANEOUS EVERY 8 HOURS SCHEDULED
Status: COMPLETED | OUTPATIENT
Start: 2024-06-17 | End: 2024-06-17

## 2024-06-17 RX ORDER — MIDAZOLAM HYDROCHLORIDE 2 MG/2ML
2 INJECTION, SOLUTION INTRAMUSCULAR; INTRAVENOUS ONCE
Status: COMPLETED | OUTPATIENT
Start: 2024-06-17 | End: 2024-06-17

## 2024-06-17 RX ORDER — LANOLIN ALCOHOL/MO/W.PET/CERES
100 CREAM (GRAM) TOPICAL DAILY
Status: DISCONTINUED | OUTPATIENT
Start: 2024-06-17 | End: 2024-06-27 | Stop reason: HOSPADM

## 2024-06-17 RX ORDER — MIDAZOLAM HYDROCHLORIDE 2 MG/2ML
INJECTION, SOLUTION INTRAMUSCULAR; INTRAVENOUS
Status: COMPLETED
Start: 2024-06-17 | End: 2024-06-17

## 2024-06-17 RX ORDER — MAGNESIUM SULFATE HEPTAHYDRATE 40 MG/ML
2 INJECTION, SOLUTION INTRAVENOUS ONCE
Status: COMPLETED | OUTPATIENT
Start: 2024-06-17 | End: 2024-06-17

## 2024-06-17 RX ORDER — LANOLIN ALCOHOL/MO/W.PET/CERES
400 CREAM (GRAM) TOPICAL DAILY
Status: DISCONTINUED | OUTPATIENT
Start: 2024-06-17 | End: 2024-06-27 | Stop reason: HOSPADM

## 2024-06-17 RX ORDER — HEPARIN SODIUM 5000 [USP'U]/ML
5000 INJECTION, SOLUTION INTRAVENOUS; SUBCUTANEOUS EVERY 8 HOURS SCHEDULED
Status: DISCONTINUED | OUTPATIENT
Start: 2024-06-17 | End: 2024-06-17

## 2024-06-17 RX ORDER — SODIUM CHLORIDE, SODIUM GLUCONATE, SODIUM ACETATE, POTASSIUM CHLORIDE, MAGNESIUM CHLORIDE, SODIUM PHOSPHATE, DIBASIC, AND POTASSIUM PHOSPHATE .53; .5; .37; .037; .03; .012; .00082 G/100ML; G/100ML; G/100ML; G/100ML; G/100ML; G/100ML; G/100ML
500 INJECTION, SOLUTION INTRAVENOUS ONCE
Status: DISCONTINUED | OUTPATIENT
Start: 2024-06-17 | End: 2024-06-19

## 2024-06-17 RX ADMIN — LISINOPRIL 10 MG: 10 TABLET ORAL at 08:04

## 2024-06-17 RX ADMIN — HEPARIN SODIUM 5000 UNITS: 5000 INJECTION INTRAVENOUS; SUBCUTANEOUS at 21:00

## 2024-06-17 RX ADMIN — CALCIUM GLUCONATE 1 G: 20 INJECTION, SOLUTION INTRAVENOUS at 03:46

## 2024-06-17 RX ADMIN — MIDAZOLAM HYDROCHLORIDE 2 MG: 2 INJECTION, SOLUTION INTRAMUSCULAR; INTRAVENOUS at 17:10

## 2024-06-17 RX ADMIN — CHLORHEXIDINE GLUCONATE 15 ML: 1.2 RINSE ORAL at 08:04

## 2024-06-17 RX ADMIN — POLYETHYLENE GLYCOL 3350 17 G: 17 POWDER, FOR SOLUTION ORAL at 08:04

## 2024-06-17 RX ADMIN — IPRATROPIUM BROMIDE 0.5 MG: 0.5 SOLUTION RESPIRATORY (INHALATION) at 07:45

## 2024-06-17 RX ADMIN — INSULIN LISPRO 1 UNITS: 100 INJECTION, SOLUTION INTRAVENOUS; SUBCUTANEOUS at 00:00

## 2024-06-17 RX ADMIN — IPRATROPIUM BROMIDE 0.5 MG: 0.5 SOLUTION RESPIRATORY (INHALATION) at 21:05

## 2024-06-17 RX ADMIN — LEVALBUTEROL HYDROCHLORIDE 1.25 MG: 1.25 SOLUTION RESPIRATORY (INHALATION) at 13:37

## 2024-06-17 RX ADMIN — VANCOMYCIN HYDROCHLORIDE 1250 MG: 5 INJECTION, POWDER, LYOPHILIZED, FOR SOLUTION INTRAVENOUS at 20:29

## 2024-06-17 RX ADMIN — CEFEPIME 1000 MG: 1 INJECTION, POWDER, FOR SOLUTION INTRAMUSCULAR; INTRAVENOUS at 10:30

## 2024-06-17 RX ADMIN — PROPOFOL 30 MCG/KG/MIN: 10 INJECTION, EMULSION INTRAVENOUS at 23:00

## 2024-06-17 RX ADMIN — ACETAMINOPHEN 650 MG: 650 SUSPENSION ORAL at 05:58

## 2024-06-17 RX ADMIN — CHLORHEXIDINE GLUCONATE 15 ML: 1.2 RINSE ORAL at 21:00

## 2024-06-17 RX ADMIN — PROPOFOL 25 MCG/KG/MIN: 10 INJECTION, EMULSION INTRAVENOUS at 03:48

## 2024-06-17 RX ADMIN — MIDAZOLAM 2 MG: 1 INJECTION INTRAMUSCULAR; INTRAVENOUS at 17:10

## 2024-06-17 RX ADMIN — MAGNESIUM SULFATE HEPTAHYDRATE 2 G: 40 INJECTION, SOLUTION INTRAVENOUS at 07:54

## 2024-06-17 RX ADMIN — LEVALBUTEROL HYDROCHLORIDE 1.25 MG: 1.25 SOLUTION RESPIRATORY (INHALATION) at 21:05

## 2024-06-17 RX ADMIN — BACLOFEN 10 MG: 10 TABLET ORAL at 17:19

## 2024-06-17 RX ADMIN — INSULIN LISPRO 1 UNITS: 100 INJECTION, SOLUTION INTRAVENOUS; SUBCUTANEOUS at 23:56

## 2024-06-17 RX ADMIN — HEPARIN SODIUM 5000 UNITS: 5000 INJECTION INTRAVENOUS; SUBCUTANEOUS at 18:02

## 2024-06-17 RX ADMIN — CEFEPIME 1000 MG: 1 INJECTION, POWDER, FOR SOLUTION INTRAMUSCULAR; INTRAVENOUS at 23:56

## 2024-06-17 RX ADMIN — POTASSIUM PHOSPHATE, MONOBASIC AND POTASSIUM PHOSPHATE, DIBASIC 12 MMOL: 224; 236 INJECTION, SOLUTION, CONCENTRATE INTRAVENOUS at 08:01

## 2024-06-17 RX ADMIN — Medication 100 MG: at 11:14

## 2024-06-17 RX ADMIN — INSULIN LISPRO 1 UNITS: 100 INJECTION, SOLUTION INTRAVENOUS; SUBCUTANEOUS at 05:33

## 2024-06-17 RX ADMIN — LEVALBUTEROL HYDROCHLORIDE 1.25 MG: 1.25 SOLUTION RESPIRATORY (INHALATION) at 07:43

## 2024-06-17 RX ADMIN — FOLIC ACID TAB 400 MCG 400 MCG: 400 TAB at 11:14

## 2024-06-17 RX ADMIN — INSULIN LISPRO 1 UNITS: 100 INJECTION, SOLUTION INTRAVENOUS; SUBCUTANEOUS at 11:58

## 2024-06-17 RX ADMIN — HEPARIN SODIUM 5000 UNITS: 5000 INJECTION INTRAVENOUS; SUBCUTANEOUS at 05:27

## 2024-06-17 RX ADMIN — FOMEPIZOLE 1000 MG: 1 INJECTION, SOLUTION INTRAVENOUS at 00:56

## 2024-06-17 RX ADMIN — CYANOCOBALAMIN TAB 500 MCG 1000 MCG: 500 TAB at 08:04

## 2024-06-17 RX ADMIN — INSULIN LISPRO 1 UNITS: 100 INJECTION, SOLUTION INTRAVENOUS; SUBCUTANEOUS at 17:28

## 2024-06-17 RX ADMIN — Medication 50 MCG/HR: at 13:25

## 2024-06-17 RX ADMIN — VANCOMYCIN HYDROCHLORIDE 1250 MG: 5 INJECTION, POWDER, LYOPHILIZED, FOR SOLUTION INTRAVENOUS at 08:26

## 2024-06-17 RX ADMIN — BACLOFEN 10 MG: 10 TABLET ORAL at 08:04

## 2024-06-17 RX ADMIN — IPRATROPIUM BROMIDE 0.5 MG: 0.5 SOLUTION RESPIRATORY (INHALATION) at 13:37

## 2024-06-17 NOTE — PLAN OF CARE
Problem: SAFETY,RESTRAINT: NV/NON-SELF DESTRUCTIVE BEHAVIOR  Goal: Remains free of harm/injury (restraint for non violent/non self-detsructive behavior)  Description: INTERVENTIONS:  - Instruct patient/family regarding restraint use   - Assess and monitor physiologic and psychological status   - Provide interventions and comfort measures to meet assessed patient needs   - Identify and implement measures to help patient regain control  - Assess readiness for release of restraint   Outcome: Progressing  Goal: Returns to optimal restraint-free functioning  Description: INTERVENTIONS:  - Assess the patient's behavior and symptoms that indicate continued need for restraint  - Identify and implement measures to help patient regain control  - Assess readiness for release of restraint   Outcome: Progressing     Problem: Nutrition/Hydration-ADULT  Goal: Nutrient/Hydration intake appropriate for improving, restoring or maintaining nutritional needs  Description: Monitor and assess patient's nutrition/hydration status for malnutrition. Collaborate with interdisciplinary team and initiate plan and interventions as ordered.  Monitor patient's weight and dietary intake as ordered or per policy. Utilize nutrition screening tool and intervene as necessary. Determine patient's food preferences and provide high-protein, high-caloric foods as appropriate.     INTERVENTIONS:  - Monitor oral intake, urinary output, labs, and treatment plans  - Assess nutrition and hydration status and recommend course of action  - Evaluate amount of meals eaten  - Assist patient with eating if necessary   - Allow adequate time for meals  - Recommend/ encourage appropriate diets, oral nutritional supplements, and vitamin/mineral supplements  - Order, calculate, and assess calorie counts as needed  - Recommend, monitor, and adjust tube feedings and TPN/PPN based on assessed needs  - Assess need for intravenous fluids  - Provide specific  nutrition/hydration education as appropriate  - Include patient/family/caregiver in decisions related to nutrition  Outcome: Progressing     Problem: Prexisting or High Potential for Compromised Skin Integrity  Goal: Skin integrity is maintained or improved  Description: INTERVENTIONS:  - Identify patients at risk for skin breakdown  - Assess and monitor skin integrity  - Assess and monitor nutrition and hydration status  - Monitor labs   - Assess for incontinence   - Turn and reposition patient  - Assist with mobility/ambulation  - Relieve pressure over bony prominences  - Avoid friction and shearing  - Provide appropriate hygiene as needed including keeping skin clean and dry  - Evaluate need for skin moisturizer/barrier cream  - Collaborate with interdisciplinary team   - Patient/family teaching  - Consider wound care consult   Outcome: Progressing     Problem: PAIN - ADULT  Goal: Verbalizes/displays adequate comfort level or baseline comfort level  Description: Interventions:  - Encourage patient to monitor pain and request assistance  - Assess pain using appropriate pain scale  - Administer analgesics based on type and severity of pain and evaluate response  - Implement non-pharmacological measures as appropriate and evaluate response  - Consider cultural and social influences on pain and pain management  - Notify physician/advanced practitioner if interventions unsuccessful or patient reports new pain  Outcome: Progressing     Problem: INFECTION - ADULT  Goal: Absence or prevention of progression during hospitalization  Description: INTERVENTIONS:  - Assess and monitor for signs and symptoms of infection  - Monitor lab/diagnostic results  - Monitor all insertion sites, i.e. indwelling lines, tubes, and drains  - Monitor endotracheal if appropriate and nasal secretions for changes in amount and color  - [place] appropriate cooling/warming therapies per order  - Administer medications as ordered  - Instruct  and encourage patient and family to use good hand hygiene technique  - Identify and instruct in appropriate isolation precautions for identified infection/condition  Outcome: Progressing  Goal: Absence of fever/infection during neutropenic period  Description: INTERVENTIONS:  - Monitor WBC    Outcome: Progressing     Problem: SAFETY ADULT  Goal: Patient will remain free of falls  Description: INTERVENTIONS:  - Educate patient/family on patient safety including physical limitations  - Instruct patient to call for assistance with activity   - Consult OT/PT to assist with strengthening/mobility   - Keep Call bell within reach  - Keep bed low and locked with side rails adjusted as appropriate  - Keep care items and personal belongings within reach  - Initiate and maintain comfort rounds  - Make Fall Risk Sign visible to staff  - Offer Toileting every 2 Hours, in advance of need  - Initiate/Maintain bed alarm  - Obtain necessary fall risk management equipment:   - Apply yellow socks and bracelet for high fall risk patients  - Consider moving patient to room near nurses station  Outcome: Progressing  Goal: Maintain or return to baseline ADL function  Description: INTERVENTIONS:  -  Assess patient's ability to carry out ADLs; assess patient's baseline for ADL function and identify physical deficits which impact ability to perform ADLs (bathing, care of mouth/teeth, toileting, grooming, dressing, etc.)  - Assess/evaluate cause of self-care deficits   - Assess range of motion  - Assess patient's mobility; develop plan if impaired  - Assess patient's need for assistive devices and provide as appropriate  - Encourage maximum independence but intervene and supervise when necessary  - Involve family in performance of ADLs  - Assess for home care needs following discharge   - Consider OT consult to assist with ADL evaluation and planning for discharge  - Provide patient education as appropriate  Outcome: Progressing  Goal:  Maintains/Returns to pre admission functional level  Description: INTERVENTIONS:  - Perform AM-PAC 6 Click Basic Mobility/ Daily Activity assessment daily.  - Set and communicate daily mobility goal to care team and patient/family/caregiver.   - Collaborate with rehabilitation services on mobility goals if consulted  - Perform Range of Motion 8 times a day.  - Reposition patient every 2 hours.  - Dangle patient  times a day  - Stand patient  times a day  - Ambulate patient  times a day  - Out of bed to chair  times a day   - Out of bed for meals  times a day  - Out of bed for toileting  - Record patient progress and toleration of activity level   Outcome: Progressing     Problem: DISCHARGE PLANNING  Goal: Discharge to home or other facility with appropriate resources  Description: INTERVENTIONS:  - Identify barriers to discharge w/patient and caregiver  - Arrange for needed discharge resources and transportation as appropriate  - Identify discharge learning needs (meds, wound care, etc.)  - Arrange for interpretive services to assist at discharge as needed  - Refer to Case Management Department for coordinating discharge planning if the patient needs post-hospital services based on physician/advanced practitioner order or complex needs related to functional status, cognitive ability, or social support system  Outcome: Progressing     Problem: Knowledge Deficit  Goal: Patient/family/caregiver demonstrates understanding of disease process, treatment plan, medications, and discharge instructions  Description: Complete learning assessment and assess knowledge base.  Interventions:  - Provide teaching at level of understanding  - Provide teaching via preferred learning methods  Outcome: Progressing

## 2024-06-17 NOTE — PROGRESS NOTES
Colin Dumont is a 54 y.o. male who is currently ordered Vancomycin IV with management by the Pharmacy Consult service.  Relevant clinical data and objective / subjective history reviewed.  Vancomycin Assessment:  Indication and Goal AUC/Trough: Pneumonia (goal -600, trough >10)  Micro:     Renal Function:  SCr: 0.76 mg/dL  CrCl: 99.5 mL/min  Renal replacement: Not on dialysis  Days of Therapy: 5  Current Dose: 1250 mg IV every 12 hours  Vancomycin Plan:  New Dosing: continue 1250 mg IV every 12 hours  Estimated AUC: 463 mcg*hr/mL  Estimated Trough: 10.5 mcg/mL  Next Level: 6/22 at 0600  Renal Function Monitoring: Daily BMP and UOP  Pharmacy will continue to follow closely for s/sx of nephrotoxicity, infusion reactions and appropriateness of therapy.  BMP and CBC will be ordered per protocol. We will continue to follow the patient’s culture results and clinical progress daily.    Maryann Rao, Pharmacist

## 2024-06-17 NOTE — ASSESSMENT & PLAN NOTE
S/p craniotomy 1/2019 at [facility], done secondary to abscess/lesions.  CTH no acute abnormalities, post-operative findings  Baseline independent

## 2024-06-17 NOTE — PLAN OF CARE
Problem: SAFETY,RESTRAINT: NV/NON-SELF DESTRUCTIVE BEHAVIOR  Goal: Remains free of harm/injury (restraint for non violent/non self-detsructive behavior)  Description: INTERVENTIONS:  - Instruct patient/family regarding restraint use   - Assess and monitor physiologic and psychological status   - Provide interventions and comfort measures to meet assessed patient needs   - Identify and implement measures to help patient regain control  - Assess readiness for release of restraint   Outcome: Not Progressing  Goal: Returns to optimal restraint-free functioning  Description: INTERVENTIONS:  - Assess the patient's behavior and symptoms that indicate continued need for restraint  - Identify and implement measures to help patient regain control  - Assess readiness for release of restraint   Outcome: Not Progressing     Problem: Nutrition/Hydration-ADULT  Goal: Nutrient/Hydration intake appropriate for improving, restoring or maintaining nutritional needs  Description: Monitor and assess patient's nutrition/hydration status for malnutrition. Collaborate with interdisciplinary team and initiate plan and interventions as ordered.  Monitor patient's weight and dietary intake as ordered or per policy. Utilize nutrition screening tool and intervene as necessary. Determine patient's food preferences and provide high-protein, high-caloric foods as appropriate.     INTERVENTIONS:  - Monitor oral intake, urinary output, labs, and treatment plans  - Assess nutrition and hydration status and recommend course of action  - Evaluate amount of meals eaten  - Assist patient with eating if necessary   - Allow adequate time for meals  - Recommend/ encourage appropriate diets, oral nutritional supplements, and vitamin/mineral supplements  - Order, calculate, and assess calorie counts as needed  - Recommend, monitor, and adjust tube feedings and TPN/PPN based on assessed needs  - Assess need for intravenous fluids  - Provide specific  nutrition/hydration education as appropriate  - Include patient/family/caregiver in decisions related to nutrition  Outcome: Not Progressing     Problem: Prexisting or High Potential for Compromised Skin Integrity  Goal: Skin integrity is maintained or improved  Description: INTERVENTIONS:  - Identify patients at risk for skin breakdown  - Assess and monitor skin integrity  - Assess and monitor nutrition and hydration status  - Monitor labs   - Assess for incontinence   - Turn and reposition patient  - Assist with mobility/ambulation  - Relieve pressure over bony prominences  - Avoid friction and shearing  - Provide appropriate hygiene as needed including keeping skin clean and dry  - Evaluate need for skin moisturizer/barrier cream  - Collaborate with interdisciplinary team   - Patient/family teaching  - Consider wound care consult   Outcome: Not Progressing     Problem: PAIN - ADULT  Goal: Verbalizes/displays adequate comfort level or baseline comfort level  Description: Interventions:  - Encourage patient to monitor pain and request assistance  - Assess pain using appropriate pain scale  - Administer analgesics based on type and severity of pain and evaluate response  - Implement non-pharmacological measures as appropriate and evaluate response  - Consider cultural and social influences on pain and pain management  - Notify physician/advanced practitioner if interventions unsuccessful or patient reports new pain  Outcome: Not Progressing     Problem: INFECTION - ADULT  Goal: Absence or prevention of progression during hospitalization  Description: INTERVENTIONS:  - Assess and monitor for signs and symptoms of infection  - Monitor lab/diagnostic results  - Monitor all insertion sites, i.e. indwelling lines, tubes, and drains  - Monitor endotracheal if appropriate and nasal secretions for changes in amount and color  - [place] appropriate cooling/warming therapies per order  - Administer medications as ordered  -  Instruct and encourage patient and family to use good hand hygiene technique  - Identify and instruct in appropriate isolation precautions for identified infection/condition  Outcome: Not Progressing  Goal: Absence of fever/infection during neutropenic period  Description: INTERVENTIONS:  - Monitor WBC    Outcome: Not Progressing     Problem: SAFETY ADULT  Goal: Patient will remain free of falls  Description: INTERVENTIONS:  - Educate patient/family on patient safety including physical limitations  - Instruct patient to call for assistance with activity   - Consult OT/PT to assist with strengthening/mobility   - Keep Call bell within reach  - Keep bed low and locked with side rails adjusted as appropriate  - Keep care items and personal belongings within reach  - Initiate and maintain comfort rounds  - Make Fall Risk Sign visible to staff  - Offer Toileting every 2 Hours, in advance of need  - Initiate/Maintain bed alarm  - Apply yellow socks and bracelet for high fall risk patients  - Consider moving patient to room near nurses station  Outcome: Not Progressing  Goal: Maintain or return to baseline ADL function  Description: INTERVENTIONS:  -  Assess patient's ability to carry out ADLs; assess patient's baseline for ADL function and identify physical deficits which impact ability to perform ADLs (bathing, care of mouth/teeth, toileting, grooming, dressing, etc.)  - Assess/evaluate cause of self-care deficits   - Assess range of motion  - Assess patient's mobility; develop plan if impaired  - Assess patient's need for assistive devices and provide as appropriate  - Encourage maximum independence but intervene and supervise when necessary  - Involve family in performance of ADLs  - Assess for home care needs following discharge   - Consider OT consult to assist with ADL evaluation and planning for discharge  - Provide patient education as appropriate  Outcome: Not Progressing  Goal: Maintains/Returns to pre admission  functional level  Description: INTERVENTIONS:  - Perform AM-PAC 6 Click Basic Mobility/ Daily Activity assessment daily.  - Set and communicate daily mobility goal to care team and patient/family/caregiver.   - Collaborate with rehabilitation services on mobility goals if consulted  - Perform Range of Motion 3 times a day.  - Reposition patient every 2 hours.  - Dangle patient 3 times a day  - Stand patient 3 times a day  - Ambulate patient 3 times a day  - Out of bed to chair 3 times a day   - Out of bed for meals 3 times a day  - Out of bed for toileting  - Record patient progress and toleration of activity level   Outcome: Not Progressing     Problem: DISCHARGE PLANNING  Goal: Discharge to home or other facility with appropriate resources  Description: INTERVENTIONS:  - Identify barriers to discharge w/patient and caregiver  - Arrange for needed discharge resources and transportation as appropriate  - Identify discharge learning needs (meds, wound care, etc.)  - Arrange for interpretive services to assist at discharge as needed  - Refer to Case Management Department for coordinating discharge planning if the patient needs post-hospital services based on physician/advanced practitioner order or complex needs related to functional status, cognitive ability, or social support system  Outcome: Not Progressing

## 2024-06-17 NOTE — ASSESSMENT & PLAN NOTE
VBG on arrival - 7.0/28/40/7/-24  Repeat VBG - 6.9/30/55/7/-24  AG - 29  Lactic - 1.8  Etiology unclear Sepsis vs Baclofen intoxication vs Ketosis  COMA panel - Negative  S/p 1L Isolyte in ED  Pending ethylene glycol and methanol levels  Acidosis has resolved    Plan:  Continue mechanical ventilation  Discontinue fomepizole  Will continue to check BMP and electrolytes, replete as needed

## 2024-06-17 NOTE — PROGRESS NOTES
Critical Care Attending Note; Roberto Martino   Note Date: 24  Note Time: 9:45 AM    Patient: Colin Dumont  Age, : 54 y.o., 1970 MRN: [id number] Code Status: Level 1 - Full Code Patient Location: ICU 13/ICU 13   Hospital LOS:4 days  ]   Patient seen and examined, medical record reviewed, discussed with house staff and nursing staff.     HPI   CC: AMS   54M with a PMH a Brain Abscess, EtOH abuse, HLD, HTN, DM, PUD who found down found AMS requiring intubation.     Key Recent Events    : Admitted, Intubated     Main ICU Plans:       #Neuro  AMS - TME vs EtOH withdrawal vs Septic- Last drink ? - Initially concerned for toxic ingestion but acidosis recovered quickly, other alcohol levels normal. Possibly baclofen toxicity with suprainfection   - CIWA protocol once Awake   - Thiamine    Brain Abscess/Craniectomy   - Continue Baclofen    Sedation - RASS 0 - (-1)  - Propofol, Fentanyl - SAT     #Card  HTN   - Lisinopril    #Pulm  Respiratory Failure - Intubated for airway protection  - LTVV VC - 6-8cc/kg IDBW - SBT   - Wean FiO2 - Maintain Oxygen Sat >92%  - VAP Bundle  - HOB > 30o    - PEEP Titrate      #Renal  BELLA - Pre- Renal - Resolved  NAGMA - Resolved     #GI  PPI    Refeeding Syndrome  - Replete Electrolyte     #ID   Sepsis - Unclear Etiology - Persistent Fevers despite broad spectrum - Plan for lumbar puncture   - Continue Vanc/Cefepime/Flagyl   - If Repeated fever -restart infectious workup   - Repeat CXR     #Endo  DM -DKA? - Resolved   - ISS      #DVT/GI ppx  SQH    #Lines/Tubes/Drains:   Invasive Devices       Peripheral Intravenous Line  Duration             Peripheral IV 24 Dorsal (posterior);Right Forearm 4 days    Peripheral IV 24 Distal;Right;Upper;Ventral (anterior) Arm 3 days    Peripheral IV 24 Dorsal (posterior);Left Hand 2 days              Drain  Duration             NG/OG/Enteral Tube Orogastric 16 Fr Left mouth 3 days    Urethral Catheter Temperature  probe 16 Fr. 3 days              Airway  Duration             ETT  Oral 7.5 mm 3 days                    #Nutrition:   Diet Enteral/Parenteral; Tube Feeding No Oral Diet; Vital AF 1.2; Continuous; 50; 30; Water; Every 4 hours        #Code Status:   Level 1 - Full Code    #Dispo:   ICU        Roberto Martino MD  Pulmonary, Critical Care    Critical care time, excluding procedures, teaching, family meetings, and excludes any prior time recorded by the AP/resident, 35 minutes. Upon my evaluation, this patient has a high probability of imminent or life-threatening deterioration due to above problems which required my direct attention, intervention, and personal management.   Impression/Active Problems:    AMS   Brain Abcesss   HTN   DM   PUD   HLD   Sepsis   Respiratory Failure   BELLA   Hyperkalemia   NAGMA      Physical Exam:     Vital Signs:   Weight: 64.9 kg (143 lb 1.3 oz)  IBW: Ideal body weight: 70.7 kg (155 lb 13.8 oz)  Temp:  [99.7 °F (37.6 °C)-101.8 °F (38.8 °C)] 99.7 °F (37.6 °C)  HR:  [] 79  Resp:  [15-24] 18  BP: ()/() 100/56  FiO2 (%):  [40] 40  General: NAD  Neuro: Sedated  Heart: RRR  Lungs: Basilar diminshed  Abdomen: Soft NT  Extremities: No edema                Ventilator Settings:    FiO2 (%):  [40] 40    Results from last 7 days   Lab Units 06/14/24  1146 06/14/24  0425 06/14/24  0216 06/13/24  2133   ISTAT PH ART  7.451*  --   --   --    PO2 UGO mm Hg  --  28.6* 35.8 41.7     Radiologic Images Reviewed:    CT Head  No acute intracranial abnormality.     Input / Output:     Intake/Output Summary (Last 24 hours) at 6/17/2024 0945  Last data filed at 6/17/2024 0800  Gross per 24 hour   Intake 3319.95 ml   Output 1235 ml   Net 2084.95 ml            Infusions:  fentaNYL, 50 mcg/hr, Last Rate: 50 mcg/hr (06/16/24 2615)  propofol, 5-50 mcg/kg/min, Last Rate: 20 mcg/kg/min (06/17/24 0433)      Scheduled Medications:  Current Facility-Administered Medications   Medication Dose Route  Frequency Provider Last Rate    acetaminophen  650 mg Oral Q4H PRN Linden Plummer MD      baclofen  10 mg Oral BID Linden Plummer MD      cefepime  1,000 mg Intravenous Q12H TIM Padilla Stopped (06/17/24 0030)    chlorhexidine  15 mL Mouth/Throat Q12H [facility] Nirmal Tobar MD      cyanocobalamin  1,000 mcg Oral Daily Arielle Zaidi MD      diazepam  5 mg Oral Q12H Arielle Zaidi MD      fentaNYL  50 mcg/hr Intravenous Continuous Linden Plummer MD 50 mcg/hr (06/16/24 1739)    fentaNYL  50 mcg Intravenous Q1H PRN TIM Padilla      heparin (porcine)  5,000 Units Subcutaneous Q8H [facility] Nirmal Tobar MD      insulin lispro  1-5 Units Subcutaneous Q6H [facility] Linden Plummer MD      ipratropium  0.5 mg Nebulization TID Kevin Guglielmello, DO      levalbuterol  1.25 mg Nebulization TID Kevin Guglielmello, DO      lisinopril  10 mg Oral Daily Arielle Zaidi MD      LORazepam  1 mg Intravenous Q6H PRN Anmol Mcgregor MD      magnesium sulfate  2 g Intravenous Once Anmol Mcgregor MD 2 g (06/17/24 0754)    polyethylene glycol  17 g Oral Daily Linden Plummer MD      potassium phosphate  12 mmol Intravenous Once Anmol Mcgregor MD 12 mmol (06/17/24 0801)    propofol  5-50 mcg/kg/min Intravenous Titrated TIM Padilla 20 mcg/kg/min (06/17/24 0433)    thiamine  500 mg Intravenous TID Anmol Mcgregor MD Stopped (06/17/24 0600)    vancomycin  1,250 mg Intravenous Q12H Arielle Zaidi MD 1,250 mg (06/17/24 0826)       PRN Medications:    acetaminophen    fentaNYL    LORazepam    Labs Reviewed:  Results from last 7 days   Lab Units 06/17/24  0508 06/16/24  0729 06/14/24  1146 06/14/24  0216   WBC Thousand/uL 6.11 4.74  --  10.08   HEMOGLOBIN g/dL 9.5* 10.1*  --  12.2   I STAT HEMOGLOBIN g/dl  --   --  9.9*  --    HEMATOCRIT % 28.9* 29.7*  --  35.8*   HEMATOCRIT, ISTAT %  --   --  29*  --    PLATELETS Thousands/uL 134* 120*  --  162      Results from last 7 days   Lab Units  "06/17/24  0508 06/17/24  0404 06/17/24  0021 06/16/24  1739 06/14/24  1210 06/14/24  1146 06/14/24  0443 06/13/24  2217 06/13/24  2133 06/13/24  2118   SODIUM mmol/L 141 139 139 140   < >  --    < >  --    < >  --    CO2 mmol/L 27 19* 25 24   < >  --    < >  --    < >  --    CO2, I-STAT mmol/L  --   --   --   --   --  21  --  8*  --  8*   BUN mg/dL 13 10 12 10   < >  --    < >  --    < >  --    GLUCOSE, ISTAT mg/dl  --   --   --   --   --  126  --  189*  --  198*   CALCIUM mg/dL 8.4 >18.0* 7.9* 7.9*   < >  --    < >  --    < >  --    MAGNESIUM mg/dL  --  1.6* 2.2 2.4   < >  --    < >  --   --   --    PHOSPHORUS mg/dL  --  2.2* 3.5 5.6*   < >  --    < >  --   --   --     < > = values in this interval not displayed.         Invalid input(s): \"ASTSGOT\", \"ALTSGPT\"LABRCNTIP@ ,alkphos:3,tbilirubin:3,dbilirubin:3)@  Results from last 7 days   Lab Units 06/14/24 0443 06/13/24 2133   INR  1.34* 1.00           Invalid input(s): \"TROPT\", \"PBNP\"             I have personally seen and examined the patient on (06/17/24 between [id number]). I discussed the patient with the AP/resident including, but not limited to, verifying findings; reviewing labs and x-rays; discussing with consultants; developing the plan of care with the bedside nurse; and discussing treatment plan with patient or surrogate.  I have reviewed the note and assessment performed by the AP/resident and agree with the AP/resident’s documented findings and plan of care with the above additions/exceptions. Please see my comments for details and adjustments.                 "

## 2024-06-17 NOTE — ASSESSMENT & PLAN NOTE
Pt presented to ED Altered  CTH - no acute intracranial abnormalities, previous post-operative changes.  History of previous psychotic episodes d/t excessive intake of Baclofen  Per patients mother she did refil his Baclofen, however is nto sure how many are left.   COMA panel - negative  UDS neg, ethanol negative  Requiring multiple doses of Versed while on propofol for agitation  Negative for methanol and ethylene glycol    Plan:  Currently with toxicology consult, toxic ingestion versus ketoacidosis  On propofol and fentanyl, down titrating  Continue with mechanical ventilation, reattempting SAT and SBT later today   Euthymic

## 2024-06-17 NOTE — PHYSICAL THERAPY NOTE
Physical Therapy Cancellation Note     06/17/24 1033   Note Type   Note type Cancelled Session   Cancel Reasons Medical status   Additional Comments PT consult received and chart reviewed. Per RN during ICU mobility rounds, pt not appropriate for participation in PT evaluation at this time. Will hold and f/u once pt appropriate.       Poornima Edgar, PT, DPT   Available via [facility]  NPI # [id number]  [place] License - [id number]  6/17/2024

## 2024-06-17 NOTE — PROGRESS NOTES
[facility]  Progress Note  Name: Colin Dumont I  MRN: [id number]  Unit/Bed#: ICU 13 I Date of Admission: 6/13/2024   Date of Service: 6/17/2024 I Hospital Day: 4    Assessment & Plan   Acute respiratory failure with hypoxia (HCC)  Assessment & Plan  Pt presented to ED altered, agitated. Received Versed 5mg in ED and became somnolent after.  Intubated for airway protection and profound metabolic acidosis  VBG 6.9/30/55/7/ BE -24  VENT: AC/VC 30/380/40/6  CXR - ETT 5cm above roque, will advance by 1 cm on arrival to ICU    Plan:  Continue mechanical ventilation  Continue sedation, goal RASS -2  Propofol gtt  Fentanyl gtt  Avoiding Precedex 2/2 bradycardic episodes  Respiratory protocol  Vent bundle  CAM-ICU  Daily SAT/SBT    * Metabolic encephalopathy  Assessment & Plan  Pt presented to ED Altered  CTH - no acute intracranial abnormalities, previous post-operative changes.  History of previous psychotic episodes d/t excessive intake of Baclofen  Per patients mother she did refil his Baclofen, however is nto sure how many are left.   COMA panel - negative  UDS neg, ethanol negative  Requiring multiple doses of Versed while on propofol for agitation  Negative for methanol and ethylene glycol    Plan:  Currently with toxicology consult, toxic ingestion versus ketoacidosis  On propofol and fentanyl, down titrating  Continue with mechanical ventilation, reattempting SAT and SBT later today    High anion gap metabolic acidosis  Assessment & Plan  VBG on arrival - 7.0/28/40/7/-24  Repeat VBG - 6.9/30/55/7/-24  AG - 29  Lactic - 1.8  Etiology unclear Sepsis vs Baclofen intoxication vs Ketosis  COMA panel - Negative  S/p 1L Isolyte in ED  Pending ethylene glycol and methanol levels  Acidosis has resolved    Plan:  Continue mechanical ventilation  Discontinue fomepizole  Will continue to check BMP and electrolytes, replete as needed    S/P craniotomy  Assessment & Plan  S/p craniotomy 1/2019 at  [facility], done secondary to abscess/lesions.  CTH no acute abnormalities, post-operative findings  Baseline independent    Benign essential hypertension  Assessment & Plan  Home regimen: Lisinopril 10mg daily  Hold home Lisinopril    Alcohol use  Assessment & Plan  Hx ETOH use in the past, family unclear if currently drink and if so how much. Unknown time of last drink.   Currently intubated/sedated  CIWA protocol when extubated  Encourage cessation    SIRS (systemic inflammatory response syndrome) (HCC)  Assessment & Plan  Present on arrival, as evidence by - Tachycardia, Tachypnea, Leukocytosis   Suspected source - Unknown at this time  CXR and CT imaging without identifiable source.   Lactate - 1.8  WBC - 15  Procalcitonin - 70  UA: Negative leuks/nitrites, occasional bacteria 2-4 WBC  Blood culture x 2 drawn in ED  He received 1L Isolyte  Started on Cefepime/Flagyl/Vancomycin    Plan:  Continue ABX: Cefepime/Flagyl/Vancomycin  Follow up blood cultures  Trend WBC  Trend fever curve    Spasticity  Assessment & Plan  Hx spasticity s/p craniotomy, home regimen: Baclofen 10mg  Restart home baclofen    Type II diabetes mellitus (HCC)  Assessment & Plan    Lab Results   Component Value Date    HGBA1C 5.3 06/16/2024   Previously took Basaglar 10 units daily, stopped in 2019    Plan  Check glucose q6h for now  SSI every 6 hours  Goal glucose 140-180  Hypoglycemia protocol  Tube feeds vital AF             Disposition: Critical care    ICU Core Measures     Vented Patient  VAP Bundle  VAP bundle ordered     A: Assess, Prevent, and Manage Pain Has pain been assessed? Yes  Need for changes to pain regimen? No   B: Both Spontaneous Awakening Trials (SATs) and Spontaneous Breathing Trials (SBTs) Plan to perform spontaneous awakening trial today? Yes   Plan to perform spontaneous breathing trial today? Yes   Obvious barriers to extubation? No   C: Choice of Sedation RASS Goal: -2 Light Sedation or -1  Drowsy  Need for changes to sedation or analgesia regimen? No   D: Delirium CAM-ICU: Negative   E: Early Mobility  Plan for early mobility? Yes   F: Family Engagement Plan for family engagement today? Yes       Antibiotic Review: To reevaluate throughout the day, may stop    Review of Invasive Devices:    Brandt Plan: Voiding trial after improvement in ambulation         Prophylaxis:  VTE VTE covered by:  heparin (porcine), Subcutaneous, 5,000 Units at 06/17/24 0527       Stress Ulcer  not ordered         Significant 24hr Events     24hr events: Continue to improve with good response to decrease sedation.  More easily arousable, failed SBT yesterday, will reattempt today.     Subjective   Review of Systems: See HPI for Review of Systems     Objective                            Vitals I/O      Most Recent Min/Max in 24hrs   Temp 99.7 °F (37.6 °C) Temp  Min: 99.7 °F (37.6 °C)  Max: 101.8 °F (38.8 °C)   Pulse 81 Pulse  Min: 66  Max: 110   Resp 18 Resp  Min: 11  Max: 24   /58 BP  Min: 86/49  Max: 172/101   O2 Sat 100 % SpO2  Min: 96 %  Max: 100 %      Intake/Output Summary (Last 24 hours) at 6/17/2024 0831  Last data filed at 6/17/2024 0800  Gross per 24 hour   Intake 3319.95 ml   Output 1235 ml   Net 2084.95 ml       Diet Enteral/Parenteral; Tube Feeding No Oral Diet; Vital AF 1.2; Continuous; 50; 30; Water; Every 4 hours    Invasive Monitoring           Physical Exam   Physical Exam  Vitals and nursing note reviewed.   Eyes:      Pupils: Pupils are equal, round, and reactive to light.   Skin:     General: Skin is warm and dry.   HENT:      Head: Normocephalic and atraumatic.   Cardiovascular:      Rate and Rhythm: Normal rate and regular rhythm.      Heart sounds: Normal heart sounds.   Musculoskeletal:      Cervical back: Full passive range of motion without pain, normal range of motion and neck supple.   Abdominal: General: Bowel sounds are normal.      Palpations: Abdomen is soft.   Constitutional:        Appearance: He is underweight. He is ill-appearing.      Interventions: He is sedated, intubated and restrained.      Comments: Generalized poor hygeine   Pulmonary:      Effort: Pulmonary effort is normal. He is intubated.      Breath sounds: Normal breath sounds.   Neurological:      Comments: Arousable by voice, although minimally interactive, only moves eyes to follow, does not follow commands.  On sedation   Genitourinary/Anorectal:  Brandt present.          Diagnostic Studies      EKG: No significant events over night  Imaging: No new imaging I have personally reviewed pertinent reports.       Medications:  Scheduled PRN   baclofen, 10 mg, BID  cefepime, 1,000 mg, Q12H  chlorhexidine, 15 mL, Q12H JEF  cyanocobalamin, 1,000 mcg, Daily  diazepam, 5 mg, Q12H  heparin (porcine), 5,000 Units, Q8H JEF  insulin lispro, 1-5 Units, Q6H JEF  ipratropium, 0.5 mg, TID  levalbuterol, 1.25 mg, TID  lisinopril, 10 mg, Daily  magnesium sulfate, 2 g, Once  polyethylene glycol, 17 g, Daily  potassium phosphate, 12 mmol, Once  thiamine, 500 mg, TID  vancomycin, 1,250 mg, Q12H      acetaminophen, 650 mg, Q4H PRN  fentaNYL, 50 mcg, Q1H PRN  LORazepam, 1 mg, Q6H PRN       Continuous    fentaNYL, 50 mcg/hr, Last Rate: 50 mcg/hr (06/16/24 1739)  propofol, 5-50 mcg/kg/min, Last Rate: 20 mcg/kg/min (06/17/24 0433)         Labs:    CBC    Recent Labs     06/16/24  0729 06/17/24  0508   WBC 4.74 6.11   HGB 10.1* 9.5*   HCT 29.7* 28.9*   * 134*     BMP    Recent Labs     06/17/24  0404 06/17/24  0508   SODIUM 139 141   K 2.9* 4.2   * 110*   CO2 19* 27   AGAP 4 4   BUN 10 13   CREATININE 0.47* 0.76   CALCIUM >18.0* 8.4       Coags    No recent results     Additional Electrolytes  Recent Labs     06/17/24  0021 06/17/24  0404   MG 2.2 1.6*   PHOS 3.5 2.2*   CAIONIZED 1.08* >2.50*          Blood Gas    Recent Labs     06/16/24  0612   PHART 7.460*   [id number] 32.1*   PO2ART 172.2*   [id number] 22.3   BEART -0.9   SOURCE Radial, Right      Recent Labs     06/16/24  0612   SOURCE Radial, Right    LFTs  No recent results    Infectious  No recent results  Glucose  Recent Labs     06/16/24  1739 06/17/24  0021 06/17/24  0404 06/17/24  0508   GLUC 172* 224* 126 153*               Anmol Mcgregor MD

## 2024-06-17 NOTE — ASSESSMENT & PLAN NOTE
Lab Results   Component Value Date    HGBA1C 5.3 06/16/2024   Previously took Basaglar 10 units daily, stopped in 2019    Plan  Check glucose q6h for now  SSI every 6 hours  Goal glucose 140-180  Hypoglycemia protocol  Tube feeds vital AF

## 2024-06-17 NOTE — OCCUPATIONAL THERAPY NOTE
Occupational Therapy Cancellation Note     Patient Name: Colin Dumont  Today's Date: 6/17/2024 06/17/24 1020   Note Type   Note type Cancelled Session   Cancel Reasons Medical status   Additional Comments OT consult received and chart reviewed. Per ICU mobility rounds, pt is not appropriate for participation in OT evaluation on this date. Will hold and f/u as able and appropriate.     Sheila Oliveira MS OTR/L   [place] Licensure# [id number]          03-Sep-2021 10:27

## 2024-06-18 LAB
ALBUMIN SERPL BCG-MCNC: 2.9 G/DL (ref 3.5–5)
ALP SERPL-CCNC: 36 U/L (ref 34–104)
ALT SERPL W P-5'-P-CCNC: 13 U/L (ref 7–52)
ANION GAP SERPL CALCULATED.3IONS-SCNC: 4 MMOL/L (ref 4–13)
APPEARANCE CSF: NORMAL
AST SERPL W P-5'-P-CCNC: 22 U/L (ref 13–39)
BACTERIA BLD CULT: NORMAL
BACTERIA BLD CULT: NORMAL
BASOPHILS # BLD AUTO: 0.02 THOUSANDS/ÂΜL (ref 0–0.1)
BASOPHILS NFR BLD AUTO: 0 % (ref 0–1)
BILIRUB SERPL-MCNC: 0.42 MG/DL (ref 0.2–1)
BUN SERPL-MCNC: 14 MG/DL (ref 5–25)
C GATTII+NEOFOR DNA CSF QL NAA+NON-PROBE: NOT DETECTED
CA-I BLD-SCNC: 1.1 MMOL/L (ref 1.12–1.32)
CALCIUM ALBUM COR SERPL-MCNC: 9.1 MG/DL (ref 8.3–10.1)
CALCIUM SERPL-MCNC: 8.2 MG/DL (ref 8.4–10.2)
CHLORIDE SERPL-SCNC: 109 MMOL/L (ref 96–108)
CMV DNA CSF QL NAA+NON-PROBE: NOT DETECTED
CO2 SERPL-SCNC: 25 MMOL/L (ref 21–32)
CREAT SERPL-MCNC: 0.6 MG/DL (ref 0.6–1.3)
E COLI K1 DNA CSF QL NAA+NON-PROBE: NOT DETECTED
EOSINOPHIL # BLD AUTO: 0.14 THOUSAND/ÂΜL (ref 0–0.61)
EOSINOPHIL NFR BLD AUTO: 2 % (ref 0–6)
ERYTHROCYTE [DISTWIDTH] IN BLOOD BY AUTOMATED COUNT: 15 % (ref 11.6–15.1)
EV RNA CSF QL NAA+NON-PROBE: NOT DETECTED
GFR SERPL CREATININE-BSD FRML MDRD: 112 ML/MIN/1.73SQ M
GLUCOSE CSF-MCNC: 126 MG/DL (ref 40–70)
GLUCOSE SERPL-MCNC: 188 MG/DL (ref 65–140)
GLUCOSE SERPL-MCNC: 191 MG/DL (ref 65–140)
GLUCOSE SERPL-MCNC: 208 MG/DL (ref 65–140)
GLUCOSE SERPL-MCNC: 210 MG/DL (ref 65–140)
GP B STREP DNA CSF QL NAA+NON-PROBE: NOT DETECTED
GRAM STN SPEC: NORMAL
HAEM INFLU DNA CSF QL NAA+NON-PROBE: NOT DETECTED
HCT VFR BLD AUTO: 28.1 % (ref 36.5–49.3)
HGB BLD-MCNC: 9.3 G/DL (ref 12–17)
HHV6 DNA CSF QL NAA+NON-PROBE: NOT DETECTED
HSV1 DNA CSF QL NAA+NON-PROBE: NOT DETECTED
HSV2 DNA CSF QL NAA+NON-PROBE: NOT DETECTED
IMM GRANULOCYTES # BLD AUTO: 0.03 THOUSAND/UL (ref 0–0.2)
IMM GRANULOCYTES NFR BLD AUTO: 0 % (ref 0–2)
L MONOCYTOG DNA CSF QL NAA+NON-PROBE: NOT DETECTED
LYMPHOCYTES # BLD AUTO: 0.46 THOUSANDS/ÂΜL (ref 0.6–4.47)
LYMPHOCYTES NFR BLD AUTO: 7 % (ref 14–44)
LYMPHOCYTES NFR CSF MANUAL: 50 %
MAGNESIUM SERPL-MCNC: 2.3 MG/DL (ref 1.9–2.7)
MCH RBC QN AUTO: 39.7 PG (ref 26.8–34.3)
MCHC RBC AUTO-ENTMCNC: 33.1 G/DL (ref 31.4–37.4)
MCV RBC AUTO: 120 FL (ref 82–98)
MONOCYTES # BLD AUTO: 0.73 THOUSAND/ÂΜL (ref 0.17–1.22)
MONOCYTES NFR BLD AUTO: 11 % (ref 4–12)
MONOS+MACROS CSF MANUAL: 10 %
N MEN DNA CSF QL NAA+NON-PROBE: NOT DETECTED
NEUTROPHILS # BLD AUTO: 5.43 THOUSANDS/ÂΜL (ref 1.85–7.62)
NEUTROPHILS NFR CSF MANUAL: 40 %
NEUTS SEG NFR BLD AUTO: 80 % (ref 43–75)
NRBC BLD AUTO-RTO: 0 /100 WBCS
PARECHOVIRUS A RNA CSF QL NAA+NON-PROBE: NOT DETECTED
PHOSPHATE SERPL-MCNC: 1.9 MG/DL (ref 2.7–4.5)
PLATELET # BLD AUTO: 158 THOUSANDS/UL (ref 149–390)
PMV BLD AUTO: 10 FL (ref 8.9–12.7)
POTASSIUM SERPL-SCNC: 4.6 MMOL/L (ref 3.5–5.3)
PROCALCITONIN SERPL-MCNC: 0.68 NG/ML
PROT CSF-MCNC: 60 MG/DL (ref 15–45)
PROT SERPL-MCNC: 5.4 G/DL (ref 6.4–8.4)
RBC # BLD AUTO: 2.34 MILLION/UL (ref 3.88–5.62)
S PNEUM DNA CSF QL NAA+NON-PROBE: NOT DETECTED
SODIUM SERPL-SCNC: 138 MMOL/L (ref 135–147)
TOTAL CELLS COUNTED BLD: NO
TOTAL CELLS COUNTED SPEC: 10
TRIGL SERPL-MCNC: 184 MG/DL
TUBE # CSF: 4
VANCOMYCIN SERPL-MCNC: 18 UG/ML (ref 10–20)
VZV DNA CSF QL NAA+NON-PROBE: NOT DETECTED
WBC # BLD AUTO: 6.81 THOUSAND/UL (ref 4.31–10.16)
WBC # CSF AUTO: 2 /UL (ref 0–5)

## 2024-06-18 PROCEDURE — 84157 ASSAY OF PROTEIN OTHER: CPT | Performed by: STUDENT IN AN ORGANIZED HEALTH CARE EDUCATION/TRAINING PROGRAM

## 2024-06-18 PROCEDURE — 80053 COMPREHEN METABOLIC PANEL: CPT | Performed by: STUDENT IN AN ORGANIZED HEALTH CARE EDUCATION/TRAINING PROGRAM

## 2024-06-18 PROCEDURE — 99233 SBSQ HOSP IP/OBS HIGH 50: CPT | Performed by: STUDENT IN AN ORGANIZED HEALTH CARE EDUCATION/TRAINING PROGRAM

## 2024-06-18 PROCEDURE — 87070 CULTURE OTHR SPECIMN AEROBIC: CPT | Performed by: STUDENT IN AN ORGANIZED HEALTH CARE EDUCATION/TRAINING PROGRAM

## 2024-06-18 PROCEDURE — 94760 N-INVAS EAR/PLS OXIMETRY 1: CPT

## 2024-06-18 PROCEDURE — 82948 REAGENT STRIP/BLOOD GLUCOSE: CPT

## 2024-06-18 PROCEDURE — 84100 ASSAY OF PHOSPHORUS: CPT | Performed by: STUDENT IN AN ORGANIZED HEALTH CARE EDUCATION/TRAINING PROGRAM

## 2024-06-18 PROCEDURE — 84478 ASSAY OF TRIGLYCERIDES: CPT | Performed by: STUDENT IN AN ORGANIZED HEALTH CARE EDUCATION/TRAINING PROGRAM

## 2024-06-18 PROCEDURE — 87102 FUNGUS ISOLATION CULTURE: CPT | Performed by: STUDENT IN AN ORGANIZED HEALTH CARE EDUCATION/TRAINING PROGRAM

## 2024-06-18 PROCEDURE — 87077 CULTURE AEROBIC IDENTIFY: CPT

## 2024-06-18 PROCEDURE — 85025 COMPLETE CBC W/AUTO DIFF WBC: CPT | Performed by: STUDENT IN AN ORGANIZED HEALTH CARE EDUCATION/TRAINING PROGRAM

## 2024-06-18 PROCEDURE — NC001 PR NO CHARGE: Performed by: STUDENT IN AN ORGANIZED HEALTH CARE EDUCATION/TRAINING PROGRAM

## 2024-06-18 PROCEDURE — 94640 AIRWAY INHALATION TREATMENT: CPT

## 2024-06-18 PROCEDURE — 87483 CNS DNA AMP PROBE TYPE 12-25: CPT | Performed by: STUDENT IN AN ORGANIZED HEALTH CARE EDUCATION/TRAINING PROGRAM

## 2024-06-18 PROCEDURE — 80202 ASSAY OF VANCOMYCIN: CPT | Performed by: STUDENT IN AN ORGANIZED HEALTH CARE EDUCATION/TRAINING PROGRAM

## 2024-06-18 PROCEDURE — 89051 BODY FLUID CELL COUNT: CPT | Performed by: STUDENT IN AN ORGANIZED HEALTH CARE EDUCATION/TRAINING PROGRAM

## 2024-06-18 PROCEDURE — 87205 SMEAR GRAM STAIN: CPT

## 2024-06-18 PROCEDURE — 83735 ASSAY OF MAGNESIUM: CPT | Performed by: STUDENT IN AN ORGANIZED HEALTH CARE EDUCATION/TRAINING PROGRAM

## 2024-06-18 PROCEDURE — 87186 SC STD MICRODIL/AGAR DIL: CPT

## 2024-06-18 PROCEDURE — 87070 CULTURE OTHR SPECIMN AEROBIC: CPT

## 2024-06-18 PROCEDURE — 62270 DX LMBR SPI PNXR: CPT | Performed by: STUDENT IN AN ORGANIZED HEALTH CARE EDUCATION/TRAINING PROGRAM

## 2024-06-18 PROCEDURE — 82945 GLUCOSE OTHER FLUID: CPT | Performed by: STUDENT IN AN ORGANIZED HEALTH CARE EDUCATION/TRAINING PROGRAM

## 2024-06-18 PROCEDURE — 84145 PROCALCITONIN (PCT): CPT | Performed by: STUDENT IN AN ORGANIZED HEALTH CARE EDUCATION/TRAINING PROGRAM

## 2024-06-18 PROCEDURE — 94150 VITAL CAPACITY TEST: CPT

## 2024-06-18 PROCEDURE — 82330 ASSAY OF CALCIUM: CPT | Performed by: STUDENT IN AN ORGANIZED HEALTH CARE EDUCATION/TRAINING PROGRAM

## 2024-06-18 PROCEDURE — 94003 VENT MGMT INPAT SUBQ DAY: CPT

## 2024-06-18 RX ORDER — MIDAZOLAM HYDROCHLORIDE 2 MG/2ML
2 INJECTION, SOLUTION INTRAMUSCULAR; INTRAVENOUS ONCE
Status: COMPLETED | OUTPATIENT
Start: 2024-06-18 | End: 2024-06-18

## 2024-06-18 RX ORDER — MIDAZOLAM HYDROCHLORIDE 2 MG/2ML
INJECTION, SOLUTION INTRAMUSCULAR; INTRAVENOUS
Status: COMPLETED
Start: 2024-06-18 | End: 2024-06-18

## 2024-06-18 RX ORDER — HEPARIN SODIUM 5000 [USP'U]/ML
5000 INJECTION, SOLUTION INTRAVENOUS; SUBCUTANEOUS EVERY 8 HOURS SCHEDULED
Status: DISCONTINUED | OUTPATIENT
Start: 2024-06-18 | End: 2024-06-27 | Stop reason: HOSPADM

## 2024-06-18 RX ORDER — FENTANYL CITRATE 50 UG/ML
50 INJECTION, SOLUTION INTRAMUSCULAR; INTRAVENOUS
Status: DISCONTINUED | OUTPATIENT
Start: 2024-06-18 | End: 2024-06-18

## 2024-06-18 RX ADMIN — LEVALBUTEROL HYDROCHLORIDE 1.25 MG: 1.25 SOLUTION RESPIRATORY (INHALATION) at 13:12

## 2024-06-18 RX ADMIN — LEVALBUTEROL HYDROCHLORIDE 1.25 MG: 1.25 SOLUTION RESPIRATORY (INHALATION) at 21:00

## 2024-06-18 RX ADMIN — PROPOFOL 45 MCG/KG/MIN: 10 INJECTION, EMULSION INTRAVENOUS at 20:53

## 2024-06-18 RX ADMIN — MIDAZOLAM 2 MG: 1 INJECTION INTRAMUSCULAR; INTRAVENOUS at 11:00

## 2024-06-18 RX ADMIN — MIDAZOLAM HYDROCHLORIDE 2 MG: 2 INJECTION, SOLUTION INTRAMUSCULAR; INTRAVENOUS at 11:00

## 2024-06-18 RX ADMIN — CHLORHEXIDINE GLUCONATE 15 ML: 1.2 RINSE ORAL at 20:53

## 2024-06-18 RX ADMIN — INSULIN LISPRO 1 UNITS: 100 INJECTION, SOLUTION INTRAVENOUS; SUBCUTANEOUS at 12:38

## 2024-06-18 RX ADMIN — INSULIN LISPRO 1 UNITS: 100 INJECTION, SOLUTION INTRAVENOUS; SUBCUTANEOUS at 18:16

## 2024-06-18 RX ADMIN — CYANOCOBALAMIN TAB 500 MCG 1000 MCG: 500 TAB at 08:29

## 2024-06-18 RX ADMIN — VANCOMYCIN HYDROCHLORIDE 1250 MG: 5 INJECTION, POWDER, LYOPHILIZED, FOR SOLUTION INTRAVENOUS at 21:01

## 2024-06-18 RX ADMIN — BACLOFEN 10 MG: 10 TABLET ORAL at 18:16

## 2024-06-18 RX ADMIN — VANCOMYCIN HYDROCHLORIDE 1250 MG: 5 INJECTION, POWDER, LYOPHILIZED, FOR SOLUTION INTRAVENOUS at 07:59

## 2024-06-18 RX ADMIN — Medication 100 MG: at 09:59

## 2024-06-18 RX ADMIN — SODIUM PHOSPHATE, MONOBASIC, MONOHYDRATE AND SODIUM PHOSPHATE, DIBASIC, ANHYDROUS 21 MMOL: 142; 276 INJECTION, SOLUTION INTRAVENOUS at 08:28

## 2024-06-18 RX ADMIN — Medication 50 MCG/HR: at 10:28

## 2024-06-18 RX ADMIN — IPRATROPIUM BROMIDE 0.5 MG: 0.5 SOLUTION RESPIRATORY (INHALATION) at 13:12

## 2024-06-18 RX ADMIN — HEPARIN SODIUM 5000 UNITS: 5000 INJECTION INTRAVENOUS; SUBCUTANEOUS at 20:53

## 2024-06-18 RX ADMIN — FOLIC ACID TAB 400 MCG 400 MCG: 400 TAB at 08:30

## 2024-06-18 RX ADMIN — PROPOFOL 45 MCG/KG/MIN: 10 INJECTION, EMULSION INTRAVENOUS at 18:37

## 2024-06-18 RX ADMIN — CEFEPIME 1000 MG: 1 INJECTION, POWDER, FOR SOLUTION INTRAMUSCULAR; INTRAVENOUS at 10:58

## 2024-06-18 RX ADMIN — LEVALBUTEROL HYDROCHLORIDE 1.25 MG: 1.25 SOLUTION RESPIRATORY (INHALATION) at 08:19

## 2024-06-18 RX ADMIN — BACLOFEN 10 MG: 10 TABLET ORAL at 08:29

## 2024-06-18 RX ADMIN — INSULIN LISPRO 1 UNITS: 100 INJECTION, SOLUTION INTRAVENOUS; SUBCUTANEOUS at 23:59

## 2024-06-18 RX ADMIN — FENTANYL CITRATE 50 MCG: 50 INJECTION INTRAMUSCULAR; INTRAVENOUS at 01:25

## 2024-06-18 RX ADMIN — IPRATROPIUM BROMIDE 0.5 MG: 0.5 SOLUTION RESPIRATORY (INHALATION) at 21:00

## 2024-06-18 RX ADMIN — IPRATROPIUM BROMIDE 0.5 MG: 0.5 SOLUTION RESPIRATORY (INHALATION) at 08:19

## 2024-06-18 RX ADMIN — LISINOPRIL 10 MG: 10 TABLET ORAL at 08:29

## 2024-06-18 RX ADMIN — CEFEPIME 1000 MG: 1 INJECTION, POWDER, FOR SOLUTION INTRAMUSCULAR; INTRAVENOUS at 23:01

## 2024-06-18 RX ADMIN — CHLORHEXIDINE GLUCONATE 15 ML: 1.2 RINSE ORAL at 08:29

## 2024-06-18 RX ADMIN — INSULIN LISPRO 1 UNITS: 100 INJECTION, SOLUTION INTRAVENOUS; SUBCUTANEOUS at 06:01

## 2024-06-18 RX ADMIN — PROPOFOL 40 MCG/KG/MIN: 10 INJECTION, EMULSION INTRAVENOUS at 05:53

## 2024-06-18 RX ADMIN — FENTANYL CITRATE 50 MCG: 50 INJECTION INTRAMUSCULAR; INTRAVENOUS at 04:42

## 2024-06-18 NOTE — ASSESSMENT & PLAN NOTE
S/p craniotomy 1/2019 at [facility], done secondary to abscess/lesions.  CTH no acute abnormalities, post-operative findings  Baseline independent

## 2024-06-18 NOTE — PROGRESS NOTES
Colin Dumont is a 54 y.o. male who is currently ordered Vancomycin IV with management by the Pharmacy Consult service.  Relevant clinical data and objective / subjective history reviewed.  Vancomycin Assessment:  Indication and Goal AUC/Trough: Pneumonia (goal -600, trough >10)  Clinical Status:  critically ill  Micro:     Renal Function:  SCr: 496 mg/dL  CrCl: 12.2 mL/min  Renal replacement: Not on dialysis  Days of Therapy: 6  Current Dose: 1250 mg every 12 hours  Vancomycin Plan:  New Dosing: continue current dosing  Estimated AUC: 496 mcg*hr/mL  Estimated Trough: 12.2 mcg/mL  Next Level: 6/25 @ 0600  Renal Function Monitoring: Daily BMP and UOP  Pharmacy will continue to follow closely for s/sx of nephrotoxicity, infusion reactions and appropriateness of therapy.  BMP and CBC will be ordered per protocol. We will continue to follow the patient’s culture results and clinical progress daily.    Tasha Roman, Pharmacist

## 2024-06-18 NOTE — PROGRESS NOTES
[facility]  Progress Note  Name: Colin Dumont I  MRN: [id number]  Unit/Bed#: ICU 13 I Date of Admission: 6/13/2024   Date of Service: 6/18/2024 I Hospital Day: 5    Assessment & Plan   Acute respiratory failure with hypoxia (HCC)  Assessment & Plan  Pt presented to ED altered, agitated. Received Versed 5mg in ED and became somnolent after.  Intubated for airway protection and profound metabolic acidosis  VBG 6.9/30/55/7/ BE -24  VENT: AC/VC 18/380/40/6  CXR - ETT 5cm above roque, will advance by 1 cm on arrival to ICU    Plan:  Continue mechanical ventilation  Continue sedation, goal RASS -2  Propofol gtt  Fentanyl gtt  Avoiding Precedex 2/2 bradycardic episodes  Respiratory protocol  Vent bundle  CAM-ICU  Daily SAT/SBT    * Metabolic encephalopathy  Assessment & Plan  Pt presented to ED Altered  CTH - no acute intracranial abnormalities, previous post-operative changes.  History of previous psychotic episodes d/t excessive intake of Baclofen  Per patients mother she did refil his Baclofen, however is nto sure how many are left.   COMA panel - negative  UDS neg, ethanol negative  Requiring multiple doses of Versed while on propofol for agitation  Negative for methanol and ethylene glycol    Plan:  Initially likely secondary to baclofen overdose versus ketoacidosis  On propofol and fentanyl, down titrating  Continue with mechanical ventilation, reattempting SAT and SBT later today    High anion gap metabolic acidosis  Assessment & Plan  VBG on arrival - 7.0/28/40/7/-24  Repeat VBG - 6.9/30/55/7/-24  AG - 29  Lactic - 1.8  Etiology unclear Sepsis vs Baclofen intoxication vs Ketosis  COMA panel - Negative  S/p 1L Isolyte in ED  Pending ethylene glycol and methanol levels  Acidosis has resolved    Plan:  Continue mechanical ventilation  Will continue to check BMP and electrolytes, replete as needed    S/P craniotomy  Assessment & Plan  S/p craniotomy 1/2019 at [facility]  done secondary to abscess/lesions.  CTH no acute abnormalities, post-operative findings  Baseline independent    Benign essential hypertension  Assessment & Plan  Home regimen: Lisinopril 10mg daily      Alcohol use  Assessment & Plan  Hx ETOH use in the past, family unclear if currently drink and if so how much. Unknown time of last drink.   Currently intubated/sedated  CIWA protocol when extubated  Encourage cessation    SIRS (systemic inflammatory response syndrome) (HCC)  Assessment & Plan  Present on arrival, as evidence by - Tachycardia, Tachypnea, Leukocytosis   Suspected source - Unknown at this time  CXR and CT imaging without identifiable source.   Lactate - 1.8  WBC - 15  Procalcitonin - 70  UA: Negative leuks/nitrites, occasional bacteria 2-4 WBC  Blood culture x 2 drawn in ED  He received 1L Isolyte    Currently still on vancomycin and cefepime, metronidazole discontinued over the weekend    Plan:  Continue ABX: Cefepime/Vancomycin  Follow up blood cultures  Trend WBC  Trend fever curve  Plan for LP as continued fevers    Spasticity  Assessment & Plan  Hx spasticity s/p craniotomy, home regimen: Baclofen 10mg  Restart home baclofen    Type II diabetes mellitus (HCC)  Assessment & Plan    Lab Results   Component Value Date    HGBA1C 5.3 06/16/2024   Previously took Basaglar 10 units daily, stopped in 2019    Plan  Check glucose q6h for now  SSI every 6 hours  Goal glucose 140-180  Hypoglycemia protocol  Tube feeds vital AF             Disposition: Critical care    ICU Core Measures     Vented Patient  VAP Bundle  VAP bundle ordered     A: Assess, Prevent, and Manage Pain Has pain been assessed? Yes  Need for changes to pain regimen? No   B: Both Spontaneous Awakening Trials (SATs) and Spontaneous Breathing Trials (SBTs) Plan to perform spontaneous awakening trial today? Yes   Plan to perform spontaneous breathing trial today? Yes   Obvious barriers to extubation? Yes   C: Choice of Sedation RASS Goal: -1  Drowsy or 0 Alert and Calm  Need for changes to sedation or analgesia regimen? Yes   D: Delirium CAM-ICU: Negative   E: Early Mobility  Plan for early mobility? Yes   F: Family Engagement Plan for family engagement today? Yes       Antibiotic Review: Patient on appropriate coverage based on culture data.     Review of Invasive Devices:            Prophylaxis:  VTE Contraindicated secondary to: Holding for possible procedure   Stress Ulcer  not ordered         Significant 24hr Events     24hr events: Plan for lumbar puncture yesterday, aborted secondary to patient having large bowel movements started turning and inability to maintain stability of field.  Consent has been obtained from patient's mother, understood risks and benefits of the procedure and decided to continue forward with it.  Will attempt again later today as patient has continued to have fevers without explained cause.     Subjective   Review of Systems: See HPI for Review of Systems     Objective                            Vitals I/O      Most Recent Min/Max in 24hrs   Temp 100.3 °F (37.9 °C) Temp  Min: 98.5 °F (36.9 °C)  Max: 100.5 °F (38.1 °C)   Pulse 89 Pulse  Min: 54  Max: 97   Resp 18 Resp  Min: 4  Max: 33   /86 BP  Min: 73/42  Max: 165/86   O2 Sat 100 % SpO2  Min: 93 %  Max: 100 %      Intake/Output Summary (Last 24 hours) at 6/18/2024 0722  Last data filed at 6/18/2024 0600  Gross per 24 hour   Intake 2665.08 ml   Output 700 ml   Net 1965.08 ml       Diet Enteral/Parenteral; Tube Feeding No Oral Diet; Vital AF 1.2; Continuous; 50; 30; Water; Every 4 hours    Invasive Monitoring           Physical Exam   Physical Exam  Vitals and nursing note reviewed.   Eyes:      Pupils: Pupils are equal, round, and reactive to light.   Skin:     General: Skin is warm and dry.   HENT:      Head: Normocephalic and atraumatic.   Cardiovascular:      Rate and Rhythm: Normal rate and regular rhythm.      Heart sounds: Normal heart sounds.   Musculoskeletal:       Cervical back: Full passive range of motion without pain, normal range of motion and neck supple.   Abdominal: General: Bowel sounds are normal.      Palpations: Abdomen is soft.   Constitutional:       Appearance: He is underweight. He is ill-appearing.      Interventions: He is sedated, intubated and restrained.      Comments: Generalized poor hygeine   Pulmonary:      Effort: Pulmonary effort is normal. He is intubated.      Breath sounds: Normal breath sounds.      Comments: Congested upper airway sounds    Neurological:      Comments: On heavy sedation this morning, movement to physical stimuli, and retracted to noxious stimuli.  Will reassess as sedation is weaned later in the day.   Genitourinary/Anorectal:  Brandt present.          Diagnostic Studies      EKG: No significant findings on telemetry overnight  Imaging: No new imaging to review.  I have personally reviewed pertinent reports.       Medications:  Scheduled PRN   baclofen, 10 mg, BID  cefepime, 1,000 mg, Q12H  chlorhexidine, 15 mL, Q12H JEF  cyanocobalamin, 1,000 mcg, Daily  folic acid, 400 mcg, Daily  insulin lispro, 1-5 Units, Q6H JEF  ipratropium, 0.5 mg, TID  levalbuterol, 1.25 mg, TID  lisinopril, 10 mg, Daily  multi-electrolyte, 500 mL, Once  polyethylene glycol, 17 g, Daily  sodium phosphate, 21 mmol, Once  thiamine, 100 mg, Daily  vancomycin, 1,250 mg, Q12H      fentaNYL, 50 mcg, Q1H PRN       Continuous    fentaNYL, 50 mcg/hr, Last Rate: 50 mcg/hr (06/17/24 1708)  propofol, 5-50 mcg/kg/min, Last Rate: 40 mcg/kg/min (06/18/24 0553)         Labs:    CBC    Recent Labs     06/17/24  0508 06/18/24  0428   WBC 6.11 6.81   HGB 9.5* 9.3*   HCT 28.9* 28.1*   * 158     BMP    Recent Labs     06/17/24  0508 06/18/24  0428   SODIUM 141 138   K 4.2 4.6   * 109*   CO2 27 25   AGAP 4 4   BUN 13 14   CREATININE 0.76 0.60   CALCIUM 8.4 8.2*       Coags    No recent results     Additional Electrolytes  Recent Labs     06/17/24  0409  06/18/24  0428   MG 1.6* 2.3   PHOS 2.2* 1.9*   CAIONIZED >2.50* 1.10*          Blood Gas    No recent results  No recent results LFTs  Recent Labs     06/18/24  0428   ALT 13   AST 22   ALKPHOS 36   ALB 2.9*   TBILI 0.42       Infectious  Recent Labs     06/17/24  0508 06/18/24  0427   PROCALCITONI 1.20* 0.68*     Glucose  Recent Labs     06/17/24  0021 06/17/24  0404 06/17/24  0508 06/18/24  0428   GLUC 224* 126 153* 188*               Anmol Mcgregor MD

## 2024-06-18 NOTE — ASSESSMENT & PLAN NOTE
Pt presented to ED Altered  CTH - no acute intracranial abnormalities, previous post-operative changes.  History of previous psychotic episodes d/t excessive intake of Baclofen  Per patients mother she did refil his Baclofen, however is nto sure how many are left.   COMA panel - negative  UDS neg, ethanol negative  Requiring multiple doses of Versed while on propofol for agitation  Negative for methanol and ethylene glycol    Plan:  Initially likely secondary to baclofen overdose versus ketoacidosis  On propofol and fentanyl, down titrating  Continue with mechanical ventilation, reattempting SAT and SBT later today

## 2024-06-18 NOTE — PROCEDURES
Lumbar puncture    Date/Time: 6/18/2024 12:05 PM    Performed by: Anmol Mcgregor MD  Authorized by: Anmol Mcgregor MD  Universal Protocol:  Procedure performed by: (Dr. Martnio)  Consent: Written consent obtained.  Risks and benefits: risks, benefits and alternatives were discussed  Consent given by: parent  Procedure consent: procedure consent matches procedure scheduled  Relevant documents: relevant documents present and verified  Site marked: the operative site was marked  Patient identity confirmed: arm band    Patient location:  Bedside  Pre-procedure details:     Preparation: Patient was prepped and draped in usual sterile fashion    Indications:     Indications: evaluation for infection, evaluation for altered mental status and evaluation of opening pressure    Sedation:     Sedation type:  Anxiolysis  Anesthesia (see MAR for exact dosages):     Anesthesia method:  Local infiltration    Local anesthetic:  Lidocaine 1% w/o epi  Procedure details:     Lumbar space:  L4-L5 interspace    Patient position:  L lateral decubitus    Equipment: Lumbar puncture kit used      Needle gauge:  20G x 3.5in    Needle type:  Spinal needle - Quincke tip    Ultrasound guidance: no      Number of attempts:  4    Opening pressure (cm H2O):  21    Closing pressure (cm H2O):  15.5    Fluid appearance:  Xanthochromic and clear    Tubes of fluid:  4    Total volume (ml):  14  Post-procedure:     Puncture site:  Direct pressure applied and adhesive bandage applied    Patient tolerance of procedure:  Tolerated well, no immediate complications    Patient instructed to lie flat for one(1) hour post lumbar puncture.: Yes

## 2024-06-18 NOTE — ASSESSMENT & PLAN NOTE
VBG on arrival - 7.0/28/40/7/-24  Repeat VBG - 6.9/30/55/7/-24  AG - 29  Lactic - 1.8  Etiology unclear Sepsis vs Baclofen intoxication vs Ketosis  COMA panel - Negative  S/p 1L Isolyte in ED  Pending ethylene glycol and methanol levels  Acidosis has resolved    Plan:  Continue mechanical ventilation  Will continue to check BMP and electrolytes, replete as needed

## 2024-06-18 NOTE — PROGRESS NOTES
Critical Care Attending Note; Roberto Martino   Note Date: 24  Note Time: 9:08 AM    Patient: Colin Dumont  Age, : 54 y.o., 1970 MRN: [id number] Code Status: Level 1 - Full Code Patient Location: ICU 13/ICU 13   Hospital LOS:5 days  ]   Patient seen and examined, medical record reviewed, discussed with house staff and nursing staff.     HPI   CC: AMS   54M with a PMH a Brain Abscess, EtOH abuse, HLD, HTN, DM, PUD who found down found AMS requiring intubation.     Key Recent Events    : Admitted, Intubated     Main ICU Plans:       #Neuro  AMS - TME vs EtOH withdrawal vs Septic- Last drink ? - Initially concerned for toxic ingestion but acidosis recovered quickly, other alcohol levels normal. Possibly baclofen toxicity with suprainfection   - Thiamine, Folate    Brain Abscess/Craniectomy   - Continue Baclofen    Sedation - RASS 0 - (-1)  - Propofol, Fentanyl - SAT     #Card  HTN   - Lisinopril    #Pulm  Respiratory Failure - Intubated for airway protection  - LTVV VC - 6-8cc/kg IDBW - SBT   - Wean FiO2 - Maintain Oxygen Sat >92%  - VAP Bundle  - HOB > 30o    - PEEP Titrate      #Renal  BELLA - Pre- Renal - Resolved  NAGMA - Resolved     #GI  PPI    Refeeding Syndrome  - Replete Electrolytes    #ID   Sepsis - Unclear Etiology - Persistent Fevers despite broad spectrum - Plan for lumbar puncture.    - Continue Vanc/Cefepime      #Endo  DM -DKA? - Resolved   - ISS      #DVT/GI ppx  SCD    #Lines/Tubes/Drains:   Invasive Devices       Peripheral Intravenous Line  Duration             Peripheral IV 24 Distal;Right;Upper;Ventral (anterior) Arm <1 day    Peripheral IV 24 Left Antecubital <1 day    Peripheral IV 24 Left;Ventral (anterior) Forearm <1 day    Peripheral IV 24 Proximal;Right;Ventral (anterior) Forearm <1 day              Drain  Duration             NG/OG/Enteral Tube Orogastric 16 Fr Left mouth 4 days    External Urinary Catheter <1 day              Airway   Duration             ETT  Oral 7.5 mm 4 days                    #Nutrition:   Diet Enteral/Parenteral; Tube Feeding No Oral Diet; Vital AF 1.2; Continuous; 50; 30; Water; Every 4 hours        #Code Status:   Level 1 - Full Code    #Dispo:   ICU        Roberto Martino MD  Pulmonary, Critical Care    Critical care time, excluding procedures, teaching, family meetings, and excludes any prior time recorded by the AP/resident, 35 minutes. Upon my evaluation, this patient has a high probability of imminent or life-threatening deterioration due to above problems which required my direct attention, intervention, and personal management.   Impression/Active Problems:    AMS   Brain Abcesss   HTN   DM   PUD   HLD   Sepsis   Respiratory Failure   BELLA   Hyperkalemia   NAGMA      Physical Exam:     Vital Signs:   Weight: 73 kg (160 lb 15 oz)  IBW: Ideal body weight: 70.7 kg (155 lb 13.8 oz)  Adjusted ideal body weight: 71.6 kg (157 lb 14.3 oz)  Temp:  [98.5 °F (36.9 °C)-100.5 °F (38.1 °C)] 99.5 °F (37.5 °C)  HR:  [54-97] 92  Resp:  [4-33] 12  BP: ()/(42-86) 140/63  FiO2 (%):  [40] 40  Physical Exam: Unchanged  General: NAD  Neuro: Sedated  Heart: RRR  Lungs: Basilar diminshed  Abdomen: Soft NT  Extremities: No edema                Ventilator Settings:    FiO2 (%):  [40] 40    Results from last 7 days   Lab Units 06/14/24  1146 06/14/24  0425 06/14/24  0216 06/13/24  2133   ISTAT PH ART  7.451*  --   --   --    PO2 UGO mm Hg  --  28.6* 35.8 41.7     Radiologic Images Reviewed:    CT Head  No acute intracranial abnormality.     Input / Output:     Intake/Output Summary (Last 24 hours) at 6/18/2024 0908  Last data filed at 6/18/2024 0800  Gross per 24 hour   Intake 2605.28 ml   Output 625 ml   Net 1980.28 ml            Infusions:  fentaNYL, 50 mcg/hr, Last Rate: 50 mcg/hr (06/17/24 1708)  propofol, 5-50 mcg/kg/min, Last Rate: 40 mcg/kg/min (06/18/24 0050)      Scheduled Medications:  Current Facility-Administered  Medications   Medication Dose Route Frequency Provider Last Rate    baclofen  10 mg Oral BID Linden Plummer MD      cefepime  1,000 mg Intravenous Q12H TIM Padilla Stopped (06/18/24 0056)    chlorhexidine  15 mL Mouth/Throat Q12H [facility] Nirmal Tobar MD      cyanocobalamin  1,000 mcg Oral Daily Arielle Zaidi MD      fentaNYL  50 mcg/hr Intravenous Continuous Linden Plummer MD 50 mcg/hr (06/17/24 1708)    fentaNYL  50 mcg Intravenous Q1H PRN TIM Padilla      folic acid  400 mcg Oral Daily TIM Boone      insulin lispro  1-5 Units Subcutaneous Q6H [facility] Linden Plummer MD      ipratropium  0.5 mg Nebulization TID Kevin Guglielmello, DO      levalbuterol  1.25 mg Nebulization TID Kevin Danielglracquelmelkel, DO      lisinopril  10 mg Oral Daily Arielle Zaidi MD      multi-electrolyte  500 mL Intravenous Once Anmol Mcgregor MD Stopped (06/17/24 1922)    polyethylene glycol  17 g Oral Daily Linden Plummer MD      propofol  5-50 mcg/kg/min Intravenous Titrated TIM Padilla 40 mcg/kg/min (06/18/24 0553)    sodium phosphate  21 mmol Intravenous Once Anmol Mcgregor MD 21 mmol (06/18/24 0828)    thiamine  100 mg Oral Daily TIM Boone      vancomycin  1,250 mg Intravenous Q12H Arielle Zaidi MD 1,250 mg (06/18/24 0759)       PRN Medications:    fentaNYL    Labs Reviewed:  Results from last 7 days   Lab Units 06/18/24  0428 06/17/24  0508 06/16/24  0729   WBC Thousand/uL 6.81 6.11 4.74   HEMOGLOBIN g/dL 9.3* 9.5* 10.1*   HEMATOCRIT % 28.1* 28.9* 29.7*   PLATELETS Thousands/uL 158 134* 120*      Results from last 7 days   Lab Units 06/18/24  0428 06/17/24  0508 06/17/24  0404 06/17/24  0021 06/14/24  1210 06/14/24  1146 06/14/24  0443 06/13/24  2217 06/13/24  2133 06/13/24 2118   SODIUM mmol/L 138 141 139 139   < >  --    < >  --    < >  --    CO2 mmol/L 25 27 19* 25   < >  --    < >  --    < >  --    CO2, I-STAT mmol/L  --   --   --   --   --  21   "--  8*  --  8*   BUN mg/dL 14 13 10 12   < >  --    < >  --    < >  --    GLUCOSE, ISTAT mg/dl  --   --   --   --   --  126  --  189*  --  198*   CALCIUM mg/dL 8.2* 8.4 >18.0* 7.9*   < >  --    < >  --    < >  --    MAGNESIUM mg/dL 2.3  --  1.6* 2.2   < >  --    < >  --   --   --    PHOSPHORUS mg/dL 1.9*  --  2.2* 3.5   < >  --    < >  --   --   --     < > = values in this interval not displayed.         Invalid input(s): \"ASTSGOT\", \"ALTSGPT\"LABRCNTIP@ ,alkphos:3,tbilirubin:3,dbilirubin:3)@  Results from last 7 days   Lab Units 06/14/24  0443 06/13/24  2133   INR  1.34* 1.00           Invalid input(s): \"TROPT\", \"PBNP\"             I have personally seen and examined the patient on (06/18/24 between [id number]). I discussed the patient with the AP/resident including, but not limited to, verifying findings; reviewing labs and x-rays; discussing with consultants; developing the plan of care with the bedside nurse; and discussing treatment plan with patient or surrogate.  I have reviewed the note and assessment performed by the AP/resident and agree with the AP/resident’s documented findings and plan of care with the above additions/exceptions. Please see my comments for details and adjustments.                 "

## 2024-06-18 NOTE — PLAN OF CARE
Problem: SAFETY,RESTRAINT: NV/NON-SELF DESTRUCTIVE BEHAVIOR  Goal: Remains free of harm/injury (restraint for non violent/non self-detsructive behavior)  Description: INTERVENTIONS:  - Instruct patient/family regarding restraint use   - Assess and monitor physiologic and psychological status   - Provide interventions and comfort measures to meet assessed patient needs   - Identify and implement measures to help patient regain control  - Assess readiness for release of restraint   Outcome: Not Progressing  Goal: Returns to optimal restraint-free functioning  Description: INTERVENTIONS:  - Assess the patient's behavior and symptoms that indicate continued need for restraint  - Identify and implement measures to help patient regain control  - Assess readiness for release of restraint   Outcome: Not Progressing     Problem: Nutrition/Hydration-ADULT  Goal: Nutrient/Hydration intake appropriate for improving, restoring or maintaining nutritional needs  Description: Monitor and assess patient's nutrition/hydration status for malnutrition. Collaborate with interdisciplinary team and initiate plan and interventions as ordered.  Monitor patient's weight and dietary intake as ordered or per policy. Utilize nutrition screening tool and intervene as necessary. Determine patient's food preferences and provide high-protein, high-caloric foods as appropriate.     INTERVENTIONS:  - Monitor oral intake, urinary output, labs, and treatment plans  - Assess nutrition and hydration status and recommend course of action  - Evaluate amount of meals eaten  - Assist patient with eating if necessary   - Allow adequate time for meals  - Recommend/ encourage appropriate diets, oral nutritional supplements, and vitamin/mineral supplements  - Order, calculate, and assess calorie counts as needed  - Recommend, monitor, and adjust tube feedings and TPN/PPN based on assessed needs  - Assess need for intravenous fluids  - Provide specific  nutrition/hydration education as appropriate  - Include patient/family/caregiver in decisions related to nutrition  Outcome: Not Progressing     Problem: Prexisting or High Potential for Compromised Skin Integrity  Goal: Skin integrity is maintained or improved  Description: INTERVENTIONS:  - Identify patients at risk for skin breakdown  - Assess and monitor skin integrity  - Assess and monitor nutrition and hydration status  - Monitor labs   - Assess for incontinence   - Turn and reposition patient  - Assist with mobility/ambulation  - Relieve pressure over bony prominences  - Avoid friction and shearing  - Provide appropriate hygiene as needed including keeping skin clean and dry  - Evaluate need for skin moisturizer/barrier cream  - Collaborate with interdisciplinary team   - Patient/family teaching  - Consider wound care consult   Outcome: Not Progressing     Problem: PAIN - ADULT  Goal: Verbalizes/displays adequate comfort level or baseline comfort level  Description: Interventions:  - Encourage patient to monitor pain and request assistance  - Assess pain using appropriate pain scale  - Administer analgesics based on type and severity of pain and evaluate response  - Implement non-pharmacological measures as appropriate and evaluate response  - Consider cultural and social influences on pain and pain management  - Notify physician/advanced practitioner if interventions unsuccessful or patient reports new pain  Outcome: Not Progressing     Problem: INFECTION - ADULT  Goal: Absence or prevention of progression during hospitalization  Description: INTERVENTIONS:  - Assess and monitor for signs and symptoms of infection  - Monitor lab/diagnostic results  - Monitor all insertion sites, i.e. indwelling lines, tubes, and drains  - Monitor endotracheal if appropriate and nasal secretions for changes in amount and color  - [place] appropriate cooling/warming therapies per order  - Administer medications as ordered  -  Instruct and encourage patient and family to use good hand hygiene technique  - Identify and instruct in appropriate isolation precautions for identified infection/condition  Outcome: Not Progressing  Goal: Absence of fever/infection during neutropenic period  Description: INTERVENTIONS:  - Monitor WBC    Outcome: Not Progressing     Problem: SAFETY ADULT  Goal: Patient will remain free of falls  Description: INTERVENTIONS:  - Educate patient/family on patient safety including physical limitations  - Instruct patient to call for assistance with activity   - Consult OT/PT to assist with strengthening/mobility   - Keep Call bell within reach  - Keep bed low and locked with side rails adjusted as appropriate  - Keep care items and personal belongings within reach  - Initiate and maintain comfort rounds  - Make Fall Risk Sign visible to staff  - Offer Toileting every 2 Hours, in advance of need  - Initiate/Maintain bed alarm  - Apply yellow socks and bracelet for high fall risk patients  - Consider moving patient to room near nurses station  Outcome: Not Progressing  Goal: Maintain or return to baseline ADL function  Description: INTERVENTIONS:  -  Assess patient's ability to carry out ADLs; assess patient's baseline for ADL function and identify physical deficits which impact ability to perform ADLs (bathing, care of mouth/teeth, toileting, grooming, dressing, etc.)  - Assess/evaluate cause of self-care deficits   - Assess range of motion  - Assess patient's mobility; develop plan if impaired  - Assess patient's need for assistive devices and provide as appropriate  - Encourage maximum independence but intervene and supervise when necessary  - Involve family in performance of ADLs  - Assess for home care needs following discharge   - Consider OT consult to assist with ADL evaluation and planning for discharge  - Provide patient education as appropriate  Outcome: Not Progressing  Goal: Maintains/Returns to pre admission  functional level  Description: INTERVENTIONS:  - Perform AM-PAC 6 Click Basic Mobility/ Daily Activity assessment daily.  - Set and communicate daily mobility goal to care team and patient/family/caregiver.   - Collaborate with rehabilitation services on mobility goals if consulted  - Perform Range of Motion 3 times a day.  - Reposition patient every 2 hours.  - Dangle patient 3 times a day  - Stand patient 3 times a day  - Ambulate patient 3 times a day  - Out of bed to chair 3 times a day   - Out of bed for meals 3 times a day  - Out of bed for toileting  - Record patient progress and toleration of activity level   Outcome: Not Progressing     Problem: DISCHARGE PLANNING  Goal: Discharge to home or other facility with appropriate resources  Description: INTERVENTIONS:  - Identify barriers to discharge w/patient and caregiver  - Arrange for needed discharge resources and transportation as appropriate  - Identify discharge learning needs (meds, wound care, etc.)  - Arrange for interpretive services to assist at discharge as needed  - Refer to Case Management Department for coordinating discharge planning if the patient needs post-hospital services based on physician/advanced practitioner order or complex needs related to functional status, cognitive ability, or social support system  Outcome: Not Progressing     Problem: Knowledge Deficit  Goal: Patient/family/caregiver demonstrates understanding of disease process, treatment plan, medications, and discharge instructions  Description: Complete learning assessment and assess knowledge base.  Interventions:  - Provide teaching at level of understanding  - Provide teaching via preferred learning methods  Outcome: Not Progressing

## 2024-06-18 NOTE — PHYSICAL THERAPY NOTE
PHYSICAL THERAPY NOTE    Patient Name: Colin Dumont  Today's Date: 6/18/2024 06/18/24 1030   Note Type   Note type Cancelled Session   Cancel Reasons Medical status   Additional Comments Pt discussed in ICU mobility rounds. Per RN, pt is not appropriate for PT services today. Will follow      Kwaku Siddiqui, PT

## 2024-06-18 NOTE — CASE MANAGEMENT
Case Management Assessment & Discharge Planning Note    Patient name Colin Dumont  Location ICU 13/ICU 13 MRN [id number]  : 1970 Date 2024       Current Admission Date: 2024  Current Admission Diagnosis:Metabolic encephalopathy   Patient Active Problem List    Diagnosis Date Noted Date Diagnosed    SIRS (systemic inflammatory response syndrome) ([facility]) 2024     Acute respiratory failure with hypoxia ([facility]) 2024     Alcohol use 2024     Alcohol intoxication ([facility]) 2022     Fall 2022     Slurred speech 2022     Metabolic encephalopathy 2022     Memory difficulty 2022     History of GI bleed 2022     Noncompliance 2020     Spasticity 2020     Right sided weakness 10/18/2019     High anion gap metabolic acidosis 2019     Hyperlipidemia 2019     S/P craniotomy 2019     Type 2 diabetes mellitus without complication, with long-term current use of insulin ([facility]) 2018     Tobacco use disorder 2018     Type II diabetes mellitus ([facility]) 2018     Depression 2017     Benign essential hypertension 2012       LOS (days): 5  Geometric Mean LOS (GMLOS) (days): 5.1  Days to GMLOS:0.5     OBJECTIVE:    Risk of Unplanned Readmission Score: 14.73     Current admission status: Inpatient    Preferred Pharmacy:   [id number] - [address] [address]  Phone: [phone] Fax: [phone]    Primary Care Provider: Stefani Garner MD    Primary Insurance: MEDICARE  Secondary Insurance:     ASSESSMENT:  Active Health Care Proxies    There are no active Health Care Proxies on file.       Patient Information  Admitted from:: Home  Mental Status: Intubated  During Assessment patient was accompanied by: Not accompanied during assessment  Assessment information provided by:: Parent (Mother, Millicent)  Primary Caregiver: Self  Support  Systems: Self, Family members, Parent    Activities of Daily Living Prior to Admission  Functional Status: Independent  Completes ADLs independently?: Yes (Pt did not usually shower in the bathroom and would sponge bath in the sink)  Ambulates independently?: Yes  Does patient use assisted devices?: Yes  Assisted Devices (DME) used: Straight Cane (only when out in the community)  Does patient currently own DME?: Yes  What DME does the patient currently own?: Straight Cane  Does patient have a history of Outpatient Therapy (PT/OT)?: Yes  Does the patient have a history of Short-Term Rehab?: Yes  Does patient have a history of HHC?: Yes  Does patient currently have HHC?: No    Patient Information Continued  Income Source: SSI/SSD  Does patient receive dialysis treatments?: No  Does patient have a history of substance abuse?: Yes  Historical substance use preference: Alcohol/ETOH  Does patient have a history of Mental Health Diagnosis?: No    Means of Transportation  Means of Transport to [facility]:: Family transport    Social Determinants of Health (SDOH)      Flowsheet Row Most Recent Value   Housing Stability    In the last 12 months, was there a time when you were not able to pay the mortgage or rent on time? N   At any time in the past 12 months, were you homeless or living in a shelter (including now)? N   Transportation Needs    In the past 12 months, has lack of transportation kept you from medical appointments or from getting medications? no   In the past 12 months, has lack of transportation kept you from meetings, work, or from getting things needed for daily living? No   Food Insecurity    Within the past 12 months, you worried that your food would run out before you got the money to buy more. Never true   Within the past 12 months, the food you bought just didn't last and you didn't have money to get more. Never true   Utilities    In the past 12 months has the electric, gas, oil, or water company threatened  to shut off services in your home? No          DISCHARGE DETAILS:    Discharge planning discussed with:: Pt's mother, Millicent    Contacts  Patient Contacts: Millicent Dumont (mother)  Relationship to Patient:: Family  Contact Method: Phone  Phone Number: [phone]  Reason/Outcome: Emergency Contact, Discharge Planning, Referral, Continuity of Care    Other Referral/Resources/Interventions Provided:  Interventions: Other (Specify)  Referral Comments: CM spoke with pt's mother, Millicent, and introduced self/role with dcp. Pt resides with his mother in a two story home. Half bathroom on second floor with pt's bedroo. Full bathroom on first floor however, as per Millicent pt usually sponge baths at the sink. Millicent reports pt is independent and uses a SPC only in the community. Millicent aware CM will f/u with any dc recommendations.

## 2024-06-18 NOTE — OCCUPATIONAL THERAPY NOTE
Occupational Therapy Cancellation Note     Patient Name: Colin Dumont  Today's Date: 6/18/2024 06/18/24 1020   Note Type   Note type Cancelled Session   Cancel Reasons Medical status   Additional Comments OT consult received and chart reviewed. Per ICU mobility rounds, pt is not appropriate for OT evaluation at this time. Will hold and f/u as able and appropriate.     Sheila Oliveira MS OTR/L   [place] Licensure# [id number]

## 2024-06-18 NOTE — PLAN OF CARE
Problem: SAFETY,RESTRAINT: NV/NON-SELF DESTRUCTIVE BEHAVIOR  Goal: Remains free of harm/injury (restraint for non violent/non self-detsructive behavior)  Description: INTERVENTIONS:  - Instruct patient/family regarding restraint use   - Assess and monitor physiologic and psychological status   - Provide interventions and comfort measures to meet assessed patient needs   - Identify and implement measures to help patient regain control  - Assess readiness for release of restraint   Outcome: Progressing  Goal: Returns to optimal restraint-free functioning  Description: INTERVENTIONS:  - Assess the patient's behavior and symptoms that indicate continued need for restraint  - Identify and implement measures to help patient regain control  - Assess readiness for release of restraint   Outcome: Progressing     Problem: Nutrition/Hydration-ADULT  Goal: Nutrient/Hydration intake appropriate for improving, restoring or maintaining nutritional needs  Description: Monitor and assess patient's nutrition/hydration status for malnutrition. Collaborate with interdisciplinary team and initiate plan and interventions as ordered.  Monitor patient's weight and dietary intake as ordered or per policy. Utilize nutrition screening tool and intervene as necessary. Determine patient's food preferences and provide high-protein, high-caloric foods as appropriate.     INTERVENTIONS:  - Monitor oral intake, urinary output, labs, and treatment plans  - Assess nutrition and hydration status and recommend course of action  - Evaluate amount of meals eaten  - Assist patient with eating if necessary   - Allow adequate time for meals  - Recommend/ encourage appropriate diets, oral nutritional supplements, and vitamin/mineral supplements  - Order, calculate, and assess calorie counts as needed  - Recommend, monitor, and adjust tube feedings and TPN/PPN based on assessed needs  - Assess need for intravenous fluids  - Provide specific  nutrition/hydration education as appropriate  - Include patient/family/caregiver in decisions related to nutrition  Outcome: Progressing     Problem: Prexisting or High Potential for Compromised Skin Integrity  Goal: Skin integrity is maintained or improved  Description: INTERVENTIONS:  - Identify patients at risk for skin breakdown  - Assess and monitor skin integrity  - Assess and monitor nutrition and hydration status  - Monitor labs   - Assess for incontinence   - Turn and reposition patient  - Assist with mobility/ambulation  - Relieve pressure over bony prominences  - Avoid friction and shearing  - Provide appropriate hygiene as needed including keeping skin clean and dry  - Evaluate need for skin moisturizer/barrier cream  - Collaborate with interdisciplinary team   - Patient/family teaching  - Consider wound care consult   Outcome: Progressing     Problem: PAIN - ADULT  Goal: Verbalizes/displays adequate comfort level or baseline comfort level  Description: Interventions:  - Encourage patient to monitor pain and request assistance  - Assess pain using appropriate pain scale  - Administer analgesics based on type and severity of pain and evaluate response  - Implement non-pharmacological measures as appropriate and evaluate response  - Consider cultural and social influences on pain and pain management  - Notify physician/advanced practitioner if interventions unsuccessful or patient reports new pain  Outcome: Progressing     Problem: INFECTION - ADULT  Goal: Absence or prevention of progression during hospitalization  Description: INTERVENTIONS:  - Assess and monitor for signs and symptoms of infection  - Monitor lab/diagnostic results  - Monitor all insertion sites, i.e. indwelling lines, tubes, and drains  - Monitor endotracheal if appropriate and nasal secretions for changes in amount and color  - [place] appropriate cooling/warming therapies per order  - Administer medications as ordered  - Instruct  and encourage patient and family to use good hand hygiene technique  - Identify and instruct in appropriate isolation precautions for identified infection/condition  Outcome: Progressing  Goal: Absence of fever/infection during neutropenic period  Description: INTERVENTIONS:  - Monitor WBC    Outcome: Progressing     Problem: SAFETY ADULT  Goal: Patient will remain free of falls  Description: INTERVENTIONS:  - Educate patient/family on patient safety including physical limitations  - Instruct patient to call for assistance with activity   - Consult OT/PT to assist with strengthening/mobility   - Keep Call bell within reach  - Keep bed low and locked with side rails adjusted as appropriate  - Keep care items and personal belongings within reach  - Initiate and maintain comfort rounds  - Make Fall Risk Sign visible to staff  - Apply yellow socks and bracelet for high fall risk patients  - Consider moving patient to room near nurses station  Outcome: Progressing  Goal: Maintain or return to baseline ADL function  Description: INTERVENTIONS:  -  Assess patient's ability to carry out ADLs; assess patient's baseline for ADL function and identify physical deficits which impact ability to perform ADLs (bathing, care of mouth/teeth, toileting, grooming, dressing, etc.)  - Assess/evaluate cause of self-care deficits   - Assess range of motion  - Assess patient's mobility; develop plan if impaired  - Assess patient's need for assistive devices and provide as appropriate  - Encourage maximum independence but intervene and supervise when necessary  - Involve family in performance of ADLs  - Assess for home care needs following discharge   - Consider OT consult to assist with ADL evaluation and planning for discharge  - Provide patient education as appropriate  Outcome: Progressing  Goal: Maintains/Returns to pre admission functional level  Description: INTERVENTIONS:  - Perform AM-PAC 6 Click Basic Mobility/ Daily Activity  assessment daily.  - Set and communicate daily mobility goal to care team and patient/family/caregiver.   - Collaborate with rehabilitation services on mobility goals if consulted  - Reposition patient every 2 hours.  - Record patient progress and toleration of activity level   Outcome: Progressing     Problem: DISCHARGE PLANNING  Goal: Discharge to home or other facility with appropriate resources  Description: INTERVENTIONS:  - Identify barriers to discharge w/patient and caregiver  - Arrange for needed discharge resources and transportation as appropriate  - Identify discharge learning needs (meds, wound care, etc.)  - Arrange for interpretive services to assist at discharge as needed  - Refer to Case Management Department for coordinating discharge planning if the patient needs post-hospital services based on physician/advanced practitioner order or complex needs related to functional status, cognitive ability, or social support system  Outcome: Progressing     Problem: Knowledge Deficit  Goal: Patient/family/caregiver demonstrates understanding of disease process, treatment plan, medications, and discharge instructions  Description: Complete learning assessment and assess knowledge base.  Interventions:  - Provide teaching at level of understanding  - Provide teaching via preferred learning methods  Outcome: Progressing

## 2024-06-18 NOTE — ASSESSMENT & PLAN NOTE
Present on arrival, as evidence by - Tachycardia, Tachypnea, Leukocytosis   Suspected source - Unknown at this time  CXR and CT imaging without identifiable source.   Lactate - 1.8  WBC - 15  Procalcitonin - 70  UA: Negative leuks/nitrites, occasional bacteria 2-4 WBC  Blood culture x 2 drawn in ED  He received 1L Isolyte    Currently still on vancomycin and cefepime, metronidazole discontinued over the weekend    Plan:  Continue ABX: Cefepime/Vancomycin  Follow up blood cultures  Trend WBC  Trend fever curve  Plan for LP as continued fevers

## 2024-06-18 NOTE — ASSESSMENT & PLAN NOTE
Pt presented to ED altered, agitated. Received Versed 5mg in ED and became somnolent after.  Intubated for airway protection and profound metabolic acidosis  VBG 6.9/30/55/7/ BE -24  VENT: AC/VC 18/380/40/6  CXR - ETT 5cm above roque, will advance by 1 cm on arrival to ICU    Plan:  Continue mechanical ventilation  Continue sedation, goal RASS -2  Propofol gtt  Fentanyl gtt  Avoiding Precedex 2/2 bradycardic episodes  Respiratory protocol  Vent bundle  CAM-ICU  Daily SAT/SBT

## 2024-06-18 NOTE — CASE MANAGEMENT
Case Management Assessment & Discharge Planning Note    Patient name Colin Dumont  Location ICU 13/ICU 13 MRN [id number]  : 1970 Date 2024       Current Admission Date: 2024  Current Admission Diagnosis:Metabolic encephalopathy   Patient Active Problem List    Diagnosis Date Noted Date Diagnosed    SIRS (systemic inflammatory response syndrome) ([facility]) 2024     Acute respiratory failure with hypoxia ([facility]) 2024     Alcohol use 2024     Alcohol intoxication ([facility]) 2022     Fall 2022     Slurred speech 2022     Metabolic encephalopathy 2022     Memory difficulty 2022     History of GI bleed 2022     Noncompliance 2020     Spasticity 2020     Right sided weakness 10/18/2019     High anion gap metabolic acidosis 2019     Hyperlipidemia 2019     S/P craniotomy 2019     Type 2 diabetes mellitus without complication, with long-term current use of insulin ([facility]) 2018     Tobacco use disorder 2018     Type II diabetes mellitus ([facility]) 2018     Depression 2017     Benign essential hypertension 2012       LOS (days): 5  Geometric Mean LOS (GMLOS) (days): 5.1  Days to GMLOS:0.5     OBJECTIVE:    Risk of Unplanned Readmission Score: 14.73     Current admission status: Inpatient    Preferred Pharmacy:   [id number] - [address] [address]  Phone: [phone] Fax: [phone]    Primary Care Provider: Stefani Garner MD    Primary Insurance: MEDICARE  Secondary Insurance:     ASSESSMENT:  Active Health Care Proxies    There are no active Health Care Proxies on file.       Patient Information  Admitted from:: Home  Mental Status: Intubated  During Assessment patient was accompanied by: Not accompanied during assessment  Assessment information provided by:: Parent (Mother, Millicent)  Primary Caregiver: Self  Support  Systems: Self, Family members, Parent  Home entry access options. Select all that apply.: Ramp  Type of Current Residence: 2 story home  Upon entering residence, is there a bedroom on the main floor (no further steps)?: No  A bedroom is located on the following floor levels of residence (select all that apply):: 2nd Floor  Upon entering residence, is there a bathroom on the main floor (no further steps)?: Yes  Number of steps to 2nd floor from main floor: One Flight  Living Arrangements: Lives w/ Parent(s)    Activities of Daily Living Prior to Admission  Functional Status: Independent  Completes ADLs independently?: Yes (Pt did not usually shower in the bathroom and would sponge bath in the sink)  Ambulates independently?: Yes  Does patient use assisted devices?: Yes  Assisted Devices (DME) used: Straight Cane (only when out in the community)  Does patient currently own DME?: Yes  What DME does the patient currently own?: Straight Cane  Does patient have a history of Outpatient Therapy (PT/OT)?: Yes  Does the patient have a history of Short-Term Rehab?: Yes  Does patient have a history of HHC?: Yes  Does patient currently have HHC?: No    Patient Information Continued  Income Source: SSI/SSD  Does patient receive dialysis treatments?: No  Does patient have a history of substance abuse?: Yes  Historical substance use preference: Alcohol/ETOH  Does patient have a history of Mental Health Diagnosis?: No    Means of Transportation  Means of Transport to Appts:: Family transport    Social Determinants of Health (SDOH)      Flowsheet Row Most Recent Value   Housing Stability    In the last 12 months, was there a time when you were not able to pay the mortgage or rent on time? N   At any time in the past 12 months, were you homeless or living in a shelter (including now)? N   Transportation Needs    In the past 12 months, has lack of transportation kept you from medical appointments or from getting medications? no   In  the past 12 months, has lack of transportation kept you from meetings, work, or from getting things needed for daily living? No   Food Insecurity    Within the past 12 months, you worried that your food would run out before you got the money to buy more. Never true   Within the past 12 months, the food you bought just didn't last and you didn't have money to get more. Never true   Utilities    In the past 12 months has the electric, gas, oil, or water company threatened to shut off services in your home? No            DISCHARGE DETAILS:    Discharge planning discussed with:: Pt's mother, Millicent    Contacts  Patient Contacts: Millicent Dumont (mother)  Relationship to Patient:: Family  Contact Method: Phone  Phone Number: [phone]  Reason/Outcome: Emergency Contact, Discharge Planning, Referral, Continuity of Care    Other Referral/Resources/Interventions Provided:  Interventions: Other (Specify)  Referral Comments: CM spoke with pt's mother, Millicent, and introduced self/role with dcp. Pt resides with his mother in a two story home. Half bathroom on second floor with pt's bedroo. Full bathroom on first floor however, as per Millicent pt usually sponge baths at the sink. Millicent reports pt is independent and uses a SPC only in the community. Millicent aware CM will f/u with any dc recommendations.

## 2024-06-19 ENCOUNTER — APPOINTMENT (INPATIENT)
Dept: VASCULAR ULTRASOUND | Facility: HOSPITAL | Age: 54
DRG: 870 | End: 2024-06-19
Payer: MEDICARE

## 2024-06-19 LAB
ANION GAP SERPL CALCULATED.3IONS-SCNC: 4 MMOL/L (ref 4–13)
ARTERIAL PATENCY WRIST A: YES
BASE EXCESS BLDA CALC-SCNC: 1.1 MMOL/L
BASOPHILS # BLD AUTO: 0.03 THOUSANDS/ÂΜL (ref 0–0.1)
BASOPHILS NFR BLD AUTO: 0 % (ref 0–1)
BUN SERPL-MCNC: 13 MG/DL (ref 5–25)
CALCIUM SERPL-MCNC: 8.4 MG/DL (ref 8.4–10.2)
CHLORIDE SERPL-SCNC: 105 MMOL/L (ref 96–108)
CO2 SERPL-SCNC: 29 MMOL/L (ref 21–32)
CREAT SERPL-MCNC: 0.62 MG/DL (ref 0.6–1.3)
EOSINOPHIL # BLD AUTO: 0.13 THOUSAND/ÂΜL (ref 0–0.61)
EOSINOPHIL NFR BLD AUTO: 2 % (ref 0–6)
ERYTHROCYTE [DISTWIDTH] IN BLOOD BY AUTOMATED COUNT: 14.5 % (ref 11.6–15.1)
GFR SERPL CREATININE-BSD FRML MDRD: 110 ML/MIN/1.73SQ M
GLUCOSE SERPL-MCNC: 122 MG/DL (ref 65–140)
GLUCOSE SERPL-MCNC: 161 MG/DL (ref 65–140)
GLUCOSE SERPL-MCNC: 215 MG/DL (ref 65–140)
GLUCOSE SERPL-MCNC: 248 MG/DL (ref 65–140)
GLUCOSE SERPL-MCNC: 279 MG/DL (ref 65–140)
GLUCOSE SERPL-MCNC: 287 MG/DL (ref 65–140)
HCO3 BLDA-SCNC: 25.2 MMOL/L (ref 22–28)
HCT VFR BLD AUTO: 30.8 % (ref 36.5–49.3)
HGB BLD-MCNC: 9.9 G/DL (ref 12–17)
HOROWITZ INDEX BLDA+IHG-RTO: 40 MM[HG]
IMM GRANULOCYTES # BLD AUTO: 0.11 THOUSAND/UL (ref 0–0.2)
IMM GRANULOCYTES NFR BLD AUTO: 2 % (ref 0–2)
LYMPHOCYTES # BLD AUTO: 0.63 THOUSANDS/ÂΜL (ref 0.6–4.47)
LYMPHOCYTES NFR BLD AUTO: 9 % (ref 14–44)
MAGNESIUM SERPL-MCNC: 2.2 MG/DL (ref 1.9–2.7)
MCH RBC QN AUTO: 38.5 PG (ref 26.8–34.3)
MCHC RBC AUTO-ENTMCNC: 32.1 G/DL (ref 31.4–37.4)
MCV RBC AUTO: 120 FL (ref 82–98)
METHYLMALONATE SERPL-SCNC: 209 NMOL/L (ref 0–378)
MONOCYTES # BLD AUTO: 0.98 THOUSAND/ÂΜL (ref 0.17–1.22)
MONOCYTES NFR BLD AUTO: 14 % (ref 4–12)
NEUTROPHILS # BLD AUTO: 5.4 THOUSANDS/ÂΜL (ref 1.85–7.62)
NEUTS SEG NFR BLD AUTO: 73 % (ref 43–75)
NRBC BLD AUTO-RTO: 0 /100 WBCS
O2 CT BLDA-SCNC: 14.3 ML/DL (ref 16–23)
OXYHGB MFR BLDA: 96.6 % (ref 94–97)
PCO2 BLDA: 37.8 MM HG (ref 36–44)
PEEP RESPIRATORY: 6 CM[H2O]
PH BLDA: 7.44 [PH] (ref 7.35–7.45)
PHOSPHATE SERPL-MCNC: 2.2 MG/DL (ref 2.7–4.5)
PLATELET # BLD AUTO: 192 THOUSANDS/UL (ref 149–390)
PMV BLD AUTO: 9.6 FL (ref 8.9–12.7)
PO2 BLDA: 106.5 MM HG (ref 75–129)
POTASSIUM SERPL-SCNC: 4.6 MMOL/L (ref 3.5–5.3)
RBC # BLD AUTO: 2.57 MILLION/UL (ref 3.88–5.62)
SODIUM SERPL-SCNC: 138 MMOL/L (ref 135–147)
SPECIMEN SOURCE: ABNORMAL
VENT AC: 18
VENT- AC: AC
VT SETTING VENT: 380 ML
WBC # BLD AUTO: 7.28 THOUSAND/UL (ref 4.31–10.16)

## 2024-06-19 PROCEDURE — 93970 EXTREMITY STUDY: CPT

## 2024-06-19 PROCEDURE — 84100 ASSAY OF PHOSPHORUS: CPT

## 2024-06-19 PROCEDURE — 83735 ASSAY OF MAGNESIUM: CPT

## 2024-06-19 PROCEDURE — 82805 BLOOD GASES W/O2 SATURATION: CPT

## 2024-06-19 PROCEDURE — 85025 COMPLETE CBC W/AUTO DIFF WBC: CPT

## 2024-06-19 PROCEDURE — 94760 N-INVAS EAR/PLS OXIMETRY 1: CPT

## 2024-06-19 PROCEDURE — 93970 EXTREMITY STUDY: CPT | Performed by: SURGERY

## 2024-06-19 PROCEDURE — 36600 WITHDRAWAL OF ARTERIAL BLOOD: CPT

## 2024-06-19 PROCEDURE — NC001 PR NO CHARGE: Performed by: STUDENT IN AN ORGANIZED HEALTH CARE EDUCATION/TRAINING PROGRAM

## 2024-06-19 PROCEDURE — 80048 BASIC METABOLIC PNL TOTAL CA: CPT | Performed by: INTERNAL MEDICINE

## 2024-06-19 PROCEDURE — 94640 AIRWAY INHALATION TREATMENT: CPT

## 2024-06-19 PROCEDURE — 82948 REAGENT STRIP/BLOOD GLUCOSE: CPT

## 2024-06-19 PROCEDURE — 94150 VITAL CAPACITY TEST: CPT

## 2024-06-19 PROCEDURE — 93005 ELECTROCARDIOGRAM TRACING: CPT

## 2024-06-19 PROCEDURE — 99232 SBSQ HOSP IP/OBS MODERATE 35: CPT | Performed by: STUDENT IN AN ORGANIZED HEALTH CARE EDUCATION/TRAINING PROGRAM

## 2024-06-19 PROCEDURE — 94003 VENT MGMT INPAT SUBQ DAY: CPT

## 2024-06-19 RX ORDER — ACETYLCYSTEINE 200 MG/ML
3 SOLUTION ORAL; RESPIRATORY (INHALATION)
Status: DISCONTINUED | OUTPATIENT
Start: 2024-06-19 | End: 2024-06-19

## 2024-06-19 RX ORDER — QUETIAPINE FUMARATE 25 MG/1
50 TABLET, FILM COATED ORAL
Status: DISCONTINUED | OUTPATIENT
Start: 2024-06-19 | End: 2024-06-27 | Stop reason: HOSPADM

## 2024-06-19 RX ORDER — INSULIN LISPRO 100 [IU]/ML
1-6 INJECTION, SOLUTION INTRAVENOUS; SUBCUTANEOUS EVERY 6 HOURS SCHEDULED
Status: DISCONTINUED | OUTPATIENT
Start: 2024-06-20 | End: 2024-06-21

## 2024-06-19 RX ADMIN — INSULIN LISPRO 2 UNITS: 100 INJECTION, SOLUTION INTRAVENOUS; SUBCUTANEOUS at 12:06

## 2024-06-19 RX ADMIN — LISINOPRIL 10 MG: 10 TABLET ORAL at 08:33

## 2024-06-19 RX ADMIN — CEFEPIME 1000 MG: 1 INJECTION, POWDER, FOR SOLUTION INTRAMUSCULAR; INTRAVENOUS at 12:06

## 2024-06-19 RX ADMIN — IPRATROPIUM BROMIDE 0.5 MG: 0.5 SOLUTION RESPIRATORY (INHALATION) at 07:33

## 2024-06-19 RX ADMIN — CEFEPIME 1000 MG: 1 INJECTION, POWDER, FOR SOLUTION INTRAMUSCULAR; INTRAVENOUS at 23:31

## 2024-06-19 RX ADMIN — Medication 100 MG: at 08:34

## 2024-06-19 RX ADMIN — PROPOFOL 40 MCG/KG/MIN: 10 INJECTION, EMULSION INTRAVENOUS at 19:04

## 2024-06-19 RX ADMIN — SODIUM PHOSPHATE, MONOBASIC, MONOHYDRATE AND SODIUM PHOSPHATE, DIBASIC, ANHYDROUS 12 MMOL: 142; 276 INJECTION, SOLUTION INTRAVENOUS at 08:33

## 2024-06-19 RX ADMIN — CHLORHEXIDINE GLUCONATE 15 ML: 1.2 RINSE ORAL at 08:33

## 2024-06-19 RX ADMIN — CHLORHEXIDINE GLUCONATE 15 ML: 1.2 RINSE ORAL at 21:03

## 2024-06-19 RX ADMIN — HEPARIN SODIUM 5000 UNITS: 5000 INJECTION INTRAVENOUS; SUBCUTANEOUS at 21:03

## 2024-06-19 RX ADMIN — HEPARIN SODIUM 5000 UNITS: 5000 INJECTION INTRAVENOUS; SUBCUTANEOUS at 05:29

## 2024-06-19 RX ADMIN — HEPARIN SODIUM 5000 UNITS: 5000 INJECTION INTRAVENOUS; SUBCUTANEOUS at 13:19

## 2024-06-19 RX ADMIN — PROPOFOL 45 MCG/KG/MIN: 10 INJECTION, EMULSION INTRAVENOUS at 06:21

## 2024-06-19 RX ADMIN — QUETIAPINE FUMARATE 50 MG: 25 TABLET ORAL at 21:03

## 2024-06-19 RX ADMIN — IPRATROPIUM BROMIDE 0.5 MG: 0.5 SOLUTION RESPIRATORY (INHALATION) at 19:51

## 2024-06-19 RX ADMIN — FOLIC ACID TAB 400 MCG 400 MCG: 400 TAB at 08:34

## 2024-06-19 RX ADMIN — INSULIN LISPRO 2 UNITS: 100 INJECTION, SOLUTION INTRAVENOUS; SUBCUTANEOUS at 18:03

## 2024-06-19 RX ADMIN — VANCOMYCIN HYDROCHLORIDE 1250 MG: 5 INJECTION, POWDER, LYOPHILIZED, FOR SOLUTION INTRAVENOUS at 08:33

## 2024-06-19 RX ADMIN — Medication 50 MCG/HR: at 06:22

## 2024-06-19 RX ADMIN — CYANOCOBALAMIN TAB 500 MCG 1000 MCG: 500 TAB at 08:33

## 2024-06-19 RX ADMIN — LEVALBUTEROL HYDROCHLORIDE 1.25 MG: 1.25 SOLUTION RESPIRATORY (INHALATION) at 07:33

## 2024-06-19 RX ADMIN — BACLOFEN 10 MG: 10 TABLET ORAL at 08:33

## 2024-06-19 RX ADMIN — INSULIN LISPRO 2 UNITS: 100 INJECTION, SOLUTION INTRAVENOUS; SUBCUTANEOUS at 23:34

## 2024-06-19 RX ADMIN — LEVALBUTEROL HYDROCHLORIDE 1.25 MG: 1.25 SOLUTION RESPIRATORY (INHALATION) at 13:52

## 2024-06-19 RX ADMIN — VANCOMYCIN HYDROCHLORIDE 1250 MG: 5 INJECTION, POWDER, LYOPHILIZED, FOR SOLUTION INTRAVENOUS at 20:16

## 2024-06-19 RX ADMIN — IPRATROPIUM BROMIDE 0.5 MG: 0.5 SOLUTION RESPIRATORY (INHALATION) at 13:51

## 2024-06-19 RX ADMIN — PROPOFOL 45 MCG/KG/MIN: 10 INJECTION, EMULSION INTRAVENOUS at 02:08

## 2024-06-19 RX ADMIN — BACLOFEN 10 MG: 10 TABLET ORAL at 18:03

## 2024-06-19 RX ADMIN — PROPOFOL 30 MCG/KG/MIN: 10 INJECTION, EMULSION INTRAVENOUS at 13:19

## 2024-06-19 RX ADMIN — LEVALBUTEROL HYDROCHLORIDE 1.25 MG: 1.25 SOLUTION RESPIRATORY (INHALATION) at 19:51

## 2024-06-19 NOTE — PLAN OF CARE
Problem: SAFETY,RESTRAINT: NV/NON-SELF DESTRUCTIVE BEHAVIOR  Goal: Remains free of harm/injury (restraint for non violent/non self-detsructive behavior)  Description: INTERVENTIONS:  - Instruct patient/family regarding restraint use   - Assess and monitor physiologic and psychological status   - Provide interventions and comfort measures to meet assessed patient needs   - Identify and implement measures to help patient regain control  - Assess readiness for release of restraint   Outcome: Progressing  Goal: Returns to optimal restraint-free functioning  Description: INTERVENTIONS:  - Assess the patient's behavior and symptoms that indicate continued need for restraint  - Identify and implement measures to help patient regain control  - Assess readiness for release of restraint   Outcome: Progressing     Problem: Nutrition/Hydration-ADULT  Goal: Nutrient/Hydration intake appropriate for improving, restoring or maintaining nutritional needs  Description: Monitor and assess patient's nutrition/hydration status for malnutrition. Collaborate with interdisciplinary team and initiate plan and interventions as ordered.  Monitor patient's weight and dietary intake as ordered or per policy. Utilize nutrition screening tool and intervene as necessary. Determine patient's food preferences and provide high-protein, high-caloric foods as appropriate.     INTERVENTIONS:  - Monitor oral intake, urinary output, labs, and treatment plans  - Assess nutrition and hydration status and recommend course of action  - Evaluate amount of meals eaten  - Assist patient with eating if necessary   - Allow adequate time for meals  - Recommend/ encourage appropriate diets, oral nutritional supplements, and vitamin/mineral supplements  - Order, calculate, and assess calorie counts as needed  - Recommend, monitor, and adjust tube feedings and TPN/PPN based on assessed needs  - Assess need for intravenous fluids  - Provide specific  nutrition/hydration education as appropriate  - Include patient/family/caregiver in decisions related to nutrition  Outcome: Progressing     Problem: Prexisting or High Potential for Compromised Skin Integrity  Goal: Skin integrity is maintained or improved  Description: INTERVENTIONS:  - Identify patients at risk for skin breakdown  - Assess and monitor skin integrity  - Assess and monitor nutrition and hydration status  - Monitor labs   - Assess for incontinence   - Turn and reposition patient  - Assist with mobility/ambulation  - Relieve pressure over bony prominences  - Avoid friction and shearing  - Provide appropriate hygiene as needed including keeping skin clean and dry  - Evaluate need for skin moisturizer/barrier cream  - Collaborate with interdisciplinary team   - Patient/family teaching  - Consider wound care consult   Outcome: Progressing     Problem: PAIN - ADULT  Goal: Verbalizes/displays adequate comfort level or baseline comfort level  Description: Interventions:  - Encourage patient to monitor pain and request assistance  - Assess pain using appropriate pain scale  - Administer analgesics based on type and severity of pain and evaluate response  - Implement non-pharmacological measures as appropriate and evaluate response  - Consider cultural and social influences on pain and pain management  - Notify physician/advanced practitioner if interventions unsuccessful or patient reports new pain  Outcome: Progressing     Problem: INFECTION - ADULT  Goal: Absence or prevention of progression during hospitalization  Description: INTERVENTIONS:  - Assess and monitor for signs and symptoms of infection  - Monitor lab/diagnostic results  - Monitor all insertion sites, i.e. indwelling lines, tubes, and drains  - Monitor endotracheal if appropriate and nasal secretions for changes in amount and color  - [place] appropriate cooling/warming therapies per order  - Administer medications as ordered  - Instruct  and encourage patient and family to use good hand hygiene technique  - Identify and instruct in appropriate isolation precautions for identified infection/condition  Outcome: Progressing  Goal: Absence of fever/infection during neutropenic period  Description: INTERVENTIONS:  - Monitor WBC    Outcome: Progressing     Problem: SAFETY ADULT  Goal: Patient will remain free of falls  Description: INTERVENTIONS:  - Educate patient/family on patient safety including physical limitations  - Instruct patient to call for assistance with activity   - Consult OT/PT to assist with strengthening/mobility   - Keep Call bell within reach  - Keep bed low and locked with side rails adjusted as appropriate  - Keep care items and personal belongings within reach  - Initiate and maintain comfort rounds  - Make Fall Risk Sign visible to staff  - Apply yellow socks and bracelet for high fall risk patients  - Consider moving patient to room near nurses station  Outcome: Progressing  Goal: Maintain or return to baseline ADL function  Description: INTERVENTIONS:  -  Assess patient's ability to carry out ADLs; assess patient's baseline for ADL function and identify physical deficits which impact ability to perform ADLs (bathing, care of mouth/teeth, toileting, grooming, dressing, etc.)  - Assess/evaluate cause of self-care deficits   - Assess range of motion  - Assess patient's mobility; develop plan if impaired  - Assess patient's need for assistive devices and provide as appropriate  - Encourage maximum independence but intervene and supervise when necessary  - Involve family in performance of ADLs  - Assess for home care needs following discharge   - Consider OT consult to assist with ADL evaluation and planning for discharge  - Provide patient education as appropriate  Outcome: Progressing  Goal: Maintains/Returns to pre admission functional level  Description: INTERVENTIONS:  - Perform AM-PAC 6 Click Basic Mobility/ Daily Activity  assessment daily.  - Set and communicate daily mobility goal to care team and patient/family/caregiver.   - Collaborate with rehabilitation services on mobility goals if consulted  - Record patient progress and toleration of activity level   Outcome: Progressing     Problem: DISCHARGE PLANNING  Goal: Discharge to home or other facility with appropriate resources  Description: INTERVENTIONS:  - Identify barriers to discharge w/patient and caregiver  - Arrange for needed discharge resources and transportation as appropriate  - Identify discharge learning needs (meds, wound care, etc.)  - Arrange for interpretive services to assist at discharge as needed  - Refer to Case Management Department for coordinating discharge planning if the patient needs post-hospital services based on physician/advanced practitioner order or complex needs related to functional status, cognitive ability, or social support system  Outcome: Progressing     Problem: Knowledge Deficit  Goal: Patient/family/caregiver demonstrates understanding of disease process, treatment plan, medications, and discharge instructions  Description: Complete learning assessment and assess knowledge base.  Interventions:  - Provide teaching at level of understanding  - Provide teaching via preferred learning methods  Outcome: Progressing

## 2024-06-19 NOTE — ASSESSMENT & PLAN NOTE
S/p craniotomy 1/2019 at [facility], done secondary to abscess/lesions.  CTH no acute abnormalities, post-operative findings  Baseline independent

## 2024-06-19 NOTE — ASSESSMENT & PLAN NOTE
Present on arrival, as evidence by - Tachycardia, Tachypnea, Leukocytosis   Suspected source - Unknown at this time  CXR and CT imaging without identifiable source.   Lactate - 1.8  WBC - 15  Procalcitonin - 70  UA: Negative leuks/nitrites, occasional bacteria 2-4 WBC  Blood culture x 2 drawn in ED  He received 1L Isolyte    Currently still on vancomycin and cefepime  LP without significant findings currently, sputum with gram-negative rods    Plan:  Continue ABX: Cefepime/Vancomycin  Follow up blood cultures  Trend WBC  Trend fever curve

## 2024-06-19 NOTE — PHYSICAL THERAPY NOTE
Physical Therapy Cancellation Note       06/19/24 1021   Note Type   Note type Cancelled Session   Cancel Reasons Medical status   Additional Comments PT consult received and chart reviewed. Per ICU mobility rounds, pt remains inappropriate for participation in PT evaluation. D/t multiple medical cancels in a row, will d/c current PT orders. Please reconsult when pt becomes medically appropriate to participate.       Poornima Edgar, PT, DPT   Available via [employer]  NPI # [id number]  [place] License - [id number]  6/19/2024

## 2024-06-19 NOTE — PROGRESS NOTES
Colin Dumont is a 54 y.o. male who is currently ordered Vancomycin IV with management by the Pharmacy Consult service.  Relevant clinical data and objective / subjective history reviewed.  Vancomycin Assessment:  Indication and Goal AUC/Trough: Pneumonia (goal -600, trough >10)  Clinical Status:  Critically Ill  Micro:   Gram + cocci( sputum culture)  Renal Function:  SCr: 0.62 mg/dL  CrCl: 113.4 mL/min  Renal replacement: Not on dialysis  Days of Therapy: 7  Current Dose: 1250 mg every 12 hours  Vancomycin Plan:  New Dosing: continue current dosing  Estimated AUC: 513 mcg*hr/mL  Estimated Trough: 12.8 mcg/mL  Next Level: 6/25 0600  Renal Function Monitoring: Daily BMP and UOP  Pharmacy will continue to follow closely for s/sx of nephrotoxicity, infusion reactions and appropriateness of therapy.  BMP and CBC will be ordered per protocol. We will continue to follow the patient’s culture results and clinical progress daily.    Lashell Mattson, Pharmacist

## 2024-06-19 NOTE — PROGRESS NOTES
Critical Care Attending Note; Roberto Martino   Note Date: 24  Note Time: 9:25 AM    Patient: Colin Dumont  Age, : 54 y.o., 1970 MRN: [id number] Code Status: Level 1 - Full Code Patient Location: ICU 13/ICU 13   Hospital LOS:6 days  ]   Patient seen and examined, medical record reviewed, discussed with house staff and nursing staff.     HPI   CC: AMS   54M with a PMH a Brain Abscess, EtOH abuse, HLD, HTN, DM, PUD who found down found AMS requiring intubation.     Key Recent Events    : Admitted, Intubated     Main ICU Plans:       #Neuro  AMS - TME vs EtOH withdrawal vs Septic- Last drink ? - Initially concerned for toxic ingestion but acidosis recovered quickly, other alcohol levels normal. Possibly baclofen toxicity with suprainfection. Mental status improving, eyes open not following commands  - Thiamine, Folate    Brain Abscess/Craniectomy   - Continue Baclofen    Sedation - RASS 0 - (-1)  - Propofol  - Fentanyl PRN    #Card  HTN   - Lisinopril    #Pulm  Respiratory Failure - Intubated for airway protection  - LTVV VC+ - 6-8cc/kg IDBW - SBT daily  - Wean FiO2 - Maintain Oxygen Sat >92%  - VAP Bundle  - HOB > 30o    - PEEP Titrate      #Renal  BELLA - Pre- Renal - Resolved  NAGMA - Resolved     Urine Retention - Failed ToV    #GI  PPI    Refeeding Syndrome  - Replete Electrolytes    #ID   Sepsis - Unclear Etiology - Persistent Fevers despite broad spectrum - Overall Improving, starting to defervesce     - Continue Vanc/Cefepime  - Venous duplex       #Endo  DM -DKA? - Resolved   - ISS      #DVT/GI ppx  SCD    #Lines/Tubes/Drains:   Invasive Devices       Peripheral Intravenous Line  Duration             Peripheral IV 24 Distal;Right;Upper;Ventral (anterior) Arm 1 day    Peripheral IV 24 Left Antecubital 1 day    Peripheral IV 24 Proximal;Right;Ventral (anterior) Forearm 1 day              Drain  Duration             NG/OG/Enteral Tube Orogastric 16 Fr Left mouth 5 days     Urethral Catheter 18 Fr. <1 day              Airway  Duration             ETT  Oral 7.5 mm 5 days                    #Nutrition:   Diet Enteral/Parenteral; Tube Feeding No Oral Diet; Vital AF 1.2; Continuous; 50; 30; Water; Every 4 hours        #Code Status:   Level 1 - Full Code    #Dispo:   ICU        Roberto Martino MD  Pulmonary, Critical Care    Critical care time, excluding procedures, teaching, family meetings, and excludes any prior time recorded by the AP/resident, 35 minutes. Upon my evaluation, this patient has a high probability of imminent or life-threatening deterioration due to above problems which required my direct attention, intervention, and personal management.   Impression/Active Problems:    AMS   Brain Abcesss   HTN   DM   PUD   HLD   Sepsis   Respiratory Failure   BELLA   Hyperkalemia   NAGMA      Physical Exam:     Vital Signs:   Weight: 73 kg (160 lb 15 oz)  IBW: Ideal body weight: 70.7 kg (155 lb 13.8 oz)  Adjusted ideal body weight: 71.6 kg (157 lb 14.3 oz)  Temp:  [98.8 °F (37.1 °C)-100.1 °F (37.8 °C)] 99 °F (37.2 °C)  HR:  [] 84  Resp:  [12-20] 18  BP: ()/(52-83) 129/65  FiO2 (%):  [40] 40  Physical Exam: Unchanged  General: NAD  Neuro: Eyes open, not following commands  Heart: RRR  Lungs: Basilar diminshed  Abdomen: Soft NT  Extremities: No edema                Ventilator Settings:    FiO2 (%):  [40] 40    Results from last 7 days   Lab Units 06/14/24  1146 06/14/24  0425 06/14/24  0216 06/13/24  2133   ISTAT PH ART  7.451*  --   --   --    PO2 UGO mm Hg  --  28.6* 35.8 41.7     Radiologic Images Reviewed:    CT Head  No acute intracranial abnormality.     Input / Output:     Intake/Output Summary (Last 24 hours) at 6/19/2024 0925  Last data filed at 6/19/2024 0600  Gross per 24 hour   Intake 2690.48 ml   Output 2071 ml   Net 619.48 ml            Infusions:  propofol, 5-50 mcg/kg/min, Last Rate: 20 mcg/kg/min (06/19/24 0857)      Scheduled Medications:  Current  Facility-Administered Medications   Medication Dose Route Frequency Provider Last Rate    baclofen  10 mg Oral BID Linden Plummer MD      cefepime  1,000 mg Intravenous Q12H TIM Padilla 1,000 mg (06/18/24 2301)    chlorhexidine  15 mL Mouth/Throat Q12H [facility] Nirmal Tobar MD      cyanocobalamin  1,000 mcg Oral Daily Arielle Zaidi MD      fentaNYL  50 mcg Intravenous Q1H PRN TIM Padilla      folic acid  400 mcg Oral Daily TIM Boone      heparin (porcine)  5,000 Units Subcutaneous Q8H [facility] Anmol Mcgregor MD      insulin lispro  1-5 Units Subcutaneous Q6H [facility] Linden Plummer MD      ipratropium  0.5 mg Nebulization TID Kevin Guglielmello, DO      levalbuterol  1.25 mg Nebulization TID Kevin Danielglielmello, DO      lisinopril  10 mg Oral Daily Arielle Zaidi MD      multi-electrolyte  500 mL Intravenous Once Anmol Mcgregor MD Stopped (06/17/24 1922)    polyethylene glycol  17 g Oral Daily Linden Plummer MD      propofol  5-50 mcg/kg/min Intravenous Titrated TIM Padilla 20 mcg/kg/min (06/19/24 0857)    sodium phosphate  12 mmol Intravenous Once Anmol Mcgregor MD 12 mmol (06/19/24 0833)    thiamine  100 mg Oral Daily TIM Boone      vancomycin  1,250 mg Intravenous Q12H Arielle Zaidi MD 1,250 mg (06/19/24 0833)       PRN Medications:    fentaNYL    Labs Reviewed:  Results from last 7 days   Lab Units 06/19/24  0448 06/18/24  0428 06/17/24  0508   WBC Thousand/uL 7.28 6.81 6.11   HEMOGLOBIN g/dL 9.9* 9.3* 9.5*   HEMATOCRIT % 30.8* 28.1* 28.9*   PLATELETS Thousands/uL 192 158 134*      Results from last 7 days   Lab Units 06/19/24  0448 06/18/24  0428 06/17/24  0508 06/17/24  0404 06/14/24  1210 06/14/24  1146 06/14/24  0443 06/13/24  2217 06/13/24 2133 06/13/24 2118   SODIUM mmol/L 138 138 141 139   < >  --    < >  --    < >  --    CO2 mmol/L 29 25 27 19*   < >  --    < >  --    < >  --    CO2, I-STAT mmol/L  --   --   --   --    "--  21  --  8*  --  8*   BUN mg/dL 13 14 13 10   < >  --    < >  --    < >  --    GLUCOSE, ISTAT mg/dl  --   --   --   --   --  126  --  189*  --  198*   CALCIUM mg/dL 8.4 8.2* 8.4 >18.0*   < >  --    < >  --    < >  --    MAGNESIUM mg/dL 2.2 2.3  --  1.6*   < >  --    < >  --   --   --    PHOSPHORUS mg/dL 2.2* 1.9*  --  2.2*   < >  --    < >  --   --   --     < > = values in this interval not displayed.         Invalid input(s): \"ASTSGOT\", \"ALTSGPT\"LABRCNTIP@ ,alkphos:3,tbilirubin:3,dbilirubin:3)@  Results from last 7 days   Lab Units 06/14/24  0443 06/13/24  2133   INR  1.34* 1.00           Invalid input(s): \"TROPT\", \"PBNP\"             I have personally seen and examined the patient on (06/19/24 between 4237-2255). I discussed the patient with the AP/resident including, but not limited to, verifying findings; reviewing labs and x-rays; discussing with consultants; developing the plan of care with the bedside nurse; and discussing treatment plan with patient or surrogate.  I have reviewed the note and assessment performed by the AP/resident and agree with the AP/resident’s documented findings and plan of care with the above additions/exceptions. Please see my comments for details and adjustments.                 "

## 2024-06-19 NOTE — PROGRESS NOTES
[facility]  Progress Note  Name: Colin Dumont I  MRN: [id number]  Unit/Bed#: ICU 13 I Date of Admission: 6/13/2024   Date of Service: 6/19/2024 I Hospital Day: 6    Assessment & Plan   Acute respiratory failure with hypoxia (HCC)  Assessment & Plan  Pt presented to ED altered, agitated. Received Versed 5mg in ED and became somnolent after.  Intubated for airway protection and profound metabolic acidosis  VBG 6.9/30/55/7/ BE -24  VENT: AC/VC 18/380/40/6  CXR - ETT 5cm above roque, will advance by 1 cm on arrival to ICU    Plan:  Continue mechanical ventilation  Continue sedation, goal RASS -2  Propofol gtt  Fentanyl gtt  Avoiding Precedex 2/2 bradycardic episodes  Respiratory protocol  Vent bundle  CAM-ICU  Daily SAT/SBT    * Metabolic encephalopathy  Assessment & Plan  Pt presented to ED Altered  CTH - no acute intracranial abnormalities, previous post-operative changes.  History of previous psychotic episodes d/t excessive intake of Baclofen  Per patients mother she did refil his Baclofen, however is nto sure how many are left.   COMA panel - negative  UDS neg, ethanol negative  Requiring multiple doses of Versed while on propofol for agitation  Negative for methanol and ethylene glycol    Plan:  Initially likely secondary to baclofen overdose versus ketoacidosis  On propofol and fentanyl, down titrating  Continue with mechanical ventilation, reattempting SAT and SBT later today    High anion gap metabolic acidosis  Assessment & Plan  VBG on arrival - 7.0/28/40/7/-24  Repeat VBG - 6.9/30/55/7/-24  AG - 29  Lactic - 1.8  Etiology unclear Sepsis vs Baclofen intoxication vs Ketosis  COMA panel - Negative  S/p 1L Isolyte in ED  Pending ethylene glycol and methanol levels  Acidosis has resolved    Plan:  Continue mechanical ventilation  Will continue to check BMP and electrolytes, replete as needed    S/P craniotomy  Assessment & Plan  S/p craniotomy 1/2019 at [facility]  done secondary to abscess/lesions.  CTH no acute abnormalities, post-operative findings  Baseline independent    Benign essential hypertension  Assessment & Plan  Home regimen: Lisinopril 10mg daily      Alcohol use  Assessment & Plan  Hx ETOH use in the past, family unclear if currently drink and if so how much. Unknown time of last drink.   Currently intubated/sedated  CIWA protocol when extubated  Encourage cessation    SIRS (systemic inflammatory response syndrome) (HCC)  Assessment & Plan  Present on arrival, as evidence by - Tachycardia, Tachypnea, Leukocytosis   Suspected source - Unknown at this time  CXR and CT imaging without identifiable source.   Lactate - 1.8  WBC - 15  Procalcitonin - 70  UA: Negative leuks/nitrites, occasional bacteria 2-4 WBC  Blood culture x 2 drawn in ED  He received 1L Isolyte    Currently still on vancomycin and cefepime  LP without significant findings currently, sputum with gram-negative rods    Plan:  Continue ABX: Cefepime/Vancomycin  Follow up blood cultures  Trend WBC  Trend fever curve    Spasticity  Assessment & Plan  Hx spasticity s/p craniotomy, home regimen: Baclofen 10mg  Continue home baclofen    Type II diabetes mellitus (HCC)  Assessment & Plan    Lab Results   Component Value Date    HGBA1C 5.3 06/16/2024   Previously took Basaglar 10 units daily, stopped in 2019    Plan  Check glucose q6h for now  SSI every 6 hours  Goal glucose 140-180  Hypoglycemia protocol  Tube feeds vital AF             Disposition: Critical care    ICU Core Measures     Vented Patient  VAP Bundle  VAP bundle ordered     A: Assess, Prevent, and Manage Pain Has pain been assessed? Yes  Need for changes to pain regimen? No   B: Both Spontaneous Awakening Trials (SATs) and Spontaneous Breathing Trials (SBTs) Plan to perform spontaneous awakening trial today? Yes   Plan to perform spontaneous breathing trial today? Yes   Obvious barriers to extubation? No   C: Choice of Sedation RASS Goal: -1  Drowsy or 0 Alert and Calm  Need for changes to sedation or analgesia regimen? Yes   D: Delirium CAM-ICU: Negative   E: Early Mobility  Plan for early mobility? Yes   F: Family Engagement Plan for family engagement today? Yes       Antibiotic Review: Awaiting culture results.     Review of Invasive Devices:    Brandt Plan: Voiding trial after improvement in ambulation         Prophylaxis:  VTE VTE covered by:  heparin (porcine), Subcutaneous, 5,000 Units at 06/19/24 0529       Stress Ulcer  not ordered         Significant 24hr Events     24hr events: Patient is status post lumbar puncture today prior, no significant results obtained as of yet.  Did have sputum growing gram-negative rods with rare gram-positive cocci, awaiting final results.  Also had Brandt placed late in the day secondary to retention.  Has been afebrile overnight.     Subjective   Review of Systems: See HPI for Review of Systems     Objective                            Vitals I/O      Most Recent Min/Max in 24hrs   Temp 99.2 °F (37.3 °C) Temp  Min: 98.8 °F (37.1 °C)  Max: 100.1 °F (37.8 °C)   Pulse 88 Pulse  Min: 57  Max: 122   Resp 19 Resp  Min: 12  Max: 19   /58 BP  Min: 90/52  Max: 144/83   O2 Sat 100 % SpO2  Min: 97 %  Max: 100 %      Intake/Output Summary (Last 24 hours) at 6/19/2024 0716  Last data filed at 6/19/2024 0600  Gross per 24 hour   Intake 2830.68 ml   Output 2071 ml   Net 759.68 ml       Diet Enteral/Parenteral; Tube Feeding No Oral Diet; Vital AF 1.2; Continuous; 50; 30; Water; Every 4 hours    Invasive Monitoring           Physical Exam   Physical Exam  Vitals and nursing note reviewed.   Eyes:      Pupils: Pupils are equal, round, and reactive to light.   Skin:     General: Skin is warm and dry.   HENT:      Head: Normocephalic and atraumatic.   Cardiovascular:      Rate and Rhythm: Normal rate and regular rhythm.      Heart sounds: Normal heart sounds.   Musculoskeletal:         General: Swelling (hands and feet) present.       Cervical back: Full passive range of motion without pain, normal range of motion and neck supple.   Abdominal: General: Bowel sounds are normal.      Palpations: Abdomen is soft.   Constitutional:       Appearance: He is underweight. He is ill-appearing.      Interventions: He is sedated, intubated and restrained.      Comments: Generalized poor hygeine   Pulmonary:      Effort: Pulmonary effort is normal. He is intubated.      Breath sounds: Normal breath sounds.   Neurological:      Comments: Opening eyes to auditory stimuli, noninteractive not following commands.    Genitourinary/Anorectal:  Brandt present.          Diagnostic Studies      EKG: No significant findings on telemetry  Imaging: No new imaging to review I have personally reviewed pertinent reports.       Medications:  Scheduled PRN   baclofen, 10 mg, BID  cefepime, 1,000 mg, Q12H  chlorhexidine, 15 mL, Q12H JEF  cyanocobalamin, 1,000 mcg, Daily  folic acid, 400 mcg, Daily  heparin (porcine), 5,000 Units, Q8H JEF  insulin lispro, 1-5 Units, Q6H JEF  ipratropium, 0.5 mg, TID  levalbuterol, 1.25 mg, TID  lisinopril, 10 mg, Daily  multi-electrolyte, 500 mL, Once  polyethylene glycol, 17 g, Daily  sodium phosphate, 12 mmol, Once  thiamine, 100 mg, Daily  vancomycin, 1,250 mg, Q12H      fentaNYL, 50 mcg, Q1H PRN       Continuous    fentaNYL, 50 mcg/hr, Last Rate: 50 mcg/hr (06/19/24 0622)  propofol, 5-50 mcg/kg/min, Last Rate: 45 mcg/kg/min (06/19/24 0621)         Labs:    CBC    Recent Labs     06/18/24 0428 06/19/24  0448   WBC 6.81 7.28   HGB 9.3* 9.9*   HCT 28.1* 30.8*    192     BMP    Recent Labs     06/18/24 0428 06/19/24  0448   SODIUM 138 138   K 4.6 4.6   * 105   CO2 25 29   AGAP 4 4   BUN 14 13   CREATININE 0.60 0.62   CALCIUM 8.2* 8.4       Coags    No recent results     Additional Electrolytes  Recent Labs     06/18/24 0428 06/19/24  0448   MG 2.3 2.2   PHOS 1.9* 2.2*   CAIONIZED 1.10*  --           Blood Gas    Recent Labs      06/19/24  0556   PHART 7.441   [id number] 37.8   PO2ART 106.5   [id number] 25.2   BEART 1.1   SOURCE Radial, Right     Recent Labs     06/19/24  0556   SOURCE Radial, Right    LFTs  Recent Labs     06/18/24  0428   ALT 13   AST 22   ALKPHOS 36   ALB 2.9*   TBILI 0.42       Infectious  Recent Labs     06/18/24  0427   PROCALCITONI 0.68*     Glucose  Recent Labs     06/18/24  0428 06/19/24  0448   GLUC 188* 161*               Anmol Mcgregor MD

## 2024-06-19 NOTE — PLAN OF CARE
Problem: SAFETY,RESTRAINT: NV/NON-SELF DESTRUCTIVE BEHAVIOR  Goal: Remains free of harm/injury (restraint for non violent/non self-detsructive behavior)  Description: INTERVENTIONS:  - Instruct patient/family regarding restraint use   - Assess and monitor physiologic and psychological status   - Provide interventions and comfort measures to meet assessed patient needs   - Identify and implement measures to help patient regain control  - Assess readiness for release of restraint   Outcome: Progressing  Goal: Returns to optimal restraint-free functioning  Description: INTERVENTIONS:  - Assess the patient's behavior and symptoms that indicate continued need for restraint  - Identify and implement measures to help patient regain control  - Assess readiness for release of restraint   Outcome: Progressing     Problem: Nutrition/Hydration-ADULT  Goal: Nutrient/Hydration intake appropriate for improving, restoring or maintaining nutritional needs  Description: Monitor and assess patient's nutrition/hydration status for malnutrition. Collaborate with interdisciplinary team and initiate plan and interventions as ordered.  Monitor patient's weight and dietary intake as ordered or per policy. Utilize nutrition screening tool and intervene as necessary. Determine patient's food preferences and provide high-protein, high-caloric foods as appropriate.     INTERVENTIONS:  - Monitor oral intake, urinary output, labs, and treatment plans  - Assess nutrition and hydration status and recommend course of action  - Evaluate amount of meals eaten  - Assist patient with eating if necessary   - Allow adequate time for meals  - Recommend/ encourage appropriate diets, oral nutritional supplements, and vitamin/mineral supplements  - Order, calculate, and assess calorie counts as needed  - Recommend, monitor, and adjust tube feedings and TPN/PPN based on assessed needs  - Assess need for intravenous fluids  - Provide specific  nutrition/hydration education as appropriate  - Include patient/family/caregiver in decisions related to nutrition  Outcome: Progressing

## 2024-06-19 NOTE — OCCUPATIONAL THERAPY NOTE
Occupational Therapy Cancellation Note     Patient Name: Colin Dumont  Today's Date: 6/19/2024 06/19/24 1020   Note Type   Note type Cancelled Session   Cancel Reasons Medical status   Additional Comments OT consult received and chart reviewed. Per ICU mobility rounds, pt is not appropriate to particiapte in OT evaluation at this time. Pt has had 4 medical cancels in a row, will d/c from IPOT caseload. Please reconsult when patient is medically appropriate.        Sheila Oliveira MS OTR/L   [place] Licensure# [id number]

## 2024-06-20 ENCOUNTER — APPOINTMENT (INPATIENT)
Dept: CT IMAGING | Facility: HOSPITAL | Age: 54
DRG: 870 | End: 2024-06-20
Payer: MEDICARE

## 2024-06-20 LAB
ALBUMIN SERPL BCG-MCNC: 2.9 G/DL (ref 3.5–5)
ALP SERPL-CCNC: 49 U/L (ref 34–104)
ALT SERPL W P-5'-P-CCNC: 11 U/L (ref 7–52)
ANA SER QL IA: NEGATIVE
ANION GAP SERPL CALCULATED.3IONS-SCNC: 8 MMOL/L (ref 4–13)
ARTERIAL PATENCY WRIST A: YES
AST SERPL W P-5'-P-CCNC: 14 U/L (ref 13–39)
ATRIAL RATE: 91 BPM
BASE EXCESS BLDA CALC-SCNC: 3.2 MMOL/L
BASOPHILS # BLD AUTO: 0.02 THOUSANDS/ÂΜL (ref 0–0.1)
BASOPHILS NFR BLD AUTO: 0 % (ref 0–1)
BILIRUB SERPL-MCNC: 0.45 MG/DL (ref 0.2–1)
BUN SERPL-MCNC: 13 MG/DL (ref 5–25)
CALCIUM ALBUM COR SERPL-MCNC: 9.3 MG/DL (ref 8.3–10.1)
CALCIUM SERPL-MCNC: 8.4 MG/DL (ref 8.4–10.2)
CHLORIDE SERPL-SCNC: 102 MMOL/L (ref 96–108)
CO2 SERPL-SCNC: 28 MMOL/L (ref 21–32)
CREAT SERPL-MCNC: 0.56 MG/DL (ref 0.6–1.3)
CRP SERPL QL: 114 MG/L
EOSINOPHIL # BLD AUTO: 0.1 THOUSAND/ÂΜL (ref 0–0.61)
EOSINOPHIL NFR BLD AUTO: 1 % (ref 0–6)
ERYTHROCYTE [DISTWIDTH] IN BLOOD BY AUTOMATED COUNT: 13.9 % (ref 11.6–15.1)
ERYTHROCYTE [SEDIMENTATION RATE] IN BLOOD: 93 MM/HOUR (ref 0–19)
EST. AVERAGE GLUCOSE BLD GHB EST-MCNC: 114 MG/DL
GFR SERPL CREATININE-BSD FRML MDRD: 115 ML/MIN/1.73SQ M
GLUCOSE SERPL-MCNC: 160 MG/DL (ref 65–140)
GLUCOSE SERPL-MCNC: 210 MG/DL (ref 65–140)
GLUCOSE SERPL-MCNC: 267 MG/DL (ref 65–140)
GLUCOSE SERPL-MCNC: 297 MG/DL (ref 65–140)
GLUCOSE SERPL-MCNC: 303 MG/DL (ref 65–140)
HBA1C MFR BLD: 5.6 %
HCO3 BLDA-SCNC: 26.2 MMOL/L (ref 22–28)
HCT VFR BLD AUTO: 26.2 % (ref 36.5–49.3)
HGB BLD-MCNC: 9.1 G/DL (ref 12–17)
HIV 1+2 AB+HIV1 P24 AG SERPL QL IA: NORMAL
HIV1 P24 AG SER QL: NORMAL
HOROWITZ INDEX BLDA+IHG-RTO: 40 MM[HG]
IMM GRANULOCYTES # BLD AUTO: 0.12 THOUSAND/UL (ref 0–0.2)
IMM GRANULOCYTES NFR BLD AUTO: 2 % (ref 0–2)
LYMPHOCYTES # BLD AUTO: 0.51 THOUSANDS/ÂΜL (ref 0.6–4.47)
LYMPHOCYTES NFR BLD AUTO: 7 % (ref 14–44)
MAGNESIUM SERPL-MCNC: 2.2 MG/DL (ref 1.9–2.7)
MCH RBC QN AUTO: 41 PG (ref 26.8–34.3)
MCHC RBC AUTO-ENTMCNC: 34.7 G/DL (ref 31.4–37.4)
MCV RBC AUTO: 118 FL (ref 82–98)
MONOCYTES # BLD AUTO: 0.99 THOUSAND/ÂΜL (ref 0.17–1.22)
MONOCYTES NFR BLD AUTO: 13 % (ref 4–12)
NEUTROPHILS # BLD AUTO: 5.63 THOUSANDS/ÂΜL (ref 1.85–7.62)
NEUTS SEG NFR BLD AUTO: 77 % (ref 43–75)
NRBC BLD AUTO-RTO: 0 /100 WBCS
O2 CT BLDA-SCNC: 13.3 ML/DL (ref 16–23)
OXYHGB MFR BLDA: 97.6 % (ref 94–97)
P AXIS: 78 DEGREES
PCO2 BLDA: 33.7 MM HG (ref 36–44)
PEEP RESPIRATORY: 6 CM[H2O]
PH BLDA: 7.51 [PH] (ref 7.35–7.45)
PHOSPHATE SERPL-MCNC: 2.2 MG/DL (ref 2.7–4.5)
PLATELET # BLD AUTO: 209 THOUSANDS/UL (ref 149–390)
PMV BLD AUTO: 10 FL (ref 8.9–12.7)
PO2 BLDA: 120.1 MM HG (ref 75–129)
POTASSIUM SERPL-SCNC: 3.9 MMOL/L (ref 3.5–5.3)
PR INTERVAL: 154 MS
PROT SERPL-MCNC: 5.4 G/DL (ref 6.4–8.4)
QRS AXIS: 41 DEGREES
QRSD INTERVAL: 78 MS
QT INTERVAL: 334 MS
QTC INTERVAL: 410 MS
RBC # BLD AUTO: 2.22 MILLION/UL (ref 3.88–5.62)
SODIUM SERPL-SCNC: 138 MMOL/L (ref 135–147)
SPECIMEN SOURCE: ABNORMAL
T WAVE AXIS: 35 DEGREES
VENT AC: 18
VENT- AC: AC
VENTRICULAR RATE: 91 BPM
VT SETTING VENT: 450 ML
WBC # BLD AUTO: 7.37 THOUSAND/UL (ref 4.31–10.16)

## 2024-06-20 PROCEDURE — 94003 VENT MGMT INPAT SUBQ DAY: CPT

## 2024-06-20 PROCEDURE — 87806 HIV AG W/HIV1&2 ANTB W/OPTIC: CPT

## 2024-06-20 PROCEDURE — 82805 BLOOD GASES W/O2 SATURATION: CPT

## 2024-06-20 PROCEDURE — 94640 AIRWAY INHALATION TREATMENT: CPT

## 2024-06-20 PROCEDURE — 74177 CT ABD & PELVIS W/CONTRAST: CPT

## 2024-06-20 PROCEDURE — 86430 RHEUMATOID FACTOR TEST QUAL: CPT

## 2024-06-20 PROCEDURE — 83735 ASSAY OF MAGNESIUM: CPT

## 2024-06-20 PROCEDURE — 94150 VITAL CAPACITY TEST: CPT

## 2024-06-20 PROCEDURE — 99291 CRITICAL CARE FIRST HOUR: CPT | Performed by: STUDENT IN AN ORGANIZED HEALTH CARE EDUCATION/TRAINING PROGRAM

## 2024-06-20 PROCEDURE — 93010 ELECTROCARDIOGRAM REPORT: CPT | Performed by: INTERNAL MEDICINE

## 2024-06-20 PROCEDURE — 80053 COMPREHEN METABOLIC PANEL: CPT

## 2024-06-20 PROCEDURE — C9113 INJ PANTOPRAZOLE SODIUM, VIA: HCPCS

## 2024-06-20 PROCEDURE — 85025 COMPLETE CBC W/AUTO DIFF WBC: CPT

## 2024-06-20 PROCEDURE — NC001 PR NO CHARGE: Performed by: STUDENT IN AN ORGANIZED HEALTH CARE EDUCATION/TRAINING PROGRAM

## 2024-06-20 PROCEDURE — 85652 RBC SED RATE AUTOMATED: CPT

## 2024-06-20 PROCEDURE — 94760 N-INVAS EAR/PLS OXIMETRY 1: CPT

## 2024-06-20 PROCEDURE — 86038 ANTINUCLEAR ANTIBODIES: CPT

## 2024-06-20 PROCEDURE — 71260 CT THORAX DX C+: CPT

## 2024-06-20 PROCEDURE — 86140 C-REACTIVE PROTEIN: CPT

## 2024-06-20 PROCEDURE — 84100 ASSAY OF PHOSPHORUS: CPT

## 2024-06-20 PROCEDURE — 70450 CT HEAD/BRAIN W/O DYE: CPT

## 2024-06-20 PROCEDURE — 83036 HEMOGLOBIN GLYCOSYLATED A1C: CPT

## 2024-06-20 PROCEDURE — 82948 REAGENT STRIP/BLOOD GLUCOSE: CPT

## 2024-06-20 PROCEDURE — 36600 WITHDRAWAL OF ARTERIAL BLOOD: CPT

## 2024-06-20 RX ORDER — HALOPERIDOL 5 MG/ML
5 INJECTION INTRAMUSCULAR ONCE
Status: COMPLETED | OUTPATIENT
Start: 2024-06-20 | End: 2024-06-20

## 2024-06-20 RX ORDER — SULFAMETHOXAZOLE AND TRIMETHOPRIM 800; 160 MG/1; MG/1
2 TABLET ORAL EVERY 8 HOURS SCHEDULED
Status: DISCONTINUED | OUTPATIENT
Start: 2024-06-20 | End: 2024-06-21

## 2024-06-20 RX ORDER — ACETAMINOPHEN 160 MG/5ML
650 SUSPENSION ORAL ONCE
Status: COMPLETED | OUTPATIENT
Start: 2024-06-20 | End: 2024-06-20

## 2024-06-20 RX ORDER — PANTOPRAZOLE SODIUM 40 MG/10ML
40 INJECTION, POWDER, LYOPHILIZED, FOR SOLUTION INTRAVENOUS
Status: DISCONTINUED | OUTPATIENT
Start: 2024-06-20 | End: 2024-06-24

## 2024-06-20 RX ADMIN — LISINOPRIL 10 MG: 10 TABLET ORAL at 08:12

## 2024-06-20 RX ADMIN — HEPARIN SODIUM 5000 UNITS: 5000 INJECTION INTRAVENOUS; SUBCUTANEOUS at 05:23

## 2024-06-20 RX ADMIN — INSULIN LISPRO 4 UNITS: 100 INJECTION, SOLUTION INTRAVENOUS; SUBCUTANEOUS at 05:23

## 2024-06-20 RX ADMIN — BACLOFEN 10 MG: 10 TABLET ORAL at 17:54

## 2024-06-20 RX ADMIN — IPRATROPIUM BROMIDE 0.5 MG: 0.5 SOLUTION RESPIRATORY (INHALATION) at 19:26

## 2024-06-20 RX ADMIN — LEVALBUTEROL HYDROCHLORIDE 1.25 MG: 1.25 SOLUTION RESPIRATORY (INHALATION) at 07:31

## 2024-06-20 RX ADMIN — LEVALBUTEROL HYDROCHLORIDE 1.25 MG: 1.25 SOLUTION RESPIRATORY (INHALATION) at 19:26

## 2024-06-20 RX ADMIN — ACETAMINOPHEN 650 MG: 650 SUSPENSION ORAL at 03:46

## 2024-06-20 RX ADMIN — CYANOCOBALAMIN TAB 500 MCG 1000 MCG: 500 TAB at 08:12

## 2024-06-20 RX ADMIN — IOHEXOL 85 ML: 350 INJECTION, SOLUTION INTRAVENOUS at 10:43

## 2024-06-20 RX ADMIN — HEPARIN SODIUM 5000 UNITS: 5000 INJECTION INTRAVENOUS; SUBCUTANEOUS at 13:25

## 2024-06-20 RX ADMIN — BACLOFEN 10 MG: 10 TABLET ORAL at 08:12

## 2024-06-20 RX ADMIN — LEVALBUTEROL HYDROCHLORIDE 1.25 MG: 1.25 SOLUTION RESPIRATORY (INHALATION) at 13:18

## 2024-06-20 RX ADMIN — QUETIAPINE FUMARATE 50 MG: 25 TABLET ORAL at 21:13

## 2024-06-20 RX ADMIN — PROPOFOL 45 MCG/KG/MIN: 10 INJECTION, EMULSION INTRAVENOUS at 05:10

## 2024-06-20 RX ADMIN — HALOPERIDOL LACTATE 5 MG: 5 INJECTION, SOLUTION INTRAMUSCULAR at 16:19

## 2024-06-20 RX ADMIN — PANTOPRAZOLE SODIUM 40 MG: 40 INJECTION, POWDER, FOR SOLUTION INTRAVENOUS at 08:12

## 2024-06-20 RX ADMIN — PROPOFOL 45 MCG/KG/MIN: 10 INJECTION, EMULSION INTRAVENOUS at 00:42

## 2024-06-20 RX ADMIN — INSULIN LISPRO 3 UNITS: 100 INJECTION, SOLUTION INTRAVENOUS; SUBCUTANEOUS at 13:24

## 2024-06-20 RX ADMIN — CHLORHEXIDINE GLUCONATE 15 ML: 1.2 RINSE ORAL at 21:12

## 2024-06-20 RX ADMIN — Medication 100 MG: at 08:13

## 2024-06-20 RX ADMIN — SULFAMETHOXAZOLE AND TRIMETHOPRIM 2 TABLET: 800; 160 TABLET ORAL at 21:13

## 2024-06-20 RX ADMIN — POTASSIUM PHOSPHATE, MONOBASIC AND POTASSIUM PHOSPHATE, DIBASIC 21 MMOL: 224; 236 INJECTION, SOLUTION, CONCENTRATE INTRAVENOUS at 06:36

## 2024-06-20 RX ADMIN — IPRATROPIUM BROMIDE 0.5 MG: 0.5 SOLUTION RESPIRATORY (INHALATION) at 07:31

## 2024-06-20 RX ADMIN — IPRATROPIUM BROMIDE 0.5 MG: 0.5 SOLUTION RESPIRATORY (INHALATION) at 13:18

## 2024-06-20 RX ADMIN — HEPARIN SODIUM 5000 UNITS: 5000 INJECTION INTRAVENOUS; SUBCUTANEOUS at 21:12

## 2024-06-20 RX ADMIN — SULFAMETHOXAZOLE AND TRIMETHOPRIM 2 TABLET: 800; 160 TABLET ORAL at 16:20

## 2024-06-20 RX ADMIN — CHLORHEXIDINE GLUCONATE 15 ML: 1.2 RINSE ORAL at 08:12

## 2024-06-20 RX ADMIN — INSULIN LISPRO 2 UNITS: 100 INJECTION, SOLUTION INTRAVENOUS; SUBCUTANEOUS at 17:54

## 2024-06-20 RX ADMIN — VANCOMYCIN HYDROCHLORIDE 1250 MG: 5 INJECTION, POWDER, LYOPHILIZED, FOR SOLUTION INTRAVENOUS at 08:23

## 2024-06-20 RX ADMIN — FOLIC ACID TAB 400 MCG 400 MCG: 400 TAB at 08:13

## 2024-06-20 NOTE — PROGRESS NOTES
Critical access hospital  Progress Note  Name: Colin Dumont I  MRN: [id number]  Unit/Bed#: ICU 13 I Date of Admission: 6/13/2024   Date of Service: 6/20/2024 I Hospital Day: 7    Assessment & Plan   Acute respiratory failure with hypoxia (HCC)  Assessment & Plan  Pt presented to ED altered, agitated. Received Versed 5mg in ED and became somnolent after.  Intubated for airway protection and profound metabolic acidosis  VBG 6.9/30/55/7/ BE -24  VENT: AC/VC 18/380/40/6  CXR - ETT 5cm above roque, will advance by 1 cm on arrival to ICU    Plan:  Continue mechanical ventilation  Continue sedation, goal RASS -1  Propofol gtt  Fentanyl prn  Avoiding Precedex 2/2 bradycardic episodes  Respiratory protocol  Vent bundle  CAM-ICU  Daily SAT/SBT    * Metabolic encephalopathy  Assessment & Plan  Pt presented to ED Altered  CTH - no acute intracranial abnormalities, previous post-operative changes.  History of previous psychotic episodes d/t excessive intake of Baclofen  Per patients mother she did refil his Baclofen, however is nto sure how many are left.   COMA panel - negative  UDS neg, ethanol negative  Requiring multiple doses of Versed while on propofol for agitation  Negative for methanol and ethylene glycol    Plan:  Initially likely secondary to baclofen overdose versus ketoacidosis  On propofol and fentanyl prn, down titrating  Continue with mechanical ventilation, reattempting SAT and SBT later today    High anion gap metabolic acidosis  Assessment & Plan  VBG on arrival - 7.0/28/40/7/-24  Repeat VBG - 6.9/30/55/7/-24  AG - 29  Lactic - 1.8  Etiology unclear Sepsis vs Baclofen intoxication vs Ketosis  COMA panel - Negative  S/p 1L Isolyte in ED  Pending ethylene glycol and methanol levels  Acidosis has resolved    Plan:  Continue mechanical ventilation  Will continue to check BMP and electrolytes, replete as needed    S/P craniotomy  Assessment & Plan  S/p craniotomy 1/2019 at [facility]  done secondary to abscess/lesions.  CTH no acute abnormalities, post-operative findings  Baseline independent    Benign essential hypertension  Assessment & Plan  Home regimen: Lisinopril 10mg daily      Alcohol use  Assessment & Plan  Hx ETOH use in the past, family unclear if currently drink and if so how much. Unknown time of last drink.   Currently intubated/sedated  CIWA protocol when extubated  Encourage cessation    SIRS (systemic inflammatory response syndrome) (HCC)  Assessment & Plan  Present on arrival, as evidence by - Tachycardia, Tachypnea, Leukocytosis   Suspected source - Unknown at this time  CXR and CT imaging without identifiable source.   Lactate - 1.8  WBC - 15  Procalcitonin - 70  UA: Negative leuks/nitrites, occasional bacteria 2-4 WBC  Blood culture x 2 drawn in ED  He received 1L Isolyte    Currently still on vancomycin and cefepime  LP without significant findings currently, sputum with gram-negative rods    Plan:  Continue ABX: Cefepime/Vancomycin  Follow up blood cultures  Trend WBC  Trend fever curve    Spasticity  Assessment & Plan  Hx spasticity s/p craniotomy, home regimen: Baclofen 10mg  Continue home baclofen    Type II diabetes mellitus (HCC)  Assessment & Plan    Lab Results   Component Value Date    HGBA1C 5.6 06/20/2024   Previously took Basaglar 10 units daily, stopped in 2019    Plan  Check glucose q6h for now  SSI every 6 hours  Goal glucose 140-180  Hypoglycemia protocol  Tube feeds vital AF             Disposition: Critical care    ICU Core Measures     Vented Patient  VAP Bundle  VAP bundle ordered     A: Assess, Prevent, and Manage Pain Has pain been assessed? Yes  Need for changes to pain regimen? No   B: Both Spontaneous Awakening Trials (SATs) and Spontaneous Breathing Trials (SBTs) Plan to perform spontaneous awakening trial today? Yes   Plan to perform spontaneous breathing trial today? Yes   Obvious barriers to extubation? No   C: Choice of Sedation RASS Goal: -1  Drowsy or 0 Alert and Calm  Need for changes to sedation or analgesia regimen? No   D: Delirium CAM-ICU: Negative   E: Early Mobility  Plan for early mobility? Yes   F: Family Engagement Plan for family engagement today? Yes       Antibiotic Review: Awaiting culture results.     Review of Invasive Devices:    Brandt Plan: Voiding trial after improvement in ambulation         Prophylaxis:  VTE VTE covered by:  heparin (porcine), Subcutaneous, 5,000 Units at 06/20/24 0523       Stress Ulcer  covered bypantoprazole (PROTONIX) injection 40 mg [[id number]]         Significant 24hr Events     24hr events: Attempted SBT today prior, patient unable to take adequate breaths on own despite ABG without significant derangements.  Patient did wake after pausing of sedation but continues to be noninteractive and agitated.  Will reattempt today, if continues with decreased interactivity, may need further workup for AMS.     Subjective   Review of Systems: See HPI for Review of Systems     Objective                            Vitals I/O      Most Recent Min/Max in 24hrs   Temp 99 °F (37.2 °C) Temp  Min: 98.8 °F (37.1 °C)  Max: 101.1 °F (38.4 °C)   Pulse 83 Pulse  Min: 63  Max: 102   Resp 18 Resp  Min: 10  Max: 32   /68 BP  Min: 101/57  Max: 178/79   O2 Sat 97 % SpO2  Min: 97 %  Max: 100 %      Intake/Output Summary (Last 24 hours) at 6/20/2024 0719  Last data filed at 6/20/2024 0600  Gross per 24 hour   Intake 2405.04 ml   Output 2400 ml   Net 5.04 ml       Diet Enteral/Parenteral; Tube Feeding No Oral Diet; Vital AF 1.2; Continuous; 50; 30; Water; Every 4 hours    Invasive Monitoring           Physical Exam   Physical Exam  Vitals and nursing note reviewed.   Eyes:      Pupils: Pupils are equal, round, and reactive to light.   Skin:     General: Skin is warm and dry.   HENT:      Head: Normocephalic and atraumatic.   Cardiovascular:      Rate and Rhythm: Normal rate and regular rhythm.      Heart sounds: Normal heart sounds.    Musculoskeletal:         General: Swelling (hands and feet) present.      Cervical back: Full passive range of motion without pain, normal range of motion and neck supple.   Abdominal: General: Bowel sounds are normal.      Palpations: Abdomen is soft.   Constitutional:       Appearance: He is underweight. He is ill-appearing.      Interventions: He is sedated, intubated and restrained.      Comments: Generalized poor hygeine   Pulmonary:      Effort: Pulmonary effort is normal. He is intubated.      Breath sounds: Normal breath sounds.   Neurological:      Comments: Opening eyes to auditory stimuli, noninteractive not following commands.    Genitourinary/Anorectal:  Brandt present.          Diagnostic Studies      EKG: No significant findings on telemetry  Imaging: No new imaging to review I have personally reviewed pertinent reports.       Medications:  Scheduled PRN   baclofen, 10 mg, BID  cefepime, 1,000 mg, Q12H  chlorhexidine, 15 mL, Q12H JEF  cyanocobalamin, 1,000 mcg, Daily  folic acid, 400 mcg, Daily  heparin (porcine), 5,000 Units, Q8H JEF  insulin lispro, 1-6 Units, Q6H JEF  ipratropium, 0.5 mg, TID  levalbuterol, 1.25 mg, TID  lisinopril, 10 mg, Daily  pantoprazole, 40 mg, Q24H JEF  polyethylene glycol, 17 g, Daily  potassium phosphate, 21 mmol, Once  QUEtiapine, 50 mg, HS  thiamine, 100 mg, Daily  vancomycin, 1,250 mg, Q12H      fentaNYL, 50 mcg, Q1H PRN       Continuous    propofol, 5-50 mcg/kg/min, Last Rate: 45 mcg/kg/min (06/20/24 0510)         Labs:    CBC    Recent Labs     06/19/24 0448 06/20/24  0522   WBC 7.28 7.37   HGB 9.9* 9.1*   HCT 30.8* 26.2*    209     BMP    Recent Labs     06/19/24 0448 06/20/24  0522   SODIUM 138 138   K 4.6 3.9    102   CO2 29 28   AGAP 4 8   BUN 13 13   CREATININE 0.62 0.56*   CALCIUM 8.4 8.4       Coags    No recent results     Additional Electrolytes  Recent Labs     06/19/24 0448 06/20/24  0522   MG 2.2 2.2   PHOS 2.2* 2.2*          Blood  Gas    Recent Labs     06/20/24  0641   PHART 7.508*   [id number] 33.7*   PO2ART 120.1   MIQ9YGC 26.2   BEART 3.2   SOURCE Radial, Left     Recent Labs     06/20/24  0641   SOURCE Radial, Left    LFTs  Recent Labs     06/20/24  0522   ALT 11   AST 14   ALKPHOS 49   ALB 2.9*   TBILI 0.45       Infectious  No recent results  Glucose  Recent Labs     06/19/24  0448 06/20/24  0522   GLUC 161* 303*               Anmol Mcgregor MD

## 2024-06-20 NOTE — PROGRESS NOTES
Critical Care Attending Note; Roberto Martino   Note Date: 24  Note Time: 9:16 AM    Patient: Colin Dumont  Age, : 54 y.o., 1970 MRN: [id number] Code Status: Level 1 - Full Code Patient Location: ICU 13/ICU 13   Hospital LOS:7 days  ]   Patient seen and examined, medical record reviewed, discussed with house staff and nursing staff.     HPI   CC: AMS   54M with a PMH a Brain Abscess, EtOH abuse, HLD, HTN, DM, PUD who found down found AMS requiring intubation.     Key Recent Events    : Admitted, Intubated     Main ICU Plans:       #Neuro  AMS - TME vs EtOH withdrawal vs Septic- Last drink ? - Initially concerned for toxic ingestion but acidosis recovered quickly, other alcohol levels normal. Possibly baclofen toxicity with suprainfection. Mental status improving, eyes open not following commands, possibly poor reserve due to previous brain injury  - Thiamine, Folate  - Seroquel 50mg qhs    Brain Abscess/Craniectomy   - Continue Baclofen    Sedation - RASS 0 - (-1)  - Propofol -SAT daily   - Fentanyl PRN    #Card  HTN   - Lisinopril    #Pulm  Respiratory Failure - Intubated for airway protection  - LTVV VC+ - 6-8cc/kg IDBW - SBT daily -   - Wean FiO2 - Maintain Oxygen Sat >92%  - VAP Bundle  - HOB > 30o    - PEEP Titrate      #Renal  BELLA - Pre- Renal - Resolved  NAGMA - Resolved     Urine Retention - Failed ToV    #GI  PPI    Refeeding Syndrome  - Replete Electrolytes    #ID   Sepsis - Unclear Etiology - Persistent Fevers despite broad spectrum - Possible related to superficial thrombophlebitis, drug fever vs other  - Discontinue Vanc/Cefepime  - CT Chest/Abdomen/Pelvis/Head  - SHANIKA, HIV, ESR, CRP, RF      #Endo  DM -DKA? - Resolved   - ISS      #DVT/GI ppx  SQH    #Lines/Tubes/Drains:   Invasive Devices       Peripheral Intravenous Line  Duration             Peripheral IV 24 Proximal;Right;Ventral (anterior) Forearm 2 days    Peripheral IV 24 Right Antecubital <1 day               Drain  Duration             NG/OG/Enteral Tube Orogastric 16 Fr Left mouth 6 days    Urethral Catheter 18 Fr. 1 day              Airway  Duration             ETT  Oral 7.5 mm 6 days                    #Nutrition:   Diet Enteral/Parenteral; Tube Feeding No Oral Diet; Vital AF 1.2; Continuous; 50; 30; Water; Every 4 hours        #Code Status:   Level 1 - Full Code    #Dispo:   ICU        Roberto Martino MD  Pulmonary, Critical Care    Critical care time, excluding procedures, teaching, family meetings, and excludes any prior time recorded by the AP/resident, 35 minutes. Upon my evaluation, this patient has a high probability of imminent or life-threatening deterioration due to above problems which required my direct attention, intervention, and personal management.   Impression/Active Problems:    AMS   Brain Abcesss   HTN   DM   PUD   HLD   Sepsis   Respiratory Failure   BELLA   Hyperkalemia   NAGMA      Physical Exam:     Vital Signs:   Weight: 71 kg (156 lb 8.4 oz)  IBW: Ideal body weight: 70.7 kg (155 lb 13.8 oz)  Adjusted ideal body weight: 70.8 kg (156 lb 2.1 oz)  Temp:  [98.8 °F (37.1 °C)-101.1 °F (38.4 °C)] 99 °F (37.2 °C)  HR:  [] 83  Resp:  [10-32] 18  BP: (101-178)/() 138/71  Physical Exam: Unchanged  General: NAD  Neuro: Eyes open, not following commands, moves all 4 extremities  Heart: RRR  Lungs: Basilar diminshed  Abdomen: Soft NT  Extremities: No edema                Ventilator Settings:         Results from last 7 days   Lab Units 06/14/24  1146 06/14/24  0425 06/14/24  0216 06/13/24  2133   ISTAT PH ART  7.451*  --   --   --    PO2 UGO mm Hg  --  28.6* 35.8 41.7     Radiologic Images Reviewed:    CT Head  No acute intracranial abnormality.     Input / Output:     Intake/Output Summary (Last 24 hours) at 6/20/2024 0916  Last data filed at 6/20/2024 0800  Gross per 24 hour   Intake 2345.76 ml   Output 2500 ml   Net -154.24 ml            Infusions:  propofol, 5-50 mcg/kg/min, Last Rate:  Stopped (06/20/24 0729)      Scheduled Medications:  Current Facility-Administered Medications   Medication Dose Route Frequency Provider Last Rate    baclofen  10 mg Oral BID Linden Plummer MD      cefepime  1,000 mg Intravenous Q12H TIM Padilla Stopped (06/20/24 0025)    chlorhexidine  15 mL Mouth/Throat Q12H [facility] Nirmal Tobar MD      cyanocobalamin  1,000 mcg Oral Daily Arielle Zaidi MD      fentaNYL  50 mcg Intravenous Q1H PRN TIM Padilla      folic acid  400 mcg Oral Daily TIM Boone      heparin (porcine)  5,000 Units Subcutaneous Q8H [facility] Anmol Mcgregor MD      insulin lispro  1-6 Units Subcutaneous Q6H [facility] TIM Padilla      ipratropium  0.5 mg Nebulization TID Kevin Guglielmello, DO      levalbuterol  1.25 mg Nebulization TID Kevin Guglielmello, DO      lisinopril  10 mg Oral Daily Arielle Zaidi MD      pantoprazole  40 mg Intravenous Q24H [facility] Anmol Mcgregor MD      polyethylene glycol  17 g Oral Daily Linden Plummer MD      potassium phosphate  21 mmol Intravenous Once Anmol Mcgregor MD 21 mmol (06/20/24 0636)    propofol  5-50 mcg/kg/min Intravenous Titrated TIM Pdailla Stopped (06/20/24 0729)    QUEtiapine  50 mg Oral HS Linden Plummer MD      thiamine  100 mg Oral Daily TIM Boone      vancomycin  1,250 mg Intravenous Q12H Arielle Zaidi MD 1,250 mg (06/20/24 0823)       PRN Medications:    fentaNYL    Labs Reviewed:  Results from last 7 days   Lab Units 06/20/24  0522 06/19/24  0448 06/18/24  0428   WBC Thousand/uL 7.37 7.28 6.81   HEMOGLOBIN g/dL 9.1* 9.9* 9.3*   HEMATOCRIT % 26.2* 30.8* 28.1*   PLATELETS Thousands/uL 209 192 158      Results from last 7 days   Lab Units 06/20/24  0522 06/19/24  0448 06/18/24  0428 06/14/24  1210 06/14/24  1146 06/14/24  0443 06/13/24  2217 06/13/24  2133 06/13/24  2118   SODIUM mmol/L 138 138 138   < >  --    < >  --    < >  --    CO2 mmol/L 28 29 25   < >  --     "< >  --    < >  --    CO2, I-STAT mmol/L  --   --   --   --  21  --  8*  --  8*   BUN mg/dL 13 13 14   < >  --    < >  --    < >  --    GLUCOSE, ISTAT mg/dl  --   --   --   --  126  --  189*  --  198*   CALCIUM mg/dL 8.4 8.4 8.2*   < >  --    < >  --    < >  --    MAGNESIUM mg/dL 2.2 2.2 2.3   < >  --    < >  --   --   --    PHOSPHORUS mg/dL 2.2* 2.2* 1.9*   < >  --    < >  --   --   --     < > = values in this interval not displayed.         Invalid input(s): \"ASTSGOT\", \"ALTSGPT\"LABRCNTIP@ ,alkphos:3,tbilirubin:3,dbilirubin:3)@  Results from last 7 days   Lab Units 06/14/24  0443 06/13/24  2133   INR  1.34* 1.00           Invalid input(s): \"TROPT\", \"PBNP\"             I have personally seen and examined the patient on (06/20/24 between [id number]). I discussed the patient with the AP/resident including, but not limited to, verifying findings; reviewing labs and x-rays; discussing with consultants; developing the plan of care with the bedside nurse; and discussing treatment plan with patient or surrogate.  I have reviewed the note and assessment performed by the AP/resident and agree with the AP/resident’s documented findings and plan of care with the above additions/exceptions. Please see my comments for details and adjustments.                 "

## 2024-06-20 NOTE — NURSING NOTE
Upon entering patients room, found vent to be alarming, patient had B/L wrist restraints on but had folded himself down and to the side to grab a hold of the tube and it was lying on his chest. No acute respiratory distress noted. Placed on 6L NC.  Respiratory Therapist and attending Dr. Martino notified and at bedside. VSS. Patient able to voice his name. No stridor.  Ailyn Caceres

## 2024-06-20 NOTE — ASSESSMENT & PLAN NOTE
Pt presented to ED altered, agitated. Received Versed 5mg in ED and became somnolent after.  Intubated for airway protection and profound metabolic acidosis  VBG 6.9/30/55/7/ BE -24  VENT: AC/VC 18/380/40/6  CXR - ETT 5cm above roque, will advance by 1 cm on arrival to ICU    Plan:  Continue mechanical ventilation  Continue sedation, goal RASS -1  Propofol gtt  Fentanyl prn  Avoiding Precedex 2/2 bradycardic episodes  Respiratory protocol  Vent bundle  CAM-ICU  Daily SAT/SBT

## 2024-06-20 NOTE — QUICK NOTE
Critical Care Services- Self Extubation Progress Note   Colin Dumont 54 y.o. male MRN: [id number]  Unit/Bed#: ICU 13 Encounter: [id number]    Date occurred: 6/20/2024   Time occurred (): 1155  Assigned Nurse: Ailyn Caceres    Restraints: yes    Intubation Date: 6/13/24  Vent settings:  Mode: APVcvm                Resp Rate (BPM): 18 BPM    VT (mL): 450    Insp time (S): 1    FIO2: 40%    PEEP (cmH20): 6    Weaning trial: yes    Continuous medications:   propofol, 5-50 mcg/kg/min, Last Rate: Stopped (06/20/24 1100)      Sedation HOLD: yes    If Yes- Date held: 6/ 20/ 24   Time held (): 1100    PRN medications:   fentaNYL, 50 mcg, Q1H PRN      Last date/ time (Fusion Antibodies) PRN sedation given: 6/18/24     RASS goal: -1 Drowsy and 0 Alert and Calm    Did patient need reintubation: no  If NO what is their oxygen requirement: 6L NC    Was the patient receiving therapy, testing, or procedure at time of extubation: no    Was family notified: yes  Who was notified: Mother, Carli    Narrative of Events/Additional comments: At 1155, was evaluating patient in the ED and received text from nursing staff that patient had extubated spite having restraints on after bending his body down to was restrained and and pulling on the tube.  Patient was scheduled for extubation and was at that time off of sedation in preparation for extubation.  Attending physician was at bedside and able to assess patient.  Initial need for intubation was decreased mental status, patient was now able to protect airway and not in need of tube.  Went to bedside and assessed patient, breathing comfortably on nasal cannula 6 L, no acute respiratory distress asking for water.  Auscultated patient, lungs grossly clear with upper airway sounds likely secondary to secretions, patient there and with strong cough at bedside.  No need for reintubation.    Anmol Mcgregor MD

## 2024-06-20 NOTE — PROGRESS NOTES
Colin Dumont is a 54 y.o. male who is currently ordered Vancomycin IV with management by the Pharmacy Consult service.  Relevant clinical data and objective / subjective history reviewed.  Vancomycin Assessment:  Indication and Goal AUC/Trough: Pneumonia (goal -600, trough >10)  Clinical Status:  critically ill  Micro:     Renal Function:  SCr: 0.56 mg/dL  CrCl: 124.3 mL/min  Renal replacement: Not on dialysis  Days of Therapy: 8  Current Dose: 1250 mg every 12 hours  Vancomycin Plan:  New Dosin mg every 12 hours  Estimated AUC: 555 mcg*hr/mL  Estimated Trough: 13.2 mcg/mL  Next Level:  @ 0600  Renal Function Monitoring: Daily BMP and UOP  Pharmacy will continue to follow closely for s/sx of nephrotoxicity, infusion reactions and appropriateness of therapy.  BMP and CBC will be ordered per protocol. We will continue to follow the patient’s culture results and clinical progress daily.    Tasha Roman, Pharmacist

## 2024-06-20 NOTE — ASSESSMENT & PLAN NOTE
Lab Results   Component Value Date    HGBA1C 5.6 06/20/2024   Previously took Basaglar 10 units daily, stopped in 2019    Plan  Check glucose q6h for now  SSI every 6 hours  Goal glucose 140-180  Hypoglycemia protocol  Tube feeds vital AF

## 2024-06-20 NOTE — ASSESSMENT & PLAN NOTE
S/p craniotomy 1/2019 at [facility], done secondary to abscess/lesions.  CTH no acute abnormalities, post-operative findings  Baseline independent

## 2024-06-20 NOTE — ASSESSMENT & PLAN NOTE
Pt presented to ED Altered  CTH - no acute intracranial abnormalities, previous post-operative changes.  History of previous psychotic episodes d/t excessive intake of Baclofen  Per patients mother she did refil his Baclofen, however is nto sure how many are left.   COMA panel - negative  UDS neg, ethanol negative  Requiring multiple doses of Versed while on propofol for agitation  Negative for methanol and ethylene glycol    Plan:  Initially likely secondary to baclofen overdose versus ketoacidosis  On propofol and fentanyl prn, down titrating  Continue with mechanical ventilation, reattempting SAT and SBT later today

## 2024-06-20 NOTE — PLAN OF CARE
Problem: SAFETY,RESTRAINT: NV/NON-SELF DESTRUCTIVE BEHAVIOR  Goal: Remains free of harm/injury (restraint for non violent/non self-detsructive behavior)  Description: INTERVENTIONS:  - Instruct patient/family regarding restraint use   - Assess and monitor physiologic and psychological status   - Provide interventions and comfort measures to meet assessed patient needs   - Identify and implement measures to help patient regain control  - Assess readiness for release of restraint   Outcome: Progressing  Goal: Returns to optimal restraint-free functioning  Description: INTERVENTIONS:  - Assess the patient's behavior and symptoms that indicate continued need for restraint  - Identify and implement measures to help patient regain control  - Assess readiness for release of restraint   Outcome: Progressing     Problem: Nutrition/Hydration-ADULT  Goal: Nutrient/Hydration intake appropriate for improving, restoring or maintaining nutritional needs  Description: Monitor and assess patient's nutrition/hydration status for malnutrition. Collaborate with interdisciplinary team and initiate plan and interventions as ordered.  Monitor patient's weight and dietary intake as ordered or per policy. Utilize nutrition screening tool and intervene as necessary. Determine patient's food preferences and provide high-protein, high-caloric foods as appropriate.     INTERVENTIONS:  - Monitor oral intake, urinary output, labs, and treatment plans  - Assess nutrition and hydration status and recommend course of action  - Evaluate amount of meals eaten  - Assist patient with eating if necessary   - Allow adequate time for meals  - Recommend/ encourage appropriate diets, oral nutritional supplements, and vitamin/mineral supplements  - Order, calculate, and assess calorie counts as needed  - Recommend, monitor, and adjust tube feedings and TPN/PPN based on assessed needs  - Assess need for intravenous fluids  - Provide specific  nutrition/hydration education as appropriate  - Include patient/family/caregiver in decisions related to nutrition  Outcome: Progressing     Problem: Prexisting or High Potential for Compromised Skin Integrity  Goal: Skin integrity is maintained or improved  Description: INTERVENTIONS:  - Identify patients at risk for skin breakdown  - Assess and monitor skin integrity  - Assess and monitor nutrition and hydration status  - Monitor labs   - Assess for incontinence   - Turn and reposition patient  - Assist with mobility/ambulation  - Relieve pressure over bony prominences  - Avoid friction and shearing  - Provide appropriate hygiene as needed including keeping skin clean and dry  - Evaluate need for skin moisturizer/barrier cream  - Collaborate with interdisciplinary team   - Patient/family teaching  - Consider wound care consult   Outcome: Progressing     Problem: PAIN - ADULT  Goal: Verbalizes/displays adequate comfort level or baseline comfort level  Description: Interventions:  - Encourage patient to monitor pain and request assistance  - Assess pain using appropriate pain scale  - Administer analgesics based on type and severity of pain and evaluate response  - Implement non-pharmacological measures as appropriate and evaluate response  - Consider cultural and social influences on pain and pain management  - Notify physician/advanced practitioner if interventions unsuccessful or patient reports new pain  Outcome: Progressing     Problem: INFECTION - ADULT  Goal: Absence or prevention of progression during hospitalization  Description: INTERVENTIONS:  - Assess and monitor for signs and symptoms of infection  - Monitor lab/diagnostic results  - Monitor all insertion sites, i.e. indwelling lines, tubes, and drains  - Monitor endotracheal if appropriate and nasal secretions for changes in amount and color  - [place] appropriate cooling/warming therapies per order  - Administer medications as ordered  - Instruct  and encourage patient and family to use good hand hygiene technique  - Identify and instruct in appropriate isolation precautions for identified infection/condition  Outcome: Progressing  Goal: Absence of fever/infection during neutropenic period  Description: INTERVENTIONS:  - Monitor WBC    Outcome: Progressing     Problem: SAFETY ADULT  Goal: Patient will remain free of falls  Description: INTERVENTIONS:  - Educate patient/family on patient safety including physical limitations  - Instruct patient to call for assistance with activity   - Consult OT/PT to assist with strengthening/mobility   - Keep Call bell within reach  - Keep bed low and locked with side rails adjusted as appropriate  - Keep care items and personal belongings within reach  - Initiate and maintain comfort rounds  - Make Fall Risk Sign visible to staff  - Apply yellow socks and bracelet for high fall risk patients  - Consider moving patient to room near nurses station  Outcome: Progressing  Goal: Maintain or return to baseline ADL function  Description: INTERVENTIONS:  -  Assess patient's ability to carry out ADLs; assess patient's baseline for ADL function and identify physical deficits which impact ability to perform ADLs (bathing, care of mouth/teeth, toileting, grooming, dressing, etc.)  - Assess/evaluate cause of self-care deficits   - Assess range of motion  - Assess patient's mobility; develop plan if impaired  - Assess patient's need for assistive devices and provide as appropriate  - Encourage maximum independence but intervene and supervise when necessary  - Involve family in performance of ADLs  - Assess for home care needs following discharge   - Consider OT consult to assist with ADL evaluation and planning for discharge  - Provide patient education as appropriate  Outcome: Progressing  Goal: Maintains/Returns to pre admission functional level  Description: INTERVENTIONS:  - Perform AM-PAC 6 Click Basic Mobility/ Daily Activity  assessment daily.  - Set and communicate daily mobility goal to care team and patient/family/caregiver.   - Collaborate with rehabilitation services on mobility goals if consulted  - Reposition patient every 2 hours.  - Record patient progress and toleration of activity level   Outcome: Progressing     Problem: DISCHARGE PLANNING  Goal: Discharge to home or other facility with appropriate resources  Description: INTERVENTIONS:  - Identify barriers to discharge w/patient and caregiver  - Arrange for needed discharge resources and transportation as appropriate  - Identify discharge learning needs (meds, wound care, etc.)  - Arrange for interpretive services to assist at discharge as needed  - Refer to Case Management Department for coordinating discharge planning if the patient needs post-hospital services based on physician/advanced practitioner order or complex needs related to functional status, cognitive ability, or social support system  Outcome: Progressing     Problem: Knowledge Deficit  Goal: Patient/family/caregiver demonstrates understanding of disease process, treatment plan, medications, and discharge instructions  Description: Complete learning assessment and assess knowledge base.  Interventions:  - Provide teaching at level of understanding  - Provide teaching via preferred learning methods  Outcome: Progressing

## 2024-06-21 LAB
ANION GAP SERPL CALCULATED.3IONS-SCNC: 5 MMOL/L (ref 4–13)
BACTERIA CSF CULT: NO GROWTH
BACTERIA SPT RESP CULT: ABNORMAL
BACTERIA SPT RESP CULT: ABNORMAL
BASOPHILS # BLD AUTO: 0.03 THOUSANDS/ÂΜL (ref 0–0.1)
BASOPHILS NFR BLD AUTO: 1 % (ref 0–1)
BUN SERPL-MCNC: 10 MG/DL (ref 5–25)
CALCIUM SERPL-MCNC: 8.8 MG/DL (ref 8.4–10.2)
CHLORIDE SERPL-SCNC: 107 MMOL/L (ref 96–108)
CO2 SERPL-SCNC: 31 MMOL/L (ref 21–32)
CREAT SERPL-MCNC: 0.58 MG/DL (ref 0.6–1.3)
EOSINOPHIL # BLD AUTO: 0.09 THOUSAND/ÂΜL (ref 0–0.61)
EOSINOPHIL NFR BLD AUTO: 2 % (ref 0–6)
ERYTHROCYTE [DISTWIDTH] IN BLOOD BY AUTOMATED COUNT: 14 % (ref 11.6–15.1)
GFR SERPL CREATININE-BSD FRML MDRD: 113 ML/MIN/1.73SQ M
GLUCOSE SERPL-MCNC: 124 MG/DL (ref 65–140)
GLUCOSE SERPL-MCNC: 131 MG/DL (ref 65–140)
GLUCOSE SERPL-MCNC: 188 MG/DL (ref 65–140)
GLUCOSE SERPL-MCNC: 216 MG/DL (ref 65–140)
GLUCOSE SERPL-MCNC: 232 MG/DL (ref 65–140)
GRAM STN SPEC: ABNORMAL
HCT VFR BLD AUTO: 27.5 % (ref 36.5–49.3)
HGB BLD-MCNC: 9 G/DL (ref 12–17)
IMM GRANULOCYTES # BLD AUTO: 0.11 THOUSAND/UL (ref 0–0.2)
IMM GRANULOCYTES NFR BLD AUTO: 2 % (ref 0–2)
LYMPHOCYTES # BLD AUTO: 0.93 THOUSANDS/ÂΜL (ref 0.6–4.47)
LYMPHOCYTES NFR BLD AUTO: 16 % (ref 14–44)
MAGNESIUM SERPL-MCNC: 2.2 MG/DL (ref 1.9–2.7)
MCH RBC QN AUTO: 38.1 PG (ref 26.8–34.3)
MCHC RBC AUTO-ENTMCNC: 32.7 G/DL (ref 31.4–37.4)
MCV RBC AUTO: 117 FL (ref 82–98)
MONOCYTES # BLD AUTO: 0.65 THOUSAND/ÂΜL (ref 0.17–1.22)
MONOCYTES NFR BLD AUTO: 11 % (ref 4–12)
NEUTROPHILS # BLD AUTO: 3.94 THOUSANDS/ÂΜL (ref 1.85–7.62)
NEUTS SEG NFR BLD AUTO: 68 % (ref 43–75)
NRBC BLD AUTO-RTO: 0 /100 WBCS
PHOSPHATE SERPL-MCNC: 3.2 MG/DL (ref 2.7–4.5)
PLATELET # BLD AUTO: 231 THOUSANDS/UL (ref 149–390)
PMV BLD AUTO: 9.1 FL (ref 8.9–12.7)
POTASSIUM SERPL-SCNC: 4 MMOL/L (ref 3.5–5.3)
PROCALCITONIN SERPL-MCNC: 0.34 NG/ML
RBC # BLD AUTO: 2.36 MILLION/UL (ref 3.88–5.62)
RHEUMATOID FACT SER QL LA: NEGATIVE
SODIUM SERPL-SCNC: 143 MMOL/L (ref 135–147)
WBC # BLD AUTO: 5.75 THOUSAND/UL (ref 4.31–10.16)

## 2024-06-21 PROCEDURE — 84145 PROCALCITONIN (PCT): CPT

## 2024-06-21 PROCEDURE — 82948 REAGENT STRIP/BLOOD GLUCOSE: CPT

## 2024-06-21 PROCEDURE — 80048 BASIC METABOLIC PNL TOTAL CA: CPT | Performed by: INTERNAL MEDICINE

## 2024-06-21 PROCEDURE — 99232 SBSQ HOSP IP/OBS MODERATE 35: CPT | Performed by: STUDENT IN AN ORGANIZED HEALTH CARE EDUCATION/TRAINING PROGRAM

## 2024-06-21 PROCEDURE — 97116 GAIT TRAINING THERAPY: CPT

## 2024-06-21 PROCEDURE — 97535 SELF CARE MNGMENT TRAINING: CPT

## 2024-06-21 PROCEDURE — 85025 COMPLETE CBC W/AUTO DIFF WBC: CPT

## 2024-06-21 PROCEDURE — 97163 PT EVAL HIGH COMPLEX 45 MIN: CPT

## 2024-06-21 PROCEDURE — C9113 INJ PANTOPRAZOLE SODIUM, VIA: HCPCS

## 2024-06-21 PROCEDURE — 94760 N-INVAS EAR/PLS OXIMETRY 1: CPT

## 2024-06-21 PROCEDURE — 97167 OT EVAL HIGH COMPLEX 60 MIN: CPT

## 2024-06-21 PROCEDURE — 99223 1ST HOSP IP/OBS HIGH 75: CPT | Performed by: INTERNAL MEDICINE

## 2024-06-21 PROCEDURE — NC001 PR NO CHARGE: Performed by: STUDENT IN AN ORGANIZED HEALTH CARE EDUCATION/TRAINING PROGRAM

## 2024-06-21 PROCEDURE — 94640 AIRWAY INHALATION TREATMENT: CPT

## 2024-06-21 PROCEDURE — 009U3ZX DRAINAGE OF SPINAL CANAL, PERCUTANEOUS APPROACH, DIAGNOSTIC: ICD-10-PCS | Performed by: INTERNAL MEDICINE

## 2024-06-21 PROCEDURE — 92610 EVALUATE SWALLOWING FUNCTION: CPT

## 2024-06-21 PROCEDURE — 83735 ASSAY OF MAGNESIUM: CPT

## 2024-06-21 PROCEDURE — 84100 ASSAY OF PHOSPHORUS: CPT

## 2024-06-21 RX ORDER — INSULIN LISPRO 100 [IU]/ML
1-5 INJECTION, SOLUTION INTRAVENOUS; SUBCUTANEOUS
Status: DISCONTINUED | OUTPATIENT
Start: 2024-06-21 | End: 2024-06-27 | Stop reason: HOSPADM

## 2024-06-21 RX ADMIN — BACLOFEN 10 MG: 10 TABLET ORAL at 17:27

## 2024-06-21 RX ADMIN — LEVALBUTEROL HYDROCHLORIDE 1.25 MG: 1.25 SOLUTION RESPIRATORY (INHALATION) at 07:45

## 2024-06-21 RX ADMIN — PANTOPRAZOLE SODIUM 40 MG: 40 INJECTION, POWDER, FOR SOLUTION INTRAVENOUS at 08:11

## 2024-06-21 RX ADMIN — INSULIN LISPRO 1 UNITS: 100 INJECTION, SOLUTION INTRAVENOUS; SUBCUTANEOUS at 00:04

## 2024-06-21 RX ADMIN — CHLORHEXIDINE GLUCONATE 15 ML: 1.2 RINSE ORAL at 08:10

## 2024-06-21 RX ADMIN — SULFAMETHOXAZOLE AND TRIMETHOPRIM 2 TABLET: 800; 160 TABLET ORAL at 05:38

## 2024-06-21 RX ADMIN — QUETIAPINE FUMARATE 50 MG: 25 TABLET ORAL at 21:33

## 2024-06-21 RX ADMIN — HEPARIN SODIUM 5000 UNITS: 5000 INJECTION INTRAVENOUS; SUBCUTANEOUS at 05:38

## 2024-06-21 RX ADMIN — LISINOPRIL 10 MG: 10 TABLET ORAL at 08:13

## 2024-06-21 RX ADMIN — INSULIN LISPRO 2 UNITS: 100 INJECTION, SOLUTION INTRAVENOUS; SUBCUTANEOUS at 12:32

## 2024-06-21 RX ADMIN — FOLIC ACID TAB 400 MCG 400 MCG: 400 TAB at 08:09

## 2024-06-21 RX ADMIN — Medication 100 MG: at 08:12

## 2024-06-21 RX ADMIN — INSULIN LISPRO 1 UNITS: 100 INJECTION, SOLUTION INTRAVENOUS; SUBCUTANEOUS at 17:27

## 2024-06-21 RX ADMIN — BACLOFEN 10 MG: 10 TABLET ORAL at 08:10

## 2024-06-21 RX ADMIN — HEPARIN SODIUM 5000 UNITS: 5000 INJECTION INTRAVENOUS; SUBCUTANEOUS at 21:33

## 2024-06-21 RX ADMIN — IPRATROPIUM BROMIDE 0.5 MG: 0.5 SOLUTION RESPIRATORY (INHALATION) at 07:44

## 2024-06-21 RX ADMIN — CYANOCOBALAMIN TAB 500 MCG 1000 MCG: 500 TAB at 08:11

## 2024-06-21 RX ADMIN — INSULIN LISPRO 2 UNITS: 100 INJECTION, SOLUTION INTRAVENOUS; SUBCUTANEOUS at 21:33

## 2024-06-21 RX ADMIN — HEPARIN SODIUM 5000 UNITS: 5000 INJECTION INTRAVENOUS; SUBCUTANEOUS at 14:02

## 2024-06-21 NOTE — PLAN OF CARE
Problem: SAFETY,RESTRAINT: NV/NON-SELF DESTRUCTIVE BEHAVIOR  Goal: Remains free of harm/injury (restraint for non violent/non self-detsructive behavior)  Description: INTERVENTIONS:  - Instruct patient/family regarding restraint use   - Assess and monitor physiologic and psychological status   - Provide interventions and comfort measures to meet assessed patient needs   - Identify and implement measures to help patient regain control  - Assess readiness for release of restraint   Outcome: Progressing  Goal: Returns to optimal restraint-free functioning  Description: INTERVENTIONS:  - Assess the patient's behavior and symptoms that indicate continued need for restraint  - Identify and implement measures to help patient regain control  - Assess readiness for release of restraint   Outcome: Progressing     Problem: Nutrition/Hydration-ADULT  Goal: Nutrient/Hydration intake appropriate for improving, restoring or maintaining nutritional needs  Description: Monitor and assess patient's nutrition/hydration status for malnutrition. Collaborate with interdisciplinary team and initiate plan and interventions as ordered.  Monitor patient's weight and dietary intake as ordered or per policy. Utilize nutrition screening tool and intervene as necessary. Determine patient's food preferences and provide high-protein, high-caloric foods as appropriate.     INTERVENTIONS:  - Monitor oral intake, urinary output, labs, and treatment plans  - Assess nutrition and hydration status and recommend course of action  - Evaluate amount of meals eaten  - Assist patient with eating if necessary   - Allow adequate time for meals  - Recommend/ encourage appropriate diets, oral nutritional supplements, and vitamin/mineral supplements  - Order, calculate, and assess calorie counts as needed  - Recommend, monitor, and adjust tube feedings and TPN/PPN based on assessed needs  - Assess need for intravenous fluids  - Provide specific  nutrition/hydration education as appropriate  - Include patient/family/caregiver in decisions related to nutrition  Outcome: Progressing     Problem: Prexisting or High Potential for Compromised Skin Integrity  Goal: Skin integrity is maintained or improved  Description: INTERVENTIONS:  - Identify patients at risk for skin breakdown  - Assess and monitor skin integrity  - Assess and monitor nutrition and hydration status  - Monitor labs   - Assess for incontinence   - Turn and reposition patient  - Assist with mobility/ambulation  - Relieve pressure over bony prominences  - Avoid friction and shearing  - Provide appropriate hygiene as needed including keeping skin clean and dry  - Evaluate need for skin moisturizer/barrier cream  - Collaborate with interdisciplinary team   - Patient/family teaching  - Consider wound care consult   Outcome: Progressing     Problem: PAIN - ADULT  Goal: Verbalizes/displays adequate comfort level or baseline comfort level  Description: Interventions:  - Encourage patient to monitor pain and request assistance  - Assess pain using appropriate pain scale  - Administer analgesics based on type and severity of pain and evaluate response  - Implement non-pharmacological measures as appropriate and evaluate response  - Consider cultural and social influences on pain and pain management  - Notify physician/advanced practitioner if interventions unsuccessful or patient reports new pain  Outcome: Progressing     Problem: INFECTION - ADULT  Goal: Absence or prevention of progression during hospitalization  Description: INTERVENTIONS:  - Assess and monitor for signs and symptoms of infection  - Monitor lab/diagnostic results  - Monitor all insertion sites, i.e. indwelling lines, tubes, and drains  - Monitor endotracheal if appropriate and nasal secretions for changes in amount and color  - [place] appropriate cooling/warming therapies per order  - Administer medications as ordered  - Instruct  and encourage patient and family to use good hand hygiene technique  - Identify and instruct in appropriate isolation precautions for identified infection/condition  Outcome: Progressing  Goal: Absence of fever/infection during neutropenic period  Description: INTERVENTIONS:  - Monitor WBC    Outcome: Progressing     Problem: SAFETY ADULT  Goal: Patient will remain free of falls  Description: INTERVENTIONS:  - Educate patient/family on patient safety including physical limitations  - Instruct patient to call for assistance with activity   - Consult OT/PT to assist with strengthening/mobility   - Keep Call bell within reach  - Keep bed low and locked with side rails adjusted as appropriate  - Keep care items and personal belongings within reach  - Initiate and maintain comfort rounds  - Make Fall Risk Sign visible to staff  - Offer Toileting every 2 Hours, in advance of need  - Initiate/Maintain bed and chair alarm  - Apply yellow socks and bracelet for high fall risk patients  - Consider moving patient to room near nurses station  Outcome: Progressing  Goal: Maintain or return to baseline ADL function  Description: INTERVENTIONS:  -  Assess patient's ability to carry out ADLs; assess patient's baseline for ADL function and identify physical deficits which impact ability to perform ADLs (bathing, care of mouth/teeth, toileting, grooming, dressing, etc.)  - Assess/evaluate cause of self-care deficits   - Assess range of motion  - Assess patient's mobility; develop plan if impaired  - Assess patient's need for assistive devices and provide as appropriate  - Encourage maximum independence but intervene and supervise when necessary  - Involve family in performance of ADLs  - Assess for home care needs following discharge   - Consider OT consult to assist with ADL evaluation and planning for discharge  - Provide patient education as appropriate  Outcome: Progressing  Goal: Maintains/Returns to pre admission functional  level  Description: INTERVENTIONS:  - Perform AM-PAC 6 Click Basic Mobility/ Daily Activity assessment daily.  - Set and communicate daily mobility goal to care team and patient/family/caregiver.   - Collaborate with rehabilitation services on mobility goals if consulted  - Perform Range of Motion 3 times a day.  - Reposition patient every 2 hours.  - Out of bed to chair 3 times a day   - Out of bed for meals 3 times a day  - Out of bed for toileting  - Record patient progress and toleration of activity level   Outcome: Progressing     Problem: DISCHARGE PLANNING  Goal: Discharge to home or other facility with appropriate resources  Description: INTERVENTIONS:  - Identify barriers to discharge w/patient and caregiver  - Arrange for needed discharge resources and transportation as appropriate  - Identify discharge learning needs (meds, wound care, etc.)  - Arrange for interpretive services to assist at discharge as needed  - Refer to Case Management Department for coordinating discharge planning if the patient needs post-hospital services based on physician/advanced practitioner order or complex needs related to functional status, cognitive ability, or social support system  Outcome: Progressing     Problem: Knowledge Deficit  Goal: Patient/family/caregiver demonstrates understanding of disease process, treatment plan, medications, and discharge instructions  Description: Complete learning assessment and assess knowledge base.  Interventions:  - Provide teaching at level of understanding  - Provide teaching via preferred learning methods  Outcome: Progressing

## 2024-06-21 NOTE — PLAN OF CARE
Problem: OCCUPATIONAL THERAPY ADULT  Goal: Performs self-care activities at highest level of function for planned discharge setting.  See evaluation for individualized goals.  Description: Treatment Interventions: ADL retraining, Functional transfer training, UE strengthening/ROM, Endurance training, Cognitive reorientation, Patient/family training, Equipment evaluation/education, Neuromuscular reeducation, Fine motor coordination activities, Compensatory technique education, Energy conservation, Activityengagement          See flowsheet documentation for full assessment, interventions and recommendations.   Note: Limitation: Decreased ADL status, Decreased UE ROM, Decreased UE strength, Decreased Safe judgement during ADL, Decreased cognition, Decreased endurance, Decreased self-care trans, Decreased high-level ADLs, Decreased fine motor control (insight, fnxl mobility, balance, standing tolerance, decreased use of R UE)  Prognosis: Fair  Assessment: Patient is a 54 y.o. male seen for OT evaluation and treatment  at [facility] following admission on 6/13/2024  s/p Metabolic encephalopathy. Please see above for comprehensive list of comorbidities and significant PMHx impacting functional performance.  At baseline, living w/ mom in a 2SH. Independent with ADLs, basic IADLs. CAMILA for functional mobility w/ use of SPC vs no AD. (-) driving, (+) falls. Upon initial evaluation, pt appears to be performing below baseline functional status. Pt requires AMILCAR for UB ADLs, MAXA for LB ADLs, supervision for bed mobility, MODA x 2 for transfers and MODA x 2 for functional mobility short distance  with RW. The AM-PAC & Barthel Index outcome tools were used to assist in determining pt safety w/self care /mobility and appropriate d/c recommendations, see above for score. Occupational performance is affected by the following deficits:  decreased muscular strength , decreased motor control , decreased balance ,  decreased standing tolerance for self care tasks , decreased dynamic balance impacting functional reach, decreased functional reach , decreased trunk control , decreased activity tolerance , impaired judgement and problem solving , poor spatial awareness , impaired safety awareness , impaired learning ability d/t current cognitive status , and impaired global mental status. Personal/Environmental factors impacting D/C include: (+) Hx of falls , steps to enter/navigate the home, FOS to second floor, Assistance needed for ADL/IADLs, Assistance needed for ADLs and functional mobility, High fall risk , decreased insight toward deficits , decreased recall of precautions , and health management. Supporting factors include: support system available and attitude towards recovery Patient would benefit from OT services within the acute care setting to maximize level of functional independence in the following areas fall prevention , self-care transfers, bed mobility , functional mobility, formal cognitive evaluation, and ADLs.  From OT standpoint, recommendation at time of D/C would be level II (moderate resource intensity).     Rehab Resource Intensity Level, OT: II (Moderate Resource Intensity)     Sheila Oliveira

## 2024-06-21 NOTE — ASSESSMENT & PLAN NOTE
Present on arrival, as evidence by - Tachycardia, Tachypnea, Leukocytosis   Suspected source - Unknown at this time  CXR and CT imaging without identifiable source.   Lactate - 1.8  WBC - 15  Procalcitonin - 70  UA: Negative leuks/nitrites, occasional bacteria 2-4 WBC  Blood culture x 2 drawn in ED  He received 1L [place]    Currently still on vancomycin and cefepime  LP without significant findings currently, sputum with gram-negative rods    Plan:  Completed 7-day course of antibiotic therapy  ID following, no need for continued antibiotics

## 2024-06-21 NOTE — ASSESSMENT & PLAN NOTE
Lab Results   Component Value Date    HGBA1C 5.6 06/20/2024   Previously took Basaglar 10 units daily, stopped in 2019    Plan  ISS  Goal glucose 140-180  Hypoglycemia protocol  Starting dysphagia 3 diet with nectar thick

## 2024-06-21 NOTE — ASSESSMENT & PLAN NOTE
VBG on arrival - 7.0/28/40/7/-24  Repeat VBG - 6.9/30/55/7/-24  AG - 29  Lactic - 1.8  Etiology unclear Sepsis vs Baclofen intoxication vs Ketosis  COMA panel - Negative  S/p 1L Isolyte in ED  Pending ethylene glycol and methanol levels  Acidosis has resolved    Plan:  Will continue to check BMP and electrolytes, replete as needed

## 2024-06-21 NOTE — PROGRESS NOTES
Critical Care Interval Transfer Note:    Brief Hospital Summary:   54yoM with PMH of brain abscess s/p drainage in 2019, HTN, T2DM, ethanol abuse, previous hospitalization for baclofen toxicity who presented with AMS. Found to be profoundly acidotic likely secondary to alcoholic and starvation ketosis. Needed intubation secondary to profound obtundation after being given versed for agitation. Was subsequently cared for in the ICU for resolution of the acidosis and work up for possible toxic ingestion with the help of the Toxicology service. Also found to have bacterial infection of unknown source and treated with broad spectrum abx. Continued to have episodes of fever despite adequate duration of treatment. Grew Stenotrophomonas on sputum and was switched over to Bactrim. Discussed with ID and agreed that the culture not be representative of active infection and have held further abx. Continues to be on supplemental oxygen with improving mentation.    Barriers to discharge:   Supplemental O2  PTOT Evaluation     Consults: IP CONSULT TO CASE MANAGEMENT  IP CONSULT TO NUTRITION SERVICES  IP CONSULT TO TOXICOLOGY  IP CONSULT TO PASTORAL CARE  IP CONSULT TO INFECTIOUS DISEASES    Recommended to review admission imaging for incidental findings and document in discharge navigator: Chart reviewed, no known incidental findings noted at this time.      Discharge Plan: Anticipate discharge in 24-48 hrs to discharge location to be determined pending rehab evaluations.    PT Recommendations: Home with home health rehabilitation  OT Recommendations: Home with home health rehabilitation      Patient seen and evaluated by Critical Care today and deemed to be appropriate for transfer to Med Surg. Spoke to Dr. Ivy from [facility] to accept transfer. Critical care can be contacted via [employer] with any questions or concerns.

## 2024-06-21 NOTE — PLAN OF CARE
Problem: SAFETY,RESTRAINT: NV/NON-SELF DESTRUCTIVE BEHAVIOR  Goal: Remains free of harm/injury (restraint for non violent/non self-detsructive behavior)  Description: INTERVENTIONS:  - Instruct patient/family regarding restraint use   - Assess and monitor physiologic and psychological status   - Provide interventions and comfort measures to meet assessed patient needs   - Identify and implement measures to help patient regain control  - Assess readiness for release of restraint   Outcome: Progressing  Goal: Returns to optimal restraint-free functioning  Description: INTERVENTIONS:  - Assess the patient's behavior and symptoms that indicate continued need for restraint  - Identify and implement measures to help patient regain control  - Assess readiness for release of restraint   Outcome: Progressing     Problem: Nutrition/Hydration-ADULT  Goal: Nutrient/Hydration intake appropriate for improving, restoring or maintaining nutritional needs  Description: Monitor and assess patient's nutrition/hydration status for malnutrition. Collaborate with interdisciplinary team and initiate plan and interventions as ordered.  Monitor patient's weight and dietary intake as ordered or per policy. Utilize nutrition screening tool and intervene as necessary. Determine patient's food preferences and provide high-protein, high-caloric foods as appropriate.     INTERVENTIONS:  - Monitor oral intake, urinary output, labs, and treatment plans  - Assess nutrition and hydration status and recommend course of action  - Evaluate amount of meals eaten  - Assist patient with eating if necessary   - Allow adequate time for meals  - Recommend/ encourage appropriate diets, oral nutritional supplements, and vitamin/mineral supplements  - Order, calculate, and assess calorie counts as needed  - Recommend, monitor, and adjust tube feedings and TPN/PPN based on assessed needs  - Assess need for intravenous fluids  - Provide specific  nutrition/hydration education as appropriate  - Include patient/family/caregiver in decisions related to nutrition  Outcome: Progressing     Problem: Prexisting or High Potential for Compromised Skin Integrity  Goal: Skin integrity is maintained or improved  Description: INTERVENTIONS:  - Identify patients at risk for skin breakdown  - Assess and monitor skin integrity  - Assess and monitor nutrition and hydration status  - Monitor labs   - Assess for incontinence   - Turn and reposition patient  - Assist with mobility/ambulation  - Relieve pressure over bony prominences  - Avoid friction and shearing  - Provide appropriate hygiene as needed including keeping skin clean and dry  - Evaluate need for skin moisturizer/barrier cream  - Collaborate with interdisciplinary team   - Patient/family teaching  - Consider wound care consult   Outcome: Progressing     Problem: PAIN - ADULT  Goal: Verbalizes/displays adequate comfort level or baseline comfort level  Description: Interventions:  - Encourage patient to monitor pain and request assistance  - Assess pain using appropriate pain scale  - Administer analgesics based on type and severity of pain and evaluate response  - Implement non-pharmacological measures as appropriate and evaluate response  - Consider cultural and social influences on pain and pain management  - Notify physician/advanced practitioner if interventions unsuccessful or patient reports new pain  Outcome: Progressing     Problem: INFECTION - ADULT  Goal: Absence or prevention of progression during hospitalization  Description: INTERVENTIONS:  - Assess and monitor for signs and symptoms of infection  - Monitor lab/diagnostic results  - Monitor all insertion sites, i.e. indwelling lines, tubes, and drains  - Monitor endotracheal if appropriate and nasal secretions for changes in amount and color  - [place] appropriate cooling/warming therapies per order  - Administer medications as ordered  - Instruct  and encourage patient and family to use good hand hygiene technique  - Identify and instruct in appropriate isolation precautions for identified infection/condition  Outcome: Progressing  Goal: Absence of fever/infection during neutropenic period  Description: INTERVENTIONS:  - Monitor WBC    Outcome: Progressing     Problem: SAFETY ADULT  Goal: Patient will remain free of falls  Description: INTERVENTIONS:  - Educate patient/family on patient safety including physical limitations  - Instruct patient to call for assistance with activity   - Consult OT/PT to assist with strengthening/mobility   - Keep Call bell within reach  - Keep bed low and locked with side rails adjusted as appropriate  - Keep care items and personal belongings within reach  - Initiate and maintain comfort rounds  - Make Fall Risk Sign visible to staff  - Offer Toileting every 2 Hours, in advance of need  - Initiate/Maintain bed alarm  - Obtain necessary fall risk management equipment  - Apply yellow socks and bracelet for high fall risk patients  - Consider moving patient to room near nurses station  Outcome: Progressing  Goal: Maintain or return to baseline ADL function  Description: INTERVENTIONS:  -  Assess patient's ability to carry out ADLs; assess patient's baseline for ADL function and identify physical deficits which impact ability to perform ADLs (bathing, care of mouth/teeth, toileting, grooming, dressing, etc.)  - Assess/evaluate cause of self-care deficits   - Assess range of motion  - Assess patient's mobility; develop plan if impaired  - Assess patient's need for assistive devices and provide as appropriate  - Encourage maximum independence but intervene and supervise when necessary  - Involve family in performance of ADLs  - Assess for home care needs following discharge   - Consider OT consult to assist with ADL evaluation and planning for discharge  - Provide patient education as appropriate  Outcome: Progressing  Goal:  Maintains/Returns to pre admission functional level  Description: INTERVENTIONS:  - Perform AM-PAC 6 Click Basic Mobility/ Daily Activity assessment daily.  - Set and communicate daily mobility goal to care team and patient/family/caregiver.   - Collaborate with rehabilitation services on mobility goals if consulted  - Perform Range of Motion 3 times a day.  - Reposition patient every 2 hours.  - Dangle patient 3 times a day  - Stand patient 3 times a day  - Ambulate patient 3 times a day  - Out of bed to chair 3 times a day   - Out of bed for meals 3 times a day  - Out of bed for toileting  - Record patient progress and toleration of activity level   Outcome: Progressing     Problem: DISCHARGE PLANNING  Goal: Discharge to home or other facility with appropriate resources  Description: INTERVENTIONS:  - Identify barriers to discharge w/patient and caregiver  - Arrange for needed discharge resources and transportation as appropriate  - Identify discharge learning needs (meds, wound care, etc.)  - Arrange for interpretive services to assist at discharge as needed  - Refer to Case Management Department for coordinating discharge planning if the patient needs post-hospital services based on physician/advanced practitioner order or complex needs related to functional status, cognitive ability, or social support system  Outcome: Progressing     Problem: Knowledge Deficit  Goal: Patient/family/caregiver demonstrates understanding of disease process, treatment plan, medications, and discharge instructions  Description: Complete learning assessment and assess knowledge base.  Interventions:  - Provide teaching at level of understanding  - Provide teaching via preferred learning methods  Outcome: Progressing

## 2024-06-21 NOTE — SPEECH THERAPY NOTE
Speech-Language Pathology Bedside Swallow Evaluation        Patient Name: Colin Dumont    Today's Date: 6/21/2024     Problem List  Principal Problem:    Metabolic encephalopathy  Active Problems:    Benign essential hypertension    S/P craniotomy    Type II diabetes mellitus (HCC)    High anion gap metabolic acidosis    Spasticity    SIRS (systemic inflammatory response syndrome) (HCC)    Acute respiratory failure with hypoxia (HCC)    Alcohol use         Past Medical History  Past Medical History:   Diagnosis Date    Abdominal cyst     Anemia     Hx     Chronic pain disorder     abdominal    Diabetes mellitus (HCC)     Diverticulitis     GI bleed     History of transfusion 2016    5 units    Lightheadedness     Multiple brain abscesses 2020    Multiple lesions on computed tomography of brain and spine     Spleen laceration        Summary    Pt presents with Mild oral/mild-moderate pharyngeal dysphagia with intermittent cough, aspiration risk w/ thin liquids. Will allow ice chips for now.     Recommendations:   Diet: soft/level 3 diet and nectar thick liquids ok for ice chips  Meds: whole with liquid and whole with puree ( nectar thick liquids)   Frequent Oral care: 2x/day  Aspiration precautions   Other Recommendations/ considerations: will cont to follow for diet tolerance and potential to advance diet.       Current Medical Status  Pt is a 54 y.o. male who presented to [facility]  with metabolic encephalopathy. Pt presents with AMS, found hitting head off wall and agitated, not responding to verbal commands. In ED initial Vbg showed pH 7.0/28/40/7 he was given 1L bolus. He continued to be agitated unable to remain still for CT scan, was given 5 mg IV versed. After CT scan RR mid teens, VBG 6.9/30/55/7, he was subsequently intubated at this time.   Of note per chart review in 2019 similar episode of psychosis and metabolic acidosis which was secondary to excessive Baclofen use. Per patients mother  she did refill his Baclofen today, unsure how many are remaining. Pt intubated, unable to participate in exam.     Pt self extubated 6/20/24     Past medical history:   Please see H&P for details    Special Studies:  CT-chest/abd/pelvis w/ contrast: 6/20/24 Interval development of extensive bilateral lung consolidation compatible with aspiration pneumonia/pneumonitis, and small bilateral pleural effusions.     Social/Education/Vocational Hx:  Pt lives with family    Swallow Information   Prior speech/swallowing tx: seen July 2022, rec Regular diet w/ thin liquids  Current Risks for Dysphagia & Aspiration: recent intubation and self extubation  Current Symptoms/Concerns:  failed nsg screen, self extubation; suspect aspiration pna  Current Diet: NPO   Baseline Diet: regular diet and thin liquids  Takes pills- whole w/ water     Baseline Assessment   Behavior/Cognition: alert  Speech/Language Status: able to participate in basic conversation and able to follow commands  Patient Positioning: upright in bed     Swallow Mechanism Exam   Facial: symmetrical  Labial: WFL  Lingual: WFL  Velum: unable to visualize  Mandible: adequate ROM  Dentition: adequate  Vocal quality:hoarse   Volitional Cough: strong/productive   Respiratory: NC, O2 sats 95-97&    Consistencies Assessed and Performance   Consistencies Administered: thin liquids, nectar thick, puree, and soft solids (shortbread cookies) ice chips    Oral Stage: pt able to bite shortbread cookies, drink liquids from cup and straw w/o labial leakage. Mastication of mech solids appeared WFL. Oral control of liquids appeared adequate. Bolus transfer appeared WFL.     Pharyngeal Stage: swallow initiation appeared delayed w/ fair laryngeal excursion initially, improving w/ subsequent swallows. Throat clearing and occas cough noted with thin liquids by cup. Pt also trialed w/ ice chips, mild throat clear noted x1.       Esophageal Concerns: none reported      Results Reviewed  with: patient and RN   Dysphagia Goals: pt will tolerate dysphagia 3 diet with nectar thick liquids without s/s of aspiration x2-4 sessions      Gabby Levy MA CCC-SLP  Speech Pathologist  [place] license # [id number]  [place] license # [id number]  Available via [employer]

## 2024-06-21 NOTE — CONSULTS
Consultation - Infectious Disease   Colin Dumont 54 y.o. male MRN: [id number]  Unit/Bed#: ICU 13 Encounter: [id number]      IMPRESSION & RECOMMENDATIONS:   1.  SIRS.  Patient initially presented meeting SIRS criteria and then subsequently declined from a respiratory standpoint.  He was noted to have metabolic derangements as well and he was altered on presentation.  Initial imaging had been unremarkable but patient was having fevers with elevated Pro-Ortiz.  Was empirically treated with antibiotics.  Cultures and LP unremarkable.  Patient did eventually defervesce.  Repeat imaging showed a later developing pulmonary process, possible aspiration/pneumonitis.  Repeat sputum cultures now isolating stenotrophomonas.  Patient however has overall clinically improved, Pro-Ortiz previously downtrended and fevers improved.  Patient self extubated today.  Suspicion low for this organism causing true pneumonia and would question if this is colonization at this point.  Discontinue Bactrim  Monitor respiratory status  Continue to trend fever curve/vitals  Repeat CBC/chemistry tomorrow to monitor stability off antibiotic  Monitor upper extremity sites and continue local care  Additional supportive cares per primary  Additional interventions pending clinical course    2.  Metabolic encephalopathy.  Was found to be altered on presentation.  Ultimately uncertain if this was due to drug side effect or metabolic derangements.  Patient on evaluation is a poor historian but is otherwise calm and cooperative.  CT imaging of the head unremarkable  Monitor mental status  Maintain off antibiotics for now  Trend fever curve/vitals  Repeat CBC and chemistry tomorrow  Monitor skin exam  Low threshold for repeat imaging    3.  High anion gap metabolic acidosis.  Uncertain if this may have been due to an ingestion or drug side effect.  Has improved.  Continue care per primary service    4.  Acute hypoxic respiratory failure.  Likely in the  setting of patient's multiple metabolic derangements.  Uncertain if he had aspirated or was also having developing pneumonia at the time.  Pro-Ortiz has improved.  Patient self extubated and remains comfortable on room air.  Discontinue antibiotics as above  Monitor respiratory status  Continue to trend fever curve/vitals  Repeat labs tomorrow  Low threshold for repeat imaging of the chest    5.  Bilateral upper extremity phlebitis.  Noted on exam and then on subsequent upper extremity imaging.  Suspect that this may have been contributing to most recent round of fevers.  Sites do not appear grossly infected and patient's fever curve has improved.  No antibiotics for this issue  Monitor sites clinically  Continue local care with heat  Recommend elevation when possible  Additional supportive care per primary    Above plan discussed briefly with the patient at bedside  Above plan discussed in detail with critical care service who is aware of recommendation to discontinue antibiotics and monitor clinically for now    ID consult service will reevaluate this patient again on Monday unless new issues in the interim.  Please contact ID attending on call if questions.    HISTORY OF PRESENT ILLNESS:  Reason for Consult: Respiratory cultures positive for stenotrophomonas    HPI: Colin Dumont is a 54 y.o. year old male with hypertension, hyperlipidemia, previous craniotomy in 2019, type 2 diabetes, alcohol use/abuse.  Patient presented to the hospital initially with altered mental status.  Reportedly he was found hitting his head off the wall and agitated and not responding to verbal commands per his mother.  In the ED he had a significant acidosis and fluids were given.  He continued to be agitated and was unable to remain still for CAT scan.  He was given Versed and after CT he became tachypneic and so he was subsequently intubated.  Labs showed elevated white count and Pro-Ortiz was elevated.  CK was also elevated.  UA was  unremarkable.  CT of the head showed no acute findings.  CT chest/abdomen/pelvis also did not have any other acute findings.  Blood cultures were drawn and the patient was started on cefepime/vancomycin and Flagyl.  Reportedly the patient had a similar episode in 2019 with psychosis and metabolic acidosis secondary to excessive baclofen use.  Mother was uncertain of overuse at the time.  Patient essentially remained intubated up until today.  Discussed with critical care service and despite being on antibiotics the patient continued to have episodes of fevers.  Toxicology was also seeing and treating the patient for possible ingestion.  He actually underwent LP which was grossly unremarkable.  Multiple blood cultures have been negative.  MRSA culture from the sputum had been positive.  He had repeat sputum cultures obtained on 6/18.  He had completed about 7 days of antibiotic as of 6/19 and the antibiotics were switched to Bactrim yesterday when cultures resulted with stenotrophomonas.  Pro-Ortiz had already been appropriately declining prior to this transition.  On discussion with critical care patient was also noted with areas of phlebitis on the arms which could have also been contributing to fever.  Repeat CT head was done on 6/20 due to the episodes of fever and that was negative.  Repeat CT chest/abdomen/pelvis showed the interval development of bilateral lung consolidations with aspiration pneumonia/pneumonitis being a consideration.  Prior chest x-rays and again admission CT chest was negative.  Patient ultimately self extubated today.  On evaluation today he is overall a poor historian.  He does not localize any acute symptoms on exam. He can tell me that he lives with his mother but he does not know his medical history or medications that he takes.  He does not remember coming to the hospital.  He reports at baseline that he manages his own medications.  He is otherwise calm and cooperative during  evaluation. We are consulted at this time for further assistance with management of this patient's cultures.    REVIEW OF SYSTEMS:  A complete 12 point system-based review of systems is negative other than that noted in the HPI.    PAST MEDICAL HISTORY:  Past Medical History:   Diagnosis Date    Abdominal cyst     Anemia     Hx     Chronic pain disorder     abdominal    Diabetes mellitus (HCC)     Diverticulitis     GI bleed     History of transfusion 2016    5 units    Lightheadedness     Multiple brain abscesses 2020    Multiple lesions on computed tomography of brain and spine     Spleen laceration      Past Surgical History:   Procedure Laterality Date    ABDOMINAL SURGERY  2016    lap removal of mass post spleen injury    COLONOSCOPY N/A 2017    Procedure: COLONOSCOPY;  Surgeon: Hammad Galindo MD;  Location: [facility] GI LAB;  Service:     CRANIOTOMY      EGD AND COLONOSCOPY N/A 03/15/2017    Procedure: EGD AND COLONOSCOPY;  Surgeon: Hammad Galindo MD;  Location: [facility] GI LAB;  Service:     OTHER SURGICAL HISTORY      per [employer]-had spleen surgery; no other details    UPPER GASTROINTESTINAL ENDOSCOPY      for gastric bleeding       FAMILY HISTORY:  Non-contributory    SOCIAL HISTORY:  Social History   Social History     Substance and Sexual Activity   Alcohol Use Yes    Alcohol/week: 0.0 - 1.0 standard drinks of alcohol    Comment: ocassionally     Social History     Substance and Sexual Activity   Drug Use No     Social History     Tobacco Use   Smoking Status Some Days    Current packs/day: 0.00    Average packs/day: 0.3 packs/day for 5.0 years (1.3 ttl pk-yrs)    Types: Cigarettes    Start date: 2011    Last attempt to quit: 2016    Years since quittin.1   Smokeless Tobacco Current    Types: Chew   Tobacco Comments    1 pack/2-3 wks       ALLERGIES:  No Known Allergies    MEDICATIONS:  All current active medications have been reviewed.      PHYSICAL EXAM:  Temp:  [98 °F (36.7 °C)-99.3 °F  (37.4 °C)] 98.1 °F (36.7 °C)  HR:  [73-91] 78  Resp:  [13-42] 17  BP: (105-142)/(61-76) 120/68  SpO2:  [91 %-100 %] 96 %  Temp (24hrs), Av.5 °F (36.9 °C), Min:98 °F (36.7 °C), Max:99.3 °F (37.4 °C)  Current: Temperature: 98.1 °F (36.7 °C)    Intake/Output Summary (Last 24 hours) at 2024 1305  Last data filed at 2024 1000  Gross per 24 hour   Intake 20 ml   Output 1325 ml   Net -1305 ml       General Appearance:  Appearing well, nontoxic, and in no distress; poor historian but very calm and cooperative.  He answers questions appropriately otherwise.   Head:  Normocephalic, without obvious abnormality, atraumatic   Eyes:  Conjunctiva pink and sclera anicteric, both eyes   Nose: Nares normal, mucosa normal, no drainage   Throat: Oropharynx moist without lesions   Neck: Supple, symmetrical, no adenopathy, no tenderness/mass/nodules   Back:   Was unable to sit up at this time for me to examine his back.   Lungs:   Clear to auscultation bilaterally, respirations unlabored on room air   Chest Wall:  No tenderness or deformity   Heart:  RRR; no murmur, rub or gallop noted   Abdomen:   Soft, non-tender, non-distended, positive bowel sounds    Extremities: No cyanosis, clubbing; mild degree of edema in all of his extremities   Skin: Areas of inflammation surrounding previous IV sites in the upper extremities bilaterally.  He does have what appears to be may be a pressure blister on the right heel but no pain associated.   Lymph nodes: Cervical, supraclavicular nodes normal   Neurologic: Alert and oriented times 2.  Overall poor historian.  He will spontaneously move and roll his arms and legs in the bed.       LABS, IMAGING, & OTHER STUDIES:  In completing this consult I have performed an extensive review of the medical records in epic including review of the notes, radiographs, and laboratory results as detailed below.     Lab Results:  I have personally reviewed pertinent labs.  Comments/Interpretations: No  leukocytosis.  Pro-Ortiz downtrending.  Chemistry otherwise stable.    Results from last 7 days   Lab Units 06/21/24  0537 06/20/24  0522 06/19/24  0448   WBC Thousand/uL 5.75 7.37 7.28   HEMOGLOBIN g/dL 9.0* 9.1* 9.9*   PLATELETS Thousands/uL 231 209 192     Results from last 7 days   Lab Units 06/21/24  0537 06/20/24  0522 06/19/24  0448 06/18/24  0428   POTASSIUM mmol/L 4.0 3.9   < > 4.6   CHLORIDE mmol/L 107 102   < > 109*   CO2 mmol/L 31 28   < > 25   BUN mg/dL 10 13   < > 14   CREATININE mg/dL 0.58* 0.56*   < > 0.60   EGFR ml/min/1.73sq m 113 115   < > 112   CALCIUM mg/dL 8.8 8.4   < > 8.2*   AST U/L  --  14  --  22   ALT U/L  --  11  --  13   ALK PHOS U/L  --  49  --  36    < > = values in this interval not displayed.     Results from last 7 days   Lab Units 06/18/24  1533 06/18/24  1244 06/17/24  1048 06/16/24  1154   BLOOD CULTURE   --   --  No Growth at 72 hrs.  No Growth at 72 hrs.  --    SPUTUM CULTURE  4+ Growth of Stenotrophomonas maltophilia*  2+ Growth of  --   --   --    GRAM STAIN RESULT  4+ Gram negative rods*  2+ Polys*  Rare Gram positive cocci in pairs* Rare Polys  Rare Mononuclear Cells  No No organisms seen  --   --    MRSA CULTURE ONLY   --   --   --  Methicillin Resistant Staphylococcus aureus isolated*  This patient requires contact isolation precautions per [place] law. Contact precautions are not required in [place] for nasal surveillance cultures.       Imaging Studies:   I have personally reviewed pertinent imaging study reports and images in PACS.  Comments/Interpretations:  CT head imaging recently reviewed and negative  CT chest/abdomen/pelvis with developing infiltrate on the imaging.  Uncertain if this progressed from the timeline of the initial images and may have been initial source.    Other Studies:   I have personally reviewed other pertinent reports as below.  Records in CareEverywhere: No recent cultures    Nursing home/EMS Records: None    Current/Prior  Cultures: Previous MRSA culture noted to be positive.  Blood cultures negative.  Sputum cultures reviewed.

## 2024-06-21 NOTE — PROGRESS NOTES
[facility]  Progress Note  Name: Colin Dumont I  MRN: [id number]  Unit/Bed#: ICU 13 I Date of Admission: 6/13/2024   Date of Service: 6/21/2024 I Hospital Day: 8    Assessment & Plan   Acute respiratory failure with hypoxia (HCC)  Assessment & Plan  Pt presented to ED altered, agitated. Received Versed 5mg in ED and became somnolent after.  Intubated for airway protection and profound metabolic acidosis  VBG 6.9/30/55/7/ BE -24  VENT: AC/VC 18/380/40/6  CXR - ETT 5cm above roque, will advance by 1 cm on arrival to ICU    Plan:  S/p selfextubation, currently on 2 L nasal cannula, titrate    * Metabolic encephalopathy  Assessment & Plan  Pt presented to ED Altered  CTH - no acute intracranial abnormalities, previous post-operative changes.  History of previous psychotic episodes d/t excessive intake of Baclofen  Per patients mother she did refil his Baclofen, however is nto sure how many are left.   COMA panel - negative  UDS neg, ethanol negative  Requiring multiple doses of Versed while on propofol for agitation  Negative for methanol and ethylene glycol    Plan:  Patient improving since yesterday off of sedation, more interactive  Continue to monitor mental status    High anion gap metabolic acidosis  Assessment & Plan  VBG on arrival - 7.0/28/40/7/-24  Repeat VBG - 6.9/30/55/7/-24  AG - 29  Lactic - 1.8  Etiology unclear Sepsis vs Baclofen intoxication vs Ketosis  COMA panel - Negative  S/p 1L Isolyte in ED  Pending ethylene glycol and methanol levels  Acidosis has resolved    Plan:  Will continue to check BMP and electrolytes, replete as needed    S/P craniotomy  Assessment & Plan  S/p craniotomy 1/2019 at [facility], done secondary to abscess/lesions.  CTH no acute abnormalities, post-operative findings  Baseline independent    Benign essential hypertension  Assessment & Plan  Home regimen: Lisinopril 10mg daily      Alcohol use  Assessment & Plan  Hx ETOH use in the  past, family unclear if currently drink and if so how much. Unknown time of last drink.   Currently intubated/sedated  Without symptoms of withdrawal postextubation  Encourage cessation    SIRS (systemic inflammatory response syndrome) (HCC)  Assessment & Plan  Present on arrival, as evidence by - Tachycardia, Tachypnea, Leukocytosis   Suspected source - Unknown at this time  CXR and CT imaging without identifiable source.   Lactate - 1.8  WBC - 15  Procalcitonin - 70  UA: Negative leuks/nitrites, occasional bacteria 2-4 WBC  Blood culture x 2 drawn in ED  He received 1L Isolyte    Currently still on vancomycin and cefepime  LP without significant findings currently, sputum with gram-negative rods    Plan:  Completed 7-day course of antibiotic therapy  ID following, no need for continued antibiotics    Spasticity  Assessment & Plan  Hx spasticity s/p craniotomy, home regimen: Baclofen 10mg  Continue home baclofen    Type II diabetes mellitus (HCC)  Assessment & Plan    Lab Results   Component Value Date    HGBA1C 5.6 06/20/2024   Previously took Basaglar 10 units daily, stopped in 2019    Plan  ISS  Goal glucose 140-180  Hypoglycemia protocol  Starting dysphagia 3 diet with nectar thick             Disposition: Med Surg    ICU Core Measures     A: Assess, Prevent, and Manage Pain Has pain been assessed? Yes  Need for changes to pain regimen? No   B: Both SAT/SAT  N/A   C: Choice of Sedation RASS Goal: 0 Alert and Calm  Need for changes to sedation or analgesia regimen? No   D: Delirium CAM-ICU: Negative   E: Early Mobility  Plan for early mobility? Yes   F: Family Engagement Plan for family engagement today? Yes       Review of Invasive Devices:            Prophylaxis:  VTE VTE covered by:  heparin (porcine), Subcutaneous, 5,000 Units at 06/21/24 0523       Stress Ulcer  covered bypantoprazole (PROTONIX) injection 40 mg [[id number]]         Significant 24hr Events     24hr events: Patient with self-extubation event  day  prior, without any subsequent complications (see associated note). With improving cognition and mentation. Without any new complaints, understands that he is in the hospital for an episode of altered mentation.     Subjective   Review of Systems: See HPI for Review of Systems     Objective                            Vitals I/O      Most Recent Min/Max in 24hrs   Temp 98.1 °F (36.7 °C) Temp  Min: 98 °F (36.7 °C)  Max: 99.3 °F (37.4 °C)   Pulse 78 Pulse  Min: 73  Max: 93   Resp 17 Resp  Min: 12  Max: 42   /68 BP  Min: 105/64  Max: 152/79   O2 Sat 96 % SpO2  Min: 91 %  Max: 100 %      Intake/Output Summary (Last 24 hours) at 6/21/2024 1207  Last data filed at 6/21/2024 1000  Gross per 24 hour   Intake 20 ml   Output 1325 ml   Net -1305 ml       Diet Dysphagia/Modified Consistency; Dysphagia 3-Dental Soft; Nectar Thick Liquid    Invasive Monitoring           Physical Exam   Physical Exam  Vitals and nursing note reviewed.   Eyes:      Conjunctiva/sclera: Conjunctivae normal.   Skin:     General: Skin is warm and dry.      Capillary Refill: Capillary refill takes less than 2 seconds.   HENT:      Mouth/Throat:      Mouth: Mucous membranes are dry.   Cardiovascular:      Rate and Rhythm: Normal rate and regular rhythm.      Heart sounds: Normal heart sounds.   Musculoskeletal:         General: Swelling (hands and feet) present.   Abdominal: General: Bowel sounds are normal.      Palpations: Abdomen is soft.   Constitutional:       General: He is not in acute distress.     Appearance: He is ill-appearing (chronic).   Pulmonary:      Effort: Pulmonary effort is normal.      Breath sounds: Normal breath sounds.   Neurological:      Mental Status: He is alert and oriented to person, place and time. He is calm.      Comments: More interactive and now aware of situation            Diagnostic Studies      EKG: No significant events on telemetry  Imaging: New CT imaging demonstrating bilateral lung consolidation being  asp pna vs pneumonitis I have personally reviewed pertinent reports.       Medications:  Scheduled PRN   baclofen, 10 mg, BID  chlorhexidine, 15 mL, Q12H JEF  cyanocobalamin, 1,000 mcg, Daily  folic acid, 400 mcg, Daily  heparin (porcine), 5,000 Units, Q8H JEF  insulin lispro, 1-6 Units, Q6H JEF  ipratropium, 0.5 mg, TID  levalbuterol, 1.25 mg, TID  lisinopril, 10 mg, Daily  pantoprazole, 40 mg, Q24H JEF  polyethylene glycol, 17 g, Daily  QUEtiapine, 50 mg, HS  thiamine, 100 mg, Daily      fentaNYL, 50 mcg, Q1H PRN       Continuous          Labs:    CBC    Recent Labs     06/20/24  0522 06/21/24  0537   WBC 7.37 5.75   HGB 9.1* 9.0*   HCT 26.2* 27.5*    231     BMP    Recent Labs     06/20/24  0522 06/21/24  0537   SODIUM 138 143   K 3.9 4.0    107   CO2 28 31   AGAP 8 5   BUN 13 10   CREATININE 0.56* 0.58*   CALCIUM 8.4 8.8       Coags    No recent results     Additional Electrolytes  Recent Labs     06/20/24  0522   MG 2.2   PHOS 2.2*          Blood Gas    Recent Labs     06/20/24  0641   PHART 7.508*   XCD9TPH 33.7*   PO2ART 120.1   CLN1YND 26.2   BEART 3.2   SOURCE Radial, Left     Recent Labs     06/20/24  0641   SOURCE Radial, Left    LFTs  Recent Labs     06/20/24  0522   ALT 11   AST 14   ALKPHOS 49   ALB 2.9*   TBILI 0.45       Infectious  Recent Labs     06/21/24  0537   PROCALCITONI 0.34*     Glucose  Recent Labs     06/20/24  0522 06/21/24  0537   GLUC 303* 124               Anmol Mcgregor MD

## 2024-06-21 NOTE — ASSESSMENT & PLAN NOTE
Pt presented to ED altered, agitated. Received Versed 5mg in ED and became somnolent after.  Intubated for airway protection and profound metabolic acidosis  VBG 6.9/30/55/7/ BE -24  VENT: AC/VC 18/380/40/6  CXR - ETT 5cm above roque, will advance by 1 cm on arrival to ICU    Plan:  S/p selfextubation, currently on 2 L nasal cannula, titrate

## 2024-06-21 NOTE — ASSESSMENT & PLAN NOTE
S/p craniotomy 1/2019 at [facility], done secondary to abscess/lesions.  CTH no acute abnormalities, post-operative findings  Baseline independent

## 2024-06-21 NOTE — PLAN OF CARE
Dysphagia 3 w/ nectar thick liquids  Meds w/ NT or in puree   Aspiration precautions  Will cont to follow

## 2024-06-21 NOTE — PROGRESS NOTES
Critical Care Attending Note; Roberto Martino   Note Date: 24  Note Time: 11:19 AM    Patient: Colin Dumont  Age, : 54 y.o., 1970 MRN: [id number] Code Status: Level 1 - Full Code Patient Location: ICU 13/ICU 13   Hospital LOS:8 days  ]   Patient seen and examined, medical record reviewed, discussed with house staff and nursing staff.     HPI   CC: AMS   54M with a PMH a Brain Abscess, EtOH abuse, HLD, HTN, DM, PUD who found down found AMS requiring intubation.     Key Recent Events    : Admitted, Intubated     Main ICU Plans:       #Neuro  AMS - TME vs EtOH withdrawal vs Septic- Last drink ? - More oriented, ambulatory    - Thiamine, Folate  - Seroquel 50mg qhs, Haldol PRN   - Discontinue 1:1    Brain Abscess/Craniectomy   - Continue Baclofen    #Card  HTN   - Lisinopril    #Pulm  Hypoxic Respiratory Failure -Resolving - Extubated   - Wean FiO2 - Maintain O2 Sat >90%    #Renal  BELLA - Pre- Renal - Resolved  NAGMA - Resolved   Urine Retention -  Resolved     #GI  PPI    Refeeding Syndrome  - Replete Electrolytes    #ID   Sepsis - Pneumonia - Stenotrophomonas  - Bactrim   - ID consult      #Endo  DM -DKA? - Resolved   - ISS      #DVT/GI ppx  SQH    #Lines/Tubes/Drains:   Invasive Devices       Peripheral Intravenous Line  Duration             Peripheral IV 24 Proximal;Right;Ventral (anterior) Forearm 3 days    Peripheral IV 24 Right Antecubital 1 day                    #Nutrition:   Diet Enteral/Parenteral; Tube Feeding No Oral Diet; Vital AF 1.2; Continuous; 50; 30; Water; Every 4 hours        #Code Status:   Level 1 - Full Code    #Dispo:   Floors        Roberto Martino MD  Pulmonary, Critical Care    Critical care time, excluding procedures, teaching, family meetings, and excludes any prior time recorded by the AP/resident, 35 minutes. Upon my evaluation, this patient has a high probability of imminent or life-threatening deterioration due to above problems which  required my direct attention, intervention, and personal management.   Impression/Active Problems:    AMS   Brain Abcesss   HTN   DM   PUD   HLD   Sepsis   Respiratory Failure   BELLA   Hyperkalemia   NAGMA      Physical Exam:     Vital Signs:   Weight: 71 kg (156 lb 8.4 oz)  IBW: Ideal body weight: 70.7 kg (155 lb 13.8 oz)  Adjusted ideal body weight: 70.8 kg (156 lb 2.1 oz)  Temp:  [98 °F (36.7 °C)-99.3 °F (37.4 °C)] 98.5 °F (36.9 °C)  HR:  [] 78  Resp:  [12-42] 17  BP: (105-163)/(61-84) 120/68  Physical Exam: Unchanged  General: NAD  Neuro: Alert, answering appropriately  Heart: RRR  Lungs: Basilar diminshed  Abdomen: Soft NT  Extremities: No edema                Ventilator Settings:         Results from last 7 days   Lab Units 06/14/24  1146   ISTAT PH ART  7.451*     Radiologic Images Reviewed:    CT Head  No acute intracranial abnormality.     Input / Output:     Intake/Output Summary (Last 24 hours) at 6/21/2024 1119  Last data filed at 6/21/2024 1000  Gross per 24 hour   Intake 20 ml   Output 1325 ml   Net -1305 ml            Infusions:  propofol, 5-50 mcg/kg/min, Last Rate: Stopped (06/20/24 1100)      Scheduled Medications:  Current Facility-Administered Medications   Medication Dose Route Frequency Provider Last Rate    baclofen  10 mg Oral BID Linden Plummer MD      chlorhexidine  15 mL Mouth/Throat Q12H [facility] Nirmal Tobar MD      cyanocobalamin  1,000 mcg Oral Daily Arielle Zaidi MD      fentaNYL  50 mcg Intravenous Q1H PRN TIM Padilla      folic acid  400 mcg Oral Daily TIM Boone      heparin (porcine)  5,000 Units Subcutaneous Q8H [facility] Anmol Mcgregor MD      insulin lispro  1-6 Units Subcutaneous Q6H [facility] TIM Padilla      ipratropium  0.5 mg Nebulization TID Kevin Danielglielmelkel, DO      levalbuterol  1.25 mg Nebulization TID Kevin Guglracquelmelkel, DO      lisinopril  10 mg Oral Daily Arielle Zaidi MD      pantoprazole  40 mg Intravenous Q24H [facility]  "Anmol Mcgregor MD      polyethylene glycol  17 g Oral Daily Linden Plummer MD      propofol  5-50 mcg/kg/min Intravenous Titrated TIM Padilla Stopped (06/20/24 1100)    QUEtiapine  50 mg Oral HS Linden Plummer MD      sulfamethoxazole-trimethoprim  2 tablet Oral Q8H [facility] Anmol Mcgregor MD      thiamine  100 mg Oral Daily TIM Boone         PRN Medications:    fentaNYL    Labs Reviewed:  Results from last 7 days   Lab Units 06/21/24  0537 06/20/24  0522 06/19/24  0448   WBC Thousand/uL 5.75 7.37 7.28   HEMOGLOBIN g/dL 9.0* 9.1* 9.9*   HEMATOCRIT % 27.5* 26.2* 30.8*   PLATELETS Thousands/uL 231 209 192      Results from last 7 days   Lab Units 06/21/24  0537 06/20/24  0522 06/19/24  0448 06/18/24  0428 06/14/24  1210 06/14/24  1146   SODIUM mmol/L 143 138 138 138   < >  --    CO2 mmol/L 31 28 29 25   < >  --    CO2, I-STAT mmol/L  --   --   --   --   --  21   BUN mg/dL 10 13 13 14   < >  --    GLUCOSE, ISTAT mg/dl  --   --   --   --   --  126   CALCIUM mg/dL 8.8 8.4 8.4 8.2*   < >  --    MAGNESIUM mg/dL  --  2.2 2.2 2.3   < >  --    PHOSPHORUS mg/dL  --  2.2* 2.2* 1.9*   < >  --     < > = values in this interval not displayed.         Invalid input(s): \"ASTSGOT\", \"ALTSGPT\"LABRCNTIP@ ,alkphos:3,tbilirubin:3,dbilirubin:3)@              Invalid input(s): \"TROPT\", \"PBNP\"             I have personally seen and examined the patient on (06/21/24 between 1023-9065). I discussed the patient with the AP/resident including, but not limited to, verifying findings; reviewing labs and x-rays; discussing with consultants; developing the plan of care with the bedside nurse; and discussing treatment plan with patient or surrogate.  I have reviewed the note and assessment performed by the AP/resident and agree with the AP/resident’s documented findings and plan of care with the above additions/exceptions. Please see my comments for details and adjustments.                 "

## 2024-06-21 NOTE — ASSESSMENT & PLAN NOTE
Hx ETOH use in the past, family unclear if currently drink and if so how much. Unknown time of last drink.   Currently intubated/sedated  Without symptoms of withdrawal postextubation  Encourage cessation

## 2024-06-21 NOTE — ASSESSMENT & PLAN NOTE
Pt presented to ED Altered  CTH - no acute intracranial abnormalities, previous post-operative changes.  History of previous psychotic episodes d/t excessive intake of Baclofen  Per patients mother she did refil his Baclofen, however is nto sure how many are left.   COMA panel - negative  UDS neg, ethanol negative  Requiring multiple doses of Versed while on propofol for agitation  Negative for methanol and ethylene glycol    Plan:  Patient improving since yesterday off of sedation, more interactive  Continue to monitor mental status

## 2024-06-22 LAB
ANION GAP SERPL CALCULATED.3IONS-SCNC: 9 MMOL/L (ref 4–13)
BACTERIA BLD CULT: NORMAL
BACTERIA BLD CULT: NORMAL
BASOPHILS # BLD AUTO: 0.04 THOUSANDS/ÂΜL (ref 0–0.1)
BASOPHILS NFR BLD AUTO: 1 % (ref 0–1)
BUN SERPL-MCNC: 9 MG/DL (ref 5–25)
CALCIUM SERPL-MCNC: 9.2 MG/DL (ref 8.4–10.2)
CHLORIDE SERPL-SCNC: 102 MMOL/L (ref 96–108)
CO2 SERPL-SCNC: 28 MMOL/L (ref 21–32)
CREAT SERPL-MCNC: 0.63 MG/DL (ref 0.6–1.3)
EOSINOPHIL # BLD AUTO: 0.07 THOUSAND/ÂΜL (ref 0–0.61)
EOSINOPHIL NFR BLD AUTO: 1 % (ref 0–6)
ERYTHROCYTE [DISTWIDTH] IN BLOOD BY AUTOMATED COUNT: 13.5 % (ref 11.6–15.1)
GFR SERPL CREATININE-BSD FRML MDRD: 110 ML/MIN/1.73SQ M
GLUCOSE SERPL-MCNC: 109 MG/DL (ref 65–140)
GLUCOSE SERPL-MCNC: 134 MG/DL (ref 65–140)
GLUCOSE SERPL-MCNC: 135 MG/DL (ref 65–140)
GLUCOSE SERPL-MCNC: 174 MG/DL (ref 65–140)
GLUCOSE SERPL-MCNC: 216 MG/DL (ref 65–140)
HCT VFR BLD AUTO: 30.2 % (ref 36.5–49.3)
HGB BLD-MCNC: 9.8 G/DL (ref 12–17)
IMM GRANULOCYTES # BLD AUTO: 0.11 THOUSAND/UL (ref 0–0.2)
IMM GRANULOCYTES NFR BLD AUTO: 2 % (ref 0–2)
LYMPHOCYTES # BLD AUTO: 0.86 THOUSANDS/ÂΜL (ref 0.6–4.47)
LYMPHOCYTES NFR BLD AUTO: 12 % (ref 14–44)
MCH RBC QN AUTO: 36.8 PG (ref 26.8–34.3)
MCHC RBC AUTO-ENTMCNC: 32.5 G/DL (ref 31.4–37.4)
MCV RBC AUTO: 114 FL (ref 82–98)
MONOCYTES # BLD AUTO: 0.58 THOUSAND/ÂΜL (ref 0.17–1.22)
MONOCYTES NFR BLD AUTO: 8 % (ref 4–12)
NEUTROPHILS # BLD AUTO: 5.5 THOUSANDS/ÂΜL (ref 1.85–7.62)
NEUTS SEG NFR BLD AUTO: 76 % (ref 43–75)
NRBC BLD AUTO-RTO: 0 /100 WBCS
PLATELET # BLD AUTO: 299 THOUSANDS/UL (ref 149–390)
PMV BLD AUTO: 9.8 FL (ref 8.9–12.7)
POTASSIUM SERPL-SCNC: 3.5 MMOL/L (ref 3.5–5.3)
RBC # BLD AUTO: 2.66 MILLION/UL (ref 3.88–5.62)
SODIUM SERPL-SCNC: 139 MMOL/L (ref 135–147)
WBC # BLD AUTO: 7.16 THOUSAND/UL (ref 4.31–10.16)

## 2024-06-22 PROCEDURE — 82948 REAGENT STRIP/BLOOD GLUCOSE: CPT

## 2024-06-22 PROCEDURE — 97530 THERAPEUTIC ACTIVITIES: CPT

## 2024-06-22 PROCEDURE — 99232 SBSQ HOSP IP/OBS MODERATE 35: CPT | Performed by: INTERNAL MEDICINE

## 2024-06-22 PROCEDURE — 80048 BASIC METABOLIC PNL TOTAL CA: CPT | Performed by: INTERNAL MEDICINE

## 2024-06-22 PROCEDURE — C9113 INJ PANTOPRAZOLE SODIUM, VIA: HCPCS

## 2024-06-22 PROCEDURE — 85025 COMPLETE CBC W/AUTO DIFF WBC: CPT | Performed by: INTERNAL MEDICINE

## 2024-06-22 PROCEDURE — 97116 GAIT TRAINING THERAPY: CPT

## 2024-06-22 RX ADMIN — HEPARIN SODIUM 5000 UNITS: 5000 INJECTION INTRAVENOUS; SUBCUTANEOUS at 16:48

## 2024-06-22 RX ADMIN — POLYETHYLENE GLYCOL 3350 17 G: 17 POWDER, FOR SOLUTION ORAL at 09:19

## 2024-06-22 RX ADMIN — FOLIC ACID TAB 400 MCG 400 MCG: 400 TAB at 10:08

## 2024-06-22 RX ADMIN — HEPARIN SODIUM 5000 UNITS: 5000 INJECTION INTRAVENOUS; SUBCUTANEOUS at 05:18

## 2024-06-22 RX ADMIN — INSULIN LISPRO 2 UNITS: 100 INJECTION, SOLUTION INTRAVENOUS; SUBCUTANEOUS at 13:12

## 2024-06-22 RX ADMIN — BACLOFEN 10 MG: 10 TABLET ORAL at 09:16

## 2024-06-22 RX ADMIN — QUETIAPINE FUMARATE 50 MG: 25 TABLET ORAL at 22:10

## 2024-06-22 RX ADMIN — INSULIN LISPRO 1 UNITS: 100 INJECTION, SOLUTION INTRAVENOUS; SUBCUTANEOUS at 16:46

## 2024-06-22 RX ADMIN — LISINOPRIL 10 MG: 10 TABLET ORAL at 09:16

## 2024-06-22 RX ADMIN — BACLOFEN 10 MG: 10 TABLET ORAL at 17:49

## 2024-06-22 RX ADMIN — HEPARIN SODIUM 5000 UNITS: 5000 INJECTION INTRAVENOUS; SUBCUTANEOUS at 22:10

## 2024-06-22 RX ADMIN — CYANOCOBALAMIN TAB 500 MCG 1000 MCG: 500 TAB at 09:16

## 2024-06-22 RX ADMIN — PANTOPRAZOLE SODIUM 40 MG: 40 INJECTION, POWDER, FOR SOLUTION INTRAVENOUS at 09:17

## 2024-06-22 RX ADMIN — Medication 100 MG: at 09:17

## 2024-06-22 NOTE — PHYSICAL THERAPY NOTE
PHYSICAL THERAPY NOTE          Patient Name: Colin Dumont  Today's Date: 6/22/2024 06/22/24 1106   PT Last Visit   PT Visit Date 06/22/24   Note Type   Note Type Treatment   Pain Assessment   Pain Assessment Tool 0-10   Hospital Pain Intervention(s) Repositioned;Ambulation/increased activity;Rest   Multiple Pain Sites No   Pain Rating: FLACC (Rest) - Face 0   Pain Rating: FLACC (Rest) - Legs 0   Pain Rating: FLACC (Rest) - Activity 0   Pain Rating: FLACC (Rest) - Cry 0   Pain Rating: FLACC (Rest) - Consolability 0   Score: FLACC (Rest) 0   Restrictions/Precautions   Weight Bearing Precautions Per Order No   Other Precautions Cognitive;Chair Alarm;Bed Alarm;Telemetry;Fall Risk;Pain  (hx of R sided weakness)   General   Chart Reviewed Yes   Response to Previous Treatment Patient with no complaints from previous session.   Family/Caregiver Present No   Cognition   Overall Cognitive Status Impaired   Arousal/Participation Alert;Responsive;Cooperative  (pt was pleasant throughout  PT intervention)   Attention Attends with cues to redirect   Orientation Level Oriented to person;Oriented to place;Disoriented to time;Disoriented to situation   Memory Decreased short term memory;Decreased recall of recent events;Decreased recall of precautions   Following Commands Follows one step commands inconsistently  (with placement of RLE with ambulation)   Subjective   Subjective pt was agreeable to participate in PT intervention and stated no pain pre/post tx session   Bed Mobility   Supine to Sit 5  Supervision   Additional items Assist x 1;HOB elevated;Bedrails;Increased time required;Verbal cues   Sit to Supine 5  Supervision   Additional items Assist x 1;HOB elevated;Bedrails;Increased time required;Verbal cues   Additional Comments in compariosn to previou stx session pt continues to be /s to min ax1 sitting balance as pt demonstrated  slight posterior lean when participating in TE activities   Transfers   Sit to Stand 3  Moderate assistance   Additional items Assist x 1;Increased time required;Verbal cues   Stand to Sit 3  Moderate assistance   Additional items Assist x 1;Increased time required;Verbal cues  (RW management)   Stand pivot Unable to assess   Additional Comments pt completed 3 STS in todays tx session 2 required mod Ax1 and 1 required min ax1   Ambulation/Elevation   Gait pattern (S)  Decreased foot clearance;Genu recurvatum;Decreased toe off;Ataxia;Excessively slow   Gait Assistance 3  Moderate assist   Additional items Assist x 1;Verbal cues;Tactile cues   Assistive Device Rolling walker   Distance 30'x1 RW   Stair Management Assistance 3  Moderate assist   Additional items Assist x 1;Verbal cues;Increased time required   Stair Management Technique Two rails;Step to pattern;Backward;Foreward   Number of Stairs 2   Balance   Static Sitting Fair   Dynamic Sitting Fair -   Static Standing Poor +   Dynamic Standing Poor   Ambulatory Poor  (w/ RW)   Endurance Deficit   Endurance Deficit Yes   Endurance Deficit Description limited ambulation distance, functional transfers and steps completed in [facility] tx session   Activity Tolerance   Activity Tolerance Patient limited by fatigue;Treatment limited secondary to medical complications (Comment)   Nurse Made Aware Spoke to RN   Exercises   Hip Abduction Sitting;15 reps;AROM;Bilateral   Hip Adduction Sitting;15 reps;AROM;Bilateral   Knee AROM Long Arc Quad Sitting;10 reps;AROM;Bilateral   Ankle Pumps Sitting;10 reps;AROM;Bilateral   Marching Sitting;10 reps;AROM;Bilateral   Assessment   Prognosis Fair   Problem List Decreased strength;Decreased endurance;Impaired balance;Decreased mobility;Decreased cognition;Impaired judgement;Decreased safety awareness;Impaired vision  (gait deviations)   Assessment pt began tx session lying supine in the bed as pt was agreeable to participate in PT  intervention. PT intervention to focus on bed mobility, transfer training, TE activities, posture/balance w/gait and stair trials. pt continues to remain consistant for requiring +time and /s in order to complete a supine<>sit EOB transfer. While seated EOB pt participated in TE activities in order to strengthen LE's and increase static/dynamic sitting balance. level of assistance varied as pt required /s for static sitting but min Ax1 for dynamic sitting balance while performing TE activities. pt was able to perform functional transfers to and from RW w/ a decrease in level of assistance required compared to previous tx session mod ax1 w/ Vc's for hand placement while ascending to RW and descending to EOB. pt continues to be limited with activity tolerance and ambulation distnace as pt ambulated 30'x1 RW with min to mod ax1 for safety and balance. pt continues to require VC's for RLE placement as pt continues to place RLE outseide of RW. pt did participate in stair trials and required bilateral hand rails and mod ax1 to complete 4 steps. Additional was not possible due to fatigue. Post tx pt in bed with call bell, bed alarm activated and all pt needs met. Continue to recommend Dc w/ level 1 maximal rehab resource intensity when medically cleared   Goals   Patient Goals none stated   STG Expiration Date 07/02/24   PT Treatment Day 2   Plan   Treatment/Interventions Functional transfer training;LE strengthening/ROM;Elevations;Therapeutic exercise;Endurance training;Cognitive reorientation;Patient/family training;Equipment eval/education;Bed mobility;Gait training;Spoke to nursing   Progress Slow progress, decreased activity tolerance   PT Frequency 4-6x/wk   Discharge Recommendation   Rehab Resource Intensity Level, PT I (Maximum Resource Intensity)   Equipment Recommended Walker   AM-PAC Basic Mobility Inpatient   Turning in Flat Bed Without Bedrails 3   Lying on Back to Sitting on Edge of Flat Bed Without Bedrails  3   Moving Bed to Chair 2   Standing Up From Chair Using Arms 2   Walk in Room 2   Climb 3-5 Stairs With Railing 2   Basic Mobility Inpatient Raw Score 14   Basic Mobility Standardized Score 35.55   [facility] Highest Level Of Mobility   -[facility] Goal 4: Move to chair/commode   -[facility] Achieved 7: Walk 25 feet or more   Education   Education Provided Mobility training;Assistive device   Patient Reinforcement needed   End of Consult   Patient Position at End of Consult Supine;Bed/Chair alarm activated;All needs within reach   The patient's AM-PAC Basic Mobility Inpatient Short Form Raw Score is 14. A Raw score of less than or equal to 16 suggests the patient may benefit from discharge to post-acute rehabilitation services. Please also refer to the recommendation of the Physical Therapist for safe discharge planning.    Jaxson Carroll

## 2024-06-22 NOTE — PLAN OF CARE
Problem: PHYSICAL THERAPY ADULT  Goal: Performs mobility at highest level of function for planned discharge setting.  See evaluation for individualized goals.  Description: Treatment/Interventions: Functional transfer training, LE strengthening/ROM, Therapeutic exercise, Endurance training, Cognitive reorientation, Patient/family training, Equipment eval/education, Bed mobility, Gait training, Continued evaluation, Spoke to nursing, Spoke to case management, OT, Family (family present for eval/treatment)  Equipment Recommended: Walker (for now)       See flowsheet documentation for full assessment, interventions and recommendations.  Note: Prognosis: Fair  Problem List: Decreased strength, Decreased endurance, Impaired balance, Decreased mobility, Decreased coordination, Decreased cognition, Impaired judgement, Decreased safety awareness, Impaired vision (gait deviations)  Assessment: Pt is a 54 y.o. male seen for PT evaluation s/p admit to [facility] on 6/13/2024 w/ Metabolic encephalopathy, history of alcohol abuse disorder, diabetes, brain abscess status post craniotomy (2019) .  Pt ntubated 6/14, self-extubated 6/20. Order placed for PT.      Prior to admission: Pt Lived w/ mom in 2 story home, intermittently used SPC for ambulation in<out of home. Pt either performed the flight of stairs on his feet or on his buttocks depending on the day and performing up vs down stairs.  Upon evaluation: Pt needed little to no A for bed mobility, but A of 2 for transfers and ambulation w/rolling walker, demonstrating substantial gait deviations worse than his baseline (per family).      Pt's clinical presentation is currently unstable/unpredictable given the functional mobility deficits above, especially (but not limited to) impaired coordination, gait deviations, and decreased functional mobility tolerance, and combined with medical complications including new onset O2 use and impulsivity during admission.  Pt IS  NOT at his mobility baseline.  He is at risk for falls based on hx of falls, impulsivity, impaired balance, impaired coordination, impaired judgement, decreased safety awareness, and altered vision.       During this admission, pt would benefit from continued skilled inpatient PT in the acute care setting in order to address deficits as defined above to maximize function and mobility.      Recommendations:     From a PT standpoint, recommend next several sessions focus on continued mobility trials w/ walker vs LRAD, goal directed mobility progression, balance activities.  Barriers to Discharge: Inaccessible home environment     Rehab Resource Intensity Level, PT: I (Maximum Resource Intensity) (may benefit from PM&R consult)    See flowsheet documentation for full assessment.

## 2024-06-22 NOTE — PHYSICAL THERAPY NOTE
PHYSICAL THERAPY EVALUATION  LATE ENTRY  NAME:  Colin Dumont  DATE: 06/21/2024    AGE:   54 y.o.  Mrn:   [id number]  Principal problem: Principal Problem:    Metabolic encephalopathy  Active Problems:    Benign essential hypertension    S/P craniotomy    Type II diabetes mellitus (HCC)    High anion gap metabolic acidosis    Spasticity    SIRS (systemic inflammatory response syndrome) (HCC)    Acute respiratory failure with hypoxia (HCC)    Alcohol use      Length Of Stay: 8  Performed at least 2 patient identifiers during session: Name and Epic photo  PHYSICAL THERAPY EVALUATION :    06/21/24 1529   PT Last Visit   PT Visit Date 06/21/24   Note Type   Note type Evaluation   Pain Assessment   Pain Assessment Tool 0-10   Pain Score No Pain   Restrictions/Precautions   Weight Bearing Precautions Per Order No   Other Precautions Cognitive;Chair Alarm;Bed Alarm;Multiple lines;Telemetry;Fall Risk;Pain  (hx of R sided weakness)   Home Living   Type of Home House   Home Layout Ramped entrance;Two level;Bed/bath upstairs  (full bath on first floor)   Home Equipment Walker;Cane;Grab bars;Wheelchair-manual   Additional Comments Use of SPC out in the community and in the home vs no AD--Per ICU mobility rounds, pt does not leave house often.   Prior Function   Level of [place] Independent with ADLs;Independent with functional mobility;Needs assistance with IADLS   Lives With Family  (mother)   Receives Help From Family   IADLs Family/Friend/Other provides transportation;Family/Friend/Other provides medication management;Independent with meal prep  ([employer] meals - family provides transportation and assists with medications)   Falls in the last 6 months (S)  5 to 10  (family reports more than 4 falls but unable to quantify, pt denies falls)   Vocational Unemployed   Comments Per OT: Family reports that patient spends most of the time on the second level of the home, will often negotiate the steps on his buttocks. Per  family he is usually unkept, not changing his clothes or bathing himself very often.   General   Family/Caregiver Present   (Pt's mom and other family)   Cognition   Overall Cognitive Status Impaired   Arousal/Participation Alert   Attention Attends with cues to redirect   Orientation Level Oriented to person;Oriented to place;Disoriented to time;Disoriented to situation  (1946 for the year)   Memory Decreased recall of precautions;Decreased recall of recent events;Decreased short term memory   Following Commands Follows one step commands inconsistently  (w/ MMT @ LEs, some functional mobility training)   RUE Assessment   RUE Assessment X  (limited AROM/strength throughout, poor  strength)   LUE Assessment   LUE Assessment WFL  (observed at least to 3)   RLE Assessment   RLE Assessment   (substantial difficulty in following MMT break testing commands, as well as demonstrating full arom gene @ ankles)   Strength RLE   R Hip Flexion   (tested to 3+)   R Knee Extension   (tested to 3+)   R Ankle Dorsiflexion   (tested to 3+; ? blood noted @ great toe bed)   LLE Assessment   LLE Assessment   (substantial difficulty in following MMT break testing commands)   Strength LLE   L Hip Flexion   (tested to 3+)   L Knee Extension   (tested to 3+)   L Ankle Dorsiflexion   (tested to 3+; ? blood noted @ great toe bed)   Vision-Basic Assessment   Patient Visual Report   (Pt denies acute visual changes, family reports patient has chronic R eye diplopia. unable to formally assess due to impulsivity and limited command following)   Coordination   Movements are Fluid and Coordinated (S)  0   Coordination and Movement Description dysmetria R>L LE and intermittent Ballistic movements gene RLE and trunk   Light Touch   RLE Light Touch Not tested   LLE Light Touch Not tested   Bed Mobility   Supine to Sit 5  Supervision   Additional items HOB elevated;Bedrails;Increased time required;Verbal cues   Additional Comments Close supervision to  reposition pelvis towards the EOB for optimal sitting position and balance - fluccuating assistance to maintain static/dynamic balance, close supervision to AMILCAR. Once returned to supine in bed, pt able to indepenently reposition self-towards the HOB.   Transfers   Sit to Stand 3  Moderate assistance   Additional items Assist x 2;Increased time required;Armrests;Verbal cues  (increased A needed on R >L side)   Stand to Sit 3  Moderate assistance   Additional items Assist x 2;Increased time required;Armrests;Verbal cues  (increased A needed on R >L side)   Additional Comments Additional STS completed from EOB w/ MODA x 2 person, use of RW for support. Assist to acheive optimal grasp w/ L hand on RW.   Ambulation/Elevation   Gait pattern (S)  Step through pattern;Decreased toe off;Genu recurvatum;Inconsistent aubrie;Ataxia;Excessively slow  (overall ataxic, gene R>L LE and trunk; forward flexed and reaching for target when close to sitting. When using walker, pt keeps RLE OUTSIDE of walker despite verbal instructions and manual walker correction.)   Gait Assistance 3  Moderate assist   Additional items Assist x 2;Tactile cues;Verbal cues   Assistive Device Rolling walker   Distance 7' when presented with target of chair   Stair Management Assistance Not tested   Balance   Static Sitting Fair   Dynamic Sitting Fair -   Static Standing Poor   Dynamic Standing Poor -  (Bracing posterior BLEs against bed, MODA x 2 person to steady w/ MAXA to manage pants up/over hips)   Endurance Deficit   Endurance Deficit Yes   Endurance Deficit Description needs therapeutic rests between mobility trials, degradation of gait quality, coordination, and balance with increased activity performance   Activity Tolerance   Activity Tolerance Patient limited by fatigue;Treatment limited secondary to medical complications (Comment)  (cognition)   Medical Staff Made Aware care coordination w/ OT, Pt discussed in ICU mobility rounds.   Nurse Made  Aware spoke to RN post session     Assessment:   Pt is a 54 y.o. male seen for PT evaluation s/p admit to [facility] on 6/13/2024 w/ Metabolic encephalopathy, w/history of alcohol abuse disorder, diabetes, brain abscess status post craniotomy (2019) .  Pt intubated 6/14, self-extubated 6/20. Order placed for PT.      Prior to admission: Pt Lived w/ mom in 2 story home, intermittently used SPC for ambulation in<out of home. Pt either performed the flight of stairs on his feet or on his buttocks depending on the day and performing up vs down stairs.  Upon evaluation: Pt needed little to no A for bed mobility, but A of 2 for transfers and ambulation w/rolling walker, demonstrating substantial gait deviations worse than his baseline (per family).      Pt's clinical presentation is currently unstable/unpredictable given the functional mobility deficits above, especially (but not limited to) impaired coordination, gait deviations, and decreased functional mobility tolerance, and combined with medical complications including new onset O2 use and impulsivity during admission.  Pt IS NOT at his mobility baseline.  He is at risk for falls based on hx of falls, impulsivity, impaired balance, impaired coordination, impaired judgement, decreased safety awareness, and altered vision.       During this admission, pt would benefit from continued skilled inpatient PT in the acute care setting in order to address deficits as defined above to maximize function and mobility.      Recommendations:    From a PT standpoint, recommend next several sessions focus on continued mobility trials w/ walker vs LRAD, goal directed mobility progression, balance activities.    Prognosis Fair   Problem List Decreased strength;Decreased endurance;Impaired balance;Decreased mobility;Decreased coordination;Decreased cognition;Impaired judgement;Decreased safety awareness;Impaired vision  (gait deviations)   Barriers to Discharge Inaccessible  "home environment   Goals   Patient Goals \"to go crazy\" --Pt unable to define how this relates to therapy   STG Expiration Date 07/02/24   Short Term Goal #1 Goals: Pt will: Perform rolling  and supine<>sit bed mobility tasks with modified I to prepare for transfers and reposition in bed. Perform transfers with Supervision to promote proper hand placement and approach. Perform ambulation with LRAD for up to 50' with Supervision to promote proper use of assistive device. Perform at least 4 stairs w/ railing +/- DME and w/no more than min A to decrease burden of care and return to multilevel home..   PT Treatment Day 1   Plan   Treatment/Interventions Functional transfer training;LE strengthening/ROM;Therapeutic exercise;Endurance training;Cognitive reorientation;Patient/family training;Equipment eval/education;Bed mobility;Gait training;Continued evaluation;Spoke to nursing;Spoke to case management;OT;Family  (family present for eval/treatment)   PT Frequency 4-6x/wk   Discharge Recommendation   Rehab Resource Intensity Level, PT I (Maximum Resource Intensity)  (may benefit from PM&R consult)   Equipment Recommended Walker  (for now)   Additional Comments 2 Personal factors affecting pt at time of IE include: multi-level environment, past experience, behavioral pattern, inability to ambulate household distances, inability to navigate community distances, limited insight into impairments, recent fall(s), and uncertain of how much mom can physically A pt upon DC .   AM-PAC Basic Mobility Inpatient   Turning in Flat Bed Without Bedrails 3   Lying on Back to Sitting on Edge of Flat Bed Without Bedrails 3   Moving Bed to Chair 1   Standing Up From Chair Using Arms 1   Walk in Room 1   Climb 3-5 Stairs With Railing 1   Basic Mobility Inpatient Raw Score 10   Turning Head Towards Sound 3   Follow Simple Instructions 2   Low Function Basic Mobility Raw Score  15   Low Function Basic Mobility Standardized Score  23.9   [place] " "[place] Highest Level Of Mobility   -[facility] Goal 4: Move to chair/commode   -[facility] Achieved 6: Walk 10 steps or more   Additional Treatment Session   Start Time 1514   End Time 1529   Treatment Assessment Pt was able to amb further distances, and appeared to have better gait quality when using rolling walker as opposed to not using DME. Family confirmed that gait looks \"weaker\" than his baseline. SKilled PT recommended to progress pt toward treatment goals.   Equipment Use rolling walker   Exercises   Neuro re-ed Additional treatment time for transfers A of 2 w/ verbal instruction for hand placement an technique. Amb w/o device w/ HHA of 2 and mod A withshorter step length, wider ANDRIY, increased hip flexion/trunnk flexion. Trial w/o device for 3', and with rolling walker for 5' back to bed. Standing balance@ commode w A of 1 using walker for UE support w/Pt again in recurvaturm. Standing tolerance time > 1 min. Sit->supine S using bed rails w pt being able to reposition self toward head of bed w/only initial verbal instruction.   End of Consult   Patient Position at End of Consult Supine;Bed/Chair alarm activated;All needs within reach  (no recliner in room, pt to transfer to MS floor.)   Pt requires PT /OT co-treat and co-eval due to required skilled interventions of at least 2 clinicians for care delivery, medical complexity, limited activity tolerance, and cognitive-behavioral impairments.   PT and OT goals addressed separately.   (Please find full objective findings from PT assessment regarding body systems outlined above).     The patient's AM-PAC Basic Mobility Inpatient Short Form Raw Score is 10. A Raw score of less than or equal to 16 suggests the patient may benefit from discharge to post-acute rehabilitation services, which DOES coincide with CURRENT above PT recommendations.     However please refer to therapist recommendation for discharge planning given other factors that may influence destination. " "    Adapted from Shelton HARTMAN, Esequiel J, Celso J, Cordell CHRISTINE. Association of -PAC “6-Clicks” Basic Mobility and Daily Activity Scores With Discharge Destination. Physical Therapy, 2021;101:1-9. DOI: [email or web address]    Portions of the record may have been created with voice recognition software.  Occasional wrong word or \"sound a like\" substitutions may have occurred due to the inherent limitations of voice recognition software.  Read the chart carefully and recognize, using context, where substitutions have occurred    "

## 2024-06-22 NOTE — ASSESSMENT & PLAN NOTE
Lab Results   Component Value Date    HGBA1C 5.6 06/20/2024       Recent Labs     06/21/24  1221 06/21/24  1627 06/21/24  2104 06/22/24  0708   POCGLU 216* 188* 232* 109       Blood Sugar Average: Last 72 hrs:  (P) [id number]    Currently on a sliding scale with a diabetic diet  Hemoglobin A1c 5.6

## 2024-06-22 NOTE — PLAN OF CARE
Problem: SAFETY,RESTRAINT: NV/NON-SELF DESTRUCTIVE BEHAVIOR  Goal: Remains free of harm/injury (restraint for non violent/non self-detsructive behavior)  Description: INTERVENTIONS:  - Instruct patient/family regarding restraint use   - Assess and monitor physiologic and psychological status   - Provide interventions and comfort measures to meet assessed patient needs   - Identify and implement measures to help patient regain control  - Assess readiness for release of restraint   Outcome: Progressing  Goal: Returns to optimal restraint-free functioning  Description: INTERVENTIONS:  - Assess the patient's behavior and symptoms that indicate continued need for restraint  - Identify and implement measures to help patient regain control  - Assess readiness for release of restraint   Outcome: Progressing     Problem: Nutrition/Hydration-ADULT  Goal: Nutrient/Hydration intake appropriate for improving, restoring or maintaining nutritional needs  Description: Monitor and assess patient's nutrition/hydration status for malnutrition. Collaborate with interdisciplinary team and initiate plan and interventions as ordered.  Monitor patient's weight and dietary intake as ordered or per policy. Utilize nutrition screening tool and intervene as necessary. Determine patient's food preferences and provide high-protein, high-caloric foods as appropriate.     INTERVENTIONS:  - Monitor oral intake, urinary output, labs, and treatment plans  - Assess nutrition and hydration status and recommend course of action  - Evaluate amount of meals eaten  - Assist patient with eating if necessary   - Allow adequate time for meals  - Recommend/ encourage appropriate diets, oral nutritional supplements, and vitamin/mineral supplements  - Order, calculate, and assess calorie counts as needed  - Recommend, monitor, and adjust tube feedings and TPN/PPN based on assessed needs  - Assess need for intravenous fluids  - Provide specific  nutrition/hydration education as appropriate  - Include patient/family/caregiver in decisions related to nutrition  Outcome: Progressing     Problem: Prexisting or High Potential for Compromised Skin Integrity  Goal: Skin integrity is maintained or improved  Description: INTERVENTIONS:  - Identify patients at risk for skin breakdown  - Assess and monitor skin integrity  - Assess and monitor nutrition and hydration status  - Monitor labs   - Assess for incontinence   - Turn and reposition patient  - Assist with mobility/ambulation  - Relieve pressure over bony prominences  - Avoid friction and shearing  - Provide appropriate hygiene as needed including keeping skin clean and dry  - Evaluate need for skin moisturizer/barrier cream  - Collaborate with interdisciplinary team   - Patient/family teaching  - Consider wound care consult   Outcome: Progressing     Problem: PAIN - ADULT  Goal: Verbalizes/displays adequate comfort level or baseline comfort level  Description: Interventions:  - Encourage patient to monitor pain and request assistance  - Assess pain using appropriate pain scale  - Administer analgesics based on type and severity of pain and evaluate response  - Implement non-pharmacological measures as appropriate and evaluate response  - Consider cultural and social influences on pain and pain management  - Notify physician/advanced practitioner if interventions unsuccessful or patient reports new pain  Outcome: Progressing     Problem: INFECTION - ADULT  Goal: Absence or prevention of progression during hospitalization  Description: INTERVENTIONS:  - Assess and monitor for signs and symptoms of infection  - Monitor lab/diagnostic results  - Monitor all insertion sites, i.e. indwelling lines, tubes, and drains  - Monitor endotracheal if appropriate and nasal secretions for changes in amount and color  - [place] appropriate cooling/warming therapies per order  - Administer medications as ordered  - Instruct  and encourage patient and family to use good hand hygiene technique  - Identify and instruct in appropriate isolation precautions for identified infection/condition  Outcome: Progressing  Goal: Absence of fever/infection during neutropenic period  Description: INTERVENTIONS:  - Monitor WBC    Outcome: Progressing     Problem: SAFETY ADULT  Goal: Patient will remain free of falls  Description: INTERVENTIONS:  - Educate patient/family on patient safety including physical limitations  - Instruct patient to call for assistance with activity   - Consult OT/PT to assist with strengthening/mobility   - Keep Call bell within reach  - Keep bed low and locked with side rails adjusted as appropriate  - Keep care items and personal belongings within reach  - Initiate and maintain comfort rounds  - Make Fall Risk Sign visible to staff  - Offer Toileting every  Hours, in advance of need  - Initiate/Maintain alarm  - Obtain necessary fall risk management equipment:   - Apply yellow socks and bracelet for high fall risk patients  - Consider moving patient to room near nurses station  Outcome: Progressing  Goal: Maintain or return to baseline ADL function  Description: INTERVENTIONS:  -  Assess patient's ability to carry out ADLs; assess patient's baseline for ADL function and identify physical deficits which impact ability to perform ADLs (bathing, care of mouth/teeth, toileting, grooming, dressing, etc.)  - Assess/evaluate cause of self-care deficits   - Assess range of motion  - Assess patient's mobility; develop plan if impaired  - Assess patient's need for assistive devices and provide as appropriate  - Encourage maximum independence but intervene and supervise when necessary  - Involve family in performance of ADLs  - Assess for home care needs following discharge   - Consider OT consult to assist with ADL evaluation and planning for discharge  - Provide patient education as appropriate  Outcome: Progressing  Goal:  Maintains/Returns to pre admission functional level  Description: INTERVENTIONS:  - Perform AM-PAC 6 Click Basic Mobility/ Daily Activity assessment daily.  - Set and communicate daily mobility goal to care team and patient/family/caregiver.   - Collaborate with rehabilitation services on mobility goals if consulted  - Perform Range of Motion  times a day.  - Reposition patient every  hours.  - Dangle patient  times a day  - Stand patient  times a day  - Ambulate patient  times a day  - Out of bed to chair  times a day   - Out of bed for meals  times a day  - Out of bed for toileting  - Record patient progress and toleration of activity level   Outcome: Progressing     Problem: DISCHARGE PLANNING  Goal: Discharge to home or other facility with appropriate resources  Description: INTERVENTIONS:  - Identify barriers to discharge w/patient and caregiver  - Arrange for needed discharge resources and transportation as appropriate  - Identify discharge learning needs (meds, wound care, etc.)  - Arrange for interpretive services to assist at discharge as needed  - Refer to Case Management Department for coordinating discharge planning if the patient needs post-hospital services based on physician/advanced practitioner order or complex needs related to functional status, cognitive ability, or social support system  Outcome: Progressing     Problem: Knowledge Deficit  Goal: Patient/family/caregiver demonstrates understanding of disease process, treatment plan, medications, and discharge instructions  Description: Complete learning assessment and assess knowledge base.  Interventions:  - Provide teaching at level of understanding  - Provide teaching via preferred learning methods  Outcome: Progressing

## 2024-06-22 NOTE — ASSESSMENT & PLAN NOTE
Unclear etiology.  ID support appreciated.  Off all antibiotics and afebrile  Hydrated in the ICU and now improved  Coma panel unremarkable

## 2024-06-22 NOTE — ASSESSMENT & PLAN NOTE
"Unclear etiology.  Presented to the emergency department with confusion by his mother.  He notes that this has happened 1 other time in the past but cannot remember the cause.  He reports that he had called his sister and mom earlier to let them know that \"he was losing it \"but he could not articulate for me what that means.  Appears to be approaching baseline  PT OT recommending small resources but will have them reevaluate  "

## 2024-06-22 NOTE — PROGRESS NOTES
"[facility]  Progress Note  Name: Colin Dumont I  MRN: [id number]  Unit/Bed#: W -01 I Date of Admission: 6/13/2024   Date of Service: 6/22/2024 I Hospital Day: 9    Assessment & Plan   * Metabolic encephalopathy  Assessment & Plan  Unclear etiology.  Presented to the emergency department with confusion by his mother.  He notes that this has happened 1 other time in the past but cannot remember the cause.  He reports that he had called his sister and mom earlier to let them know that \"he was losing it \"but he could not articulate for me what that means.  Appears to be approaching baseline  PT OT recommending small resources but will have them reevaluate    High anion gap metabolic acidosis  Assessment & Plan  Unclear etiology.  ID support appreciated.  Off all antibiotics and afebrile  Hydrated in the ICU and now improved  Coma panel unremarkable    Alcohol use  Assessment & Plan  Continue vitamin B12 folate and multivitamin.    Acute respiratory failure with hypoxia (HCC)  Assessment & Plan  Now off of oxygen.  he self extubated    Spasticity  Assessment & Plan  Continue baclofen    Type II diabetes mellitus (HCC)  Assessment & Plan  Lab Results   Component Value Date    HGBA1C 5.6 06/20/2024       Recent Labs     06/21/24  1221 06/21/24  1627 06/21/24  2104 06/22/24  0708   POCGLU 216* 188* 232* 109       Blood Sugar Average: Last 72 hrs:  (P) [id number]    Currently on a sliding scale with a diabetic diet  Hemoglobin A1c 5.6    S/P craniotomy  Assessment & Plan  Noted.  Lives at home with his mother.    Benign essential hypertension  Assessment & Plan  Improved control with lisinopril               VTE Pharmacologic Prophylaxis:   Moderate Risk (Score 3-4) - Pharmacological DVT Prophylaxis Ordered: heparin.    Mobility:   Basic Mobility Inpatient Raw Score: 14  JH-HLM Goal: 4: Move to chair/commode  JH-HLM Achieved: 7: Walk 25 feet or more  JH-HLM Goal achieved. " Continue to encourage appropriate mobility.    Patient Centered Rounds: I performed bedside rounds with nursing staff today.   Discussions with Specialists or Other Care Team Provider: PT, ICU notes reviewed    Education and Discussions with Family / Patient: Patient declined call to .     Total Time Spent on Date of Encounter in care of patient: 35 mins. This time was spent on one or more of the following: performing physical exam; counseling and coordination of care; obtaining or reviewing history; documenting in the medical record; reviewing/ordering tests, medications or procedures; communicating with other healthcare professionals and discussing with patient's family/caregivers.    Current Length of Stay: 9 day(s)  Current Patient Status: Inpatient   Certification Statement: The patient will continue to require additional inpatient hospital stay due to metabolic encephalopathy, amatory dysfunction  Discharge Plan: Anticipate discharge in 48-72 hrs to rehab facility.    Code Status: Level 1 - Full Code    Subjective:   Patient reports feeling closer to his baseline.  He is unclear why he was confused.  He denies illicit substance use or change medication.  He notes it happened 1 time 5 years ago but it was an unknown cause.  He does not recall much of his ICU stay.  He understands the plan to continue to work on his deconditioning    Objective:     Vitals:   Temp (24hrs), Av.3 °F (36.8 °C), Min:97.9 °F (36.6 °C), Max:98.8 °F (37.1 °C)    Temp:  [97.9 °F (36.6 °C)-98.8 °F (37.1 °C)] 97.9 °F (36.6 °C)  HR:  [] 111  Resp:  [17-47] 17  BP: (121-144)/(68-91) 144/91  SpO2:  [89 %-95 %] 92 %  Body mass index is 21.32 kg/m².     Input and Output Summary (last 24 hours):     Intake/Output Summary (Last 24 hours) at 2024 1247  Last data filed at 2024 0900  Gross per 24 hour   Intake 960 ml   Output --   Net 960 ml       Physical Exam:   Physical Exam  Vitals and nursing note reviewed.    Constitutional:       Appearance: He is ill-appearing.   HENT:      Head: Normocephalic.      Nose: Nose normal. No congestion.      Mouth/Throat:      Mouth: Mucous membranes are moist.      Pharynx: No oropharyngeal exudate.   Eyes:      General: No scleral icterus.     Conjunctiva/sclera: Conjunctivae normal.   Cardiovascular:      Rate and Rhythm: Normal rate and regular rhythm.      Heart sounds: No murmur heard.  Pulmonary:      Effort: Pulmonary effort is normal. No respiratory distress.      Breath sounds: No wheezing, rhonchi or rales.   Abdominal:      General: Bowel sounds are normal. There is no distension.      Palpations: Abdomen is soft.      Tenderness: There is no abdominal tenderness.   Musculoskeletal:      Cervical back: Normal range of motion and neck supple. No rigidity.   Skin:     General: Skin is warm.      Coloration: Skin is not jaundiced.   Neurological:      Mental Status: He is alert and oriented to person, place, and time.   Psychiatric:         Behavior: Behavior normal.          Additional Data:     Labs:  Results from last 7 days   Lab Units 06/22/24  0502   WBC Thousand/uL 7.16   HEMOGLOBIN g/dL 9.8*   HEMATOCRIT % 30.2*   PLATELETS Thousands/uL 299   SEGS PCT % 76*   LYMPHO PCT % 12*   MONO PCT % 8   EOS PCT % 1     Results from last 7 days   Lab Units 06/22/24  0502 06/21/24  0537 06/20/24  0522   SODIUM mmol/L 139   < > 138   POTASSIUM mmol/L 3.5   < > 3.9   CHLORIDE mmol/L 102   < > 102   CO2 mmol/L 28   < > 28   BUN mg/dL 9   < > 13   CREATININE mg/dL 0.63   < > 0.56*   ANION GAP mmol/L 9   < > 8   CALCIUM mg/dL 9.2   < > 8.4   ALBUMIN g/dL  --   --  2.9*   TOTAL BILIRUBIN mg/dL  --   --  0.45   ALK PHOS U/L  --   --  49   ALT U/L  --   --  11   AST U/L  --   --  14   GLUCOSE RANDOM mg/dL 134   < > 303*    < > = values in this interval not displayed.         Results from last 7 days   Lab Units 06/22/24  0708 06/21/24  2104 06/21/24  1627 06/21/24  1221 06/21/24  0536  06/20/24  2359 06/20/24  1754 06/20/24  1201 06/20/24  0521 06/19/24  2333 06/19/24  1802 06/19/24  1122   POC GLUCOSE mg/dl 109 232* 188* 216* 131 160* 210* 267* 297* 287* 279* 248*     Results from last 7 days   Lab Units 06/20/24  0004 06/16/24  0729   HEMOGLOBIN A1C % 5.6 5.3     Results from last 7 days   Lab Units 06/21/24  0537 06/18/24  0427 06/17/24  0508   PROCALCITONIN ng/ml 0.34* 0.68* 1.20*       Lines/Drains:  Invasive Devices       Peripheral Intravenous Line  Duration             Peripheral IV 06/18/24 Proximal;Right;Ventral (anterior) Forearm 4 days    Peripheral IV 06/20/24 Right Antecubital 2 days                          Imaging: Personally reviewed the following imaging: chest CT scan, abdominal/pelvic CT, and CT head    Recent Cultures (last 7 days):   Results from last 7 days   Lab Units 06/18/24  1533 06/18/24  1244 06/17/24  1048   BLOOD CULTURE   --   --  No Growth After 4 Days.  No Growth After 4 Days.   SPUTUM CULTURE  4+ Growth of Stenotrophomonas maltophilia*  2+ Growth of  --   --    GRAM STAIN RESULT  4+ Gram negative rods*  2+ Polys*  Rare Gram positive cocci in pairs* Rare Polys  Rare Mononuclear Cells  No No organisms seen  --        Last 24 Hours Medication List:   Current Facility-Administered Medications   Medication Dose Route Frequency Provider Last Rate    baclofen  10 mg Oral BID Anmol Mcgregor MD      cyanocobalamin  1,000 mcg Oral Daily Anmol Mcgregor MD      folic acid  400 mcg Oral Daily Anmol Mcgregor MD      heparin (porcine)  5,000 Units Subcutaneous Q8H JEF Anmol Mcgregor MD      insulin lispro  1-5 Units Subcutaneous 4x Daily (AC & HS) Anmol Mcgregor MD      lisinopril  10 mg Oral Daily Anmol Mcgregor MD      pantoprazole  40 mg Intravenous Q24H JEF Anmol Mcgregor MD      polyethylene glycol  17 g Oral Daily Anmol Mcgregor MD      QUEtiapine  50 mg Oral HS Anmol Mcgregor MD      thiamine  100 mg Oral Daily Anmol Mcgregor  MD          Today, Patient Was Seen By: Pamela Conrad MD    **Please Note: This note may have been constructed using a voice recognition system.**

## 2024-06-22 NOTE — PLAN OF CARE
Problem: PHYSICAL THERAPY ADULT  Goal: Performs mobility at highest level of function for planned discharge setting.  See evaluation for individualized goals.  Description: Treatment/Interventions: Functional transfer training, LE strengthening/ROM, Therapeutic exercise, Endurance training, Cognitive reorientation, Patient/family training, Equipment eval/education, Bed mobility, Gait training, Continued evaluation, Spoke to nursing, Spoke to case management, OT, Family (family present for eval/treatment)  Equipment Recommended: Walker (for now)       See flowsheet documentation for full assessment, interventions and recommendations.  Outcome: Progressing  Note: Prognosis: Fair  Problem List: Decreased strength, Decreased endurance, Impaired balance, Decreased mobility, Decreased cognition, Impaired judgement, Decreased safety awareness, Impaired vision (gait deviations)  Assessment: pt began tx session lying supine in the bed as pt was agreeable to participate in PT intervention. PT intervention to focus on bed mobility, transfer training, TE activities, posture/balance w/gait and stair trials. pt continues to remain consistant for requiring +time and /s in order to complete a supine<>sit EOB transfer. While seated EOB pt participated in TE activities in order to strengthen LE's and increase static/dynamic sitting balance. level of assistance varied as pt required /s for static sitting but min Ax1 for dynamic sitting balance while performing TE activities. pt was able to perform functional transfers to and from RW w/ a decrease in level of assistance required compared to previous tx session mod ax1 w/ Vc's for hand placement while ascending to RW and descending to EOB. pt continues to be limited with activity tolerance and ambulation distnace as pt ambulated 30'x1 RW with min to mod ax1 for safety and balance. pt continues to require VC's for RLE placement as pt continues to place RLE outseide of RW. pt did  participate in stair trials and required bilateral hand rails and mod ax1 to complete 4 steps. Additional was not possible due to fatigue. Post tx pt in bed with call bell, bed alarm activated and all pt needs met. Continue to recommend Dc w/ level 1 maximal rehab resource intensity when medically cleared  Barriers to Discharge: Inaccessible home environment     Rehab Resource Intensity Level, PT: I (Maximum Resource Intensity)    See flowsheet documentation for full assessment.

## 2024-06-22 NOTE — PLAN OF CARE
Problem: SAFETY,RESTRAINT: NV/NON-SELF DESTRUCTIVE BEHAVIOR  Goal: Remains free of harm/injury (restraint for non violent/non self-detsructive behavior)  Description: INTERVENTIONS:  - Instruct patient/family regarding restraint use   - Assess and monitor physiologic and psychological status   - Provide interventions and comfort measures to meet assessed patient needs   - Identify and implement measures to help patient regain control  - Assess readiness for release of restraint   Outcome: Progressing  Goal: Returns to optimal restraint-free functioning  Description: INTERVENTIONS:  - Assess the patient's behavior and symptoms that indicate continued need for restraint  - Identify and implement measures to help patient regain control  - Assess readiness for release of restraint   Outcome: Progressing     Problem: Nutrition/Hydration-ADULT  Goal: Nutrient/Hydration intake appropriate for improving, restoring or maintaining nutritional needs  Description: Monitor and assess patient's nutrition/hydration status for malnutrition. Collaborate with interdisciplinary team and initiate plan and interventions as ordered.  Monitor patient's weight and dietary intake as ordered or per policy. Utilize nutrition screening tool and intervene as necessary. Determine patient's food preferences and provide high-protein, high-caloric foods as appropriate.     INTERVENTIONS:  - Monitor oral intake, urinary output, labs, and treatment plans  - Assess nutrition and hydration status and recommend course of action  - Evaluate amount of meals eaten  - Assist patient with eating if necessary   - Allow adequate time for meals  - Recommend/ encourage appropriate diets, oral nutritional supplements, and vitamin/mineral supplements  - Order, calculate, and assess calorie counts as needed  - Recommend, monitor, and adjust tube feedings and TPN/PPN based on assessed needs  - Assess need for intravenous fluids  - Provide specific  nutrition/hydration education as appropriate  - Include patient/family/caregiver in decisions related to nutrition  Outcome: Progressing     Problem: Prexisting or High Potential for Compromised Skin Integrity  Goal: Skin integrity is maintained or improved  Description: INTERVENTIONS:  - Identify patients at risk for skin breakdown  - Assess and monitor skin integrity  - Assess and monitor nutrition and hydration status  - Monitor labs   - Assess for incontinence   - Turn and reposition patient  - Assist with mobility/ambulation  - Relieve pressure over bony prominences  - Avoid friction and shearing  - Provide appropriate hygiene as needed including keeping skin clean and dry  - Evaluate need for skin moisturizer/barrier cream  - Collaborate with interdisciplinary team   - Patient/family teaching  - Consider wound care consult   Outcome: Progressing     Problem: PAIN - ADULT  Goal: Verbalizes/displays adequate comfort level or baseline comfort level  Description: Interventions:  - Encourage patient to monitor pain and request assistance  - Assess pain using appropriate pain scale  - Administer analgesics based on type and severity of pain and evaluate response  - Implement non-pharmacological measures as appropriate and evaluate response  - Consider cultural and social influences on pain and pain management  - Notify physician/advanced practitioner if interventions unsuccessful or patient reports new pain  Outcome: Progressing     Problem: INFECTION - ADULT  Goal: Absence or prevention of progression during hospitalization  Description: INTERVENTIONS:  - Assess and monitor for signs and symptoms of infection  - Monitor lab/diagnostic results  - Monitor all insertion sites, i.e. indwelling lines, tubes, and drains  - Monitor endotracheal if appropriate and nasal secretions for changes in amount and color  - [place] appropriate cooling/warming therapies per order  - Administer medications as ordered  - Instruct  and encourage patient and family to use good hand hygiene technique  - Identify and instruct in appropriate isolation precautions for identified infection/condition  Outcome: Progressing  Goal: Absence of fever/infection during neutropenic period  Description: INTERVENTIONS:  - Monitor WBC    Outcome: Progressing     Problem: SAFETY ADULT  Goal: Patient will remain free of falls  Description: INTERVENTIONS:  - Educate patient/family on patient safety including physical limitations  - Instruct patient to call for assistance with activity   - Consult OT/PT to assist with strengthening/mobility   - Keep Call bell within reach  - Keep bed low and locked with side rails adjusted as appropriate  - Keep care items and personal belongings within reach  - Initiate and maintain comfort rounds  - Make Fall Risk Sign visible to staff  - Offer Toileting every 2 Hours, in advance of need  - Initiate/Maintain bed alarm  - Obtain necessary fall risk management equipment: yellow socks  - Apply yellow socks and bracelet for high fall risk patients  - Consider moving patient to room near nurses station  Outcome: Progressing  Goal: Maintain or return to baseline ADL function  Description: INTERVENTIONS:  -  Assess patient's ability to carry out ADLs; assess patient's baseline for ADL function and identify physical deficits which impact ability to perform ADLs (bathing, care of mouth/teeth, toileting, grooming, dressing, etc.)  - Assess/evaluate cause of self-care deficits   - Assess range of motion  - Assess patient's mobility; develop plan if impaired  - Assess patient's need for assistive devices and provide as appropriate  - Encourage maximum independence but intervene and supervise when necessary  - Involve family in performance of ADLs  - Assess for home care needs following discharge   - Consider OT consult to assist with ADL evaluation and planning for discharge  - Provide patient education as appropriate  Outcome:  Progressing  Goal: Maintains/Returns to pre admission functional level  Description: INTERVENTIONS:  - Perform AM-PAC 6 Click Basic Mobility/ Daily Activity assessment daily.  - Set and communicate daily mobility goal to care team and patient/family/caregiver.   - Collaborate with rehabilitation services on mobility goals if consulted  - Perform Range of Motion 2 times a day.  - Reposition patient every 2 hours.  - Dangle patient 2 times a day  - Stand patient 2 times a day  - Ambulate patient 2 times a day  - Out of bed to chair 2 times a day   - Out of bed for meals 2 times a day  - Out of bed for toileting  - Record patient progress and toleration of activity level   Outcome: Progressing     Problem: DISCHARGE PLANNING  Goal: Discharge to home or other facility with appropriate resources  Description: INTERVENTIONS:  - Identify barriers to discharge w/patient and caregiver  - Arrange for needed discharge resources and transportation as appropriate  - Identify discharge learning needs (meds, wound care, etc.)  - Arrange for interpretive services to assist at discharge as needed  - Refer to Case Management Department for coordinating discharge planning if the patient needs post-hospital services based on physician/advanced practitioner order or complex needs related to functional status, cognitive ability, or social support system  Outcome: Progressing     Problem: Knowledge Deficit  Goal: Patient/family/caregiver demonstrates understanding of disease process, treatment plan, medications, and discharge instructions  Description: Complete learning assessment and assess knowledge base.  Interventions:  - Provide teaching at level of understanding  - Provide teaching via preferred learning methods  Outcome: Progressing

## 2024-06-22 NOTE — ASSESSMENT & PLAN NOTE
Continue vitamin B12 folate and multivitamin.   Const: + insomnia. Denies fever, chills  HEENT: Denies blurry vision, sore throat  Neck: Denies neck pain/stiffness  Resp: Denies coughing, SOB  Cardiovascular: Denies CP, palpitations, LE edema  GI: Denies nausea, vomiting, abdominal pain, diarrhea, constipation, blood in stool  : Denies urinary frequency/urgency/dysuria, hematuria  MSK: + bilateral knee pain, + left leg pain. Denies back pain  Neuro: Denies HA, dizziness, numbness, weakness  Skin: Denies rashes.

## 2024-06-23 LAB
GLUCOSE SERPL-MCNC: 137 MG/DL (ref 65–140)
GLUCOSE SERPL-MCNC: 176 MG/DL (ref 65–140)
GLUCOSE SERPL-MCNC: 308 MG/DL (ref 65–140)
GLUCOSE SERPL-MCNC: 89 MG/DL (ref 65–140)

## 2024-06-23 PROCEDURE — 99232 SBSQ HOSP IP/OBS MODERATE 35: CPT | Performed by: INTERNAL MEDICINE

## 2024-06-23 PROCEDURE — C9113 INJ PANTOPRAZOLE SODIUM, VIA: HCPCS

## 2024-06-23 PROCEDURE — 82948 REAGENT STRIP/BLOOD GLUCOSE: CPT

## 2024-06-23 RX ADMIN — POLYETHYLENE GLYCOL 3350 17 G: 17 POWDER, FOR SOLUTION ORAL at 09:31

## 2024-06-23 RX ADMIN — BACLOFEN 10 MG: 10 TABLET ORAL at 17:36

## 2024-06-23 RX ADMIN — FOLIC ACID TAB 400 MCG 400 MCG: 400 TAB at 09:32

## 2024-06-23 RX ADMIN — QUETIAPINE FUMARATE 50 MG: 25 TABLET ORAL at 21:55

## 2024-06-23 RX ADMIN — HEPARIN SODIUM 5000 UNITS: 5000 INJECTION INTRAVENOUS; SUBCUTANEOUS at 05:17

## 2024-06-23 RX ADMIN — INSULIN LISPRO 1 UNITS: 100 INJECTION, SOLUTION INTRAVENOUS; SUBCUTANEOUS at 22:01

## 2024-06-23 RX ADMIN — LISINOPRIL 10 MG: 10 TABLET ORAL at 09:31

## 2024-06-23 RX ADMIN — HEPARIN SODIUM 5000 UNITS: 5000 INJECTION INTRAVENOUS; SUBCUTANEOUS at 13:17

## 2024-06-23 RX ADMIN — CYANOCOBALAMIN TAB 500 MCG 1000 MCG: 500 TAB at 09:31

## 2024-06-23 RX ADMIN — BACLOFEN 10 MG: 10 TABLET ORAL at 09:31

## 2024-06-23 RX ADMIN — PANTOPRAZOLE SODIUM 40 MG: 40 INJECTION, POWDER, FOR SOLUTION INTRAVENOUS at 09:31

## 2024-06-23 RX ADMIN — Medication 100 MG: at 09:31

## 2024-06-23 RX ADMIN — INSULIN LISPRO 3 UNITS: 100 INJECTION, SOLUTION INTRAVENOUS; SUBCUTANEOUS at 11:55

## 2024-06-23 RX ADMIN — HEPARIN SODIUM 5000 UNITS: 5000 INJECTION INTRAVENOUS; SUBCUTANEOUS at 21:56

## 2024-06-23 NOTE — ASSESSMENT & PLAN NOTE
Lab Results   Component Value Date    HGBA1C 5.6 06/20/2024       Recent Labs     06/22/24  1309 06/22/24  1636 06/22/24  2106 06/23/24  0746   POCGLU 216* 174* 135 89         Blood Sugar Average: Last 72 hrs:  (P) [id number]    Currently on a sliding scale with a diabetic diet  Hemoglobin A1c 5.6

## 2024-06-23 NOTE — PLAN OF CARE
Problem: SAFETY,RESTRAINT: NV/NON-SELF DESTRUCTIVE BEHAVIOR  Goal: Remains free of harm/injury (restraint for non violent/non self-detsructive behavior)  Description: INTERVENTIONS:  - Instruct patient/family regarding restraint use   - Assess and monitor physiologic and psychological status   - Provide interventions and comfort measures to meet assessed patient needs   - Identify and implement measures to help patient regain control  - Assess readiness for release of restraint   Outcome: Progressing  Goal: Returns to optimal restraint-free functioning  Description: INTERVENTIONS:  - Assess the patient's behavior and symptoms that indicate continued need for restraint  - Identify and implement measures to help patient regain control  - Assess readiness for release of restraint   Outcome: Progressing     Problem: Nutrition/Hydration-ADULT  Goal: Nutrient/Hydration intake appropriate for improving, restoring or maintaining nutritional needs  Description: Monitor and assess patient's nutrition/hydration status for malnutrition. Collaborate with interdisciplinary team and initiate plan and interventions as ordered.  Monitor patient's weight and dietary intake as ordered or per policy. Utilize nutrition screening tool and intervene as necessary. Determine patient's food preferences and provide high-protein, high-caloric foods as appropriate.     INTERVENTIONS:  - Monitor oral intake, urinary output, labs, and treatment plans  - Assess nutrition and hydration status and recommend course of action  - Evaluate amount of meals eaten  - Assist patient with eating if necessary   - Allow adequate time for meals  - Recommend/ encourage appropriate diets, oral nutritional supplements, and vitamin/mineral supplements  - Order, calculate, and assess calorie counts as needed  - Recommend, monitor, and adjust tube feedings and TPN/PPN based on assessed needs  - Assess need for intravenous fluids  - Provide specific  nutrition/hydration education as appropriate  - Include patient/family/caregiver in decisions related to nutrition  Outcome: Progressing     Problem: Prexisting or High Potential for Compromised Skin Integrity  Goal: Skin integrity is maintained or improved  Description: INTERVENTIONS:  - Identify patients at risk for skin breakdown  - Assess and monitor skin integrity  - Assess and monitor nutrition and hydration status  - Monitor labs   - Assess for incontinence   - Turn and reposition patient  - Assist with mobility/ambulation  - Relieve pressure over bony prominences  - Avoid friction and shearing  - Provide appropriate hygiene as needed including keeping skin clean and dry  - Evaluate need for skin moisturizer/barrier cream  - Collaborate with interdisciplinary team   - Patient/family teaching  - Consider wound care consult   Outcome: Progressing     Problem: PAIN - ADULT  Goal: Verbalizes/displays adequate comfort level or baseline comfort level  Description: Interventions:  - Encourage patient to monitor pain and request assistance  - Assess pain using appropriate pain scale  - Administer analgesics based on type and severity of pain and evaluate response  - Implement non-pharmacological measures as appropriate and evaluate response  - Consider cultural and social influences on pain and pain management  - Notify physician/advanced practitioner if interventions unsuccessful or patient reports new pain  Outcome: Progressing     Problem: INFECTION - ADULT  Goal: Absence or prevention of progression during hospitalization  Description: INTERVENTIONS:  - Assess and monitor for signs and symptoms of infection  - Monitor lab/diagnostic results  - Monitor all insertion sites, i.e. indwelling lines, tubes, and drains  - Monitor endotracheal if appropriate and nasal secretions for changes in amount and color  - [place] appropriate cooling/warming therapies per order  - Administer medications as ordered  - Instruct  and encourage patient and family to use good hand hygiene technique  - Identify and instruct in appropriate isolation precautions for identified infection/condition  Outcome: Progressing  Goal: Absence of fever/infection during neutropenic period  Description: INTERVENTIONS:  - Monitor WBC    Outcome: Progressing     Problem: SAFETY ADULT  Goal: Patient will remain free of falls  Description: INTERVENTIONS:  - Educate patient/family on patient safety including physical limitations  - Instruct patient to call for assistance with activity   - Consult OT/PT to assist with strengthening/mobility   - Keep Call bell within reach  - Keep bed low and locked with side rails adjusted as appropriate  - Keep care items and personal belongings within reach  - Initiate and maintain comfort rounds  - Make Fall Risk Sign visible to staff  - Offer Toileting every 2 Hours, in advance of need  - Initiate/Maintain bed alarm  - Obtain necessary fall risk management equipment: alarms  - Apply yellow socks and bracelet for high fall risk patients  - Consider moving patient to room near nurses station  Outcome: Progressing  Goal: Maintain or return to baseline ADL function  Description: INTERVENTIONS:  -  Assess patient's ability to carry out ADLs; assess patient's baseline for ADL function and identify physical deficits which impact ability to perform ADLs (bathing, care of mouth/teeth, toileting, grooming, dressing, etc.)  - Assess/evaluate cause of self-care deficits   - Assess range of motion  - Assess patient's mobility; develop plan if impaired  - Assess patient's need for assistive devices and provide as appropriate  - Encourage maximum independence but intervene and supervise when necessary  - Involve family in performance of ADLs  - Assess for home care needs following discharge   - Consider OT consult to assist with ADL evaluation and planning for discharge  - Provide patient education as appropriate  Outcome: Progressing  Goal:  Maintains/Returns to pre admission functional level  Description: INTERVENTIONS:  - Perform AM-PAC 6 Click Basic Mobility/ Daily Activity assessment daily.  - Set and communicate daily mobility goal to care team and patient/family/caregiver.   - Collaborate with rehabilitation services on mobility goals if consulted  - Perform Range of Motion 3 times a day.  - Reposition patient every 2 hours.  - Dangle patient 3 times a day  - Stand patient 3 times a day  - Ambulate patient 3 times a day  - Out of bed to chair 3 times a day   - Out of bed for meals 3 times a day  - Out of bed for toileting  - Record patient progress and toleration of activity level   Outcome: Progressing     Problem: DISCHARGE PLANNING  Goal: Discharge to home or other facility with appropriate resources  Description: INTERVENTIONS:  - Identify barriers to discharge w/patient and caregiver  - Arrange for needed discharge resources and transportation as appropriate  - Identify discharge learning needs (meds, wound care, etc.)  - Arrange for interpretive services to assist at discharge as needed  - Refer to Case Management Department for coordinating discharge planning if the patient needs post-hospital services based on physician/advanced practitioner order or complex needs related to functional status, cognitive ability, or social support system  Outcome: Progressing     Problem: Knowledge Deficit  Goal: Patient/family/caregiver demonstrates understanding of disease process, treatment plan, medications, and discharge instructions  Description: Complete learning assessment and assess knowledge base.  Interventions:  - Provide teaching at level of understanding  - Provide teaching via preferred learning methods  Outcome: Progressing

## 2024-06-23 NOTE — ASSESSMENT & PLAN NOTE
"Unclear etiology.  Presented to the emergency department with confusion by his mother.  He notes that this has happened 1 other time in the past but cannot remember the cause.  He reports that he had called his sister and mom earlier to let them know that \"he was losing it \"but he could not articulate for me what that means.  Appears to be approaching baseline  PT OT recommending small resources but will have them reevaluate  ID evaluated for infectious etiology and work up unremarkable  "

## 2024-06-23 NOTE — PROGRESS NOTES
"[facility]  Progress Note  Name: Colin Dumont I  MRN: [id number]  Unit/Bed#: W -01 I Date of Admission: 6/13/2024   Date of Service: 6/23/2024 I Hospital Day: 10    Assessment & Plan   * Metabolic encephalopathy  Assessment & Plan  Unclear etiology.  Presented to the emergency department with confusion by his mother.  He notes that this has happened 1 other time in the past but cannot remember the cause.  He reports that he had called his sister and mom earlier to let them know that \"he was losing it \"but he could not articulate for me what that means.  Appears to be approaching baseline  PT OT recommending small resources but will have them reevaluate  ID evaluated for infectious etiology and work up unremarkable    High anion gap metabolic acidosis  Assessment & Plan  Unclear etiology.  ID support appreciated.  Off all antibiotics and afebrile  Hydrated in the ICU and now improved  Coma panel unremarkable    Alcohol use  Assessment & Plan  Continue vitamin B12 folate and multivitamin.    Acute respiratory failure with hypoxia (HCC)  Assessment & Plan  Now off of oxygen.  he self extubated    Spasticity  Assessment & Plan  Continue baclofen    Type II diabetes mellitus (HCC)  Assessment & Plan  Lab Results   Component Value Date    HGBA1C 5.6 06/20/2024       Recent Labs     06/22/24  1309 06/22/24  1636 06/22/24  2106 06/23/24  0746   POCGLU 216* 174* 135 89         Blood Sugar Average: Last 72 hrs:  (P) [id number]    Currently on a sliding scale with a diabetic diet  Hemoglobin A1c 5.6    S/P craniotomy  Assessment & Plan  Noted.  Lives at home with his mother.    Benign essential hypertension  Assessment & Plan  Cont lisinopril               VTE Pharmacologic Prophylaxis:   Moderate Risk (Score 3-4) - Pharmacological DVT Prophylaxis Ordered: heparin.    Mobility:   Basic Mobility Inpatient Raw Score: 14  -[facility] Goal: 4: Move to chair/commode  -[facility] Achieved: 7: Walk " 25 feet or more  [facility] Goal achieved. Continue to encourage appropriate mobility.    Patient Centered Rounds: I performed bedside rounds with nursing staff today.   Discussions with Specialists or Other Care Team Provider: none    Education and Discussions with Family / Patient: Attempted to update  (mother) via phone. Unable to contact.  The call went straight to voicemail at 11:30 AM    Total Time Spent on Date of Encounter in care of patient: 35 mins. This time was spent on one or more of the following: performing physical exam; counseling and coordination of care; obtaining or reviewing history; documenting in the medical record; reviewing/ordering tests, medications or procedures; communicating with other healthcare professionals and discussing with patient's family/caregivers.    Current Length of Stay: 10 day(s)  Current Patient Status: Inpatient   Certification Statement: The patient will continue to require additional inpatient hospital stay due to his physical deconditioning  Discharge Plan: Anticipate discharge in 24-48 hrs to rehab facility.    Code Status: Level 1 - Full Code    Subjective:   Patient reports having an okay night.  He denies any fevers or chills.  He still asks me today what might of caused his altered mental status.  He understands the plan for physical conditioning with our therapy team as well as potential need for rehab    Objective:     Vitals:   Temp (24hrs), Av.3 °F (36.8 °C), Min:97.1 °F (36.2 °C), Max:99.2 °F (37.3 °C)    Temp:  [97.1 °F (36.2 °C)-99.2 °F (37.3 °C)] 99.2 °F (37.3 °C)  HR:  [80-83] 83  Resp:  [16-18] 18  BP: (144-152)/(92-95) 150/93  SpO2:  [94 %-95 %] 95 %  Body mass index is 21.78 kg/m².     Input and Output Summary (last 24 hours):     Intake/Output Summary (Last 24 hours) at 2024 1042  Last data filed at 2024 0906  Gross per 24 hour   Intake 720 ml   Output --   Net 720 ml       Physical Exam:   Physical Exam  Vitals and nursing  note reviewed.   Constitutional:       Appearance: He is ill-appearing. He is not diaphoretic.   HENT:      Head: Normocephalic.      Nose: Nose normal. No congestion.      Mouth/Throat:      Mouth: Mucous membranes are moist.      Pharynx: No oropharyngeal exudate.   Eyes:      General: No scleral icterus.     Conjunctiva/sclera: Conjunctivae normal.   Cardiovascular:      Rate and Rhythm: Normal rate and regular rhythm.   Pulmonary:      Effort: Pulmonary effort is normal. No respiratory distress.      Breath sounds: No wheezing or rales.   Abdominal:      General: Bowel sounds are normal. There is no distension.      Palpations: Abdomen is soft.   Musculoskeletal:      Cervical back: Normal range of motion and neck supple. No rigidity.   Skin:     General: Skin is warm.      Coloration: Skin is not jaundiced.   Neurological:      Mental Status: He is alert and oriented to person, place, and time.   Psychiatric:         Behavior: Behavior normal.          Additional Data:     Labs:  Results from last 7 days   Lab Units 06/22/24  0502   WBC Thousand/uL 7.16   HEMOGLOBIN g/dL 9.8*   HEMATOCRIT % 30.2*   PLATELETS Thousands/uL 299   SEGS PCT % 76*   LYMPHO PCT % 12*   MONO PCT % 8   EOS PCT % 1     Results from last 7 days   Lab Units 06/22/24  0502 06/21/24  0537 06/20/24  0522   SODIUM mmol/L 139   < > 138   POTASSIUM mmol/L 3.5   < > 3.9   CHLORIDE mmol/L 102   < > 102   CO2 mmol/L 28   < > 28   BUN mg/dL 9   < > 13   CREATININE mg/dL 0.63   < > 0.56*   ANION GAP mmol/L 9   < > 8   CALCIUM mg/dL 9.2   < > 8.4   ALBUMIN g/dL  --   --  2.9*   TOTAL BILIRUBIN mg/dL  --   --  0.45   ALK PHOS U/L  --   --  49   ALT U/L  --   --  11   AST U/L  --   --  14   GLUCOSE RANDOM mg/dL 134   < > 303*    < > = values in this interval not displayed.         Results from last 7 days   Lab Units 06/23/24  0746 06/22/24  2106 06/22/24  1636 06/22/24  1309 06/22/24  0708 06/21/24  2104 06/21/24  1627 06/21/24  1221 06/21/24  0536  06/20/24  2359 06/20/24  1754 06/20/24  1201   POC GLUCOSE mg/dl 89 135 174* 216* 109 232* 188* 216* 131 160* 210* 267*     Results from last 7 days   Lab Units 06/20/24  0004   HEMOGLOBIN A1C % 5.6     Results from last 7 days   Lab Units 06/21/24  0537 06/18/24  0427 06/17/24  0508   PROCALCITONIN ng/ml 0.34* 0.68* 1.20*       Lines/Drains:  Invasive Devices       Peripheral Intravenous Line  Duration             Peripheral IV 06/20/24 Right Antecubital 3 days                          Imaging: No pertinent imaging reviewed.    Recent Cultures (last 7 days):   Results from last 7 days   Lab Units 06/18/24  1533 06/18/24  1244 06/17/24  1048   BLOOD CULTURE   --   --  No Growth After 5 Days.  No Growth After 5 Days.   SPUTUM CULTURE  4+ Growth of Stenotrophomonas maltophilia*  2+ Growth of  --   --    GRAM STAIN RESULT  4+ Gram negative rods*  2+ Polys*  Rare Gram positive cocci in pairs* Rare Polys  Rare Mononuclear Cells  No No organisms seen  --        Last 24 Hours Medication List:   Current Facility-Administered Medications   Medication Dose Route Frequency Provider Last Rate    baclofen  10 mg Oral BID Anmol Mcgregor MD      cyanocobalamin  1,000 mcg Oral Daily Anmol Mcgregor MD      folic acid  400 mcg Oral Daily Anmol Mcgregor MD      heparin (porcine)  5,000 Units Subcutaneous Q8H JEF Anmol Mcgregor MD      insulin lispro  1-5 Units Subcutaneous 4x Daily (AC & HS) nAmol Mcgregor MD      lisinopril  10 mg Oral Daily Anmol Mcgregor MD      pantoprazole  40 mg Intravenous Q24H JEF Anmol Mcgregor MD      polyethylene glycol  17 g Oral Daily Anmol Mcgregor MD      QUEtiapine  50 mg Oral HS Anmol Mcgregor MD      thiamine  100 mg Oral Daily Anmol Mcgregor MD          Today, Patient Was Seen By: Pamela Conrad MD    **Please Note: This note may have been constructed using a voice recognition system.**

## 2024-06-24 LAB
ANION GAP SERPL CALCULATED.3IONS-SCNC: 6 MMOL/L (ref 4–13)
BASOPHILS # BLD AUTO: 0.03 THOUSANDS/ÂΜL (ref 0–0.1)
BASOPHILS NFR BLD AUTO: 1 % (ref 0–1)
BUN SERPL-MCNC: 11 MG/DL (ref 5–25)
CALCIUM SERPL-MCNC: 9.4 MG/DL (ref 8.4–10.2)
CHLORIDE SERPL-SCNC: 104 MMOL/L (ref 96–108)
CO2 SERPL-SCNC: 33 MMOL/L (ref 21–32)
CREAT SERPL-MCNC: 0.63 MG/DL (ref 0.6–1.3)
EOSINOPHIL # BLD AUTO: 0.06 THOUSAND/ÂΜL (ref 0–0.61)
EOSINOPHIL NFR BLD AUTO: 1 % (ref 0–6)
ERYTHROCYTE [DISTWIDTH] IN BLOOD BY AUTOMATED COUNT: 13.8 % (ref 11.6–15.1)
FUNGUS SPEC CULT: NORMAL
GFR SERPL CREATININE-BSD FRML MDRD: 110 ML/MIN/1.73SQ M
GLUCOSE SERPL-MCNC: 121 MG/DL (ref 65–140)
GLUCOSE SERPL-MCNC: 159 MG/DL (ref 65–140)
GLUCOSE SERPL-MCNC: 193 MG/DL (ref 65–140)
GLUCOSE SERPL-MCNC: 301 MG/DL (ref 65–140)
GLUCOSE SERPL-MCNC: 82 MG/DL (ref 65–140)
HCT VFR BLD AUTO: 32.2 % (ref 36.5–49.3)
HGB BLD-MCNC: 10.8 G/DL (ref 12–17)
IMM GRANULOCYTES # BLD AUTO: 0.08 THOUSAND/UL (ref 0–0.2)
IMM GRANULOCYTES NFR BLD AUTO: 2 % (ref 0–2)
LYMPHOCYTES # BLD AUTO: 0.88 THOUSANDS/ÂΜL (ref 0.6–4.47)
LYMPHOCYTES NFR BLD AUTO: 17 % (ref 14–44)
MCH RBC QN AUTO: 37.6 PG (ref 26.8–34.3)
MCHC RBC AUTO-ENTMCNC: 33.5 G/DL (ref 31.4–37.4)
MCV RBC AUTO: 112 FL (ref 82–98)
MONOCYTES # BLD AUTO: 0.43 THOUSAND/ÂΜL (ref 0.17–1.22)
MONOCYTES NFR BLD AUTO: 8 % (ref 4–12)
NEUTROPHILS # BLD AUTO: 3.66 THOUSANDS/ÂΜL (ref 1.85–7.62)
NEUTS SEG NFR BLD AUTO: 71 % (ref 43–75)
NRBC BLD AUTO-RTO: 0 /100 WBCS
PLATELET # BLD AUTO: 346 THOUSANDS/UL (ref 149–390)
PMV BLD AUTO: 9.3 FL (ref 8.9–12.7)
POTASSIUM SERPL-SCNC: 4.1 MMOL/L (ref 3.5–5.3)
RBC # BLD AUTO: 2.87 MILLION/UL (ref 3.88–5.62)
SODIUM SERPL-SCNC: 143 MMOL/L (ref 135–147)
WBC # BLD AUTO: 5.14 THOUSAND/UL (ref 4.31–10.16)

## 2024-06-24 PROCEDURE — 85025 COMPLETE CBC W/AUTO DIFF WBC: CPT | Performed by: INTERNAL MEDICINE

## 2024-06-24 PROCEDURE — 99232 SBSQ HOSP IP/OBS MODERATE 35: CPT | Performed by: INTERNAL MEDICINE

## 2024-06-24 PROCEDURE — 82948 REAGENT STRIP/BLOOD GLUCOSE: CPT

## 2024-06-24 PROCEDURE — 80048 BASIC METABOLIC PNL TOTAL CA: CPT | Performed by: INTERNAL MEDICINE

## 2024-06-24 RX ORDER — INSULIN GLARGINE 100 [IU]/ML
10 INJECTION, SOLUTION SUBCUTANEOUS
Status: DISCONTINUED | OUTPATIENT
Start: 2024-06-24 | End: 2024-06-25

## 2024-06-24 RX ORDER — PANTOPRAZOLE SODIUM 40 MG/1
40 TABLET, DELAYED RELEASE ORAL
Status: DISCONTINUED | OUTPATIENT
Start: 2024-06-24 | End: 2024-06-27 | Stop reason: HOSPADM

## 2024-06-24 RX ADMIN — POLYETHYLENE GLYCOL 3350 17 G: 17 POWDER, FOR SOLUTION ORAL at 09:38

## 2024-06-24 RX ADMIN — INSULIN LISPRO 3 UNITS: 100 INJECTION, SOLUTION INTRAVENOUS; SUBCUTANEOUS at 12:07

## 2024-06-24 RX ADMIN — INSULIN GLARGINE 10 UNITS: 100 INJECTION, SOLUTION SUBCUTANEOUS at 23:14

## 2024-06-24 RX ADMIN — HEPARIN SODIUM 5000 UNITS: 5000 INJECTION INTRAVENOUS; SUBCUTANEOUS at 15:38

## 2024-06-24 RX ADMIN — CYANOCOBALAMIN TAB 500 MCG 1000 MCG: 500 TAB at 09:39

## 2024-06-24 RX ADMIN — HEPARIN SODIUM 5000 UNITS: 5000 INJECTION INTRAVENOUS; SUBCUTANEOUS at 23:13

## 2024-06-24 RX ADMIN — Medication 100 MG: at 09:39

## 2024-06-24 RX ADMIN — HEPARIN SODIUM 5000 UNITS: 5000 INJECTION INTRAVENOUS; SUBCUTANEOUS at 05:32

## 2024-06-24 RX ADMIN — BACLOFEN 10 MG: 10 TABLET ORAL at 09:38

## 2024-06-24 RX ADMIN — LISINOPRIL 10 MG: 10 TABLET ORAL at 09:38

## 2024-06-24 RX ADMIN — BACLOFEN 10 MG: 10 TABLET ORAL at 17:40

## 2024-06-24 RX ADMIN — FOLIC ACID TAB 400 MCG 400 MCG: 400 TAB at 09:39

## 2024-06-24 RX ADMIN — QUETIAPINE FUMARATE 50 MG: 25 TABLET ORAL at 23:13

## 2024-06-24 RX ADMIN — INSULIN LISPRO 1 UNITS: 100 INJECTION, SOLUTION INTRAVENOUS; SUBCUTANEOUS at 23:13

## 2024-06-24 RX ADMIN — PANTOPRAZOLE SODIUM 40 MG: 40 TABLET, DELAYED RELEASE ORAL at 09:38

## 2024-06-24 RX ADMIN — INSULIN LISPRO 1 UNITS: 100 INJECTION, SOLUTION INTRAVENOUS; SUBCUTANEOUS at 17:40

## 2024-06-24 NOTE — CASE MANAGEMENT
Case Management Discharge Planning Note    Patient name Colin Dumont  Location W /W -01 MRN [id number]  : 1970 Date 2024       Current Admission Date: 2024  Current Admission Diagnosis:Metabolic encephalopathy   Patient Active Problem List    Diagnosis Date Noted Date Diagnosed    SIRS (systemic inflammatory response syndrome) ([facility]) 2024     Acute respiratory failure with hypoxia ([facility]) 2024     Alcohol use 2024     Alcohol intoxication ([facility]) 2022     Fall 2022     Slurred speech 2022     Metabolic encephalopathy 2022     Memory difficulty 2022     History of GI bleed 2022     Noncompliance 2020     Spasticity 2020     Right sided weakness 10/18/2019     High anion gap metabolic acidosis 2019     Hyperlipidemia 2019     S/P craniotomy 2019     Type 2 diabetes mellitus without complication, with long-term current use of insulin ([facility]) 2018     Tobacco use disorder 2018     Type II diabetes mellitus ([facility]) 2018     Depression 2017     Benign essential hypertension 2012       LOS (days): 11  Geometric Mean LOS (GMLOS) (days): 13.5  Days to GMLOS:2.9     OBJECTIVE:  Risk of Unplanned Readmission Score: 13.28         Current admission status: Inpatient   Preferred Pharmacy:   [id number] - [address] [address]  Phone: [phone] Fax: [phone]    Primary Care Provider: Stefani Garner MD    Primary Insurance: MEDICARE  Secondary Insurance:     DISCHARGE DETAILS:    Discharge planning discussed with:: Patient, Millicent-mother and Carli-sister  Freedom of Choice: Yes  Comments - Freedom of Choice: CM met with the Pt, his mother and sister per their request to discuss the Pt rehab needs. Pt and family reported their interest in rehab as previously recommended and gave permission for a blanket  referral in the [place] area. Millicent reported she is in the process on arranging assisted living care for the Pt for him to transition to after rehab, as family is unable to care for him at home.  CM contacted family/caregiver?: Yes  Were Treatment Team discharge recommendations reviewed with patient/caregiver?: Yes  Did patient/caregiver verbalize understanding of patient care needs?: Yes  Were patient/caregiver advised of the risks associated with not following Treatment Team discharge recommendations?: Yes    Contacts  Patient Contacts: Millicent & Carli  Relationship to Patient:: Family  Contact Method: In Person  Reason/Outcome: Discharge Planning, Referral    Requested Home Health Care         Is the patient interested in HHC at discharge?: No    DME Referral Provided  Referral made for DME?: No    Other Referral/Resources/Interventions Provided:  Interventions: Short Term Rehab  Referral Comments: CM made a blanket SNF referral in the [place] area.         Treatment Team Recommendation: Short Term Rehab  Discharge Destination Plan:: Short Term Rehab  Transport at Discharge : Wheelchair van

## 2024-06-24 NOTE — PLAN OF CARE
Problem: SAFETY,RESTRAINT: NV/NON-SELF DESTRUCTIVE BEHAVIOR  Goal: Remains free of harm/injury (restraint for non violent/non self-detsructive behavior)  Description: INTERVENTIONS:  - Instruct patient/family regarding restraint use   - Assess and monitor physiologic and psychological status   - Provide interventions and comfort measures to meet assessed patient needs   - Identify and implement measures to help patient regain control  - Assess readiness for release of restraint   Outcome: Progressing  Goal: Returns to optimal restraint-free functioning  Description: INTERVENTIONS:  - Assess the patient's behavior and symptoms that indicate continued need for restraint  - Identify and implement measures to help patient regain control  - Assess readiness for release of restraint   Outcome: Progressing     Problem: Nutrition/Hydration-ADULT  Goal: Nutrient/Hydration intake appropriate for improving, restoring or maintaining nutritional needs  Description: Monitor and assess patient's nutrition/hydration status for malnutrition. Collaborate with interdisciplinary team and initiate plan and interventions as ordered.  Monitor patient's weight and dietary intake as ordered or per policy. Utilize nutrition screening tool and intervene as necessary. Determine patient's food preferences and provide high-protein, high-caloric foods as appropriate.     INTERVENTIONS:  - Monitor oral intake, urinary output, labs, and treatment plans  - Assess nutrition and hydration status and recommend course of action  - Evaluate amount of meals eaten  - Assist patient with eating if necessary   - Allow adequate time for meals  - Recommend/ encourage appropriate diets, oral nutritional supplements, and vitamin/mineral supplements  - Order, calculate, and assess calorie counts as needed  - Recommend, monitor, and adjust tube feedings and TPN/PPN based on assessed needs  - Assess need for intravenous fluids  - Provide specific  nutrition/hydration education as appropriate  - Include patient/family/caregiver in decisions related to nutrition  Outcome: Progressing     Problem: Prexisting or High Potential for Compromised Skin Integrity  Goal: Skin integrity is maintained or improved  Description: INTERVENTIONS:  - Identify patients at risk for skin breakdown  - Assess and monitor skin integrity  - Assess and monitor nutrition and hydration status  - Monitor labs   - Assess for incontinence   - Turn and reposition patient  - Assist with mobility/ambulation  - Relieve pressure over bony prominences  - Avoid friction and shearing  - Provide appropriate hygiene as needed including keeping skin clean and dry  - Evaluate need for skin moisturizer/barrier cream  - Collaborate with interdisciplinary team   - Patient/family teaching  - Consider wound care consult   Outcome: Progressing     Problem: PAIN - ADULT  Goal: Verbalizes/displays adequate comfort level or baseline comfort level  Description: Interventions:  - Encourage patient to monitor pain and request assistance  - Assess pain using appropriate pain scale  - Administer analgesics based on type and severity of pain and evaluate response  - Implement non-pharmacological measures as appropriate and evaluate response  - Consider cultural and social influences on pain and pain management  - Notify physician/advanced practitioner if interventions unsuccessful or patient reports new pain  Outcome: Progressing     Problem: INFECTION - ADULT  Goal: Absence or prevention of progression during hospitalization  Description: INTERVENTIONS:  - Assess and monitor for signs and symptoms of infection  - Monitor lab/diagnostic results  - Monitor all insertion sites, i.e. indwelling lines, tubes, and drains  - Monitor endotracheal if appropriate and nasal secretions for changes in amount and color  - [place] appropriate cooling/warming therapies per order  - Administer medications as ordered  - Instruct  and encourage patient and family to use good hand hygiene technique  - Identify and instruct in appropriate isolation precautions for identified infection/condition  Outcome: Progressing  Goal: Absence of fever/infection during neutropenic period  Description: INTERVENTIONS:  - Monitor WBC    Outcome: Progressing     Problem: SAFETY ADULT  Goal: Patient will remain free of falls  Description: INTERVENTIONS:  - Educate patient/family on patient safety including physical limitations  - Instruct patient to call for assistance with activity   - Consult OT/PT to assist with strengthening/mobility   - Keep Call bell within reach  - Keep bed low and locked with side rails adjusted as appropriate  - Keep care items and personal belongings within reach  - Initiate and maintain comfort rounds  - Make Fall Risk Sign visible to staff  - Offer Toileting every  Hours, in advance of need  - Initiate/Maintain alarm  - Obtain necessary fall risk management equipment:   - Apply yellow socks and bracelet for high fall risk patients  - Consider moving patient to room near nurses station  Outcome: Progressing  Goal: Maintain or return to baseline ADL function  Description: INTERVENTIONS:  -  Assess patient's ability to carry out ADLs; assess patient's baseline for ADL function and identify physical deficits which impact ability to perform ADLs (bathing, care of mouth/teeth, toileting, grooming, dressing, etc.)  - Assess/evaluate cause of self-care deficits   - Assess range of motion  - Assess patient's mobility; develop plan if impaired  - Assess patient's need for assistive devices and provide as appropriate  - Encourage maximum independence but intervene and supervise when necessary  - Involve family in performance of ADLs  - Assess for home care needs following discharge   - Consider OT consult to assist with ADL evaluation and planning for discharge  - Provide patient education as appropriate  Outcome: Progressing  Goal:  Maintains/Returns to pre admission functional level  Description: INTERVENTIONS:  - Perform AM-PAC 6 Click Basic Mobility/ Daily Activity assessment daily.  - Set and communicate daily mobility goal to care team and patient/family/caregiver.   - Collaborate with rehabilitation services on mobility goals if consulted  - Perform Range of Motion  times a day.  - Reposition patient every  hours.  - Dangle patient  times a day  - Stand patient  times a day  - Ambulate patient  times a day  - Out of bed to chair  times a day   - Out of bed for meals  times a day  - Out of bed for toileting  - Record patient progress and toleration of activity level   Outcome: Progressing     Problem: DISCHARGE PLANNING  Goal: Discharge to home or other facility with appropriate resources  Description: INTERVENTIONS:  - Identify barriers to discharge w/patient and caregiver  - Arrange for needed discharge resources and transportation as appropriate  - Identify discharge learning needs (meds, wound care, etc.)  - Arrange for interpretive services to assist at discharge as needed  - Refer to Case Management Department for coordinating discharge planning if the patient needs post-hospital services based on physician/advanced practitioner order or complex needs related to functional status, cognitive ability, or social support system  Outcome: Progressing     Problem: Knowledge Deficit  Goal: Patient/family/caregiver demonstrates understanding of disease process, treatment plan, medications, and discharge instructions  Description: Complete learning assessment and assess knowledge base.  Interventions:  - Provide teaching at level of understanding  - Provide teaching via preferred learning methods  Outcome: Progressing

## 2024-06-24 NOTE — PROGRESS NOTES
Progress Note - Infectious Disease   Colin Dumont 54 y.o. male MRN: [id number]  Unit/Bed#: W -01 Encounter: [id number]      Impression/Plan:  1.  SIRS.  Patient initially presented meeting SIRS criteria and then subsequently declined from a respiratory standpoint.  He was noted to have metabolic derangements as well and he was altered on presentation.  Initial imaging had been unremarkable but patient was having fevers with elevated Pro-Ortiz.  Was empirically treated with antibiotics.  Cultures and LP unremarkable.  Patient did eventually defervesce.  Repeat imaging showed a later developing pulmonary process, possible aspiration/pneumonitis.  Repeat sputum cultures now isolating stenotrophomonas.  Patient however has overall clinically improved, Pro-Ortiz previously downtrended and fevers improved.  Patient self extubated.  Suspicion low for this organism causing true pneumonia and would question if this is colonization at this point.  No additional antibiotics recommended  Monitor respiratory status while admitted  Continue to trend fever curve/vitals while admitted  Monitor CBC/chemistry while admitted  Monitor upper extremity sites and continue local care  Additional supportive care/discharge per primary     2.  Metabolic encephalopathy.  Was found to be altered on presentation.  Ultimately uncertain if this was due to drug side effect or metabolic derangements.  Patient on evaluation is a poor historian but is otherwise calm and cooperative.  CT imaging of the head unremarkable  Monitor mental status  Maintain off antibiotics   Trend fever curve/vitals  Monitor skin exam     3.  High anion gap metabolic acidosis.  Uncertain if this may have been due to an ingestion or drug side effect.  Has improved.  Continue care per primary service     4.  Acute hypoxic respiratory failure.  Likely in the setting of patient's multiple metabolic derangements.  Uncertain if he had aspirated or was also having developing  pneumonia at the time.  Pro-Ortiz has improved.  Patient self extubated and remains comfortable on room air.  No further antibiotics as above  Monitor respiratory status  Continue to trend fever curve/vitals     5.  Bilateral upper extremity phlebitis.  Noted on exam and then on subsequent upper extremity imaging.  Suspect that this may have been contributing to most recent round of fevers.  Sites do not appear grossly infected and patient's fever curve has improved.   No antibiotics for this issue  Monitor sites clinically  Continue local care otherwise.    Above plan discussed briefly with the patient at bedside  Above plan discussed in detail with primary service attending who is aware of plans for maintaining off antibiotics and monitoring clinically otherwise    Given overall stability, ID consult service will sign off at this time.  Please call with questions.    Antibiotics:  Off systemic antibiotic day 3    24 Hour events:  Yesterday and overnight notes reviewed and no acute events noted    Subjective:  Patient seen at bedside and he denied having any nausea, vomiting, chest pain or shortness of breath.  Has been off antibiotics without any new or developing symptoms.  He reports plans hopefully for discharge soon.  Discussed with primary.    Objective:  Vitals:  Temp:  [97.1 °F (36.2 °C)-98.4 °F (36.9 °C)] 97.1 °F (36.2 °C)  HR:  [71-77] 71  Resp:  [16-20] 20  BP: (143-145)/(84-86) 145/84  SpO2:  [97 %-98 %] 98 %  Temp (24hrs), Av.8 °F (36.6 °C), Min:97.1 °F (36.2 °C), Max:98.4 °F (36.9 °C)  Current: Temperature: (!) 97.1 °F (36.2 °C)    Physical Exam:   General Appearance:  Alert, interactive, nontoxic, no acute distress.   Throat: Oropharynx moist without lesions.    Lungs:   Clear to auscultation bilaterally; no wheezes, rhonchi or rales; respirations unlabored   Heart:  RRR; no murmur, rub or gallop   Abdomen:   Soft, non-tender, non-distended, positive bowel sounds.     Extremities: No clubbing,  cyanosis or edema   Skin: No new rashes or lesions. No draining wounds noted.       Labs, Imaging, & Other studies:   All pertinent labs and imaging studies in PACS were personally reviewed as below.  Results from last 7 days   Lab Units 06/24/24  0523 06/22/24  0502 06/21/24  0537   WBC Thousand/uL 5.14 7.16 5.75   HEMOGLOBIN g/dL 10.8* 9.8* 9.0*   PLATELETS Thousands/uL 346 299 231     Results from last 7 days   Lab Units 06/24/24  0523 06/21/24  0537 06/20/24  0522 06/19/24  0448 06/18/24  0428   POTASSIUM mmol/L 4.1   < > 3.9   < > 4.6   CHLORIDE mmol/L 104   < > 102   < > 109*   CO2 mmol/L 33*   < > 28   < > 25   BUN mg/dL 11   < > 13   < > 14   CREATININE mg/dL 0.63   < > 0.56*   < > 0.60   EGFR ml/min/1.73sq m 110   < > 115   < > 112   CALCIUM mg/dL 9.4   < > 8.4   < > 8.2*   AST U/L  --   --  14  --  22   ALT U/L  --   --  11  --  13   ALK PHOS U/L  --   --  49  --  36    < > = values in this interval not displayed.     Results from last 7 days   Lab Units 06/18/24  1533 06/18/24  1244   SPUTUM CULTURE  4+ Growth of Stenotrophomonas maltophilia*  2+ Growth of  --    GRAM STAIN RESULT  4+ Gram negative rods*  2+ Polys*  Rare Gram positive cocci in pairs* Rare Polys  Rare Mononuclear Cells  No No organisms seen       Lab interpretation/comments: No leukocytosis.  Creatinine unremarkable.    Imaging interpretation/comments: No new imaging    Culture data: No new cultures    External notes: None

## 2024-06-24 NOTE — ASSESSMENT & PLAN NOTE
Lab Results   Component Value Date    HGBA1C 5.6 06/20/2024       Recent Labs     06/23/24  1630 06/23/24  2103 06/24/24  0715 06/24/24  1045   POCGLU 137 176* 82 301*         Blood Sugar Average: Last 72 hrs:  (P) [id number]    Currently on a sliding scale with a diabetic diet  Hemoglobin A1c 5.6  One high reading today

## 2024-06-24 NOTE — ASSESSMENT & PLAN NOTE
"Unclear etiology.  Presented to the emergency department with confusion by his mother.  He notes that this has happened 1 other time in the past but cannot remember the cause.  He reports that he had called his sister and mom earlier to let them know that \"he was losing it \"but he could not articulate for me what that means.  Appears to be approaching baseline  Discussed with CM regarding dispo   Appears to be medically stable for DC  "

## 2024-06-24 NOTE — PLAN OF CARE
Problem: SAFETY,RESTRAINT: NV/NON-SELF DESTRUCTIVE BEHAVIOR  Goal: Remains free of harm/injury (restraint for non violent/non self-detsructive behavior)  Description: INTERVENTIONS:  - Instruct patient/family regarding restraint use   - Assess and monitor physiologic and psychological status   - Provide interventions and comfort measures to meet assessed patient needs   - Identify and implement measures to help patient regain control  - Assess readiness for release of restraint   Outcome: Progressing  Goal: Returns to optimal restraint-free functioning  Description: INTERVENTIONS:  - Assess the patient's behavior and symptoms that indicate continued need for restraint  - Identify and implement measures to help patient regain control  - Assess readiness for release of restraint   Outcome: Progressing     Problem: Nutrition/Hydration-ADULT  Goal: Nutrient/Hydration intake appropriate for improving, restoring or maintaining nutritional needs  Description: Monitor and assess patient's nutrition/hydration status for malnutrition. Collaborate with interdisciplinary team and initiate plan and interventions as ordered.  Monitor patient's weight and dietary intake as ordered or per policy. Utilize nutrition screening tool and intervene as necessary. Determine patient's food preferences and provide high-protein, high-caloric foods as appropriate.     INTERVENTIONS:  - Monitor oral intake, urinary output, labs, and treatment plans  - Assess nutrition and hydration status and recommend course of action  - Evaluate amount of meals eaten  - Assist patient with eating if necessary   - Allow adequate time for meals  - Recommend/ encourage appropriate diets, oral nutritional supplements, and vitamin/mineral supplements  - Order, calculate, and assess calorie counts as needed  - Recommend, monitor, and adjust tube feedings and TPN/PPN based on assessed needs  - Assess need for intravenous fluids  - Provide specific  nutrition/hydration education as appropriate  - Include patient/family/caregiver in decisions related to nutrition  Outcome: Progressing     Problem: Prexisting or High Potential for Compromised Skin Integrity  Goal: Skin integrity is maintained or improved  Description: INTERVENTIONS:  - Identify patients at risk for skin breakdown  - Assess and monitor skin integrity  - Assess and monitor nutrition and hydration status  - Monitor labs   - Assess for incontinence   - Turn and reposition patient  - Assist with mobility/ambulation  - Relieve pressure over bony prominences  - Avoid friction and shearing  - Provide appropriate hygiene as needed including keeping skin clean and dry  - Evaluate need for skin moisturizer/barrier cream  - Collaborate with interdisciplinary team   - Patient/family teaching  - Consider wound care consult   Outcome: Progressing     Problem: PAIN - ADULT  Goal: Verbalizes/displays adequate comfort level or baseline comfort level  Description: Interventions:  - Encourage patient to monitor pain and request assistance  - Assess pain using appropriate pain scale  - Administer analgesics based on type and severity of pain and evaluate response  - Implement non-pharmacological measures as appropriate and evaluate response  - Consider cultural and social influences on pain and pain management  - Notify physician/advanced practitioner if interventions unsuccessful or patient reports new pain  Outcome: Progressing     Problem: INFECTION - ADULT  Goal: Absence or prevention of progression during hospitalization  Description: INTERVENTIONS:  - Assess and monitor for signs and symptoms of infection  - Monitor lab/diagnostic results  - Monitor all insertion sites, i.e. indwelling lines, tubes, and drains  - Monitor endotracheal if appropriate and nasal secretions for changes in amount and color  - [place] appropriate cooling/warming therapies per order  - Administer medications as ordered  - Instruct  and encourage patient and family to use good hand hygiene technique  - Identify and instruct in appropriate isolation precautions for identified infection/condition  Outcome: Progressing  Goal: Absence of fever/infection during neutropenic period  Description: INTERVENTIONS:  - Monitor WBC    Outcome: Progressing     Problem: SAFETY ADULT  Goal: Patient will remain free of falls  Description: INTERVENTIONS:  - Educate patient/family on patient safety including physical limitations  - Instruct patient to call for assistance with activity   - Consult OT/PT to assist with strengthening/mobility   - Keep Call bell within reach  - Keep bed low and locked with side rails adjusted as appropriate  - Keep care items and personal belongings within reach  - Initiate and maintain comfort rounds  - Make Fall Risk Sign visible to staff  - Offer Toileting every  Hours, in advance of need  - Initiate/Maintain alarm  - Obtain necessary fall risk management equipment:   - Apply yellow socks and bracelet for high fall risk patients  - Consider moving patient to room near nurses station  Outcome: Progressing  Goal: Maintain or return to baseline ADL function  Description: INTERVENTIONS:  -  Assess patient's ability to carry out ADLs; assess patient's baseline for ADL function and identify physical deficits which impact ability to perform ADLs (bathing, care of mouth/teeth, toileting, grooming, dressing, etc.)  - Assess/evaluate cause of self-care deficits   - Assess range of motion  - Assess patient's mobility; develop plan if impaired  - Assess patient's need for assistive devices and provide as appropriate  - Encourage maximum independence but intervene and supervise when necessary  - Involve family in performance of ADLs  - Assess for home care needs following discharge   - Consider OT consult to assist with ADL evaluation and planning for discharge  - Provide patient education as appropriate  Outcome: Progressing  Goal:  Maintains/Returns to pre admission functional level  Description: INTERVENTIONS:  - Perform AM-PAC 6 Click Basic Mobility/ Daily Activity assessment daily.  - Set and communicate daily mobility goal to care team and patient/family/caregiver.   - Collaborate with rehabilitation services on mobility goals if consulted  - Perform Range of Motion  times a day.  - Reposition patient every  hours.  - Dangle patient  times a day  - Stand patient  times a day  - Ambulate patient  times a day  - Out of bed to chair  times a day   - Out of bed for meals  times a day  - Out of bed for toileting  - Record patient progress and toleration of activity level   Outcome: Progressing     Problem: DISCHARGE PLANNING  Goal: Discharge to home or other facility with appropriate resources  Description: INTERVENTIONS:  - Identify barriers to discharge w/patient and caregiver  - Arrange for needed discharge resources and transportation as appropriate  - Identify discharge learning needs (meds, wound care, etc.)  - Arrange for interpretive services to assist at discharge as needed  - Refer to Case Management Department for coordinating discharge planning if the patient needs post-hospital services based on physician/advanced practitioner order or complex needs related to functional status, cognitive ability, or social support system  Outcome: Progressing     Problem: Knowledge Deficit  Goal: Patient/family/caregiver demonstrates understanding of disease process, treatment plan, medications, and discharge instructions  Description: Complete learning assessment and assess knowledge base.  Interventions:  - Provide teaching at level of understanding  - Provide teaching via preferred learning methods  Outcome: Progressing

## 2024-06-24 NOTE — PROGRESS NOTES
"[facility]  Progress Note  Name: Colin Dumont I  MRN: [id number]  Unit/Bed#: W -01 I Date of Admission: 6/13/2024   Date of Service: 6/24/2024 I Hospital Day: 11    Assessment & Plan   * Metabolic encephalopathy  Assessment & Plan  Unclear etiology.  Presented to the emergency department with confusion by his mother.  He notes that this has happened 1 other time in the past but cannot remember the cause.  He reports that he had called his sister and mom earlier to let them know that \"he was losing it \"but he could not articulate for me what that means.  Appears to be approaching baseline  Discussed with CM regarding dispo   Appears to be medically stable for DC    Alcohol use  Assessment & Plan  Continue vitamin B12 folate and multivitamin.    Acute respiratory failure with hypoxia (HCC)  Assessment & Plan  Now off of oxygen.  he self extubated    SIRS (systemic inflammatory response syndrome) (HCC)  Assessment & Plan  Resolved.     Spasticity  Assessment & Plan  Continue baclofen    High anion gap metabolic acidosis  Assessment & Plan  Unclear etiology.  ID support appreciated.  Off all antibiotics and afebrile  Hydrated in the ICU and now improved  Coma panel unremarkable    Type II diabetes mellitus (HCC)  Assessment & Plan  Lab Results   Component Value Date    HGBA1C 5.6 06/20/2024       Recent Labs     06/23/24  1630 06/23/24  2103 06/24/24  0715 06/24/24  1045   POCGLU 137 176* 82 301*         Blood Sugar Average: Last 72 hrs:  (P) 178.9458040628207924    Currently on a sliding scale with a diabetic diet  Hemoglobin A1c 5.6  One high reading today     S/P craniotomy  Assessment & Plan  Noted.  Lives at home with his mother.    Benign essential hypertension  Assessment & Plan  Cont lisinopril  /80                VTE Pharmacologic Prophylaxis:   Moderate Risk (Score 3-4) - Pharmacological DVT Prophylaxis Ordered: heparin.    Mobility:   Basic Mobility Inpatient Raw " Score: 22  JH-HLM Goal: 7: Walk 25 feet or more  JH-HLM Achieved: 8: Walk 250 feet ot more      Patient Centered Rounds: I performed bedside rounds with nursing staff today.   Discussions with Specialists or Other Care Team Provider: Discussed with CM.    Education and Discussions with Family / Patient:  called mother. .     Total Time Spent on Date of Encounter in care of patient: 30 mins. This time was spent on one or more of the following: performing physical exam; counseling and coordination of care; obtaining or reviewing history; documenting in the medical record; reviewing/ordering tests, medications or procedures; communicating with other healthcare professionals and discussing with patient's family/caregivers.    Current Length of Stay: 11 day(s)  Current Patient Status: Inpatient   Certification Statement: The patient will continue to require additional inpatient hospital stay due to awaiting DC planning.   Discharge Plan:  rehab- referrals sent out.     Code Status: Level 1 - Full Code    Subjective:   Patient seen and examined   No new complaints.     Objective:     Vitals:   Temp (24hrs), Av.3 °F (36.8 °C), Min:98.1 °F (36.7 °C), Max:98.4 °F (36.9 °C)    Temp:  [98.1 °F (36.7 °C)-98.4 °F (36.9 °C)] 98.4 °F (36.9 °C)  HR:  [75-77] 77  Resp:  [16] 16  BP: (143)/(86-92) 143/86  SpO2:  [97 %] 97 %  Body mass index is 21.81 kg/m².     Input and Output Summary (last 24 hours):     Intake/Output Summary (Last 24 hours) at 2024 1334  Last data filed at 2024 1229  Gross per 24 hour   Intake 970 ml   Output --   Net 970 ml       Physical Exam:   Physical Exam  Constitutional:       General: He is not in acute distress.     Appearance: He is not ill-appearing, toxic-appearing or diaphoretic.   HENT:      Head: Normocephalic.      Mouth/Throat:      Mouth: Mucous membranes are moist.   Eyes:      General: No scleral icterus.        Right eye: No discharge.         Left eye: No discharge.      Pupils:  Pupils are equal, round, and reactive to light.   Cardiovascular:      Rate and Rhythm: Normal rate.      Heart sounds: No murmur heard.     No friction rub. No gallop.   Pulmonary:      Effort: No respiratory distress.      Breath sounds: No stridor. No wheezing, rhonchi or rales.   Chest:      Chest wall: No tenderness.   Abdominal:      General: There is no distension.      Palpations: There is no mass.      Tenderness: There is no abdominal tenderness. There is no left CVA tenderness, guarding or rebound.      Hernia: No hernia is present.   Musculoskeletal:      Right lower leg: No edema.      Left lower leg: No edema.   Skin:     Coloration: Skin is not jaundiced or pale.      Findings: No bruising, erythema, lesion or rash.   Neurological:      Mental Status: He is alert.      Sensory: No sensory deficit.      Motor: No weakness.      Coordination: Coordination normal.      Gait: Gait normal.      Deep Tendon Reflexes: Reflexes normal.   Psychiatric:         Mood and Affect: Mood normal.          Additional Data:     Labs:  Results from last 7 days   Lab Units 06/24/24  0523   WBC Thousand/uL 5.14   HEMOGLOBIN g/dL 10.8*   HEMATOCRIT % 32.2*   PLATELETS Thousands/uL 346   SEGS PCT % 71   LYMPHO PCT % 17   MONO PCT % 8   EOS PCT % 1     Results from last 7 days   Lab Units 06/24/24  0523 06/21/24  0537 06/20/24  0522   SODIUM mmol/L 143   < > 138   POTASSIUM mmol/L 4.1   < > 3.9   CHLORIDE mmol/L 104   < > 102   CO2 mmol/L 33*   < > 28   BUN mg/dL 11   < > 13   CREATININE mg/dL 0.63   < > 0.56*   ANION GAP mmol/L 6   < > 8   CALCIUM mg/dL 9.4   < > 8.4   ALBUMIN g/dL  --   --  2.9*   TOTAL BILIRUBIN mg/dL  --   --  0.45   ALK PHOS U/L  --   --  49   ALT U/L  --   --  11   AST U/L  --   --  14   GLUCOSE RANDOM mg/dL 121   < > 303*    < > = values in this interval not displayed.         Results from last 7 days   Lab Units 06/24/24  1045 06/24/24  0715 06/23/24  2103 06/23/24  1630 06/23/24  1122 06/23/24  0746  06/22/24  2106 06/22/24  1636 06/22/24  1309 06/22/24  0708 06/21/24  2104 06/21/24  1627   POC GLUCOSE mg/dl 301* 82 176* 137 308* 89 135 174* 216* 109 232* 188*     Results from last 7 days   Lab Units 06/20/24  0004   HEMOGLOBIN A1C % 5.6     Results from last 7 days   Lab Units 06/21/24  0537 06/18/24  0427   PROCALCITONIN ng/ml 0.34* 0.68*       Lines/Drains:  Invasive Devices       Peripheral Intravenous Line  Duration             Peripheral IV 06/20/24 Right Antecubital 4 days                          Imaging: No pertinent imaging reviewed.    Recent Cultures (last 7 days):   Results from last 7 days   Lab Units 06/18/24  1533 06/18/24  1244   SPUTUM CULTURE  4+ Growth of Stenotrophomonas maltophilia*  2+ Growth of  --    GRAM STAIN RESULT  4+ Gram negative rods*  2+ Polys*  Rare Gram positive cocci in pairs* Rare Polys  Rare Mononuclear Cells  No No organisms seen       Last 24 Hours Medication List:   Current Facility-Administered Medications   Medication Dose Route Frequency Provider Last Rate    baclofen  10 mg Oral BID Anmol Mcgregor MD      cyanocobalamin  1,000 mcg Oral Daily Anmol Mcgregor MD      folic acid  400 mcg Oral Daily Anmol Mcgregor MD      heparin (porcine)  5,000 Units Subcutaneous Q8H JEF Anmol Mcgregor MD      insulin lispro  1-5 Units Subcutaneous 4x Daily (AC & HS) Anmol Mcgregor MD      lisinopril  10 mg Oral Daily Anmol Mcgregor MD      pantoprazole  40 mg Oral Early Morning Sarita Abad MD      polyethylene glycol  17 g Oral Daily Anmol Mcgregor MD      QUEtiapine  50 mg Oral HS Anmol Mcgregor MD      thiamine  100 mg Oral Daily Anmol Mcgregor MD          Today, Patient Was Seen By: Sarita Abad MD    **Please Note: This note may have been constructed using a voice recognition system.**

## 2024-06-24 NOTE — CASE MANAGEMENT
Case Management Progress Note    Patient name Colin Dumont  Location W /W -01 MRN [id number]  : 1970 Date 2024       LOS (days): 11  Geometric Mean LOS (GMLOS) (days): 13.5  Days to GMLOS:2.8        OBJECTIVE:        Current admission status: Inpatient  Preferred Pharmacy:   [id number] - [address] [address]  Phone: [phone] Fax: [phone]    Primary Care Provider: Stefani Garner MD    Primary Insurance: MEDICARE  Secondary Insurance:     PROGRESS NOTE:  CM has made a referral to [facility] to assist the Pt and family with locating the appropriate AL facility for the Pt once he completes his rehab treatment.

## 2024-06-25 ENCOUNTER — APPOINTMENT (INPATIENT)
Dept: MRI IMAGING | Facility: HOSPITAL | Age: 54
DRG: 870 | End: 2024-06-25
Payer: MEDICARE

## 2024-06-25 PROBLEM — E87.29 HIGH ANION GAP METABOLIC ACIDOSIS: Status: RESOLVED | Noted: 2019-09-24 | Resolved: 2024-01-01

## 2024-06-25 PROBLEM — E87.29 HIGH ANION GAP METABOLIC ACIDOSIS: Status: RESOLVED | Noted: 2019-09-24 | Resolved: 2024-06-25

## 2024-06-25 PROBLEM — J96.01 ACUTE RESPIRATORY FAILURE WITH HYPOXIA (HCC): Status: RESOLVED | Noted: 2024-06-14 | Resolved: 2024-06-25

## 2024-06-25 PROBLEM — R65.10 SIRS (SYSTEMIC INFLAMMATORY RESPONSE SYNDROME) (HCC): Status: RESOLVED | Noted: 2024-06-14 | Resolved: 2024-06-25

## 2024-06-25 PROBLEM — J96.01 ACUTE RESPIRATORY FAILURE WITH HYPOXIA (HCC): Status: RESOLVED | Noted: 2024-01-01 | Resolved: 2024-01-01

## 2024-06-25 PROBLEM — R65.10 SIRS (SYSTEMIC INFLAMMATORY RESPONSE SYNDROME) (HCC): Status: RESOLVED | Noted: 2024-01-01 | Resolved: 2024-01-01

## 2024-06-25 LAB
ALBUMIN SERPL BCG-MCNC: 3.5 G/DL (ref 3.5–5)
ALP SERPL-CCNC: 45 U/L (ref 34–104)
ALT SERPL W P-5'-P-CCNC: 17 U/L (ref 7–52)
ANION GAP SERPL CALCULATED.3IONS-SCNC: 6 MMOL/L (ref 4–13)
AST SERPL W P-5'-P-CCNC: 14 U/L (ref 13–39)
BASOPHILS # BLD AUTO: 0.04 THOUSANDS/ÂΜL (ref 0–0.1)
BASOPHILS NFR BLD AUTO: 1 % (ref 0–1)
BILIRUB SERPL-MCNC: 0.4 MG/DL (ref 0.2–1)
BUN SERPL-MCNC: 11 MG/DL (ref 5–25)
CALCIUM SERPL-MCNC: 9.1 MG/DL (ref 8.4–10.2)
CHLORIDE SERPL-SCNC: 104 MMOL/L (ref 96–108)
CO2 SERPL-SCNC: 31 MMOL/L (ref 21–32)
CREAT SERPL-MCNC: 0.62 MG/DL (ref 0.6–1.3)
EOSINOPHIL # BLD AUTO: 0.06 THOUSAND/ÂΜL (ref 0–0.61)
EOSINOPHIL NFR BLD AUTO: 1 % (ref 0–6)
ERYTHROCYTE [DISTWIDTH] IN BLOOD BY AUTOMATED COUNT: 13.8 % (ref 11.6–15.1)
GFR SERPL CREATININE-BSD FRML MDRD: 110 ML/MIN/1.73SQ M
GLUCOSE SERPL-MCNC: 138 MG/DL (ref 65–140)
GLUCOSE SERPL-MCNC: 154 MG/DL (ref 65–140)
GLUCOSE SERPL-MCNC: 165 MG/DL (ref 65–140)
GLUCOSE SERPL-MCNC: 91 MG/DL (ref 65–140)
GLUCOSE SERPL-MCNC: 95 MG/DL (ref 65–140)
HCT VFR BLD AUTO: 30 % (ref 36.5–49.3)
HGB BLD-MCNC: 10 G/DL (ref 12–17)
HIV 1+2 AB+HIV1 P24 AG SERPL QL IA: NORMAL
HIV1 P24 AG SER QL: NORMAL
IMM GRANULOCYTES # BLD AUTO: 0.07 THOUSAND/UL (ref 0–0.2)
IMM GRANULOCYTES NFR BLD AUTO: 1 % (ref 0–2)
LYMPHOCYTES # BLD AUTO: 1.02 THOUSANDS/ÂΜL (ref 0.6–4.47)
LYMPHOCYTES NFR BLD AUTO: 18 % (ref 14–44)
MCH RBC QN AUTO: 37.5 PG (ref 26.8–34.3)
MCHC RBC AUTO-ENTMCNC: 33.3 G/DL (ref 31.4–37.4)
MCV RBC AUTO: 112 FL (ref 82–98)
MONOCYTES # BLD AUTO: 0.49 THOUSAND/ÂΜL (ref 0.17–1.22)
MONOCYTES NFR BLD AUTO: 8 % (ref 4–12)
NEUTROPHILS # BLD AUTO: 4.13 THOUSANDS/ÂΜL (ref 1.85–7.62)
NEUTS SEG NFR BLD AUTO: 71 % (ref 43–75)
NRBC BLD AUTO-RTO: 0 /100 WBCS
PLATELET # BLD AUTO: 320 THOUSANDS/UL (ref 149–390)
PMV BLD AUTO: 9.5 FL (ref 8.9–12.7)
POTASSIUM SERPL-SCNC: 3.8 MMOL/L (ref 3.5–5.3)
PROT SERPL-MCNC: 6.1 G/DL (ref 6.4–8.4)
RBC # BLD AUTO: 2.67 MILLION/UL (ref 3.88–5.62)
SODIUM SERPL-SCNC: 141 MMOL/L (ref 135–147)
WBC # BLD AUTO: 5.81 THOUSAND/UL (ref 4.31–10.16)

## 2024-06-25 PROCEDURE — 87806 HIV AG W/HIV1&2 ANTB W/OPTIC: CPT | Performed by: PHYSICIAN ASSISTANT

## 2024-06-25 PROCEDURE — A9585 GADOBUTROL INJECTION: HCPCS | Performed by: FAMILY MEDICINE

## 2024-06-25 PROCEDURE — 82948 REAGENT STRIP/BLOOD GLUCOSE: CPT

## 2024-06-25 PROCEDURE — 86803 HEPATITIS C AB TEST: CPT | Performed by: PHYSICIAN ASSISTANT

## 2024-06-25 PROCEDURE — 87340 HEPATITIS B SURFACE AG IA: CPT | Performed by: PHYSICIAN ASSISTANT

## 2024-06-25 PROCEDURE — 70553 MRI BRAIN STEM W/O & W/DYE: CPT

## 2024-06-25 PROCEDURE — 80053 COMPREHEN METABOLIC PANEL: CPT | Performed by: INTERNAL MEDICINE

## 2024-06-25 PROCEDURE — 92526 ORAL FUNCTION THERAPY: CPT

## 2024-06-25 PROCEDURE — 99232 SBSQ HOSP IP/OBS MODERATE 35: CPT | Performed by: PHYSICIAN ASSISTANT

## 2024-06-25 PROCEDURE — 85025 COMPLETE CBC W/AUTO DIFF WBC: CPT | Performed by: INTERNAL MEDICINE

## 2024-06-25 RX ORDER — GADOBUTROL 604.72 MG/ML
6 INJECTION INTRAVENOUS
Status: COMPLETED | OUTPATIENT
Start: 2024-06-25 | End: 2024-06-25

## 2024-06-25 RX ADMIN — QUETIAPINE FUMARATE 50 MG: 25 TABLET ORAL at 21:39

## 2024-06-25 RX ADMIN — INSULIN LISPRO 1 UNITS: 100 INJECTION, SOLUTION INTRAVENOUS; SUBCUTANEOUS at 16:23

## 2024-06-25 RX ADMIN — LISINOPRIL 10 MG: 10 TABLET ORAL at 08:32

## 2024-06-25 RX ADMIN — HEPARIN SODIUM 5000 UNITS: 5000 INJECTION INTRAVENOUS; SUBCUTANEOUS at 06:00

## 2024-06-25 RX ADMIN — POLYETHYLENE GLYCOL 3350 17 G: 17 POWDER, FOR SOLUTION ORAL at 08:32

## 2024-06-25 RX ADMIN — PANTOPRAZOLE SODIUM 40 MG: 40 TABLET, DELAYED RELEASE ORAL at 06:00

## 2024-06-25 RX ADMIN — FOLIC ACID TAB 400 MCG 400 MCG: 400 TAB at 08:32

## 2024-06-25 RX ADMIN — GADOBUTROL 6 ML: 604.72 INJECTION INTRAVENOUS at 22:34

## 2024-06-25 RX ADMIN — INSULIN LISPRO 1 UNITS: 100 INJECTION, SOLUTION INTRAVENOUS; SUBCUTANEOUS at 12:31

## 2024-06-25 RX ADMIN — CYANOCOBALAMIN TAB 500 MCG 1000 MCG: 500 TAB at 08:31

## 2024-06-25 RX ADMIN — Medication 100 MG: at 08:31

## 2024-06-25 RX ADMIN — HEPARIN SODIUM 5000 UNITS: 5000 INJECTION INTRAVENOUS; SUBCUTANEOUS at 21:39

## 2024-06-25 RX ADMIN — BACLOFEN 10 MG: 10 TABLET ORAL at 18:02

## 2024-06-25 RX ADMIN — HEPARIN SODIUM 5000 UNITS: 5000 INJECTION INTRAVENOUS; SUBCUTANEOUS at 15:07

## 2024-06-25 RX ADMIN — BACLOFEN 10 MG: 10 TABLET ORAL at 08:31

## 2024-06-25 NOTE — PLAN OF CARE
Problem: SAFETY,RESTRAINT: NV/NON-SELF DESTRUCTIVE BEHAVIOR  Goal: Remains free of harm/injury (restraint for non violent/non self-detsructive behavior)  Description: INTERVENTIONS:  - Instruct patient/family regarding restraint use   - Assess and monitor physiologic and psychological status   - Provide interventions and comfort measures to meet assessed patient needs   - Identify and implement measures to help patient regain control  - Assess readiness for release of restraint   Outcome: Progressing  Goal: Returns to optimal restraint-free functioning  Description: INTERVENTIONS:  - Assess the patient's behavior and symptoms that indicate continued need for restraint  - Identify and implement measures to help patient regain control  - Assess readiness for release of restraint   Outcome: Progressing     Problem: Nutrition/Hydration-ADULT  Goal: Nutrient/Hydration intake appropriate for improving, restoring or maintaining nutritional needs  Description: Monitor and assess patient's nutrition/hydration status for malnutrition. Collaborate with interdisciplinary team and initiate plan and interventions as ordered.  Monitor patient's weight and dietary intake as ordered or per policy. Utilize nutrition screening tool and intervene as necessary. Determine patient's food preferences and provide high-protein, high-caloric foods as appropriate.     INTERVENTIONS:  - Monitor oral intake, urinary output, labs, and treatment plans  - Assess nutrition and hydration status and recommend course of action  - Evaluate amount of meals eaten  - Assist patient with eating if necessary   - Allow adequate time for meals  - Recommend/ encourage appropriate diets, oral nutritional supplements, and vitamin/mineral supplements  - Order, calculate, and assess calorie counts as needed  - Recommend, monitor, and adjust tube feedings and TPN/PPN based on assessed needs  - Assess need for intravenous fluids  - Provide specific  nutrition/hydration education as appropriate  - Include patient/family/caregiver in decisions related to nutrition  Outcome: Progressing     Problem: Prexisting or High Potential for Compromised Skin Integrity  Goal: Skin integrity is maintained or improved  Description: INTERVENTIONS:  - Identify patients at risk for skin breakdown  - Assess and monitor skin integrity  - Assess and monitor nutrition and hydration status  - Monitor labs   - Assess for incontinence   - Turn and reposition patient  - Assist with mobility/ambulation  - Relieve pressure over bony prominences  - Avoid friction and shearing  - Provide appropriate hygiene as needed including keeping skin clean and dry  - Evaluate need for skin moisturizer/barrier cream  - Collaborate with interdisciplinary team   - Patient/family teaching  - Consider wound care consult   Outcome: Progressing     Problem: PAIN - ADULT  Goal: Verbalizes/displays adequate comfort level or baseline comfort level  Description: Interventions:  - Encourage patient to monitor pain and request assistance  - Assess pain using appropriate pain scale  - Administer analgesics based on type and severity of pain and evaluate response  - Implement non-pharmacological measures as appropriate and evaluate response  - Consider cultural and social influences on pain and pain management  - Notify physician/advanced practitioner if interventions unsuccessful or patient reports new pain  Outcome: Progressing     Problem: INFECTION - ADULT  Goal: Absence or prevention of progression during hospitalization  Description: INTERVENTIONS:  - Assess and monitor for signs and symptoms of infection  - Monitor lab/diagnostic results  - Monitor all insertion sites, i.e. indwelling lines, tubes, and drains  - Monitor endotracheal if appropriate and nasal secretions for changes in amount and color  - [place] appropriate cooling/warming therapies per order  - Administer medications as ordered  - Instruct  and encourage patient and family to use good hand hygiene technique  - Identify and instruct in appropriate isolation precautions for identified infection/condition  Outcome: Progressing  Goal: Absence of fever/infection during neutropenic period  Description: INTERVENTIONS:  - Monitor WBC    Outcome: Progressing     Problem: SAFETY ADULT  Goal: Patient will remain free of falls  Description: INTERVENTIONS:  - Educate patient/family on patient safety including physical limitations  - Instruct patient to call for assistance with activity   - Consult OT/PT to assist with strengthening/mobility   - Keep Call bell within reach  - Keep bed low and locked with side rails adjusted as appropriate  - Keep care items and personal belongings within reach  - Initiate and maintain comfort rounds  - Make Fall Risk Sign visible to staff  - Offer Toileting every  Hours, in advance of need  - Initiate/Maintain alarm  - Obtain necessary fall risk management equipment:   - Apply yellow socks and bracelet for high fall risk patients  - Consider moving patient to room near nurses station  Outcome: Progressing  Goal: Maintain or return to baseline ADL function  Description: INTERVENTIONS:  -  Assess patient's ability to carry out ADLs; assess patient's baseline for ADL function and identify physical deficits which impact ability to perform ADLs (bathing, care of mouth/teeth, toileting, grooming, dressing, etc.)  - Assess/evaluate cause of self-care deficits   - Assess range of motion  - Assess patient's mobility; develop plan if impaired  - Assess patient's need for assistive devices and provide as appropriate  - Encourage maximum independence but intervene and supervise when necessary  - Involve family in performance of ADLs  - Assess for home care needs following discharge   - Consider OT consult to assist with ADL evaluation and planning for discharge  - Provide patient education as appropriate  Outcome: Progressing  Goal:  Maintains/Returns to pre admission functional level  Description: INTERVENTIONS:  - Perform AM-PAC 6 Click Basic Mobility/ Daily Activity assessment daily.  - Set and communicate daily mobility goal to care team and patient/family/caregiver.   - Collaborate with rehabilitation services on mobility goals if consulted  - Perform Range of Motion  times a day.  - Reposition patient every  hours.  - Dangle patient  times a day  - Stand patient  times a day  - Ambulate patient  times a day  - Out of bed to chair  times a day   - Out of bed for meals times a day  - Out of bed for toileting  - Record patient progress and toleration of activity level   Outcome: Progressing     Problem: DISCHARGE PLANNING  Goal: Discharge to home or other facility with appropriate resources  Description: INTERVENTIONS:  - Identify barriers to discharge w/patient and caregiver  - Arrange for needed discharge resources and transportation as appropriate  - Identify discharge learning needs (meds, wound care, etc.)  - Arrange for interpretive services to assist at discharge as needed  - Refer to Case Management Department for coordinating discharge planning if the patient needs post-hospital services based on physician/advanced practitioner order or complex needs related to functional status, cognitive ability, or social support system  Outcome: Progressing     Problem: Knowledge Deficit  Goal: Patient/family/caregiver demonstrates understanding of disease process, treatment plan, medications, and discharge instructions  Description: Complete learning assessment and assess knowledge base.  Interventions:  - Provide teaching at level of understanding  - Provide teaching via preferred learning methods  Outcome: Progressing

## 2024-06-25 NOTE — ASSESSMENT & PLAN NOTE
Lab Results   Component Value Date    HGBA1C 5.6 06/20/2024     Recent Labs     06/24/24  1635 06/24/24  2105 06/25/24  0705 06/25/24  1113   POCGLU 193* 159* 95 154*     Blood Sugar Average: Last 72 hrs:  (P) 166.8054420743037386    Currently on a sliding scale with a diabetic diet  AM glucose controlled at 95 - no need for basal insulin as AM glucoses had been ranging 80-90

## 2024-06-25 NOTE — ASSESSMENT & PLAN NOTE
Presented with confusion, banging head on the wall.  History of altered mental status with excessive bacolofen use previously.  Mother unable to be contacted to discuss amt of baclofen at home - will need to confirm with her  Current episode/this admission - altered mental status of unclear etiology.  LP negative. Had possible aspiration pneumonitis but not a true PNA  Per mother, improving but still somewhat more confused than baseline  At home has not left the house in prolonged period of time but according to mother, was making himself meals prior to coming to the hospital  COMA panel, UDS, ethanol, TSH, ammonia were negative on admission  CT heads have been negative. Neuro exam is nonfocal - no indication for repeat imaging at this time.  Continue to monitor mental status

## 2024-06-25 NOTE — PROGRESS NOTES
Patient:    MRN:  [id number]    Aidin Request ID:  [id number]    Level of care reserved:  Skilled Nursing Facility    Partner Reserved:  [facility], [place] [id number] [phone]    Clinical needs requested:    Geography searched:  20 miles around [facility]    Start of Service:    Request sent:  12:41pm EDT on 6/24/2024 by Eliza Augustine    Partner reserved:  10:53am EDT on 6/25/2024 by Eliza Augustine    Choice list shared:  10:53am EDT on 6/25/2024 by Eliza Augustine

## 2024-06-25 NOTE — SPEECH THERAPY NOTE
"Speech Language/Pathology    Speech/Language Pathology Progress Note    Patient Name: Colin Dumont  Today's Date: 6/25/2024       Subjective:  Pt seen for dysphagia tx and was alert and oriented upon arrival sitting upright in bed with breakfast tray at bedside.       Objective:  Pt observed with dysphagia 3 diet with nectar thick liquids with trials of thin liquids via cup/straw. Pt with adequate bite strength/utensil stripping on dental soft solids, with mildly increased mastication cycle noted, fair bolus manipulation/formation. No buccal pocketing observed. Pt with decreased A-P propulsion and swallow trigger on both solids/liquids with Mild residue s/p swallow. Vocal quality WFL s/p swallow. No coughing/throat clearing noted on solids/liquids. Mild throat clearing on thin liquid trials via straw sip. adequate suck-swallow technique via straw sips of thin liquids and nectar thick liquids. No coughing/throat clearing noted on nectar thick liquids via cup or straw sip.      Assessment:  Pt exhibited no overt clinical s/s pen/asp on dysphagia 3/nectar thick liquid diet. Mild throat clearing on thin liquid trials via straw sip. Pt stated he \"feels fine with regular liquids\" and will continue to drink them. Education was given on the importance of utilizing compensatory swallow strategies for safe and adequate swallow functioning such as small bites/sips, slow rate of intake, alternating liquids/solids as well as education on meeting nutrition/hydration standards for overall QOL. Pt expressed understanding.     Plan/Recommendations:  Pt currently tolerating dysphagia 3 diet with nectar thick liquids. Continue current diet consistency. Maintain aspiration precautions, upright ~90 degree positioning during ALL PO intake and at least 30 minutes post PO meals, small bites, slow rate of intake, and alternate liquids/solids. Medications as tolerated. Will continue to follow.      Merlene Cook MS, CCC-SLP  Speech " Pathologist  Available via [employer]

## 2024-06-25 NOTE — PROGRESS NOTES
[facility]  Progress Note  Name: Colin Dumont I  MRN: [id number]  Unit/Bed#: W -01 I Date of Admission: 6/13/2024   Date of Service: 6/25/2024 I Hospital Day: 12    Assessment & Plan   * Metabolic encephalopathy  Assessment & Plan  Presented with confusion, banging head on the wall.  History of altered mental status with excessive bacolofen use previously.  Mother unable to be contacted to discuss amt of baclofen at home - will need to confirm with her  Current episode/this admission - altered mental status of unclear etiology.  LP negative. Had possible aspiration pneumonitis but not a true PNA  Per mother, improving but still somewhat more confused than baseline  At home has not left the house in prolonged period of time but according to mother, was making himself meals prior to coming to the hospital  COMA panel, UDS, ethanol, TSH, ammonia were negative on admission  CT heads have been negative.   Given mental status still not back to baseline and prior fevers with history of brain abscess, will proceed with MRI brain to rule out alternative etiologies  Continue to monitor mental status    Acute respiratory failure with hypoxia (HCC)-resolved as of 6/25/2024  Assessment & Plan  Now off of oxygen.    Self extubated 6/20  Unclear if he aspirated or had been developing pneumonia.  Patient had been on antibiotics, procalcitonin had improved.  No further antibiotics at this time    SIRS (systemic inflammatory response syndrome) (HCC)-resolved as of 6/25/2024  Assessment & Plan  Who presented with SIRS criteria and then subsequently declined from respiratory standpoint  No evidence of acute infection at this time  Was having fevers previously with elevated procalcitonin.  Was empirically started on antibiotics.  Cultures and lumbar puncture were unremarkable.  Possible develop being aspiration versus pneumonitis.  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  Continue baclofen  Lives at home with his mother at baseline    Benign essential hypertension  Assessment & Plan  Continue lisinopril  /84    Type II diabetes mellitus (HCC)  Assessment & Plan  Lab Results   Component Value Date    HGBA1C 5.6 06/20/2024     Recent Labs     06/24/24  1635 06/24/24  2105 06/25/24  0705 06/25/24  1113   POCGLU 193* 159* 95 154*     Blood Sugar Average: Last 72 hrs:  (P) [id number]    Currently on a sliding scale with a diabetic diet  AM glucose controlled at 95 - no need for basal insulin as AM glucoses had been ranging 80-90    High anion gap metabolic acidosis-resolved as of 6/25/2024  Assessment & Plan  Was treated in the ICU.  Initially there was concern that this could have been related to drug ingestion.  All coma panels, testing returned negative  Was seen by toxiciology  Now resolved.    Spasticity  Assessment & Plan  Continue baclofen 10 mg BID - has been on this since 2019    Alcohol use  Assessment & Plan  Continue vitamin B12 folate and multivitamin.  Reported last drink 6/12 - no withdrawal symptoms             VTE Pharmacologic Prophylaxis: heparin    Mobility:   Basic Mobility Inpatient Raw Score: 22  JH-HLM Goal: 7: Walk 25 feet or more  JH-HLM Achieved: 7: Walk 25 feet or more  JH-HLM Goal achieved. Continue to encourage appropriate mobility.    Patient Centered Rounds: I performed bedside rounds with nursing staff today.   Discussions with Specialists or Other Care Team Provider: nursing    Education and Discussions with Family / Patient: Updated  (mother) via phone. On second return call ofr additional questions she did not answer    Total Time Spent on Date of Encounter in care of patient:  mins. This time was spent on one or more of the following: performing physical exam;  counseling and coordination of care; obtaining or reviewing history; documenting in the medical record; reviewing/ordering tests, medications or procedures; communicating with other healthcare professionals and discussing with patient's family/caregivers.    Current Length of Stay: 12 day(s)  Current Patient Status: Inpatient   Certification Statement: The patient will continue to require additional inpatient hospital stay due to need for IV   Discharge Plan: 54-year-old    Code Status: Level 1 - Full Code    Subjective:   54-year-old male with history of alcohol abuse disorder, diabetes, history of brain abscess status postcraniotomy in 2019 who lives at home with his mother.  He has not been out of the house in a prolonged period of time.  He will not go to doctors because he will argue with his mother about this.  He was having increasing weakness and confusion at home.  He then was noted to be hitting his head against the wall.  They called police who called EMS and the patient was brought to the emergency department.  The patient was given Versed.  He had severe metabolic acidosis and he was intubated.  Despite above while on fentanyl the patient was unable to be weaned off sedation.  He was transition to Precedex.  Patient was started on fomepizole. Patient felt to have starvation ketosis vs. Alcoholic acidosis. COMA panel, UDS and ethanol were negative however. Patient with issues with psychosis previously with excessive baclofen use.     Patient was having fevers of unclear etiology.  LP was performed . Unremarkable.  Treated with vanco/cefepime.  Self extubated     Seen by ID on  - stopped bactrim. Monitored off ABX.  Bilateral upper extremity phlebitis - noted on exam and imaging.     Objective:     Vitals:   Temp (24hrs), Av.7 °F (36.5 °C), Min:97.1 °F (36.2 °C), Max:98.2 °F (36.8 °C)    Temp:  [97.1 °F (36.2 °C)-98.2 °F (36.8 °C)] 97.9 °F (36.6 °C)  HR:  [71-73] 73  Resp:  [17-20] 17  BP:  (138-145)/(84-92) 138/84  SpO2:  [97 %-98 %] 97 %  Body mass index is 21.85 kg/m².     Input and Output Summary (last 24 hours):     Intake/Output Summary (Last 24 hours) at 6/25/2024 1400  Last data filed at 6/25/2024 1231  Gross per 24 hour   Intake 1540 ml   Output --   Net 1540 ml       Physical Exam:   Physical Exam  Vitals and nursing note reviewed.   Constitutional:       General: He is not in acute distress.     Appearance: Normal appearance. He is not diaphoretic.   HENT:      Head: Normocephalic and atraumatic.   Cardiovascular:      Rate and Rhythm: Normal rate and regular rhythm.   Pulmonary:      Effort: Pulmonary effort is normal.      Breath sounds: Normal breath sounds. No stridor. No wheezing, rhonchi or rales.   Abdominal:      General: Bowel sounds are normal.      Palpations: Abdomen is soft. There is no mass.      Tenderness: There is no abdominal tenderness. There is no guarding.   Musculoskeletal:      Right lower leg: No edema.      Left lower leg: No edema.   Skin:     General: Skin is warm and dry.   Neurological:      Mental Status: He is alert.      Comments: Awake, alert, oriented to person and time but not to place. Able to tell me the president and the month of the year. Raises arms and legs off of the bed   Psychiatric:         Mood and Affect: Mood normal.         Behavior: Behavior normal.          Additional Data:     Labs:  Results from last 7 days   Lab Units 06/25/24  0453   WBC Thousand/uL 5.81   HEMOGLOBIN g/dL 10.0*   HEMATOCRIT % 30.0*   PLATELETS Thousands/uL 320   SEGS PCT % 71   LYMPHO PCT % 18   MONO PCT % 8   EOS PCT % 1     Results from last 7 days   Lab Units 06/25/24  0453   SODIUM mmol/L 141   POTASSIUM mmol/L 3.8   CHLORIDE mmol/L 104   CO2 mmol/L 31   BUN mg/dL 11   CREATININE mg/dL 0.62   ANION GAP mmol/L 6   CALCIUM mg/dL 9.1   ALBUMIN g/dL 3.5   TOTAL BILIRUBIN mg/dL 0.40   ALK PHOS U/L 45   ALT U/L 17   AST U/L 14   GLUCOSE RANDOM mg/dL 91         Results from  last 7 days   Lab Units 06/25/24  1113 06/25/24  0705 06/24/24  2105 06/24/24  1635 06/24/24  1045 06/24/24  0715 06/23/24  2103 06/23/24  1630 06/23/24  1122 06/23/24  0746 06/22/24  2106 06/22/24  1636   POC GLUCOSE mg/dl 154* 95 159* 193* 301* 82 176* 137 308* 89 135 174*     Results from last 7 days   Lab Units 06/20/24  0004   HEMOGLOBIN A1C % 5.6     Results from last 7 days   Lab Units 06/21/24  0537   PROCALCITONIN ng/ml 0.34*       Lines/Drains:  Invasive Devices       Peripheral Intravenous Line  Duration             Peripheral IV 06/20/24 Right Antecubital 5 days                          Imaging: Reviewed radiology reports from this admission including: chest xray and CT head    Recent Cultures (last 7 days):   Results from last 7 days   Lab Units 06/18/24  1533   SPUTUM CULTURE  4+ Growth of Stenotrophomonas maltophilia*  2+ Growth of   GRAM STAIN RESULT  4+ Gram negative rods*  2+ Polys*  Rare Gram positive cocci in pairs*       Last 24 Hours Medication List:   Current Facility-Administered Medications   Medication Dose Route Frequency Provider Last Rate    baclofen  10 mg Oral BID Anmol Mcgregor MD      cyanocobalamin  1,000 mcg Oral Daily Anmol Mcgregor MD      folic acid  400 mcg Oral Daily Amnol Mcgregor MD      heparin (porcine)  5,000 Units Subcutaneous Q8H JEF Anmol Mcgregor MD      insulin lispro  1-5 Units Subcutaneous 4x Daily (AC & HS) Anmol Mcgregor MD      lisinopril  10 mg Oral Daily Anmol Mcgregor MD      pantoprazole  40 mg Oral Early Morning Sarita Abad MD      polyethylene glycol  17 g Oral Daily Anmol Mcgregor MD      QUEtiapine  50 mg Oral HS Anmol Mcgregor MD      thiamine  100 mg Oral Daily Anmol Mcgregor MD          Today, Patient Was Seen By: Leeanne Ng PA-C    **Please Note: This note may have been constructed using a voice recognition system.**

## 2024-06-25 NOTE — ASSESSMENT & PLAN NOTE
Now off of oxygen.    Self extubated 6/20  Unclear if he aspirated or had been developing pneumonia.  Patient had been on antibiotics, procalcitonin had improved.  No further antibiotics at this time

## 2024-06-25 NOTE — ASSESSMENT & PLAN NOTE
Was treated in the ICU.  Initially there was concern that this could have been related to drug ingestion.  All coma panels, testing returned negative  Was seen by toxiciology  Now resolved.

## 2024-06-25 NOTE — PLAN OF CARE
Pt currently tolerating dysphagia 3 diet with nectar thick liquids. Continue current diet consistency. Maintain aspiration precautions, upright ~90 degree positioning during ALL PO intake and at least 30 minutes post PO meals, small bites, slow rate of intake, and alternate liquids/solids. Medications as tolerated. Will continue to f/u.

## 2024-06-25 NOTE — PLAN OF CARE
Problem: SAFETY,RESTRAINT: NV/NON-SELF DESTRUCTIVE BEHAVIOR  Goal: Remains free of harm/injury (restraint for non violent/non self-detsructive behavior)  Description: INTERVENTIONS:  - Instruct patient/family regarding restraint use   - Assess and monitor physiologic and psychological status   - Provide interventions and comfort measures to meet assessed patient needs   - Identify and implement measures to help patient regain control  - Assess readiness for release of restraint   Outcome: Progressing  Goal: Returns to optimal restraint-free functioning  Description: INTERVENTIONS:  - Assess the patient's behavior and symptoms that indicate continued need for restraint  - Identify and implement measures to help patient regain control  - Assess readiness for release of restraint   Outcome: Progressing     Problem: Nutrition/Hydration-ADULT  Goal: Nutrient/Hydration intake appropriate for improving, restoring or maintaining nutritional needs  Description: Monitor and assess patient's nutrition/hydration status for malnutrition. Collaborate with interdisciplinary team and initiate plan and interventions as ordered.  Monitor patient's weight and dietary intake as ordered or per policy. Utilize nutrition screening tool and intervene as necessary. Determine patient's food preferences and provide high-protein, high-caloric foods as appropriate.     INTERVENTIONS:  - Monitor oral intake, urinary output, labs, and treatment plans  - Assess nutrition and hydration status and recommend course of action  - Evaluate amount of meals eaten  - Assist patient with eating if necessary   - Allow adequate time for meals  - Recommend/ encourage appropriate diets, oral nutritional supplements, and vitamin/mineral supplements  - Order, calculate, and assess calorie counts as needed  - Recommend, monitor, and adjust tube feedings and TPN/PPN based on assessed needs  - Assess need for intravenous fluids  - Provide specific  nutrition/hydration education as appropriate  - Include patient/family/caregiver in decisions related to nutrition  Outcome: Progressing     Problem: Prexisting or High Potential for Compromised Skin Integrity  Goal: Skin integrity is maintained or improved  Description: INTERVENTIONS:  - Identify patients at risk for skin breakdown  - Assess and monitor skin integrity  - Assess and monitor nutrition and hydration status  - Monitor labs   - Assess for incontinence   - Turn and reposition patient  - Assist with mobility/ambulation  - Relieve pressure over bony prominences  - Avoid friction and shearing  - Provide appropriate hygiene as needed including keeping skin clean and dry  - Evaluate need for skin moisturizer/barrier cream  - Collaborate with interdisciplinary team   - Patient/family teaching  - Consider wound care consult   Outcome: Progressing     Problem: PAIN - ADULT  Goal: Verbalizes/displays adequate comfort level or baseline comfort level  Description: Interventions:  - Encourage patient to monitor pain and request assistance  - Assess pain using appropriate pain scale  - Administer analgesics based on type and severity of pain and evaluate response  - Implement non-pharmacological measures as appropriate and evaluate response  - Consider cultural and social influences on pain and pain management  - Notify physician/advanced practitioner if interventions unsuccessful or patient reports new pain  Outcome: Progressing     Problem: INFECTION - ADULT  Goal: Absence or prevention of progression during hospitalization  Description: INTERVENTIONS:  - Assess and monitor for signs and symptoms of infection  - Monitor lab/diagnostic results  - Monitor all insertion sites, i.e. indwelling lines, tubes, and drains  - Monitor endotracheal if appropriate and nasal secretions for changes in amount and color  - [place] appropriate cooling/warming therapies per order  - Administer medications as ordered  - Instruct  and encourage patient and family to use good hand hygiene technique  - Identify and instruct in appropriate isolation precautions for identified infection/condition  Outcome: Progressing  Goal: Absence of fever/infection during neutropenic period  Description: INTERVENTIONS:  - Monitor WBC    Outcome: Progressing     Problem: SAFETY ADULT  Goal: Patient will remain free of falls  Description: INTERVENTIONS:  - Educate patient/family on patient safety including physical limitations  - Instruct patient to call for assistance with activity   - Consult OT/PT to assist with strengthening/mobility   - Keep Call bell within reach  - Keep bed low and locked with side rails adjusted as appropriate  - Keep care items and personal belongings within reach  - Initiate and maintain comfort rounds  - Make Fall Risk Sign visible to staff  - Offer Toileting every  Hours, in advance of need  - Initiate/Maintain alarm  - Obtain necessary fall risk management equipment:   - Apply yellow socks and bracelet for high fall risk patients  - Consider moving patient to room near nurses station  Outcome: Progressing  Goal: Maintain or return to baseline ADL function  Description: INTERVENTIONS:  -  Assess patient's ability to carry out ADLs; assess patient's baseline for ADL function and identify physical deficits which impact ability to perform ADLs (bathing, care of mouth/teeth, toileting, grooming, dressing, etc.)  - Assess/evaluate cause of self-care deficits   - Assess range of motion  - Assess patient's mobility; develop plan if impaired  - Assess patient's need for assistive devices and provide as appropriate  - Encourage maximum independence but intervene and supervise when necessary  - Involve family in performance of ADLs  - Assess for home care needs following discharge   - Consider OT consult to assist with ADL evaluation and planning for discharge  - Provide patient education as appropriate  Outcome: Progressing  Goal:  Maintains/Returns to pre admission functional level  Description: INTERVENTIONS:  - Perform AM-PAC 6 Click Basic Mobility/ Daily Activity assessment daily.  - Set and communicate daily mobility goal to care team and patient/family/caregiver.   - Collaborate with rehabilitation services on mobility goals if consulted  - Perform Range of Motion  times a day.  - Reposition patient every  hours.  - Dangle patient  times a day  - Stand patient  times a day  - Ambulate patient  times a day  - Out of bed to chair  times a day   - Out of bed for meals  times a day  - Out of bed for toileting  - Record patient progress and toleration of activity level   Outcome: Progressing     Problem: DISCHARGE PLANNING  Goal: Discharge to home or other facility with appropriate resources  Description: INTERVENTIONS:  - Identify barriers to discharge w/patient and caregiver  - Arrange for needed discharge resources and transportation as appropriate  - Identify discharge learning needs (meds, wound care, etc.)  - Arrange for interpretive services to assist at discharge as needed  - Refer to Case Management Department for coordinating discharge planning if the patient needs post-hospital services based on physician/advanced practitioner order or complex needs related to functional status, cognitive ability, or social support system  Outcome: Progressing     Problem: Knowledge Deficit  Goal: Patient/family/caregiver demonstrates understanding of disease process, treatment plan, medications, and discharge instructions  Description: Complete learning assessment and assess knowledge base.  Interventions:  - Provide teaching at level of understanding  - Provide teaching via preferred learning methods  Outcome: Progressing

## 2024-06-25 NOTE — CASE MANAGEMENT
Case Management Discharge Planning Note    Patient name Colin Dumont  Location W /W -01 MRN [id number]  : 1970 Date 2024       Current Admission Date: 2024  Current Admission Diagnosis:Metabolic encephalopathy   Patient Active Problem List    Diagnosis Date Noted Date Diagnosed    SIRS (systemic inflammatory response syndrome) ([facility]) 2024     Acute respiratory failure with hypoxia ([facility]) 2024     Alcohol use 2024     Alcohol intoxication ([facility]) 2022     Fall 2022     Slurred speech 2022     Metabolic encephalopathy 2022     Memory difficulty 2022     History of GI bleed 2022     Noncompliance 2020     Spasticity 2020     Right sided weakness 10/18/2019     High anion gap metabolic acidosis 2019     Hyperlipidemia 2019     S/P craniotomy 2019     Type 2 diabetes mellitus without complication, with long-term current use of insulin ([facility]) 2018     Tobacco use disorder 2018     Type II diabetes mellitus ([facility]) 2018     Depression 2017     Benign essential hypertension 2012       LOS (days): 12  Geometric Mean LOS (GMLOS) (days): 13.5  Days to GMLOS:2     OBJECTIVE:  Risk of Unplanned Readmission Score: 13.63         Current admission status: Inpatient   Preferred Pharmacy:   [id number] - [address] [address]  Phone: [phone] Fax: [phone]    Primary Care Provider: Stefani Garner MD    Primary Insurance: MEDICARE  Secondary Insurance:     DISCHARGE DETAILS:    Discharge planning discussed with:: Millicent-mother     Comments - Freedom of Choice: CM was in contact with Pt's mother Millicent to review the current accepting rehab facilities. Millicent selected [facility] as first choice to have the Pt close to family.       Contacts  Patient Contacts: Millicent  Relationship to Patient::  Family  Contact Method: Phone  Phone Number: [phone]  Reason/Outcome: Discharge Planning      Other Referral/Resources/Interventions Provided:  Referral Comments: Pt's family selected [facility] for the Pt's rehab facility. CM notified [facility] and reserved the bed. CM later notified Pt's mother Millicent that [facility] would haven't a bed until Friday for an admission, and discussed the Pt's readiness for d/c today. CM discussed the importance of getting the Pt moving with rehab, instead waiting until Friday. Millicent remained adamant she wanted the Pt to be d/c to [facility] to have the presence of family close by, and therefore, wasn't willing to select another facility.

## 2024-06-25 NOTE — ASSESSMENT & PLAN NOTE
Who presented with SIRS criteria and then subsequently declined from respiratory standpoint  No evidence of acute infection at this time  Was having fevers previously with elevated procalcitonin.  Was empirically started on antibiotics.  Cultures and lumbar puncture were unremarkable.  Possible develop being aspiration versus pneumonitis.  Has now clinically improved and no indication for further antibiotics

## 2024-06-25 NOTE — ASSESSMENT & PLAN NOTE
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  Continue baclofen  Lives at home with his mother at baseline

## 2024-06-26 LAB
GLUCOSE SERPL-MCNC: 104 MG/DL (ref 65–140)
GLUCOSE SERPL-MCNC: 150 MG/DL (ref 65–140)
GLUCOSE SERPL-MCNC: 228 MG/DL (ref 65–140)
GLUCOSE SERPL-MCNC: 89 MG/DL (ref 65–140)
HBV SURFACE AG SER QL: NORMAL
HCV AB SER QL: NORMAL

## 2024-06-26 PROCEDURE — 97116 GAIT TRAINING THERAPY: CPT

## 2024-06-26 PROCEDURE — 82948 REAGENT STRIP/BLOOD GLUCOSE: CPT

## 2024-06-26 PROCEDURE — 92526 ORAL FUNCTION THERAPY: CPT

## 2024-06-26 PROCEDURE — 97530 THERAPEUTIC ACTIVITIES: CPT

## 2024-06-26 PROCEDURE — 99232 SBSQ HOSP IP/OBS MODERATE 35: CPT | Performed by: PHYSICIAN ASSISTANT

## 2024-06-26 RX ADMIN — BACLOFEN 10 MG: 10 TABLET ORAL at 09:25

## 2024-06-26 RX ADMIN — HEPARIN SODIUM 5000 UNITS: 5000 INJECTION INTRAVENOUS; SUBCUTANEOUS at 05:40

## 2024-06-26 RX ADMIN — FOLIC ACID TAB 400 MCG 400 MCG: 400 TAB at 09:31

## 2024-06-26 RX ADMIN — LISINOPRIL 10 MG: 10 TABLET ORAL at 09:25

## 2024-06-26 RX ADMIN — HEPARIN SODIUM 5000 UNITS: 5000 INJECTION INTRAVENOUS; SUBCUTANEOUS at 21:43

## 2024-06-26 RX ADMIN — CYANOCOBALAMIN TAB 500 MCG 1000 MCG: 500 TAB at 09:25

## 2024-06-26 RX ADMIN — QUETIAPINE FUMARATE 50 MG: 25 TABLET ORAL at 21:43

## 2024-06-26 RX ADMIN — BACLOFEN 10 MG: 10 TABLET ORAL at 17:17

## 2024-06-26 RX ADMIN — HEPARIN SODIUM 5000 UNITS: 5000 INJECTION INTRAVENOUS; SUBCUTANEOUS at 14:35

## 2024-06-26 RX ADMIN — PANTOPRAZOLE SODIUM 40 MG: 40 TABLET, DELAYED RELEASE ORAL at 05:40

## 2024-06-26 RX ADMIN — Medication 100 MG: at 09:25

## 2024-06-26 RX ADMIN — POLYETHYLENE GLYCOL 3350 17 G: 17 POWDER, FOR SOLUTION ORAL at 09:25

## 2024-06-26 RX ADMIN — INSULIN LISPRO 1 UNITS: 100 INJECTION, SOLUTION INTRAVENOUS; SUBCUTANEOUS at 22:33

## 2024-06-26 RX ADMIN — INSULIN LISPRO 2 UNITS: 100 INJECTION, SOLUTION INTRAVENOUS; SUBCUTANEOUS at 17:15

## 2024-06-26 NOTE — PLAN OF CARE
Problem: SAFETY,RESTRAINT: NV/NON-SELF DESTRUCTIVE BEHAVIOR  Goal: Remains free of harm/injury (restraint for non violent/non self-detsructive behavior)  Description: INTERVENTIONS:  - Instruct patient/family regarding restraint use   - Assess and monitor physiologic and psychological status   - Provide interventions and comfort measures to meet assessed patient needs   - Identify and implement measures to help patient regain control  - Assess readiness for release of restraint   Outcome: Progressing  Goal: Returns to optimal restraint-free functioning  Description: INTERVENTIONS:  - Assess the patient's behavior and symptoms that indicate continued need for restraint  - Identify and implement measures to help patient regain control  - Assess readiness for release of restraint   Outcome: Progressing     Problem: Nutrition/Hydration-ADULT  Goal: Nutrient/Hydration intake appropriate for improving, restoring or maintaining nutritional needs  Description: Monitor and assess patient's nutrition/hydration status for malnutrition. Collaborate with interdisciplinary team and initiate plan and interventions as ordered.  Monitor patient's weight and dietary intake as ordered or per policy. Utilize nutrition screening tool and intervene as necessary. Determine patient's food preferences and provide high-protein, high-caloric foods as appropriate.     INTERVENTIONS:  - Monitor oral intake, urinary output, labs, and treatment plans  - Assess nutrition and hydration status and recommend course of action  - Evaluate amount of meals eaten  - Assist patient with eating if necessary   - Allow adequate time for meals  - Recommend/ encourage appropriate diets, oral nutritional supplements, and vitamin/mineral supplements  - Order, calculate, and assess calorie counts as needed  - Recommend, monitor, and adjust tube feedings and TPN/PPN based on assessed needs  - Assess need for intravenous fluids  - Provide specific  nutrition/hydration education as appropriate  - Include patient/family/caregiver in decisions related to nutrition  Outcome: Progressing     Problem: Prexisting or High Potential for Compromised Skin Integrity  Goal: Skin integrity is maintained or improved  Description: INTERVENTIONS:  - Identify patients at risk for skin breakdown  - Assess and monitor skin integrity  - Assess and monitor nutrition and hydration status  - Monitor labs   - Assess for incontinence   - Turn and reposition patient  - Assist with mobility/ambulation  - Relieve pressure over bony prominences  - Avoid friction and shearing  - Provide appropriate hygiene as needed including keeping skin clean and dry  - Evaluate need for skin moisturizer/barrier cream  - Collaborate with interdisciplinary team   - Patient/family teaching  - Consider wound care consult   Outcome: Progressing     Problem: PAIN - ADULT  Goal: Verbalizes/displays adequate comfort level or baseline comfort level  Description: Interventions:  - Encourage patient to monitor pain and request assistance  - Assess pain using appropriate pain scale  - Administer analgesics based on type and severity of pain and evaluate response  - Implement non-pharmacological measures as appropriate and evaluate response  - Consider cultural and social influences on pain and pain management  - Notify physician/advanced practitioner if interventions unsuccessful or patient reports new pain  Outcome: Progressing     Problem: INFECTION - ADULT  Goal: Absence or prevention of progression during hospitalization  Description: INTERVENTIONS:  - Assess and monitor for signs and symptoms of infection  - Monitor lab/diagnostic results  - Monitor all insertion sites, i.e. indwelling lines, tubes, and drains  - Monitor endotracheal if appropriate and nasal secretions for changes in amount and color  - [place] appropriate cooling/warming therapies per order  - Administer medications as ordered  - Instruct  and encourage patient and family to use good hand hygiene technique  - Identify and instruct in appropriate isolation precautions for identified infection/condition  Outcome: Progressing  Goal: Absence of fever/infection during neutropenic period  Description: INTERVENTIONS:  - Monitor WBC    Outcome: Progressing     Problem: SAFETY ADULT  Goal: Patient will remain free of falls  Description: INTERVENTIONS:  - Educate patient/family on patient safety including physical limitations  - Instruct patient to call for assistance with activity   - Consult OT/PT to assist with strengthening/mobility   - Keep Call bell within reach  - Keep bed low and locked with side rails adjusted as appropriate  - Keep care items and personal belongings within reach  - Initiate and maintain comfort rounds  - Make Fall Risk Sign visible to staff  - Offer Toileting every  Hours, in advance of need  - Initiate/Maintain alarm  - Obtain necessary fall risk management equipment:   - Apply yellow socks and bracelet for high fall risk patients  - Consider moving patient to room near nurses station  Outcome: Progressing  Goal: Maintain or return to baseline ADL function  Description: INTERVENTIONS:  -  Assess patient's ability to carry out ADLs; assess patient's baseline for ADL function and identify physical deficits which impact ability to perform ADLs (bathing, care of mouth/teeth, toileting, grooming, dressing, etc.)  - Assess/evaluate cause of self-care deficits   - Assess range of motion  - Assess patient's mobility; develop plan if impaired  - Assess patient's need for assistive devices and provide as appropriate  - Encourage maximum independence but intervene and supervise when necessary  - Involve family in performance of ADLs  - Assess for home care needs following discharge   - Consider OT consult to assist with ADL evaluation and planning for discharge  - Provide patient education as appropriate  Outcome: Progressing  Goal:  Maintains/Returns to pre admission functional level  Description: INTERVENTIONS:  - Perform AM-PAC 6 Click Basic Mobility/ Daily Activity assessment daily.  - Set and communicate daily mobility goal to care team and patient/family/caregiver.   - Collaborate with rehabilitation services on mobility goals if consulted  - Perform Range of Motion  times a day.  - Reposition patient every  hours.  - Dangle patient  times a day  - Stand patient  times a day  - Ambulate patient  times a day  - Out of bed to chair  times a day   - Out of bed for meals times a day  - Out of bed for toileting  - Record patient progress and toleration of activity level   Outcome: Progressing     Problem: DISCHARGE PLANNING  Goal: Discharge to home or other facility with appropriate resources  Description: INTERVENTIONS:  - Identify barriers to discharge w/patient and caregiver  - Arrange for needed discharge resources and transportation as appropriate  - Identify discharge learning needs (meds, wound care, etc.)  - Arrange for interpretive services to assist at discharge as needed  - Refer to Case Management Department for coordinating discharge planning if the patient needs post-hospital services based on physician/advanced practitioner order or complex needs related to functional status, cognitive ability, or social support system  Outcome: Progressing     Problem: Knowledge Deficit  Goal: Patient/family/caregiver demonstrates understanding of disease process, treatment plan, medications, and discharge instructions  Description: Complete learning assessment and assess knowledge base.  Interventions:  - Provide teaching at level of understanding  - Provide teaching via preferred learning methods  Outcome: Progressing

## 2024-06-26 NOTE — CASE MANAGEMENT
Case Management Progress Note    Patient name Colin Dumont  Location W /W -01 MRN [id number]  : 1970 Date 2024       LOS (days): 13  Geometric Mean LOS (GMLOS) (days): 13.5  Days to GMLOS:0.8        OBJECTIVE:        Current admission status: Inpatient  Preferred Pharmacy:   [id number] [address] [address]  Phone: [phone] Fax: [phone]    Primary Care Provider: Stefani Garner MD    Primary Insurance: MEDICARE  Secondary Insurance:     PROGRESS NOTE:    EARC authorized. Determination packet uploaded to [employer]. [facility] CM (LUIS ALBERTO) made aware.    Morteza Nevarez, LCSW, ACSW, ACM, CCM, CCTP, DNCCM  [place] LCSW: [id number]  [place] LSW: [id number]  24 3:56 PM

## 2024-06-26 NOTE — PHYSICAL THERAPY NOTE
PHYSICAL THERAPY NOTE          Patient Name: Colin Dumont  Today's Date: 24 1426   PT Last Visit   PT Visit Date 24   Note Type   Note Type Treatment   Pain Assessment   Pain Assessment Tool 0-10   Pain Score No Pain   Patient's Stated Pain Goal No pain   Hospital Pain Intervention(s) Repositioned;Rest;Ambulation/increased activity   Multiple Pain Sites No   Pain Rating: FLACC (Rest) - Face 0   Pain Rating: FLACC (Rest) - Legs 0   Pain Rating: FLACC (Rest) - Activity 0   Pain Rating: FLACC (Rest) - Cry 0   Pain Rating: FLACC (Rest) - Consolability 0   Score: FLACC (Rest) 0   Restrictions/Precautions   Weight Bearing Precautions Per Order No   Other Precautions Cognitive;Chair Alarm;Bed Alarm;Fall Risk;Pain  (hx of R sided weakness)   General   Chart Reviewed Yes   Response to Previous Treatment Patient with no complaints from previous session.   Family/Caregiver Present Yes   Cognition   Overall Cognitive Status Impaired   Arousal/Participation Alert;Responsive;Cooperative   Attention Attends with cues to redirect   Orientation Level Oriented to person;Oriented to place;Oriented to situation;Disoriented to time   Memory Decreased short term memory;Decreased recall of recent events;Decreased recall of precautions   Following Commands Follows one step commands with increased time or repetition   Comments pt Id by name and  on wrist band.   Subjective   Subjective pt was agreeable to participate in PT intervention and stated 0/10 pain pre/post tx session   Bed Mobility   Supine to Sit 6  Modified independent   Additional items Assist x 1;HOB elevated;Bedrails;Increased time required   Sit to Supine 6  Modified independent   Additional items Assist x 1;HOB elevated;Bedrails;Increased time required   Additional Comments pt was able to sit EOB w/o LOB prior to initiating functional transfers to and from  RW   Transfers   Sit to Stand 5  Supervision   Additional items Assist x 1;Increased time required;Verbal cues  (w/ RW)   Stand to Sit 5  Supervision   Additional items Assist x 1;Increased time required;Verbal cues  (w/ RW)   Stand pivot Unable to assess   Additional Comments pt completed 3 STS in todays tx session to and from RW and required constant VC's for hand placement in order to increase safety and balance w/ transfers   Ambulation/Elevation   Gait pattern Decreased foot clearance;Ataxia;Genu recurvatum;Decreased toe off;Excessively slow  (LOB x3, gait deviations)   Gait Assistance 4  Minimal assist   Additional items Assist x 1;Verbal cues   Assistive Device Rolling walker   Distance 70'x4 RW   Stair Management Assistance 3  Moderate assist   Additional items Assist x 1;Verbal cues;Increased time required   Stair Management Technique Two rails;Step to pattern;Foreward;Backward   Number of Stairs 5   Ambulation/Elevation Additional Comments pt continues to have LOB with ambulation /w RW. pt did increase ambulation distance but required several standing rest breaks in todays tx session due to LOB, fatigue and requiring physical assistance w/ RW   Balance   Static Sitting Fair   Dynamic Sitting Fair -   Static Standing Poor +   Dynamic Standing Poor   Ambulatory Poor +  (w/ RW)   Endurance Deficit   Endurance Deficit Yes   Endurance Deficit Description limited activity tolerance, ambulation balance/distance and steps completed in todays tx session   Activity Tolerance   Activity Tolerance Patient limited by fatigue;Treatment limited secondary to medical complications (Comment)   Nurse Made Aware Spoke to RN   Exercises   Knee AROM Long Arc Quad Sitting;10 reps;AROM;Bilateral   Ankle Pumps Sitting;15 reps;AROM;Bilateral   Marching Sitting;10 reps;AROM;Bilateral   Assessment   Prognosis Fair   Problem List Decreased strength;Decreased endurance;Decreased range of motion;Impaired balance;Decreased  mobility;Decreased coordination;Decreased cognition;Decreased safety awareness;Impaired judgement;Impaired vision  (gait deviations, ataxia, impaired balance)   Assessment pt began tx session lying supine in the bed as pt was agreeable to participate in PT intervention. Pt intervention to focus on bed mobility, transfer training, TE activities, posture/balance w/gait and stair trials. In comparison to previous tx sessions progress was noted as pt was able to complete all bed mobility with mod i. pt was able to sit EOB and complete some TE activities w/o LOB prior to initiating functional transfers to and from RW. pt was able to complete multiple functional transfers w/ a decrease in level of assistance required compared to previous tx sessions /s but required constant VC's for hand placement in order to increase safety and balance. pt was able to increase ambulation distance to 70'x4 RW with min Ax1 for safety and balance. pt had difficulty with ambulation as pt had 3x LOB and required RW management as pt would veer towards his L side. pt also required several standing rest breaks due to fatigue. pt was educated w/ verabl/visual demonstration on all safe stair trial strategies prior to initiating steps. pt completed 5 steps with bilateral hand rails but required mod Ax1 due to several LOB. Post tx pt continues to demonstrate the following deficits: limited activity tolerance, ambulation balance/distance, steps completed safely, impaired balance, gait deviations and ataxia. pt would benefit from continued skilled PT intervention in order to address deficits listed above. Continue to recommend DC w/ level 1 maximal rehab resource intensity when medically cleared   Barriers to Discharge Inaccessible home environment   Goals   Patient Goals none stated   [facility] Expiration Date 07/02/24   PT Treatment Day 3   Plan   Treatment/Interventions Functional transfer training;LE strengthening/ROM;Elevations;Therapeutic  exercise;Endurance training;Cognitive reorientation;Patient/family training;Equipment eval/education;Bed mobility;Gait training;Spoke to nursing   Progress Slow progress, decreased activity tolerance   PT Frequency 4-6x/wk   Discharge Recommendation   Rehab Resource Intensity Level, PT I (Maximum Resource Intensity)   Equipment Recommended Walker   AM-PAC Basic Mobility Inpatient   Turning in Flat Bed Without Bedrails 4   Lying on Back to Sitting on Edge of Flat Bed Without Bedrails 4   Moving Bed to Chair 3   Standing Up From Chair Using Arms 3   Walk in Room 3   Climb 3-5 Stairs With Railing 2   Basic Mobility Inpatient Raw Score 19   Basic Mobility Standardized Score 42.48   [facility] Highest Level Of Mobility   -HL Goal 6: Walk 10 steps or more   -HLM Achieved 7: Walk 25 feet or more   Education   Education Provided Mobility training;Assistive device;Other  (stair trials)   Patient Reinforcement needed   End of Consult   Patient Position at End of Consult Supine;Bed/Chair alarm activated;All needs within reach   The patient's AM-PAC Basic Mobility Inpatient Short Form Raw Score is 19. A Raw score of greater than 16 suggests the patient may benefit from discharge to home. Please also refer to the recommendation of the Physical Therapist for safe discharge planning.    Pt would benefit from continued skilled PT intervention in order to address deficits listed above such as limited activity tolerance, ambulation balance/distance, impaired balance, limited steps completed w/o AMRITA Carroll

## 2024-06-26 NOTE — CASE MANAGEMENT
Case Management Progress Note    Patient name Colin Dumont  Location W /W -01 MRN [id number]  : 1970 Date 2024       LOS (days): 13  Geometric Mean LOS (GMLOS) (days): 13.5  Days to GMLOS:0.8        OBJECTIVE:        Current admission status: Inpatient  Preferred Pharmacy:   [id number] - [address] [address]  Phone: [phone] Fax: [phone]    Primary Care Provider: Stefani Garner MD    Primary Insurance: MEDICARE  Secondary Insurance:     PROGRESS NOTE:    CM made aware of need for EARC for placement in [place]. [place] PASRR completed, PASRR negative.    EARC submitted to [facility] via portal.    Pending EARC #: [id number]     Morteza Nevarez, LCSW, ACSW, ACM, CCM, CCTP, DNCCM  [place] LCSW: [id number]  [place] LSW: [id number]  24 2:54 PM

## 2024-06-26 NOTE — PLAN OF CARE
Problem: PHYSICAL THERAPY ADULT  Goal: Performs mobility at highest level of function for planned discharge setting.  See evaluation for individualized goals.  Description: Treatment/Interventions: Functional transfer training, LE strengthening/ROM, Therapeutic exercise, Endurance training, Cognitive reorientation, Patient/family training, Equipment eval/education, Bed mobility, Gait training, Continued evaluation, Spoke to nursing, Spoke to case management, OT, Family (family present for eval/treatment)  Equipment Recommended: Walker (for now)       See flowsheet documentation for full assessment, interventions and recommendations.  Outcome: Progressing  Note: Prognosis: Fair  Problem List: Decreased strength, Decreased endurance, Decreased range of motion, Impaired balance, Decreased mobility, Decreased coordination, Decreased cognition, Decreased safety awareness, Impaired judgement, Impaired vision (gait deviations, ataxia, impaired balance)  Assessment: pt began tx session lying supine in the bed as pt was agreeable to participate in PT intervention. Pt intervention to focus on bed mobility, transfer training, TE activities, posture/balance w/gait and stair trials. In comparison to previous tx sessions progress was noted as pt was able to complete all bed mobility with mod i. pt was able to sit EOB and complete some TE activities w/o LOB prior to initiating functional transfers to and from RW. pt was able to complete multiple functional transfers w/ a decrease in level of assistance required compared to previous tx sessions /s but required constant VC's for hand placement in order to increase safety and balance. pt was able to increase ambulation distance to 70'x4 RW with min Ax1 for safety and balance. pt had difficulty with ambulation as pt had 3x LOB and required RW management as pt would veer towards his L side. pt also required several standing rest breaks due to fatigue. pt was educated w/ verabl/visual  demonstration on all safe stair trial strategies prior to initiating steps. pt completed 5 steps with bilateral hand rails but required mod Ax1 due to several LOB. Post tx pt continues to demonstrate the following deficits: limited activity tolerance, ambulation balance/distance, steps completed safely, impaired balance, gait deviations and ataxia. pt would benefit from continued skilled PT intervention in order to address deficits listed above. Continue to recommend DC w/ level 1 maximal rehab resource intensity when medically cleared  Barriers to Discharge: Inaccessible home environment     Rehab Resource Intensity Level, PT: I (Maximum Resource Intensity)    See flowsheet documentation for full assessment.

## 2024-06-26 NOTE — PLAN OF CARE
Recommending diet advancement to dysphagia 3 diet with thin liquids. Maintain aspiration precautions, upright ~90 degree positioning during ALL PO intake and at least 30 minutes post PO meals, small bites, slow rate of intake, and alternate liquids/solids. Medications as tolerated.

## 2024-06-26 NOTE — CASE MANAGEMENT
Case Management Discharge Planning Note    Patient name Colin Dumont  Location W /W -01 MRN [id number]  : 1970 Date 2024       Current Admission Date: 2024  Current Admission Diagnosis:Metabolic encephalopathy   Patient Active Problem List    Diagnosis Date Noted Date Diagnosed    Alcohol use 2024     Alcohol intoxication (HCC) 2022     Fall 2022     Slurred speech 2022     Metabolic encephalopathy 2022     Memory difficulty 2022     History of GI bleed 2022     Noncompliance 2020     Spasticity 2020     Right sided weakness 10/18/2019     Hyperlipidemia 2019     S/P craniotomy 2019     Type 2 diabetes mellitus without complication, with long-term current use of insulin (HCC) 2018     Tobacco use disorder 2018     Type II diabetes mellitus ([facility]) 2018     Depression 2017     Benign essential hypertension 2012       LOS (days): 13  Geometric Mean LOS (GMLOS) (days): 13.5  Days to GMLOS:1     OBJECTIVE:  Risk of Unplanned Readmission Score: 13.8         Current admission status: Inpatient   Preferred Pharmacy:   [id number] - [address] [address]  Phone: [phone] Fax: [phone]    Primary Care Provider: Stefani Garner MD    Primary Insurance: MEDICARE  Secondary Insurance:     DISCHARGE DETAILS:        CM received message that [facility] will not have a bed available this week as the DC for this week is now going to be LT.  CM contacted pt's mother to review this with her and the other 2 facilities that are willing to take pt.  Mother does not like these facilities and requested additional referrals be made.  CM updated referrals.      Mother is willing to consider [facility] however they are still reviewing.  Message has been sent to them.  CM will continue to follow to assist with planning for pt  to go to rehab as he is medically stable.

## 2024-06-26 NOTE — CASE MANAGEMENT
Case Management Progress Note    Patient name oClin Dumont  Location W /W -01 MRN [id number]  : 1970 Date 2024       LOS (days): 13  Geometric Mean LOS (GMLOS) (days): 13.5  Days to GMLOS:0.8        OBJECTIVE:        Current admission status: Inpatient  Preferred Pharmacy:   [id number] - [address] [address]  Phone: [phone] Fax: [phone]    Primary Care Provider: Stefani Garner MD    Primary Insurance: MEDICARE  Secondary Insurance:     PROGRESS NOTE:    Weekly Care Management Length of Stay Review     Current LOS: 13 Days    Most Recent Labs:     Lab Results   Component Value Date/Time    WBC 5.81 2024 04:53 AM    HGB 10.0 (L) 2024 04:53 AM    HCT 30.0 (L) 2024 04:53 AM     2024 04:53 AM    SODIUM 141 2024 04:53 AM    K 3.8 2024 04:53 AM     2024 04:53 AM    CO2 31 2024 04:53 AM    BUN 11 2024 04:53 AM    CREATININE 0.62 2024 04:53 AM    GLUC 91 2024 04:53 AM    ALKPHOS 45 2024 04:53 AM    ALT 17 2024 04:53 AM    AST 14 2024 04:53 AM    ALB 3.5 2024 04:53 AM    TBILI 0.40 2024 04:53 AM       Most Recent Vitals:   Vitals:    24 0713   BP: 157/86   Pulse: 67   Resp: 14   Temp: 97.7 °F (36.5 °C)   SpO2: 97%        Identified Barriers to Discharge/Discharge Goals/Care Management Interventions:  metabolic encephalopathy, hx of crani, ETOH abuse    Intended Discharge Disposition: referrals sent, awaiting family choice. Current facility [facility] does not have a bed.    Expected Discharge Date:  ready.

## 2024-06-26 NOTE — PROGRESS NOTES
Patient:    MRN:  [id number]    Araceli Request ID:  [id number]    Level of care reserved:    Partner Reserved:    Clinical needs requested:    Geography searched:  20 miles around [id number]    Start of Service:    Request sent:  12:41pm EDT on 6/24/2024 by Eliza Augustine    Partner reserved:  by Neema Gonsalez    Choice list shared:  3:04pm EDT on 6/26/2024 by Neema Gonsalez

## 2024-06-26 NOTE — ASSESSMENT & PLAN NOTE
Presented with confusion, banging head on the wall.  History of altered mental status with excessive bacolofen use previously.  Mother unable to be contacted to discuss amount of baclofen at home. She is adamant that he did not overdose on this  Current episode/this admission - altered mental status of unclear etiology.  LP negative. Had possible aspiration pneumonitis but not a true PNA  Per mother, improving but still somewhat more confused than baseline  At home has not left the house in prolonged period of time but according to mother, was making himself meals prior to coming to the hospital  COMA panel, UDS, ethanol, TSH, ammonia were negative on admission  CT heads have been negative.   MRI negative  Neuro exam is nonfocal  Continue to monitor mental status

## 2024-06-26 NOTE — PROGRESS NOTES
[facility]  Progress Note  Name: Colin Dumont I  MRN: [id number]  Unit/Bed#: W -01 I Date of Admission: 6/13/2024   Date of Service: 6/26/2024 I Hospital Day: 13    Assessment & Plan   * Metabolic encephalopathy  Assessment & Plan  Presented with confusion, banging head on the wall.  History of altered mental status with excessive bacolofen use previously.  Mother unable to be contacted to discuss amount of baclofen at home. She is adamant that he did not overdose on this  Current episode/this admission - altered mental status of unclear etiology.  LP negative. Had possible aspiration pneumonitis but not a true PNA  Per mother, improving but still somewhat more confused than baseline  At home has not left the house in prolonged period of time but according to mother, was making himself meals prior to coming to the hospital  COMA panel, UDS, ethanol, TSH, ammonia were negative on admission  CT heads have been negative.   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  Continue baclofen  Lives at home with his mother at baseline    Alcohol use  Assessment & Plan  Reported last drink 6/12 - no withdrawal symptoms  Continue vitamin B12 folate and multivitamin.    Spasticity  Assessment & Plan  Continue baclofen 10 mg BID - has been on this since 2019    Type II diabetes mellitus (HCC)  Assessment & Plan  Lab Results   Component Value Date    HGBA1C 5.6 06/20/2024     Recent Labs     06/25/24  1113 06/25/24  1552 06/25/24  2123 06/26/24  0713   POCGLU 154* 165* 138 89     Blood Sugar Average: Last 72 hrs:  (P) 160.0958579596986813    A1c and glucose has been controlled   Currently on a sliding scale with a diabetic diet  No need for basal insulin as AM glucoses had been ranging 80-90    Benign essential  hypertension  Assessment & Plan  BP acceptable   Continue Lisinopril                VTE Pharmacologic Prophylaxis: VTE Score: 1 Low Risk (Score 0-2) - Encourage Ambulation.    Mobility:   Basic Mobility Inpatient Raw Score: 22  JH-HLM Goal: 7: Walk 25 feet or more  JH-HLM Achieved: 3: Sit at edge of bed  JH-HLM Goal NOT achieved. Continue with multidisciplinary rounding and encourage appropriate mobility to improve upon JH-HLM goals.    Patient Centered Rounds: I performed bedside rounds with nursing staff today.   Discussions with Specialists or Other Care Team Provider: Discussed with RNRICARDO    Education and Discussions with Family / Patient: Updated  (mother) via phone.    Total Time Spent on Date of Encounter in care of patient: 35 mins. This time was spent on one or more of the following: performing physical exam; counseling and coordination of care; obtaining or reviewing history; documenting in the medical record; reviewing/ordering tests, medications or procedures; communicating with other healthcare professionals and discussing with patient's family/caregivers.    Current Length of Stay: 13 day(s)  Current Patient Status: Inpatient   Certification Statement: The patient will continue to require additional inpatient hospital stay due to pending safe discharge plan   Discharge Plan: Anticipate discharge in 48 hrs to rehab facility.    Code Status: Level 1 - Full Code    Subjective:   Patient has no complaints today. Reports he is feeling well. Eager to get out of the hospital.    Objective:     Vitals:   Temp (24hrs), Av.7 °F (36.5 °C), Min:97.7 °F (36.5 °C), Max:97.7 °F (36.5 °C)    Temp:  [97.7 °F (36.5 °C)] 97.7 °F (36.5 °C)  HR:  [67-70] 67  Resp:  [14-16] 14  BP: (139-157)/(85-86) 157/86  SpO2:  [97 %-99 %] 97 %  Body mass index is 21.62 kg/m².     Input and Output Summary (last 24 hours):     Intake/Output Summary (Last 24 hours) at 2024 0934  Last data filed at 2024  1231  Gross per 24 hour   Intake 240 ml   Output --   Net 240 ml       Physical Exam:   Physical Exam  Constitutional:       General: He is not in acute distress.     Appearance: Normal appearance. He is normal weight. He is not ill-appearing or diaphoretic.   HENT:      Head: Normocephalic and atraumatic.      Mouth/Throat:      Mouth: Mucous membranes are moist.   Eyes:      General: No scleral icterus.     Pupils: Pupils are equal, round, and reactive to light.   Cardiovascular:      Rate and Rhythm: Normal rate and regular rhythm.      Pulses: Normal pulses.      Heart sounds: Normal heart sounds, S1 normal and S2 normal. No murmur heard.     No systolic murmur is present.      No diastolic murmur is present.      No gallop. No S3 or S4 sounds.   Pulmonary:      Effort: Pulmonary effort is normal. No accessory muscle usage or respiratory distress.      Breath sounds: Normal breath sounds. No stridor. No decreased breath sounds, wheezing, rhonchi or rales.   Chest:      Chest wall: No tenderness.   Abdominal:      General: Bowel sounds are normal. There is no distension.      Palpations: Abdomen is soft.      Tenderness: There is no abdominal tenderness. There is no guarding.   Musculoskeletal:      Right lower leg: No edema.      Left lower leg: No edema.   Skin:     General: Skin is warm and dry.      Coloration: Skin is not jaundiced.   Neurological:      General: No focal deficit present.      Mental Status: He is alert. Mental status is at baseline.      Motor: No tremor or seizure activity.   Psychiatric:         Behavior: Behavior is cooperative.          Additional Data:     Labs:  Results from last 7 days   Lab Units 06/25/24  0453   WBC Thousand/uL 5.81   HEMOGLOBIN g/dL 10.0*   HEMATOCRIT % 30.0*   PLATELETS Thousands/uL 320   SEGS PCT % 71   LYMPHO PCT % 18   MONO PCT % 8   EOS PCT % 1     Results from last 7 days   Lab Units 06/25/24  0453   SODIUM mmol/L 141   POTASSIUM mmol/L 3.8   CHLORIDE mmol/L  104   CO2 mmol/L 31   BUN mg/dL 11   CREATININE mg/dL 0.62   ANION GAP mmol/L 6   CALCIUM mg/dL 9.1   ALBUMIN g/dL 3.5   TOTAL BILIRUBIN mg/dL 0.40   ALK PHOS U/L 45   ALT U/L 17   AST U/L 14   GLUCOSE RANDOM mg/dL 91         Results from last 7 days   Lab Units 06/26/24  0713 06/25/24  2123 06/25/24  1552 06/25/24  1113 06/25/24  0705 06/24/24  2105 06/24/24  1635 06/24/24  1045 06/24/24  0715 06/23/24  2103 06/23/24  1630 06/23/24  1122   POC GLUCOSE mg/dl 89 138 165* 154* 95 159* 193* 301* 82 176* 137 308*     Results from last 7 days   Lab Units 06/20/24  0004   HEMOGLOBIN A1C % 5.6     Results from last 7 days   Lab Units 06/21/24  0537   PROCALCITONIN ng/ml 0.34*       Lines/Drains:  Invasive Devices       Peripheral Intravenous Line  Duration             Peripheral IV 06/25/24 Left;Proximal;Ventral (anterior) Forearm <1 day                          Imaging: Reviewed radiology reports from this admission including: MRI brain    Recent Cultures (last 7 days):         Last 24 Hours Medication List:   Current Facility-Administered Medications   Medication Dose Route Frequency Provider Last Rate    baclofen  10 mg Oral BID Anmol Mcgregor MD      cyanocobalamin  1,000 mcg Oral Daily Anmol Mcgregor MD      folic acid  400 mcg Oral Daily Anmol Mcgregor MD      heparin (porcine)  5,000 Units Subcutaneous Q8H [facility] Anmol Mcgregor MD      insulin lispro  1-5 Units Subcutaneous 4x Daily (AC & HS) Anmol Mcgregor MD      lisinopril  10 mg Oral Daily Anmol Mcgregor MD      pantoprazole  40 mg Oral Early Morning Sarita Abad MD      polyethylene glycol  17 g Oral Daily Anmol Mcgregor MD      QUEtiapine  50 mg Oral HS Anmol Mcgregor MD      thiamine  100 mg Oral Daily Anmol Mcgregor MD          Today, Patient Was Seen By: Bryan Laughlin PA-C    **Please Note: This note may have been constructed using a voice recognition system.**

## 2024-06-26 NOTE — PLAN OF CARE
Problem: SAFETY,RESTRAINT: NV/NON-SELF DESTRUCTIVE BEHAVIOR  Goal: Remains free of harm/injury (restraint for non violent/non self-detsructive behavior)  Description: INTERVENTIONS:  - Instruct patient/family regarding restraint use   - Assess and monitor physiologic and psychological status   - Provide interventions and comfort measures to meet assessed patient needs   - Identify and implement measures to help patient regain control  - Assess readiness for release of restraint   Outcome: Progressing  Goal: Returns to optimal restraint-free functioning  Description: INTERVENTIONS:  - Assess the patient's behavior and symptoms that indicate continued need for restraint  - Identify and implement measures to help patient regain control  - Assess readiness for release of restraint   Outcome: Progressing     Problem: Nutrition/Hydration-ADULT  Goal: Nutrient/Hydration intake appropriate for improving, restoring or maintaining nutritional needs  Description: Monitor and assess patient's nutrition/hydration status for malnutrition. Collaborate with interdisciplinary team and initiate plan and interventions as ordered.  Monitor patient's weight and dietary intake as ordered or per policy. Utilize nutrition screening tool and intervene as necessary. Determine patient's food preferences and provide high-protein, high-caloric foods as appropriate.     INTERVENTIONS:  - Monitor oral intake, urinary output, labs, and treatment plans  - Assess nutrition and hydration status and recommend course of action  - Evaluate amount of meals eaten  - Assist patient with eating if necessary   - Allow adequate time for meals  - Recommend/ encourage appropriate diets, oral nutritional supplements, and vitamin/mineral supplements  - Order, calculate, and assess calorie counts as needed  - Recommend, monitor, and adjust tube feedings and TPN/PPN based on assessed needs  - Assess need for intravenous fluids  - Provide specific  nutrition/hydration education as appropriate  - Include patient/family/caregiver in decisions related to nutrition  Outcome: Progressing     Problem: Prexisting or High Potential for Compromised Skin Integrity  Goal: Skin integrity is maintained or improved  Description: INTERVENTIONS:  - Identify patients at risk for skin breakdown  - Assess and monitor skin integrity  - Assess and monitor nutrition and hydration status  - Monitor labs   - Assess for incontinence   - Turn and reposition patient  - Assist with mobility/ambulation  - Relieve pressure over bony prominences  - Avoid friction and shearing  - Provide appropriate hygiene as needed including keeping skin clean and dry  - Evaluate need for skin moisturizer/barrier cream  - Collaborate with interdisciplinary team   - Patient/family teaching  - Consider wound care consult   Outcome: Progressing     Problem: PAIN - ADULT  Goal: Verbalizes/displays adequate comfort level or baseline comfort level  Description: Interventions:  - Encourage patient to monitor pain and request assistance  - Assess pain using appropriate pain scale  - Administer analgesics based on type and severity of pain and evaluate response  - Implement non-pharmacological measures as appropriate and evaluate response  - Consider cultural and social influences on pain and pain management  - Notify physician/advanced practitioner if interventions unsuccessful or patient reports new pain  Outcome: Progressing     Problem: INFECTION - ADULT  Goal: Absence or prevention of progression during hospitalization  Description: INTERVENTIONS:  - Assess and monitor for signs and symptoms of infection  - Monitor lab/diagnostic results  - Monitor all insertion sites, i.e. indwelling lines, tubes, and drains  - Monitor endotracheal if appropriate and nasal secretions for changes in amount and color  - [place] appropriate cooling/warming therapies per order  - Administer medications as ordered  - Instruct  and encourage patient and family to use good hand hygiene technique  - Identify and instruct in appropriate isolation precautions for identified infection/condition  Outcome: Progressing  Goal: Absence of fever/infection during neutropenic period  Description: INTERVENTIONS:  - Monitor WBC    Outcome: Progressing     Problem: SAFETY ADULT  Goal: Patient will remain free of falls  Description: INTERVENTIONS:  - Educate patient/family on patient safety including physical limitations  - Instruct patient to call for assistance with activity   - Consult OT/PT to assist with strengthening/mobility   - Keep Call bell within reach  - Keep bed low and locked with side rails adjusted as appropriate  - Keep care items and personal belongings within reach  - Initiate and maintain comfort rounds  - Make Fall Risk Sign visible to staff  - Offer Toileting every  Hours, in advance of need  - Initiate/Maintain alarm  - Obtain necessary fall risk management equipment:   - Apply yellow socks and bracelet for high fall risk patients  - Consider moving patient to room near nurses station  Outcome: Progressing  Goal: Maintain or return to baseline ADL function  Description: INTERVENTIONS:  -  Assess patient's ability to carry out ADLs; assess patient's baseline for ADL function and identify physical deficits which impact ability to perform ADLs (bathing, care of mouth/teeth, toileting, grooming, dressing, etc.)  - Assess/evaluate cause of self-care deficits   - Assess range of motion  - Assess patient's mobility; develop plan if impaired  - Assess patient's need for assistive devices and provide as appropriate  - Encourage maximum independence but intervene and supervise when necessary  - Involve family in performance of ADLs  - Assess for home care needs following discharge   - Consider OT consult to assist with ADL evaluation and planning for discharge  - Provide patient education as appropriate  Outcome: Progressing  Goal:  Maintains/Returns to pre admission functional level  Description: INTERVENTIONS:  - Perform AM-PAC 6 Click Basic Mobility/ Daily Activity assessment daily.  - Set and communicate daily mobility goal to care team and patient/family/caregiver.   - Collaborate with rehabilitation services on mobility goals if consulted  - Perform Range of Motion  times a day.  - Reposition patient every  hours.  - Dangle patient  times a day  - Stand patient  times a day  - Ambulate patient  times a day  - Out of bed to chair  times a day   - Out of bed for meals  times a day  - Out of bed for toileting  - Record patient progress and toleration of activity level   Outcome: Progressing     Problem: DISCHARGE PLANNING  Goal: Discharge to home or other facility with appropriate resources  Description: INTERVENTIONS:  - Identify barriers to discharge w/patient and caregiver  - Arrange for needed discharge resources and transportation as appropriate  - Identify discharge learning needs (meds, wound care, etc.)  - Arrange for interpretive services to assist at discharge as needed  - Refer to Case Management Department for coordinating discharge planning if the patient needs post-hospital services based on physician/advanced practitioner order or complex needs related to functional status, cognitive ability, or social support system  Outcome: Progressing     Problem: Knowledge Deficit  Goal: Patient/family/caregiver demonstrates understanding of disease process, treatment plan, medications, and discharge instructions  Description: Complete learning assessment and assess knowledge base.  Interventions:  - Provide teaching at level of understanding  - Provide teaching via preferred learning methods  Outcome: Progressing

## 2024-06-26 NOTE — CASE MANAGEMENT
Case Management Progress Note    Patient name Colin Dumont  Location W /W -01 MRN [id number]  : 1970 Date 2024       LOS (days): 13  Geometric Mean LOS (GMLOS) (days): 13.5  Days to GMLOS:0.9        OBJECTIVE:        Current admission status: Inpatient  Preferred Pharmacy:   [id number] - [address] [address]  Phone: [phone] Fax: [phone]    Primary Care Provider: Stefani Garner MD    Primary Insurance: MEDICARE  Secondary Insurance:     PROGRESS NOTE:    CM contacted pt's mother to review those facilities that are able to offer a bed as the rest of the facilities have reviewed and provided responses.      Mother would like for pt to go to [facility] as her daughter works there.  CM explained they are not accepting pt as they have concerns about his hx of drinking and medical complexities.  Mother is not happy and will have her daughter contact the facility as she works there.      CM will follow up with mother to determine where she would like pt to go.

## 2024-06-26 NOTE — ASSESSMENT & PLAN NOTE
Lab Results   Component Value Date    HGBA1C 5.6 06/20/2024     Recent Labs     06/25/24  1113 06/25/24  1552 06/25/24  2123 06/26/24  0713   POCGLU 154* 165* 138 89     Blood Sugar Average: Last 72 hrs:  (P) 160.7316174734623800    A1c and glucose has been controlled   Currently on a sliding scale with a diabetic diet  No need for basal insulin as AM glucoses had been ranging 80-90

## 2024-06-26 NOTE — CASE MANAGEMENT
Case Management Discharge Planning Note    Patient name Colin Dumont  Location W /W -01 MRN [id number]  : 1970 Date 2024       Current Admission Date: 2024  Current Admission Diagnosis:Metabolic encephalopathy   Patient Active Problem List    Diagnosis Date Noted Date Diagnosed    Alcohol use 2024     Alcohol intoxication (HCC) 2022     Fall 2022     Slurred speech 2022     Metabolic encephalopathy 2022     Memory difficulty 2022     History of GI bleed 2022     Noncompliance 2020     Spasticity 2020     Right sided weakness 10/18/2019     Hyperlipidemia 2019     S/P craniotomy 2019     Type 2 diabetes mellitus without complication, with long-term current use of insulin ([facility]) 2018     Tobacco use disorder 2018     Type II diabetes mellitus ([facility]) 2018     Depression 2017     Benign essential hypertension 2012       LOS (days): 13  Geometric Mean LOS (GMLOS) (days): 13.5  Days to GMLOS:0.8     OBJECTIVE:  Risk of Unplanned Readmission Score: 13.87         Current admission status: Inpatient   Preferred Pharmacy:   [id number] - [address] [address]  Phone: [phone] Fax: [phone]    Primary Care Provider: Stefani Garner MD    Primary Insurance: MEDICARE  Secondary Insurance:     DISCHARGE DETAILS:     After further review with nursing staff at [facility], they are able to accept pt for rehab.  Pt ill need PASRR and EARC which have been completed and returned.  Pt will be stable for DC tomorrow to rehab.

## 2024-06-26 NOTE — SPEECH THERAPY NOTE
"Speech Language/Pathology    Speech/Language Pathology Progress Note    Patient Name: Colin Dumont  Today's Date: 6/26/2024       Subjective:  Pt seen for dysphagia tx and was alert and oriented upon arrival, sitting upright in bed.       Objective:  Pt observed with dysphagia 3 diet with trials of thin liquids. Pt with adequate bite strength/utensil stripping on with mildly increased mastication cycle, adequate bolus manipulation/formation. No buccal pocketing observed. Pt with mildly decreased A-P propulsion and swallow trigger on both solids/liquids with No residue s/p swallow. Vocal quality WFL s/p swallow. No coughing/throat clearing noted on solids/liquids.mildly decreased hyolaryngeal elevation/excursion as noted on palpation with all trialed consistencies. adequate suck-swallow technique via straw sips of thin liquids. No coughing/throat clearing noted on thin liquids via cup or straw sip.      Assessment:  Pt exhibits no s/s pen/asp on dysphagia 3 diet consistency with consistent trials of thin liquids. Pt states \"I do not want to be on the thick stuff anymore. I don't think it's necessary\". Pt prefers dysphagia 3 dental soft diet for easier mastication. Pt exhibited no coughing/throat clearing on increased amounts of thin liquid trials via cup and straw sips. Education was given on the importance of utilizing compensatory swallow strategies for safe and adequate swallow functioning such as small bites/sips, slow rate, alternating liquids-solids, as well as education on meeting nutrition/hydration standards for overall QOL. Pt expressed understanding. Pt acknowledged and utilized compensatory strategies for safe and adequate swallow function given safe swallow education.      Plan/Recommendations:  Pt currently tolerating dysphagia 3 diet with thin liquids. Recommending diet advancement to dysphagia 3 diet consistency with thin liquids. Maintain aspiration precautions, upright ~90 degree positioning " during ALL PO intake and at least 30 minutes post PO meals, small bites, slow rate of intake, and alternate liquids/solids. Medications as tolerated. Will continue to follow.      Merlene Cook MS, CCC-SLP  Speech Pathologist  Available via [employer]

## 2024-06-27 VITALS
BODY MASS INDEX: 21.62 KG/M2 | SYSTOLIC BLOOD PRESSURE: 161 MMHG | TEMPERATURE: 97.8 F | RESPIRATION RATE: 20 BRPM | OXYGEN SATURATION: 94 % | HEART RATE: 83 BPM | WEIGHT: 146.39 LBS | DIASTOLIC BLOOD PRESSURE: 91 MMHG

## 2024-06-27 LAB — GLUCOSE SERPL-MCNC: 103 MG/DL (ref 65–140)

## 2024-06-27 PROCEDURE — 82948 REAGENT STRIP/BLOOD GLUCOSE: CPT

## 2024-06-27 PROCEDURE — 99239 HOSP IP/OBS DSCHRG MGMT >30: CPT | Performed by: PHYSICIAN ASSISTANT

## 2024-06-27 RX ORDER — PANTOPRAZOLE SODIUM 40 MG/1
40 TABLET, DELAYED RELEASE ORAL
Qty: 30 TABLET | Refills: 0 | Status: SHIPPED
Start: 2024-06-28

## 2024-06-27 RX ORDER — LANOLIN ALCOHOL/MO/W.PET/CERES
400 CREAM (GRAM) TOPICAL DAILY
Qty: 30 TABLET | Refills: 0 | Status: SHIPPED
Start: 2024-06-28

## 2024-06-27 RX ORDER — LANOLIN ALCOHOL/MO/W.PET/CERES
100 CREAM (GRAM) TOPICAL DAILY
Qty: 30 TABLET | Refills: 0 | Status: SHIPPED
Start: 2024-06-28

## 2024-06-27 RX ORDER — QUETIAPINE FUMARATE 50 MG/1
50 TABLET, FILM COATED ORAL
Qty: 30 TABLET | Refills: 0 | Status: SHIPPED
Start: 2024-06-27

## 2024-06-27 RX ADMIN — POLYETHYLENE GLYCOL 3350 17 G: 17 POWDER, FOR SOLUTION ORAL at 08:11

## 2024-06-27 RX ADMIN — FOLIC ACID TAB 400 MCG 400 MCG: 400 TAB at 08:09

## 2024-06-27 RX ADMIN — HEPARIN SODIUM 5000 UNITS: 5000 INJECTION INTRAVENOUS; SUBCUTANEOUS at 05:05

## 2024-06-27 RX ADMIN — LISINOPRIL 10 MG: 10 TABLET ORAL at 08:10

## 2024-06-27 RX ADMIN — PANTOPRAZOLE SODIUM 40 MG: 40 TABLET, DELAYED RELEASE ORAL at 05:05

## 2024-06-27 RX ADMIN — BACLOFEN 10 MG: 10 TABLET ORAL at 08:10

## 2024-06-27 RX ADMIN — Medication 100 MG: at 08:10

## 2024-06-27 RX ADMIN — CYANOCOBALAMIN TAB 500 MCG 1000 MCG: 500 TAB at 08:10

## 2024-06-27 NOTE — CASE MANAGEMENT
Case Management Discharge Planning Note    Patient name Colin Dumont  Location W /W -01 MRN [id number]  : 1970 Date 2024       Current Admission Date: 2024  Current Admission Diagnosis:Metabolic encephalopathy   Patient Active Problem List    Diagnosis Date Noted Date Diagnosed    Alcohol use 2024     Alcohol intoxication (HCC) 2022     Fall 2022     Slurred speech 2022     Metabolic encephalopathy 2022     Memory difficulty 2022     History of GI bleed 2022     Noncompliance 2020     Spasticity 2020     Right sided weakness 10/18/2019     Hyperlipidemia 2019     S/P craniotomy 2019     Type 2 diabetes mellitus without complication, with long-term current use of insulin ([facility]) 2018     Tobacco use disorder 2018     Type II diabetes mellitus ([facility]) 2018     Depression 2017     Benign essential hypertension 2012       LOS (days): 14  Geometric Mean LOS (GMLOS) (days): 13.5  Days to GMLOS:0     OBJECTIVE:  Risk of Unplanned Readmission Score: 13.86         Current admission status: Inpatient   Preferred Pharmacy:   [id number] - [address] [address]  Phone: [phone] Fax: [phone]    Primary Care Provider: Stefani Garner MD    Primary Insurance: MEDICARE  Secondary Insurance:     DISCHARGE DETAILS:    Discharge planning discussed with:: pt  Freedom of Choice: Yes  Comments - Freedom of Choice: Pt is stable for DC and will be going to [facility] where pt's sister works.  Pt will need transportation in order to be discharged      Other Referral/Resources/Interventions Provided:  Interventions: Transportation  Referral Comments: Request has been made for pt to go to [facility] at 1130 via [facility] for rehab.  [facility].  All parties involved have been notified of DC  arranagements.    Would you like to participate in our [facility] service program?  : No - Declined    Treatment Team Recommendation: Short Term Rehab  Discharge Destination Plan:: Short Term Rehab  Transport at Discharge : Wheelchair van  Dispatcher Contacted: Yes  Number/Name of Dispatcher: roundtrip  Transported by (Company and Unit #): [facility]  ETA of Transport (Date): 06/27/24  ETA of Transport (Time): 1130     Transfer Mode: Wheelchair  Accompanied by: EMS personnel     IMM Given (Date):: 06/26/24  IMM Given to:: Patient  Family notified:: mother     IMM reviewed with patient, patient agrees with discharge determination.

## 2024-06-27 NOTE — ASSESSMENT & PLAN NOTE
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  Continue baclofen  Lives at home with his mother at baseline  Discharge to rehab

## 2024-06-27 NOTE — ASSESSMENT & PLAN NOTE
Presented with confusion, banging head on the wall.  History of altered mental status with excessive bacolofen use previously.  Mother unable to be contacted to discuss amount of baclofen at home. She is adamant that he did not overdose on this  Current episode/this admission - altered mental status of unclear etiology.  LP negative. Had possible aspiration pneumonitis but not a true PNA  Per mother, improving but still somewhat more confused than baseline  At home has not left the house in prolonged period of time but according to mother, was making himself meals prior to coming to the hospital  COMA panel, UDS, ethanol, TSH, ammonia were negative on admission  CT heads have been negative.   MRI negative  Neuro exam is nonfocal  Stable for discharge

## 2024-06-27 NOTE — ASSESSMENT & PLAN NOTE
Lab Results   Component Value Date    HGBA1C 5.6 06/20/2024     Recent Labs     06/26/24  1157 06/26/24  1619 06/26/24  2149 06/27/24  0731   POCGLU 104 228* 150* 103     Blood Sugar Average: Last 72 hrs:  (P) [id number]    A1c and glucose has been controlled   Currently on a sliding scale with a diabetic diet  No need for basal insulin as AM glucoses had been ranging 80-90

## 2024-06-27 NOTE — PLAN OF CARE
Problem: SAFETY,RESTRAINT: NV/NON-SELF DESTRUCTIVE BEHAVIOR  Goal: Remains free of harm/injury (restraint for non violent/non self-detsructive behavior)  Description: INTERVENTIONS:  - Instruct patient/family regarding restraint use   - Assess and monitor physiologic and psychological status   - Provide interventions and comfort measures to meet assessed patient needs   - Identify and implement measures to help patient regain control  - Assess readiness for release of restraint   Outcome: Progressing  Goal: Returns to optimal restraint-free functioning  Description: INTERVENTIONS:  - Assess the patient's behavior and symptoms that indicate continued need for restraint  - Identify and implement measures to help patient regain control  - Assess readiness for release of restraint   Outcome: Progressing     Problem: Nutrition/Hydration-ADULT  Goal: Nutrient/Hydration intake appropriate for improving, restoring or maintaining nutritional needs  Description: Monitor and assess patient's nutrition/hydration status for malnutrition. Collaborate with interdisciplinary team and initiate plan and interventions as ordered.  Monitor patient's weight and dietary intake as ordered or per policy. Utilize nutrition screening tool and intervene as necessary. Determine patient's food preferences and provide high-protein, high-caloric foods as appropriate.     INTERVENTIONS:  - Monitor oral intake, urinary output, labs, and treatment plans  - Assess nutrition and hydration status and recommend course of action  - Evaluate amount of meals eaten  - Assist patient with eating if necessary   - Allow adequate time for meals  - Recommend/ encourage appropriate diets, oral nutritional supplements, and vitamin/mineral supplements  - Order, calculate, and assess calorie counts as needed  - Recommend, monitor, and adjust tube feedings and TPN/PPN based on assessed needs  - Assess need for intravenous fluids  - Provide specific  nutrition/hydration education as appropriate  - Include patient/family/caregiver in decisions related to nutrition  Outcome: Progressing     Problem: Prexisting or High Potential for Compromised Skin Integrity  Goal: Skin integrity is maintained or improved  Description: INTERVENTIONS:  - Identify patients at risk for skin breakdown  - Assess and monitor skin integrity  - Assess and monitor nutrition and hydration status  - Monitor labs   - Assess for incontinence   - Turn and reposition patient  - Assist with mobility/ambulation  - Relieve pressure over bony prominences  - Avoid friction and shearing  - Provide appropriate hygiene as needed including keeping skin clean and dry  - Evaluate need for skin moisturizer/barrier cream  - Collaborate with interdisciplinary team   - Patient/family teaching  - Consider wound care consult   Outcome: Progressing     Problem: PAIN - ADULT  Goal: Verbalizes/displays adequate comfort level or baseline comfort level  Description: Interventions:  - Encourage patient to monitor pain and request assistance  - Assess pain using appropriate pain scale  - Administer analgesics based on type and severity of pain and evaluate response  - Implement non-pharmacological measures as appropriate and evaluate response  - Consider cultural and social influences on pain and pain management  - Notify physician/advanced practitioner if interventions unsuccessful or patient reports new pain  Outcome: Progressing     Problem: INFECTION - ADULT  Goal: Absence or prevention of progression during hospitalization  Description: INTERVENTIONS:  - Assess and monitor for signs and symptoms of infection  - Monitor lab/diagnostic results  - Monitor all insertion sites, i.e. indwelling lines, tubes, and drains  - Monitor endotracheal if appropriate and nasal secretions for changes in amount and color  - [place] appropriate cooling/warming therapies per order  - Administer medications as ordered  - Instruct  and encourage patient and family to use good hand hygiene technique  - Identify and instruct in appropriate isolation precautions for identified infection/condition  Outcome: Progressing  Goal: Absence of fever/infection during neutropenic period  Description: INTERVENTIONS:  - Monitor WBC    Outcome: Progressing     Problem: SAFETY ADULT  Goal: Patient will remain free of falls  Description: INTERVENTIONS:  - Educate patient/family on patient safety including physical limitations  - Instruct patient to call for assistance with activity   - Consult OT/PT to assist with strengthening/mobility   - Keep Call bell within reach  - Keep bed low and locked with side rails adjusted as appropriate  - Keep care items and personal belongings within reach  - Initiate and maintain comfort rounds  - Make Fall Risk Sign visible to staff  - Offer Toileting every  Hours, in advance of need  - Initiate/Maintain alarm  - Obtain necessary fall risk management equipment:   - Apply yellow socks and bracelet for high fall risk patients  - Consider moving patient to room near nurses station  Outcome: Progressing  Goal: Maintain or return to baseline ADL function  Description: INTERVENTIONS:  -  Assess patient's ability to carry out ADLs; assess patient's baseline for ADL function and identify physical deficits which impact ability to perform ADLs (bathing, care of mouth/teeth, toileting, grooming, dressing, etc.)  - Assess/evaluate cause of self-care deficits   - Assess range of motion  - Assess patient's mobility; develop plan if impaired  - Assess patient's need for assistive devices and provide as appropriate  - Encourage maximum independence but intervene and supervise when necessary  - Involve family in performance of ADLs  - Assess for home care needs following discharge   - Consider OT consult to assist with ADL evaluation and planning for discharge  - Provide patient education as appropriate  Outcome: Progressing  Goal:  Maintains/Returns to pre admission functional level  Description: INTERVENTIONS:  - Perform AM-PAC 6 Click Basic Mobility/ Daily Activity assessment daily.  - Set and communicate daily mobility goal to care team and patient/family/caregiver.   - Collaborate with rehabilitation services on mobility goals if consulted  - Perform Range of Motion  times a day.  - Reposition patient every hours.  - Dangle patient  times a day  - Stand patient  times a day  - Ambulate patient  times a day  - Out of bed to chair times a day   - Out of bed for meals  times a day  - Out of bed for toileting  - Record patient progress and toleration of activity level   Outcome: Progressing     Problem: DISCHARGE PLANNING  Goal: Discharge to home or other facility with appropriate resources  Description: INTERVENTIONS:  - Identify barriers to discharge w/patient and caregiver  - Arrange for needed discharge resources and transportation as appropriate  - Identify discharge learning needs (meds, wound care, etc.)  - Arrange for interpretive services to assist at discharge as needed  - Refer to Case Management Department for coordinating discharge planning if the patient needs post-hospital services based on physician/advanced practitioner order or complex needs related to functional status, cognitive ability, or social support system  Outcome: Progressing     Problem: Knowledge Deficit  Goal: Patient/family/caregiver demonstrates understanding of disease process, treatment plan, medications, and discharge instructions  Description: Complete learning assessment and assess knowledge base.  Interventions:  - Provide teaching at level of understanding  - Provide teaching via preferred learning methods  Outcome: Progressing

## 2024-06-27 NOTE — DISCHARGE SUMMARY
[facility]  Discharge- Colin Dumont 1970, 54 y.o. male MRN: [id number]  Unit/Bed#: W -01 Encounter: [id number]  Primary Care Provider: Stefani Garner MD   Date and time admitted to hospital: 6/13/2024  9:05 PM    * Metabolic encephalopathy  Assessment & Plan  Presented with confusion, banging head on the wall.  History of altered mental status with excessive bacolofen use previously.  Mother unable to be contacted to discuss amount of baclofen at home. She is adamant that he did not overdose on this  Current episode/this admission - altered mental status of unclear etiology.  LP negative. Had possible aspiration pneumonitis but not a true PNA  Per mother, improving but still somewhat more confused than baseline  At home has not left the house in prolonged period of time but according to mother, was making himself meals prior to coming to the hospital  COMA panel, UDS, ethanol, TSH, ammonia were negative on admission  CT heads have been negative.   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  Continue baclofen  Lives at home with his mother at baseline  Discharge to rehab     Alcohol use  Assessment & Plan  Reported last drink 6/12 - no withdrawal symptoms  Continue vitamin B12 folate and multivitamin  Encourage on going cessation     Spasticity  Assessment & Plan  Continue baclofen 10 mg BID - has been on this since 2019    Type II diabetes mellitus (HCC)  Assessment & Plan  Lab Results   Component Value Date    HGBA1C 5.6 06/20/2024     Recent Labs     06/26/24  1157 06/26/24  1619 06/26/24  2149 06/27/24  0731   POCGLU 104 228* 150* 103     Blood Sugar Average: Last 72 hrs:  (P) [id number]    A1c and glucose has been controlled   Currently on a sliding scale with a diabetic diet  No need for  basal insulin as AM glucoses had been ranging 80-90    Benign essential hypertension  Assessment & Plan  BP acceptable   Continue Lisinopril       Medical Problems       Resolved Problems  Date Reviewed: 6/27/2024            Resolved    High anion gap metabolic acidosis 6/25/2024     Resolved by  Leenane Ng PA-C    BELLA (acute kidney injury) ([facility]) 6/16/2024     Resolved by  Linden Plummer MD    SIRS (systemic inflammatory response syndrome) ([facility]) 6/25/2024     Resolved by  Leeanne Ng PA-C    Acute respiratory failure with hypoxia ([facility]) 6/25/2024     Resolved by  Leeanne Ng PA-C        Discharging Physician / Practitioner: Bryan Laughlin PA-C  PCP: Stefani Garner MD  Admission Date:   Admission Orders (From admission, onward)       Ordered        06/13/24 2306  INPATIENT ADMISSION  Once            06/13/24 2311  Inpatient Admission  Once                          Discharge Date: 06/27/24    Consultations During Hospital Stay:  Critical Care   Toxicology - Dr. Parr  Infectious Disease - Dr. Cortez     Procedures Performed:   CXR  CT head  CT C-Spine   CT Chest, Abdomen, Pelvis   CXR  XR ICU  LE Duplex   UE Duplex   CT Head  CT Chest, Abdomen, Pelvis   MRI Brain     Significant Findings / Test Results:   CXR: No acute pulmonary disease   CT Head: No acute intracranial abnormality   CT C-Spine: No cervical spine fracture or traumatic malalignment   CT Chest, Abdomen, Pelvis: No acute findings in the chest, abdomen, or pelvis   CXR: Endotracheal tube tip 4.5 cm above the roque. Stable elevation of the right hemidiaphragm. No acute pulmonary process.   XR ICU: No acute pulmonary disease   LE Duplex: Negative DVT bilaterally   UE Duplex: Negative DVT bilaterally   CT head: No mass effect, acute intracranial hemorrhage or evidence of recent infarction.   CT Chest, Abdomen, Pelvis: Interval development of extensive bilateral lung consolidations compatible with aspiration pneumonia/pneumonitis and small  "bilateral pleural effusions. Unchanged hepatosplenomegaly. Increased body wall edema compatible with anasarca   MRI Brain: No acute intracranial pathology. Stable encephalomalacia, sequela of remote infection     Incidental Findings:   None     Test Results Pending at Discharge (will require follow up):   None     Outpatient Tests Requested:  None    Complications:  None    Reason for Admission: Encephalopathy    Hospital Course:   Colin Dumont is a 54 y.o. male patient who originally presented to the hospital on 6/13/2024 due to encephalopathy and banging his head against the wall. Trauma work up was negative in the ER. He was intubated due to need for airway protection and profound metabolic acidosis. There was suspicion for toxic alcohol ingestion, however methanol and ethylene glycol tests were negative. He was treated with Fomepizole empirically in addition to multivitamin therapies. COMA, UDS were negative. There was also possibility of Baclofen overdose, however, later in the admission his mother stated that there was \"no way\". Patient was extubated successfully and transferred to medical surgical floor. He then underwent FUO work up and was suspected to have aspiration pneumonitis. ID was consulted and he was briefly on antibiotics, however these were discontinued and he did well. He underwent multiple cranial imaging with CTs and MRI which were unremarkable. Ultimately his mental status improved with no clear etiology for presentation. He will be discharged to [facility] and will follow up with PCP. Please refer to medical chart for further details regarding prolonged hospital stay.     Hospital Course: No notes on file        Please see above list of diagnoses and related plan for additional information.     Condition at Discharge: stable    Discharge Day Visit / Exam:   Subjective:  Patient has no complaints. Excited to be discharged.   Vitals: Blood Pressure: 161/91 (06/27/24 0732)  Pulse: 83 (06/27/24 " 0732)  Temperature: 97.8 °F (36.6 °C) (06/27/24 0732)  Temp Source: Oral (06/25/24 1534)  Respirations: 20 (06/27/24 0732)  Weight - Scale: 66.4 kg (146 lb 6.2 oz) (06/26/24 0600)  SpO2: 94 % (06/27/24 0732)  Exam:   Physical Exam  Constitutional:       General: He is not in acute distress.     Appearance: Normal appearance. He is normal weight. He is not ill-appearing or diaphoretic.   HENT:      Head: Normocephalic and atraumatic.      Mouth/Throat:      Mouth: Mucous membranes are moist.   Eyes:      General: No scleral icterus.     Pupils: Pupils are equal, round, and reactive to light.   Cardiovascular:      Rate and Rhythm: Normal rate and regular rhythm.      Pulses: Normal pulses.      Heart sounds: Normal heart sounds, S1 normal and S2 normal. No murmur heard.     No systolic murmur is present.      No diastolic murmur is present.      No gallop. No S3 or S4 sounds.   Pulmonary:      Effort: Pulmonary effort is normal. No accessory muscle usage or respiratory distress.      Breath sounds: Normal breath sounds. No stridor. No decreased breath sounds, wheezing, rhonchi or rales.   Chest:      Chest wall: No tenderness.   Abdominal:      General: Bowel sounds are normal. There is no distension.      Palpations: Abdomen is soft.      Tenderness: There is no abdominal tenderness. There is no guarding.   Musculoskeletal:      Right lower leg: No edema.      Left lower leg: No edema.   Skin:     General: Skin is warm and dry.      Coloration: Skin is not jaundiced.   Neurological:      General: No focal deficit present.      Mental Status: He is alert. Mental status is at baseline.      Motor: No tremor or seizure activity.   Psychiatric:         Behavior: Behavior is cooperative.          Discussion with Family: Updated  (mother) via phone.    Discharge instructions/Information to patient and family:   See after visit summary for information provided to patient and family.      Provisions for  Follow-Up Care:  See after visit summary for information related to follow-up care and any pertinent home health orders.      Mobility at time of Discharge:   Basic Mobility Inpatient Raw Score: 19  JH-HLM Goal: 6: Walk 10 steps or more  JH-HLM Achieved: 6: Walk 10 steps or more  HLM Goal achieved. Continue to encourage appropriate mobility.     Disposition:   Other Skilled Nursing Facility at [facility]     Planned Readmission: None     Discharge Statement:  I spent 45 minutes discharging the patient. This time was spent on the day of discharge. I had direct contact with the patient on the day of discharge. Greater than 50% of the total time was spent examining patient, answering all patient questions, arranging and discussing plan of care with patient as well as directly providing post-discharge instructions.  Additional time then spent on discharge activities.    Discharge Medications:  See after visit summary for reconciled discharge medications provided to patient and/or family.      **Please Note: This note may have been constructed using a voice recognition system**

## 2024-06-27 NOTE — ASSESSMENT & PLAN NOTE
Reported last drink 6/12 - no withdrawal symptoms  Continue vitamin B12 folate and multivitamin  Encourage on going cessation

## 2024-06-27 NOTE — NURSING NOTE
Attempted to call report to yajaira dahl and got voicemail so number left to call nruse back for report    Private Vehicle

## 2024-06-28 ENCOUNTER — TRANSITIONAL CARE MANAGEMENT (OUTPATIENT)
Dept: FAMILY MEDICINE CLINIC | Facility: CLINIC | Age: 54
End: 2024-06-28

## 2024-07-01 LAB — FUNGUS SPEC CULT: NORMAL

## 2024-07-08 LAB — FUNGUS SPEC CULT: NORMAL

## 2024-07-15 LAB — FUNGUS SPEC CULT: NORMAL

## 2024-07-22 LAB — FUNGUS SPEC CULT: NORMAL

## 2024-08-01 ENCOUNTER — TELEPHONE (OUTPATIENT)
Age: 54
End: 2024-08-01

## 2024-08-01 NOTE — TELEPHONE ENCOUNTER
Patients mom Millicent called in asking about patients Humalog insulin pen patient was given in ED. Millicent stated patient is out of Humalog insulin pen and patient has not had insulin since yesterday. Millicent asked if Dr. Ko could send another one to the pharmacy since he is out, Patient is scheduled for 8/2. Millicent also asked if sharps can be sent to pharmacy with Humalog pen as she did not receive any. Millicent is concerned patient hasn't had insulin since yesterday. She stated patient was doing okay just tired.  Please advise, thank you

## 2024-08-01 NOTE — TELEPHONE ENCOUNTER
Patient has long lasting insulin, but in need of a refill of the Humalog, needles, and Sharps bin. Spoke with Dr. Ko, she will not refill anything for this Patient until she sees him at his appointment tomorrow.    Ryleigh Nemec, MA

## 2024-08-01 NOTE — TELEPHONE ENCOUNTER
Spoke with Millicent and advised her that Dr. Ko will not refill anything until Patient's appointment tomorrow. Patient returned home from the nursing home 7/31/2024. Patient has not had insulin since 3 pm yesterday. Millicent stated that the nursing home provided him with 1 months supply of medication, but no pen needles to administer the long lasting insulin and no humalog. I asked it Patient had tested his sugar, Millicent stated that he refuses to. I told her that if the Patient starts to show symptoms or if he tests his sugar and its above 400 to take Patient to ED.     Ryleigh Nemec, MA

## 2024-08-02 ENCOUNTER — OFFICE VISIT (OUTPATIENT)
Dept: FAMILY MEDICINE CLINIC | Facility: CLINIC | Age: 54
End: 2024-08-02
Payer: MEDICARE

## 2024-08-02 VITALS
DIASTOLIC BLOOD PRESSURE: 70 MMHG | TEMPERATURE: 96.7 F | HEART RATE: 102 BPM | RESPIRATION RATE: 18 BRPM | SYSTOLIC BLOOD PRESSURE: 120 MMHG | WEIGHT: 155.2 LBS | OXYGEN SATURATION: 95 % | BODY MASS INDEX: 22.99 KG/M2 | HEIGHT: 69 IN

## 2024-08-02 DIAGNOSIS — Z79.4 TYPE 2 DIABETES MELLITUS WITHOUT COMPLICATION, WITH LONG-TERM CURRENT USE OF INSULIN (HCC): ICD-10-CM

## 2024-08-02 DIAGNOSIS — D64.9 ANEMIA, UNSPECIFIED TYPE: ICD-10-CM

## 2024-08-02 DIAGNOSIS — E11.9 TYPE 2 DIABETES MELLITUS WITHOUT COMPLICATION, WITH LONG-TERM CURRENT USE OF INSULIN (HCC): ICD-10-CM

## 2024-08-02 DIAGNOSIS — Z98.890 S/P CRANIOTOMY: Chronic | ICD-10-CM

## 2024-08-02 DIAGNOSIS — I10 BENIGN ESSENTIAL HYPERTENSION: Primary | Chronic | ICD-10-CM

## 2024-08-02 DIAGNOSIS — Z91.199 NONCOMPLIANCE: ICD-10-CM

## 2024-08-02 DIAGNOSIS — R25.2 SPASTICITY: ICD-10-CM

## 2024-08-02 PROBLEM — G93.41 METABOLIC ENCEPHALOPATHY: Status: RESOLVED | Noted: 2022-07-11 | Resolved: 2024-01-01

## 2024-08-02 PROBLEM — F32.A DEPRESSION: Chronic | Status: RESOLVED | Noted: 2017-03-14 | Resolved: 2024-08-02

## 2024-08-02 PROBLEM — F10.929 ALCOHOL INTOXICATION (HCC): Status: RESOLVED | Noted: 2022-07-11 | Resolved: 2024-08-02

## 2024-08-02 PROBLEM — F32.A DEPRESSION: Chronic | Status: RESOLVED | Noted: 2017-03-14 | Resolved: 2024-01-01

## 2024-08-02 PROBLEM — G93.41 METABOLIC ENCEPHALOPATHY: Status: RESOLVED | Noted: 2022-07-11 | Resolved: 2024-08-02

## 2024-08-02 PROBLEM — F10.929 ALCOHOL INTOXICATION (HCC): Status: RESOLVED | Noted: 2022-07-11 | Resolved: 2024-01-01

## 2024-08-02 LAB — SL AMB POCT GLUCOSE BLD: 159

## 2024-08-02 PROCEDURE — 99495 TRANSJ CARE MGMT MOD F2F 14D: CPT | Performed by: FAMILY MEDICINE

## 2024-08-02 PROCEDURE — 82948 REAGENT STRIP/BLOOD GLUCOSE: CPT | Performed by: FAMILY MEDICINE

## 2024-08-02 RX ORDER — LISINOPRIL 20 MG/1
20 TABLET ORAL DAILY
COMMUNITY
Start: 2024-07-31 | End: 2024-08-02 | Stop reason: SDUPTHER

## 2024-08-02 RX ORDER — BACLOFEN 10 MG/1
10 TABLET ORAL 2 TIMES DAILY
Qty: 60 TABLET | Refills: 0 | Status: SHIPPED | OUTPATIENT
Start: 2024-08-02

## 2024-08-02 RX ORDER — INSULIN GLARGINE 100 [IU]/ML
15 INJECTION, SOLUTION SUBCUTANEOUS
Qty: 15 ML | Refills: 0 | Status: SHIPPED | OUTPATIENT
Start: 2024-08-02

## 2024-08-02 RX ORDER — LISINOPRIL 20 MG/1
20 TABLET ORAL DAILY
Qty: 30 TABLET | Refills: 0 | Status: SHIPPED | OUTPATIENT
Start: 2024-08-02

## 2024-08-02 RX ORDER — INSULIN GLARGINE-YFGN 100 [IU]/ML
INJECTION, SOLUTION SUBCUTANEOUS
COMMUNITY
Start: 2024-07-31

## 2024-08-02 NOTE — PROGRESS NOTES
Chief Complaint   Patient presents with   • Follow-up     Recent hospital and rehab stay              Patient ID: Colin Dumont is a 54 y.o. male.    HPI  Pt is seeing for f/u recent hospital and rehab stay -  was abusing alcohol in the past - did not drink for 1 m -  was getting PT in rehab with help -  lives with his mother - BG at home 280 yesterday  -  was d/c on Lantus 10 U and Humalog with sliding scale -  cannot fill Rx for Humalog     The following portions of the patient's history were reviewed and updated as appropriate: allergies, current medications, past family history, past medical history, past social history, past surgical history and problem list.    Review of Systems   Constitutional: Negative.    HENT: Negative.     Respiratory: Negative.     Cardiovascular: Negative.    Gastrointestinal: Negative.    Musculoskeletal:  Positive for gait problem and myalgias.   Skin:  Negative for rash and wound.   Neurological:  Positive for dizziness. Negative for light-headedness and headaches.   Psychiatric/Behavioral:  Negative for decreased concentration, dysphoric mood, self-injury and suicidal ideas. The patient is not nervous/anxious.        Current Outpatient Medications   Medication Sig Dispense Refill   • baclofen 10 mg tablet take 1 tablet by mouth twice a day 60 tablet 6   • cyanocobalamin (VITAMIN B-12) 1000 MCG tablet Take 1 tablet (1,000 mcg total) by mouth daily 30 tablet 0   • Insulin Glargine-yfgn 100 UNIT/ML SOPN INJECT 10 UNITS SUBCUTANEOUSLY AT BEDTIME     • Insulin Lispro (HUMALOG KWIKPEN SC) Inject under the skin     • lisinopril (ZESTRIL) 20 mg tablet Take 20 mg by mouth daily     • pantoprazole (PROTONIX) 40 mg tablet Take 1 tablet (40 mg total) by mouth daily in the early morning 30 tablet 0   • thiamine 100 MG tablet Take 1 tablet (100 mg total) by mouth daily 30 tablet 0   • folic acid (FOLVITE) 400 mcg tablet Take 1 tablet (400 mcg total) by mouth daily (Patient not taking:  "Reported on 8/2/2024) 30 tablet 0   • QUEtiapine (SEROquel) 50 mg tablet Take 1 tablet (50 mg total) by mouth daily at bedtime (Patient not taking: Reported on 8/2/2024) 30 tablet 0     No current facility-administered medications for this visit.       Objective:    /70 (BP Location: Left arm, Patient Position: Sitting, Cuff Size: Standard)   Pulse 102   Temp (!) 96.7 °F (35.9 °C)   Resp 18   Ht 5' 9\" (1.753 m)   Wt 70.4 kg (155 lb 3.2 oz)   SpO2 95%   BMI 22.92 kg/m²        Physical Exam  Constitutional:       General: He is not in acute distress.     Appearance: He is not ill-appearing.   Cardiovascular:      Rate and Rhythm: Regular rhythm. Tachycardia present.      Heart sounds: No murmur heard.     No gallop.   Pulmonary:      Effort: Pulmonary effort is normal. No respiratory distress.      Breath sounds: No wheezing, rhonchi or rales.   Musculoskeletal:         General: Deformity (both legs) present.      Right lower leg: No edema.   Skin:     Coloration: Skin is not pale.   Neurological:      Mental Status: He is alert.      Motor: Weakness present.      Gait: Gait abnormal (using a cane).           Labs in chart were reviewed.      Assessment/Plan:         Diagnoses and all orders for this visit:    Benign essential hypertension  -     lisinopril (ZESTRIL) 20 mg tablet; Take 1 tablet (20 mg total) by mouth daily    Type 2 diabetes mellitus without complication, with long-term current use of insulin ([facility])  -     Cancel: Albumin / creatinine urine ratio -  could not provide urine sample   -     POCT blood glucose  -     Insulin Glargine Solostar (Lantus SoloStar) 100 UNIT/ML SOPN; Inject 0.15 mL (15 Units total) under the skin daily at bedtime  -     Insulin Pen Needle 32G X 4 MM MISC; Use in the morning   No sliding scale for now  Will increase Lantus to 15 U  Will monitor BG at home 3 times a day   Phone call in 1 wk if BG > 200   Noncompliance    Spasticity  -     baclofen 10 mg tablet; Take 1 " tablet (10 mg total) by mouth 2 (two) times a day   F/u with neurologist -  courtesy refill given   Anemia, unspecified type   Will f/u with next BW   S/P craniotomy    Other orders    -     Insulin Glargine-yfgn 100 UNIT/ML SOPN; INJECT 10 UNITS SUBCUTANEOUSLY AT BEDTIME  -     Insulin Lispro (HUMALOG KWIKPEN SC); Inject under the skin        Rto in 2 wks                     Stefani Garner MD

## 2024-08-09 ENCOUNTER — RA CDI HCC (OUTPATIENT)
Dept: OTHER | Facility: HOSPITAL | Age: 54
End: 2024-08-09

## 2024-09-16 DIAGNOSIS — R25.2 SPASTICITY: ICD-10-CM

## 2024-09-17 RX ORDER — BACLOFEN 10 MG/1
10 TABLET ORAL 2 TIMES DAILY
Qty: 60 TABLET | Refills: 0 | Status: SHIPPED | OUTPATIENT
Start: 2024-09-17

## 2024-09-24 DIAGNOSIS — I10 BENIGN ESSENTIAL HYPERTENSION: Chronic | ICD-10-CM

## 2024-09-24 RX ORDER — LISINOPRIL 20 MG/1
20 TABLET ORAL DAILY
Qty: 30 TABLET | Refills: 5 | Status: SHIPPED | OUTPATIENT
Start: 2024-09-24 | End: 2024-09-25

## 2024-09-25 RX ORDER — LISINOPRIL 20 MG/1
20 TABLET ORAL DAILY
Qty: 90 TABLET | Refills: 1 | Status: SHIPPED | OUTPATIENT
Start: 2024-09-25

## 2024-09-30 DIAGNOSIS — R25.2 SPASTICITY: ICD-10-CM

## 2024-09-30 RX ORDER — BACLOFEN 10 MG/1
10 TABLET ORAL 2 TIMES DAILY
Qty: 60 TABLET | Refills: 0 | Status: ON HOLD | OUTPATIENT
Start: 2024-09-30

## 2024-10-12 ENCOUNTER — HOSPITAL ENCOUNTER (INPATIENT)
Facility: HOSPITAL | Age: 54
LOS: 2 days | Discharge: HOME | DRG: 917 | End: 2024-10-14
Attending: STUDENT IN AN ORGANIZED HEALTH CARE EDUCATION/TRAINING PROGRAM | Admitting: INTERNAL MEDICINE
Payer: MEDICARE

## 2024-10-12 ENCOUNTER — APPOINTMENT (EMERGENCY)
Dept: RADIOLOGY | Facility: HOSPITAL | Age: 54
DRG: 917 | End: 2024-10-12
Payer: MEDICARE

## 2024-10-12 DIAGNOSIS — R53.1 RIGHT SIDED WEAKNESS: ICD-10-CM

## 2024-10-12 DIAGNOSIS — T39.091A SALICYLATE OVERDOSE: Primary | ICD-10-CM

## 2024-10-12 DIAGNOSIS — R25.2 SPASTICITY: ICD-10-CM

## 2024-10-12 DIAGNOSIS — R41.3 MEMORY DIFFICULTY: ICD-10-CM

## 2024-10-12 DIAGNOSIS — N17.9 AKI (ACUTE KIDNEY INJURY) (HCC): ICD-10-CM

## 2024-10-12 DIAGNOSIS — Z98.890 S/P CRANIOTOMY: ICD-10-CM

## 2024-10-12 DIAGNOSIS — R33.9 URINARY RETENTION: ICD-10-CM

## 2024-10-12 DIAGNOSIS — E87.29 INCREASED ANION GAP METABOLIC ACIDOSIS: ICD-10-CM

## 2024-10-12 PROBLEM — R33.8 ACUTE URINARY RETENTION: Status: ACTIVE | Noted: 2019-09-24

## 2024-10-12 LAB
ALBUMIN SERPL BCG-MCNC: 5 G/DL (ref 3.5–5)
ALP SERPL-CCNC: 47 U/L (ref 34–104)
ALT SERPL W P-5'-P-CCNC: 19 U/L (ref 7–52)
AMMONIA PLAS-SCNC: 37 UMOL/L (ref 18–72)
AMPHETAMINES SERPL QL SCN: NEGATIVE
ANION GAP SERPL CALCULATED.3IONS-SCNC: 12 MMOL/L (ref 4–13)
ANION GAP SERPL CALCULATED.3IONS-SCNC: 15 MMOL/L (ref 4–13)
ANION GAP SERPL CALCULATED.3IONS-SCNC: 7 MMOL/L (ref 4–13)
ANION GAP SERPL CALCULATED.3IONS-SCNC: 8 MMOL/L (ref 4–13)
APAP SERPL-MCNC: 12 UG/ML (ref 10–20)
AST SERPL W P-5'-P-CCNC: 22 U/L (ref 13–39)
BACTERIA UR QL AUTO: ABNORMAL /HPF
BARBITURATES UR QL: NEGATIVE
BASE EX.OXY STD BLDV CALC-SCNC: 83.2 % (ref 60–80)
BASE EXCESS BLDV CALC-SCNC: -4.6 MMOL/L
BASOPHILS # BLD AUTO: 0.03 THOUSANDS/ΜL (ref 0–0.1)
BASOPHILS NFR BLD AUTO: 0 % (ref 0–1)
BENZODIAZ UR QL: NEGATIVE
BILIRUB SERPL-MCNC: 0.26 MG/DL (ref 0.2–1)
BILIRUB UR QL STRIP: NEGATIVE
BUN SERPL-MCNC: 28 MG/DL (ref 5–25)
BUN SERPL-MCNC: 29 MG/DL (ref 5–25)
BUN SERPL-MCNC: 29 MG/DL (ref 5–25)
BUN SERPL-MCNC: 33 MG/DL (ref 5–25)
CALCIUM SERPL-MCNC: 10.9 MG/DL (ref 8.4–10.2)
CALCIUM SERPL-MCNC: 12.5 MG/DL (ref 8.4–10.2)
CALCIUM SERPL-MCNC: 9.1 MG/DL (ref 8.4–10.2)
CALCIUM SERPL-MCNC: 9.9 MG/DL (ref 8.4–10.2)
CHLORIDE SERPL-SCNC: 102 MMOL/L (ref 96–108)
CHLORIDE SERPL-SCNC: 105 MMOL/L (ref 96–108)
CHLORIDE SERPL-SCNC: 105 MMOL/L (ref 96–108)
CHLORIDE SERPL-SCNC: 107 MMOL/L (ref 96–108)
CLARITY UR: ABNORMAL
CO2 SERPL-SCNC: 19 MMOL/L (ref 21–32)
CO2 SERPL-SCNC: 21 MMOL/L (ref 21–32)
CO2 SERPL-SCNC: 26 MMOL/L (ref 21–32)
CO2 SERPL-SCNC: 29 MMOL/L (ref 21–32)
COCAINE UR QL: NEGATIVE
COLOR UR: YELLOW
CREAT SERPL-MCNC: 1.1 MG/DL (ref 0.6–1.3)
CREAT SERPL-MCNC: 1.18 MG/DL (ref 0.6–1.3)
CREAT SERPL-MCNC: 1.19 MG/DL (ref 0.6–1.3)
CREAT SERPL-MCNC: 1.33 MG/DL (ref 0.6–1.3)
EOSINOPHIL # BLD AUTO: 0.07 THOUSAND/ΜL (ref 0–0.61)
EOSINOPHIL NFR BLD AUTO: 1 % (ref 0–6)
ERYTHROCYTE [DISTWIDTH] IN BLOOD BY AUTOMATED COUNT: 17.6 % (ref 11.6–15.1)
EST. AVERAGE GLUCOSE BLD GHB EST-MCNC: 143 MG/DL
ETHANOL SERPL-MCNC: <10 MG/DL
FENTANYL UR QL SCN: NEGATIVE
GFR SERPL CREATININE-BSD FRML MDRD: 59 ML/MIN/1.73SQ M
GFR SERPL CREATININE-BSD FRML MDRD: 67 ML/MIN/1.73SQ M
GFR SERPL CREATININE-BSD FRML MDRD: 68 ML/MIN/1.73SQ M
GFR SERPL CREATININE-BSD FRML MDRD: 74 ML/MIN/1.73SQ M
GLUCOSE SERPL-MCNC: 116 MG/DL (ref 65–140)
GLUCOSE SERPL-MCNC: 120 MG/DL (ref 65–140)
GLUCOSE SERPL-MCNC: 120 MG/DL (ref 65–140)
GLUCOSE SERPL-MCNC: 128 MG/DL (ref 65–140)
GLUCOSE SERPL-MCNC: 134 MG/DL (ref 65–140)
GLUCOSE SERPL-MCNC: 145 MG/DL (ref 65–140)
GLUCOSE SERPL-MCNC: 147 MG/DL (ref 65–140)
GLUCOSE SERPL-MCNC: 192 MG/DL (ref 65–140)
GLUCOSE UR STRIP-MCNC: NEGATIVE MG/DL
HBA1C MFR BLD: 6.6 %
HCO3 BLDV-SCNC: 20.6 MMOL/L (ref 24–30)
HCT VFR BLD AUTO: 42.2 % (ref 36.5–49.3)
HGB BLD-MCNC: 14.1 G/DL (ref 12–17)
HGB UR QL STRIP.AUTO: NEGATIVE
HYALINE CASTS #/AREA URNS LPF: ABNORMAL /LPF
HYDROCODONE UR QL SCN: NEGATIVE
IMM GRANULOCYTES # BLD AUTO: 0.03 THOUSAND/UL (ref 0–0.2)
IMM GRANULOCYTES NFR BLD AUTO: 0 % (ref 0–2)
KETONES UR STRIP-MCNC: ABNORMAL MG/DL
LEUKOCYTE ESTERASE UR QL STRIP: NEGATIVE
LYMPHOCYTES # BLD AUTO: 0.65 THOUSANDS/ΜL (ref 0.6–4.47)
LYMPHOCYTES NFR BLD AUTO: 7 % (ref 14–44)
MAGNESIUM SERPL-MCNC: 1.6 MG/DL (ref 1.9–2.7)
MAGNESIUM SERPL-MCNC: 1.8 MG/DL (ref 1.9–2.7)
MAGNESIUM SERPL-MCNC: 1.9 MG/DL (ref 1.9–2.7)
MAGNESIUM SERPL-MCNC: 2.2 MG/DL (ref 1.9–2.7)
MCH RBC QN AUTO: 33.3 PG (ref 26.8–34.3)
MCHC RBC AUTO-ENTMCNC: 33.4 G/DL (ref 31.4–37.4)
MCV RBC AUTO: 100 FL (ref 82–98)
METHADONE UR QL: NEGATIVE
MONOCYTES # BLD AUTO: 0.37 THOUSAND/ΜL (ref 0.17–1.22)
MONOCYTES NFR BLD AUTO: 4 % (ref 4–12)
MUCOUS THREADS UR QL AUTO: ABNORMAL
NEUTROPHILS # BLD AUTO: 8.43 THOUSANDS/ΜL (ref 1.85–7.62)
NEUTS SEG NFR BLD AUTO: 88 % (ref 43–75)
NITRITE UR QL STRIP: NEGATIVE
NON-SQ EPI CELLS URNS QL MICRO: ABNORMAL /HPF
NRBC BLD AUTO-RTO: 0 /100 WBCS
O2 CT BLDV-SCNC: 17.5 ML/DL
OPIATES UR QL SCN: NEGATIVE
OXYCODONE+OXYMORPHONE UR QL SCN: NEGATIVE
PCO2 BLDV: 38.7 MM HG (ref 42–50)
PCP UR QL: NEGATIVE
PH BLDV: 7.34 [PH] (ref 7.3–7.4)
PH UR STRIP.AUTO: 5 [PH]
PLATELET # BLD AUTO: 237 THOUSANDS/UL (ref 149–390)
PMV BLD AUTO: 9.3 FL (ref 8.9–12.7)
PO2 BLDV: 52.6 MM HG (ref 35–45)
POTASSIUM SERPL-SCNC: 4.1 MMOL/L (ref 3.5–5.3)
POTASSIUM SERPL-SCNC: 4.6 MMOL/L (ref 3.5–5.3)
POTASSIUM SERPL-SCNC: 4.6 MMOL/L (ref 3.5–5.3)
POTASSIUM SERPL-SCNC: 4.9 MMOL/L (ref 3.5–5.3)
PROT SERPL-MCNC: 7.1 G/DL (ref 6.4–8.4)
PROT UR STRIP-MCNC: ABNORMAL MG/DL
RBC # BLD AUTO: 4.24 MILLION/UL (ref 3.88–5.62)
RBC #/AREA URNS AUTO: ABNORMAL /HPF
SALICYLATES SERPL-MCNC: 28 MG/DL (ref 3–20)
SALICYLATES SERPL-MCNC: 35 MG/DL (ref 3–20)
SALICYLATES SERPL-MCNC: 43 MG/DL (ref 3–20)
SALICYLATES SERPL-MCNC: 49 MG/DL (ref 3–20)
SODIUM SERPL-SCNC: 136 MMOL/L (ref 135–147)
SODIUM SERPL-SCNC: 138 MMOL/L (ref 135–147)
SODIUM SERPL-SCNC: 140 MMOL/L (ref 135–147)
SODIUM SERPL-SCNC: 142 MMOL/L (ref 135–147)
SP GR UR STRIP.AUTO: >=1.03 (ref 1–1.03)
THC UR QL: NEGATIVE
TSH SERPL DL<=0.05 MIU/L-ACNC: 0.74 UIU/ML (ref 0.45–4.5)
UROBILINOGEN UR STRIP-ACNC: <2 MG/DL
WBC # BLD AUTO: 9.58 THOUSAND/UL (ref 4.31–10.16)
WBC #/AREA URNS AUTO: ABNORMAL /HPF

## 2024-10-12 PROCEDURE — 93005 ELECTROCARDIOGRAM TRACING: CPT

## 2024-10-12 PROCEDURE — 80179 DRUG ASSAY SALICYLATE: CPT | Performed by: STUDENT IN AN ORGANIZED HEALTH CARE EDUCATION/TRAINING PROGRAM

## 2024-10-12 PROCEDURE — 83036 HEMOGLOBIN GLYCOSYLATED A1C: CPT | Performed by: INTERNAL MEDICINE

## 2024-10-12 PROCEDURE — 82140 ASSAY OF AMMONIA: CPT | Performed by: STUDENT IN AN ORGANIZED HEALTH CARE EDUCATION/TRAINING PROGRAM

## 2024-10-12 PROCEDURE — 99291 CRITICAL CARE FIRST HOUR: CPT | Performed by: STUDENT IN AN ORGANIZED HEALTH CARE EDUCATION/TRAINING PROGRAM

## 2024-10-12 PROCEDURE — 70450 CT HEAD/BRAIN W/O DYE: CPT

## 2024-10-12 PROCEDURE — 80048 BASIC METABOLIC PNL TOTAL CA: CPT | Performed by: STUDENT IN AN ORGANIZED HEALTH CARE EDUCATION/TRAINING PROGRAM

## 2024-10-12 PROCEDURE — 80053 COMPREHEN METABOLIC PANEL: CPT | Performed by: STUDENT IN AN ORGANIZED HEALTH CARE EDUCATION/TRAINING PROGRAM

## 2024-10-12 PROCEDURE — 80307 DRUG TEST PRSMV CHEM ANLYZR: CPT | Performed by: STUDENT IN AN ORGANIZED HEALTH CARE EDUCATION/TRAINING PROGRAM

## 2024-10-12 PROCEDURE — 96361 HYDRATE IV INFUSION ADD-ON: CPT

## 2024-10-12 PROCEDURE — 85025 COMPLETE CBC W/AUTO DIFF WBC: CPT | Performed by: STUDENT IN AN ORGANIZED HEALTH CARE EDUCATION/TRAINING PROGRAM

## 2024-10-12 PROCEDURE — 81001 URINALYSIS AUTO W/SCOPE: CPT | Performed by: STUDENT IN AN ORGANIZED HEALTH CARE EDUCATION/TRAINING PROGRAM

## 2024-10-12 PROCEDURE — 82077 ASSAY SPEC XCP UR&BREATH IA: CPT | Performed by: STUDENT IN AN ORGANIZED HEALTH CARE EDUCATION/TRAINING PROGRAM

## 2024-10-12 PROCEDURE — 82948 REAGENT STRIP/BLOOD GLUCOSE: CPT

## 2024-10-12 PROCEDURE — 87081 CULTURE SCREEN ONLY: CPT | Performed by: INTERNAL MEDICINE

## 2024-10-12 PROCEDURE — 99285 EMERGENCY DEPT VISIT HI MDM: CPT

## 2024-10-12 PROCEDURE — 80143 DRUG ASSAY ACETAMINOPHEN: CPT | Performed by: STUDENT IN AN ORGANIZED HEALTH CARE EDUCATION/TRAINING PROGRAM

## 2024-10-12 PROCEDURE — 99223 1ST HOSP IP/OBS HIGH 75: CPT | Performed by: INTERNAL MEDICINE

## 2024-10-12 PROCEDURE — 82805 BLOOD GASES W/O2 SATURATION: CPT | Performed by: STUDENT IN AN ORGANIZED HEALTH CARE EDUCATION/TRAINING PROGRAM

## 2024-10-12 PROCEDURE — 83735 ASSAY OF MAGNESIUM: CPT | Performed by: STUDENT IN AN ORGANIZED HEALTH CARE EDUCATION/TRAINING PROGRAM

## 2024-10-12 PROCEDURE — 96374 THER/PROPH/DIAG INJ IV PUSH: CPT

## 2024-10-12 PROCEDURE — 84443 ASSAY THYROID STIM HORMONE: CPT | Performed by: STUDENT IN AN ORGANIZED HEALTH CARE EDUCATION/TRAINING PROGRAM

## 2024-10-12 PROCEDURE — 36415 COLL VENOUS BLD VENIPUNCTURE: CPT | Performed by: STUDENT IN AN ORGANIZED HEALTH CARE EDUCATION/TRAINING PROGRAM

## 2024-10-12 RX ORDER — ACETAMINOPHEN 325 MG/1
650 TABLET ORAL EVERY 4 HOURS PRN
Status: DISCONTINUED | OUTPATIENT
Start: 2024-10-12 | End: 2024-10-14 | Stop reason: HOSPADM

## 2024-10-12 RX ORDER — BACLOFEN 10 MG/1
10 TABLET ORAL 2 TIMES DAILY
Status: DISCONTINUED | OUTPATIENT
Start: 2024-10-12 | End: 2024-10-14

## 2024-10-12 RX ORDER — POTASSIUM CHLORIDE 14.9 MG/ML
20 INJECTION INTRAVENOUS ONCE
Status: COMPLETED | OUTPATIENT
Start: 2024-10-12 | End: 2024-10-12

## 2024-10-12 RX ORDER — INSULIN GLARGINE 100 [IU]/ML
8 INJECTION, SOLUTION SUBCUTANEOUS
Status: DISCONTINUED | OUTPATIENT
Start: 2024-10-12 | End: 2024-10-14 | Stop reason: HOSPADM

## 2024-10-12 RX ORDER — HEPARIN SODIUM 5000 [USP'U]/ML
5000 INJECTION, SOLUTION INTRAVENOUS; SUBCUTANEOUS EVERY 8 HOURS SCHEDULED
Status: DISCONTINUED | OUTPATIENT
Start: 2024-10-12 | End: 2024-10-14 | Stop reason: HOSPADM

## 2024-10-12 RX ORDER — INSULIN LISPRO 100 [IU]/ML
1-6 INJECTION, SOLUTION INTRAVENOUS; SUBCUTANEOUS
Status: DISCONTINUED | OUTPATIENT
Start: 2024-10-12 | End: 2024-10-14 | Stop reason: HOSPADM

## 2024-10-12 RX ORDER — LANOLIN ALCOHOL/MO/W.PET/CERES
100 CREAM (GRAM) TOPICAL DAILY
Status: DISCONTINUED | OUTPATIENT
Start: 2024-10-12 | End: 2024-10-14 | Stop reason: HOSPADM

## 2024-10-12 RX ORDER — LANOLIN ALCOHOL/MO/W.PET/CERES
400 CREAM (GRAM) TOPICAL DAILY
Status: DISCONTINUED | OUTPATIENT
Start: 2024-10-12 | End: 2024-10-14 | Stop reason: HOSPADM

## 2024-10-12 RX ORDER — ONDANSETRON 2 MG/ML
4 INJECTION INTRAMUSCULAR; INTRAVENOUS EVERY 4 HOURS PRN
Status: DISCONTINUED | OUTPATIENT
Start: 2024-10-12 | End: 2024-10-14 | Stop reason: HOSPADM

## 2024-10-12 RX ORDER — MAGNESIUM SULFATE HEPTAHYDRATE 40 MG/ML
2 INJECTION, SOLUTION INTRAVENOUS ONCE
Status: COMPLETED | OUTPATIENT
Start: 2024-10-12 | End: 2024-10-12

## 2024-10-12 RX ORDER — PANTOPRAZOLE SODIUM 40 MG/1
40 TABLET, DELAYED RELEASE ORAL
Status: DISCONTINUED | OUTPATIENT
Start: 2024-10-13 | End: 2024-10-14 | Stop reason: HOSPADM

## 2024-10-12 RX ORDER — POTASSIUM CHLORIDE 29.8 MG/ML
40 INJECTION INTRAVENOUS ONCE
Status: DISCONTINUED | OUTPATIENT
Start: 2024-10-12 | End: 2024-10-12

## 2024-10-12 RX ORDER — OLANZAPINE 10 MG/2ML
2.5 INJECTION, POWDER, FOR SOLUTION INTRAMUSCULAR EVERY 6 HOURS PRN
Status: DISCONTINUED | OUTPATIENT
Start: 2024-10-12 | End: 2024-10-14 | Stop reason: HOSPADM

## 2024-10-12 RX ORDER — INSULIN GLARGINE 100 [IU]/ML
10 INJECTION, SOLUTION SUBCUTANEOUS
Status: DISCONTINUED | OUTPATIENT
Start: 2024-10-12 | End: 2024-10-12

## 2024-10-12 RX ADMIN — INSULIN LISPRO 2 UNITS: 100 INJECTION, SOLUTION INTRAVENOUS; SUBCUTANEOUS at 16:37

## 2024-10-12 RX ADMIN — HEPARIN SODIUM 5000 UNITS: 5000 INJECTION, SOLUTION INTRAVENOUS; SUBCUTANEOUS at 22:40

## 2024-10-12 RX ADMIN — SODIUM BICARBONATE 150 ML/HR: 84 INJECTION, SOLUTION INTRAVENOUS at 17:52

## 2024-10-12 RX ADMIN — POTASSIUM CHLORIDE 20 MEQ: 14.9 INJECTION, SOLUTION INTRAVENOUS at 12:47

## 2024-10-12 RX ADMIN — HEPARIN SODIUM 5000 UNITS: 5000 INJECTION, SOLUTION INTRAVENOUS; SUBCUTANEOUS at 15:17

## 2024-10-12 RX ADMIN — SODIUM CHLORIDE 1000 ML: 0.9 INJECTION, SOLUTION INTRAVENOUS at 09:25

## 2024-10-12 RX ADMIN — OLANZAPINE 2.5 MG: 10 INJECTION, POWDER, FOR SOLUTION INTRAMUSCULAR at 14:05

## 2024-10-12 RX ADMIN — INSULIN GLARGINE 8 UNITS: 100 INJECTION, SOLUTION SUBCUTANEOUS at 22:41

## 2024-10-12 RX ADMIN — NICOTINE 1 PATCH: 7 PATCH, EXTENDED RELEASE TRANSDERMAL at 15:17

## 2024-10-12 RX ADMIN — CYANOCOBALAMIN TAB 500 MCG 1000 MCG: 500 TAB at 15:16

## 2024-10-12 RX ADMIN — THIAMINE HCL TAB 100 MG 100 MG: 100 TAB at 15:17

## 2024-10-12 RX ADMIN — Medication 400 MCG: at 15:16

## 2024-10-12 RX ADMIN — BACLOFEN 10 MG: 10 TABLET ORAL at 17:26

## 2024-10-12 RX ADMIN — POTASSIUM CHLORIDE 20 MEQ: 14.9 INJECTION, SOLUTION INTRAVENOUS at 10:43

## 2024-10-12 RX ADMIN — SODIUM BICARBONATE 150 ML/HR: 84 INJECTION, SOLUTION INTRAVENOUS at 10:26

## 2024-10-12 RX ADMIN — MAGNESIUM SULFATE HEPTAHYDRATE 2 G: 40 INJECTION, SOLUTION INTRAVENOUS at 10:33

## 2024-10-12 NOTE — Clinical Note
Case was discussed with  and the patient's admission status was agreed to be Admission Status: observation status to the service of Dr. Gil

## 2024-10-12 NOTE — CONSULTS
H&P Exam - Urology       Patient: Colin Dumont   : 1970 Sex: male   MRN: [id number]     CSN: [id number]      History of Present Illness   HPI:  Colin Dumont is a 54 y.o. male who presents with salicylate overdose toxic metabolic encephalopathy metabolic acidosis presenting to [facility] earlier this morning placed on 3 S. unable to urinate multiple attempts by the nursing staff to place catheter unsuccessful due to patient's aggressiveness and hostility stat urologic consult called for bladder scan confirming 4 to 500 cc patient seen at the bedside discussed situation with him to place catheter with the help of 2 nurses in light of this aggressive behavior 14 [place] Brandt catheter inserted with 400 cc of urine drained left the bag drainage        Review of Systems:   Constitutional:  Negative for activity change, fever, chills and diaphoresis.   HENT: Negative for hearing loss and trouble swallowing.   Eyes: Negative for itching and visual disturbance.   Respiratory: Negative for chest tightness and shortness of breath.   Cardiovascular: Negative for chest pain, edema.   Gastrointestinal: Negative for abdominal distention, na abdominal pain, constipation, diarrhea, Nausea and vomiting.   Genitourinary: Negative for decreased urine volume, difficulty urinating, dysuria, enuresis, frequency, hematuria and urgency.   Musculoskeletal: Negative for gait problem and myalgias.   Neurological: Negative for dizziness and headaches.   Hematological: Does not bruise/bleed easily.       Historical Information   Past Medical History:   Diagnosis Date    Abdominal cyst     Anemia     Hx     Chronic pain disorder     abdominal    Diabetes mellitus (HCC)     Diverticulitis     GI bleed     History of transfusion 2016    5 units    Lightheadedness     Multiple brain abscesses 2020    Multiple lesions on computed tomography of brain and spine     Spleen laceration      Past Surgical History:   Procedure  "Laterality Date    ABDOMINAL SURGERY  2016    lap removal of mass post spleen injury    COLONOSCOPY N/A 2017    Procedure: COLONOSCOPY;  Surgeon: Hammad Galindo MD;  Location: [facility] GI LAB;  Service:     CRANIOTOMY      EGD AND COLONOSCOPY N/A 03/15/2017    Procedure: EGD AND COLONOSCOPY;  Surgeon: Hammad Galindo MD;  Location: [facility] GI [facility];  Service:     OTHER SURGICAL HISTORY      per [employer]-had spleen surgery; no other details    UPPER GASTROINTESTINAL ENDOSCOPY      for gastric bleeding     Social History   Social History     Substance and Sexual Activity   Alcohol Use Not Currently    Alcohol/week: 0.0 - 1.0 standard drinks of alcohol    Comment: ocassionally     Social History     Substance and Sexual Activity   Drug Use No     Social History     Tobacco Use   Smoking Status Some Days    Current packs/day: 0.00    Average packs/day: 0.3 packs/day for 5.0 years (1.3 ttl pk-yrs)    Types: Cigarettes    Start date: 2011    Last attempt to quit: 2016    Years since quittin.4   Smokeless Tobacco Current    Types: Chew   Tobacco Comments    Off an on smoking     Family History:   Family History   Problem Relation Age of Onset    No Known Problems Mother     Diabetes Father     No Known Problems Sister     No Known Problems Brother        Meds/Allergies     Medications Prior to Admission:     baclofen 10 mg tablet    cyanocobalamin (VITAMIN B-12) 1000 MCG tablet    folic acid (FOLVITE) 400 mcg tablet    Insulin Glargine Solostar (Lantus SoloStar) 100 UNIT/ML SOPN    Insulin Glargine-yfgn 100 UNIT/ML SOPN    Insulin Lispro (HUMALOG KWIKPEN SC)    Insulin Pen Needle 32G X 4 MM MISC    lisinopril (ZESTRIL) 20 mg tablet    pantoprazole (PROTONIX) 40 mg tablet    thiamine 100 MG tablet  No Known Allergies    Objective   Vitals: /75 (BP Location: Right arm)   Pulse (!) 110   Temp (!) 97.4 °F (36.3 °C) (Oral)   Resp 18   Ht 5' 9\" (1.753 m)   Wt 69.9 kg (154 lb 1.6 oz)   SpO2 97%   BMI " "22.76 kg/m²     Physical Exam:  General Alert awake   Normocephalic atraumatic PERRLA  Lungs clear bilaterally  Cardiac normal S1 normal S2  Abdomen soft, flank pain  Extremities no edema    No intake/output data recorded.    Invasive Devices       Peripheral Intravenous Line  Duration             Peripheral IV 10/12/24 Left Antecubital <1 day    Peripheral IV 10/12/24 Left Hand <1 day    Peripheral IV 10/12/24 Right;Upper Arm <1 day                        Lab Results: CBC:   Lab Results   Component Value Date    WBC 9.58 10/12/2024    HGB 14.1 10/12/2024    HCT 42.2 10/12/2024     (H) 10/12/2024     10/12/2024    RBC 4.24 10/12/2024    MCH 33.3 10/12/2024    MCHC 33.4 10/12/2024    RDW 17.6 (H) 10/12/2024    MPV 9.3 10/12/2024    NRBC 0 10/12/2024     CMP:   Lab Results   Component Value Date     10/12/2024     02/26/2019    CO2 21 10/12/2024    CO2 21 06/14/2024    CO2 31 02/26/2019    BUN 29 (H) 10/12/2024    BUN 11 02/26/2019    CREATININE 1.19 10/12/2024    CREATININE 0.6 02/26/2019    GLUCOSE 126 06/14/2024    CALCIUM 10.9 (H) 10/12/2024    CALCIUM 9.5 02/26/2019    AST 22 10/12/2024    AST 13 02/26/2019    ALT 19 10/12/2024    ALT 21 02/26/2019    ALKPHOS 47 10/12/2024    ALKPHOS 72 02/26/2019    EGFR 67 10/12/2024    EGFR >60.0 02/26/2019     Urinalysis:   Lab Results   Component Value Date    COLORU Light Yellow 06/13/2024    CLARITYU Turbid 06/13/2024    SPECGRAV 1.028 06/13/2024    PHUR 5.5 06/13/2024    PHUR 5.5 03/14/2017    LEUKOCYTESUR Negative 06/13/2024    NITRITE Negative 06/13/2024    GLUCOSEU Negative 06/13/2024    KETONESU 10 (1+) (A) 06/13/2024    BILIRUBINUR Negative 06/13/2024    BLOODU Moderate (A) 06/13/2024     Urine Culture: No results found for: \"URINECX\"  PSA: No results found for: \"PSA\"        Assessment/ Plan:  Urinary retention  Salicylate overdose  Metabolic encephalopathy  Plan  Patient's hands tied with mittens and held down 14 [place] Brandt coudé catheter " placed with difficulty in light of aggressive behavior draining clear urine   suggest 1 on 1 to   watch patient not to pull Brandt out  Suggest placing abdominal binder over Brandt and groin to prevent patient from reaching down and pulling Brandt out  Attempt voiding trial when toxic metabolic encephalopathy clears    Alex Curtis MD

## 2024-10-12 NOTE — ASSESSMENT & PLAN NOTE
Acute toxic metabolic encephalopathy secondary to salicylate overdose on top of cognitive decline from history of craniotomy  IV fluids.  Zyprexa as needed for agitation.  He remains impulsive and is unable to make clear decisions.  If ongoing mood disorder will need to consult psychiatry

## 2024-10-12 NOTE — ASSESSMENT & PLAN NOTE
Increased anion gap acidosis present admission secondary to salicylate toxicity  Has been recommended bicarbonate infusion, continue.    Results from last 7 days   Lab Units 10/12/24  1205 10/12/24  0854   ANION GAP mmol/L 12 15*   CO2 mmol/L 21 19*

## 2024-10-12 NOTE — H&P
H&P - Hospitalist   Name: Colin Dumont 54 y.o. male I MRN: [id number]  Unit/Bed#: [address] [place] [address] I Date of Admission: 10/12/2024   Date of Service: 10/12/2024 I Hospital Day: 0     Assessment & Plan  Salicylate overdose  History of diabetes mellitus hypertension and brain abscess status postcraniotomy who was sent to the hospital by his mother for change in mental status  In the ED he was found to have metabolic acidosis and subsequently salicylate toxicity  Patient reports consuming 2 tablets of excedrin p.m. last night and then 2 more tablets later in the morning  Mother reports 2 more bottles came in the mail today  Appreciate toxicology evaluation.  On sodium bicarb infusion with serial labs    Results from last 7 days   Lab Units 10/12/24  1205 10/12/24  0854   SALICYLATE LVL mg/dL 43* 49*     Toxic metabolic encephalopathy  Acute toxic metabolic encephalopathy secondary to salicylate overdose on top of cognitive decline from history of craniotomy  IV fluids.  Zyprexa as needed for agitation.  He remains impulsive and is unable to make clear decisions.  If ongoing mood disorder will need to consult psychiatry  High anion gap metabolic acidosis  Increased anion gap acidosis present admission secondary to salicylate toxicity  Has been recommended bicarbonate infusion, continue.    Results from last 7 days   Lab Units 10/12/24  1205 10/12/24  0854   ANION GAP mmol/L 12 15*   CO2 mmol/L 21 19*     Benign essential hypertension  Holding lisinopril due to lower blood pressure  S/P craniotomy  History of brain abscess with craniotomy.  Patient's mother reports he has had some mood changes.  Once medically stable is still present we will consult psychiatry  Type 2 diabetes mellitus without complication, with long-term current use of insulin ([facility])  Lab Results   Component Value Date    HGBA1C 5.6 06/20/2024     Recent Labs     10/12/24  0839 10/12/24  1200   POCGLU 147* 128     Reportedly on glargine 15 units and  lispro.  Patient does not remember dosing.    VTE Pharmacologic Prophylaxis: VTE Score: 3 Moderate Risk (Score 3-4) - Pharmacological DVT Prophylaxis Ordered: heparin.  Code Status: Level 1 - Full Code   Discussion with family: Updated  (mother) via phone.    Anticipated Length of Stay: Patient will be admitted on an inpatient basis with an anticipated length of stay of greater than 2 midnights secondary to salicylate toxicity and metabolic acidosis.    Chief Complaint:     Altered Mental Status (Per squad pts mother states that pts speech appeared slurred. pt seemed confused and like his face appeared to be drooping.)    History of Present Illness  Colin Dumont is a 54 y.o. male with a PMH of craniotomy for brain abscess insulin-dependent diabetes hypertension and chronic headaches who presents with change in mental status.  Patient is impulsive and cannot provide any reliable history.  Case was discussed with mother on telephone.  She reports the patient has been more agitated recently and last night started to show signs of confusion/slurring of speech.  This morning he was still altered so EMS was called and he was brought here to the hospital where he was found to have metabolic acidosis and subsequently salicylate toxicity.  Patient reports he took 4 tablets of Excedrin PM last night due to his headache.  There was no thoughts of self-harm.  He was seen by toxicology in the ED and recommended sodium bicarb infusion and serial labs.  On exam he is agitated requesting to leave.  Denies any chest pain or shortness of breath.    Review of Systems   Constitutional:  Negative for fever.   HENT:  Negative for facial swelling.    Eyes:  Negative for visual disturbance.   Respiratory:  Negative for shortness of breath.    Cardiovascular:  Negative for palpitations.   Gastrointestinal:  Negative for abdominal distention, abdominal pain, diarrhea, nausea and vomiting.   Genitourinary:  Negative for  hematuria and urgency.   Musculoskeletal:  Negative for back pain and myalgias.   Skin:  Negative for rash.   Neurological:  Positive for headaches. Negative for seizures and speech difficulty.   Psychiatric/Behavioral:  Positive for agitation and confusion. The patient is nervous/anxious.    All other systems reviewed and are negative.      Past Medical and Surgical History:   Past Medical History:   Diagnosis Date    Abdominal cyst     Anemia     Hx     Chronic pain disorder     abdominal    Diabetes mellitus (HCC)     Diverticulitis     GI bleed     History of transfusion 2016    5 units    Lightheadedness     Multiple brain abscesses 2020    Multiple lesions on computed tomography of brain and spine     Spleen laceration      Past Surgical History:   Procedure Laterality Date    ABDOMINAL SURGERY  2016    lap removal of mass post spleen injury    COLONOSCOPY N/A 05/24/2017    Procedure: COLONOSCOPY;  Surgeon: Hammad Galindo MD;  Location: [facility] GI LAB;  Service:     CRANIOTOMY      EGD AND COLONOSCOPY N/A 03/15/2017    Procedure: EGD AND COLONOSCOPY;  Surgeon: Hammad Galindo MD;  Location: [facility];  Service:     OTHER SURGICAL HISTORY      per [employer]-had spleen surgery; no other details    UPPER GASTROINTESTINAL ENDOSCOPY      for gastric bleeding     Meds/Allergies:  Allergies: No Known Allergies  Prior to Admission Medications   Prescriptions Last Dose Informant Patient Reported? Taking?   Insulin Glargine Solostar (Lantus SoloStar) 100 UNIT/ML SOPN   No No   Sig: Inject 0.15 mL (15 Units total) under the skin daily at bedtime   Insulin Glargine-yfgn 100 UNIT/ML SOPN   Yes No   Sig: INJECT 10 UNITS SUBCUTANEOUSLY AT BEDTIME   Insulin Lispro (HUMALOG KWIKPEN SC)   Yes No   Sig: Inject under the skin   Insulin Pen Needle 32G X 4 MM MISC   No No   Sig: Use in the morning   baclofen 10 mg tablet   No No   Sig: take 1 tablet by mouth twice a day   cyanocobalamin (VITAMIN B-12) 1000 MCG tablet   No No   Sig:  Take 1 tablet (1,000 mcg total) by mouth daily   folic acid (FOLVITE) 400 mcg tablet   No No   Sig: Take 1 tablet (400 mcg total) by mouth daily   lisinopril (ZESTRIL) 20 mg tablet   No No   Sig: take 1 tablet by mouth once daily   pantoprazole (PROTONIX) 40 mg tablet   No No   Sig: Take 1 tablet (40 mg total) by mouth daily in the early morning   thiamine 100 MG tablet   No No   Sig: Take 1 tablet (100 mg total) by mouth daily      Facility-Administered Medications: None     Social History:     Social History     Socioeconomic History    Marital status: Single     Spouse name: Not on file    Number of children: Not on file    Years of education: Not on file    Highest education level: Not on file   Occupational History    Not on file   Tobacco Use    Smoking status: Some Days     Current packs/day: 0.00     Average packs/day: 0.3 packs/day for 5.0 years (1.3 ttl pk-yrs)     Types: Cigarettes     Start date: 2011     Last attempt to quit: 2016     Years since quittin.4    Smokeless tobacco: Current     Types: Chew    Tobacco comments:     Off an on smoking   Vaping Use    Vaping status: Never Used   Substance and Sexual Activity    Alcohol use: Not Currently     Alcohol/week: 0.0 - 1.0 standard drinks of alcohol     Comment: ocassionally    Drug use: No    Sexual activity: Yes   Other Topics Concern    Not on file   Social History Narrative    Caffeine use    Feels safe at home     Social Determinants of Health     Financial Resource Strain: Not on file   Food Insecurity: No Food Insecurity (2024)    Hunger Vital Sign     Worried About Running Out of Food in the Last Year: Never true     Ran Out of Food in the Last Year: Never true   Transportation Needs: No Transportation Needs (2024)    PRAPARE - Transportation     Lack of Transportation (Medical): No     Lack of Transportation (Non-Medical): No   Physical Activity: Not on file   Stress: Not on file   Social Connections: Unknown (2024)  "   Received from [employer]     How often do you feel lonely or isolated from those around you? (Adult - for ages 18 years and over): Not on file   Intimate Partner Violence: Not on file   Housing Stability: Unknown (6/18/2024)    Housing Stability Vital Sign     Unable to Pay for Housing in the Last Year: No     Number of Times Moved in the Last Year: Not on file     Homeless in the Last Year: No     Patient Pre-hospital Living Situation: Home  Patient Pre-hospital Level of Mobility:  Lives with mother  Patient Pre-hospital Diet Restrictions:     Objective   Vitals:   Blood Pressure: 103/75 (10/12/24 1258)  Pulse: (!) 110 (10/12/24 1258)  Temperature: (!) 97.4 °F (36.3 °C) (10/12/24 1258)  Temp Source: Oral (10/12/24 1258)  Respirations: 18 (10/12/24 1258)  Height: 5' 9\" (175.3 cm) (10/12/24 1258)  Weight - Scale: 69.9 kg (154 lb 1.6 oz) (10/12/24 1258)  SpO2: 97 % (10/12/24 1228)    Physical Exam  Vitals reviewed.   HENT:      Head: Atraumatic.   Eyes:      General: No scleral icterus.     Extraocular Movements: Extraocular movements intact.      Conjunctiva/sclera: Conjunctivae normal.   Cardiovascular:      Rate and Rhythm: Regular rhythm. Tachycardia present.      Heart sounds: Normal heart sounds.   Pulmonary:      Effort: Pulmonary effort is normal.      Breath sounds: No wheezing.   Abdominal:      General: Bowel sounds are normal.      Palpations: Abdomen is soft.      Tenderness: There is no abdominal tenderness.   Musculoskeletal:         General: No swelling or tenderness.   Skin:     General: Skin is warm and dry.   Neurological:      Mental Status: He is alert. He is disoriented.      Motor: No weakness.   Psychiatric:         Mood and Affect: Affect is labile.         Behavior: Behavior is aggressive.         Additional Data:   Lab Results: I have reviewed the following results:  Results from last 7 days   Lab Units 10/12/24  0854   WBC Thousand/uL 9.58   HEMOGLOBIN g/dL 14.1 "   HEMATOCRIT % 42.2   PLATELETS Thousands/uL 237   SEGS PCT % 88*   LYMPHO PCT % 7*   MONO PCT % 4   EOS PCT % 1     Results from last 7 days   Lab Units 10/12/24  1205 10/12/24  0854   SODIUM mmol/L 138 136   POTASSIUM mmol/L 4.6 4.6   CHLORIDE mmol/L 105 102   CO2 mmol/L 21 19*   ANION GAP mmol/L 12 15*   BUN mg/dL 29* 33*   CREATININE mg/dL 1.19 1.33*   CALCIUM mg/dL 10.9* 12.5*   ALBUMIN g/dL  --  5.0   TOTAL BILIRUBIN mg/dL  --  0.26   ALK PHOS U/L  --  47   ALT U/L  --  19   AST U/L  --  22   EGFR ml/min/1.73sq m 67 59   GLUCOSE RANDOM mg/dL 120 116           Lines/Drains  Invasive Devices       Peripheral Intravenous Line  Duration             Peripheral IV 10/12/24 Left Antecubital <1 day    Peripheral IV 10/12/24 Left Hand <1 day    Peripheral IV 10/12/24 Right;Upper Arm <1 day                    Imaging:   Personally reviewed the following image studies in PACS and associated radiology reports:  CT head without contrast    Result Date: 10/12/2024  Impression: No acute intracranial hemorrhage, mass effect or edema. Workstation performed: [id number]       EKG, Pathology, and Other Studies Reviewed on Admission:   EKG  Result Date: 10/12/24  Personally reviewed strips with impression of: Sinus tachycardia 103 bpm    Administrative Statements       ** Please Note: This note has been constructed using a voice recognition system. **

## 2024-10-12 NOTE — TELEMEDICINE
"e-Consult (IPC)  - Medical Toxicology  Colin Dumont 54 y.o. male MRN: [id number]  Unit/Bed#: ED 09 Encounter: [id number]      Reason for Consult / Principal Problem: Salicylate toxicity  Inpatient consult to Toxicology  Consult performed by: Martell Garcia DO  Consult ordered by: Malvin Echevarria DO        10/12/24      ASSESSMENT:  Acute salicylate toxicity  Anion gap metabolic acidosis  BELLA  Tachycardia    RECOMMENDATIONS:  The patient likely took too much Excedrin PM, which explains both the salicylate and APAP levels. He has an AGMA, which is seen in salicylate toxicity. However, he does not have a resp alkalosis, also typically seen in salicylate toxicity. However, perhaps the hyperventilation is suppressed by other factors such as baclofen possibly.     I recommend staring bicarbonate infusion by adding 3 amps of sodium bicarb to 1L D5W with 40meq potassium to run at 150cc per hour. Salicylate levels can be obtained every 4 hours. Bicarb should continue until salicylate levels is 25 or below. Monitor potassium and replete as needed to goal of potassium at least 4.0.     Hold bsclofen today in the setting of BELLA and possible baclofen overuse. However, if he takes baclofen regularly, it will need to be restarted probably tomorrow to avoid withdrawal.     The diphenhydramine in the \"PM\" preparation may contribute to tachycardia and any mental status alteration. Supportive care. There is no evidence of DPH induced cardiotoxicity.     For further questions, please contact the medical  on call via [place] [facility] or throughl the [facility]  Service or Patient Access Center.     Please see additional teaching note below:    Hx and PE limited by the dynamics of a phone consultation. I have not personally interviewed or evaluated the patient, but only advised based on the information provided to me. Primary provider is responsible for all clinical decisions.     Pertinent history, physical exam and " clinical findings and course discussed: Colin Dumont is a 54 y.o. year old male who presents with altered mental status. He called the police for unclear reasons and his mother reports that his mental status is not at its baseline. He sometimes takes extra baclofen. He also reported taking Excedrin PM last night and was found to have APAP level of 12 and salicylate level of 49. Labs reviewed.     Review of systems and physical exam not performed by me.    Historical Information   Past Medical History:   Diagnosis Date    Abdominal cyst     Anemia     Hx     Chronic pain disorder     abdominal    Diabetes mellitus (HCC)     Diverticulitis     GI bleed     History of transfusion 2016    5 units    Lightheadedness     Multiple brain abscesses 2020    Multiple lesions on computed tomography of brain and spine     Spleen laceration      Past Surgical History:   Procedure Laterality Date    ABDOMINAL SURGERY      lap removal of mass post spleen injury    COLONOSCOPY N/A 2017    Procedure: COLONOSCOPY;  Surgeon: Hammad Glaindo MD;  Location: [facility] GI LAB;  Service:     CRANIOTOMY      EGD AND COLONOSCOPY N/A 03/15/2017    Procedure: EGD AND COLONOSCOPY;  Surgeon: Hammad Galindo MD;  Location: [facility] GI LAB;  Service:     OTHER SURGICAL HISTORY      per [employer]-had spleen surgery; no other details    UPPER GASTROINTESTINAL ENDOSCOPY      for gastric bleeding     Social History   Social History     Substance and Sexual Activity   Alcohol Use Yes    Alcohol/week: 0.0 - 1.0 standard drinks of alcohol    Comment: ocassionally     Social History     Substance and Sexual Activity   Drug Use No     Social History     Tobacco Use   Smoking Status Some Days    Current packs/day: 0.00    Average packs/day: 0.3 packs/day for 5.0 years (1.3 ttl pk-yrs)    Types: Cigarettes    Start date: 2011    Last attempt to quit: 2016    Years since quittin.4   Smokeless Tobacco Current    Types: Chew   Tobacco Comments     1 pack/2-3 wks     Family History   Problem Relation Age of Onset    No Known Problems Mother     Diabetes Father     No Known Problems Sister     No Known Problems Brother         Prior to Admission medications    Medication Sig Start Date End Date Taking? Authorizing Provider   baclofen 10 mg tablet take 1 tablet by mouth twice a day 9/30/24   Stefani Garner MD   cyanocobalamin (VITAMIN B-12) 1000 MCG tablet Take 1 tablet (1,000 mcg total) by mouth daily 6/28/24   Bryan Laughlin PA-C   folic acid (FOLVITE) 400 mcg tablet Take 1 tablet (400 mcg total) by mouth daily  Patient not taking: Reported on 8/2/2024 6/28/24   Bryan Laughlin PA-C   Insulin Glargine Solostar (Lantus SoloStar) 100 UNIT/ML SOPN Inject 0.15 mL (15 Units total) under the skin daily at bedtime 8/2/24   Stefani Garner MD   Insulin Glargine-yfgn 100 UNIT/ML SOPN INJECT 10 UNITS SUBCUTANEOUSLY AT BEDTIME 7/31/24   Historical Provider, MD   Insulin Lispro (HUMALOG KWIKPEN SC) Inject under the skin    Historical Provider, MD   Insulin Pen Needle 32G X 4 MM MISC Use in the morning 8/2/24   Stefani Garner MD   lisinopril (ZESTRIL) 20 mg tablet take 1 tablet by mouth once daily 9/25/24   Stefani Garner MD   pantoprazole (PROTONIX) 40 mg tablet Take 1 tablet (40 mg total) by mouth daily in the early morning 6/28/24   Bryan Laughlin PA-C   thiamine 100 MG tablet Take 1 tablet (100 mg total) by mouth daily 6/28/24   Bryan Laughlin PA-C         Current Facility-Administered Medications:     sodium bicarbonate 150 mEq in dextrose 5 % 1,000 mL infusion, Continuous    sodium chloride 0.9 % bolus 1,000 mL, Once, Last Rate: 1,000 mL (10/12/24 0925)    No Known Allergies    Objective     No intake or output data in the 24 hours ending 10/12/24 0952    Invasive Devices:   Peripheral IV 10/12/24 Left Antecubital (Active)       Vitals   Vitals:    10/12/24 0836 10/12/24 0838   BP: 155/95    TempSrc:  Tympanic   Pulse: (!)  "114    Resp: 18    Patient Position - Orthostatic VS: Sitting    Temp:  99.1 °F (37.3 °C)         EKG, Pathology, and/or Other Studies: EKG. Sinus tachycardia at a rate of 103. QRS and Qtc wnl.       Labs:    Results from last 7 days   Lab Units 10/12/24  0854   WBC Thousand/uL 9.58   HEMOGLOBIN g/dL 14.1   HEMATOCRIT % 42.2   PLATELETS Thousands/uL 237   SEGS PCT % 88*   LYMPHO PCT % 7*   MONO PCT % 4   EOS PCT % 1      Results from last 7 days   Lab Units 10/12/24  0854   SODIUM mmol/L 136   POTASSIUM mmol/L 4.6   CHLORIDE mmol/L 102   CO2 mmol/L 19*   BUN mg/dL 33*   CREATININE mg/dL 1.33*   CALCIUM mg/dL 12.5*   ALK PHOS U/L 47   ALT U/L 19   AST U/L 22              0   Lab Value Date/Time    TROPONINI <0.02 03/13/2017 1952     Results from last 7 days   Lab Units 10/12/24  0854   PH UGO  7.344   PCO2 UGO mm Hg 38.7*   PO2 UGO mm Hg 52.6*   HCO3 UGO mmol/L 20.6*   O2 CONTENT UGO ml/dL 17.5   O2 HGB, VENOUS % 83.2*     Results from last 7 days   Lab Units 10/12/24  0854   ACETAMINOPHEN LVL ug/mL 12   ETHANOL LVL mg/dL <10   SALICYLATE LVL mg/dL 49*     Invalid input(s): \"EXTPREGUR\"      Imaging Studies: Results Review Statement: No pertinent imaging studies reviewed.     21-30 minutes, >50% of the total time devoted to medical consultative verbal/EMR discussion between providers. Written report will be generated in the EMR.     Patient or appropriate family member was verbally informed by the ED physician of this consultative service on their behalf to provide more timely access to specialty care in lieu of an in person consultation. Verbal consent was obtained.    Martell Garcia DO    "

## 2024-10-12 NOTE — ASSESSMENT & PLAN NOTE
History of brain abscess with craniotomy.  Patient's mother reports he has had some mood changes.  Once medically stable is still present we will consult psychiatry

## 2024-10-12 NOTE — ED PROVIDER NOTES
Time reflects when diagnosis was documented in both MDM as applicable and the Disposition within this note       Time User Action Codes Description Comment    10/12/2024  9:33 AM Mlavin Echevarria [T39.091A] Salicylate overdose     10/12/2024 10:02 AM Malvin Echevarria [N17.9] BELLA (acute kidney injury) (HCC)     10/12/2024  2:28 PM Sidney Lovelace [R33.9] Urinary retention     10/13/2024  7:39 AM Malvin Echevarria [E87.29] Increased anion gap metabolic acidosis           ED Disposition       ED Disposition   Admit    Condition   Stable    Date/Time   Sun Oct 13, 2024  7:31 AM    Comment   Case was discussed with SLIM and the patient's admission status was agreed to be Admission Status: observation status to the service of Dr. Lovelace  .               Assessment & Plan       Medical Decision Making  Patient is a 54 y.o. male who presents to the ED for possible altered mental status, possible slurred speech although patient currently without signs of either.     Unclear etiology of presentation. The differential includes toxic metabolic etiologies such as electrolyte disturbances (Na/Ca), hypoglycemia, uremia, infection, toxidromes of intoxication or withdrawal, hypoxemia or hypercarbia, liver disease or failure causing hepatic encephalopathy, endocrine emergencies (hyper/hypothyroidism, adrenal insufficiency), seizure, trauma, intracranial bleeds or ischemic stroke.     Plan: Labs, TSH, coma panel, VBG, CT head, reassess                 Amount and/or Complexity of Data Reviewed  Labs: ordered. Decision-making details documented in ED Course.  Radiology: ordered.    Risk  Prescription drug management.  Decision regarding hospitalization.        ED Course as of 10/13/24 0739   Sat Oct 12, 2024   0900 Mother now at bedside.  States that she woke up this morning and noticed door was open.  She became concerned that patient may have taken extra baclofen as he becomes paranoid and hallucinates when he does so.  She  will observe to track number he is not answered she fell asleep.  She then woke up to police at her home.  She denies calling police and states that it was her son. Pt does not recall this.   0921 Creatinine(!): 1.33  BELLA   0921 ANION GAP(!): 15   0921 Carbon Dioxide(!): 19   0932 Salicylate elevated. Pt states he took Excedrin PM. Will start bicarb. Will admit   0955 MAGNESIUM(!): 1.6   1040 Discussed with SLIM. Accepts admission       Medications   sodium bicarbonate 150 mEq in dextrose 5 % 1,000 mL infusion (150 mL/hr Intravenous New Bag 10/12/24 1026)   insulin lispro (HumALOG/ADMELOG) 100 units/mL subcutaneous injection 1-6 Units ( Subcutaneous Not Given 10/12/24 1201)   sodium chloride 0.9 % bolus 1,000 mL (1,000 mL Intravenous New Bag 10/12/24 0925)   magnesium sulfate 2 g/50 mL IVPB (premix) 2 g (2 g Intravenous New Bag 10/12/24 1033)   potassium chloride 20 mEq IVPB (premix) (20 mEq Intravenous New Bag 10/12/24 1043)     Followed by   potassium chloride 20 mEq IVPB (premix) (has no administration in time range)       ED Risk Strat Scores                                              History of Present Illness       Chief Complaint   Patient presents with    Altered Mental Status     Per squad pts mother states that pts speech appeared slurred. pt seemed confused and like his face appeared to be drooping.       Past Medical History:   Diagnosis Date    Abdominal cyst     Anemia     Hx     Chronic pain disorder     abdominal    Diabetes mellitus (HCC)     Diverticulitis     GI bleed     History of transfusion 2016    5 units    Lightheadedness     Multiple brain abscesses 2020    Multiple lesions on computed tomography of brain and spine     Spleen laceration       Past Surgical History:   Procedure Laterality Date    ABDOMINAL SURGERY  2016    lap removal of mass post spleen injury    COLONOSCOPY N/A 05/24/2017    Procedure: COLONOSCOPY;  Surgeon: Hammad Galindo MD;  Location: [facility] GI LAB;  Service:      CRANIOTOMY      EGD AND COLONOSCOPY N/A 03/15/2017    Procedure: EGD AND COLONOSCOPY;  Surgeon: Hammad Galindo MD;  Location: [facility] GI LAB;  Service:     OTHER SURGICAL HISTORY      per [employer]-had spleen surgery; no other details    UPPER GASTROINTESTINAL ENDOSCOPY      for gastric bleeding      Family History   Problem Relation Age of Onset    No Known Problems Mother     Diabetes Father     No Known Problems Sister     No Known Problems Brother       Social History     Tobacco Use    Smoking status: Some Days     Current packs/day: 0.00     Average packs/day: 0.3 packs/day for 5.0 years (1.3 ttl pk-yrs)     Types: Cigarettes     Start date: 2011     Last attempt to quit: 2016     Years since quittin.4    Smokeless tobacco: Current     Types: Chew    Tobacco comments:     Off an on smoking   Vaping Use    Vaping status: Never Used   Substance Use Topics    Alcohol use: Not Currently     Alcohol/week: 0.0 - 1.0 standard drinks of alcohol     Comment: ocassionally    Drug use: No      E-Cigarette/Vaping    E-Cigarette Use Never User       E-Cigarette/Vaping Substances      I have reviewed and agree with the history as documented.     Patient is a 54-year-old male, past medical history including anemia, diabetes, multiple brain abscesses status post craniectomy, who presents the emergency department for altered mental status.  Per EMS mother noted slurred speech.  Unclear exactly when the slurred speech started.  I attempted to call patient's mother but there is no answer.  EMS is unable to provide any further history.  On my assessment patient is awake, alert, in no acute distress.  He is alert and oriented x 4.  There is no slurred speech.  There is no facial droop, nor no focal neurologic deficits.  He has no complaints at this time.  He is tachycardic but otherwise vitals are stable.  He has no complaints or concerns at this time.      Altered Mental Status      Review of Systems   All other  systems reviewed and are negative.          Objective       ED Triage Vitals   Temperature Pulse Blood Pressure Respirations SpO2 Patient Position - Orthostatic VS   10/12/24 0838 10/12/24 0836 10/12/24 0836 10/12/24 0836 10/12/24 0836 10/12/24 0836   99.1 °F (37.3 °C) (!) 114 155/95 18 98 % Sitting      Temp Source Heart Rate Source BP Location FiO2 (%) Pain Score    10/12/24 0838 10/12/24 0836 10/12/24 0836 -- 10/12/24 1228    Tympanic Monitor Right arm  No Pain      Vitals      Date and Time Temp Pulse SpO2 Resp BP Pain Score FACES Pain Rating User   10/13/24 0300 97.4 °F (36.3 °C) 83 96 % 16 129/60 -- -- BD   10/13/24 0100 -- -- -- -- -- No Pain -- DL   10/12/24 2300 97.1 °F (36.2 °C) 86 97 % 16 123/68 -- -- BD   10/12/24 2035 97.8 °F (36.6 °C) 93 97 % 16 129/70 No Pain -- DB   10/12/24 1258 97.4 °F (36.3 °C) 110 -- 18 103/75 -- -- AH   10/12/24 1228 98.4 °F (36.9 °C) 110 97 % -- 103/75 No Pain -- AH   10/12/24 1130 -- 106 -- -- 130/67 -- -- GÓMEZ   10/12/24 0838 99.1 °F (37.3 °C) -- -- -- -- -- -- SS   10/12/24 0836 -- 114 98 % 18 155/95 -- -- SS            Physical Exam  Vitals and nursing note reviewed.   Constitutional:       General: He is not in acute distress.     Appearance: He is well-developed. He is not ill-appearing, toxic-appearing or diaphoretic.   HENT:      Head: Normocephalic and atraumatic.      Right Ear: External ear normal.      Left Ear: External ear normal.      Nose: Nose normal.   Eyes:      General: Lids are normal. No scleral icterus.  Cardiovascular:      Rate and Rhythm: Regular rhythm. Tachycardia present.      Heart sounds: Normal heart sounds. No murmur heard.     No friction rub. No gallop.   Pulmonary:      Effort: Pulmonary effort is normal. No respiratory distress.      Breath sounds: Normal breath sounds. No wheezing or rales.   Abdominal:      Palpations: Abdomen is soft.      Tenderness: There is no abdominal tenderness. There is no guarding or rebound.   Musculoskeletal:          General: No deformity. Normal range of motion.      Cervical back: Normal range of motion and neck supple.   Skin:     General: Skin is warm and dry.   Neurological:      General: No focal deficit present.      Mental Status: He is alert.   Psychiatric:         Mood and Affect: Mood normal.         Behavior: Behavior normal.         Results Reviewed       Procedure Component Value Units Date/Time    Basic metabolic panel [[id number]]  (Abnormal) Collected: 10/13/24 0452    Lab Status: Final result Specimen: Blood from Hand, Right Updated: 10/13/24 0657     Sodium 142 mmol/L      Potassium 3.5 mmol/L      Chloride 103 mmol/L      CO2 30 mmol/L      ANION GAP 9 mmol/L      BUN 23 mg/dL      Creatinine 0.83 mg/dL      Glucose 100 mg/dL      Calcium 8.3 mg/dL      eGFR 98 ml/min/1.73sq m     Narrative:      National Kid[employer] Disease Foundation guidelines for Chronic Kidney Disease (CKD):     Stage 1 with normal or high GFR (GFR > 90 mL/min/1.73 square meters)    Stage 2 Mild CKD (GFR = 60-89 mL/min/1.73 square meters)    Stage 3A Moderate CKD (GFR = 45-59 mL/min/1.73 square meters)    Stage 3B Moderate CKD (GFR = 30-44 mL/min/1.73 square meters)    Stage 4 Severe CKD (GFR = 15-29 mL/min/1.73 square meters)    Stage 5 End Stage CKD (GFR <15 mL/min/1.73 square meters)  Note: GFR calculation is accurate only with a steady state creatinine    Magnesium [[id number]]  (Normal) Collected: 10/13/24 0452    Lab Status: Final result Specimen: Blood from Hand, Right Updated: 10/13/24 0657     Magnesium 2.0 mg/dL     Salicylate level [[id number]]  (Normal) Collected: 10/13/24 0452    Lab Status: Final result Specimen: Blood from Hand, Right Updated: 10/13/24 0657     Salicylate Lvl 17 mg/dL     Basic metabolic panel [[id number]]     Lab Status: No result Specimen: Blood     Magnesium [[id number]]     Lab Status: No result Specimen: Blood     Salicylate level [[id number]]     Lab Status: No result Specimen: Blood     Basic metabolic  panel [[id number]] Collected: 10/13/24 0051    Lab Status: Final result Specimen: Blood from Hand, Right Updated: 10/13/24 0125     Sodium 141 mmol/L      Potassium 3.5 mmol/L      Chloride 104 mmol/L      CO2 31 mmol/L      ANION GAP 6 mmol/L      BUN 25 mg/dL      Creatinine 0.95 mg/dL      Glucose 128 mg/dL      Calcium 8.8 mg/dL      eGFR 89 ml/min/1.73sq m     Narrative:      National Kidney Disease Foundation guidelines for Chronic Kidney Disease (CKD):     Stage 1 with normal or high GFR (GFR > 90 mL/min/1.73 square meters)    Stage 2 Mild CKD (GFR = 60-89 mL/min/1.73 square meters)    Stage 3A Moderate CKD (GFR = 45-59 mL/min/1.73 square meters)    Stage 3B Moderate CKD (GFR = 30-44 mL/min/1.73 square meters)    Stage 4 Severe CKD (GFR = 15-29 mL/min/1.73 square meters)    Stage 5 End Stage CKD (GFR <15 mL/min/1.73 square meters)  Note: GFR calculation is accurate only with a steady state creatinine    Magnesium [[id number]]  (Abnormal) Collected: 10/13/24 0051    Lab Status: Final result Specimen: Blood from Hand, Right Updated: 10/13/24 0125     Magnesium 1.7 mg/dL     Salicylate level [[id number]]  (Normal) Collected: 10/13/24 0051    Lab Status: Final result Specimen: Blood from Hand, Right Updated: 10/13/24 0111     Salicylate Lvl <5 mg/dL     Basic metabolic panel [[id number]]  (Abnormal) Collected: 10/12/24 2122    Lab Status: Final result Specimen: Blood from Arm, Right Updated: 10/12/24 2156     Sodium 142 mmol/L      Potassium 4.1 mmol/L      Chloride 105 mmol/L      CO2 29 mmol/L      ANION GAP 8 mmol/L      BUN 28 mg/dL      Creatinine 1.10 mg/dL      Glucose 120 mg/dL      Calcium 9.1 mg/dL      eGFR 74 ml/min/1.73sq m     Narrative:      National Kidney Disease Foundation guidelines for Chronic Kidney Disease (CKD):     Stage 1 with normal or high GFR (GFR > 90 mL/min/1.73 square meters)    Stage 2 Mild CKD (GFR = 60-89 mL/min/1.73 square meters)    Stage 3A Moderate CKD (GFR = 45-59 mL/min/1.73  square meters)    Stage 3B Moderate CKD (GFR = 30-44 mL/min/1.73 square meters)    Stage 4 Severe CKD (GFR = 15-29 mL/min/1.73 square meters)    Stage 5 End Stage CKD (GFR <15 mL/min/1.73 square meters)  Note: GFR calculation is accurate only with a steady state creatinine    Magnesium [[id number]]  (Abnormal) Collected: 10/12/24 2122    Lab Status: Final result Specimen: Blood from Arm, Right Updated: 10/12/24 2156     Magnesium 1.8 mg/dL     Salicylate level [[id number]]  (Abnormal) Collected: 10/12/24 2122    Lab Status: Final result Specimen: Blood from Arm, Right Updated: 10/12/24 2156     Salicylate Lvl 28 mg/dL     Salicylate level [[id number]]  (Abnormal) Collected: 10/12/24 1635    Lab Status: Final result Specimen: Blood from Arm, Right Updated: 10/12/24 1740     Salicylate Lvl 35 mg/dL     Narrative:      SAL Result Verified by Repeat    Basic metabolic panel [[id number]]  (Abnormal) Collected: 10/12/24 1635    Lab Status: Final result Specimen: Blood from Arm, Right Updated: 10/12/24 1715     Sodium 140 mmol/L      Potassium 4.9 mmol/L      Chloride 107 mmol/L      CO2 26 mmol/L      ANION GAP 7 mmol/L      BUN 29 mg/dL      Creatinine 1.18 mg/dL      Glucose 145 mg/dL      Calcium 9.9 mg/dL      eGFR 68 ml/min/1.73sq m     Narrative:      National Kidney Disease Foundation guidelines for Chronic Kidney Disease (CKD):     Stage 1 with normal or high GFR (GFR > 90 mL/min/1.73 square meters)    Stage 2 Mild CKD (GFR = 60-89 mL/min/1.73 square meters)    Stage 3A Moderate CKD (GFR = 45-59 mL/min/1.73 square meters)    Stage 3B Moderate CKD (GFR = 30-44 mL/min/1.73 square meters)    Stage 4 Severe CKD (GFR = 15-29 mL/min/1.73 square meters)    Stage 5 End Stage CKD (GFR <15 mL/min/1.73 square meters)  Note: GFR calculation is accurate only with a steady state creatinine    Magnesium [[id number]]  (Normal) Collected: 10/12/24 1635    Lab Status: Final result Specimen: Blood from Arm, Right Updated: 10/12/24 1630      Magnesium 1.9 mg/dL     Hemoglobin A1c w/EAG Estimation (Prechecked if no A1C within 90 days) [[id number]]  (Abnormal) Collected: 10/12/24 1205    Lab Status: Final result Specimen: Blood from Arm, Right Updated: 10/12/24 1624     Hemoglobin A1C 6.6 %       mg/dl     Rapid drug screen, urine [[id number]]  (Normal) Collected: 10/12/24 1500    Lab Status: Final result Specimen: Urine, Catheter Updated: 10/12/24 1604     Amph/Meth UR Negative     Barbiturate Ur Negative     Benzodiazepine Urine Negative     Cocaine Urine Negative     Methadone Urine Negative     Opiate Urine Negative     PCP Ur Negative     THC Urine Negative     Oxycodone Urine Negative     Fentanyl Urine Negative     HYDROCODONE URINE Negative    Narrative:      FOR MEDICAL PURPOSES ONLY.   IF CONFIRMATION NEEDED PLEASE CONTACT THE LAB WITHIN 5 DAYS.    Drug Screen Cutoff Levels:  AMPHETAMINE/METHAMPHETAMINES  1000 ng/mL  BARBITURATES     200 ng/mL  BENZODIAZEPINES     200 ng/mL  COCAINE      300 ng/mL  METHADONE      300 ng/mL  OPIATES      300 ng/mL  PHENCYCLIDINE     25 ng/mL  THC       50 ng/mL  OXYCODONE      100 ng/mL  FENTANYL      5 ng/mL  HYDROCODONE     300 ng/mL    UA w Reflex to Microscopic w Reflex to Culture [[id number]]  (Abnormal) Collected: 10/12/24 1503    Lab Status: Final result Specimen: Urine, Straight Cath Updated: 10/12/24 1526     Color, UA Yellow     Clarity, UA Slightly Cloudy     Specific Gravity, UA >=1.030     pH, UA 5.0     Leukocytes, UA Negative     Nitrite, UA Negative     Protein, UA Trace mg/dl      Glucose, UA Negative mg/dl      Ketones, UA 10 (1+) mg/dl      Urobilinogen, UA <2.0 mg/dl      Bilirubin, UA Negative     Occult Blood, UA Negative    Salicylate level [[id number]]  (Abnormal) Collected: 10/12/24 1205    Lab Status: Final result Specimen: Blood from Arm, Right Updated: 10/12/24 1304     Salicylate Lvl 43 mg/dL     Basic metabolic panel [[id number]]  (Abnormal) Collected: 10/12/24 1205    Lab  Status: Final result Specimen: Blood from Arm, Right Updated: 10/12/24 1257     Sodium 138 mmol/L      Potassium 4.6 mmol/L      Chloride 105 mmol/L      CO2 21 mmol/L      ANION GAP 12 mmol/L      BUN 29 mg/dL      Creatinine 1.19 mg/dL      Glucose 120 mg/dL      Calcium 10.9 mg/dL      eGFR 67 ml/min/1.73sq m     Narrative:      National Kidney Disease Foundation guidelines for Chronic Kidney Disease (CKD):     Stage 1 with normal or high GFR (GFR > 90 mL/min/1.73 square meters)    Stage 2 Mild CKD (GFR = 60-89 mL/min/1.73 square meters)    Stage 3A Moderate CKD (GFR = 45-59 mL/min/1.73 square meters)    Stage 3B Moderate CKD (GFR = 30-44 mL/min/1.73 square meters)    Stage 4 Severe CKD (GFR = 15-29 mL/min/1.73 square meters)    Stage 5 End Stage CKD (GFR <15 mL/min/1.73 square meters)  Note: GFR calculation is accurate only with a steady state creatinine    Magnesium [[id number]]  (Normal) Collected: 10/12/24 1205    Lab Status: Final result Specimen: Blood from Arm, Right Updated: 10/12/24 1257     Magnesium 2.2 mg/dL     Fingerstick Glucose (POCT) [[id number]]  (Normal) Collected: 10/12/24 1200    Lab Status: Final result Specimen: Blood Updated: 10/12/24 1201     POC Glucose 128 mg/dl     Magnesium [[id number]]  (Abnormal) Collected: 10/12/24 0854    Lab Status: Final result Specimen: Blood from Arm, Left Updated: 10/12/24 0954     Magnesium 1.6 mg/dL     TSH, 3rd generation with Free T4 reflex [[id number]]  (Normal) Collected: 10/12/24 0854    Lab Status: Final result Specimen: Blood from Arm, Left Updated: 10/12/24 0936     TSH 3RD GENERATON 0.743 uIU/mL     Salicylate level [[id number]]  (Abnormal) Collected: 10/12/24 0854    Lab Status: Final result Specimen: Blood from Arm, Left Updated: 10/12/24 0923     Salicylate Lvl 49 mg/dL     Comprehensive metabolic panel [[id number]]  (Abnormal) Collected: 10/12/24 0854    Lab Status: Final result Specimen: Blood from Arm, Left Updated: 10/12/24 0921     Sodium 136  mmol/L      Potassium 4.6 mmol/L      Chloride 102 mmol/L      CO2 19 mmol/L      ANION GAP 15 mmol/L      BUN 33 mg/dL      Creatinine 1.33 mg/dL      Glucose 116 mg/dL      Calcium 12.5 mg/dL      AST 22 U/L      ALT 19 U/L      Alkaline Phosphatase 47 U/L      Total Protein 7.1 g/dL      Albumin 5.0 g/dL      Total Bilirubin 0.26 mg/dL      eGFR 59 ml/min/1.73sq m     Narrative:      National Kidney Disease Foundation guidelines for Chronic Kidney Disease (CKD):     Stage 1 with normal or high GFR (GFR > 90 mL/min/1.73 square meters)    Stage 2 Mild CKD (GFR = 60-89 mL/min/1.73 square meters)    Stage 3A Moderate CKD (GFR = 45-59 mL/min/1.73 square meters)    Stage 3B Moderate CKD (GFR = 30-44 mL/min/1.73 square meters)    Stage 4 Severe CKD (GFR = 15-29 mL/min/1.73 square meters)    Stage 5 End Stage CKD (GFR <15 mL/min/1.73 square meters)  Note: GFR calculation is accurate only with a steady state creatinine    Acetaminophen level-If concentration is detectable, please discuss with medical  on call. [[id number]]  (Normal) Collected: 10/12/24 0854    Lab Status: Final result Specimen: Blood from Arm, Left Updated: 10/12/24 0921     Acetaminophen Level 12 ug/mL     Ethanol [[id number]]  (Normal) Collected: 10/12/24 0854    Lab Status: Final result Specimen: Blood from Arm, Left Updated: 10/12/24 0918     Ethanol Lvl <10 mg/dL     Ammonia [[id number]]  (Normal) Collected: 10/12/24 0854    Lab Status: Final result Specimen: Blood from Arm, Left Updated: 10/12/24 0918     Ammonia 37 umol/L     Blood gas, venous [[id number]]  (Abnormal) Collected: 10/12/24 0854    Lab Status: Final result Specimen: Blood from Arm, Left Updated: 10/12/24 0905     pH, Abel 7.344     pCO2, Abel 38.7 mm Hg      pO2, Abel 52.6 mm Hg      HCO3, Abel 20.6 mmol/L      Base Excess, Abel -4.6 mmol/L      O2 Content, Abel 17.5 ml/dL      O2 HGB, VENOUS 83.2 %     CBC and differential [[id number]]  (Abnormal) Collected: 10/12/24 0854    Lab  Status: Final result Specimen: Blood from Arm, Left Updated: 10/12/24 0902     WBC 9.58 Thousand/uL      RBC 4.24 Million/uL      Hemoglobin 14.1 g/dL      Hematocrit 42.2 %       fL      MCH 33.3 pg      MCHC 33.4 g/dL      RDW 17.6 %      MPV 9.3 fL      Platelets 237 Thousands/uL      nRBC 0 /100 WBCs      Segmented % 88 %      Immature Grans % 0 %      Lymphocytes % 7 %      Monocytes % 4 %      Eosinophils Relative 1 %      Basophils Relative 0 %      Absolute Neutrophils 8.43 Thousands/µL      Absolute Immature Grans 0.03 Thousand/uL      Absolute Lymphocytes 0.65 Thousands/µL      Absolute Monocytes 0.37 Thousand/µL      Eosinophils Absolute 0.07 Thousand/µL      Basophils Absolute 0.03 Thousands/µL     Fingerstick Glucose (POCT) [[id number]]  (Abnormal) Collected: 10/12/24 0839    Lab Status: Final result Specimen: Blood Updated: 10/12/24 0840     POC Glucose 147 mg/dl             CT head without contrast   Final Interpretation by Reno Mattson MD (10/12 1026)      No acute intracranial hemorrhage, mass effect or edema.                  Workstation performed: [id number]             ECG 12 Lead Documentation Only    Date/Time: 10/13/2024 7:38 AM    Performed by: Malvin Echevarria DO  Authorized by: Malvin Echevarria DO    ECG reviewed by me, the ED Provider: yes    Patient location:  ED  Interpretation:     Interpretation: normal    Rate:     ECG rate:  103    ECG rate assessment: tachycardic    Rhythm:     Rhythm: sinus tachycardia    Ectopy:     Ectopy: none    QRS:     QRS axis:  Normal  Conduction:     Conduction: normal    ST segments:     ST segments:  Normal  T waves:     T waves: normal    CriticalCare Time    Date/Time: 10/12/2024 10:00 AM    Performed by: Malvin Echevarria DO  Authorized by: Malvin Echevarria DO    Critical care provider statement:     Critical care time (minutes):  35    Critical care start time:  10/12/2024 8:33 AM    Critical care end time:  10/12/2024 10:40 AM     Critical care time was exclusive of:  Separately billable procedures and treating other patients and teaching time    Critical care was necessary to treat or prevent imminent or life-threatening deterioration of the following conditions:  Toxidrome    Critical care was time spent personally by me on the following activities:  Obtaining history from patient or surrogate, blood draw for specimens, development of treatment plan with patient or surrogate, discussions with consultants, evaluation of patient's response to treatment, examination of patient, re-evaluation of patient's condition, review of old charts, ordering and review of laboratory studies, ordering and review of radiographic studies and ordering and performing treatments and interventions    I assumed direction of critical care for this patient from another provider in my specialty: no        ED Medication and Procedure Management   Prior to Admission Medications   Prescriptions Last Dose Informant Patient Reported? Taking?   Insulin Glargine Solostar (Lantus SoloStar) 100 UNIT/ML SOPN Unknown  No No   Sig: Inject 0.15 mL (15 Units total) under the skin daily at bedtime   Insulin Glargine-yfgn 100 UNIT/ML SOPN Unknown  Yes No   Sig: INJECT 10 UNITS SUBCUTANEOUSLY AT BEDTIME   Insulin Lispro (HUMALOG KWIKPEN SC) Unknown  Yes No   Sig: Inject under the skin   Insulin Pen Needle 32G X 4 MM MISC Unknown  No No   Sig: Use in the morning   baclofen 10 mg tablet Unknown  No No   Sig: take 1 tablet by mouth twice a day   cyanocobalamin (VITAMIN B-12) 1000 MCG tablet Unknown  No No   Sig: Take 1 tablet (1,000 mcg total) by mouth daily   folic acid (FOLVITE) 400 mcg tablet Not Taking  No No   Sig: Take 1 tablet (400 mcg total) by mouth daily   Patient not taking: Reported on 10/12/2024   lisinopril (ZESTRIL) 20 mg tablet Unknown  No No   Sig: take 1 tablet by mouth once daily   pantoprazole (PROTONIX) 40 mg tablet Unknown  No No   Sig: Take 1 tablet (40 mg total)  by mouth daily in the early morning   thiamine 100 MG tablet Unknown  No No   Sig: Take 1 tablet (100 mg total) by mouth daily      Facility-Administered Medications: None     Current Discharge Medication List        CONTINUE these medications which have NOT CHANGED    Details   baclofen 10 mg tablet take 1 tablet by mouth twice a day  Qty: 60 tablet, Refills: 0    Associated Diagnoses: Spasticity      cyanocobalamin (VITAMIN B-12) 1000 MCG tablet Take 1 tablet (1,000 mcg total) by mouth daily  Qty: 30 tablet, Refills: 0    Associated Diagnoses: Alcohol use      folic acid (FOLVITE) 400 mcg tablet Take 1 tablet (400 mcg total) by mouth daily  Qty: 30 tablet, Refills: 0    Associated Diagnoses: Alcohol use      Insulin Glargine Solostar (Lantus SoloStar) 100 UNIT/ML SOPN Inject 0.15 mL (15 Units total) under the skin daily at bedtime  Qty: 15 mL, Refills: 0    Associated Diagnoses: Type 2 diabetes mellitus without complication, with long-term current use of insulin ([facility])      Insulin Glargine-yfgn 100 UNIT/ML SOPN INJECT 10 UNITS SUBCUTANEOUSLY AT BEDTIME      Insulin Lispro (HUMALOG KWIKPEN SC) Inject under the skin      Insulin Pen Needle 32G X 4 MM MISC Use in the morning  Qty: 100 each, Refills: 0    Associated Diagnoses: Type 2 diabetes mellitus without complication, with long-term current use of insulin ([facility])      lisinopril (ZESTRIL) 20 mg tablet take 1 tablet by mouth once daily  Qty: 90 tablet, Refills: 1    Associated Diagnoses: Benign essential hypertension      pantoprazole (PROTONIX) 40 mg tablet Take 1 tablet (40 mg total) by mouth daily in the early morning  Qty: 30 tablet, Refills: 0    Associated Diagnoses: History of GI bleed      thiamine 100 MG tablet Take 1 tablet (100 mg total) by mouth daily  Qty: 30 tablet, Refills: 0    Associated Diagnoses: Alcohol use           No discharge procedures on file.  ED SEPSIS DOCUMENTATION   Time reflects when diagnosis was documented in both MDM as applicable  and the Disposition within this note       Time User Action Codes Description Comment    10/12/2024  9:33 AM Malvin Echevarria Add [T39.091A] Salicylate overdose     10/12/2024 10:02 AM Malvin Echevarria [N17.9] BELLA (acute kidney injury) (HCC)     10/12/2024  2:28 PM Sidney Lovelace Add [R33.9] Urinary retention     10/13/2024  7:39 AM Malvin Echevarria [E87.29] Increased anion gap metabolic acidosis                  Malvin Echevarria DO  10/13/24 0739

## 2024-10-12 NOTE — ASSESSMENT & PLAN NOTE
History of diabetes mellitus hypertension and brain abscess status postcraniotomy who was sent to the hospital by his mother for change in mental status  In the ED he was found to have metabolic acidosis and subsequently salicylate toxicity  Patient reports consuming 2 tablets of excedrin p.m. last night and then 2 more tablets later in the morning  Mother reports 2 more bottles came in the mail today  Appreciate toxicology evaluation.  On sodium bicarb infusion with serial labs    Results from last 7 days   Lab Units 10/12/24  1205 10/12/24  0854   SALICYLATE LVL mg/dL 43* 49*

## 2024-10-12 NOTE — ASSESSMENT & PLAN NOTE
Lab Results   Component Value Date    HGBA1C 5.6 06/20/2024     Recent Labs     10/12/24  0839 10/12/24  1200   POCGLU 147* 128     Reportedly on glargine 15 units and lispro.  Patient does not remember dosing.

## 2024-10-13 LAB
ALBUMIN SERPL BCG-MCNC: 3.6 G/DL (ref 3.5–5)
ALP SERPL-CCNC: 31 U/L (ref 34–104)
ALT SERPL W P-5'-P-CCNC: 11 U/L (ref 7–52)
ANION GAP SERPL CALCULATED.3IONS-SCNC: 6 MMOL/L (ref 4–13)
ANION GAP SERPL CALCULATED.3IONS-SCNC: 8 MMOL/L (ref 4–13)
ANION GAP SERPL CALCULATED.3IONS-SCNC: 9 MMOL/L (ref 4–13)
AST SERPL W P-5'-P-CCNC: 12 U/L (ref 13–39)
ATRIAL RATE: 103 BPM
BILIRUB SERPL-MCNC: 0.24 MG/DL (ref 0.2–1)
BUN SERPL-MCNC: 23 MG/DL (ref 5–25)
BUN SERPL-MCNC: 23 MG/DL (ref 5–25)
BUN SERPL-MCNC: 25 MG/DL (ref 5–25)
CALCIUM SERPL-MCNC: 8.3 MG/DL (ref 8.4–10.2)
CALCIUM SERPL-MCNC: 8.5 MG/DL (ref 8.4–10.2)
CALCIUM SERPL-MCNC: 8.8 MG/DL (ref 8.4–10.2)
CHLORIDE SERPL-SCNC: 102 MMOL/L (ref 96–108)
CHLORIDE SERPL-SCNC: 103 MMOL/L (ref 96–108)
CHLORIDE SERPL-SCNC: 104 MMOL/L (ref 96–108)
CO2 SERPL-SCNC: 30 MMOL/L (ref 21–32)
CO2 SERPL-SCNC: 31 MMOL/L (ref 21–32)
CO2 SERPL-SCNC: 32 MMOL/L (ref 21–32)
CREAT SERPL-MCNC: 0.82 MG/DL (ref 0.6–1.3)
CREAT SERPL-MCNC: 0.83 MG/DL (ref 0.6–1.3)
CREAT SERPL-MCNC: 0.95 MG/DL (ref 0.6–1.3)
ERYTHROCYTE [DISTWIDTH] IN BLOOD BY AUTOMATED COUNT: 17.3 % (ref 11.6–15.1)
GFR SERPL CREATININE-BSD FRML MDRD: 89 ML/MIN/1.73SQ M
GFR SERPL CREATININE-BSD FRML MDRD: 98 ML/MIN/1.73SQ M
GFR SERPL CREATININE-BSD FRML MDRD: 98 ML/MIN/1.73SQ M
GLUCOSE SERPL-MCNC: 100 MG/DL (ref 65–140)
GLUCOSE SERPL-MCNC: 105 MG/DL (ref 65–140)
GLUCOSE SERPL-MCNC: 108 MG/DL (ref 65–140)
GLUCOSE SERPL-MCNC: 118 MG/DL (ref 65–140)
GLUCOSE SERPL-MCNC: 123 MG/DL (ref 65–140)
GLUCOSE SERPL-MCNC: 128 MG/DL (ref 65–140)
GLUCOSE SERPL-MCNC: 94 MG/DL (ref 65–140)
HCT VFR BLD AUTO: 33.3 % (ref 36.5–49.3)
HGB BLD-MCNC: 11 G/DL (ref 12–17)
MAGNESIUM SERPL-MCNC: 1.7 MG/DL (ref 1.9–2.7)
MAGNESIUM SERPL-MCNC: 2 MG/DL (ref 1.9–2.7)
MCH RBC QN AUTO: 33.1 PG (ref 26.8–34.3)
MCHC RBC AUTO-ENTMCNC: 32.7 G/DL (ref 31.4–37.4)
MCV RBC AUTO: 101 FL (ref 82–98)
P AXIS: 80 DEGREES
PLATELET # BLD AUTO: 155 THOUSANDS/UL (ref 149–390)
PMV BLD AUTO: 9.8 FL (ref 8.9–12.7)
POTASSIUM SERPL-SCNC: 3.2 MMOL/L (ref 3.5–5.3)
POTASSIUM SERPL-SCNC: 3.5 MMOL/L (ref 3.5–5.3)
POTASSIUM SERPL-SCNC: 3.5 MMOL/L (ref 3.5–5.3)
PR INTERVAL: 154 MS
PROT SERPL-MCNC: 5.3 G/DL (ref 6.4–8.4)
QRS AXIS: 52 DEGREES
QRSD INTERVAL: 78 MS
QT INTERVAL: 326 MS
QTC INTERVAL: 427 MS
RBC # BLD AUTO: 3.29 MILLION/UL (ref 3.88–5.62)
SALICYLATES SERPL-MCNC: 17 MG/DL (ref 3–20)
SALICYLATES SERPL-MCNC: <5 MG/DL (ref 3–20)
SODIUM SERPL-SCNC: 141 MMOL/L (ref 135–147)
SODIUM SERPL-SCNC: 142 MMOL/L (ref 135–147)
SODIUM SERPL-SCNC: 142 MMOL/L (ref 135–147)
T WAVE AXIS: 52 DEGREES
VENTRICULAR RATE: 103 BPM
WBC # BLD AUTO: 3.81 THOUSAND/UL (ref 4.31–10.16)

## 2024-10-13 PROCEDURE — RECHECK: Performed by: INTERNAL MEDICINE

## 2024-10-13 PROCEDURE — 80048 BASIC METABOLIC PNL TOTAL CA: CPT | Performed by: STUDENT IN AN ORGANIZED HEALTH CARE EDUCATION/TRAINING PROGRAM

## 2024-10-13 PROCEDURE — 80179 DRUG ASSAY SALICYLATE: CPT | Performed by: STUDENT IN AN ORGANIZED HEALTH CARE EDUCATION/TRAINING PROGRAM

## 2024-10-13 PROCEDURE — 83735 ASSAY OF MAGNESIUM: CPT | Performed by: STUDENT IN AN ORGANIZED HEALTH CARE EDUCATION/TRAINING PROGRAM

## 2024-10-13 PROCEDURE — 99232 SBSQ HOSP IP/OBS MODERATE 35: CPT | Performed by: INTERNAL MEDICINE

## 2024-10-13 PROCEDURE — 93010 ELECTROCARDIOGRAM REPORT: CPT | Performed by: INTERNAL MEDICINE

## 2024-10-13 PROCEDURE — 85027 COMPLETE CBC AUTOMATED: CPT | Performed by: INTERNAL MEDICINE

## 2024-10-13 PROCEDURE — 80053 COMPREHEN METABOLIC PANEL: CPT | Performed by: INTERNAL MEDICINE

## 2024-10-13 PROCEDURE — 82948 REAGENT STRIP/BLOOD GLUCOSE: CPT

## 2024-10-13 RX ORDER — MAGNESIUM SULFATE 1 G/100ML
1 INJECTION INTRAVENOUS ONCE
Status: COMPLETED | OUTPATIENT
Start: 2024-10-13 | End: 2024-10-13

## 2024-10-13 RX ORDER — LANOLIN ALCOHOL/MO/W.PET/CERES
6 CREAM (GRAM) TOPICAL
Status: DISCONTINUED | OUTPATIENT
Start: 2024-10-13 | End: 2024-10-14 | Stop reason: HOSPADM

## 2024-10-13 RX ADMIN — HEPARIN SODIUM 5000 UNITS: 5000 INJECTION, SOLUTION INTRAVENOUS; SUBCUTANEOUS at 14:52

## 2024-10-13 RX ADMIN — HEPARIN SODIUM 5000 UNITS: 5000 INJECTION, SOLUTION INTRAVENOUS; SUBCUTANEOUS at 05:26

## 2024-10-13 RX ADMIN — MAGNESIUM SULFATE HEPTAHYDRATE 1 G: 1 INJECTION, SOLUTION INTRAVENOUS at 03:41

## 2024-10-13 RX ADMIN — HEPARIN SODIUM 5000 UNITS: 5000 INJECTION, SOLUTION INTRAVENOUS; SUBCUTANEOUS at 22:18

## 2024-10-13 RX ADMIN — SODIUM BICARBONATE 150 ML/HR: 84 INJECTION, SOLUTION INTRAVENOUS at 01:38

## 2024-10-13 RX ADMIN — THIAMINE HCL TAB 100 MG 100 MG: 100 TAB at 08:15

## 2024-10-13 RX ADMIN — NICOTINE 1 PATCH: 7 PATCH, EXTENDED RELEASE TRANSDERMAL at 08:16

## 2024-10-13 RX ADMIN — CYANOCOBALAMIN TAB 500 MCG 1000 MCG: 500 TAB at 08:15

## 2024-10-13 RX ADMIN — BACLOFEN 10 MG: 10 TABLET ORAL at 08:15

## 2024-10-13 RX ADMIN — BACLOFEN 10 MG: 10 TABLET ORAL at 17:42

## 2024-10-13 RX ADMIN — INSULIN GLARGINE 8 UNITS: 100 INJECTION, SOLUTION SUBCUTANEOUS at 22:17

## 2024-10-13 RX ADMIN — PANTOPRAZOLE SODIUM 40 MG: 40 TABLET, DELAYED RELEASE ORAL at 05:26

## 2024-10-13 RX ADMIN — Medication 6 MG: at 22:17

## 2024-10-13 RX ADMIN — Medication 400 MCG: at 08:15

## 2024-10-13 NOTE — PROGRESS NOTES
Progress Note - Hospitalist   Name: Colin Dumont 54 y.o. male I MRN: [id number]  Unit/Bed#: 3 [address] I Date of Admission: 10/12/2024   Date of Service: 10/13/2024 I Hospital Day: 1    Assessment & Plan  Salicylate overdose  History of diabetes mellitus hypertension and brain abscess status postcraniotomy who was sent to the hospital by his mother for change in mental status  In the ED he was found to have metabolic acidosis and subsequently salicylate toxicity  Patient reports consuming 4 tablets of excedrin p.m. night prior to admission  Mother reports 2 more bottles came in the mail  Appreciate toxicology evaluation.  On sodium bicarb infusion with serial labs    Results from last 7 days   Lab Units 10/13/24  0452 10/13/24  0051 10/12/24  2122 10/12/24  1635   SALICYLATE LVL mg/dL 17 <5 28* 35*     Toxic metabolic encephalopathy  Acute toxic metabolic encephalopathy secondary to salicylate overdose on top of cognitive decline from history of craniotomy  Was impulsive yesterday requiring restraints and observation  Mood much better today.  Mother to evaluate; if ongoing mood disorder will need to consult psychiatry  High anion gap metabolic acidosis  Increased anion gap acidosis present admission secondary to salicylate toxicity  Resolved with bicarbonate infusion    Results from last 7 days   Lab Units 10/13/24  0452 10/13/24  0051 10/12/24  2122   ANION GAP mmol/L 8  9 6 8   CO2 mmol/L 32  30 31 29     Benign essential hypertension  Holding lisinopril due to lower blood pressure.  Will restart tomorrow  S/P craniotomy  History of brain abscess with craniotomy.  Patient's mother reports he has had some mood changes.  Once medically stable is still present will consult psychiatry  Type 2 diabetes mellitus without complication, with long-term current use of insulin ([facility])  Lab Results   Component Value Date    HGBA1C 6.6 (H) 10/12/2024     Recent Labs     10/12/24  1200 10/12/24  1541 10/12/24  2124  10/13/24  0719   POCGLU 128 192* 134 123     Reportedly on glargine 15 units and lispro.  Patient does not remember dosing.  Acute urinary retention  Urinary catheter was placed yesterday by urology.  If mentating better, will do voiding trial    VTE Pharmacologic Prophylaxis: VTE Score: 3 Moderate Risk (Score 3-4) - Pharmacological DVT Prophylaxis Ordered: heparin.    Mobility:   Basic Mobility Inpatient Raw Score: 14  JH-HL Goal: 4: Move to chair/commode  JH-HLM Achieved: 5: Stand (1 or more minutes)  JH-HLM Goal achieved. Continue to encourage appropriate mobility.    Patient Centered Rounds: I have performed bedside rounds with nursing staff today.  Discussions with Specialists or Other Care Team Provider:     Education and Discussions with Family / Patient: Updated  (mother) via phone.    Current Length of Stay: 1 day(s)  Current Patient Status: Inpatient   Certification Statement:   Discharge Plan: Anticipate discharge later today or tomorrow to home.    Code Status: Level 1 - Full Code    Subjective   Patient seen and examined.  Mentating much better today.  Mother was going to come in after her hot water heater is fixed.    Objective   Vitals:   Temp (24hrs), Av.5 °F (36.4 °C), Min:97.1 °F (36.2 °C), Max:98.4 °F (36.9 °C)    Temp:  [97.1 °F (36.2 °C)-98.4 °F (36.9 °C)] 97.1 °F (36.2 °C)  HR:  [] 83  Resp:  [16-18] 18  BP: (103-130)/(60-75) 120/67  SpO2:  [93 %-97 %] 93 %  Body mass index is 22.76 kg/m².     Input and Output Summary (last 24 hours):     Intake/Output Summary (Last 24 hours) at 10/13/2024 0953  Last data filed at 10/13/2024 0537  Gross per 24 hour   Intake 720 ml   Output 1515 ml   Net -795 ml       Physical Exam  Vitals reviewed.   Constitutional:       General: He is not in acute distress.     Appearance: Normal appearance.   HENT:      Head: Atraumatic.   Cardiovascular:      Rate and Rhythm: Regular rhythm.      Heart sounds: Normal heart sounds.   Pulmonary:       Effort: Pulmonary effort is normal.      Breath sounds: Decreased breath sounds present. No wheezing.   Abdominal:      General: Bowel sounds are normal.      Palpations: Abdomen is soft.      Tenderness: There is no abdominal tenderness.   Musculoskeletal:         General: No swelling.      Comments: Upper extremity restraints   Skin:     General: Skin is warm and dry.   Neurological:      Mental Status: He is alert and oriented to person, place, and time.   Psychiatric:         Mood and Affect: Mood normal.       Lines/Drains:  Lines/Drains/Airways       Active Status       Name Placement date Placement time Site Days    Urethral Catheter Coude 14 Fr. 10/12/24  1430  Coude  less than 1                  Invasive Devices       Peripheral Intravenous Line  Duration             Peripheral IV 10/12/24 Left Hand <1 day    Peripheral IV 10/12/24 Left;Ventral (anterior) Forearm <1 day    Peripheral IV 10/12/24 Right;Upper Arm <1 day              Drain  Duration             Urethral Catheter Coude 14 Fr. <1 day                  Urinary Catheter:  Goal for removal: Voiding trial when ambulation improves                 Lab Results: I have reviewed the following results:   Results from last 7 days   Lab Units 10/13/24  0452 10/12/24  0854   WBC Thousand/uL 3.81* 9.58   HEMOGLOBIN g/dL 11.0* 14.1   PLATELETS Thousands/uL 155 237   MCV fL 101* 100*     Results from last 7 days   Lab Units 10/13/24  0452 10/13/24  0051 10/12/24  2122 10/12/24  1205 10/12/24  0854   SODIUM mmol/L 142  142 141 142   < > 136   POTASSIUM mmol/L 3.2*  3.5 3.5 4.1   < > 4.6   CHLORIDE mmol/L 102  103 104 105   < > 102   CO2 mmol/L 32  30 31 29   < > 19*   ANION GAP mmol/L 8  9 6 8   < > 15*   BUN mg/dL 23  23 25 28*   < > 33*   CREATININE mg/dL 0.82  0.83 0.95 1.10   < > 1.33*   CALCIUM mg/dL 8.5  8.3* 8.8 9.1   < > 12.5*   ALBUMIN g/dL 3.6  --   --   --  5.0   TOTAL BILIRUBIN mg/dL 0.24  --   --   --  0.26   ALK PHOS U/L 31*  --   --   --   47   ALT U/L 11  --   --   --  19   AST U/L 12*  --   --   --  22   EGFR ml/min/1.73sq m 98  98 89 74   < > 59   GLUCOSE RANDOM mg/dL 105  100 128 120   < > 116    < > = values in this interval not displayed.     Results from last 7 days   Lab Units 10/13/24  0452 10/13/24  0051 10/12/24  2122 10/12/24  1635 10/12/24  1205   MAGNESIUM mg/dL 2.0 1.7* 1.8* 1.9 2.2                      Results from last 7 days   Lab Units 10/13/24  0719 10/12/24  2124 10/12/24  1541 10/12/24  1200 10/12/24  0839   POC GLUCOSE mg/dl 123 134 192* 128 147*     Results from last 7 days   Lab Units 10/12/24  1205   HEMOGLOBIN A1C % 6.6*     Results from last 7 days   Lab Units 10/12/24  0854   TSH 3RD GENERATON uIU/mL 0.743       Recent Cultures (last 7 days):         Imaging:  Reviewed radiology reports from this admission including:  CT head without contrast    Result Date: 10/12/2024  Impression: No acute intracranial hemorrhage, mass effect or edema. Workstation performed: [id number]       Last 24 Hours Medication List:     Current Facility-Administered Medications:     acetaminophen (TYLENOL) tablet 650 mg, Q4H PRN    baclofen tablet 10 mg, BID    cyanocobalamin (VITAMIN B-12) tablet 1,000 mcg, Daily    folic acid (FOLVITE) tablet 400 mcg, Daily    heparin (porcine) subcutaneous injection 5,000 Units, Q8H EJF    insulin glargine (LANTUS) subcutaneous injection 8 Units 0.08 mL, HS    insulin lispro (HumALOG/ADMELOG) 100 units/mL subcutaneous injection 1-6 Units, 4x Daily (AC & HS) **AND** Fingerstick Glucose (POCT), 4x Daily AC and at bedtime    nicotine (NICODERM CQ) 7 mg/24hr TD 24 hr patch 1 patch, Daily    OLANZapine (ZyPREXA) IM injection 2.5 mg, Q6H PRN    ondansetron (ZOFRAN) injection 4 mg, Q4H PRN    pantoprazole (PROTONIX) EC tablet 40 mg, Early Morning    thiamine tablet 100 mg, Daily    Administrative Statements   Today, Patient Was Seen By: Sidney Lovelace, DO  I have spent a total time of 35 minutes in caring for this  patient on the day of the visit/encounter including Diagnostic results, Patient and family education, Impressions, Counseling / Coordination of care, Documenting in the medical record, and Reviewing / ordering tests, medicine, procedures  .    **Please Note: This note may have been constructed using a voice recognition system.**

## 2024-10-13 NOTE — ASSESSMENT & PLAN NOTE
History of brain abscess with craniotomy.  Patient's mother reports he has had some mood changes.  Once medically stable is still present will consult psychiatry

## 2024-10-13 NOTE — ASSESSMENT & PLAN NOTE
Acute toxic metabolic encephalopathy secondary to salicylate overdose on top of cognitive decline from history of craniotomy  Was impulsive yesterday requiring restraints and observation  Mood much better today.  Mother to evaluate; if ongoing mood disorder will need to consult psychiatry

## 2024-10-13 NOTE — ASSESSMENT & PLAN NOTE
Lab Results   Component Value Date    HGBA1C 6.6 (H) 10/12/2024     Recent Labs     10/12/24  1200 10/12/24  1541 10/12/24  2124 10/13/24  0719   POCGLU 128 192* 134 123     Reportedly on glargine 15 units and lispro.  Patient does not remember dosing.

## 2024-10-13 NOTE — ASSESSMENT & PLAN NOTE
History of diabetes mellitus hypertension and brain abscess status postcraniotomy who was sent to the hospital by his mother for change in mental status  In the ED he was found to have metabolic acidosis and subsequently salicylate toxicity  Patient reports consuming 4 tablets of excedrin p.m. night prior to admission  Mother reports 2 more bottles came in the mail  Appreciate toxicology evaluation.  On sodium bicarb infusion with serial labs    Results from last 7 days   Lab Units 10/13/24  0452 10/13/24  0051 10/12/24  2122 10/12/24  1635   SALICYLATE LVL mg/dL 17 <5 28* 35*

## 2024-10-13 NOTE — PLAN OF CARE
Problem: GENITOURINARY - ADULT  Goal: Maintains or returns to baseline urinary function  Description: INTERVENTIONS:  - Assess urinary function  - Encourage oral fluids to ensure adequate hydration if ordered  - Administer IV fluids as ordered to ensure adequate hydration  - Administer ordered medications as needed  - Offer frequent toileting  - Follow urinary retention protocol if ordered  Outcome: Progressing     Problem: METABOLIC, FLUID AND ELECTROLYTES - ADULT  Goal: Electrolytes maintained within normal limits  Description: INTERVENTIONS:  - Monitor labs and assess patient for signs and symptoms of electrolyte imbalances  - Administer electrolyte replacement as ordered  - Monitor response to electrolyte replacements, including repeat lab results as appropriate  - Instruct patient on fluid and nutrition as appropriate  Outcome: Progressing

## 2024-10-13 NOTE — PROGRESS NOTES
"Progress Note - Urology      Patient: Colin Dumont   : 1970 Sex: male   MRN: [id number]     CSN: [id number]  Unit/Bed#: [address] [place] [address]     SUBJECTIVE:   Patient seen on afternoon rounds  Brandt catheter in position  Being watched      Objective   Vitals: /78 (BP Location: Left arm)   Pulse 98   Temp 98.3 °F (36.8 °C) (Temporal)   Resp 16   Ht 5' 9\" (1.753 m)   Wt 69.9 kg (154 lb 1.6 oz)   SpO2 97%   BMI 22.76 kg/m²     I/O last 24 hours:  In: 720 [P.O.:720]  Out: 1640 [Urine:1640]      Physical Exam:   General Alert awake   Normocephalic atraumatic PERRLA  Lungs clear bilaterally  Cardiac normal S1 normal S2  Abdomen soft, flank pain  Extremities no edema      Lab Results: CBC:   Lab Results   Component Value Date    WBC 3.81 (L) 10/13/2024    HGB 11.0 (L) 10/13/2024    HCT 33.3 (L) 10/13/2024     (H) 10/13/2024     10/13/2024    RBC 3.29 (L) 10/13/2024    MCH 33.1 10/13/2024    MCHC 32.7 10/13/2024    RDW 17.3 (H) 10/13/2024    MPV 9.8 10/13/2024    NRBC 0 10/12/2024     CMP:   Lab Results   Component Value Date     10/13/2024     10/13/2024     2019    CO2 30 10/13/2024    CO2 32 10/13/2024    CO2 21 2024    CO2 31 2019    BUN 23 10/13/2024    BUN 23 10/13/2024    BUN 11 2019    CREATININE 0.83 10/13/2024    CREATININE 0.82 10/13/2024    CREATININE 0.6 2019    GLUCOSE 126 2024    CALCIUM 8.3 (L) 10/13/2024    CALCIUM 8.5 10/13/2024    CALCIUM 9.5 2019    AST 12 (L) 10/13/2024    AST 13 2019    ALT 11 10/13/2024    ALT 21 2019    ALKPHOS 31 (L) 10/13/2024    ALKPHOS 72 2019    EGFR 98 10/13/2024    EGFR 98 10/13/2024    EGFR >60.0 2019     Urinalysis:   Lab Results   Component Value Date    COLORU Yellow 10/12/2024    CLARITYU Slightly Cloudy 10/12/2024    SPECGRAV >=1.030 10/12/2024    PHUR 5.0 10/12/2024    PHUR 5.5 2017    LEUKOCYTESUR Negative 10/12/2024    NITRITE Negative 10/12/2024 " "   GLUCOSEU Negative 10/12/2024    KETONESU 10 (1+) (A) 10/12/2024    BILIRUBINUR Negative 10/12/2024    BLOODU Negative 10/12/2024     Urine Culture: No results found for: \"URINECX\"  PSA: No results found for: \"PSA\"      Assessment/ Plan:  Urinary tension  Continue Brandt catheter as per medical team          Alex Curtis MD  "

## 2024-10-13 NOTE — PROGRESS NOTES
Treatment plan    Plan of care: Discussed with mother at bedside.  Patient mentation is much improved.  Unfortunately still remains hyperactive jittery with rapid speech.  Patient agreeable to psychiatric evaluation tomorrow.    Urinary retention: Voiding trial tomorrow morning.    Sidney Lovelace DO FACP

## 2024-10-13 NOTE — ASSESSMENT & PLAN NOTE
Increased anion gap acidosis present admission secondary to salicylate toxicity  Resolved with bicarbonate infusion    Results from last 7 days   Lab Units 10/13/24  0452 10/13/24  0051 10/12/24  2122   ANION GAP mmol/L 8  9 6 8   CO2 mmol/L 32  30 31 29

## 2024-10-13 NOTE — QUICK NOTE
Med Tox Follow Up    Chart and labs reviewed. Salicylate level less than 25 and chemistries are improved. No further need for bicarb. I have discontinued the infusion. The patient is stable from a med tox standpoint. We will remain available as needed.

## 2024-10-13 NOTE — PLAN OF CARE
Problem: Prexisting or High Potential for Compromised Skin Integrity  Goal: Skin integrity is maintained or improved  Description: INTERVENTIONS:  - Identify patients at risk for skin breakdown  - Assess and monitor skin integrity  - Assess and monitor nutrition and hydration status  - Monitor labs   - Assess for incontinence   - Turn and reposition patient  - Assist with mobility/ambulation  - Relieve pressure over bony prominences  - Avoid friction and shearing  - Provide appropriate hygiene as needed including keeping skin clean and dry  - Evaluate need for skin moisturizer/barrier cream  - Collaborate with interdisciplinary team   - Patient/family teaching  - Consider wound care consult   10/13/2024 0049 by Rupa Sims LPN  Outcome: Progressing

## 2024-10-14 ENCOUNTER — TRANSITIONAL CARE MANAGEMENT (OUTPATIENT)
Dept: FAMILY MEDICINE CLINIC | Facility: CLINIC | Age: 54
End: 2024-10-14

## 2024-10-14 VITALS
OXYGEN SATURATION: 93 % | DIASTOLIC BLOOD PRESSURE: 81 MMHG | HEART RATE: 89 BPM | TEMPERATURE: 98.1 F | HEIGHT: 69 IN | RESPIRATION RATE: 18 BRPM | BODY MASS INDEX: 22.82 KG/M2 | SYSTOLIC BLOOD PRESSURE: 136 MMHG | WEIGHT: 154.1 LBS

## 2024-10-14 DIAGNOSIS — R25.2 SPASTICITY: Primary | ICD-10-CM

## 2024-10-14 PROBLEM — E78.5 HYPERLIPIDEMIA: Chronic | Status: RESOLVED | Noted: 2019-09-24 | Resolved: 2024-10-14

## 2024-10-14 PROBLEM — E78.5 HYPERLIPIDEMIA: Chronic | Status: RESOLVED | Noted: 2019-09-24 | Resolved: 2024-01-01

## 2024-10-14 LAB
ALBUMIN SERPL BCG-MCNC: 3.8 G/DL (ref 3.5–5)
ALP SERPL-CCNC: 36 U/L (ref 34–104)
ALT SERPL W P-5'-P-CCNC: 12 U/L (ref 7–52)
ANION GAP SERPL CALCULATED.3IONS-SCNC: 5 MMOL/L (ref 4–13)
AST SERPL W P-5'-P-CCNC: 15 U/L (ref 13–39)
BILIRUB SERPL-MCNC: 0.33 MG/DL (ref 0.2–1)
BUN SERPL-MCNC: 11 MG/DL (ref 5–25)
CALCIUM SERPL-MCNC: 8.5 MG/DL (ref 8.4–10.2)
CHLORIDE SERPL-SCNC: 103 MMOL/L (ref 96–108)
CO2 SERPL-SCNC: 32 MMOL/L (ref 21–32)
CREAT SERPL-MCNC: 0.58 MG/DL (ref 0.6–1.3)
ERYTHROCYTE [DISTWIDTH] IN BLOOD BY AUTOMATED COUNT: 17 % (ref 11.6–15.1)
GFR SERPL CREATININE-BSD FRML MDRD: 113 ML/MIN/1.73SQ M
GLUCOSE SERPL-MCNC: 113 MG/DL (ref 65–140)
GLUCOSE SERPL-MCNC: 131 MG/DL (ref 65–140)
GLUCOSE SERPL-MCNC: 73 MG/DL (ref 65–140)
HCT VFR BLD AUTO: 36.4 % (ref 36.5–49.3)
HGB BLD-MCNC: 11.8 G/DL (ref 12–17)
MCH RBC QN AUTO: 33.4 PG (ref 26.8–34.3)
MCHC RBC AUTO-ENTMCNC: 32.4 G/DL (ref 31.4–37.4)
MCV RBC AUTO: 103 FL (ref 82–98)
MRSA NOSE QL CULT: NORMAL
PLATELET # BLD AUTO: 140 THOUSANDS/UL (ref 149–390)
PMV BLD AUTO: 9.4 FL (ref 8.9–12.7)
POTASSIUM SERPL-SCNC: 3.9 MMOL/L (ref 3.5–5.3)
PROT SERPL-MCNC: 5.7 G/DL (ref 6.4–8.4)
RBC # BLD AUTO: 3.53 MILLION/UL (ref 3.88–5.62)
SALICYLATES SERPL-MCNC: <5 MG/DL (ref 3–20)
SODIUM SERPL-SCNC: 140 MMOL/L (ref 135–147)
WBC # BLD AUTO: 3.98 THOUSAND/UL (ref 4.31–10.16)

## 2024-10-14 PROCEDURE — 99222 1ST HOSP IP/OBS MODERATE 55: CPT | Performed by: PSYCHIATRY & NEUROLOGY

## 2024-10-14 PROCEDURE — 97166 OT EVAL MOD COMPLEX 45 MIN: CPT

## 2024-10-14 PROCEDURE — 80179 DRUG ASSAY SALICYLATE: CPT | Performed by: INTERNAL MEDICINE

## 2024-10-14 PROCEDURE — 82948 REAGENT STRIP/BLOOD GLUCOSE: CPT

## 2024-10-14 PROCEDURE — 99239 HOSP IP/OBS DSCHRG MGMT >30: CPT | Performed by: INTERNAL MEDICINE

## 2024-10-14 PROCEDURE — 80053 COMPREHEN METABOLIC PANEL: CPT | Performed by: INTERNAL MEDICINE

## 2024-10-14 PROCEDURE — 85027 COMPLETE CBC AUTOMATED: CPT | Performed by: INTERNAL MEDICINE

## 2024-10-14 RX ORDER — BACLOFEN 10 MG/1
5 TABLET ORAL 2 TIMES DAILY
Status: DISCONTINUED | OUTPATIENT
Start: 2024-10-14 | End: 2024-10-14 | Stop reason: HOSPADM

## 2024-10-14 RX ADMIN — NICOTINE 1 PATCH: 7 PATCH, EXTENDED RELEASE TRANSDERMAL at 09:05

## 2024-10-14 RX ADMIN — THIAMINE HCL TAB 100 MG 100 MG: 100 TAB at 09:07

## 2024-10-14 RX ADMIN — CYANOCOBALAMIN TAB 500 MCG 1000 MCG: 500 TAB at 09:06

## 2024-10-14 RX ADMIN — BACLOFEN 10 MG: 10 TABLET ORAL at 09:07

## 2024-10-14 RX ADMIN — Medication 400 MCG: at 09:07

## 2024-10-14 RX ADMIN — PANTOPRAZOLE SODIUM 40 MG: 40 TABLET, DELAYED RELEASE ORAL at 05:18

## 2024-10-14 RX ADMIN — HEPARIN SODIUM 5000 UNITS: 5000 INJECTION, SOLUTION INTRAVENOUS; SUBCUTANEOUS at 05:18

## 2024-10-14 NOTE — ASSESSMENT & PLAN NOTE
Acute toxic metabolic encephalopathy secondary to salicylate overdose on top of cognitive decline from history of craniotomy  Was impulsive yesterday requiring restraints and observation  Mood much better today.    Evaluated by neurology and will discontinue baclofen and start Zanaflex  Will take Zanaflex 4 mg at bedtime and 2 mg every 8 hours as needed -neurology will send in prescription

## 2024-10-14 NOTE — CONSULTS
"Consultation - Behavioral Health   Colin Dumont 54 y.o. male MRN: [id number]  Unit/Bed#: [address] [place] [address] Encounter: [id number]      Chief Complaint: \"I took baclofen and Excedrin PM\"    History of Present Illness   Physician Requesting Consult: Shayan Walton DO  Reason for Consult / Principal Problem: Mother reports patient is still active with rapid speech/thoughts, Dx 1.  Salicylate overdose    Colin Dumont is a 54 y.o. male with past medical history of craniotomy, hyperlipidemia, hypertension, type 2 diabetes who presented to the ED with altered mental status and is admitted for salicylate overdose. Psychiatry consultation is requested due to mother request.     Patient was cooperative with interview. Patient states that prior to admission that he had taken too much baclofen and Excedrin PM.  States that he bought Excedrin PM on [employer] and that he had taken it in the past.  States that he was using this for headache and sleep.  Reports that prior to admission he had taken baclofen and Excedrin PM and that he did not feel well and states he forgot that he had taken the medications and therefore stated that he took more baclofen and more Excedrin PM.  States that he felt confused and asked his mother for help.  Patient reports feeling better today and states that he is at baseline.  Patient denies depression.  Denies anxiety.  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 Denies suicidal or homicidal ideation, plan, or intent. Denies this having been a suicide attempt.  Does not endorse history of kanwal/hypomania.  Denies auditory or visual hallucinations.  Denies taking psychiatric medication.    Patient gave verbal consent to speak with patient's mother.  Called patient's mother Millicent who reports that patient \"acts normal most of the time\" though is \"suspicious, doesn't bathe\".  She reports the patient called the police Friday \"against\" her, she states because she believes he " "heard  her talking about him to her daughter/his sister about him not bathing or up keeping his room.  She states she believes the patient thought that his sister's  was going to come with a gun to get him out of the house.  Reports that the patient was agitated, \"twitchy\", talking quickly and could not understand him, that he is diabetic and they could not get his blood glucose.  She reports that his room smelled like \"raid\" even on his clothes.  Reports that the patient is impulsive. States she does not have concerns that this was a suicide attempt.  Reports that the patient is okay to return home.    Went to discuss this with patient.  Patient affirms that he said he thought that his brother-in-law was coming with a gun to kick him out of the house.  He states he feels this was the result of having taken all the medication.        Historical Information   Past Psychiatric History:   Patient denies psychiatric hospitalizations..  Past Suicide attempts: denies  Past Violent behavior: denies  Past Psychiatric medication trial: denies    Substance Abuse History:  Reports chewing tobacco. Denies alcohol or other substance use for 6 months.      Family Psychiatric History:   unknown    Social History  Marital history: single  Living arrangement, social support: lives with parents  Occupational History: reports disability  Denies access to firearms      Past Medical History:   Diagnosis Date    Abdominal cyst     Anemia     Hx     Chronic pain disorder     abdominal    Diabetes mellitus (HCC)     Diverticulitis     GI bleed     History of transfusion 2016    5 units    Lightheadedness     Multiple brain abscesses 2020    Multiple lesions on computed tomography of brain and spine     Spleen laceration        Medical Review Of Systems:    Muscle spasms    Meds/Allergies     all current active meds have been reviewed and current meds:   Current Facility-Administered Medications:     acetaminophen (TYLENOL) tablet " "650 mg, Q4H PRN    baclofen tablet 10 mg, BID    cyanocobalamin (VITAMIN B-12) tablet 1,000 mcg, Daily    folic acid (FOLVITE) tablet 400 mcg, Daily    heparin (porcine) subcutaneous injection 5,000 Units, Q8H JEF    insulin glargine (LANTUS) subcutaneous injection 8 Units 0.08 mL, HS    insulin lispro (HumALOG/ADMELOG) 100 units/mL subcutaneous injection 1-6 Units, 4x Daily (AC & HS) **AND** Fingerstick Glucose (POCT), 4x Daily AC and at bedtime    melatonin tablet 6 mg, HS    nicotine (NICODERM CQ) 7 mg/24hr TD 24 hr patch 1 patch, Daily    OLANZapine (ZyPREXA) IM injection 2.5 mg, Q6H PRN    ondansetron (ZOFRAN) injection 4 mg, Q4H PRN    pantoprazole (PROTONIX) EC tablet 40 mg, Early Morning    thiamine tablet 100 mg, Daily  No Known Allergies    Objective     Vital signs in last 24 hours:  Temp:  [98.1 °F (36.7 °C)-98.3 °F (36.8 °C)] 98.1 °F (36.7 °C)  HR:  [88-98] 89  BP: (136-139)/(78-81) 136/81  Resp:  [16-18] 18  SpO2:  [93 %-97 %] 93 %  O2 Device: None (Room air)      Intake/Output Summary (Last 24 hours) at 10/14/2024 0936  Last data filed at 10/14/2024 0534  Gross per 24 hour   Intake --   Output 2425 ml   Net -2425 ml       Mental Status Evaluation:  Appearance:  appears stated age, bearded, and laying in bed   Behavior:  cooperative and psychomotor agitation   Speech:  normal volume and increase rate   Mood:  \"Better\"   Affect:  Increased in intensity   Language: Within normal limits   Thought Process:  goal directed and organized   Associations: intact associations   Thought Content:  no delusions elicited   Perceptual Disturbances: Denies auditory or visual hallucinations and Does not appear to be responding to internal stimuli   Risk Potential: Denies current suicidal or homicidal ideation, plan, or intent   Sensorium:  person, place, and situation   Memory:  Recent memory appears impaired   Cognition:  Recent memory appears impaired   Consciousness:  alert and awake    Attention: attention span and " concentration were age appropriate   Intellect: not examined   Fund of Knowledge: Not assessed   Insight:  fair   Judgment: moderate   Muscle Strength and Tone: Not assessed   Gait/Station: not assessed, patient in bed   Motor Activity: no abnormal movements     Lab Results:      Most Recent Labs:   Lab Results   Component Value Date    WBC 3.98 (L) 10/14/2024    RBC 3.53 (L) 10/14/2024    HGB 11.8 (L) 10/14/2024    HCT 36.4 (L) 10/14/2024     (L) 10/14/2024    RDW 17.0 (H) 10/14/2024    NEUTROABS 8.43 (H) 10/12/2024    SODIUM 140 10/14/2024    K 3.9 10/14/2024     10/14/2024    CO2 32 10/14/2024    BUN 11 10/14/2024    CREATININE 0.58 (L) 10/14/2024    GLUC 73 10/14/2024    CALCIUM 8.5 10/14/2024    AST 15 10/14/2024    ALT 12 10/14/2024    ALKPHOS 36 10/14/2024    TP 5.7 (L) 10/14/2024    ALB 3.8 10/14/2024    TBILI 0.33 10/14/2024    CHOLESTEROL 150 07/11/2022    HDL 46 07/11/2022    TRIG 184 (H) 06/18/2024    LDLCALC 26 07/11/2022    AMMONIA 37 10/12/2024    QOB3RJRYAUSA 0.743 10/12/2024    HGBA1C 6.6 (H) 10/12/2024     10/12/2024     Labs in last 72 hours:   Recent Labs     10/12/24  0854 10/12/24  1205 10/14/24  0524   WBC 9.58   < > 3.98*   RBC 4.24   < > 3.53*   HGB 14.1   < > 11.8*   HCT 42.2   < > 36.4*      < > 140*   RDW 17.6*   < > 17.0*   NEUTROABS 8.43*  --   --    SODIUM 136   < > 140   K 4.6   < > 3.9      < > 103   CO2 19*   < > 32   BUN 33*   < > 11   CREATININE 1.33*   < > 0.58*   GLUC 116   < > 73   CALCIUM 12.5*   < > 8.5   AST 22   < > 15   ALT 19   < > 12   ALKPHOS 47   < > 36   TP 7.1   < > 5.7*   ALB 5.0   < > 3.8   TBILI 0.26   < > 0.33   AMMONIA 37  --   --    FNC6VHQYIIWA 0.743  --   --     < > = values in this interval not displayed.     Drug Screen:   Lab Results   Component Value Date    AMPMETHUR Negative 10/12/2024    BARBTUR Negative 10/12/2024    BDZUR Negative 10/12/2024    THCUR Negative 10/12/2024    COCAINEUR Negative 10/12/2024    METHADONEUR  Negative 10/12/2024    OPIATEUR Negative 10/12/2024    PCPUR Negative 10/12/2024     Acetaminophen/Salicylate level   Lab Results   Component Value Date    ACTMNPHEN 12 10/12/2024    SALICYLATE <5 10/14/2024     Medical alcohol level   Lab Results   Component Value Date    ETOH <10 10/12/2024       Imaging Studies: CT head without contrast    Result Date: 10/12/2024  Narrative: CT BRAIN - WITHOUT CONTRAST INDICATION:   Altered mental status.. Slurred speech. COMPARISON: 6/20/2024 TECHNIQUE:  CT examination of the brain was performed.  Multiplanar 2D reformatted images were created from the source data. Radiation dose length product (DLP) for this visit:  914 mGy-cm .  This examination, like all CT scans performed in the [facility], was performed utilizing techniques to minimize radiation dose exposure, including the use of iterative reconstruction and automated exposure control. IMAGE QUALITY:  Diagnostic. FINDINGS: PARENCHYMA: Gliosis anteriorly in the right frontal lobe subjacent to a prior bilateral craniotomy likely related to prior shunt catheter. No hydrocephalus or shunt catheter currently. No acute intracranial hemorrhage. Left retromastoid/inferior lateral occipital craniotomy noted. Small amount of beam-hardening artifact versus encephalomalacia in the inferior left cerebellum. Atherosclerotic calcifications of the cavernous segment of the internal carotid artery are mild. VENTRICLES AND EXTRA-AXIAL SPACES:  Normal for the patient's age. VISUALIZED ORBITS: Normal visualized orbits. PARANASAL SINUSES: Normal visualized paranasal sinuses. CALVARIUM AND EXTRACRANIAL SOFT TISSUES: Left retromastoid/occipital craniotomy noted. Small right frontal reny hole craniotomy.     Impression: No acute intracranial hemorrhage, mass effect or edema. Workstation performed: [id number]     EKG/Pathology/Other Studies:   Lab Results   Component Value Date    VENTRATE 103 10/12/2024    ATRIALRATE 103 10/12/2024  "   PRINT 154 10/12/2024    QRSDINT 78 10/12/2024    QTINT 326 10/12/2024    QTCINT 427 10/12/2024    PAXIS 80 10/12/2024    QRSAXIS 52 10/12/2024    TWAVEAXIS 52 10/12/2024        Code Status: Level 1 - Full Code  Advance Directive and Living Will:      Power of :    POLST:        Assessment & Plan  Salicylate overdose  See below    Colin Dumont is a 54 y.o. malewith past medical history of craniotomy, hyperlipidemia, hypertension, type 2 diabetes who presented to the ED with altered mental status and is admitted for salicylate overdose. Psychiatry consultation is requested due to mother request.     Patient cooperative with interview.  He appears to exhibit psychomotor agitation, increased rate of speech, increased intensity of affect. Reports that prior to admission he had taken baclofen and Excedrin PM and that he did not feel well and states he forgot that he had taken the medications and therefore stated that he took more baclofen and more Excedrin PM.  States that he felt confused and asked his mother for help.  Denies depression.  Denies anxiety.  Denies current suicidal or homicidal ideation, plan, or intent.  Denies this having been a suicide attempt.  Does not endorse history of kanwal/hypomania.  Does not endorse current psychotic symptoms.  Denies taking psychiatric medication.  Called patient's mother Millicent with verbal consent from patient, see above for more information.  States that patient has been suspicious, has had poor hygiene.  Reports that he called the police \"against\" her and states she believes the patient thought that his sister's  was going to come with a gun to get him out of the house.  Reports the patient was agitated, talking quickly, \"twitchy\".  Reports that his room smelled like raid and even his clothes.  Reports the patient is impulsive.  She does not have concerns that this was a suicide attempt.  Reports that the patient is okay to return home.  Patient " affirms that he said he thought that his brother-in-law was coming with a gun to kick him out of the house.  He states he feels this was the result of having taken all the medication.  He states that he is not going to take Excedrin PM anymore. Patient is not interested in psychiatric medication or outpatient therapy.  Patient does not meet criteria for inpatient psychiatric hospitalization at this time.      Plan:   Continue medical management  Patient does not meet criteria for inpatient psychiatric hospitalization at this time  Patient is not interested in psychiatric medication or outpatient therapy  Patient is cleared from psychiatric standpoint for discharge  Discussed with the primary team  I will sign off.  Please contact with questions      Risks, benefits and possible side effects of Medications:   Patient is not interested in psychiatric medication      Alex Knight DO  10/14/24      This note has been constructed using a voice recognition system.    There may be translation, syntax,  or grammatical errors. If you have any questions, please contact the dictating provider.

## 2024-10-14 NOTE — OCCUPATIONAL THERAPY NOTE
OT EVALUATION       10/14/24 1305   OT Last Visit   OT Visit Date 10/14/24   Note Type   Note type Evaluation   Pain Assessment   Pain Assessment Tool 0-10   Pain Score No Pain   Restrictions/Precautions   Other Precautions Fall Risk   Home Living   Type of Home House   Home Layout Two level;Ramped entrance   Bathroom Shower/Tub Tub/shower unit   Bathroom Toilet Standard   Bathroom Equipment Shower chair;Grab bars in shower   Home Equipment Cane;Walker  (uses walker for long distances)   Prior Function   Level of [place] Independent with ADLs;Independent with functional mobility;Needs assistance with IADLS  (mother provides tranportation, pt reports he cooks)   Lives With Family  (mother)   Receives Help From Family   IADLs Family/Friend/Other provides transportation   Lifestyle   Reciprocal Relationships mother present for session   ADL   Eating Assistance 7  Independent   Grooming Assistance 7  Independent   UB Bathing Assistance 5  Supervision/Setup   LB Bathing Assistance 5  Supervision/Setup   UB Dressing Assistance 5  Supervision/Setup   LB Dressing Assistance 5  Supervision/Setup   Toileting Assistance  5  Supervision/Setup   Transfers   Sit to Stand 5  Supervision   Stand to Sit 5  Supervision   Functional Mobility   Functional Mobility 5  Supervision   Additional Comments household distance with RW in hallway, pt impulsive with poor safety awareness, benefits from verbal cues. Mother reports this is his baseline gait   Balance   Static Sitting Fair +   Dynamic Sitting Fair +   Static Standing Fair   Dynamic Standing Fair -   Activity Tolerance   Activity Tolerance Patient tolerated treatment well   Medical Staff Made Aware Dr Walton pt would benefit from outpatient PT   Nurse Made Aware yes, Jocelynn ALSTON Assessment   RUE Assessment WFL   LUE Assessment   LUE Assessment WFL   Hand Function   Gross Motor Coordination Impaired  (RUE/RLE)   Fine Motor Coordination Impaired  (RUE)   Cognition   Overall  Cognitive Status WFL   Arousal/Participation Cooperative   Attention Attends with cues to redirect   Orientation Level Oriented X4   Following Commands Follows one step commands with increased time or repetition   Assessment   Limitation Decreased ADL status;Decreased Safe judgement during ADL;Decreased endurance;Decreased high-level ADLs;Decreased self-care trans  (decreased balance and mobility)   Prognosis Good   Assessment Patient evaluated by Occupational Therapy.  Patient admitted with Salicylate overdose.  The patients occupational profile, medical and therapy history includes a extensive additional review of physical, cognitive, or psychosocial history related to current functional performance.  Comorbidities affecting functional mobility and ADLS include: anemia, chronic pain, and diabetes, multiple brain abscesses and hx of craniotomy.  Prior to admission, patient was independent with functional mobility with cane/walker for distance, independent with ADLS, and requiring assist for IADLS.  The evaluation identifies the following performance deficits: impaired balance, decreased endurance, increased fall risk, new onset of impairment of functional mobility, decreased ADLS, decreased IADLS, decreased activity tolerance, decreased safety awareness, and decreased strength, that result in activity limitations and/or participation restrictions. This evaluation requires clinical decision making of moderate complexity, because the patient may present with comorbidities that affect occupational performance and required minimal or moderate modification of tasks or assistance with the consideration of several treatment options.  The Barthel Index was used as a functional outcome tool presenting with a score of Barthel Index Score: 55, indicating marked limitations of functional mobility and ADLS.  The patient's raw score on the AM-PAC Daily Activity Inpatient Short Form is 21. A raw score of greater than or equal to  19 suggests the patient may benefit from discharge to home. Please refer to the recommendation of the Occupational Therapist for safe discharge planning.  Patient will benefit from skilled Occupational Therapy services to address above deficits and facilitate a safe return to prior level of function.   Goals   Patient Goals to go home   STG Time Frame   (1-7 days)   Short Term Goal  Goals established to promote Patient Goals: to go home:  Grooming: independent standing at sink; Bathing: independent; Lower Body Dressing: independent; Toileting: independent; Patient will increase ambulatory standard toilet transfer to independent with rolling walker to increase performance and safety with ADLS and functional mobility; Patient will increase standing tolerance to 5 minutes during ADL task to decrease assistance level and decrease fall risk; Patient will increase functional mobility to and from bathroom with rolling walker independently to increase performance with ADLS and to use a toilet;  Patient will improve functional activity tolerance to 10 minutes of sustained functional tasks to increase participation in basic self-care and decrease assistance level  Patient will increase dynamic standing balance to fair to improve postural stability and decrease fall risk during standing ADLS and transfers.   LTG Time Frame   (8-14 days)   Long Term Goal Patient will increase standing tolerance to 10 minutes during ADL task to decrease assistance level and decrease fall risk;  Patient will improve functional activity tolerance to 20 minutes of sustained functional tasks to increase participation in basic self-care and decrease assistance level  Patient will increase dynamic standing balance to fair+ to improve postural stability and decrease fall risk during standing ADLS and transfers. Pt will score >/= 24/24 on AM-PAC Daily Activity Inpatient scale to promote safe independence with ADLs and functional mobility; Pt will score  >/= 85/100 on Barthel Index in order to decrease caregiver assistance needed and increase ability to perform ADLs and functional mobility.   Plan   Treatment Interventions ADL retraining;Functional transfer training;UE strengthening/ROM;Patient/family training;Equipment evaluation/education;Activityengagement;Compensatory technique education;Endurance training   Goal Expiration Date 10/28/24   OT Frequency 3-5x/wk   Discharge Recommendation   Rehab Resource Intensity Level, OT III (Minimum Resource Intensity)   AM-PAC Daily Activity Inpatient   Lower Body Dressing 3   Bathing 3   Toileting 3   Upper Body Dressing 4   Grooming 4   Eating 4   Daily Activity Raw Score 21   Daily Activity Standardized Score (Calc for Raw Score >=11) 44.27   AM-PAC Applied Cognition Inpatient   Following a Speech/Presentation 3   Understanding Ordinary Conversation 4   Taking Medications 3   Remembering Where Things Are Placed or Put Away 3   Remembering List of 4-5 Errands 3   Taking Care of Complicated Tasks 3   Applied Cognition Raw Score 19   Applied Cognition Standardized Score 39.77   Barthel Index   Feeding 10   Bathing 0   Grooming Score 0   Dressing Score 5   Bladder Score 10   Bowels Score 10   Toilet Use Score 5   Transfers (Bed/Chair) Score 10   Mobility (Level Surface) Score 0   Stairs Score 5   Barthel Index Score 55   Licensure   [place] License Number  Teena Gill MS OTR/L [id number]

## 2024-10-14 NOTE — ASSESSMENT & PLAN NOTE
Lab Results   Component Value Date    HGBA1C 6.6 (H) 10/12/2024     Recent Labs     10/13/24  1603 10/13/24  2133 10/14/24  0824 10/14/24  1052   POCGLU 108 94 131 113       Continue home dosing of glargine

## 2024-10-14 NOTE — ASSESSMENT & PLAN NOTE
History of brain abscess with craniotomy.  Patient's mother reports he has had some mood changes.  Cleared by psychiatry for discharge

## 2024-10-14 NOTE — DISCHARGE INSTR - AVS FIRST PAGE
Dear Colin Dumont,     It was our pleasure to care for you here at [facility].  It is our hope that we were always able to exceed the expected standards for your care during your stay.  You were hospitalized due to intentional overdose of aspirin containing products.  You were cared for on the third floor by Shayan Walton DO with the [facility] who covers for your primary care physician (PCP), Stefani Garner MD, while you were hospitalized.  If you have any questions or concerns related to this hospitalization, you may contact us at .  For follow up as well as any medication refills, we recommend that you follow up with your primary care physician.  A registered nurse will reach out to you by phone within a few days after your discharge to answer any additional questions that you may have after going home.  However, at this time we provide for you here, the most important instructions / recommendations at discharge:     Notable Medication Adjustments -   Discontinue baclofen, neurology has replaced this with Zanaflex-they will call that prescription in  Testing Required after Discharge -   None  Important follow up information -   PCP follow-up in 1 week  Neurology follow-up in 2 to 3 months  Other Instructions -   You have persistent spasms or uncontrolled symptoms please contact neurology for additional recommendation  Please review this entire after visit summary as additional general instructions including medication list, appointments, activity, diet, any pertinent wound care, and other additional recommendations from your care team that may be provided for you.      Sincerely,     Shayan Walton DO and Nurse Diego

## 2024-10-14 NOTE — ASSESSMENT & PLAN NOTE
Urinary catheter was placed yesterday by urology.  Trial void performed and patient urinating appropriately, simply some anticholinergic effects from unintentional overdose

## 2024-10-14 NOTE — ASSESSMENT & PLAN NOTE
Lab Results   Component Value Date    HGBA1C 6.6 (H) 10/12/2024       Recent Labs     10/13/24  1057 10/13/24  1603 10/13/24  2133 10/14/24  0824   POCGLU 118 108 94 131       Blood Sugar Average: Last 72 hrs:  (P) [id number]

## 2024-10-14 NOTE — PROGRESS NOTES
"Progress Note - Urology      Patient: Colin Dumont   : 1970 Sex: male   MRN: [id number]     CSN: [id number]  Unit/Bed#: [address] [place] [address]     SUBJECTIVE:   Patient seen on afternoon rounds  Brandt catheter in position  Alert awake      Objective   Vitals: /81 (BP Location: Left arm)   Pulse 89   Temp 98.1 °F (36.7 °C) (Temporal)   Resp 18   Ht 5' 9\" (1.753 m)   Wt 69.9 kg (154 lb 1.6 oz)   SpO2 93%   BMI 22.76 kg/m²     I/O last 24 hours:  In: -   Out: 2425 [Urine:2425]      Physical Exam:   General Alert awake   Normocephalic atraumatic PERRLA  Lungs clear bilaterally  Cardiac normal S1 normal S2  Abdomen soft, flank pain  Extremities no edema      Lab Results: CBC:   Lab Results   Component Value Date    WBC 3.98 (L) 10/14/2024    HGB 11.8 (L) 10/14/2024    HCT 36.4 (L) 10/14/2024     (H) 10/14/2024     (L) 10/14/2024    RBC 3.53 (L) 10/14/2024    MCH 33.4 10/14/2024    MCHC 32.4 10/14/2024    RDW 17.0 (H) 10/14/2024    MPV 9.4 10/14/2024    NRBC 0 10/12/2024     CMP:   Lab Results   Component Value Date     10/14/2024     2019    CO2 32 10/14/2024    CO2 21 2024    CO2 31 2019    BUN 11 10/14/2024    BUN 11 2019    CREATININE 0.58 (L) 10/14/2024    CREATININE 0.6 2019    GLUCOSE 126 2024    CALCIUM 8.5 10/14/2024    CALCIUM 9.5 2019    AST 15 10/14/2024    AST 13 2019    ALT 12 10/14/2024    ALT 21 2019    ALKPHOS 36 10/14/2024    ALKPHOS 72 2019    EGFR 113 10/14/2024    EGFR >60.0 2019     Urinalysis:   Lab Results   Component Value Date    COLORU Yellow 10/12/2024    CLARITYU Slightly Cloudy 10/12/2024    SPECGRAV >=1.030 10/12/2024    PHUR 5.0 10/12/2024    PHUR 5.5 2017    LEUKOCYTESUR Negative 10/12/2024    NITRITE Negative 10/12/2024    GLUCOSEU Negative 10/12/2024    KETONESU 10 (1+) (A) 10/12/2024    BILIRUBINUR Negative 10/12/2024    BLOODU Negative 10/12/2024     Urine Culture: No " "results found for: \"URINECX\"  PSA: No results found for: \"PSA\"      Assessment/ Plan:  Urinary tension  Awake  D/c [facility]  Voiding trial          Alex Curtis MD  "

## 2024-10-14 NOTE — CONSULTS
[facility]   Neurology Initial Consult    Colin Dumont is a 54 y.o. male  [address] [address]/[address] [address]          Information obtained from:   Chief Complaint   Patient presents with    Altered Mental Status     Per squad pts mother states that pts speech appeared slurred. pt seemed confused and like his face appeared to be drooping.         Assessment/Plan:    1.  Salicylate overdose  2.  BELLA  3.  Metabolic acidosis  4.  Right sided spasticity status post craniotomy  5.  Altered mental status-improved    -Discontinue baclofen  -Start tizanidine 4 mg daily at bedtime, can take half tablet 2 mg at daytime if needed  Will adjust dosing in the outpatient setting  -Outpatient physical therapy regarding right sided spasticity    Patient is a 54-year-old male with PMH of cerebral abscesses she is antibiotics and postcraniotomy.  Patient has right sided spasticity and has previously been treated with neurology through baclofen.  According to patient's mother since being on the baclofen he has had episodes of paranoia and hallucinations.  Patient indicates these are worse when he takes Excedrin PM.  Patient did have lethargy and unresponsiveness due to taking too much medication, he was brought to the ER for further evaluation.  Patient positive with salicylate overdose, had BELLA with metabolic acidosis.  His mental status did improve over time and patient was indicated his discharge ready.  Patient's mother grew concerned with regards to the continued use of baclofen in light of this event.  Requested neurology evaluation.  Patient had no significant focal deficits on his neurological exam, he does have ataxia to the right upper and lower extremities secondary to spasticity.  He has a mild tremor with action in the right upper extremity as well.  No deficits noted to the left.  Patient speech is somewhat slowed however deliberate and purposeful.  Is clear and understandable.  Patient's gait is slightly  antalgic, he ambulates unassisted.  Will discontinue his baclofen at this point in time.  Can start on tizanidine 4 mg daily at bedtime, half tablet during the day if needed.  Will continue to adjust this in the outpatient setting.        Colin Dumont will need follow up in 2-3 months with general attending. He will not require outpatient neurological testing.      HPI:  Colin Dumont is a 55yo male with PMH of chronic pain disorder, DM, GI bleed, multiple brain abscesses postcraniotomy who presents to the hospital on 10/12/2020 for with altered mental status and reported slurred speech, etiology unclear.  Patient's mother reported he woke up in the morning, noticed his door was open.  She became concerned that he had taken extra baclofen as he becomes paranoid and hallucinates with the medication.  She had fallen asleep and woke into the please back her door, she had not called the police therefore suspect that her son called them.  On arrival his creatinine was 1.33, CO2 was 19, salicylate level was elevated and reports he took Excedrin PM.  Magnesium 1.6.  Patient did receive bicarb in the ER.  Patient reported to psychiatry that he had taken too much baclofen and Excedrin PM.  He had bought Excedrin off of the bed a and had taking it in the past.  He was using it for headache and sleep.  Prior to his admission he was not feeling well, he believes he took the baclofen and Excedrin but forgot he had already taken the medications therefore took them again.  He believes he is taken 30mg of baclofen in addition to the Excedrin PM.  Patient reported he felt confused, asked his mother for help.  Reported feeling better overall.  Patient denied this as a suicidal attempt, he does not endorse having history of kanwal or hypomania he has denied auditory or visual hallucinations and he denies taking any psychiatric medication.  According to the patient's mother he acts normal most of the time however has suspicious  behaviors.  She noted that he does not behave which is suspicious to her, patient also called the police against her indicating he thought she was having her  come with him To get him out of the house.  Patient's mother indicated that she may have been talking to his sister about his bathing habits and staying up in his room, noted that he gets agitated, talks quickly to the point where he cannot understand him.  He is also diabetic and cannot get his blood glucose on him.  She reported that his room smelled like grade bug spray and that he is impulsive.    Patient's mother denies any concerns for the possibility of suicidal attempt and that he can return home when he is discharged ready.  Patient did reaffirm that he thought his brother-in-law was coming to the house with a gun to get him out of the house.  States that he feels this was a result of taking too much of his medication.  Patient was presented as discharge ready today, patient's mother was concerned and asked for neurology to be consulted to get clearance before  Discharging.  Evaluated patient at bedside, he continues to have some spasticity in the right upper and lower extremities, he has tremor with action, dysmetria and ataxia on finger-to-nose.  He is alert and oriented x 3 and able to have conversation.  He has no issues with the left side of his body, right side continues with spasticity.  Patient reports he has been taking his baclofen, he believes that in commendation with the Excedrin PM causes him some reaction.  Patient's mother is concerned baclofen may be causing him some hallucinations which is why she asked neurology to weigh in.  Patient is previously been seen in the outpatient neurology office however it has been almost a year and a half since his last visit.  Patient has no significant neurological deficits with the exception of right upper extremity ataxia on exam.  He is ambulatory with antalgic type gait due to the  spasticity in the right lower extremity.  Discussed physical therapy, patient has not done any physical therapy since he left rehab post infectious lesions.  He has had recommendations to reinitiate therapy he has just not gone.  Patient is aware he should go to PT, this may help with some of the spasticity in his movement.  Patient is agreeable and referral will be provided.  Discussed with attending neurology MD, she did visit the patient had further conversation.  Will discontinue his baclofen as this can cause him some issues with confusion and hallucination.  Will switch to Zanaflex 4 mg at bedtime.  She is advised she could take half tablet during the day if needed and further adjustments can be done in the neurology office in approximately 2 to 3 months.      Past Medical History:   Diagnosis Date    Abdominal cyst     Anemia     Hx     Chronic pain disorder     abdominal    Diabetes mellitus (HCC)     Diverticulitis     GI bleed     History of transfusion 2016    5 units    Lightheadedness     Multiple brain abscesses 2020    Multiple lesions on computed tomography of brain and spine     Spleen laceration        Past Surgical History:   Procedure Laterality Date    ABDOMINAL SURGERY  2016    lap removal of mass post spleen injury    COLONOSCOPY N/A 05/24/2017    Procedure: COLONOSCOPY;  Surgeon: Hammad Galindo MD;  Location: [facility] GI LAB;  Service:     CRANIOTOMY      EGD AND COLONOSCOPY N/A 03/15/2017    Procedure: EGD AND COLONOSCOPY;  Surgeon: Hammad Galindo MD;  Location: [facility] GI LAB;  Service:     OTHER SURGICAL HISTORY      per [employer]-had spleen surgery; no other details    UPPER GASTROINTESTINAL ENDOSCOPY      for gastric bleeding       No Known Allergies    No current facility-administered medications for this encounter.    Current Outpatient Medications:     tiZANidine (Zanaflex) 4 mg tablet, Take 1 tablet (4 mg total) by mouth daily at bedtime (Patient not taking: Reported on 10/14/2024), Disp:  , Rfl:     cyanocobalamin (VITAMIN B-12) 1000 MCG tablet, Take 1 tablet (1,000 mcg total) by mouth daily, Disp: 30 tablet, Rfl: 0    folic acid (FOLVITE) 400 mcg tablet, Take 1 tablet (400 mcg total) by mouth daily (Patient not taking: Reported on 10/12/2024), Disp: 30 tablet, Rfl: 0    Insulin Glargine Solostar (Lantus SoloStar) 100 UNIT/ML SOPN, Inject 0.15 mL (15 Units total) under the skin daily at bedtime, Disp: 15 mL, Rfl: 0    Insulin Pen Needle 32G X 4 MM MISC, Use in the morning, Disp: 100 each, Rfl: 0    pantoprazole (PROTONIX) 40 mg tablet, Take 1 tablet (40 mg total) by mouth daily in the early morning, Disp: 30 tablet, Rfl: 0    thiamine 100 MG tablet, Take 1 tablet (100 mg total) by mouth daily, Disp: 30 tablet, Rfl: 0    tiZANidine (ZANAFLEX) 4 mg tablet, Take 1 tab at bedtime daily and if needed take 1/2 tablet during day time., Disp: 60 tablet, Rfl: 4    Social History     Socioeconomic History    Marital status: Single     Spouse name: Not on file    Number of children: Not on file    Years of education: Not on file    Highest education level: Not on file   Occupational History    Not on file   Tobacco Use    Smoking status: Some Days     Current packs/day: 0.00     Average packs/day: 0.3 packs/day for 5.0 years (1.3 ttl pk-yrs)     Types: Cigarettes     Start date: 2011     Last attempt to quit: 2016     Years since quittin.4    Smokeless tobacco: Current     Types: Chew    Tobacco comments:     Off an on smoking   Vaping Use    Vaping status: Never Used   Substance and Sexual Activity    Alcohol use: Not Currently     Alcohol/week: 0.0 - 1.0 standard drinks of alcohol     Comment: ocassionally    Drug use: No    Sexual activity: Yes   Other Topics Concern    Not on file   Social History Narrative    Caffeine use    Feels safe at home     Social Determinants of Health     Financial Resource Strain: Not on file   Food Insecurity: No Food Insecurity (10/14/2024)    Hunger Vital Sign     " Worried About Running Out of Food in the Last Year: Never true     Ran Out of Food in the Last Year: Never true   Transportation Needs: No Transportation Needs (10/14/2024)    PRAPARE - Transportation     Lack of Transportation (Medical): No     Lack of Transportation (Non-Medical): No   Physical Activity: Not on file   Stress: Not on file   Social Connections: Unknown (6/18/2024)    Received from [facility]    Social [employer]     How often do you feel lonely or isolated from those around you? (Adult - for ages 18 years and over): Not on file   Intimate Partner Violence: Not on file   Housing Stability: Low Risk  (10/14/2024)    Housing Stability Vital Sign     Unable to Pay for Housing in the Last Year: No     Number of Times Moved in the Last Year: 0     Homeless in the Last Year: No       Family History   Problem Relation Age of Onset    No Known Problems Mother     Diabetes Father     No Known Problems Sister     No Known Problems Brother          Review of systems:  Please see HPI for positive symptoms.   Constitutional: No fever, no chills, no weight change.   + Mild bouts of lightheadedness  Ocular: No diplopia, no blurred vision, spots/zigzag lines  HEENT:  No sore throat, headache or congestion.   COR:  No chest pain. No palpitations.   Lungs:  no sob  GI:  no  nausea, no vomiting, no diarrhea, no constipation, no anorexia.   :  No dysuria, frequency, or urgency. No hematuria.    Musculoskeletal:  No joint pain or swelling   + Right upper extremity tremor with spasticity  + Mild spasticity to the right lower extremity  Skin:  No rash or itching.   Psychiatric:  no anxiety, no depression.   Endocrine:  No polyuria or polydipsia.    Physical examination:  /81 (BP Location: Left arm)   Pulse 89   Temp 98.1 °F (36.7 °C) (Temporal)   Resp 18   Ht 5' 9\" (1.753 m)   Wt 69.9 kg (154 lb 1.6 oz)   SpO2 93%   BMI 22.76 kg/m²     GENERAL APPEARANCE:  The patient is alert, oriented.    HEENT:  Head is " normocephalic.  Pupils are equal and reactive.    NECK:  Supple without lymphadenopathy.   HEART:  Regular rate and rhythm.  LUNGS:   No audible wheezing or stridor heard.  ABDOMEN:  Soft, nontender, nondistended.    EXTREMITIES:  Without cyanosis, clubbing or edema.     Mental status:  The patient is alert, attentive, and oriented.   Speech is clear and fluent, good repetition, comprehension, and naming.   Cranial nerves:  CN II: Visual fields are full to confrontation.   Fundoscopic exam is normal with sharp discs and no vascular changes.  Pupils are 3 mm and briskly reactive to light.   CN III, IV, VI: At primary gaze, there is no eye deviation.   CN V: Facial sensation is intact in all 3 divisions bilaterally.   Corneal responses are intact.  CN VII: Face is symmetric with normal eye closure and smile.  CN VIII: Hearing is normal to rubbing fingers  CN IX, X: Palate elevates symmetrically. Phonation is normal.  CN XI: Head turning and shoulder shrug are intact  CN XII: Tongue is midline with normal movements and no atrophy.  Motor:  There is no pronator drift of out-stretched arms.    Muscle bulk and tone are normal.   Muscle exam  Arm Right Left Leg Right Left   Deltoid 5/5 5/5 Iliopsoas 5/5 5/5   Biceps 5/5 5/5 Quads 5/5 5/5   Triceps 5/5 5/5 Hamstrings 5/5 5/5   Wrist Extension 5/5 5/5 Ankle Dorsi Flexion 5/5 5/5   Wrist Flexion 5/5 5/5 Ankle Plantar Flexion 5/5 5/5        Reflexes    RJ BJ TJ KJ AJ Plantars Alvarez's   Right 2+ 2+  2+ 2+ Downgoing Not present   Left 2+ 2+  2+ 2+ Downgoing Not present      Sensory:  Light touch, Temperature, position sense, and vibration sense are intact in fingers and toes.  Coordination:  Rapid alternating movements and fine finger movements are intact.   There is + ataxia/dysmetria on finger-to-nose with right upper extremity and heel-knee-shin limited to the right lower extremity.   There are + tremor, right upper extremity.    Romberg mildly  positive  Gait/Stance:  Mildly antalgic gait, walks unassisted    Lab Results   Component Value Date    WBC 3.98 (L) 10/14/2024    HGB 11.8 (L) 10/14/2024    HCT 36.4 (L) 10/14/2024     (H) 10/14/2024     (L) 10/14/2024     Lab Results   Component Value Date    HGBA1C 6.6 (H) 10/12/2024     Lab Results   Component Value Date    ALT 12 10/14/2024    AST 15 10/14/2024    ALKPHOS 36 10/14/2024     Lab Results   Component Value Date    GLUCOSE 126 06/14/2024    CALCIUM 8.5 10/14/2024    K 3.9 10/14/2024    CO2 32 10/14/2024     10/14/2024    BUN 11 10/14/2024    CREATININE 0.58 (L) 10/14/2024         Review of reports and notes reveal:  Independent Interpretation of images or specimens:  CT head without contrast    Result Date: 10/12/2024  No acute intracranial hemorrhage, mass effect or edema. Workstation performed: [id number]           Thank you for this consult.    Total time of encounter: 70 Minutes  More than 50% of time was spent in counseling and coordination of care of patient.  Discussed with the patient and his mother at bedside, medical team and attending neurology MD.

## 2024-10-14 NOTE — ASSESSMENT & PLAN NOTE
Increased anion gap acidosis present admission secondary to salicylate toxicity  Resolved with bicarbonate infusion    Results from last 7 days   Lab Units 10/14/24  0524 10/13/24  0452 10/13/24  0051   ANION GAP mmol/L 5 8  9 6   CO2 mmol/L 32 32  30 31

## 2024-10-14 NOTE — DISCHARGE SUMMARY
Discharge Summary - Hospitalist   Name: Colin Dumont 54 y.o. male I MRN: [id number]  Unit/Bed#: 3 [address] I Date of Admission: 10/12/2024   Date of Service: 10/14/2024 I Hospital Day: 2       Admitting Provider:  Sidney Lovelace DO  Discharge Provider:  Shayan Walton DO  Admission Date: 10/12/2024       Discharge Date: 10/14/24   LOS: 2  Primary Care Physician at Discharge: Stefani Garner [phone]    HOSPITAL COURSE:  Colin Dumont is a 54 y.o. male who presented with altered mental status.  The patient has a history of craniotomy for which she has residual cognitive defects as well as right-sided spasticity.  He has has a history of hyperlipidemia hypertension and diabetes mellitus.  The patient was admitted to the hospital after he had taken multiple doses of Excedrin PM.  He reported that he been taking it for headache and sleep and did not remember how he was taking his medications and took additional doses of baclofen and Excedrin.  This resulted in significant disorientation, the patient was reportedly confused and contacted the police.  He was brought into the hospital where he was found to have salicylate intoxication and toxicity.    The patient subsequently was evaluated urgently in consultation by the toxicology service.  He was placed on sodium bicarbonate drip and his renal function remained stable.  He had no further evidence of anion gap elevation in salicylate levels rapidly normalized.    Patient was evaluated by psychiatry who felt the patient had not had a suicidal attempt.  He was cleared for discharge home.    Neurology consultation was obtained prior to discharge and baclofen was discontinued.  He was placed on Zanaflex which will be called in by the neurology service.    At the time of discharge the patient was tolerating oral diet they were without acute complaint and they were medically cleared for discharge.  All questions were answered the patient's satisfaction  and they were in agreement with the discharge plan.    DISCHARGE DIAGNOSES  Acute urinary retention  Assessment & Plan  Urinary catheter was placed yesterday by urology.  Trial void performed and patient urinating appropriately, simply some anticholinergic effects from unintentional overdose    High anion gap metabolic acidosis  Assessment & Plan  Increased anion gap acidosis present admission secondary to salicylate toxicity  Resolved with bicarbonate infusion    Results from last 7 days   Lab Units 10/14/24  0524 10/13/24  0452 10/13/24  0051   ANION GAP mmol/L 5 8  9 6   CO2 mmol/L 32 32  30 31         Toxic metabolic encephalopathy  Assessment & Plan  Acute toxic metabolic encephalopathy secondary to salicylate overdose on top of cognitive decline from history of craniotomy  Was impulsive yesterday requiring restraints and observation  Mood much better today.    Evaluated by neurology and will discontinue baclofen and start Zanaflex  Will take Zanaflex 4 mg at bedtime and 2 mg every 8 hours as needed -neurology will send in prescription    Type 2 diabetes mellitus without complication, with long-term current use of insulin ([facility])  Assessment & Plan  Lab Results   Component Value Date    HGBA1C 6.6 (H) 10/12/2024     Recent Labs     10/13/24  1603 10/13/24  2133 10/14/24  0824 10/14/24  1052   POCGLU 108 94 131 113       Continue home dosing of glargine    S/P craniotomy  Assessment & Plan  History of brain abscess with craniotomy.  Patient's mother reports he has had some mood changes.  Cleared by psychiatry for discharge    Benign essential hypertension  Assessment & Plan  Resume home medications including lisinopril at discharge      * Salicylate overdose  Assessment & Plan  History of diabetes mellitus hypertension and brain abscess status postcraniotomy who was sent to the hospital by his mother for change in mental status  In the ED he was found to have metabolic acidosis and subsequently salicylate  toxicity  Patient reports consuming 4 tablets of excedrin p.m. night prior to admission  Mother reports 2 more bottles came in the mail  No further salicylates  Bicarbonate drip discontinued and no further renal damage noted    Results from last 7 days   Lab Units 10/14/24  0524 10/13/24  0452 10/13/24  0051 10/12/24  2122   SALICYLATE LVL mg/dL <5 17 <5 28*           CONSULTING PROVIDERS   IP CONSULT TO TOXICOLOGY  IP CONSULT TO UROLOGY  IP CONSULT TO PSYCHIATRY  IP CONSULT TO NEUROLOGY    PROCEDURES PERFORMED  * No surgery found *    RADIOLOGY RESULTS  CT head without contrast    Result Date: 10/12/2024  Impression: No acute intracranial hemorrhage, mass effect or edema. Workstation performed: [id number]       LABS  Results from last 7 days   Lab Units 10/14/24  0524 10/13/24  0452 10/12/24  0854   WBC Thousand/uL 3.98* 3.81* 9.58   HEMOGLOBIN g/dL 11.8* 11.0* 14.1   HEMATOCRIT % 36.4* 33.3* 42.2   MCV fL 103* 101* 100*   PLATELETS Thousands/uL 140* 155 237     Results from last 7 days   Lab Units 10/14/24  0524 10/13/24  0452 10/13/24  0051 10/12/24  2122 10/12/24  1635 10/12/24  1205 10/12/24  0854   SODIUM mmol/L 140 142  142 141 142 140 138 136   POTASSIUM mmol/L 3.9 3.2*  3.5 3.5 4.1 4.9 4.6 4.6   CHLORIDE mmol/L 103 102  103 104 105 107 105 102   CO2 mmol/L 32 32  30 31 29 26 21 19*   BUN mg/dL 11 23  23 25 28* 29* 29* 33*   CREATININE mg/dL 0.58* 0.82  0.83 0.95 1.10 1.18 1.19 1.33*   CALCIUM mg/dL 8.5 8.5  8.3* 8.8 9.1 9.9 10.9* 12.5*   ALBUMIN g/dL 3.8 3.6  --   --   --   --  5.0   TOTAL BILIRUBIN mg/dL 0.33 0.24  --   --   --   --  0.26   ALK PHOS U/L 36 31*  --   --   --   --  47   ALT U/L 12 11  --   --   --   --  19   AST U/L 15 12*  --   --   --   --  22   EGFR ml/min/1.73sq m 113 98  98 89 74 68 67 59   GLUCOSE RANDOM mg/dL 73 105  100 128 120 145* 120 116                  Results from last 7 days   Lab Units 10/14/24  1052 10/14/24  0824 10/13/24  2133 10/13/24  1603 10/13/24  1057  "10/13/24  0719 10/12/24  2124 10/12/24  1541 10/12/24  1200 10/12/24  0839   POC GLUCOSE mg/dl 113 131 94 108 118 123 134 192* 128 147*     Results from last 7 days   Lab Units 10/12/24  1205   HEMOGLOBIN A1C % 6.6*     Results from last 7 days   Lab Units 10/12/24  0854   TSH 3RD GENERATON uIU/mL 0.743               Cultures:   Results from last 7 days   Lab Units 10/12/24  1503   COLOR UA  Yellow   CLARITY UA  Slightly Cloudy   SPEC GRAV UA  >=1.030   PH UA  5.0   LEUKOCYTES UA  Negative   NITRITE UA  Negative   GLUCOSE UA mg/dl Negative   KETONES UA mg/dl 10 (1+)*   BILIRUBIN UA  Negative   BLOOD UA  Negative      Results from last 7 days   Lab Units 10/12/24  1503   RBC UA /hpf 0-1   WBC UA /hpf 0-1   EPITHELIAL CELLS WET PREP /hpf Occasional   BACTERIA UA /hpf Occasional                  PHYSICAL EXAM:  Vitals:   Blood Pressure: 136/81 (10/14/24 0820)  Pulse: 89 (10/14/24 0820)  Temperature: 98.1 °F (36.7 °C) (10/13/24 2001)  Temp Source: Temporal (10/13/24 2001)  Respirations: 18 (10/14/24 0820)  Height: 5' 9\" (175.3 cm) (10/12/24 1258)  Weight - Scale: 69.9 kg (154 lb 1.6 oz) (10/12/24 1258)  SpO2: 93 % (10/14/24 0820)      General: well appearing, no acute distress  HEENT: atraumatic, PERRLA, moist mucosa, normal pharynx, normal tonsils and adenoids, normal tongue, no fluid in sinuses  Neck: Trachea midline, no carotid bruit, no masses  Respiratory: normal chest wall expansion, CTA B  Cardiovascular: RRR, no m/r/g, Normal S1 and S2  Abdomen: Soft, non-tender, non-distended, normal bowel sounds in all quadrants, no hepatosplenomegaly, no tympany  Rectal: deferred  Musculoskeletal: Moves all  Integumentary: warm, dry, and pink, with no visible rash, purpura, or petechia  Heme/Lymph: no lymphadenopathy, no bruises  Neurological: Cranial Nerves II-XII grossly intact  Psychiatric: cooperative with normal mood, affect, and cognition       Discharge Disposition: Home  AM-PAC Basic Mobility:  Basic Mobility " Inpatient Raw Score: 14    JH-HLM Achieved: 7: Walk 25 feet or more  JH-HLM Goal: 4: Move to chair/commode    HLM Goal listed above. Continue with ongoing physical therapy and encourage appropriate mobility to improve upon HLM goals.      Test Results Pending at Discharge:           Medications   Summary of Medication Adjustments made as a result of this hospitalization: See discharge summary and AVS for medication changes  Medication Dosing Tapers - Please refer to Discharge Medication List for details on any medication dosing tapers (if applicable to patient).  Discharge Medication List: See after visit summary for reconciled discharge medications.     Diet restrictions:         Diet Orders   (From admission, onward)                 Start     Ordered    10/12/24 1247  Room Service  Once        Question:  Type of Service  Answer:  Room Service-Appropriate    10/12/24 1246    10/12/24 1120  Diet Ortiz/CHO Controlled; Consistent Carbohydrate Diet Level 2 (5 carb servings/75 grams CHO/meal)  Diet effective now        References:    Adult Nutrition Support Algorithm    RD Therapeutic Diet Order Protocol   Question Answer Comment   Diet Type Ortiz/CHO Controlled    Ortiz/CHO Controlled Consistent Carbohydrate Diet Level 2 (5 carb servings/75 grams CHO/meal)    RD to adjust diet per protocol? Yes        10/12/24 1119                  Activity restrictions: No strenuous activity  Discharge Condition: good    Outpatient Follow-Up and Discharge Instructions  See after visit summary section titled Discharge Instructions for information provided to patient and family.      Code Status: Level 1 - Full Code  Discharge Statement   I spent 86 minutes discharging the patient. This time was spent on the day of discharge. Greater than 50% of total time was spent with the patient and / or family counseling and / or coordination of care.    ** Please Note: This note was completed in part utilizing [employer] Dragon [employer] One Software.   Grammatical errors, random word insertions, spelling mistakes, and incomplete sentences may be an occasional consequence of this system secondary to software limitations, ambient noise, and hardware issues.  If you have any questions or concerns about the content, text, or information contained within the body of this dictation, please contact the provider for clarification.**

## 2024-10-14 NOTE — ASSESSMENT & PLAN NOTE
History of diabetes mellitus hypertension and brain abscess status postcraniotomy who was sent to the hospital by his mother for change in mental status  In the ED he was found to have metabolic acidosis and subsequently salicylate toxicity  Patient reports consuming 4 tablets of excedrin p.m. night prior to admission  Mother reports 2 more bottles came in the mail  No further salicylates  Bicarbonate drip discontinued and no further renal damage noted    Results from last 7 days   Lab Units 10/14/24  0524 10/13/24  0452 10/13/24  0051 10/12/24  2122   SALICYLATE LVL mg/dL <5 17 <5 28*

## 2024-10-14 NOTE — PHYSICAL THERAPY NOTE
PT orders received and chart reviewed. Pt D/C prior to PT evaluation completed. Seen by OT this AM.     Teena Andrade PT [id number]

## 2024-10-14 NOTE — PLAN OF CARE
Problem: Prexisting or High Potential for Compromised Skin Integrity  Goal: Skin integrity is maintained or improved  Description: INTERVENTIONS:  - Identify patients at risk for skin breakdown  - Assess and monitor skin integrity  - Assess and monitor nutrition and hydration status  - Monitor labs   - Assess for incontinence   - Turn and reposition patient  - Assist with mobility/ambulation  - Relieve pressure over bony prominences  - Avoid friction and shearing  - Provide appropriate hygiene as needed including keeping skin clean and dry  - Evaluate need for skin moisturizer/barrier cream  - Collaborate with interdisciplinary team   - Patient/family teaching  - Consider wound care consult   Outcome: Progressing     Problem: GENITOURINARY - ADULT  Goal: Urinary catheter remains patent  Description: INTERVENTIONS:  - Assess patency of urinary catheter  - If patient has a chronic wiseman, consider changing catheter if non-functioning  - Follow guidelines for intermittent irrigation of non-functioning urinary catheter  Outcome: Progressing     Problem: METABOLIC, FLUID AND ELECTROLYTES - ADULT  Goal: Electrolytes maintained within normal limits  Description: INTERVENTIONS:  - Monitor labs and assess patient for signs and symptoms of electrolyte imbalances  - Administer electrolyte replacement as ordered  - Monitor response to electrolyte replacements, including repeat lab results as appropriate  - Instruct patient on fluid and nutrition as appropriate  Outcome: Progressing  Goal: Glucose maintained within target range  Description: INTERVENTIONS:  - Monitor Blood Glucose as ordered  - Assess for signs and symptoms of hyperglycemia and hypoglycemia  - Administer ordered medications to maintain glucose within target range  - Assess nutritional intake and initiate nutrition service referral as needed  Outcome: Progressing

## 2024-10-14 NOTE — PLAN OF CARE
Problem: Prexisting or High Potential for Compromised Skin Integrity  Goal: Skin integrity is maintained or improved  Description: INTERVENTIONS:  - Identify patients at risk for skin breakdown  - Assess and monitor skin integrity  - Assess and monitor nutrition and hydration status  - Monitor labs   - Assess for incontinence   - Turn and reposition patient  - Assist with mobility/ambulation  - Relieve pressure over bony prominences  - Avoid friction and shearing  - Provide appropriate hygiene as needed including keeping skin clean and dry  - Evaluate need for skin moisturizer/barrier cream  - Collaborate with interdisciplinary team   - Patient/family teaching  - Consider wound care consult   Outcome: Progressing     Problem: GENITOURINARY - ADULT  Goal: Maintains or returns to baseline urinary function  Description: INTERVENTIONS:  - Assess urinary function  - Encourage oral fluids to ensure adequate hydration if ordered  - Administer IV fluids as ordered to ensure adequate hydration  - Administer ordered medications as needed  - Offer frequent toileting  - Follow urinary retention protocol if ordered  Outcome: Progressing  Goal: Absence of urinary retention  Description: INTERVENTIONS:  - Assess patient’s ability to void and empty bladder  - Monitor I/O  - Bladder scan as needed  - Discuss with physician/AP medications to alleviate retention as needed  - Discuss catheterization for long term situations as appropriate  Outcome: Progressing  Goal: Urinary catheter remains patent  Description: INTERVENTIONS:  - Assess patency of urinary catheter  - If patient has a chronic wiseman, consider changing catheter if non-functioning  - Follow guidelines for intermittent irrigation of non-functioning urinary catheter  Outcome: Progressing     Problem: METABOLIC, FLUID AND ELECTROLYTES - ADULT  Goal: Electrolytes maintained within normal limits  Description: INTERVENTIONS:  - Monitor labs and assess patient for signs and  symptoms of electrolyte imbalances  - Administer electrolyte replacement as ordered  - Monitor response to electrolyte replacements, including repeat lab results as appropriate  - Instruct patient on fluid and nutrition as appropriate  Outcome: Progressing  Goal: Fluid balance maintained  Description: INTERVENTIONS:  - Monitor labs   - Monitor I/O and WT  - Instruct patient on fluid and nutrition as appropriate  - Assess for signs & symptoms of volume excess or deficit  Outcome: Progressing  Goal: Glucose maintained within target range  Description: INTERVENTIONS:  - Monitor Blood Glucose as ordered  - Assess for signs and symptoms of hyperglycemia and hypoglycemia  - Administer ordered medications to maintain glucose within target range  - Assess nutritional intake and initiate nutrition service referral as needed  Outcome: Progressing

## 2024-10-17 DIAGNOSIS — R25.2 SPASTICITY: ICD-10-CM

## 2024-10-18 RX ORDER — BACLOFEN 10 MG/1
10 TABLET ORAL 2 TIMES DAILY
Qty: 60 TABLET | Refills: 0 | Status: SHIPPED | OUTPATIENT
Start: 2024-10-18

## 2024-10-20 PROBLEM — S36.039A SPLEEN LACERATION: Status: ACTIVE | Noted: 2024-10-20

## 2024-10-20 PROBLEM — S12.401A CLOSED NONDISPLACED FRACTURE OF FIFTH CERVICAL VERTEBRA (HCC): Status: ACTIVE | Noted: 2024-10-20
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
## 2024-10-20 NOTE — PROCEDURES
POC FAST US    Date/Time: 10/20/2024 12:15 PM    Performed by: Nathan Fung MD  Authorized by: Nathan Fung MD    Patient location:  Trauma  Procedure details:     Exam Type:  Diagnostic    Indications: blunt abdominal trauma and blunt chest trauma      Assess for:  Hemothorax, intra-abdominal fluid and pericardial effusion    Technique: FAST      Views obtained:  Heart - Pericardial sac, LUQ - Splenorenal space, Suprapubic - Pouch of Gokul and RUQ - Martinez's Pouch    Image quality: diagnostic      Image availability:  Images available in PACS  FAST Findings:     RUQ (Hepatorenal) free fluid: absent      LUQ (Splenorenal) free fluid: absent      Suprapubic free fluid: absent      Cardiac wall motion: identified      Pericardial effusion: absent    Interpretation:     Impressions: negative

## 2024-10-20 NOTE — ASSESSMENT & PLAN NOTE
Comminuted C5 vertebral body fracture  Status post fall approximately 10 feet  Endorses neck pain  Baseline right sided numbness and tingling for history of stroke.  No new radicular pain, numbness, tingling and or weakness    Imaging:   CT cervical spine without 10/20/24: Acute comminuted C5 vertebral body fracture with associated soft tissue swelling.  Maintained alignment.  Posterior elements appear intact.    Plan:   Continue monitor neurological exam  CT results reviewed with patient demonstrating C5 fracture  Plan for conservative management  Ordered Vista collar to be worn at all times.  Patient aware collar anticipated for 8 to 12 weeks  Ordered baseline cervical spine upright x-rays  Pain control per primary team  Okay to mobilize with physical and Occupational Therapy with collar in place  DVT prophylaxis: Bilateral SCDs.  Lovenox    No neurosurgical intervention anticipated at this juncture.  Will review upright x-rays once completed.  Call with any questions or concerns.

## 2024-10-20 NOTE — H&P
H&P - Trauma   Name: Tae Dolan 54 y.o. male I MRN: [id number]  Unit/Bed#: ED-19 I Date of Admission: 10/20/2024   Date of Service: 10/20/2024 I Hospital Day: 0     Assessment & Plan  Fall, initial encounter   - CT head   - CT c-spine   - CT CAP   - CBC, CMP, COMA   - 1:1 obs   - TDAP update   - psychology consult  Closed nondisplaced fracture of fifth cervical vertebra, unspecified fracture morphology, initial encounter ([facility])   - neurosurgery consult   - aspen collar in place  admit  Laceration of spleen, initial encounter   - SD2 admission   - q6h H/H    Trauma Alert: Level B   Model of Arrival: Ambulance    Trauma Team: Attending Kindra and Residents Sara  Consultants:     Neurosurgery: routine consult; Epic consult order placed;     History of Present Illness   Chief Complaint: face pain  Mechanism:Fall     Tae Dolan is a 54 y.o. male who presents with facial pain and abrasions after falling 10 feet off his roof.  According to the patient, he was in his house and fell out the window down to the ground.  EMS state that the patient fell from a second story window with an unknwon LOC.  Patient denies hadache but endorses pain around the abrasions on his face.  Patient has abrasions to the left and right shoulders with no complaint of pain there.  Patient denies alcohol use, drug use, and denies non-accidental causes..    Review of Systems   Constitutional:  Negative for chills and fever.   HENT:  Negative for congestion and rhinorrhea.    Respiratory:  Negative for chest tightness and shortness of breath.    Cardiovascular:  Negative for chest pain and palpitations.   Gastrointestinal:  Negative for abdominal pain, diarrhea, nausea and vomiting.   Genitourinary:  Negative for dysuria.   Musculoskeletal:  Negative for back pain, neck pain and neck stiffness.   Neurological:  Negative for dizziness, weakness and headaches.   Psychiatric/Behavioral:  Negative for agitation and confusion.      I have  reviewed the patient's PMH, PSH, Social History, Family History, Meds, and Allergies  Immunization History   Administered Date(s) Administered    Tdap 10/20/2024     Last Tetanus: updated today       Objective :  Temp:  [97.7 °F (36.5 °C)] 97.7 °F (36.5 °C)  HR:  [60-74] 74  BP: (122-182)/(71-99) 122/71  Resp:  [18] 18  SpO2:  [93 %-99 %] 99 %  O2 Device: None (Room air)    Initial Vitals:   Temperature: 97.7 °F (36.5 °C) (10/20/24 0015)  Pulse: 60 (10/20/24 0015)  Respirations: 18 (10/20/24 0015)  Blood Pressure: (!) 182/99 (10/20/24 0015)    Primary Survey:   Airway:        Status: patent;        Pre-hospital Interventions: none        Hospital Interventions: none  Breathing:        Pre-hospital Interventions: none       Effort: normal       Right breath sounds: normal       Left breath sounds: normal  Circulation:        Rhythm: regular       Rate: regular   Right Pulses Left Pulses    R radial: 2+    R pedal: 2+     L radial: 2+    L pedal: 2+       Disability:        GCS: Eye: 4; Verbal: 5 Motor: 6 Total: 15       Right Pupil: 3 mm;  round;  reactive         Left Pupil:  3 mm;  round;  reactive      R Motor Strength L Motor Strength    R : 5/5  R dorsiflex: 5/5  R plantarflex: 5/5 L : 5/5  L dorsiflex: 5/5  L plantarflex: 5/5        Sensory:  No sensory deficit  Exposure:       Completed: Yes      Secondary Survey:  Physical Exam  Constitutional:       Appearance: Normal appearance.   HENT:      Head: Normocephalic.      Comments: Multiple abrasions on right and left zygomatic areas.  Swelling periorbital.  No signs of skill fracture.  Bleeding controlled.     Right Ear: Tympanic membrane, ear canal and external ear normal.      Left Ear: Tympanic membrane, ear canal and external ear normal.      Nose: Nose normal.      Mouth/Throat:      Pharynx: Oropharynx is clear.   Eyes:      Extraocular Movements: Extraocular movements intact.      Conjunctiva/sclera: Conjunctivae normal.      Pupils: Pupils are  equal, round, and reactive to light.      Comments: No pain with EOM, swelling noted periorbital    Cardiovascular:      Rate and Rhythm: Normal rate.      Pulses: Normal pulses.      Heart sounds: Normal heart sounds.   Pulmonary:      Effort: Pulmonary effort is normal.      Breath sounds: Normal breath sounds.   Abdominal:      General: Bowel sounds are normal.      Palpations: Abdomen is soft.   Musculoskeletal:         General: Normal range of motion.      Cervical back: Normal range of motion.   Skin:     General: Skin is warm.      Capillary Refill: Capillary refill takes less than 2 seconds.      Comments: Abrasions throughout left and right shoulder region.  No active bleeding.  Abrasion on right lower extremity, bleeding controlled.  Abrasions throughout fingers, no active bleeding.  No suturing needed.    Mild skin tear on left thumb, no suturing at this time.  Lip laceration noted internally, no suturing   Neurological:      General: No focal deficit present.      Mental Status: He is alert and oriented to person, place, and time.   Psychiatric:         Mood and Affect: Mood normal.         Behavior: Behavior normal.             Lab Results: I have reviewed the following results:  Recent Labs     10/20/24  0026 10/20/24  0115   WBC  --  7.10   HGB 11.9* 11.5*   HCT 35* 33.7*   PLT  --  214   SODIUM  --  133*   K  --  4.5   CL  --  98   CO2 30 28   BUN  --  27*   CREATININE  --  1.03   GLUC  --  196*   CAIONIZED 1.10*  --    AST  --  37   ALT  --  32   ALB  --  3.8   TBILI  --  0.55   ALKPHOS  --  56       Imaging Results: I have personally reviewed pertinent images saved in PACS. CT scan findings (and other pertinent positive findings on images) were discussed with radiology. My interpretation of the images/reports are as follows:  Chest Xray(s): negative for acute findings   FAST exam(s): negative for acute findings   CT Scan(s): positive for acute findings: No acute intracranial hemorrhage or depressed  calvarial fracture.  Mild bilateral anterior frontal scalp soft tissue swelling, right greater than left. Right periorbital, perinasal, and premaxillary facial soft tissue swelling also noted.   Additional Xray(s): negative for acute findings     Other Studies: Other Study Results Review: No additional pertinent studies reviewed.

## 2024-10-20 NOTE — OCCUPATIONAL THERAPY NOTE
Occupational Therapy Evaluation     Patient Name: Tae Dolan  Today's Date: 10/20/2024  Problem List  Active Problems:    Fall    Closed nondisplaced fracture of fifth cervical vertebra (HCC)    Spleen laceration    Past Medical History  No past medical history on file.  Past Surgical History  No past surgical history on file.     Patient's identity confirmed via 2 patient identifiers (full name and ) at start of session        10/20/24 3399   OT Last Visit   OT Visit Date 10/20/24   Note Type   Note type Evaluation   Pain Assessment   Pain Assessment Tool 0-10   Pain Score 5   Pain Location/Orientation Location: Neck   Pain Onset/Description Onset: Ongoing;Descriptor: Aching   Effect of Pain on Daily Activities limits comfort, activity tolerance   Patient's Stated Pain Goal No pain   Hospital Pain Intervention(s) Repositioned;Ambulation/increased activity;Emotional support   Multiple Pain Sites Yes   Pain 2   Pain Score 2 6   Pain Location/Orientation 2 Orientation: Left;Location: Arm   Pain Onset/Description 2 Onset: Ongoing;Descriptor: Aching   Patient's Stated Pain Goal 2 No pain   Hospital Pain Intervention(s) 2 Repositioned;Ambulation/increased activity;Emotional support   Restrictions/Precautions   Weight Bearing Precautions Per Order No   Other Precautions Cognitive;Bed Alarm;Chair Alarm;Impulsive;1:1;Multiple lines;Pain;Fall Risk;Telemetry   Home Living   Type of Home House   Home Layout Two level;Bed/bath upstairs   Bathroom Shower/Tub Tub/shower unit   Bathroom Toilet Standard   Bathroom Equipment Grab bars in shower;Shower chair;Grab bars around toilet   Bathroom Accessibility Accessible   Home Equipment Walker;Cane   Prior Function   Level of [place] Independent with ADLs;Independent with functional mobility;Needs assistance with IADLS   Lives With Family  (mother)   Receives Help From Family   IADLs Independent with medication management;Family/Friend/Other provides  "meals;Family/Friend/Other provides transportation   Falls in the last 6 months 1 to 4  (1 as reason for admission; pt denies other falls)   Vocational On disability   Comments Pt is a questionable historian. Reports being (I) w/ ADLs PTA, uses SPC for mobility. Mother manages most IADLs aside from med management.   Lifestyle   Autonomy Pt lives w/ mother in a 2 level house and is (I) w/ ADLs PTA, SPC, (+) falls, (-)    Reciprocal Relationships Supportive  mother   Service to Others On disability   General   Additional Pertinent History Pt admitted s/p fall off roof resulting in C5 fx and splenic laceration. Per neurosurgery: [employer] [place] collar to be worn at all times. PMH includes: history of alcohol abuse disorder, diabetes, brain abscess status post craniotomy (2019), T2DM, hx of CVA w/ residual R sided deficits   Family/Caregiver Present No   Subjective   Subjective \"I was on the roof trying to clean the air conditioning unit and when I stepped out onto the roof there was a puddle and I slipped in it\"   ADL   Where Assessed Edge of bed   Eating Assistance 6  Modified independent   Eating Deficit Setup;Beverage management  (chewing pills and drinking beverage upon arrival, increased time to swallow/manage contents of mouth)   Grooming Assistance 5  Supervision/Setup   UB Bathing Assistance 5  Supervision/Setup   LB Bathing Assistance 3  Moderate Assistance   UB Dressing Assistance 5  Supervision/Setup   LB Dressing Assistance 3  Moderate Assistance   LB Dressing Deficit Setup;Don/doff R sock;Don/doff L sock;Increased time to complete;Supervision/safety;Verbal cueing;Impulsive  (sitting EOB, limited by incoordination, difficulty problem solving, pt easily gives up. Required A to start socks over toes, able to adjust over heel via tailor sit method)   Toileting Assistance  3  Moderate Assistance   Bed Mobility   Supine to Sit 4  Minimal assistance   Additional items Assist x 1;HOB elevated;Bedrails;Increased " "time required;Verbal cues   Additional Comments Able to sit EOB w/ S. Denies lightheadedness. Multiple times pt initiating return to [place]pine despite instruction to remain seated. OOB to recliner at end of session   Transfers   Sit to Stand 3  Moderate assistance   Additional items Assist x 1;Increased time required;Verbal cues   Stand to Sit 3  Moderate assistance   Additional items Assist x 1;Increased time required;Verbal cues   Sit pivot 4  Minimal assistance   Additional items Assist x 1;Armrests;Increased time required;Verbal cues  (from EOb>recliner to pt L)   Additional Comments from EOB, L sided lean in stance, noted to place minimal weight through RLE requiring mod/max A x 1 to maintain safe stance. Required sudden return to sitting, pt stating \"I felt like I was going to go down\". Additional SPT from EOB>recliner to pt L w/ min A x 1   Functional Mobility   Additional Comments Unable to assess, pt declining at this time   Balance   Static Sitting Fair   Dynamic Sitting Fair -   Static Standing Poor +   Dynamic Standing Poor   Activity Tolerance   Activity Tolerance Patient limited by fatigue;Patient limited by pain  (cognition, insight, coordination)   Medical Staff Made Aware Care coordinated w/ PT Charisse   Nurse Made Aware yes, RN Tiffany flores to see pt and updated   RUE Assessment   RUE Assessment X  (chronic weakness/incoordination from previous CVA)   LUE Assessment   LUE Assessment WFL   Hand Function   Gross Motor Coordination Impaired  (obeserved to have difficulty motor planning w/ RUE when donning socks/functional reach forward)   Fine Motor Coordination Impaired  (difficulty grasping socks, poor ability to manipulate objects)   Hand Function Comments L handed baseline per pt, writes w/ L hand   Sensation   Light Touch Partial deficits in the RUE;Partial deficits in the RLE  (self-reported decresed sensation from previous CVA)   Vision-Basic Assessment   Current Vision Wears glasses all the time  (has " "separate reading and distance glasses)   Cognition   Overall Cognitive Status (S)  Impaired   Arousal/Participation Alert;Cooperative   Attention Attends with cues to redirect   Orientation Level Oriented X4  (grossly oriented to hospital)   Memory Decreased short term memory;Decreased recall of recent events;Decreased recall of precautions  (per RN Tiffany, she has gotten multiple different stories as to how accident happened)   Following Commands Follows one step commands with increased time or repetition   Comments Pleasant and agreeable. Poor STM. Distractible, impulsive at times. Lacks inisght into current deficits. Often responds ok to edu re need for bracing and precautions w/ highly questionable carryover. Able to follow basic commands. States he should be fine to get to the bathroom later however was unable to stand w/o assist and states he felt like he was \"going to go down\". Able to predict difficulties w/ prompting and A for logic. Recommend formal cognitive evaluation this admission.   Assessment   Limitation Decreased ADL status;Decreased UE strength;Decreased Safe judgement during ADL;Decreased cognition;Decreased endurance;Decreased sensation;Decreased self-care trans;Decreased high-level ADLs;Non-func R UE  (pain, balance, fxnl mobility, act kelli, FM coord, GM coord, sensation, fxnl reach, trunk control, standing kelli, strength, fxnl sitting balance, and fxnl sitting kelli, safety awareness, insight, and problem solving, response time)   Prognosis Good   Assessment Pt is a 54 y.o. male seen for OT evaluation s/p admit to [facility] on 10/20/2024 w/ fall off roof resulting in C5 fx and splenic lac. Prior to admission, pt was living with mother in a 2 level house, (I) with ADLs, (A) with IADLs, SPC for mobility, (+) falls, (-) . Personal and environmental factors affecting patient at time of evaluation include current habits and behavioral patterns, lifestyle patterns, limited social support, " inaccessible home environment, inaccessible bathroom environment, difficulty completing ADLs, and difficulty completing IADLs. Personal factors supporting patient at time of evaluation include (I) PLOF, supportive mother and family, and attitude towards recovery. Based upon this evaluation, pt is functioning significantly below baseline due to pain, impaired ADLs, impaired balance, current level of physical A required for transfers and inability to tolerate ambulation as well as significant cognitive deficits impacting safety. Pt will benefit from continued skilled inpatient OT to maximize safety, level of independence and overall performance in ADLs, functional transfers, functional mobility to return to functional baseline/highest level of function.   Goals   Patient Goals to be able to go to the bathroom   [facility] Time Frame 10-14   Long Term Goal #1 see goals below   Plan   Treatment Interventions ADL retraining;Functional transfer training;UE strengthening/ROM;Endurance training;Cognitive reorientation;Patient/family training;Equipment evaluation/education;Neuromuscular reeducation;Fine motor coordination activities;Compensatory technique education;Continued evaluation;Activityengagement   Goal Expiration Date 10/30/24   OT Treatment Day 0   OT Frequency 3-5x/wk   Discharge Recommendation   Rehab Resource Intensity Level, OT I (Maximum Resource Intensity)   AM-PAC Daily Activity Inpatient   Lower Body Dressing 2   Bathing 2   Toileting 2   Upper Body Dressing 3   Grooming 3   Eating 4   Daily Activity Raw Score 16   Daily Activity Standardized Score (Calc for Raw Score >=11) 35.96   AM-PAC Applied Cognition Inpatient   Following a Speech/Presentation 2   Understanding Ordinary Conversation 3   Taking Medications 2   Remembering Where Things Are Placed or Put Away 3   Remembering List of 4-5 Errands 2   Taking Care of Complicated Tasks 2   Applied Cognition Raw Score 14   Applied Cognition Standardized Score 32.02    End of Consult   Education Provided (S)  Yes  (cervical spine precautions. Nita collar training deferred at this time due to anticipated lack of carryover and pt report of pain. Will plan to complete edu in f/u session. Brace in room.)   Patient Position at End of Consult Bedside chair;Bed/Chair alarm activated;All needs within reach   Nurse Communication Nurse aware of consult     GOALS:    *Goals established to promote patient goal of to go to the bathroom:      *Patient will perform grooming tasks standing at sink with (S) in order to increase overall independence     *UB ADL with (S) for inc'd independence with self care    *LB ADL with Min (A) using AE prn for inc'd independence with self care    *Toileting with Min (A) for clothing management and hygiene to increase hygiene/thoroughness in order to reduce caregiver burden    *ADL transfers with Min (A) for inc'd independence with ADLs/purposeful tasks    *Bed mobility- (S) for inc'd independence to manage own comfort and initiate EOB & OOB purposeful tasks    *Patient will increase functional mobility to and from bathroom with least restrictive assistive device with min assist to increase independence with toileting    *Patient will increase OOB/sitting tolerance to 2-4 hours per day to increase participation in self-care and leisure tasks with no s/s of exertion.     *Increase stand tolerance x 3-5  m for inc'd tolerance with standing purposeful tasks including grooming/hygiene    *Increase dynamic standing balance to fair to improve safety/performance of ALDs/standing purposeful tasks     *Participate in 10m UE therex/therac to increase overall coordination/strength for improved performance of purposeful tasks    *Patient will engage in ongoing cognitive assessment to assist with safe discharge planning/recommendations.     *Patient will be able to recall 3/3 cervical spine precautions when prompted to maximize safety during purposeful  tasks/activity    *Pt will attend to treatment task or activity for 5 minutes without need for redirection to improve activity engagement within 10 days.    *Pt vs caregiver (likely caregiver) will verbalize and demonstrate good understanding/technique for donning/doffing Nita collar to increase safety and compliance w/ collar management during bathing    The patient's raw score on the AM-PAC Daily Activity Inpatient Short Form is 16. A raw score of less than 19 suggests the patient may benefit from discharge to post-acute rehabilitation services. Please also refer to the recommendation of the Occupational Therapist for safe discharge planning.     Pt seen as a co-eval with PT due to the patient's co-morbidities and clinically unstable presentation indicated by chart review. During session, pt benefited from two skilled therapists due to extensive physical assistance to achieve transitional movements and pt's decreased activity tolerance.      VICENTA Pineda, OTR/L    [place] License [id number]  [place] License [id number]

## 2024-10-20 NOTE — PLAN OF CARE
Problem: PHYSICAL THERAPY ADULT  Goal: Performs mobility at highest level of function for planned discharge setting.  See evaluation for individualized goals.  Description: Treatment/Interventions: Functional transfer training, LE strengthening/ROM, Elevations, Therapeutic exercise, Endurance training, Cognitive reorientation, Patient/family training, Equipment eval/education, Bed mobility, Gait training, Compensatory technique education, Spoke to nursing     See flowsheet documentation for full assessment, interventions and recommendations.  Outcome: Progressing  Note: Prognosis: Good  Problem List: Decreased strength, Decreased range of motion, Decreased endurance, Impaired balance, Decreased mobility, Decreased coordination, Decreased cognition, Impaired judgement, Decreased safety awareness, Impaired vision, Impaired sensation, Decreased skin integrity, Pain  Assessment: Pt seen for PT evaluation for mobility assessment & discharge needs. Pt admitted 10/20/2024 s/p fall from second story window, C5 fracture, spleen laceration. Comorbidities affecting pt's fnxl performance include: CVA with R sided numbness, alcohol abuse disorder, diabetes, brain abscess status post craniotomy (2019), falls. During PT IE, pt requires DANIEL for bed mobility, MODA for STS transfers, MOD-MAXA to maintain standing balance. Progresses to complete SPT from EOB to recliner chair with DANIEL towards L side, with use of armrests. Pt displays above outlined functional impairments & limitations, and presents below his baseline level of functional mobility. The AM-PAC & Barthel Index outcome tools were used to assist in determining pt safety w/ mobility/self care & appropriate d/c recommendations, see above for scores. Pt is at risk of falls d/t multiple comorbidities, impulsivity, h/o falls, impaired balance, impaired cognition, impaired insight/safety awareness, use of ambulatory aid, varying levels of pain, acuity of medical illness,  ongoing medical treatment of primary dx, polypharmacy, and unstable vitals. Pt's clinical presentation is currently unstable/unpredictable as seen in pt's presentation of vital sign response, changing level of pain, varying levels of cognitive performance, increased fall risk, new onset of impairment of functional mobility, decreased endurance, and new onset of weakness. Pt will benefit from continued PT services in order to address impairments, decrease risk of falls, maximize independence w/ fnxl mobility, & ensure safety w/ mobility for transition to next level of care. Based on pt presentation & impairments, pt would most appropriately benefit from Level 1 (maximal PT intensity) resources upon d/c.  Barriers to Discharge: Decreased caregiver support, Inaccessible home environment     Rehab Resource Intensity Level, PT: I (Maximum Resource Intensity)    See flowsheet documentation for full assessment.

## 2024-10-20 NOTE — QUICK NOTE
Attempted contacting family, was unable to get in touch with mother or sister.  Will try again tomorrow.

## 2024-10-20 NOTE — PLAN OF CARE
Problem: OCCUPATIONAL THERAPY ADULT  Goal: Performs self-care activities at highest level of function for planned discharge setting.  See evaluation for individualized goals.  Description: Treatment Interventions: ADL retraining, Functional transfer training, UE strengthening/ROM, Endurance training, Cognitive reorientation, Patient/family training, Equipment evaluation/education, Neuromuscular reeducation, Fine motor coordination activities, Compensatory technique education, Continued evaluation, Activityengagement          See flowsheet documentation for full assessment, interventions and recommendations.   Note: Limitation: Decreased ADL status, Decreased UE strength, Decreased Safe judgement during ADL, Decreased cognition, Decreased endurance, Decreased sensation, Decreased self-care trans, Decreased high-level ADLs, Non-func R UE (pain, balance, fxnl mobility, act kelli, FM coord, GM coord, sensation, fxnl reach, trunk control, standing kelli, strength, fxnl sitting balance, and fxnl sitting kelli, safety awareness, insight, and problem solving, response time)  Prognosis: Good  Assessment: Pt is a 54 y.o. male seen for OT evaluation s/p admit to [facility] on 10/20/2024 w/ fall off roof resulting in C5 fx and splenic lac. Prior to admission, pt was living with mother in a 2 level house, (I) with ADLs, (A) with IADLs, SPC for mobility, (+) falls, (-) . Personal and environmental factors affecting patient at time of evaluation include current habits and behavioral patterns, lifestyle patterns, limited social support, inaccessible home environment, inaccessible bathroom environment, difficulty completing ADLs, and difficulty completing IADLs. Personal factors supporting patient at time of evaluation include (I) PLOF, supportive mother and family, and attitude towards recovery. Based upon this evaluation, pt is functioning significantly below baseline due to pain, impaired ADLs, impaired balance,  current level of physical A required for transfers and inability to tolerate ambulation as well as significant cognitive deficits impacting safety. Pt will benefit from continued skilled inpatient OT to maximize safety, level of independence and overall performance in ADLs, functional transfers, functional mobility to return to functional baseline/highest level of function.     Rehab Resource Intensity Level, OT: I (Maximum Resource Intensity)     VICENTA Pineda, OTR/L  [place] License [id number]  [place] License [id number]

## 2024-10-20 NOTE — ED NOTES
Patient taking off c-collar & sitting up in bed at this time. This RN, Sue RN, & Amos RN entered patients room to put c-collar back on & to educate patient that c-collar needs to stay on until CT scan results come back. Patient allowed these RN's to replace c-collar and patient agreed to keep collar on & remain lying flat in bed.      Leyla Whitlock RN  10/20/24 5961

## 2024-10-20 NOTE — CONSULTS
Consultation - Neurosurgery   Name: Tae Dolan 54 y.o. male I MRN: [id number]  Unit/Bed#: ICU 01 I Date of Admission: 10/20/2024   Date of Service: 10/20/2024 I Hospital Day: 0   Inpatient consult to Neurosurgery  Consult performed by: Lissette Swain PA-C  Consult ordered by: Nathan Fung MD        Physician Requesting Evaluation: Stanton Arguelles DO   Reason for Evaluation / Principal Problem: C5 fx    Patient seen and examined on October 20, 2024 at 6:20 AM  Assessment & Plan  Closed nondisplaced fracture of fifth cervical vertebra (HCC)  Comminuted C5 vertebral body fracture  Status post fall approximately 10 feet  Endorses neck pain  Baseline right sided numbness and tingling for history of stroke.  No new radicular pain, numbness, tingling and or weakness    Imaging:   CT cervical spine without 10/20/24: Acute comminuted C5 vertebral body fracture with associated soft tissue swelling.  Maintained alignment.  Posterior elements appear intact.    Plan:   Continue monitor neurological exam  CT results reviewed with patient demonstrating C5 fracture  Plan for conservative management  Ordered Vista collar to be worn at all times.  Patient aware collar anticipated for 8 to 12 weeks  Ordered baseline cervical spine upright x-rays  Pain control per primary team  Okay to mobilize with physical and Occupational Therapy with collar in place  DVT prophylaxis: Bilateral SCDs.  Lovenox    No neurosurgical intervention anticipated at this juncture.  Will review upright x-rays once completed.  Call with any questions or concerns.    Fall  Reported fall from roof at least 10 feet  Patient without recollection of events  States possibly related to new medication and unclear recollections of the last several days  Denies any purposeful jumping or suicidal ideations  Spleen laceration  Per trauma  Neurosurgery service will follow.    History of Present Illness   HPI: Tae Dolan is a 54 y.o.  male with reported  history of stroke who presents with following fall from roof.  Patient states unclear and poor recollection of the events.  Per chart review patient prior stated he fell off the roof with EMS stating he fell from a second story window.  Unknown loss of consciousness.  Patient presents with facial pain and diffuse abrasions.  CT imaging demonstrates C5 vertebral body fracture.  CT head without any intracranial abnormalities.  No additional spine fractures noted.    Patient endorses facial pain as well as neck pain.  He denies any new radicular pain, numbness, tingling or weakness of his extremities.  Denies any vision or speech changes.  Does endorse central chest pain    Review of Systems   Respiratory:  Negative for shortness of breath.    Gastrointestinal:  Negative for nausea and vomiting.   Musculoskeletal:  Positive for neck pain.        Chest wall/mediastinal pain   Skin:  Positive for wound.   Neurological:  Negative for weakness, numbness and headaches.   Psychiatric/Behavioral:  Negative for self-injury.      I have reviewed the patient's PMH, PSH, Social History, Family History, Meds, and Allergies    Objective :  Temp:  [97.7 °F (36.5 °C)] 97.7 °F (36.5 °C)  HR:  [59-74] 63  BP: (103-182)/(55-99) 104/58  Resp:  [15-18] 16  SpO2:  [93 %-100 %] 100 %  O2 Device: None (Room air)    Physical Exam  Constitutional:       General: He is not in acute distress.     Appearance: Normal appearance. He is well-developed. He is not ill-appearing.   HENT:      Head: Normocephalic.      Comments: Right facial edema     Right Ear: External ear normal.      Left Ear: External ear normal.      Nose: Nose normal.      Mouth/Throat:      Mouth: Mucous membranes are moist.   Eyes:      General: No scleral icterus.        Right eye: No discharge.         Left eye: No discharge.      Extraocular Movements: Extraocular movements intact.      Conjunctiva/sclera: Conjunctivae normal.      Pupils: Pupils are equal, round, and  reactive to light.   Cardiovascular:      Rate and Rhythm: Normal rate.   Pulmonary:      Effort: Pulmonary effort is normal. No respiratory distress.   Musculoskeletal:         General: No tenderness.      Cervical back: Normal range of motion and neck supple.   Skin:     General: Skin is warm and dry.   Neurological:      Mental Status: He is alert.      Motor: Motor strength is normal.  Psychiatric:         Mood and Affect: Mood normal.         Speech: Speech normal.         Behavior: Behavior normal.         Thought Content: Thought content normal.         Judgment: Judgment normal.      Neurological Exam  Mental Status  Alert. Speech is normal. Language is fluent with no aphasia. Attention and concentration are normal.    Cranial Nerves  CN III, IV, VI: Extraocular movements intact bilaterally. Pupils equal round and reactive to light bilaterally.  CN V: Facial sensation is normal.  CN VII: Full and symmetric facial movement.  CN VIII: Hearing is normal.  CN XI:  Right: Trapezius strength is normal.  Left: Trapezius strength is normal.  CN XII: Tongue midline without atrophy or fasciculations.    Motor  Normal muscle bulk throughout. Normal muscle tone. Strength is 5/5 throughout all four extremities.    Sensory  Light touch is normal in upper and lower extremities.     Reflexes    Right pathological reflexes: Katiuska's absent. Ankle clonus absent.  Left pathological reflexes: Katiuska's absent. Ankle clonus absent.        Lab Results: I have reviewed the following results:  Recent Labs     10/20/24  0026 10/20/24  0115 10/20/24  0449   WBC  --    < > 7.36   HGB 11.9*   < > 11.0*   HCT 35*   < > 31.4*   PLT  --    < > 176   SODIUM  --    < > 138   K  --    < > 4.6   CL  --    < > 101   CO2 30   < > 28   BUN  --    < > 26*   CREATININE  --    < > 0.90   GLUC  --    < > 154*   CAIONIZED 1.10*  --   --    AST  --    < > 32   ALT  --    < > 30   ALB  --    < > 3.8   TBILI  --    < > 0.71   ALKPHOS  --    < > 57     < > = values in this interval not displayed.       Imaging Results Review: I personally reviewed the following image studies in PACS and associated radiology reports: CT abdomen/pelvis, CT head, and CT C-spine. My interpretation of the radiology images/reports is: As above.  Other Study Results Review: No additional pertinent studies reviewed.    VTE Pharmacologic Prophylaxis: Sequential compression device (Venodyne)  and Enoxaparin (Lovenox)

## 2024-10-20 NOTE — ASSESSMENT & PLAN NOTE
Reported fall from roof at least 10 feet  Patient without recollection of events  States possibly related to new medication and unclear recollections of the last several days  Denies any purposeful jumping or suicidal ideations

## 2024-10-20 NOTE — ED PROVIDER NOTES
Emergency Department Airway Evaluation and Management Form    History  Obtained from: patient and EMS  Patient has no allergy information on record.  No chief complaint on file.    HPI Patient is a 54 year old male who fell out of a second story window and rolled off the roof onto the ground tonight about 10 feet. Patient does not know why he fell. Denies SI. Airway intact. PERRL. (+) left periorbital swelling and ecchymosis and tenderness. No hemotympanum. ED resident Dr. Strong evaluated patient in trauma ED. Trauma team evaluating patient in trauma ED.     No past medical history on file.  No past surgical history on file.  No family history on file.     I have reviewed and agree with the history as documented.    Review of Systems    Physical Exam  BP (!) 180/92   Pulse 64   Temp 97.7 °F (36.5 °C) (Oral)   Resp 18   Wt 74.3 kg (163 lb 12.8 oz)   SpO2 98%     Physical Exam    ED Medications  Medications - No data to display    Intubation  Procedures    Notes      Final Diagnosis  Final diagnoses:   None       ED Provider  Electronically Signed by     Cem Peoples MD  10/20/24 0028

## 2024-10-20 NOTE — PHYSICAL THERAPY NOTE
PHYSICAL THERAPY ORTHOTIC FITTING, EVALUATION & TREATMENT  DATE: 10/20/24  TIME: Orthotic fitting [id number]; evaluation [id number]; treatment [id number]    NAME:  Tae Dolan  AGE:   54 y.o.  Mrn:   [id number]  Length Of Stay: 0    ADMIT DX:  Laceration of spleen, initial encounter [S36.039A]  Closed nondisplaced fracture of fifth cervical vertebra, unspecified fracture morphology, initial encounter ([facility]) [S12.401A]    No past medical history on file.  No past surgical history on file.    Performed at least 2 patient identifiers during session: Name, Birthday, and ID bracelet     10/20/24 1303   PT Last Visit   PT Visit Date 10/20/24   Note Type   Note type Orthotic Management/Training;Evaluation  (& treatment)   Type of Brace   Brace Applied [place] Collar Set   Additional Brace Ordered No   Patient Position When Brace Applied Supine   Bracing Recommendations Pt fit for [place] cervical collar while in supine position. Pt requires full physical assistance & 100% verbal instruction for donning/doffing brace at this time. Therapist educated pt on spinal precautions, donning/doffing brace, wear schedule, and monitoring skin for irritation. Pt verbalizes fair understanding, however reports that he doesn't like things tight around his neck so doesn't know how well he'll tolerate this brace. EUGENE Allen made aware of cervical collar fitting.   Education   Education Provided Yes   Pain Assessment   Pain Assessment Tool 0-10   Pain Score 5   Pain Location/Orientation Location: Neck   Pain Onset/Description Onset: Ongoing;Descriptor: Aching   Effect of Pain on Daily Activities limits comfort, limits tolerance of mobility   Patient's Stated Pain Goal No pain   Hospital Pain Intervention(s) Medication (See MAR);Repositioned;Ambulation/increased activity;Emotional support   Multiple Pain Sites Yes   Pain 2   Pain Score 2 5   Pain Location/Orientation 2 Location: Chest   Pain Onset/Description 2 Onset:  Ongoing;Descriptor: Aching   Patient's Stated Pain Goal 2 No pain   Hospital Pain Intervention(s) 2 Medication (See MAR);Repositioned;Ambulation/increased activity;Emotional support   Pain 3   Pain Score 3 6   Pain Location/Orientation 3 Orientation: Left;Orientation: Upper;Location: Arm   Pain Onset/Description 3 Onset: Ongoing;Descriptor: Aching   Patient's Stated Pain Goal 3 No pain   Hospital Pain Intervention(s) 3 Medication (See MAR);Repositioned;Ambulation/increased activity;Emotional support   Restrictions/Precautions   Weight Bearing Precautions Per Order No   Braces or Orthoses C/S Collar  ([place] cervical collar worn at all times; Nita collar for showering)   Other Precautions Impulsive;Cognitive;Chair Alarm;Bed Alarm;Multiple lines;Telemetry;Fall Risk;Pain;Spinal precautions   Home Living   Type of Home House   Home Layout Two level;Bed/bath upstairs;Ramped entrance   Bathroom Shower/Tub Tub/shower unit   Bathroom Toilet Standard   Bathroom Equipment Grab bars in shower;Shower chair;Grab bars around toilet   Bathroom Accessibility Accessible   Home Equipment Walker;Cane   Additional Comments Per OT note during previous hospitalization: Family reports that pt spends most of his time on the 2nd level of home, will often negotiate the steps on his buttocks. Per family pt is usually unkept, not changing his clothes or bathing himself very often.   Prior Function   Level of [place] Independent with ADLs;Independent with functional mobility;Needs assistance with IADLS   Lives With Family  (mother)   Receives Help From Family   IADLs Independent with medication management;Family/Friend/Other provides transportation;Family/Friend/Other provides meals  (mother drives and cooks, pt independently manages medications)   Falls in the last 6 months 1 to 4  (1 leading to current hospitalization)   Vocational On disability   Comments At baseline, pt ambulates with cane at baseline, has RW but does not use.  "  General   Additional Pertinent History Pt is a 54 yr old male admitted 10/20/24 s/p fall from 2nd story window, unable to recall events. Dx: C5 fracture - aspen vista collar; spleen laceration.   Family/Caregiver Present No   Cognition   Overall Cognitive Status (S)  Impaired   Arousal/Participation Cooperative   Orientation Level Oriented X4  (oriented to hospital but recalls [facility])   Memory Decreased short term memory;Decreased recall of recent events;Decreased recall of precautions  (per RN, pt has been telling multiple different stories on how he fell out window; pt poor recall of precautions and safety)   Following Commands Follows one step commands with increased time or repetition   Comments Pt highly impulsive at times; is able to be redirected easily however quickly distracted again.   Subjective   Subjective \"I was trying to clean my AC unit in my window, I stepped out onto the roof and slipped on water.\"   RUE Assessment   RUE Assessment X  (chronic weakness/incoordination from previous CVA, grasp poorly functional)   LUE Assessment   LUE Assessment WFL  (strength WFL, coordination highly impaired)   RLE Assessment   RLE Assessment X  (grossly 3/5 to 3+/5 MMT throughout, pt denies baseline weakness in R LE from previous CVA, reports current weakness d/t pain after fall)   LLE Assessment   LLE Assessment X  (grossly 4-/5 to 4/5 MMT throughout)   Vision-Basic Assessment   Current Vision Wears glasses all the time  (also has reading glasses)   Patient Visual Report Other (Comment)  (denies acute visual changes)   Coordination   Movements are Fluid and Coordinated 0   Coordination and Movement Description Pt with significant coordination impairments in all extremities; pt reports baseline impairments from previous CVA and craniotomy, however coordination deficits are currently worse than baseline.   Sensation X  (pt self reports R sided UE and LE impaired sensation as compared to L side from " "previous CVA)   Finger to Nose & Finger to Finger  Impaired   Heel to Shin Impaired   Rapid Alternating Movements Impaired   Bed Mobility   Supine to Sit 4  Minimal assistance   Additional items Assist x 1;HOB elevated;Bedrails;Increased time required;Verbal cues   Sit to Supine   (NT as pt was left seated OOB in recliner chair at end of session)   Additional Comments Pt able to maintain upright sitting balance at EOB with close S. Pt denies lightheadedness or dizziness with changes in positioning. Attempts to initiate return to supine multiple times while EOB, despite therapist cues for and pt agreeable to additional mobility.   Transfers   Sit to Stand 3  Moderate assistance   Additional items Assist x 1;Impulsive;Increased time required;Verbal cues  (no AD; impulsive to initiate, increased time to complete)   Stand to Sit 3  Moderate assistance   Additional items Assist x 1;Impulsive;Verbal cues  (no AD)   Additional Comments STS from EOB with MODA 1 person and no AD. Once in standing pt with significant posterior L sided trunk lean, limited WBing through R LE, required variable MOD-MAXA to maintain upright standing. Pt impulsive to return to sit EOB, stating \"I felt like i was going down.\"   Ambulation/Elevation   Gait pattern Not tested;Improper Weight shift;Poor UE support   Stair Management Assistance Not tested   Balance   Static Sitting Fair   Dynamic Sitting Fair -   Static Standing Poor   Dynamic Standing Poor -   Ambulatory Zero   Endurance Deficit   Endurance Deficit Yes   Activity Tolerance   Activity Tolerance Patient limited by fatigue;Patient limited by pain;Other (Comment)  (impaired cognition, impaired coordination)   Medical Staff Made Aware Spoke with EUGENE Allen, coordinated care with OT Campos   Assessment   Prognosis Good   Problem List Decreased strength;Decreased range of motion;Decreased endurance;Impaired balance;Decreased mobility;Decreased coordination;Decreased cognition;Impaired " judgement;Decreased safety awareness;Impaired vision;Impaired sensation;Decreased skin integrity;Pain   Assessment Pt seen for PT evaluation for mobility assessment & discharge needs. Pt admitted 10/20/2024 s/p fall from second story window, C5 fracture, spleen laceration. Comorbidities affecting pt's fnxl performance include: CVA with R sided numbness, alcohol abuse disorder, diabetes, brain abscess status post craniotomy (2019), falls. During PT IE, pt requires DANIEL for bed mobility, MODA for STS transfers, MOD-MAXA to maintain standing balance. Progresses to complete SPT from EOB to recliner chair with DANIEL towards L side, with use of armrests. Pt displays above outlined functional impairments & limitations, and presents below his baseline level of functional mobility. The AM-PAC & Barthel Index outcome tools were used to assist in determining pt safety w/ mobility/self care & appropriate d/c recommendations, see above for scores. Pt is at risk of falls d/t multiple comorbidities, impulsivity, h/o falls, impaired balance, impaired cognition, impaired insight/safety awareness, use of ambulatory aid, varying levels of pain, acuity of medical illness, ongoing medical treatment of primary dx, polypharmacy, and unstable vitals. Pt's clinical presentation is currently unstable/unpredictable as seen in pt's presentation of vital sign response, changing level of pain, varying levels of cognitive performance, increased fall risk, new onset of impairment of functional mobility, decreased endurance, and new onset of weakness. Pt will benefit from continued PT services in order to address impairments, decrease risk of falls, maximize independence w/ fnxl mobility, & ensure safety w/ mobility for transition to next level of care. Based on pt presentation & impairments, pt would most appropriately benefit from Level 1 (maximal PT intensity) resources upon d/c.   Barriers to Discharge Decreased caregiver support;Inaccessible  "home environment   Goals   Patient Goals \"to be able to get to the bathroom myself\"   [facility] Expiration Date 11/03/24   Short Term Goal #1 Patient PT goals established in order to maximize functional independence and safety. Pt will: complete all bed mobility in flat bed independently in order to promote increased OOB functional mobility and simulate home environment; complete all transfers with LRAD and S in order to increase safety with functional mobility; ambulate >50ft with LRAD and S in order to increase safety with household distance functional mobility; negotiate 3-5 stairs with HR + S in order to facilitate progress towards access of all areas of his home; improve R LE strength to >/= 4/5 MMT t/o in order to increase safety with functional mobility and decrease risk of falls; demonstrate understanding and independence with LE strengthening HEP; improve static standing balance to >/= fair+ grade in order to promote safety and increased independence with mobility; tolerate >3hrs OOB in upright position, in order to improve muscular endurance and respiratory status; improve AM-PAC score to >/= 17/24 in order to increase independence with mobility and decrease burden of care; improve Barthel Index score to >/= 50/100 in order to increase independence and decrease risk of falls.   PT Treatment Day 1   Plan   Treatment/Interventions Functional transfer training;LE strengthening/ROM;Elevations;Therapeutic exercise;Endurance training;Cognitive reorientation;Patient/family training;Equipment eval/education;Bed mobility;Gait training;Compensatory technique education;Spoke to nursing   PT Frequency 3-5x/wk   Discharge Recommendation   Rehab Resource Intensity Level, PT I (Maximum Resource Intensity)   AM-PAC Basic Mobility Inpatient   Turning in Flat Bed Without Bedrails 3   Lying on Back to Sitting on Edge of Flat Bed Without Bedrails 3   Moving Bed to Chair 2   Standing Up From Chair Using Arms 2   Walk in Room 1 "   Climb 3-5 Stairs With Railing 1   Basic Mobility Inpatient Raw Score 12   Basic Mobility Standardized Score 32.23   [facility] Highest Level Of Mobility   -[facility] Goal 4: Move to chair/commode   -[facility] Achieved 4: Move to chair/commode   Modified Kiersten Scale   Modified [place] Scale 4   Barthel Index   Feeding 5   Bathing 0   Grooming Score 0   Dressing Score 5   Bladder Score 10   Bowels Score 10   Toilet Use Score 5   Transfers (Bed/Chair) Score 5   Mobility (Level Surface) Score 0   Stairs Score 0   Barthel Index Score 40   Additional Treatment Session   Start Time 1318   End Time 1328   Treatment Assessment Pt is agreeable to participate in additional transfer training post IE in order to achieve OOB positioning for improved posture with eating, improved respiratory status, and improved trunk stability training. Pt progresses to complete sit pivot transfer from EOB to recliner chair TOWARDS L side with DANIEL, UE grasp onto armrest, no AD. Pt unable to recall spinal precautions at end of session, additional education provided regarding same. At end of session pt was left seated OOB in recliner chair with alarm engaged and needs in reach, RN aware of pt status. Regarding functional mobility, pt remains significantly below his baseline level of functional mobility and is not safe for return to home at current functional status. Continue to recommend Level 1 (maximal PT intensity) resources once medically cleared for d/c from the acute care setting. Will continue skilled PT POC as able and appropriate to address functional impairments and progress towards therapy goals. Next session, plan for intervention of increased transfer and gait training as able.   Additional Treatment Day 1   End of Consult   Patient Position at End of Consult Bedside chair;Bed/Chair alarm activated;All needs within reach     This session, pt required and most appropriately benefited from partial or full skilled PT/OT co-eval due to  cognitive-communication impairments, cognitive-behavioral deficits, significant regression from baseline level of mobility, continuous vitals monitoring, decreased activity tolerance, and unpredictable medical and/or functional status. PT and OT goals were addressed separately as seen in documentation.    Based on patient's [facility] Highest Level of Mobility scores today, patient currently has a goal of [facility] Levels: 4: MOVE TO CHAIR/COMMODE, to be completed with RN staffing each shift, in order to improve overall activity tolerance and mobility, combat hospital related deconditioning, and maximize outcomes for d/c from the acute care setting.     The patient's AM-PAC Basic Mobility Inpatient Short Form Raw Score is 12. A Raw score of less than or equal to 16 suggests the patient may benefit from discharge to post-acute rehabilitation services. Please also refer to the recommendation of the Physical Therapist for safe discharge planning.      Xin Rivas PT, DPT   Available via [employer]  NPI # [id number]  [place] License - [id number]  10/20/2024

## 2024-10-20 NOTE — ASSESSMENT & PLAN NOTE
- CT head   - CT c-spine   - CT CAP   - CBC, CMP, COMA   - 1:1 obs   - TDAP update   - psychology consult

## 2024-10-21 ENCOUNTER — TELEPHONE (OUTPATIENT)
Dept: CT IMAGING | Facility: HOSPITAL | Age: 54
End: 2024-10-21

## 2024-10-21 ENCOUNTER — TELEPHONE (OUTPATIENT)
Dept: NEUROSURGERY | Facility: CLINIC | Age: 54
End: 2024-10-21

## 2024-10-21 PROBLEM — Z86.73 HISTORY OF STROKE: Status: ACTIVE | Noted: 2024-10-21

## 2024-10-21 PROBLEM — F39 MOOD DISORDER (HCC): Status: ACTIVE | Noted: 2024-10-21

## 2024-10-21 NOTE — TREATMENT PLAN
Upright cervical x-rays reviewed. Stable appearance of comminuted anterior inferior C5 VB fracture.    Continue cervical brace as discussed.    Neurosurgery will sign off. Plan for outpatient follow up in 2 weeks with repeat cervical x-rays. Call with questions.

## 2024-10-21 NOTE — ASSESSMENT & PLAN NOTE
- neurosurgery consult   - aspen collar in place  -upright films stable  -NSGY s/o follow up 2 week

## 2024-10-21 NOTE — NURSING NOTE
Late entry:    Assumed care of patient upon admission to [facility]  from [facility] ICU. Patient is resting comfortably in bed, no distress noted, VSS, Skin assessment completed upon transfer- no pressure injuries noted- however he does have scattered abrasion over whole body, bruising and abrasions on face- secondary to his admitting reason- fall off roof. Virtual 1:1 observation continues- bed alarm set, alarms are on and audible. Patient is compliant with care, and call bell use. Aspen collar on- skin under appliance is intact.    Respirations present, non labored, regular rate. Patient is awake, alert and oriented to new room, educated on call bell use. All needs met at this time.     Carmelina Sparks AAS RN, CMSRN  [facility]

## 2024-10-21 NOTE — ASSESSMENT & PLAN NOTE
Plan:   Medical management per primary team.  No medication recommendations at this time. He does not meet criteria for inpatient psychiatric commitment.  Work with case management to find an alternate living arrangement after medically stable.  Recommend offering resources for drug and alcohol use per family's suspicion of medication misuse.   Consultation appreciated. Psychiatry to sign off. Please do not hesitate to call/contact our service with any additional comments/concerns. Please call/contact on-call Psychiatry via [employer] including on-call psychiatric service via [employer] ([phone]) with any comments/concerns if after hours/Fri/Sat/Sunday.Thank you!

## 2024-10-21 NOTE — PROGRESS NOTES
Progress Note - Trauma   Name: Tae Dolan 54 y.o. male I MRN: [id number]  Unit/Bed#: S -01 I Date of Admission: 10/20/2024   Date of Service: 10/21/2024 I Hospital Day: 1    Assessment & Plan  Fall   - CT head   - CT c-spine   - CT CAP   - CBC, CMP, COMA   - 1:1 obs   - TDAP update   - psychology consult  Closed nondisplaced fracture of fifth cervical vertebra (HCC)   - neurosurgery consult   - aspen collar in place  -upright films stable  -NSGY s/o follow up 2 week      Spleen laceration   - SD2 admission   - q6h H/H stable   Mood disorder (HCC) unspecified  Psych consult   Follow up recs   History of stroke  History of stroke   Denies home medication use   Will start daily ASA and recheck H&H tomorrow    Bowel Regimen: senokot  VTE Prophylaxis:Sequential compression device (Venodyne)  and Enoxaparin (Lovenox)     Disposition: med surg Ok for discharge from Trauma service perspective. Pending rehab admission. Care management consult.     24 Hour Events : NAEO  Subjective : Patient reports his pain is controlled. Denies taking any home medications. Reports he has a history of a stroke but doesn't take ASA regularly.     Objective :  Temp:  [98.2 °F (36.8 °C)-99.3 °F (37.4 °C)] 98.3 °F (36.8 °C)  HR:  [] 102  BP: ()/() 184/99  Resp:  [11-47] 18  SpO2:  [62 %-100 %] 96 %  O2 Device: None (Room air)    I/O         10/19 0701  10/20 0700 10/20 0701  10/21 0700 10/21 0701  10/22 0700    P.O.  540     I.V. (mL/kg)  20 (0.3)     Total Intake(mL/kg)  560 (7.5)     Urine (mL/kg/hr)  2170 (1.2)     Total Output  2170     Net  -1610                    Physical Exam  Constitutional:       Appearance: Normal appearance.   HENT:      Head: Atraumatic.      Comments: Facial asymmetry with speech     Mouth/Throat:      Mouth: Mucous membranes are moist.   Eyes:      Pupils: Pupils are equal, round, and reactive to light.   Cardiovascular:      Rate and Rhythm: Normal rate.      Pulses: Normal pulses.    Pulmonary:      Effort: Pulmonary effort is normal.      Breath sounds: Normal breath sounds.   Abdominal:      General: Bowel sounds are normal.      Palpations: Abdomen is soft.   Musculoskeletal:      Cervical back: Normal range of motion.      Right knee: Laceration present.      Comments: Abrasion and scabbing   Skin:     Findings: Abrasion present.          Neurological:      Mental Status: He is alert and oriented to person, place, and time.      GCS: GCS eye subscore is 4. GCS verbal subscore is 5. GCS motor subscore is 6.      Motor: Weakness present.      Deep Tendon Reflexes:      Reflex Scores:       Tricep reflexes are 4+ on the right side and 3+ on the left side.       Bicep reflexes are 4+ on the right side and 3+ on the left side.       Brachioradialis reflexes are 4+ on the right side and 3+ on the left side.       Patellar reflexes are 4+ on the right side and 3+ on the left side.       Achilles reflexes are 4+ on the right side and 3+ on the left side.           Lab Results: I have reviewed the following results:  Recent Labs     10/20/24  0026 10/20/24  0115 10/20/24  0449 10/21/24  0442   WBC  --    < > 7.36 7.27   HGB 11.9*   < > 11.0* 11.2*   HCT 35*   < > 31.4* 33.3*   PLT  --    < > 176 182   SODIUM  --    < > 138 138   K  --    < > 4.6 4.3   CL  --    < > 101 98   CO2 30   < > 28 34*   BUN  --    < > 26* 12   CREATININE  --    < > 0.90 0.75   GLUC  --    < > 154* 138   CAIONIZED 1.10*  --   --   --    AST  --    < > 32  --    ALT  --    < > 30  --    ALB  --    < > 3.8  --    TBILI  --    < > 0.71  --    ALKPHOS  --    < > 57  --     < > = values in this interval not displayed.       Imaging Results Review: I personally reviewed the following image studies in PACS and associated radiology reports: CT chest, CT abdomen/pelvis, CT head, CT C-spine, and xray(s).   Other Study Results Review: No additional pertinent studies reviewed.
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
## 2024-10-21 NOTE — CONSULTS
Consultation - Neuropsychology/Psychology Department  Tae Dolan 54 y.o. male MRN: [id number]  Unit/Bed#: S -01 Encounter: [id number]        Reason for Consultation:  Tae Dolan is a 54 y.o. year old male who was referred for a Neuropsychological Exam to assess cognitive functioning and comment on capacity to make informed medical decisions.      History of Present Illness  Fall, homeless, squatting in aunt's attic without her knowledge, fell off roof causing his injuried  Physician Requesting Consult: Lauryn Ullrich, DO    PROBLEM LIST:  Patient Active Problem List   Diagnosis    Fall    Closed nondisplaced fracture of fifth cervical vertebra (HCC)    Spleen laceration    Mood disorder (HCC) unspecified    History of stroke         Historical Information   No past medical history on file.  No past surgical history on file.  Social History   Social History     Substance and Sexual Activity   Alcohol Use Not on file     Social History     Substance and Sexual Activity   Drug Use Not on file     Social History     Tobacco Use   Smoking Status Not on file   Smokeless Tobacco Not on file     Family History: No family history on file.    Meds/Allergies   current meds:   Current Facility-Administered Medications:     acetaminophen (TYLENOL) tablet 975 mg, Q8H NANCY    Artificial Tears Op Soln 1 drop, BID    aspirin (ECOTRIN LOW STRENGTH) EC tablet 81 mg, Daily    enoxaparin (LOVENOX) subcutaneous injection 30 mg, Q12H    folic acid (FOLVITE) tablet 1 mg, Daily    HYDROmorphone HCl (DILAUDID) injection 0.2 mg, Q2H PRN    labetalol (NORMODYNE) injection 10 mg, Q6H PRN    methocarbamol (ROBAXIN) tablet 750 mg, Q6H NANCY    multivitamin-minerals (CENTRUM) tablet 1 tablet, Daily    naloxone (NARCAN) 0.04 mg/mL syringe 0.04 mg, Q1MIN PRN    nicotine (NICODERM CQ) 21 mg/24 hr TD 24 hr patch 21 mg, Daily    ondansetron (ZOFRAN) injection 4 mg, Q4H PRN    oxyCODONE (ROXICODONE) split tablet 2.5 mg, Q4H PRN **OR**  oxyCODONE (ROXICODONE) IR tablet 5 mg, Q4H PRN    pantoprazole (PROTONIX) EC tablet 40 mg, Early Morning    phenylephrine (JEREMIAH-SYNEPHRINE) 0.125 % nasal solution 1 drop, Q6H PRN    senna-docusate sodium (SENOKOT S) 8.6-50 mg per tablet 1 tablet, HS    thiamine tablet 100 mg, Daily    Not on File      Family and Social Support:   No data recorded    Behavioral Observations: Patient was alert, speech/thoughts appeared disorganized with word substitution, cooperative; denied depressed mood and anxiety; states he is able to return home today and care for himself and does not know what he is being treated for in hospital. Unable to accurately state the year, season, day/week, day/month, city, state, and name of hospital    Cognitive Examination    General Cognitive Functioning MMSE = Severely Impaired1/28;     Attention/Concentration Auditory Selective Attention = Impaired;  Auditory Vigilance = Impaired;     Frontal Systems/Executive Functioning Mental Flexibility/Cognitive Control = Impaired; Working Memory = Impaired Abstract Reasoning = Impaired; Generative Ability = Impaired,     Language Functioning Confrontation naming = Impaired, Phonemic Fluency = Impaired; Semantic Retrieval = Impaired; Comprehension of Complex Ideational Material = Impaired;  Repetition = Impaired; Basic Reading = Impaired; Following Commands = Impaired    Memory Functioning Narrative Recall - Short Delay = Impaired; Long Delay Narrative Recall = Impaired; Three word recall = Impaired 0/3    Visuo-Spatial Abilities Not Assessed    Functional Knowledge  Health & Safety Knowledge = Impaired;     Summary/Impression:  Results of Neuropsychological Exam revealed diffuse cognitive dysfunction and on a measure assessing awareness of personal health status and ability to evaluate health problems, handle medical emergencies and take safety precautions, patient performed in the IMPAIRED range of functioning. During this encounter, patient does not  appear to have capacity to make fully informed medical or self care decisions.

## 2024-10-21 NOTE — TELEPHONE ENCOUNTER
10/20/24 - 10/25/24: INPATIENT    2 WK HFU W/ AP  11/8/24 / 10:Buffy /     IMAGING: XRAY CSPINE & CT HEAD    PER  CARLOS Al,    This gentleman needs appointment in 2 weeks with AP. I've ordered cervical x-rays. Thanks.    10/23/24  CARLOS Al!    This gentleman already scheduled for follow up for cervical fracture on 11/8. Just wanted to make sure we add that we will also be seeing him to evaluate CT head for hemorrhage. Thanks.

## 2024-10-21 NOTE — DISCHARGE INSTR - AVS FIRST PAGE
Neurosurgery discharge instructions following neck fracture:     VISTA collar at all times except for showering change to lizy (peach) collar.  No bending, twisting or heavy lifting. No pushing or pulling over 10lbs. No strenuous activities. NO DRIVING.     **Please notify MD immediately if you have increased neck or arm pain. New numbness and/or weakness in your arm. Difficulty swallowing or breathing especially while lying down. Numbness or weakness in arms or legs. Increased difficulty walking **     Neurosurgery discharge instructions following traumatic head bleed:     Do not take any blood thinning medications (ie. No Advil. No motrin. No ibuprofen. No Aleve. No Aspirin. No fishoil. No heparin. No antiplatelet / no anticoagulation medication).  Refrain from activity that increases chance of trauma to head or falls. Recommend you take fall precaution.  No strenuous activity or sports.  Return to hospital Emergency Room if you experience worsening / new headache, nausea/vomiting, speech/vision change, seizure, confusion / mental status change, weakness, or other neurological changes.      Follow-up as scheduled with a repeat CT head without contrast to be completed 2-3 days prior to visit.  Prescription has been entered electronically.  Please call [phone] to schedule.

## 2024-10-21 NOTE — PROGRESS NOTES
Pt moved to room near nursing station for safety. Alarms in place. Pt confused, impulsive and continually climbing OOB. Ingrid Martinez with trauma made aware. Verbal order received for Posey waist restraint. Pt almost immediately out of restraint twice and OOB with increasing agitation. Ingrid Martinez made aware. Verbal order received for BUE soft limb restraints. Applied. Will continue to monitor.

## 2024-10-22 PROBLEM — R25.2 SPASTICITY: Status: ACTIVE | Noted: 2024-10-22

## 2024-10-22 PROBLEM — S06.4XAA EPIDURAL HEMATOMA (HCC): Status: ACTIVE | Noted: 2024-10-22

## 2024-10-22 PROBLEM — Z87.898 HISTORY OF ALCOHOL USE: Status: ACTIVE | Noted: 2024-10-22

## 2024-10-22 PROBLEM — E11.9 TYPE 2 DIABETES MELLITUS (HCC): Status: ACTIVE | Noted: 2024-10-22

## 2024-10-22 PROBLEM — A41.9 SEPSIS (HCC): Status: ACTIVE | Noted: 2024-10-22

## 2024-10-22 PROBLEM — Z98.890 S/P CRANIOTOMY: Status: ACTIVE | Noted: 2024-10-22

## 2024-10-22 NOTE — ASSESSMENT & PLAN NOTE
History of ETOH use in the past, unknown last drink.  Continue CIWA protocol.  Continue thiamine/folate daily.  Encourage cessation.

## 2024-10-22 NOTE — ASSESSMENT & PLAN NOTE
No home medication at this time. No indication for inpatient admission at this time.  Psych consulted, appreciate recommendations.  Continue supportive care.  CM consulted for dispo planning.

## 2024-10-22 NOTE — ASSESSMENT & PLAN NOTE
CTH done given AMS, lethargy.  CTA head/neck - New extra-axial lenticular shaped hemorrhage along the left parietal convexity as described above, which may represent an epidural hematoma or less likely a subdural hematoma in this region. No significant midline shift noted. Trace new subarachnoid hemorrhage noted along the inferior left temporo-occipital convexity noted.   Pt was started on ASA this admission. He was given DDAVP for reversal.  Neurosurgery consulted, appreciate recommendations.    Plan:  Continue neuro-checks q1h  Repeat CTH at 8pm tonight  STAT CTH with drop in GCS by 2 or more in 1 hour.  Discontinue ASA - given DDAVP  Continue Keppra 500mg BID x 7 days (Day #1/7)  Hold Lovenox for DVT prophylaxis until cleared by neurosurgery.  Goal SBP < 140, may require Cardene gtt.  Maintain euglycemia, normothermia  PT/OT eval and treat

## 2024-10-22 NOTE — ASSESSMENT & PLAN NOTE
Hx craniotomy for evacuation of abscess in 2019 at [facility] with Dr. Villareal.

## 2024-10-22 NOTE — ASSESSMENT & PLAN NOTE
History of stroke.  No home regimen.  Was started on ASA this admission, ASA has since been discontinued in setting of EDH.  Continue neuro-checks.  Supportive care.

## 2024-10-22 NOTE — ASSESSMENT & PLAN NOTE
Fall from window, at least 10 feet. Pt does not recall events before or after the fall.  Possibly d/t new medications.  Denies intentionally jumping out of window or any other self harm.  Fall precautions.  Safety sitter.

## 2024-10-22 NOTE — ASSESSMENT & PLAN NOTE
Comminuted C5 vertebral body fracture  Status post fall approximately 10 feet  Endorses neck pain  Baseline right sided numbness and tingling for history of stroke.  No new radicular pain, numbness, tingling and or weakness    Imaging:   CT cervical spine without 10/20/24: Acute comminuted C5 vertebral body fracture with associated soft tissue swelling.  Maintained alignment.  Posterior elements appear intact.    Plan:   Continue monitor neurological exam  CT results reviewed with patient demonstrating C5 fracture  Plan for conservative management  Ordered Vista collar to be worn at all times.  Patient aware collar anticipated for 8 to 12 weeks  Ordered baseline cervical spine upright x-rays  Pain control per primary team  Okay to mobilize with physical and Occupational Therapy with collar in place  DVT prophylaxis: Bilateral SCDs.  Lovenox    No neurosurgical intervention anticipated at this juncture.  Plan for outpatient follow up as scheduled.  Call with any questions or concerns.

## 2024-10-22 NOTE — ASSESSMENT & PLAN NOTE
"No results found for: \"HGBA1C\"    Recent Labs     10/22/24  1122   POCGLU 222*     Blood Sugar Average: Last 72 hrs:  (P) 222    Home regimen: Lantus 15 units SC qHS,   Hold home regimen for now  Continue SSI every 6 hours while NPO  Goal glucose 140 - 180  Hypoglycemia protocol  Currently NPO  "

## 2024-10-22 NOTE — QUICK NOTE
Patient remains in restraints and on a 1:1.  Finally has settled down and is resting after being given the Seroquel.  He was incontinent for urine and a condom cath put in place.  Will continue restraints and 1:1 throughout the night

## 2024-10-22 NOTE — ASSESSMENT & PLAN NOTE
Hx spasticity s/p craniotomy.  Home regimen: Baclofen and Tizanidine qHS  Hold Baclofen and Tizanidine for now, restart when appropriate.   Continue Robaxin for pain.

## 2024-10-22 NOTE — ASSESSMENT & PLAN NOTE
As evidence by tachycardia, fever, leukocytosis  Source, unknown at this time  WBC - 11, Procal - 0.46, Lactic - 0.8  CXR - No acute cardiopulmonary abnormalities  UA - pending.  COVID/FLU/RSV - pending.  Blood cultures x 2 obtained.  He received 30cc/kg resuscitation fluids.    Plan:  Continue to hold ABX.  Follow up blood cultures, UA, viral swab and MRSA.  Monitor/trend fever curve.  Monitor/trend WBC.  Trend endpoints.

## 2024-10-22 NOTE — ASSESSMENT & PLAN NOTE
S/p fall  CT C/A/P - Splenomegaly with metallic surgical coils near the splenic hilar region. There is a small amount of intermediate density perisplenic free fluid adjacent to the inferior spleen which could represent subcapsular hematoma (AAST grade I splenic injury).   Hgb on admission - 11.5  Most recent Hgb - 9.5    Plan:  No intervention at this time.  Trend Hgb, repeat CBC in AM.  Transfuse for Hgb < 7.0 or active bleeding, HD instability.  Hold ASA.  Hold Lovenox for DVT prophylaxis .  SCD's for DVT prophylaxis.  Monitor for S/S bleeding.

## 2024-10-22 NOTE — ASSESSMENT & PLAN NOTE
"No results found for: \"HGBA1C\"    Recent Labs     10/22/24  1122 10/22/24  1804   POCGLU 222* 209*     Blood Sugar Average: Last 72 hrs:  (P) 215.5    Home regimen: Lantus 15 units SC qHS  Hold home regimen  SSI every 6 hours while NPO  Goal glucose 140 - 180  Hypoglycemia protocol  NPO for now.   "

## 2024-10-22 NOTE — ASSESSMENT & PLAN NOTE
As evidence by Tachcyardia, Fever  Suspected source Unknown at this time  WBC - 11, Procal - 0.46, Lactate - 0.8  CXR - no acute cardiopulmonary abnormalities  UA - pending  COVID/FLU/RSV - pending  Blood cultres x 2 obtained  He received 30cc/kg resuscitation fluids  ABX held at this time.    Plan:  Continue to hold ABX.  Follow up UA, viral swab, MRSA swab and blood cultures.  Monitor/trend fever.  Monitor/trend WBC.

## 2024-10-22 NOTE — PROGRESS NOTES
Progress Note - Neurosurgery   Name: Tae Dolan 54 y.o. male I MRN: [id number]  Unit/Bed#: S -01 I Date of Admission: 10/20/2024   Date of Service: 10/22/2024 I Hospital Day: 2    Assessment & Plan  Closed nondisplaced fracture of fifth cervical vertebra (HCC)  Comminuted C5 vertebral body fracture  Status post fall approximately 10 feet  Endorses neck pain  Baseline right sided numbness and tingling for history of stroke.  No new radicular pain, numbness, tingling and or weakness    Imaging:   CT cervical spine without 10/20/24: Acute comminuted C5 vertebral body fracture with associated soft tissue swelling.  Maintained alignment.  Posterior elements appear intact.    Plan:   Continue monitor neurological exam  CT results reviewed with patient demonstrating C5 fracture  Plan for conservative management  Ordered Vista collar to be worn at all times.  Patient aware collar anticipated for 8 to 12 weeks  Ordered baseline cervical spine upright x-rays  Pain control per primary team  Okay to mobilize with physical and Occupational Therapy with collar in place  DVT prophylaxis: Bilateral SCDs.  Lovenox    No neurosurgical intervention anticipated at this juncture.  Plan for outpatient follow up as scheduled.  Call with any questions or concerns.    Fall  Reported fall from roof at least 10 feet  Patient without recollection of events  States possibly related to new medication and unclear recollections of the last several days  Denies any purposeful jumping or suicidal ideations  Spleen laceration  Per trauma  Mood disorder (HCC) unspecified    History of stroke    Epidural hematoma (HCC)  Acute left parietal epidural hematoma, left temporo-occipital SAH  CTA completed am of 10/22 due to worsening AMS, confusion.  Noted with above.  On daily ASA - reversed with DDAVP.    Imaging:  CTA head and neck w/wo, 10/22/2024: New extra-axial lenticular shaped hemorrhage along the left parietal convexity as described  above, which may represent an epidural hematoma or less likely a subdural hematoma in this region. No significant midline shift noted. Trace new subarachnoid hemorrhage noted along the inferior left temporo-occipital convexity noted. Right facial subcutaneous soft tissue swelling/hematoma again demonstrated. CT Angiography: No active extravasation of contrast is noted into the extra-axial hematoma. No large vessel occlusion, high-grade stenosis, or intracranial aneurysm identified on CT angiogram of the head. No hemodynamically significant stenosis or dissection identified on CT angiogram of the neck. Anterior mildly comminuted C5 vertebral body fracture again demonstrated, with mild associated prevertebral edema again demonstrated.    Plan:  Continue to monitor neuro exam closely.  STAT CT head with decline in GCS > 2 pts in 1 hour.  Hold ASA - reversed with DDAVP.  Keppra per trauma team.  Plan for repeat CT head for stability this evening.  Mobilize with PT/OT.  DVT ppx: SCDs. Hold pharmacologic DVT ppx.    Neurosurgery will continue to follow. Call with questions.     S/P craniotomy  Hx craniotomy for evacuation of abscess in 2019 at [facility] with Dr. Villareal.    I have discussed the above management plan in detail with the primary service.   Neurosurgery service will follow.  Please contact the SecureChat role for the Neurosurgery service with any questions/concerns.    Subjective   Advised by trauma team that patient with new findings on CTA head including possible epidural hematoma and SAH. Imaging completed as patient with confusion and AMS today.   He was given 2x doses of ASA 81 mg this morning and yesterday as well as Lovenox for DVT ppx.  He is also noted today with fever Tmax 102.    Objective :  Temp:  [98.5 °F (36.9 °C)-102.1 °F (38.9 °C)] 100.3 °F (37.9 °C)  HR:  [103-127] 120  BP: (158-197)/() 172/92  Resp:  [16-19] 19  SpO2:  [95 %-98 %] 97 %  O2 Device: None (Room air)    I/O         10/20  0701  10/21 0700 10/21 0701  10/22 0700 10/22 0701  10/23 0700    P.O.     I.V. (mL/kg) 20 (0.3)      Total Intake(mL/kg) 560 (7.5) 650 (8.7) 1560 (21)    Urine (mL/kg/hr) 2170 (1.2) 640 (0.4) 725 (1.2)    Total Output 2170 640 725    Net -1610 +10 +835           Unmeasured Urine Occurrence  1 x 1 x          Physical Exam  Vitals and nursing note reviewed.   Constitutional:       General: He is not in acute distress.     Appearance: He is well-developed.   HENT:      Head: Normocephalic.      Comments: R facial contusions/ecchymosis  Eyes:      Conjunctiva/sclera: Conjunctivae normal.   Cardiovascular:      Rate and Rhythm: Normal rate and regular rhythm.      Heart sounds: No murmur heard.  Pulmonary:      Effort: Pulmonary effort is normal. No respiratory distress.      Breath sounds: Normal breath sounds.   Abdominal:      Palpations: Abdomen is soft.      Tenderness: There is no abdominal tenderness.   Musculoskeletal:         General: No swelling.      Cervical back: Neck supple.   Skin:     General: Skin is warm and dry.      Capillary Refill: Capillary refill takes less than 2 seconds.   Neurological:      Mental Status: He is alert. He is disoriented.   Psychiatric:         Mood and Affect: Mood normal.      Neurological Exam  Mental Status  Alert.  Receptive and expressive dysphasia with dysarthria.    Motor    OMER spontaneously.        Lab Results: I have reviewed the following results:  Recent Labs     10/20/24  0026 10/20/24  0115 10/20/24  0449 10/21/24  0442 10/21/24  2312 10/22/24  0526   WBC  --    < > 7.36   < > 11.01*  --    HGB 11.9*   < > 11.0*   < > 9.8* 9.5*   HCT 35*   < > 31.4*   < > 28.9* 28.4*   PLT  --    < > 176   < > 172  --    BANDSPCT  --   --   --   --  1  --    SODIUM  --    < > 138   < > 135  --    K  --    < > 4.6   < > 3.9  --    CL  --    < > 101   < > 100  --    CO2 30   < > 28   < > 31  --    BUN  --    < > 26*   < > 11  --    CREATININE  --    < > 0.90   < > 0.78   --    GLUC  --    < > 154*   < > 272*  --    CAIONIZED 1.10*  --   --   --   --   --    AST  --    < > 32  --   --   --    ALT  --    < > 30  --   --   --    ALB  --    < > 3.8  --   --   --    TBILI  --    < > 0.71  --   --   --    ALKPHOS  --    < > 57  --   --   --     < > = values in this interval not displayed.       Imaging Results Review: I personally reviewed the following image studies in PACS and associated radiology reports: CT head. My interpretation of the radiology images/reports is: as above.  Other Study Results Review: No additional pertinent studies reviewed.    VTE Pharmacologic Prophylaxis: VTE covered by:    None    and Sequential compression device (Venodyne)

## 2024-10-22 NOTE — ASSESSMENT & PLAN NOTE
S/p craniotomy 1/2019 at [facility], done secondary to abscess/lesions. Baseline independent.   CTH (10/20/24) - No acute intracranial hemorrhage or depressed calvarial fracture. Mild bilateral anterior frontal scalp soft tissue swelling, right greater than left. Right periorbital, perinasal, and premaxillary facial soft tissue swelling also noted.   CTA head/neck (10/22/24) - New extra-axial lenticular shaped hemorrhage along the left parietal convexity as described above, which may represent an epidural hematoma or less likely a subdural hematoma in this region. No significant midline shift noted. Trace new subarachnoid hemorrhage noted along the inferior left temporo-occipital convexity noted.   Continue supportive care.   See individual plans below.

## 2024-10-22 NOTE — PROGRESS NOTES
Progress Note - Trauma   Name: Tae Dolan 54 y.o. male I MRN: [id number]  Unit/Bed#: S -01 I Date of Admission: 10/20/2024   Date of Service: 10/22/2024 I Hospital Day: 2    Assessment & Plan  Fall   - CT head   - CT c-spine   - CT CAP   - CBC, CMP, COMA   - 1:1 obs   - TDAP update   - psychology consult  Closed nondisplaced fracture of fifth cervical vertebra (HCC)   - neurosurgery consult   - aspen collar in place  -upright films stable  -NSGY s/o follow up 2 week      Spleen laceration   - SD2 admission   - q6h H/H stable   Mood disorder (HCC) unspecified  Psych consult   Follow up recs   History of stroke  History of stroke   Denies home medication use   Will start daily ASA and recheck H&H tomorrow    Bowel Regimen: Senna and Colace  VTE Prophylaxis:Enoxaparin (Lovenox)     Disposition: rehab vs placement    24 Hour Events : admitted, throwing self oob, agitated, restraints attempted, pulled pose lab belt and limb restraints off so was put in locked restraints.  Will need to be re-evaluated later today.  Is sleeping peacefully, gave him Seroquel 12/5 mg last evening.  Subjective : sleeping    Objective :  Temp:  [98.3 °F (36.8 °C)-102.1 °F (38.9 °C)] 98.5 °F (36.9 °C)  HR:  [] 113  BP: (164-197)/() 165/96  Resp:  [16-18] 16  SpO2:  [95 %-98 %] 95 %  O2 Device: None (Room air)    I/O         10/20 0701  10/21 0700 10/21 0701  10/22 0700    P.O. 540 650    I.V. (mL/kg) 20 (0.3)     Total Intake(mL/kg) 560 (7.5) 650 (8.7)    Urine (mL/kg/hr) 2170 (1.2) 640 (0.4)    Total Output 2170 640    Net -1610 +10          Unmeasured Urine Occurrence  1 x            Physical Exam  Constitutional:       Comments: Sleepy this morning, resting comfortably   HENT:      Head: Normocephalic.      Right Ear: External ear normal.      Left Ear: External ear normal.      Nose: Nose normal.      Mouth/Throat:      Pharynx: Oropharynx is clear.   Eyes:      Comments: sleeping   Cardiovascular:      Rate and  Rhythm: Normal rate and regular rhythm.      Pulses: Normal pulses.      Heart sounds: Normal heart sounds.   Pulmonary:      Effort: Pulmonary effort is normal.      Breath sounds: Normal breath sounds.   Abdominal:      Palpations: Abdomen is soft.   Genitourinary:     Comments: Voiding with condom catheter  Musculoskeletal:         General: No tenderness.      Cervical back: Neck supple. No tenderness.   Skin:     General: Skin is warm and dry.      Capillary Refill: Capillary refill takes less than 2 seconds.   Neurological:      Comments: Very sleepy at this time   Psychiatric:      Comments: Unable to assess               Lab Results: I have reviewed the following results:  Recent Labs     10/20/24  0026 10/20/24  0115 10/20/24  0449 10/21/24  0442 10/21/24  2312 10/22/24  0526   WBC  --    < > 7.36   < > 11.01*  --    HGB 11.9*   < > 11.0*   < > 9.8* 9.5*   HCT 35*   < > 31.4*   < > 28.9* 28.4*   PLT  --    < > 176   < > 172  --    BANDSPCT  --   --   --   --  1  --    SODIUM  --    < > 138   < > 135  --    K  --    < > 4.6   < > 3.9  --    CL  --    < > 101   < > 100  --    CO2 30   < > 28   < > 31  --    BUN  --    < > 26*   < > 11  --    CREATININE  --    < > 0.90   < > 0.78  --    GLUC  --    < > 154*   < > 272*  --    CAIONIZED 1.10*  --   --   --   --   --    AST  --    < > 32  --   --   --    ALT  --    < > 30  --   --   --    ALB  --    < > 3.8  --   --   --    TBILI  --    < > 0.71  --   --   --    ALKPHOS  --    < > 57  --   --   --     < > = values in this interval not displayed.       Once awake will re-asses need for locked restraints

## 2024-10-22 NOTE — ASSESSMENT & PLAN NOTE
S/p craniotomy 1/2019 at [facility], done secondary to abscess/lesions. Baseline function, independent   CTH (10/20/24) - No acute intracranial hemorrhage or depressed calvarial fracture. Mild bilateral anterior frontal scalp soft tissue swelling, right greater than left. Right periorbital, perinasal, and premaxillary facial soft tissue swelling also noted.  CTA head/neck (10/22/2024) - New extra-axial lenticular shaped hemorrhage along the left parietal convexity as described above, which may represent an epidural hematoma or less likely a subdural hematoma in this region. No significant midline shift noted. Trace new subarachnoid hemorrhage noted along the inferior left temporo-occipital convexity noted. Right facial subcutaneous soft tissue swelling/hematoma again demonstrated.  Continue supportive care.

## 2024-10-22 NOTE — ASSESSMENT & PLAN NOTE
S/p fall from 10 feet, endorsing neck pain on arrival. Baseline numbness and tingling from previous stroke.  CT c-spine (10/20/2024) - Acute anterior C5 vertebral body fracture with prevertebral soft tissue swelling. No traumatic subluxation.   Neurosurgery consulted, appreciate recommendations.     Plan:  Continue neuro-checks q1h.  No operative intervention at this time.  Maintain in Vista collar at all times (will need collar for 8 to 12 weeks).  Analgesia as indicated.  Follow up upright films.  PT/OT eval and treat.  DVT prophylaxis: SCD's, hold pharmacological DVT prophylaxis in setting of EDH.  Follow up with Neurosurgery as outpatient.

## 2024-10-22 NOTE — PROGRESS NOTES
Transported pt to CT via bed with PCA. Pt confused, slightly agitated and unable to hold still during scan. CT tech aborted scans. Primary RN made aware.

## 2024-10-22 NOTE — QUICK NOTE
Called about patient lowering himself to floor and crawling out of bed.  Attempted soft limb restraints and a posey belt but patient was able to keep getting out of them.  After the lowering to the floor security was called and patient in 4 point restraints.  He is on a 1:1 as well.  At this time he is talkative and still a little agitated.  Will medicate him and continue 1:1 and restraints.

## 2024-10-22 NOTE — ASSESSMENT & PLAN NOTE
Fall from a height of 10 feet. He does not recall events before and after fall.  Possibly d/t new medication.  Denies intentionally jumping out of window or any thoughts of self harm.   Fall precautions.

## 2024-10-22 NOTE — ASSESSMENT & PLAN NOTE
S/p fall from about 10 feet. Baseline right sided numbness/tingling given history of stroke. No new pain.  CT c-spine - Acute anterior C5 vertebral body fracture with prevertebral soft tissue swelling. No traumatic subluxation.   Neurosurgery consulted, appreciate recommendations.    Plan:  Continue Neuro-checks q1h.  No surgical intervention at this time.   Maintain Vista collar at all times, will need for 8 to 12 weeks.   Follow up on upright films.  DVT prophylaxis: SCD's.  PT/OT eval and treat with Collar in place.  CM consulted for disposition planning.  Follow up with Neurosurgery as outpatient in 2 weeks.

## 2024-10-22 NOTE — ASSESSMENT & PLAN NOTE
Acute left parietal epidural hematoma, left temporo-occipital SAH  CTA completed am of 10/22 due to worsening AMS, confusion.  Noted with above.  On daily ASA - reversed with DDAVP.    Imaging:  CTA head and neck w/wo, 10/22/2024: New extra-axial lenticular shaped hemorrhage along the left parietal convexity as described above, which may represent an epidural hematoma or less likely a subdural hematoma in this region. No significant midline shift noted. Trace new subarachnoid hemorrhage noted along the inferior left temporo-occipital convexity noted. Right facial subcutaneous soft tissue swelling/hematoma again demonstrated. CT Angiography: No active extravasation of contrast is noted into the extra-axial hematoma. No large vessel occlusion, high-grade stenosis, or intracranial aneurysm identified on CT angiogram of the head. No hemodynamically significant stenosis or dissection identified on CT angiogram of the neck. Anterior mildly comminuted C5 vertebral body fracture again demonstrated, with mild associated prevertebral edema again demonstrated.    Plan:  Continue to monitor neuro exam closely.  STAT CT head with decline in GCS > 2 pts in 1 hour.  Hold ASA - reversed with DDAVP.  Keppra per trauma team.  Plan for repeat CT head for stability this evening.  Mobilize with PT/OT.  DVT ppx: SCDs. Hold pharmacologic DVT ppx.    Neurosurgery will continue to follow. Call with questions.

## 2024-10-22 NOTE — ASSESSMENT & PLAN NOTE
S/p fall  CT C/A/P - Splenomegaly with metallic surgical coils near the splenic hilar region. There is a small amount of intermediate density perisplenic free fluid adjacent to the inferior spleen which could represent subcapsular hematoma (AAST grade I splenic injury).   Hgb on arrival - 11.5  Most recent Hgb - 9.5  Has been stable in the 9's x 2 checks    Plan:  Continue to hold Lovenox and ASA in setting of EDH  Repeat CBC in AM.  No intervention at this time.   Monitor for s/s bleeding.  Transfuse as indicated.

## 2024-10-22 NOTE — PROGRESS NOTES
Pt pulled self out of soft wrist restraints, climbed over bed rails and sat on floor. PCA who had responded to bed alarm was with pt during this time. Pt agitated and yelling nonsensical statements and not allowing staff to safely assist pt. Pt also attempting to remove Vista collar. Control team was called at this time. Security responded and 4-point locked restraints applied. PCA / 1:1 with pt. Ingrid Martinez with Trauma made aware of above. Will continue to monitor.

## 2024-10-22 NOTE — PROGRESS NOTES
Pt more calm at this time, although still confused and restless. Temp 102.1, HR in the 120's and /104. Ingrid Martinez with Trauma made aware of above. Awaiting response. Will continue to monitor.

## 2024-10-22 NOTE — ASSESSMENT & PLAN NOTE
No home regimen. No indication for inpatient admission at this time.  Psych consulted, appreciate recommendations.  CM consulted for dispo planning.  Continue supportive care.

## 2024-10-22 NOTE — ASSESSMENT & PLAN NOTE
History of spasticity s/p craniotomy.  Home regimen: Baclofen and Tizanidine qHS.  Hold home Baclofen and Tizanidine.  Continue Robaxin for analgesia.  Restart Baclofen when appropriate.   Continue supportive care.
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
## 2024-10-22 NOTE — ASSESSMENT & PLAN NOTE
Pt presented s/p fall from 10 ft. Today developed AMS, lethargy. Pt started on ASA this admission.  CTA head/neck - New extra-axial lenticular shaped hemorrhage along the left parietal convexity as described above, which may represent an epidural hematoma or less likely a subdural hematoma in this region. No significant midline shift noted. Trace new subarachnoid hemorrhage noted along the inferior left temporo-occipital convexity noted. Right facial subcutaneous soft tissue swelling/hematoma again demonstrated.  Previous CTH from 10/20 without acute intracranial abnormalities.  He was given DDAVP for ASA reversal and Lovenox was discontinued.  Neurosurgery consulted, appreciate recommendations.    Plan:  Continue Neuro-checks q1h.  STAT CTH with drop in GCS by 2 or more points in 1 hour.  Repeat CTH tonight at 8pm and then agin in the AM.  Discontinue ASA.  Hold Lovenox for DVT prophylaxis until cleared by Neurosurgery.  SCD's for DVT prophylaxis.  Continue Keppra 500mg BID x 7 days (Day # 1/7).  PT/OT eval and treat.  CM consulted for dispo planing.

## 2024-10-22 NOTE — PROGRESS NOTES
ICU Acceptance Note - Critical Care/ICU   Name: Tae Dolan 54 y.o. male I MRN: [id number]  Unit/Bed#: ICU 07 I Date of Admission: 10/20/2024   Date of Service: 10/22/2024 I Hospital Day: 2      Assessment & Plan  Fall  Fall from a height of 10 feet. He does not recall events before and after fall.  Possibly d/t new medication.  Denies intentionally jumping out of window or any thoughts of self harm.   Fall precautions.  Closed nondisplaced fracture of fifth cervical vertebra (HCC)  S/p fall from about 10 feet. Baseline right sided numbness/tingling given history of stroke. No new pain.  CT c-spine - Acute anterior C5 vertebral body fracture with prevertebral soft tissue swelling. No traumatic subluxation.   Neurosurgery consulted, appreciate recommendations.    Plan:  Continue Neuro-checks q1h.  No surgical intervention at this time.   Maintain Vista collar at all times, will need for 8 to 12 weeks.   Follow up on upright films.  DVT prophylaxis: SCD's.  PT/OT eval and treat with Collar in place.  CM consulted for disposition planning.  Follow up with Neurosurgery as outpatient in 2 weeks.   Spleen laceration  S/p fall  CT C/A/P - Splenomegaly with metallic surgical coils near the splenic hilar region. There is a small amount of intermediate density perisplenic free fluid adjacent to the inferior spleen which could represent subcapsular hematoma (AAST grade I splenic injury).   Hgb on admission - 11.5  Most recent Hgb - 9.5    Plan:  No intervention at this time.  Trend Hgb, repeat CBC in AM.  Transfuse for Hgb < 7.0 or active bleeding, HD instability.  Hold ASA.  Hold Lovenox for DVT prophylaxis .  SCD's for DVT prophylaxis.  Monitor for S/S bleeding.  Mood disorder (HCC) unspecified  No home regimen. No indication for inpatient admission at this time.  Psych consulted, appreciate recommendations.  CM consulted for dispo planning.  Continue supportive care.  History of stroke  History of stroke.  No home  regimen.  Pt was started on ASA this admission.  Discontinue ASA secondary to EDH.  Continue Neuro-checks.  Epidural hematoma (HCC)  CTH done given AMS, lethargy.  CTA head/neck - New extra-axial lenticular shaped hemorrhage along the left parietal convexity as described above, which may represent an epidural hematoma or less likely a subdural hematoma in this region. No significant midline shift noted. Trace new subarachnoid hemorrhage noted along the inferior left temporo-occipital convexity noted.   Pt was started on ASA this admission. He was given DDAVP for reversal.  Neurosurgery consulted, appreciate recommendations.    Plan:  Continue neuro-checks q1h  Repeat CTH at 8pm tonight  STAT CTH with drop in GCS by 2 or more in 1 hour.  Discontinue ASA - given DDAVP  Continue Keppra 500mg BID x 7 days (Day #1/7)  Hold Lovenox for DVT prophylaxis until cleared by neurosurgery.  Goal SBP < 140, may require Cardene gtt.  Maintain euglycemia, normothermia  PT/OT eval and treat  S/P craniotomy  S/p craniotomy 1/2019 at [facility], done secondary to abscess/lesions. Baseline independent.   CTH (10/20/24) - No acute intracranial hemorrhage or depressed calvarial fracture. Mild bilateral anterior frontal scalp soft tissue swelling, right greater than left. Right periorbital, perinasal, and premaxillary facial soft tissue swelling also noted.   CTA head/neck (10/22/24) - New extra-axial lenticular shaped hemorrhage along the left parietal convexity as described above, which may represent an epidural hematoma or less likely a subdural hematoma in this region. No significant midline shift noted. Trace new subarachnoid hemorrhage noted along the inferior left temporo-occipital convexity noted.   Continue supportive care.   See individual plans below.   Spasticity  History of spasticity s/p craniotomy.  Home regimen: Baclofen and Tizanidine qHS.  Hold home Baclofen and Tizanidine.  Continue Robaxin for  "analgesia.  Restart Baclofen when appropriate.   Continue supportive care.  Type 2 diabetes mellitus (HCC)  No results found for: \"HGBA1C\"    Recent Labs     10/22/24  1122 10/22/24  1804   POCGLU 222* 209*     Blood Sugar Average: Last 72 hrs:  (P) 215.5    Home regimen: Lantus 15 units SC qHS  Hold home regimen  SSI every 6 hours while NPO  Goal glucose 140 - 180  Hypoglycemia protocol  NPO for now.   Sepsis (HCC)  As evidence by tachycardia, fever, leukocytosis  Source, unknown at this time  WBC - 11, Procal - 0.46, Lactic - 0.8  CXR - No acute cardiopulmonary abnormalities  UA - pending.  COVID/FLU/RSV - pending.  Blood cultures x 2 obtained.  He received 30cc/kg resuscitation fluids.    Plan:  Continue to hold ABX.  Follow up blood cultures, UA, viral swab and MRSA.  Monitor/trend fever curve.  Monitor/trend WBC.  Trend endpoints.  History of alcohol use  History of ETOH use in the past, unknown last drink.  Continue CIWA protocol.  Continue thiamine/folate daily.  Encourage cessation.  Disposition: Stepdown Level 1    ICU Core Measures     A: Assess, Prevent, and Manage Pain Has pain been assessed? Yes  Need for changes to pain regimen? No   B: Both SAT/SAT  N/A   C: Choice of Sedation RASS Goal: N/A patient not on sedation  Need for changes to sedation or analgesia regimen? NA   D: Delirium CAM-ICU: Unable to perform secondary to Traumatic Brain Injury   E: Early Mobility  Plan for early mobility? Yes   F: Family Engagement Plan for family engagement today? Yes     Prophylaxis:  VTE Contraindicated secondary to: EDH   Stress Ulcer  covered bypantoprazole (PROTONIX) EC tablet 40 mg [[id number]]         24 Hour Events : Tae Dolan 55 y/o male PMHx: HTN, CVA, T2DM, craniotomy 2/2 abscess, mood disorder, ETOH use who presented on 10/20 s/p fall from a window at a height of 10 feet. Initial imaging showed a non-displaced C5 fracture and grade 1 splenic laceration. Hgb remains stable, neurosurgery was consulted " for C5 fracture with no operative intervention. Today he was noted to be altered and lethargic, CTA head/neck was done and showed lenticular shaped hemorrhage along the left parietal convexity either SDH vs EDH and new trace SAH in the inferior left temporo-occipital region. He was given DDAVP for ASA reversal. He was also noted to have a fever of 102 at this time, etiology unclear. He was transferred to the ICU and neurosurgery was consulted. On exam GCS 14, no focal deficit appreciate, no acute distress. Pt not participating in exam d/t AMS.     Subjective   Review of Systems: Review of Systems   Unable to perform ROS: Mental status change     Objective :                   Vitals I/O      Most Recent Min/Max in 24hrs   Temp 100.3 °F (37.9 °C) Temp  Min: 98.5 °F (36.9 °C)  Max: 102.1 °F (38.9 °C)   Pulse (!) 113 Pulse  Min: 105  Max: 127   Resp 21 Resp  Min: 16  Max: 21   /79 BP  Min: 158/86  Max: 195/143   O2 Sat 99 % SpO2  Min: 95 %  Max: 99 %      Intake/Output Summary (Last 24 hours) at 10/22/2024 1821  Last data filed at 10/22/2024 1819  Gross per 24 hour   Intake 3640 ml   Output 925 ml   Net 2715 ml       Diet NPO    Invasive Monitoring           Physical Exam   Physical Exam  Vitals and nursing note reviewed.   Eyes:      Pupils: Pupils are equal, round, and reactive to light.   Skin:     General: Skin is warm and dry.      Capillary Refill: Capillary refill takes less than 2 seconds.   HENT:      Head: Abrasion and contusion present.      Comments: Abrasion, eccymosis  Scabs     Mouth/Throat:      Mouth: Mucous membranes are moist.   Neck:      Comments: C-collar  Cardiovascular:      Rate and Rhythm: Normal rate and regular rhythm.      Pulses: Normal pulses.           Radial pulses are 2+ on the right side and 2+ on the left side.        Dorsalis pedis pulses are 2+ on the right side and 2+ on the left side.      Heart sounds: Normal heart sounds.   Musculoskeletal:      Right lower leg: No edema.       Left lower leg: No edema.   Abdominal: General: Abdomen is flat. Bowel sounds are normal. There is no distension.      Palpations: Abdomen is soft.      Tenderness: There is no abdominal tenderness.   Constitutional:       General: He is awake. He is not in acute distress.     Appearance: He is well-developed, normal weight and well-nourished. He is ill-appearing.      Interventions: He is restrained. Cervical collar in place.   Pulmonary:      Effort: Pulmonary effort is normal.      Breath sounds: Normal breath sounds.   Psychiatric:      Comments: CAROLYN   Neurological:      General: No focal deficit present.      Mental Status: He is disoriented.      GCS: GCS eye subscore is 3. GCS verbal subscore is 4. GCS motor subscore is 6.          Diagnostic Studies        Lab Results: I have reviewed the following results:     Medications:  Scheduled PRN   acetaminophen, 975 mg, Q8H NANCY  Artificial Tears, 1 drop, BID  atorvastatin, 10 mg, Daily With Dinner  chlorhexidine, 15 mL, Q12H NANCY  folic acid, 1 mg, Daily  insulin lispro, 1-6 Units, Q6H NANCY  levETIRAcetam, 500 mg, Q12H NANCY  methocarbamol, 750 mg, Q6H NANCY  multi-electrolyte, 1,000 mL, Once  multivitamin-minerals, 1 tablet, Daily  nicotine, 21 mg, Daily  pantoprazole, 40 mg, Early Morning  QUEtiapine, 12.5 mg, HS  senna-docusate sodium, 1 tablet, HS  thiamine, 100 mg, Daily      HYDROmorphone, 0.2 mg, Q2H PRN  labetalol, 10 mg, Q6H PRN  naloxone, 0.04 mg, Q1MIN PRN  ondansetron, 4 mg, Q4H PRN  oxyCODONE, 2.5 mg, Q4H PRN   Or  oxyCODONE, 5 mg, Q4H PRN  phenylephrine, 1 drop, Q6H PRN       Continuous    niCARdipine, 1-15 mg/hr         Labs:   CBC    Recent Labs     10/21/24  0442 10/21/24  2312 10/22/24  0526   WBC 7.27 11.01*  --    HGB 11.2* 9.8* 9.5*   HCT 33.3* 28.9* 28.4*    172  --    BANDSPCT  --  1  --      BMP    Recent Labs     10/21/24  0442 10/21/24  2312   SODIUM 138 135   K 4.3 3.9   CL 98 100   CO2 34* 31   AGAP 6 4   BUN 12 11   CREATININE  0.75 0.78   CALCIUM 9.2 9.2       Coags    No recent results     Additional Electrolytes  No recent results       Blood Gas    No recent results  No recent results LFTs  No recent results    Infectious  Recent Labs     10/22/24  1124   PROCALCITONI 0.46*     Glucose  Recent Labs     10/21/24  0442 10/21/24  2312   GLUC 138 272*

## 2024-10-23 PROBLEM — R65.10 SYSTEMIC INFLAMMATORY RESPONSE SYNDROME (SIRS) WITHOUT ORGAN DYSFUNCTION (HCC): Status: ACTIVE | Noted: 2024-10-22

## 2024-10-23 PROBLEM — R56.9 SEIZURE (HCC): Status: ACTIVE | Noted: 2024-10-23

## 2024-10-23 PROBLEM — G40.901 STATUS EPILEPTICUS (HCC): Status: ACTIVE | Noted: 2024-10-23

## 2024-10-23 NOTE — OCCUPATIONAL THERAPY NOTE
Occupational Therapy Cancellation Note     Patient Name: Tae Dolan  Today's Date: 10/23/2024     10/23/24 1030   Note Type   Note Type Cancelled Session   Cancel Reasons Medical status  (Per ICU mobility rounds, patient is not appropraite for participation in therapy tx at this time. Will hold and f/u as able and appropriate.)     Little Gu MS OTR/L   [place] Licensure# [id number]

## 2024-10-23 NOTE — PROGRESS NOTES
Progress Note - Critical Care/ICU   Name: Tae Dolan 54 y.o. male I MRN: [id number]  Unit/Bed#: ICU 07 I Date of Admission: 10/20/2024   Date of Service: 10/23/2024 I Hospital Day: 3      Assessment & Plan  Fall  Fall from a height of 10 feet. He does not recall events before and after fall.  Possibly d/t new medication.  Denies intentionally jumping out of window or any thoughts of self harm.   Fall precautions.  Closed nondisplaced fracture of fifth cervical vertebra (HCC)  S/p fall from about 10 feet. Baseline right sided numbness/tingling given history of stroke. No new pain.  CT c-spine - Acute anterior C5 vertebral body fracture with prevertebral soft tissue swelling. No traumatic subluxation.   Neurosurgery consulted, appreciate recommendations.    Plan:  Continue Neuro-checks q1h.  No surgical intervention at this time.   Maintain Vista collar at all times, will need for 8 to 12 weeks.   Follow up on upright films.  DVT prophylaxis: SCD's.  PT/OT eval and treat with Collar in place.  CM consulted for disposition planning.  Follow up with Neurosurgery as outpatient in 2 weeks.   Spleen laceration  S/p fall  CT C/A/P - Splenomegaly with metallic surgical coils near the splenic hilar region. There is a small amount of intermediate density perisplenic free fluid adjacent to the inferior spleen which could represent subcapsular hematoma (AAST grade I splenic injury).   Hgb on admission - 11.5  Most recent Hgb - 9.5    Plan:  No intervention at this time.  Trend Hgb, repeat CBC in AM.  Transfuse for Hgb < 7.0 or active bleeding, HD instability.  Hold ASA.  Hold Lovenox for DVT prophylaxis .  SCD's for DVT prophylaxis.  Monitor for S/S bleeding.  Mood disorder (HCC) unspecified  No home regimen. No indication for inpatient admission at this time.  Psych consulted, appreciate recommendations.  CM consulted for dispo planning.  Continue supportive care.  History of stroke  History of stroke.  No home  regimen.  Pt was started on ASA this admission.  Discontinue ASA secondary to EDH.  Continue Neuro-checks.  Epidural hematoma (HCC)  CTH done given AMS, lethargy.  CTA head/neck - New extra-axial lenticular shaped hemorrhage along the left parietal convexity as described above, which may represent an epidural hematoma or less likely a subdural hematoma in this region. No significant midline shift noted. Trace new subarachnoid hemorrhage noted along the inferior left temporo-occipital convexity noted.   Pt was started on ASA this admission. He was given DDAVP for reversal.  Neurosurgery consulted, appreciate recommendations.    Plan:  Continue neuro-checks q1h  Repeat CTH at 8pm tonight  STAT CTH with drop in GCS by 2 or more in 1 hour.  Discontinue ASA - given DDAVP  Continue Keppra 500mg BID x 7 days (Day #1/7)  Hold Lovenox for DVT prophylaxis until cleared by neurosurgery.  Goal SBP < 140, may require Cardene gtt.  Maintain euglycemia, normothermia  PT/OT eval and treat  S/P craniotomy  S/p craniotomy 1/2019 at [facility], done secondary to abscess/lesions. Baseline independent.   CTH (10/20/24) - No acute intracranial hemorrhage or depressed calvarial fracture. Mild bilateral anterior frontal scalp soft tissue swelling, right greater than left. Right periorbital, perinasal, and premaxillary facial soft tissue swelling also noted.   CTA head/neck (10/22/24) - New extra-axial lenticular shaped hemorrhage along the left parietal convexity as described above, which may represent an epidural hematoma or less likely a subdural hematoma in this region. No significant midline shift noted. Trace new subarachnoid hemorrhage noted along the inferior left temporo-occipital convexity noted.   Continue supportive care.   See individual plans below.   Spasticity  History of spasticity s/p craniotomy.  Home regimen: Baclofen and Tizanidine qHS.  Hold home Baclofen and Tizanidine.  Continue Robaxin for  analgesia.  Restart Baclofen when appropriate.   Continue supportive care.  Type 2 diabetes mellitus (HCC)  Lab Results   Component Value Date    HGBA1C 6.4 (H) 10/22/2024       Recent Labs     10/22/24  1122 10/22/24  1804 10/23/24  0011 10/23/24  0609   POCGLU 222* 209* 159* 140     Blood Sugar Average: Last 72 hrs:  (P) 182.5    Home regimen: Lantus 15 units SC qHS  Hold home regimen  SSI every 6 hours while NPO  Goal glucose 140 - 180  Hypoglycemia protocol  NPO for now.   Sepsis (HCC)  As evidence by tachycardia, fever, leukocytosis  Source, unknown at this time  WBC - 11, Procal - 0.46, Lactic - 0.8  CXR - No acute cardiopulmonary abnormalities  UA - pending.  COVID/FLU/RSV - pending.  Blood cultures x 2 obtained.  He received 30cc/kg resuscitation fluids.    Plan:  Continue to hold ABX.  Follow up blood cultures, UA, viral swab and MRSA.  Monitor/trend fever curve.  Monitor/trend WBC.  Trend endpoints.  History of alcohol use  History of ETOH use in the past, unknown last drink.  Continue CIWA protocol.  Continue thiamine/folate daily.  Encourage cessation.  Seizure (HCC)  vEEG overnight 10/22-10/23 with multiple subclinical seizures  Loaded with Keppra, lacosamide, and valproic acid and started on maintenance doses  Also given Ativan  Neurology consulted, appreciate recommendations  Consider intubation given multiple seizures.  Disposition: Critical care    ICU Core Measures     A: Assess, Prevent, and Manage Pain Has pain been assessed? Yes  Need for changes to pain regimen? No   B: Both SAT/SAT  N/A   C: Choice of Sedation RASS Goal: 0 Alert and Calm  Need for changes to sedation or analgesia regimen? No   D: Delirium CAM-ICU: Positive   E: Early Mobility  Plan for early mobility? Yes   F: Family Engagement Plan for family engagement today? Yes         Prophylaxis:  VTE Contraindicated secondary to: ICH   Stress Ulcer  covered bypantoprazole (PROTONIX) EC tablet 40 mg [[id number]]         24 Hour Events :  Overnight patient was found to be having many sub-clinical seizures. He was bolused and started on 3 anti-epilepsy medications including Keppra, Vimpat and Valproic acid. Neurology consult was placed. Repeat CTH continued to show epidural with local edema but no shift.    Subjective   This morning patient has continued to have subclinical seizures per epileptology. Patient himself is unable or unwilling to participate in ROS.    Review of Systems: Review of Systems not obtainable due to Altered mental status    Objective :                   Vitals I/O      Most Recent Min/Max in 24hrs   Temp 99.3 °F (37.4 °C) Temp  Min: 99.3 °F (37.4 °C)  Max: 100.3 °F (37.9 °C)   Pulse 92 Pulse  Min: 92  Max: 127   Resp 14 Resp  Min: 13  Max: 28   /74 BP  Min: 119/58  Max: 207/80   O2 Sat 100 % SpO2  Min: 95 %  Max: 100 %      Intake/Output Summary (Last 24 hours) at 10/23/2024 0715  Last data filed at 10/23/2024 0600  Gross per 24 hour   Intake 4669.34 ml   Output 2300 ml   Net 2369.34 ml       Diet NPO    Invasive Monitoring           Physical Exam   Physical Exam  Vitals and nursing note reviewed.   Eyes:      Extraocular Movements: Extraocular movements intact.      Pupils: Pupils are equal, round, and reactive to light.      Comments: Left subconjunctival hematoma   Skin:     General: Skin is warm and dry.      Findings: Wound present.   HENT:      Head: Abrasion present.      Comments: Multiple bruises and abraisions     Mouth/Throat:      Mouth: Mucous membranes are moist.   Cardiovascular:      Rate and Rhythm: Normal rate and regular rhythm.   Musculoskeletal:         General: Normal range of motion.   Abdominal:      Palpations: Abdomen is soft.      Tenderness: There is no abdominal tenderness. There is no guarding.   Constitutional:       Appearance: He is well-developed. He is ill-appearing.      Interventions: He is sedated (Recent ativan).   Pulmonary:      Effort: Pulmonary effort is normal.      Breath  sounds: Normal breath sounds.   Neurological:      Mental Status: He is disoriented and confused.      GCS: GCS eye subscore is 1. GCS verbal subscore is 2. GCS motor subscore is 5.      Motor: Weakness (Moving all four extremities equally) and tremor (Intermittent tremors).      Comments: Making moaning noises with intermittent words that are slurred and nonsensical. Moving all four extremities in intermittently coordinated fashion. Not following directions but will localize, move, and groan to pain.   Genitourinary/Anorectal:  external catheter present.        Diagnostic Studies        Lab Results: I have reviewed the following results:     Medications:  Scheduled PRN   acetaminophen, 975 mg, Q8H NANCY  Artificial Tears, 1 drop, BID  atorvastatin, 10 mg, Daily With Dinner  chlorhexidine, 15 mL, Q12H NANCY  folic acid, 1 mg, Daily  insulin lispro, 1-6 Units, Q6H NANCY  lacosamide, 200 mg, Q12H  levETIRAcetam, 500 mg, Q12H NANCY  LORazepam, 2 mg, Once  methocarbamol, 750 mg, Q8H NANCY  multivitamin-minerals, 1 tablet, Daily  nicotine, 21 mg, Daily  pantoprazole, 40 mg, Early Morning  QUEtiapine, 12.5 mg, HS  senna-docusate sodium, 1 tablet, HS  thiamine, 100 mg, Daily  valproate sodium, 250 mg, Q8H NANCY      HYDROmorphone, 0.2 mg, Q2H PRN  labetalol, 10 mg, Q6H PRN  naloxone, 0.04 mg, Q1MIN PRN  ondansetron, 4 mg, Q4H PRN  oxyCODONE, 2.5 mg, Q4H PRN   Or  oxyCODONE, 5 mg, Q4H PRN  phenylephrine, 1 drop, Q6H PRN       Continuous    niCARdipine, 1-15 mg/hr, Last Rate: 10 mg/hr (10/23/24 0710)         Labs:   CBC    Recent Labs     10/21/24  2312 10/22/24  0526 10/23/24  0405 10/23/24  0410   WBC 11.01*  --   --   --    HGB 9.8*   < > 8.8* 9.9*   HCT 28.9*   < > 26* 29*     --   --   --    BANDSPCT 1  --   --   --     < > = values in this interval not displayed.     BMP    Recent Labs     10/21/24  2312 10/23/24  0410 10/23/24  0445   SODIUM 135  --  129*   K 3.9  --  5.5*     --  96   CO2 31 26 24   AGAP 4  --  9    BUN 11  --  5   CREATININE 0.78  --  0.50*   CALCIUM 9.2  --  7.7*       Coags    No recent results     Additional Electrolytes  Recent Labs     10/23/24  0405 10/23/24  0410 10/23/24  0445   MG  --   --  1.8*   PHOS  --   --  2.1*   CAIONIZED 1.11* 1.12  --           Blood Gas    No recent results  No recent results LFTs  No recent results    Infectious  Recent Labs     10/22/24  1124 10/23/24  0444   PROCALCITONI 0.46* 0.20     Glucose  Recent Labs     10/21/24  2312 10/23/24  0445   GLUC 272* 149*

## 2024-10-23 NOTE — ASSESSMENT & PLAN NOTE
Lab Results   Component Value Date    HGBA1C 6.4 (H) 10/22/2024       Recent Labs     10/23/24  1238 10/23/24  1746 10/23/24  2345 10/24/24  0532   POCGLU 167* 198* 197* 194*     Blood Sugar Average: Last 72 hrs:  (P) 185.75    Home regimen: Lantus 15 units SC qHS  Hold home regimen  SSI every 6 hours while NPO  Goal glucose 140 - 180  Hypoglycemia protocol  NPO for now.    [facility] Discharge Summary    Attending: Charlie Casey M.d.  Senior Resident: Dr. Muñoz  Intern:  Dr. Oleary  Contact Number: [phone]    CHIEF COMPLAINT ON ADMISSION  Chief Complaint   Patient presents with    Lightheadedness     Pt was admitted three weeks ago and has had dizziness/lightheaded since he was discharged. Pt compliant with medications.     Sent by MD     Pt was seen by nephrologist this morning and was told to come to ER due to fluid retention and SOB.     Shortness of Breath     Pt was started on antibiotics yesterday due to productive cough. Pt has hx of pleural effusions without drainage. Pt found to be 89% on RA. No hx of oxygen at home.      Reason for Admission  SOB/Sent by MD     Admission Date  6/11/2024    CODE STATUS  Full Code    HPI & HOSPITAL COURSE  Jama Altman is a 65 y.o. 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 CAD s/p stent, renal transplant 2010 on chronic immunosuppressant therapy, (hx of polycystic kidney disease), HLD, HTN and PAD who was admitted due to hypervolemia in the setting of acute on chronic kidney injury and acute hypoxic failure 2/2 CAP and possibly HFmrEF exacerbation complicated by a-fib with RVR and associated lightheadedness and dizziness with ambulation.    Patient improved with IV diuresis, levofloxacin for CAP treatment and rate control.     D-dimer was elevated. With acute kidney injury and history of Kidney transplant, V/Q scan was ordered but patient could not lie down flat more than 15 mins so V/Q scan got canceled. Patient agreed to start heparin to treat possible pulmonary embolism after discussion benefit and risk of heparin including bleeding risk. Cardiologist consulted for atrial fibrillation with RVR on exertion. Infectious disease was consulted and recommended  7 days of levofloxacin, which was completed 6/19/24. Pulm was consulted, recommended discontinuing heparin (no concern for PE), recommended continuing levofloxacin and diuresing. His O2 requirement improved down to 1L NC prior to discharge.    Hospital course complicated by a-fib with RVR with exertion and dizziness/lightheadedness. Cardiology consulted who recommended uptitration of his metoprolol up to 200mg daily. He was titrated up to 100mg bid but still reported to have HR 190s on telemetry. Cardiology re consulted who looked at rhythm strips in telemetry and determined that his HR wasn't actually 190 but was actually 120 with a lot of PVCs and no further medication needed to be added. Therefore, he was deemed appropriate to be discharged home. He has follow up with cardiology outpatient established.    * Acute respiratory failure with hypoxia (HCC)- (present on admission)  Assessment & Plan  Patient has been endorsing shortness of breath and orthopnea.  Hypervolemic on assessment.  Requiring 2 L oxygen at ED.  Chest x-ray presented hypoinflation with bibasilar atelectasis/pneumonitis and small pleural effusion without significant changes compared to 5/28/24 chest x-ray. 2 view CXR with RUL infiltrate, procal elevated 0.4 on admission   Likely due to fluid overload from acute kidney injury, possible pneumonia (high risk due to immunosuppression), less likely acute on chronic heart failure. MRSA nares positive March    (6/13/24) increased oxygen requirement overnight.  Two-view chest x-ray was unremarkable compared to yesterday.  Repeat procalcitonin trending down to 0.34.   (6/14/24) Pro-Randy trending down 0.25.  D-dimer was elevated.  Discussed risk and benefit to start heparin to treat possible pulmonary embolism, patient agreed.  Started heparin  (6/15/24) still require 1.5 L of oxygen same as yesterday.  Discontinued Metolazone. ID switched to Levofloxacin  (6/16/24) Went up to 2L of O2. Pulm was consulted,  recommended discontinuing heparin (no concern for PE), recommended continuing Abx and diuresing. CT thorax w/o if his symptom is not improving, which overall was not needed due to continued improvement over hospital course.  Heparin discontinued  Heparin 6/14 - 16 due to low likelihood PE per pulm  (6/18/24) Patient continue to feel improvement, but BUN increase, per nephrologist Lasix decreased to one dose per day.  Patient is not on obvious fluid overload status.Patient needs 6L oxygen when walking.   (6/19/24) Repeat 2-view CXR still showing fluid overload in the lung, lasix increased to bid 40mg PO per nephrologist, which he will be discharged home with  (6/20/24) O2 requirement down 1L oxygen at resting.   -Levofloxacin started 6/16/24 CAP, last dose 6/19 for total 14 day antibiotic course  -Sputum culture presented a few gram-positive growth  IMPROVED     Orthostatic dizziness  Assessment & Plan  Endorses worsening dizziness and lightheadedness when he is standing up from sitting up. Negative orthostatics by blood pressure but pulse does increase dramatically with ambulation  Has been tachycardic 160s by standing and walking.  (6/14) walking challenge presented patient only able to walk to the door and has significant dizziness with elevated heart rate 150s-160s.  (6/15) cardiologist consulted for A-fib RVR on exertion.  Recommended titrating metoprolol up to 200mg daily  (6/19 and 6/20) Patient's walking HR is better but still up to 190 when ambulation,  Metoprolol has been increased to 100 mg bid   -Fall risk precaution  -Increase metoprolol SR to 100 mg bid on 6/20/24 but tele reported 190 HR with ambulation. Re consulted Cardiology on 6/20/24 who reviewed rhythm strips and reported HR actually 120 with PVCs and no other medication needed to be added. Follow up with cardiology outpatient      A-fib ([facility]) s/p Watchman- (present on admission)  Assessment & Plan  - Same plan as orthostatic dizziness  -  Telemetry monitoring      Lymphedema on LUE  Assessment & Plan  Chronic lymphedema on left upper extremity  US LUE 5/4/24 presented No gross evidence of left upper extremity DVT or SVT.   - CTM     HFmrEF  Assessment & Plan  Echocardiogram 5/30/24 presented LVEF of 45% with moderate concentric left ventricular hypertrophy.  Possibly, acute respiratory failure secondary to cardiorenal syndrome or acute on chronic heart failure but less likely   -Same plan as hypervolemia     Hypervolemia  Assessment & Plan  Patient endorses 5 pounds weight gain past 3 weeks.  Has been following [facility].  Was sent to ED by recommended from nephrologist due to unresponsive p.o. Lasix.  Patient daily diuresis treatment, has lost 5.5 kg since admission.   - Nephrology consulted, appreciate recommendations  - IV Lasix 40 mg BID, transition to oral Lasix 6/13, at bid 40mg, which he will be discharged home on  - Fluid restriction to 1L, low salt diet  - Daily standing weights, which he will continue to log outpatient    Acute on chronic renal insufficiency- (present on admission)  Assessment & Plan  Baseline creatinine 1.7 - 2.0  Nephrology consulted  -Same plan as hypervolemia     Stage 4 squamous cell cancer of left axillary region complicated by left axillary abcscess s/p drainage- (present on admission)  Assessment & Plan  Has been followed by outpatient oncologist.   Per patient, he is currently on immunotherapy and last therapy was on last Friday (6/7/24)  - Continued home prednisone and Sirolimus  - Wound consult placed while inpatient  - Increased gabapentin to 200mg bid to control the pain on 6/20      Therefore, he is discharged in fair and stable condition to home with close outpatient follow-up.    The patient met 2-midnight criteria for an inpatient stay at the time of discharge.    Discharge Date  6/21/24    Physical Exam on Day of Discharge  Physical Exam  Constitutional:       General: He is not in acute  distress.     Appearance: He is not ill-appearing, toxic-appearing or diaphoretic.   HENT:      Head: Normocephalic and atraumatic.      Nose: Nose normal. No rhinorrhea.      Mouth/Throat:      Mouth: Mucous membranes are moist.      Pharynx: Oropharynx is clear.   Eyes:      Extraocular Movements: Extraocular movements intact.      Conjunctiva/sclera: Conjunctivae normal.   Cardiovascular:      Rate and Rhythm: Normal rate. Rhythm irregular.      Heart sounds: No murmur heard.  Pulmonary:      Effort: Pulmonary effort is normal. No respiratory distress.      Breath sounds: No wheezing or rales.   Abdominal:      General: There is no distension.      Palpations: Abdomen is soft.      Tenderness: There is no abdominal tenderness. There is no guarding or rebound.   Musculoskeletal:      Cervical back: Normal range of motion and neck supple.      Right lower leg: No edema.      Left lower leg: No edema.   Skin:     General: Skin is warm and dry.   Neurological:      General: No focal deficit present.      Mental Status: He is alert and oriented to person, place, and time. Mental status is at baseline.   Psychiatric:         Mood and Affect: Mood normal.         Behavior: Behavior normal.       FOLLOW UP ITEMS POST DISCHARGE  BMP on Monday, 6/24/24    DISCHARGE DIAGNOSES  Principal Problem:    Acute respiratory failure with hypoxia (HCC) (POA: Yes)  Active Problems:    Hyperlipidemia (POA: Yes)      Overview:     Coronary artery disease s/p stent (POA: Yes)    GERD (gastroesophageal reflux disease) (POA: Yes)    Type 2 diabetes mellitus (HCC) (POA: Yes)    A-fib (HCC) s/p Watchman (POA: Yes)    Stage 4 squamous cell cancer of left axillary region complicated by left axillary abcscess s/p drainage (POA: Yes)    Acute on chronic renal insufficiency (POA: Yes)    Hypervolemia (POA: Unknown)    Orthostatic dizziness (POA: Unknown)    HFmrEF (POA: Unknown)    Lymphedema on LUE (POA: Unknown)  Resolved Problems:    * No  resolved hospital problems. *    FOLLOW UP  Future Appointments   Date Time Provider Department Center   6/21/2024  9:20 AM Ganesh Benton III, M.D. [facility]   6/25/2024  2:30 PM Ze Martins M.D. [address]   6/26/2024  9:45 AM Temidamon Cantu, PT [address] [place] [address]   6/27/2024  1:00 PM BITA Salcedo. [facility] None   7/1/2024  8:15 AM Temi BRADEN. Courtenay, PT [address] [place] [address]   7/3/2024  9:45 AM Temi BRADEN. Courtenay, PT [address] [place] [address]   7/8/2024  9:00 AM Temi BRADEN. Courtenay, PT [address] [place] [address]   7/10/2024  9:00 AM Temi BRADEN. Courtenay, PT [address] [place] [address]   7/17/2024  9:45 AM Temi KARRI Courtenay, PT [address] [place] [address]   7/22/2024  9:45 AM Temi KARRI Dayker, PT [address] [place] [address]     Ganesh Benton III, M.D.  [address]  [facility] [id number] [phone]    Schedule an appointment as soon as possible for a visit today      Dax León D.O.  [address] [facility] [address]  [phone]    Schedule an appointment as soon as possible for a visit today      MEDICATIONS ON DISCHARGE     Medication List        START taking these medications        Instructions   midodrine 10 MG tablet  Commonly known as: Proamatine  Next Dose Due: 5p 6/21   Doctor's comments: Check your blood pressure before taking. If your systolic blood pressure (top number) is > 120, skip dose  Take 1 Tablet by mouth 3 times a day with meals for 30 days.  Dose: 10 mg            CHANGE how you take these medications        Instructions   aspirin 81 MG EC tablet  What changed: when to take this  Next Dose Due: 6/22   Take 1 Tablet by mouth every day.  Dose: 81 mg     furosemide 40 MG Tabs  What changed: when to take this  Commonly known as: Lasix  Next Dose Due: 4p 6/21   Take 1 Tablet by mouth 2 times a day for 30 days.  Dose: 40 mg     gabapentin 100 MG Caps  What changed:   medication strength  how much to take  Commonly known as: Neurontin  Next Dose Due: 5p 6/21   Take 2 Capsules by mouth 2 times  a day for 30 days.  Dose: 200 mg     Lantus SoloStar 100 UNIT/ML Sopn injection  What changed: how much to take  Generic drug: insulin glargine   Inject 20 Units under the skin every evening.  Dose: 20 Units     metoprolol  MG Tb24  What changed:   medication strength  how much to take  when to take this  additional instructions  Commonly known as: Toprol XL  Next Dose Due: 6p 6/21   Take 1 Tablet by mouth 2 times a day for 30 days.  Dose: 100 mg            CONTINUE taking these medications        Instructions   acetaminophen-codeine #3 300-30 MG Tabs  Commonly known as: Tylenol #3   Take 1 Tablet by mouth every evening.  Dose: 1 Tablet     atorvastatin 80 MG tablet  Commonly known as: Lipitor  Next Dose Due: Bedtime 6/21   Take 1 Tablet by mouth at bedtime.  Dose: 80 mg     BD Pen Needle Jayleen U/F  Generic drug: Insulin Pen Needle 32 G x 4 mm   Doctor's comments: Per patient/formulary preference. ICD-10 code: E11.65 Uncontrolled type 2 Diabetes Mellitus  Use one pen needle in pen device to inject insulin once daily.     benzonatate 200 MG capsule  Commonly known as: Tessalon   Take 1 Capsule by mouth 3 times a day as needed for Cough.  Dose: 200 mg     omeprazole 20 MG delayed-release capsule  Commonly known as: PriLOSEC  Next Dose Due: 6/22 AM    Take 1 Capsule by mouth every day. Indications: Heartburn  Dose: 20 mg     predniSONE 10 MG Tabs  Commonly known as: Deltasone  Next Dose Due: 6/22 AM    Take 20 mg by mouth every day. 20 mg = 2 tabs  Dose: 20 mg     Sirolimus 2 MG Tabs   Take 4 mg by mouth every day. 4 mg = 2 tabs  Dose: 4 mg     Wixela Inhub 250-50 MCG/ACT Aepb  Generic drug: fluticasone-salmeterol   Inhale 1 Puff every 12 hours.  Dose: 1 Puff            STOP taking these medications      acarbose 50 MG Tabs  Commonly known as: Precose     glyBURIDE 5 MG Tabs  Commonly known as: Diabeta     levoFLOXacin 750 MG tablet  Commonly known as: Levaquin     metFORMIN 500 MG Tabs  Commonly known as:  Glucophage     metroNIDAZOLE 500 MG Tabs  Commonly known as: Flagyl     pioglitazone 45 MG Tabs  Commonly known as: Actos     potassium chloride SA 10 MEQ Tbcr  Commonly known as: K-Dur     Tradjenta 5 MG Tabs tablet  Generic drug: linagliptin            Allergies  Allergies   Allergen Reactions    Doxycycline Rash     Sweats and shakes: 9/28/17: Clarified allergy with patient. Allergy was in 1998 and he doesn't remember what happened. He thought the medication is for pain.  Tolerates doxycycline 9/2017     DIET  Orders Placed This Encounter   Procedures    Diet Order Diet: 2 Gram Sodium; Second Modifier: (optional): Consistent CHO (Diabetic); Fluid modifications: (optional): 1000 ml Fluid Restriction     Standing Status:   Standing     Number of Occurrences:   1     Order Specific Question:   Diet:     Answer:   2 Gram Sodium [7]     Order Specific Question:   Second Modifier: (optional)     Answer:   Consistent CHO (Diabetic) [4]     Order Specific Question:   Fluid modifications: (optional)     Answer:   1000 ml Fluid Restriction [7]    Discontinue Diet Tray     Standing Status:   Standing     Number of Occurrences:   1     ACTIVITY  As tolerated.  Weight bearing as tolerated    CONSULTATIONS  Cardiology  Pulmonary  Infectious disease  Cardiac electrophysiology  Nephrology    PROCEDURES  None    LABORATORY  Lab Results   Component Value Date    SODIUM 134 (L) 06/21/2024    POTASSIUM 5.2 06/21/2024    CHLORIDE 97 06/21/2024    CO2 24 06/21/2024    GLUCOSE 226 (H) 06/21/2024    BUN 92 (H) 06/21/2024    CREATININE 2.85 (H) 06/21/2024    CREATININE 2.4 (H) 12/18/2006      Lab Results   Component Value Date    WBC 8.8 06/19/2024    HEMOGLOBIN 10.0 (L) 06/19/2024    HEMATOCRIT 32.6 (L) 06/19/2024    PLATELETCT 65 (L) 06/19/2024      Total time of the discharge process exceeds 42 minutes.

## 2024-10-23 NOTE — CASE MANAGEMENT
Case Management Progress Note    Patient name Tae Dolan  Location ICU 07/ICU 07 MRN [id number]  : 1970 Date 10/23/2024       LOS (days): 3  Geometric Mean LOS (GMLOS) (days): 3  Days to GMLOS:-0.6        OBJECTIVE:        Current admission status: Inpatient  Preferred Pharmacy:   [id number] - [address] [address]  Phone: [phone] Fax: [phone]    Primary Care Provider: Leny Sparks MD    Primary Insurance: MEDICARE  Secondary Insurance:     PROGRESS NOTE:    CM completed MA51 and PASRR and sent all appropriate documentation to [facility] ([email or web address]) for review. CM will continue to follow for LOC determination.

## 2024-10-23 NOTE — ASSESSMENT & PLAN NOTE
vEEG overnight 10/22-10/23 with multiple subclinical seizures  Loaded with Keppra, lacosamide, and valproic acid and started on maintenance doses  Also given Ativan  Neurology consulted, appreciate recommendations  Neurology discontinued valproic acid  Bolus of Fosphenytoin given  Continue on Keppra, lacosamide  Versed and propofol prn  Intubated 10/23, placed on propofol and versed  Currently on propofol and versed with one episode overnight  Continue vEEG

## 2024-10-23 NOTE — TREATMENT PLAN
Contacted by primary team regarding continued seizure activity requiring escalation of AEDs and intubation. A repeat head CT was obtained. I reviewed the scan which demonstrates a small left parietal epidural hematoma. This appears stable to the prior two CTs from yesterday. There is minimal local mass effect, but no significant brain compression or midline shift. I would not recommend surgical evacuation give the small size of the collection and its stability over serial scans. Given the need for burst suppression and intubation I would recommend a surveillance CT head in the AM without a clinical exam to follow. Agree with MRI when able.

## 2024-10-23 NOTE — PROCEDURES
Intubation    Date/Time: 10/23/2024 2:33 PM    Performed by: Zonia Hill MD  Authorized by: Zonia Hill MD    Patient location:  Bedside  Other Assisting Provider: Yes (comment) (Dr. Foley)    Consent:     Consent given by:  Parent    Risks discussed:  Aspiration, laryngeal injury, brain injury, dental trauma, bleeding, hypoxia, death and pneumothorax    Alternatives discussed:  No treatment, delayed treatment and observation  Universal protocol:     Procedure explained and questions answered to patient or proxy's satisfaction: yes      Patient identity confirmed:  Arm band and hospital-assigned identification number  Pre-procedure details:     Patient status:  Altered mental status    Mallampati score:  3    Pretreatment medications:  Propofol    Paralytics:  Rocuronium  Indications:     Indications for intubation: airway protection      Indications for intubation comment:  Propofol for seizures  Procedure details:     Preoxygenation:  Nasal cannula (Blowby)    CPR in progress: no      Intubation method:  Oral    Oral intubation technique:  Glidescope    Laryngoscope size: Hyperangulated 4.    Tube size (mm):  7.5    Tube type:  Cuffed    Number of attempts:  1    Cricoid pressure: no      Tube visualized through cords: yes    Placement assessment:     ETT to lip:  26    ETT to teeth:  25    Tube secured with:  ETT miramontes    Breath sounds:  Equal and absent over the epigastrium    Placement verification: chest rise, colorimetric ETCO2 device, CXR verification and equal breath sounds      CXR findings:  ETT in proper place  Post-procedure details:     Patient tolerance of procedure:  Tolerated well, no immediate complications

## 2024-10-23 NOTE — ASSESSMENT & PLAN NOTE
S/p craniotomy 1/2019 at [facility], done secondary to abscess/lesions. Baseline independent.   CTH (10/20/24) - No acute intracranial hemorrhage or depressed calvarial fracture. Mild bilateral anterior frontal scalp soft tissue swelling, right greater than left. Right periorbital, perinasal, and premaxillary facial soft tissue swelling also noted.   CTA head/neck (10/22/24) - New extra-axial lenticular shaped hemorrhage along the left parietal convexity as described above, which may represent an epidural hematoma or less likely a subdural hematoma in this region. No significant midline shift noted. Trace new subarachnoid hemorrhage noted along the inferior left temporo-occipital convexity noted.   Continue supportive care.  See individual plans below.

## 2024-10-23 NOTE — CONSULTS
NEUROLOGY RESIDENCY CONSULT NOTE     Name: Tae Dolan   Age & Sex: 54 y.o. male   MRN: [id number]  Unit/Bed#: ICU 07   Encounter: [id number]  Length of Stay: 3    Recommendations for outpatient neurological follow up have yet to be determined.        ASSESSMENT & PLAN     Seizure (HCC)  Assessment & Plan  Tae Dolan is a 54 year old male with a pertinent PMH of , neurology consulted for c/f for seizure activity. The patient initially presented to the hospital on 10/20 at 0030 AM after falling out of a second story window.     Work Up:  NC CTH: Intracranial hemorrhage redemonstrated, most prominently suspected epidural hemorrhage in the left parietal region, as described. There is some local mass effect but no midline shift.   Recommend MRI Brain w/wo contrast with Seizure Protocol  EEG: Multiple frequent seizures since being placed on continuous vEEG on 10/22/24 at 2038. Frequent seizures continued after loading with Levetiracetam, Lacosamide, valproic acid and fosphenytoin. 10 seizures in 90 minutes since 1123 and most recent being caught at 1326.  Coma Panel - Normal (elevated acetaminohen)  UDS - Normal  Ethanol level - Normal    Plan:  Frequent Neuro Checks, notify Neurology team if any acute changes in exam and obtain STAT CTH  Seizure / Fall Precautions  Telemetry Monitoring  AEDs: Levetiracetam 2g q12h, Lacosamide 200mg q12h. Valproic acid and fosphenytoin loading doses were given but made no observable difference on EEG.  EEG: continuous vEEG active.  Recommend Burst suppression as the patient has had continued seizure activity which poses risk for permanent neurologic damage.   Imaging Studies: MRI Brain w/wo contrast seizure protocol  Rescue Meds: Ativan IV 2mg prn 2 complex partial seizures or 1 GTC seizure  DVT PPx , and SCDs  PT/OT Evaluate and Treat  [place] DOT: Needs to be completed prior to discharge  Rest of care per primary medical team      SUBJECTIVE     Reason for Consult / Principal  Problem: Seizure activity  Hx and PE limited by: Patient does not speak back    HPI: Tae Dolan is a 54 y.o. male w/ a PMH of hyperlipidemia, hypertension, a brain abscess s/p craniotomy and diabetes mellitus who presented to [facility] after a fall on the night of 10/19/24. The patient reportedly fell out of a second story window and the fall was about 10 feet. The patient had abrasions on his face and shoulders. The patient reportedly did not know why he fell. The patient was brought to the hospital and admitted to the ICU under trauma. Patient was connected to continuous vEEG on 10/22/24 at 2038. Patient was found to have frequent seizure activity which has continued every few minutes. The patient was loaded with Levetiracetam, Lacosamide and Valproic acid. Levetiracetam and Lacosamide made observable difference to the LTM but Valproic acid did not. Valproic acid was discontinued and fosphenytoin was loaded. Fosphenytoin did not improve patient's frequent seizures and so it was decided that the patient should be intubated and placed on burst suppression.    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 MRI Brain w/wo contrast seizure protocol recommended. Will investigate this history of brain abscesses and their etiology. Concern for immunocompromised state and further insidious disease processes possibly contributing to patient's overall clinical picture as the initial cause of his fall remains questionable. Will contact family for further history.         Inpatient consult to Neurology     Date/Time  10/20/2024 12:14 AM     Performed by  Angela Martel   Authorized by  CARLOS Moore             Historical Information   No past medical history on file.  No past surgical history on file.  Social History   Social History     Substance and Sexual Activity   Alcohol Use Not on  file     Social History     Substance and Sexual Activity   Drug Use Not on file     No existing history information found.  No existing history information found.  Social History     Tobacco Use   Smoking Status Not on file   Smokeless Tobacco Not on file     Family History: Family history non-contributory  Meds/Allergies   all current active meds have been reviewed  Not on File    Review of previous medical records was completed.    OBJECTIVE     Vitals:   Vitals:    10/23/24 1215 10/23/24 1230 10/23/24 1245 10/23/24 1300   BP: 132/59 129/60 123/56 129/60   BP Location:       Pulse: 89 92 90 90   Resp: 16 16 17 15   Temp:       TempSrc:       SpO2: 93% 92% (!) 86% 98%   Weight:       Height:          Body mass index is 23.38 kg/m².     Intake/Output Summary (Last 24 hours) at 10/23/2024 1414  Last data filed at 10/23/2024 1200  Gross per 24 hour   Intake 4017.33 ml   Output 2300 ml   Net 1717.33 ml       Temperature:   Temp (24hrs), Av.3 °F (37.4 °C), Min:98.4 °F (36.9 °C), Max:100.2 °F (37.9 °C)    Temperature: 98.4 °F (36.9 °C)    Invasive Devices:   Invasive Devices       Peripheral Intravenous Line  Duration             Peripheral IV 10/22/24 Right;Upper;Ventral (anterior) Arm 1 day    Peripheral IV 10/23/24 Dorsal (posterior);Left Wrist <1 day              Drain  Duration             External Urinary Catheter Small <1 day                    Physical Exam  Vitals reviewed.          Neurologic Exam     Mental Status   Patient is able to be aroused but remains stuporous. Patient vocalizes some words but senselessly.  Does not follow any directions.  .      Cranial Nerves   Multiple abrasions and bruises on the face.       Motor Exam Spontaneous movement of lower extremities as well.Some spontaneous movement in RUE.     Sensory Exam   Withdrawal to painful stimuli in b/l lower extremities.   Grimace to painful stimuli in b/l upper extremities but not localizing to pain.            LABORATORY DATA     Labs:  Results Review Statement: No pertinent imaging studies reviewed.  Results from last 7 days   Lab Units 10/23/24  1119 10/23/24  0410 10/23/24  0405 10/22/24  0526 10/21/24  2312 10/21/24  0442 10/20/24  0449 10/20/24  0340 10/20/24  0115   WBC Thousand/uL 9.27  --   --   --  11.01* 7.27 7.36  --  7.10   HEMOGLOBIN g/dL 8.4*  --   --    < > 9.8* 11.2* 11.0*   < > 11.5*   I STAT HEMOGLOBIN g/dl  --  9.9* 8.8*  --   --   --   --   --   --    HEMATOCRIT % 24.7*  --   --    < > 28.9* 33.3* 31.4*   < > 33.7*   HEMATOCRIT, ISTAT %  --  29* 26*  --   --   --   --   --   --    PLATELETS Thousands/uL 129*  --   --   --  172 182 176  --  214   SEGS PCT %  --   --   --   --   --   --  85*  --  75   MONO PCT %  --   --   --   --  1*  --  7  --  8   EOS PCT %  --   --   --   --  0  --  1  --  2    < > = values in this interval not displayed.      Results from last 7 days   Lab Units 10/23/24  0445 10/23/24  0410 10/23/24  0405 10/21/24  2312 10/21/24  0442 10/20/24  0449 10/20/24  0115 10/20/24  0115 10/20/24  0026   POTASSIUM mmol/L 5.5*  --   --  3.9 4.3 4.6   < > 4.5  --    CHLORIDE mmol/L 96  --   --  100 98 101   < > 98  --    CO2 mmol/L 24  --   --  31 34* 28   < > 28  --    CO2, I-STAT mmol/L  --  26  --   --   --   --   --   --  30   BUN mg/dL 5  --   --  11 12 26*   < > 27*  --    CREATININE mg/dL 0.50*  --   --  0.78 0.75 0.90   < > 1.03  --    CALCIUM mg/dL 7.7*  --   --  9.2 9.2 9.0   < > 8.7  --    ALK PHOS U/L  --   --   --   --   --  57  --  56  --    ALT U/L  --   --   --   --   --  30  --  32  --    AST U/L  --   --   --   --   --  32  --  37  --    GLUCOSE, ISTAT mg/dl  --  155* 155*  --   --   --   --   --  210*    < > = values in this interval not displayed.     Results from last 7 days   Lab Units 10/23/24  0445   MAGNESIUM mg/dL 1.8*     Results from last 7 days   Lab Units 10/23/24  0445   PHOSPHORUS mg/dL 2.1*          Results from last 7 days   Lab Units 10/22/24  1625   LACTIC ACID mmol/L 0.8            IMAGING & DIAGNOSTIC TESTING     Radiology Results: Results Review Statement: No pertinent imaging studies reviewed.  CT head wo contrast   Final Result by Faye Chopra MD (10/23 0291)      Grossly stable biconvex extra-axial hemorrhage along the left parietal lobe, which again likely presents an epidural hematoma or less likely subdural hematoma.      Stable likely trace subarachnoid hemorrhage along the inferior aspect of the left temporal occipital junction.      No obvious new intracranial hemorrhage noted.                  Workstation performed: [id number]         CT head wo contrast   Final Result by Suresh Swanson DO (10/23 9066)      Intracranial hemorrhage redemonstrated, most prominently suspected epidural hemorrhage in the left parietal region, as described. There is some local mass effect but no midline shift.      No new hemorrhage is identified. Overall there has been no significant interval change.      Follow-up as clinically warranted.                  Workstation performed: [id number]         CTA head and neck w wo contrast   Final Result by Faye Chopra MD (10/22 1136)      CT Brain:      New extra-axial lenticular shaped hemorrhage along the left parietal convexity as described above, which may represent an epidural hematoma or less likely a subdural hematoma in this region. No significant midline shift noted.      Trace new subarachnoid hemorrhage noted along the inferior left temporo-occipital convexity noted.      Right facial subcutaneous soft tissue swelling/hematoma again demonstrated.      CT Angiography:      No active extravasation of contrast is noted into the extra-axial hematoma.      No large vessel occlusion, high-grade stenosis, or intracranial aneurysm identified on CT angiogram of the head.      No hemodynamically significant stenosis or dissection identified on CT angiogram of the neck.      Anterior mildly comminuted C5 vertebral body fracture again demonstrated,  with mild associated prevertebral edema again demonstrated.         I personally discussed this study with Valeria RAO  on 10/22/2024 2:15 PM.            Workstation performed: [id number]         XR chest portable   Final Result by Scottie Sauceda MD (10/22 1140)      No acute cardiopulmonary disease.            Workstation performed: [id number]         XR spine cervical 2 or 3 vw injury   Final Result by Jonathan Barron MD (10/21 1412)      C5 fracture is again demonstrated without additional displacement or subluxation.         Workstation performed: [id number]         XR shoulder 2+ vw left   Final Result by Melisa Dejesus MD (10/21 1241)      No acute osseous abnormality.         Computerized Assisted Algorithm (CAA) may have been used to analyze all applicable images.         Workstation performed: [id number]         CT head wo contrast   Final Result by Marcela Garcia MD (10/20 0601)      No acute intracranial abnormality.                  Workstation performed: [id number]         TRAUMA - CT head wo contrast   Final Result by Mir Ivey MD (10/20 0051)      No acute intracranial hemorrhage or depressed calvarial fracture.  Mild bilateral anterior frontal scalp soft tissue swelling, right greater than left. Right periorbital, perinasal, and premaxillary facial soft tissue swelling also noted.                     Workstation performed: [id number]         TRAUMA - CT spine cervical wo contrast   Final Result by Mir Ivey MD (10/20 0124)      Acute anterior C5 vertebral body fracture with prevertebral soft tissue swelling. No traumatic subluxation.      Workstation performed: [id number]         TRAUMA - CT chest abdomen pelvis w contrast   Final Result by Mir Ivey MD (10/20 0152)      1.  No acute intrathoracic abnormalities.   2.  Splenomegaly with metallic surgical coils near the splenic hilar region.  There is a small amount of intermediate density perisplenic free fluid adjacent to the  inferior spleen which could represent subcapsular hematoma (AAST grade I splenic injury).   3.  Small subcutaneous/superficial contusions along the right ventral lower abdominal/pelvic wall.   4.  Additional ancillary findings detailed above.      Findings of this patient's complete CTs were discussed with trauma team.            Workstation performed: [id number]         TRAUMA - CT facial bones wo contrast   Final Result by Mir Ivey MD (10/20 0110)      No acute maxillofacial bone, orbital, or mandible fracture.  Bilateral anterior frontal scalp soft tissue swelling noted, right greater than left. Right periorbital, perinasal, and premaxillary facial soft tissue swelling.               Workstation performed: [id number]         XR Trauma multiple (SLB/SLRA trauma bay ONLY)   Final Result by Mir Ivey MD (10/20 0131)      Hypoinflation without acute cardiopulmonary disease within limitations of supine imaging.      No acute fracture or subluxation of the right knee. Small suprapatellar knee joint effusion and prepatellar soft tissue swelling.         Computerized Assisted Algorithm (CAA) may have been used to analyze all applicable images.            Workstation performed: [id number]         XR chest 1 view   Final Result by Mir Ivey MD (10/21 1309)      Hypoinflation without acute cardiopulmonary disease within limitations of supine imaging.      No acute fracture or subluxation of the right knee. Small suprapatellar knee joint effusion and prepatellar soft tissue swelling.         Computerized Assisted Algorithm (CAA) may have been used to analyze all applicable images.            Workstation performed: [id number]         XR knee 1 or 2 vw right   Final Result by Mir Ivey MD (10/21 6739)      Hypoinflation without acute cardiopulmonary disease within limitations of supine imaging.      No acute fracture or subluxation of the right knee. Small suprapatellar knee joint effusion and prepatellar soft  tissue swelling.         Computerized Assisted Algorithm (CAA) may have been used to analyze all applicable images.            Workstation performed: [id number]         XR spine cervical 2 or 3 vw injury    (Results Pending)   CT head wo contrast    (Results Pending)   X-ray chest 1 view    (Results Pending)       Other Diagnostic Testing: Results Review Statement: No pertinent imaging studies reviewed.    ACTIVE MEDICATIONS       Current Facility-Administered Medications:     acetaminophen (Ofirmev) injection 1,000 mg, Q6H PRN    Artificial Tears Op Soln 1 drop, BID    atorvastatin (LIPITOR) tablet 10 mg, Daily With Dinner    chlorhexidine (PERIDEX) 0.12 % oral rinse 15 mL, Q12H NANCY    chlorhexidine (PERIDEX) 0.12 % oral rinse 15 mL, Q12H NANCY    [START ON 10/24/2024] dextrose 5 % in lactated Ringer's infusion, Continuous    fentaNYL injection 50 mcg, Q1H PRN    folic acid (FOLVITE) tablet 1 mg, Daily    insulin lispro (HumALOG/ADMELOG) 100 units/mL subcutaneous injection 1-6 Units, Q6H NANCY **AND** Fingerstick Glucose (POCT), Q6H    labetalol (NORMODYNE) injection 10 mg, Q6H PRN    [COMPLETED] lacosamide (VIMPAT) injection 400 mg, Once **FOLLOWED BY** lacosamide (VIMPAT) injection 200 mg, Q12H    levETIRAcetam (KEPPRA) injection 2,000 mg, Q12H NANCY    multivitamin-minerals (CENTRUM) tablet 1 tablet, Daily    naloxone (NARCAN) 0.04 mg/mL syringe 0.04 mg, Q1MIN PRN    niCARdipine (CARDENE) 25 mg (STANDARD CONCENTRATION) in sodium chloride 0.9% 250 mL, Titrated, Last Rate: 7.5 mg/hr (10/23/24 1357)    nicotine (NICODERM CQ) 21 mg/24 hr TD 24 hr patch 21 mg, Daily    ondansetron (ZOFRAN) injection 4 mg, Q4H PRN    oxyCODONE (ROXICODONE) split tablet 2.5 mg, Q4H PRN **OR** oxyCODONE (ROXICODONE) IR tablet 5 mg, Q4H PRN    pantoprazole (PROTONIX) EC tablet 40 mg, Early Morning    phenylephrine (JEREMIAH-SYNEPHRINE) 0.125 % nasal solution 1 drop, Q6H PRN    propofol (DIPRIVAN) 1000 mg in 100 mL infusion (premix), Titrated     propofol (DIPRIVAN) 200 MG/20ML bolus injection 150 mg, Continuous    QUEtiapine (SEROquel) tablet 12.5 mg, HS    senna-docusate sodium (SENOKOT S) 8.6-50 mg per tablet 1 tablet, HS    thiamine tablet 100 mg, Daily    Prior to Admission medications    Not on File       CODE STATUS & ADVANCED DIRECTIVES     Code Status: Level 1 - Full Code  Advance Directive and Living Will:      Power of :    POLST:        VTE Pharmacologic Prophylaxis: per primary team  VTE Mechanical Prophylaxis: sequential compression device    ======    I have discussed the patient's history, physical exam findings, assessment, and plan in detail with attending, Dr. Pathak    Thank you for allowing me to participate in the care of your patient, Tae Dolan.    Angela Martel  [facility]'s Neurology Residency, PGY-3

## 2024-10-23 NOTE — ASSESSMENT & PLAN NOTE
Reported he has a sistory of stroke, however on discussion with mother, there was no stroke but were stroke-like residual deficits from brain abscesses and craniotomy.  No home regimen.  Pt was started on ASA this admission.  Discontinue ASA secondary to EDH.  Continue Neuro-checks.

## 2024-10-23 NOTE — ASSESSMENT & PLAN NOTE
Hx craniotomy for evacuation of abscess in 2019 at [facility] with Dr. Villareal.

## 2024-10-23 NOTE — TREATMENT PLAN
Repeat CT head reviewed. Stable appearance left parietal epidural hematoma and left temporal occipital SAH.    Would continue to hold any AC/AP x 2 weeks.     Patient is scheduled for post-hospital follow up in 2 weeks for cervical fracture. Will plan to also evaluate with CT head for resolution of hemorrhage.    Ongoing workup per CCM/neurology for encephalopathy.    Neurosurgery will sign off. Call with questions. Follow up as scheduled.

## 2024-10-23 NOTE — ASSESSMENT & PLAN NOTE
History of stroke.  No home regimen.  Pt was started on ASA this admission.  Discontinue ASA secondary to EDH.  Continue Neuro-checks.

## 2024-10-23 NOTE — ASSESSMENT & PLAN NOTE
Hgb / Hct       Results from last 7 days   Lab Units 10/24/24  0724 10/24/24  0437 10/23/24  1119   HEMOGLOBIN g/dL 5.3* 6.7* 8.4*   HEMATOCRIT % 16.1* 20.1* 24.7*   S/p fall  CT C/A/P - Splenomegaly with metallic surgical coils near the splenic hilar region. There is a small amount of intermediate density perisplenic free fluid adjacent to the inferior spleen which could represent subcapsular hematoma (AAST grade I splenic injury).   Hgb on admission - 11.5  Most recent Hgb - 6.7    Plan:  Trend Hgb, repeat CBC in AM.  Transfuse for Hgb < 7.0 or active bleeding, HD instability.  Last hemoglobin 5.3 (10/24), will transfuse  Will repeat CT to evaluate for new or worsening bleeding.  Hold ASA.  Hold Lovenox for DVT prophylaxis .  SCD's for DVT prophylaxis.  Monitor for S/S bleeding.

## 2024-10-23 NOTE — PHYSICAL THERAPY NOTE
Physical Therapy Cancellation Note       10/23/24 1028   Note Type   Note Type Cancelled Session   Cancel Reasons Medical status  (PT consult remains active. Per ICU mobility rounds, patient is not appropraite for participation in therapy session d/t medical status. Will hold and f/u as able and appropriate.)       Xin Rivas, PT, DPT   Available via [employer]  NPI # [id number]  [place] License - [id number]  10/23/2024

## 2024-10-23 NOTE — ASSESSMENT & PLAN NOTE
Lab Results   Component Value Date    HGBA1C 6.4 (H) 10/22/2024       Recent Labs     10/22/24  1122 10/22/24  1804 10/23/24  0011 10/23/24  0609   POCGLU 222* 209* 159* 140     Blood Sugar Average: Last 72 hrs:  (P) 182.5    Home regimen: Lantus 15 units SC qHS  Hold home regimen  SSI every 6 hours while NPO  Goal glucose 140 - 180  Hypoglycemia protocol  NPO for now.

## 2024-10-23 NOTE — ASSESSMENT & PLAN NOTE
Tae Dolan is a 54 year old male with a pertinent PMH of , neurology consulted for c/f for seizure activity. The patient initially presented to the hospital on 10/20 at 0030 AM after falling out of a second story window.     Work Up:  NC CTH: Intracranial hemorrhage redemonstrated, most prominently suspected epidural hemorrhage in the left parietal region, as described. There is some local mass effect but no midline shift.   Recommend MRI Brain w/wo contrast with Seizure Protocol  EEG: Multiple frequent seizures since being placed on continuous vEEG on 10/22/24 at 2038. Frequent seizures continued after loading with Levetiracetam, Lacosamide, valproic acid and fosphenytoin. 10 seizures in 90 minutes since 1123 and most recent being caught at 1326.  Coma Panel - Normal (elevated acetaminohen)  UDS - Normal  Ethanol level - Normal    Current AEDs: Levetiracetam 2g q12h, Lacosamide 200mg q12h, Clobazam 5mg TID    AEDS that did not work:  Valproic acid and fosphenytoin loading doses were given but made no observable difference on EEG.    Plan:  Frequent Neuro Checks, notify Neurology team if any acute changes in exam and obtain STAT CTH  Seizure / Fall Precautions  Telemetry Monitoring  AEDs: Levetiracetam 2g q12h, Lacosamide 200mg q12h, Clobazam 5mg TID.  EEG: continuous vEEG active.  Recommend seizure suppression 24-48 hours as the patient has had continued seizure activity which poses risk for permanent neurologic damage.   Imaging Studies: MRI Brain w/wo contrast seizure protocol  Rescue Meds: Ativan IV 2mg prn 2 complex partial seizures or 1 GTC seizure  DVT PPx , and SCDs  PT/OT Evaluate and Treat  Dl DOT: Needs to be completed prior to discharge  Rest of care per primary medical team
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
## 2024-10-23 NOTE — ASSESSMENT & PLAN NOTE
As evidence by tachycardia, fever, leukocytosis  Source, unknown at this time  WBC - 11, Procal - 0.46, Lactic - 0.8  CXR - No acute cardiopulmonary abnormalities  UA - pending.  COVID/FLU/RSV - pending.  Blood cultures x 2 obtained.  He received 30cc/kg resuscitation fluids.     Plan:  Continue to hold ABX.  Follow up blood cultures, UA, viral swab and MRSA.  Monitor/trend fever curve.  Monitor/trend WBC.  Trend endpoints.   HPI:     Back Pain   This is a chronic problem. The current episode started more than 1 year ago. The problem occurs constantly. The problem has been rapidly worsening since onset. The pain is present in the lumbar spine. The quality of the pain is described as stabbing, shooting, cramping, burning and aching. The pain does not radiate. The pain is at a severity of 9/10. The pain is severe. The pain is the same all the time. The symptoms are aggravated by bending, lying down, standing, sitting and twisting. Associated symptoms include bowel incontinence, numbness, tingling and weakness. Pertinent negatives include no bladder incontinence, fever or leg pain. She has tried ice, heat, muscle relaxant, chiropractic manipulation, home exercises, bed rest, analgesics and walking for the symptoms. The treatment provided no relief. Multiple treatment modalities with minimal benefit. Considering intrathecal pump. She has seen a surgeon with nothing surgical.  Tolerated Percocet after her stimulator implant. Chart review shows history of allergy to opioids however she states she did have allergy opioids and just had a bad reaction when she was on high-dose opioids while in a nursing home after surgery, states she is on Norco Percocet and fentanyl at the same time. Patient denies any new neurological symptoms. Nobowel or bladder incontinence, no weakness, and no falling. Review of OARRS does not show any aberrant prescription behavior. Medication is helping the patient stay active. Patient denies any side effects and reports adequate analgesia. No sign of misuse/abuse.     Past Medical History:   Diagnosis Date    Arthritis     Bowel obstruction ([address]     Cancer ([address] 2015    ovarian stage 2    Cerebral artery occlusion with cerebral infarction ([address] 03/2018    Mini strokes \"Tia's\"    Diabetes mellitus ([address]     Epigastric pain     GERD (gastroesophageal reflux disease)     History of anesthesia complications 4038'L    was told after gallbladder surgery to never have anesthesia again \"since it was hard for me to come out of it\"    History of blood transfusion     Hx of blood clots     lung     Hypertension     Right arm pain     Right shoulder pain     Sleep apnea     uses CPAP nightly    Tachycardia     hx of    Thyroid disease     TIA (transient ischemic attack)        Past Surgical History:   Procedure Laterality Date    ANESTHESIA NERVE BLOCK Left 11/7/2019    NERVE BLOCK (DEFINE) - INTERCOSTAL NERVE BLOCK performed by Francisco J Austin MD at [facility] Bilateral 5/15/2020    NERVE BLOCK BILATERAL - MBB  L4-5, L5-S1 performed by Francisco J Austin MD at [address] Left     ORIF    CHOLECYSTECTOMY      COLONOSCOPY  07/08/2019    5 yr recall.     COLONOSCOPY N/A 7/8/2019    COLONOSCOPY WITH BIOPSY performed by Rosa Figueroa MD at [address] [place], [facility]. INJECTION PROCEDURE FOR SACROILIAC JOINT Bilateral 8/29/2019    SACROILIAC JOINT INJECTION performed by Francisco J Austin MD at [address] Bilateral 10/10/2019    SACROILIAC JOINT INJECTION performed by Francisco J Austin MD at [address]  2/2014    [facility] with bso    JOINT REPLACEMENT Right     shoulder    KNEE ARTHROSCOPY Right     NERVE BLOCK Bilateral 08/29/2019    SI joint injection    NERVE BLOCK Bilateral 10/10/2019    SI joint    NERVE BLOCK Bilateral 05/15/2020    Medial branch block L4-5, L5-S1    NERVE BLOCK  06/05/2020    SPINAL CORD STIMULATOR IMPLANT TRIAL (N/A )    OTHER SURGICAL HISTORY Right 11/11/2014    shoulder manipulation    SHOULDER ARTHROSCOPY Right 02/03/15    SHOULDER SURGERY  11/2014    manipulation    SPINAL CORD STIMULATOR SURGERY N/A 6/5/2020    SPINAL CORD STIMULATOR [facility]PLANT [facility]    SPINAL CORD STIMULATOR SURGERY N/A 7/27/2020    SPINAL CORD STIMULATOR IMPLANT WITH THORACIC LAMINOTOMY performed by Yazan Bhagat MD at [address] ENDOSCOPY  07/08/2019    UPPER GASTROINTESTINAL ENDOSCOPY N/A 7/8/2019    EGD BIOPSY performed by Mone Tracy MD at [facility] 4/30/2015    pt has blood clot & abscess in her right kidney    VASCULAR SURGERY      Alesha filter in & removed 1 year later       Allergies   Allergen Reactions    Aspirin Anaphylaxis     Only thing she can take is tylenol    Benadryl [Diphenhydramine] Other (See Comments)     Dystonic movement    Ibuprofen Anaphylaxis    Lidocaine Anaphylaxis     CAN NOT TOLERATE IV    Penicillins Anaphylaxis    Hydrocodone-Acetaminophen Other (See Comments)     Unsure of allergy    Oxycodone-Acetaminophen Other (See Comments)     Unsure of allergy    Tramadol Other (See Comments)     Unsure of allergy    Morphine Nausea And Vomiting     Unsure of allergy       Meds reviewed in EMR.     Family History   Problem Relation Age of Onset    Elevated Lipids Paternal Grandmother        Social History     Socioeconomic History    Marital status: Single     Spouse name: Not on file    Number of children: Not on file    Years of education: Not on file    Highest education level: Not on file   Occupational History    Not on file   Social Needs    Financial resource strain: Not on file    Food insecurity     Worry: Not on file     Inability: Not on file    Transportation needs     Medical: Not on file     Non-medical: Not on file   Tobacco Use    Smoking status: Never Smoker    Smokeless tobacco: Never Used   Substance and Sexual Activity    Alcohol use: Yes     Comment: rare    Drug use: No    Sexual activity: Yes   Lifestyle    Physical activity     Days per week: Not on file     Minutes per session: Not on file    Stress: Not on file Relationships    Social connections     Talks on phone: Not on file     Gets together: Not on file     Attends Sabianism service: Not on file     Active member of club or organization: Not on file     Attends meetings of clubs or organizations: Not on file     Relationship status: Not on file    Intimate partner violence     Fear of current or ex partner: Not on file     Emotionally abused: Not on file     Physically abused: Not on file     Forced sexual activity: Not on file   Other Topics Concern    Not on file   Social History Narrative    Not on file       Review of Systems:  Review of Systems   Constitution: Negative for chills and fever. Eyes: Negative for pain. Respiratory: Negative for sleep disturbances due to breathing and wheezing. Hematologic/Lymphatic: Negative for bleeding problem. Skin: Negative for rash. Musculoskeletal: Positive for back pain. Gastrointestinal: Positive for bowel incontinence. Genitourinary: Negative for bladder incontinence. Neurological: Positive for numbness, tingling and weakness. Physical Exam:    Physical Exam  Constitutional:       Appearance: Normal appearance. HENT:      Head: Normocephalic and atraumatic. Eyes:      General: Lids are normal.   Neck:      Musculoskeletal: Normal range of motion. Pulmonary:      Effort: Pulmonary effort is normal.   Skin:     Comments: Visualized skin with no rash   Neurological:      Mental Status: She is alert. Psychiatric:         Attention and Perception: Attention and perception normal.         Mood and Affect: Mood and affect normal.         Record/Diagnostics Review:    As above, I did review the imaging    Orders:    Orders Placed This Encounter   Medications    HYDROcodone-acetaminophen (NORCO) 5-325 MG per tablet     Sig: Take 1 tablet by mouth 2 times daily as needed for Pain for up to 7 days.  Take lowest dose possible to manage pain     Dispense:  14 tablet     Refill:  0     Reduce doses taken as pain becomes manageable       Assessment:  1. Lumbosacral spondylosis without myelopathy    2. Chronic bilateral thoracic back pain    3. Other chronic pain        Treatment Plan:  DISCUSSION: Treatment options discussed with patient and all questions answered to patient's satisfaction. OARRS Review: Reviewed and acceptable for medications prescribed. TREATMENT OPTIONS:     Pain quite severe. She is failed multiple significant modalities including therapy injections and spinal cord stimulator. We will try low-dose Norco, she denies any significant allergy to this. Understands opioid and benzo high risk regimen  She will start working on slowly lowering her lorazepam dose and will discuss this with her primary care physician as well. We will consider intrathecal pump if unable to control her pain with low-dose opioid therapy. She will need to come in for a urine drug screen and to sign a pain contract. Declines referral to a tertiary care facility. Sidney Mcdaniel M.D. I have reviewed the chief complaint and history of present illness (including ROS and PFSH) and vital documentation by my staff and I agree with their documentation and have added where applicable. Danny Downey is a 69 y.o. female being evaluated by a Virtual Visit (video visit) encounter to address concerns as mentioned above. A caregiver was present when appropriate. Due to this being a TeleHealth encounter (During DZZ-22 public health emergency), evaluation of the following organ systems was limited: Vitals/Constitutional/EENT/Resp/CV/GI//MS/Neuro/Skin/Heme-Lymph-Imm.   Pursuant to the emergency declaration under the [facility], [address] [place] [address] authority and the [employer] Act, this Virtual Visit was conducted with patient's (and/or legal guardian's) consent, to reduce the patient's risk of exposure to COVID-19 and provide necessary medical care. The patient (and/or legal guardian) has also been advised to contact this office for worsening conditions or problems, and seek emergency medical treatment and/or call 911 if deemed necessary. Total time spent for this encounter: Not billed by time    Services were provided through a video synchronous discussion virtually to substitute for in-person clinic visit. Patient and provider were located at their individual homes. --Andrew Vaughn MD on 12/3/2020 at 3:08 PM    An electronic signature was used to authenticate this note.

## 2024-10-23 NOTE — QUICK NOTE
Patient's family member, Jaz Dolan, was provided with updates regarding clinical progress and plans of care at bedside. All questions and concerns were addressed to their satisfaction. We will continue to update daily.     Zonia Hill MD  [facility]  Emergency Medicine Residency, PGY-2

## 2024-10-23 NOTE — ASSESSMENT & PLAN NOTE
vEEG overnight 10/22-10/23 with multiple subclinical seizures  Loaded with Keppra, lacosamide, and valproic acid and started on maintenance doses  Also given Ativan  Neurology consulted, appreciate recommendations  Consider intubation given multiple seizures.

## 2024-10-23 NOTE — PLAN OF CARE
Problem: PAIN - ADULT  Goal: Verbalizes/displays adequate comfort level or baseline comfort level  Description: Interventions:  - Encourage patient to monitor pain and request assistance  - Assess pain using appropriate pain scale  - Administer analgesics based on type and severity of pain and evaluate response  - Implement non-pharmacological measures as appropriate and evaluate response  - Consider cultural and social influences on pain and pain management  - Notify physician/advanced practitioner if interventions unsuccessful or patient reports new pain  Outcome: Progressing     Problem: SAFETY ADULT  Goal: Patient will remain free of falls  Description: INTERVENTIONS:  - Educate patient/family on patient safety including physical limitations  - Instruct patient to call for assistance with activity   - Consult OT/PT to assist with strengthening/mobility   - Keep Call bell within reach  - Keep bed low and locked with side rails adjusted as appropriate  - Keep care items and personal belongings within reach  - Initiate and maintain comfort rounds  - Make Fall Risk Sign visible to staff  - Apply yellow socks and bracelet for high fall risk patients  - Consider moving patient to room near nurses station  Outcome: Progressing  Goal: Maintain or return to baseline ADL function  Description: INTERVENTIONS:  -  Assess patient's ability to carry out ADLs; assess patient's baseline for ADL function and identify physical deficits which impact ability to perform ADLs (bathing, care of mouth/teeth, toileting, grooming, dressing, etc.)  - Assess/evaluate cause of self-care deficits   - Assess range of motion  - Assess patient's mobility; develop plan if impaired  - Assess patient's need for assistive devices and provide as appropriate  - Encourage maximum independence but intervene and supervise when necessary  - Involve family in performance of ADLs  - Assess for home care needs following discharge   - Consider OT consult  to assist with ADL evaluation and planning for discharge  - Provide patient education as appropriate  Outcome: Progressing  Goal: Maintains/Returns to pre admission functional level  Description: INTERVENTIONS:  - Perform AM-PAC 6 Click Basic Mobility/ Daily Activity assessment daily.  - Set and communicate daily mobility goal to care team and patient/family/caregiver.   - Collaborate with rehabilitation services on mobility goals if consulted  - Out of bed for toileting  - Record patient progress and toleration of activity level   Outcome: Progressing     Problem: DISCHARGE PLANNING  Goal: Discharge to home or other facility with appropriate resources  Description: INTERVENTIONS:  - Identify barriers to discharge w/patient and caregiver  - Arrange for needed discharge resources and transportation as appropriate  - Identify discharge learning needs (meds, wound care, etc.)  - Arrange for interpretive services to assist at discharge as needed  - Refer to Case Management Department for coordinating discharge planning if the patient needs post-hospital services based on physician/advanced practitioner order or complex needs related to functional status, cognitive ability, or social support system  Outcome: Progressing     Problem: Knowledge Deficit  Goal: Patient/family/caregiver demonstrates understanding of disease process, treatment plan, medications, and discharge instructions  Description: Complete learning assessment and assess knowledge base.  Interventions:  - Provide teaching at level of understanding  - Provide teaching via preferred learning methods  Outcome: Progressing     Problem: SAFETY,RESTRAINT: NV/NON-SELF DESTRUCTIVE BEHAVIOR  Goal: Remains free of harm/injury (restraint for non violent/non self-detsructive behavior)  Description: INTERVENTIONS:  - Instruct patient/family regarding restraint use   - Assess and monitor physiologic and psychological status   - Provide interventions and comfort measures  to meet assessed patient needs   - Identify and implement measures to help patient regain control  - Assess readiness for release of restraint   Outcome: Progressing  Goal: Returns to optimal restraint-free functioning  Description: INTERVENTIONS:  - Assess the patient's behavior and symptoms that indicate continued need for restraint  - Identify and implement measures to help patient regain control  - Assess readiness for release of restraint   Outcome: Progressing     Problem: Nutrition/Hydration-ADULT  Goal: Nutrient/Hydration intake appropriate for improving, restoring or maintaining nutritional needs  Description: Monitor and assess patient's nutrition/hydration status for malnutrition. Collaborate with interdisciplinary team and initiate plan and interventions as ordered.  Monitor patient's weight and dietary intake as ordered or per policy. Utilize nutrition screening tool and intervene as necessary. Determine patient's food preferences and provide high-protein, high-caloric foods as appropriate.     INTERVENTIONS:  - Monitor oral intake, urinary output, labs, and treatment plans  - Assess nutrition and hydration status and recommend course of action  - Evaluate amount of meals eaten  - Assist patient with eating if necessary   - Allow adequate time for meals  - Recommend/ encourage appropriate diets, oral nutritional supplements, and vitamin/mineral supplements  - Order, calculate, and assess calorie counts as needed  - Recommend, monitor, and adjust tube feedings and TPN/PPN based on assessed needs  - Assess need for intravenous fluids  - Provide specific nutrition/hydration education as appropriate  - Include patient/family/caregiver in decisions related to nutrition  Outcome: Progressing     Problem: Prexisting or High Potential for Compromised Skin Integrity  Goal: Skin integrity is maintained or improved  Description: INTERVENTIONS:  - Identify patients at risk for skin breakdown  - Assess and monitor  skin integrity  - Assess and monitor nutrition and hydration status  - Monitor labs   - Assess for incontinence   - Turn and reposition patient  - Assist with mobility/ambulation  - Relieve pressure over bony prominences  - Avoid friction and shearing  - Provide appropriate hygiene as needed including keeping skin clean and dry  - Evaluate need for skin moisturizer/barrier cream  - Collaborate with interdisciplinary team   - Patient/family teaching  - Consider wound care consult   Outcome: Progressing

## 2024-10-23 NOTE — ASSESSMENT & PLAN NOTE
S/p craniotomy 1/2019 at [facility], done secondary to abscess/lesions. Baseline independent.   CTH (10/20/24) - No acute intracranial hemorrhage or depressed calvarial fracture. Mild bilateral anterior frontal scalp soft tissue swelling, right greater than left. Right periorbital, perinasal, and premaxillary facial soft tissue swelling also noted.   CTA head/neck (10/22/24) - New extra-axial lenticular shaped hemorrhage along the left parietal convexity as described above, which may represent an epidural hematoma or less likely a subdural hematoma in this region. No significant midline shift noted. Trace new subarachnoid hemorrhage noted along the inferior left temporo-occipital convexity noted.   Continue supportive care.   See individual plans below.

## 2024-10-23 NOTE — SEPSIS NOTE
Sepsis Note   Tae Dolan 54 y.o. male MRN: [id number]  Unit/Bed#: ICU 07 Encounter: [id number]       Initial Sepsis Screening       Row Name 10/23/24 1932                Is the patient's history suggestive of a new or worsening infection? No  intubated 2/2 persistent seizure in trauma patient.  -DORA                  User Key  (r) = Recorded By, (t) = Taken By, (c) = Cosigned By      Initials Name Provider Type    CARLOS Calloway Nurse Practitioner                        Body mass index is 23.38 kg/m².  Wt Readings from Last 1 Encounters:   10/23/24 71.8 kg (158 lb 4.6 oz)        Ideal body weight: 70.7 kg (155 lb 13.8 oz)  Adjusted ideal body weight: 71.1 kg (156 lb 13.4 oz)

## 2024-10-23 NOTE — ASSESSMENT & PLAN NOTE
CTH done given AMS, lethargy.  CTA head/neck - New extra-axial lenticular shaped hemorrhage along the left parietal convexity as described above, which may represent an epidural hematoma or less likely a subdural hematoma in this region. No significant midline shift noted. Trace new subarachnoid hemorrhage noted along the inferior left temporo-occipital convexity noted.   Pt was started on ASA this admission. He was given DDAVP for reversal.  Neurosurgery consulted, appreciate recommendations.  CTH 10/22-10/23 stable    Plan:  Continue neuro-checks q1h  Repeat CTH at 8pm tonight  STAT CTH with drop in GCS by 2 or more in 1 hour.  Discontinue ASA - given DDAVP  Continue Keppra 500mg BID x 7 days (Day #1/7)  Hold Lovenox for DVT prophylaxis until cleared by neurosurgery.  Goal SBP < 140, may require Cardene gtt.  Maintain euglycemia, normothermia  PT/OT eval and treat

## 2024-10-23 NOTE — ASSESSMENT & PLAN NOTE
Lab Results   Component Value Date    HGBA1C 6.4 (H) 10/22/2024       Recent Labs     10/22/24  1122 10/22/24  1804 10/23/24  0011 10/23/24  0609   POCGLU 222* 209* 159* 140       Blood Sugar Average: Last 72 hrs:  (P) 182.5

## 2024-10-24 NOTE — PROGRESS NOTES
Progress Note - Critical Care/ICU   Name: Tae Dolan 54 y.o. male I MRN: [id number]  Unit/Bed#: ICU 07 I Date of Admission: 10/20/2024   Date of Service: 10/24/2024 I Hospital Day: 4      Assessment & Plan  Fall  Fall from a height of 10 feet. He does not recall events before and after fall.  Possibly d/t new medication.  Denies intentionally jumping out of window or any thoughts of self harm.   Fall precautions.   Closed nondisplaced fracture of fifth cervical vertebra (HCC)  S/p fall from about 10 feet. Baseline right sided numbness/tingling given history of stroke. No new pain.  CT c-spine - Acute anterior C5 vertebral body fracture with prevertebral soft tissue swelling. No traumatic subluxation.   Neurosurgery consulted, appreciate recommendations.     Plan:  Continue Neuro-checks q1h.  No surgical intervention at this time.   Maintain Vista collar at all times, will need for 8 to 12 weeks.   Follow up on upright films.  DVT prophylaxis: SCD's.  PT/OT eval and treat with Collar in place.  CM consulted for disposition planning.  Follow up with Neurosurgery as outpatient in 2 weeks.   Spleen laceration  Hgb / Hct       Results from last 7 days   Lab Units 10/24/24  0724 10/24/24  0437 10/23/24  1119   HEMOGLOBIN g/dL 5.3* 6.7* 8.4*   HEMATOCRIT % 16.1* 20.1* 24.7*   S/p fall  CT C/A/P - Splenomegaly with metallic surgical coils near the splenic hilar region. There is a small amount of intermediate density perisplenic free fluid adjacent to the inferior spleen which could represent subcapsular hematoma (AAST grade I splenic injury).   Hgb on admission - 11.5  Most recent Hgb - 6.7    Plan:  Trend Hgb, repeat CBC in AM.  Transfuse for Hgb < 7.0 or active bleeding, HD instability.  Last hemoglobin 5.3 (10/24), will transfuse  Will repeat CT to evaluate for new or worsening bleeding.  Hold ASA.  Hold Lovenox for DVT prophylaxis .  SCD's for DVT prophylaxis.  Monitor for S/S bleeding.  Mood disorder (HCC)  unspecified  No home regimen. No indication for inpatient admission at this time.  Psych consulted, appreciate recommendations.  CM consulted for dispo planning.  Continue supportive care.   History of stroke  Reported he has a sistory of stroke, however on discussion with mother, there was no stroke but were stroke-like residual deficits from brain abscesses and craniotomy.  No home regimen.  Pt was started on ASA this admission.  Discontinue ASA secondary to EDH.  Continue Neuro-checks.   Epidural hematoma (HCC)  CTH done given AMS, lethargy.  CTA head/neck - New extra-axial lenticular shaped hemorrhage along the left parietal convexity as described above, which may represent an epidural hematoma or less likely a subdural hematoma in this region. No significant midline shift noted. Trace new subarachnoid hemorrhage noted along the inferior left temporo-occipital convexity noted.   Pt was started on ASA this admission. He was given DDAVP for reversal.  Neurosurgery consulted, appreciate recommendations.  CTH 10/22-10/23 stable    Plan:  Continue neuro-checks q1h  Repeat CTH at 8pm tonight  STAT CTH with drop in GCS by 2 or more in 1 hour.  Discontinue ASA - given DDAVP  Continue Keppra 500mg BID x 7 days (Day #1/7)  Hold Lovenox for DVT prophylaxis until cleared by neurosurgery.  Goal SBP < 140, may require Cardene gtt.  Maintain euglycemia, normothermia  PT/OT eval and treat  S/P craniotomy  [facility] done secondary to abscess/lesions. Baseline independent.   CTH (10/20/24) - No acute intracranial hemorrhage or depressed calvarial fracture. Mild bilateral anterior frontal scalp soft tissue swelling, right greater than left. Right periorbital, perinasal, and premaxillary facial soft tissue swelling also noted.   CTA head/neck (10/22/24) - New extra-axial lenticular shaped hemorrhage along the left parietal convexity as described above, which may represent an epidural  hematoma or less likely a subdural hematoma in this region. No significant midline shift noted. Trace new subarachnoid hemorrhage noted along the inferior left temporo-occipital convexity noted.   Continue supportive care.  See individual plans below.   Spasticity  History of spasticity s/p craniotomy.  Home regimen: Baclofen and Tizanidine qHS.  Hold home Baclofen and Tizanidine.  Continue Robaxin for analgesia.  Restart Baclofen when appropriate.   Continue supportive care.   Type 2 diabetes mellitus (HCC)  Lab Results   Component Value Date    HGBA1C 6.4 (H) 10/22/2024       Recent Labs     10/23/24  1238 10/23/24  1746 10/23/24  2345 10/24/24  0532   POCGLU 167* 198* 197* 194*     Blood Sugar Average: Last 72 hrs:  (P) 185.75    Home regimen: Lantus 15 units SC qHS  Hold home regimen  SSI every 6 hours while NPO  Goal glucose 140 - 180  Hypoglycemia protocol  NPO for now.   Systemic inflammatory response syndrome (SIRS) without organ dysfunction (HCC)  As evidence by tachycardia, fever, leukocytosis  Source, unknown at this time  WBC - 11, Procal - 0.46, Lactic - 0.8  CXR - No acute cardiopulmonary abnormalities  UA - pending.  COVID/FLU/RSV - pending.  Blood cultures x 2 obtained.  He received 30cc/kg resuscitation fluids.     Plan:  Continue to hold ABX.  Follow up blood cultures, UA, viral swab and MRSA.  Monitor/trend fever curve.  Monitor/trend WBC.  Trend endpoints.  History of alcohol use  History of ETOH use in the past, unknown last drink.  Continue CIWA protocol.  Continue thiamine/folate daily.  Encourage cessation.  Status epilepticus (HCC)  vEEG overnight 10/22-10/23 with multiple subclinical seizures  Loaded with Keppra, lacosamide, and valproic acid and started on maintenance doses  Also given Ativan  Neurology consulted, appreciate recommendations  Neurology discontinued valproic acid  Bolus of Fosphenytoin given  Continue on Keppra, lacosamide  Versed and propofol prn  Intubated 10/23, placed on  propofol and versed  Currently on propofol and versed with one episode overnight  Continue vEEG  Seizure (HCC)    Disposition: Critical care    ICU Core Measures     Vented Patient  VAP Bundle  VAP bundle ordered     A: Assess, Prevent, and Manage Pain Has pain been assessed? Yes  Need for changes to pain regimen? No   B: Both Spontaneous Awakening Trials (SATs) and Spontaneous Breathing Trials (SBTs) Plan to perform spontaneous awakening trial today? No secondary to ongoing seizures/status epilepticus  Plan to perform spontaneous breathing trial today? No secondary to ongoing seizures/status epilepticus  Obvious barriers to extubation? Yes   C: Choice of Sedation RASS Goal: -4 Deep Sedation or 0 Alert and Calm  Need for changes to sedation or analgesia regimen? No   D: Delirium CAM-ICU: Intubated and sedated   E: Early Mobility  Plan for early mobility? Yes   F: Family Engagement Plan for family engagement today? Yes       Review of Invasive Devices:    Jeffery Plan: Continue for accurate I/O monitoring for 48 hours        Prophylaxis:  VTE Contraindicated secondary to: Trauma   Stress Ulcer  covered bypantoprazole (PROTONIX) EC tablet 40 mg [[id number]]         24 Hour Events : Overnight patient had a seizure despite current sedation as observed by EMU. Versed was increased without further current report of seizures.    Subjective   Patient is currently intubated and sedated, unable to perform ROS.    Review of Systems: See HPI for Review of Systems    Objective :                   Vitals I/O      Most Recent Min/Max in 24hrs   Temp 97.8 °F (36.6 °C) Temp  Min: 97.8 °F (36.6 °C)  Max: 98.8 °F (37.1 °C)   Pulse 82 Pulse  Min: 81  Max: 104   Resp 16 Resp  Min: 13  Max: 21   /65 BP  Min: 110/57  Max: 180/77   O2 Sat 100 % SpO2  Min: 86 %  Max: 100 %      Intake/Output Summary (Last 24 hours) at 10/24/2024 0857  Last data filed at 10/24/2024 0600  Gross per 24 hour   Intake 4235.76 ml   Output 1803 ml   Net 2432.76  ml       Diet NPO    Invasive Monitoring   Arterial Line  Kisha /47  Arterial Line BP  Min: 139/47  Max: 139/47   MAP 69 mmHg  Arterial Line MAP (mmHg)  Min: 69 mmHg  Max: 69 mmHg           Physical Exam   Physical Exam  Vitals and nursing note reviewed.   Eyes:      Pupils: Pupils are equal, round, and reactive to light.   Skin:     General: Skin is warm and dry.      Findings: Abrasion (Left arm abraision) present.   HENT:      Head:      Comments: Bruising over face, especially right eye.     Mouth/Throat:      Mouth: Mucous membranes are moist.   Cardiovascular:      Rate and Rhythm: Normal rate and regular rhythm.      Pulses: Normal pulses.   Musculoskeletal:         General: Signs of injury (Multiple injuries) present.   Abdominal: General: There is no distension.      Palpations: Abdomen is soft.      Comments: No bruising over belly or flanks   Constitutional:       General: He is not in acute distress.     Appearance: He is ill-appearing. He is not toxic-appearing.      Comments: No new bruising visualized.   Pulmonary:      Effort: Pulmonary effort is normal.      Breath sounds: Normal breath sounds.   Neurological:      Comments: Intubated and sedated, unable to participate in neurologic exam   Genitourinary/Anorectal:  Gil present.        Diagnostic Studies        Lab Results: I have reviewed the following results:     Medications:  Scheduled PRN   Artificial Tears, 1 drop, BID  atorvastatin, 10 mg, Daily With Dinner  bacitracin, 1 large application, BID  chlorhexidine, 15 mL, Q12H NANCY  chlorhexidine, 15 mL, Q12H NANCY  folic acid, 1 mg, Daily  insulin lispro, 1-6 Units, Q6H NANCY  lacosamide, 200 mg, Q12H  levETIRAcetam, 2,000 mg, Q12H NANCY  multivitamin-minerals, 1 tablet, Daily  nicotine, 21 mg, Daily  pantoprazole, 40 mg, Early Morning  QUEtiapine, 12.5 mg, HS  senna-docusate sodium, 1 tablet, HS  thiamine, 100 mg, Daily      acetaminophen, 1,000 mg, Q6H PRN  fentaNYL, 50 mcg, Q1H PRN  labetalol,  10 mg, Q6H PRN  naloxone, 0.04 mg, Q1MIN PRN  ondansetron, 4 mg, Q4H PRN  oxyCODONE, 2.5 mg, Q4H PRN   Or  oxyCODONE, 5 mg, Q4H PRN       Continuous    dextrose 5 % and sodium chloride 0.9 %, 125 mL/hr, Last Rate: 125 mL/hr (10/24/24 0621)  midazolam, 4 mg/hr, Last Rate: 4 mg/hr (10/24/24 0622)  niCARdipine, 1-15 mg/hr, Last Rate: 7.5 mg/hr (10/24/24 0845)  propofol, 5-50 mcg/kg/min, Last Rate: 50 mcg/kg/min (10/24/24 0821)  propofol, 150 mg, Last Rate: 150 mg (10/23/24 1421)         Labs:   CBC    Recent Labs     10/23/24  1119 10/24/24  0437 10/24/24  0724   WBC 9.27 7.29  --    HGB 8.4* 6.7* 5.3*   HCT 24.7* 20.1* 16.1*   * 122*  --      BMP    Recent Labs     10/24/24  0003 10/24/24  0437   SODIUM 131* 132*  132*   K 3.9 3.6  3.6    103  103   CO2 25 24 23   AGAP 4 5  6   BUN 6 6  6   CREATININE 0.56* 0.56*  0.55*   CALCIUM 7.5* 7.4*  7.4*       Coags    No recent results     Additional Electrolytes  Recent Labs     10/23/24  0445 10/23/24  1119 10/24/24  0437   MG 1.8*  --  1.8*   PHOS 2.1*  --  2.2*   CAIONIZED  --  1.01* 1.05*          Blood Gas    No recent results  No recent results LFTs  No recent results    Infectious  Recent Labs     10/22/24  1124 10/23/24  0444   PROCALCITONI 0.46* 0.20     Glucose  Recent Labs     10/23/24  0445 10/23/24  1839 10/24/24  0003 10/24/24  0437   GLUC 149* 242* 225* 212*  209*

## 2024-10-24 NOTE — ASSESSMENT & PLAN NOTE
Hx craniotomy for evacuation of abscess in 2019 at [facility] with Dr. Villareal.

## 2024-10-24 NOTE — PLAN OF CARE
Problem: PAIN - ADULT  Goal: Verbalizes/displays adequate comfort level or baseline comfort level  Description: Interventions:  - Encourage patient to monitor pain and request assistance  - Assess pain using appropriate pain scale  - Administer analgesics based on type and severity of pain and evaluate response  - Implement non-pharmacological measures as appropriate and evaluate response  - Consider cultural and social influences on pain and pain management  - Notify physician/advanced practitioner if interventions unsuccessful or patient reports new pain  Outcome: Progressing     Problem: SAFETY ADULT  Goal: Patient will remain free of falls  Description: INTERVENTIONS:  - Educate patient/family on patient safety including physical limitations  - Instruct patient to call for assistance with activity   - Consult OT/PT to assist with strengthening/mobility   - Keep Call bell within reach  - Keep bed low and locked with side rails adjusted as appropriate  - Keep care items and personal belongings within reach  - Initiate and maintain comfort rounds  - Make Fall Risk Sign visible to staff  - Initiate/Maintain bed alarm  - Obtain necessary fall risk management equipment: bed alarm  - Apply yellow socks and bracelet for high fall risk patients  - Consider moving patient to room near nurses station  Outcome: Progressing  Goal: Maintain or return to baseline ADL function  Description: INTERVENTIONS:  -  Assess patient's ability to carry out ADLs; assess patient's baseline for ADL function and identify physical deficits which impact ability to perform ADLs (bathing, care of mouth/teeth, toileting, grooming, dressing, etc.)  - Assess/evaluate cause of self-care deficits   - Assess range of motion  - Assess patient's mobility; develop plan if impaired  - Assess patient's need for assistive devices and provide as appropriate  - Encourage maximum independence but intervene and supervise when necessary  - Involve family in  performance of ADLs  - Assess for home care needs following discharge   - Consider OT consult to assist with ADL evaluation and planning for discharge  - Provide patient education as appropriate  Outcome: Progressing  Goal: Maintains/Returns to pre admission functional level  Description: INTERVENTIONS:  - Perform AM-PAC 6 Click Basic Mobility/ Daily Activity assessment daily.  - Set and communicate daily mobility goal to care team and patient/family/caregiver.   - Collaborate with rehabilitation services on mobility goals if consulted  - Perform Range of Motion 3 times a day.  - Reposition patient every 2 hours.  - Record patient progress and toleration of activity level   Outcome: Progressing     Problem: DISCHARGE PLANNING  Goal: Discharge to home or other facility with appropriate resources  Description: INTERVENTIONS:  - Identify barriers to discharge w/patient and caregiver  - Arrange for needed discharge resources and transportation as appropriate  - Identify discharge learning needs (meds, wound care, etc.)  - Arrange for interpretive services to assist at discharge as needed  - Refer to Case Management Department for coordinating discharge planning if the patient needs post-hospital services based on physician/advanced practitioner order or complex needs related to functional status, cognitive ability, or social support system  Outcome: Progressing     Problem: Knowledge Deficit  Goal: Patient/family/caregiver demonstrates understanding of disease process, treatment plan, medications, and discharge instructions  Description: Complete learning assessment and assess knowledge base.  Interventions:  - Provide teaching at level of understanding  - Provide teaching via preferred learning methods  Outcome: Progressing     Problem: SAFETY,RESTRAINT: NV/NON-SELF DESTRUCTIVE BEHAVIOR  Goal: Remains free of harm/injury (restraint for non violent/non self-detsructive behavior)  Description: INTERVENTIONS:  - Instruct  patient/family regarding restraint use   - Assess and monitor physiologic and psychological status   - Provide interventions and comfort measures to meet assessed patient needs   - Identify and implement measures to help patient regain control  - Assess readiness for release of restraint   Outcome: Progressing  Goal: Returns to optimal restraint-free functioning  Description: INTERVENTIONS:  - Assess the patient's behavior and symptoms that indicate continued need for restraint  - Identify and implement measures to help patient regain control  - Assess readiness for release of restraint   Outcome: Progressing     Problem: Nutrition/Hydration-ADULT  Goal: Nutrient/Hydration intake appropriate for improving, restoring or maintaining nutritional needs  Description: Monitor and assess patient's nutrition/hydration status for malnutrition. Collaborate with interdisciplinary team and initiate plan and interventions as ordered.  Monitor patient's weight and dietary intake as ordered or per policy. Utilize nutrition screening tool and intervene as necessary. Determine patient's food preferences and provide high-protein, high-caloric foods as appropriate.     INTERVENTIONS:  - Monitor oral intake, urinary output, labs, and treatment plans  - Assess nutrition and hydration status and recommend course of action  - Evaluate amount of meals eaten  - Assist patient with eating if necessary   - Allow adequate time for meals  - Recommend/ encourage appropriate diets, oral nutritional supplements, and vitamin/mineral supplements  - Order, calculate, and assess calorie counts as needed  - Recommend, monitor, and adjust tube feedings and TPN/PPN based on assessed needs  - Assess need for intravenous fluids  - Provide specific nutrition/hydration education as appropriate  - Include patient/family/caregiver in decisions related to nutrition  Outcome: Progressing     Problem: Prexisting or High Potential for Compromised Skin  Integrity  Goal: Skin integrity is maintained or improved  Description: INTERVENTIONS:  - Identify patients at risk for skin breakdown  - Assess and monitor skin integrity  - Assess and monitor nutrition and hydration status  - Monitor labs   - Assess for incontinence   - Turn and reposition patient  - Assist with mobility/ambulation  - Relieve pressure over bony prominences  - Avoid friction and shearing  - Provide appropriate hygiene as needed including keeping skin clean and dry  - Evaluate need for skin moisturizer/barrier cream  - Collaborate with interdisciplinary team   - Patient/family teaching  - Consider wound care consult   Outcome: Progressing

## 2024-10-24 NOTE — OCCUPATIONAL THERAPY NOTE
Occupational Therapy Cancellation Note     Patient Name: Tae Dolan  Today's Date: 10/24/2024     10/24/24 1030   Note Type   Note Type Cancelled Session   Cancel Reasons Medical status  (OT orders remain active. Per ICU mobility rounds patient remains inappropriate for OT tx as he is intubated and sedated. Will hold and f/u as able.)     Little Gu MS OTR/L   [place] Licensure# [id number]

## 2024-10-24 NOTE — DISCHARGE INSTR - OTHER ORDERS
Skin care plans:  1-Hydraguard to bilateral sacrum, buttock and heels BID and PRN  2-Elevate heels to offload pressure.  3-Ehob cushion in chair when out of bed.  4-Moisturize skin daily with skin nourishing cream.  5-Turn/reposition q2h for pressure re-distribution on skin.  6-Left Hand, B/L Knees: cleanse with NSS soaked gauze. Pat dry. Cover scabs with 3M no sting barrier daily.   7-Schedule Follow-up Outpatient Wound Appointment. Ambulatory Referral Placed.   Call Outpatient Wound and Ostomy Care Team @ [phone]   Option 1: [place], [place], [place], [place]  Option 2: [facility], [place]

## 2024-10-24 NOTE — ASSESSMENT & PLAN NOTE
Acute left parietal epidural hematoma, left temporo-occipital SAH  CTA completed am of 10/22 due to worsening AMS, confusion.  Noted with above.  On daily ASA - reversed with DDAVP.    Imaging:  CT head wo, 10/23/2024: Unchanged 1.3 cm thick acute extra-axial hemorrhage along left parietooccipital cerebral convexity, which could represent a combination of subdural hematoma and epidural hematoma (given lenticular shape at its thickest portion). Unchanged mild mass effect on adjacent left parietal lobe. Unchanged acute trace subarachnoid hemorrhage along left inferior temporal convexity. Unchanged acute trace thin subdural hematoma along left tentorium cerebelli. No new acute intracranial abnormality.  CTA head and neck w/wo, 10/22/2024: New extra-axial lenticular shaped hemorrhage along the left parietal convexity as described above, which may represent an epidural hematoma or less likely a subdural hematoma in this region. No significant midline shift noted. Trace new subarachnoid hemorrhage noted along the inferior left temporo-occipital convexity noted. Right facial subcutaneous soft tissue swelling/hematoma again demonstrated. CT Angiography: No active extravasation of contrast is noted into the extra-axial hematoma. No large vessel occlusion, high-grade stenosis, or intracranial aneurysm identified on CT angiogram of the head. No hemodynamically significant stenosis or dissection identified on CT angiogram of the neck. Anterior mildly comminuted C5 vertebral body fracture again demonstrated, with mild associated prevertebral edema again demonstrated.    Plan:  Continue to monitor neuro exam closely.  STAT CT head with decline in GCS > 2 pts in 1 hour.  Hold ASA - reversed with DDAVP.  Keppra per trauma team.  Continue to hold all AC/AP x 2 weeks.  CCM/medicine:  Noted with seizure activity on vEEG 10/22-23, currently burst suppressed.  Intubated for burst supression.  Neurology consulted - appreciate  recommendations.  Unable to obtain MRI brain currently due to incompatible vEEG leads.  Hyponatremia - Na 132  Mobilize with PT/OT.  DVT ppx: SCDs. Ok for pharmacologic DVT ppx.    Neurosurgery will continue to follow. Call with questions.

## 2024-10-24 NOTE — PROGRESS NOTES
Progress Note - Neurosurgery   Name: Tae Dolan 54 y.o. male I MRN: [id number]  Unit/Bed#: ICU 07 I Date of Admission: 10/20/2024   Date of Service: 10/24/2024 I Hospital Day: 4    Assessment & Plan  Closed nondisplaced fracture of fifth cervical vertebra (HCC)  Comminuted C5 vertebral body fracture  Status post fall approximately 10 feet  Endorses neck pain  Baseline right sided numbness and tingling for history of stroke.  No new radicular pain, numbness, tingling and or weakness    Imaging:   CT cervical spine without 10/20/24: Acute comminuted C5 vertebral body fracture with associated soft tissue swelling.  Maintained alignment.  Posterior elements appear intact.    Plan:   Continue monitor neurological exam  CT results reviewed with patient demonstrating C5 fracture  Plan for conservative management  Ordered Vista collar to be worn at all times.  Patient aware collar anticipated for 8 to 12 weeks  Ordered baseline cervical spine upright x-rays  Pain control per primary team  Okay to mobilize with physical and Occupational Therapy with collar in place  DVT prophylaxis: Bilateral SCDs.  Lovenox    No neurosurgical intervention anticipated at this juncture.  Plan for outpatient follow up as scheduled.  Call with any questions or concerns.    Fall  Reported fall from roof at least 10 feet  Patient without recollection of events  States possibly related to new medication and unclear recollections of the last several days  Denies any purposeful jumping or suicidal ideations  Spleen laceration  Per trauma  Mood disorder (HCC) unspecified    History of stroke    Epidural hematoma (HCC)  Acute left parietal epidural hematoma, left temporo-occipital SAH  CTA completed am of 10/22 due to worsening AMS, confusion.  Noted with above.  On daily ASA - reversed with DDAVP.    Imaging:  CT head wo, 10/23/2024: Unchanged 1.3 cm thick acute extra-axial hemorrhage along left parietooccipital cerebral convexity, which could  represent a combination of subdural hematoma and epidural hematoma (given lenticular shape at its thickest portion). Unchanged mild mass effect on adjacent left parietal lobe. Unchanged acute trace subarachnoid hemorrhage along left inferior temporal convexity. Unchanged acute trace thin subdural hematoma along left tentorium cerebelli. No new acute intracranial abnormality.  CTA head and neck w/wo, 10/22/2024: New extra-axial lenticular shaped hemorrhage along the left parietal convexity as described above, which may represent an epidural hematoma or less likely a subdural hematoma in this region. No significant midline shift noted. Trace new subarachnoid hemorrhage noted along the inferior left temporo-occipital convexity noted. Right facial subcutaneous soft tissue swelling/hematoma again demonstrated. CT Angiography: No active extravasation of contrast is noted into the extra-axial hematoma. No large vessel occlusion, high-grade stenosis, or intracranial aneurysm identified on CT angiogram of the head. No hemodynamically significant stenosis or dissection identified on CT angiogram of the neck. Anterior mildly comminuted C5 vertebral body fracture again demonstrated, with mild associated prevertebral edema again demonstrated.    Plan:  Continue to monitor neuro exam closely.  STAT CT head with decline in GCS > 2 pts in 1 hour.  Hold ASA - reversed with DDAVP.  Keppra per trauma team.  Continue to hold all AC/AP x 2 weeks.  CCM/medicine:  Noted with seizure activity on vEEG 10/22-23, currently burst suppressed.  Intubated for burst supression.  Neurology consulted - appreciate recommendations.  Unable to obtain MRI brain currently due to incompatible vEEG leads.  Hyponatremia - Na 132  Mobilize with PT/OT.  DVT ppx: SCDs. Ok for pharmacologic DVT ppx.    Neurosurgery will continue to follow. Call with questions.     S/P craniotomy  Hx craniotomy for evacuation of abscess in 2019 at [facility] with   Dionna.  Spasticity    Type 2 diabetes mellitus (HCC)  Lab Results   Component Value Date    HGBA1C 6.4 (H) 10/22/2024       Recent Labs     10/23/24  1238 10/23/24  1746 10/23/24  2345 10/24/24  0532   POCGLU 167* 198* 197* 194*       Blood Sugar Average: Last 72 hrs:  (P) 185.75    Systemic inflammatory response syndrome (SIRS) without organ dysfunction (HCC)    History of alcohol use    Status epilepticus (HCC)    Seizure (HCC)  See above.    I have discussed the above management plan in detail with the primary service.   Neurosurgery service will follow.  Please contact the SecureChat role for the Neurosurgery service with any questions/concerns.    Subjective   Patient critically ill, intubated and sedated  On burst suppression with propofol.  PERRL 2 mm bilaterally.  Otherwise GCS 3T.  Cervical brace in place.    Objective :  Temp:  [97.9 °F (36.6 °C)-98.8 °F (37.1 °C)] 98.5 °F (36.9 °C)  HR:  [] 83  BP: (110-180)/(52-77) 119/63  Resp:  [13-21] 16  SpO2:  [86 %-100 %] 100 %  O2 Device: Ventilator    I/O         10/22 0701  10/23 0700 10/23 0701  10/24 0700 10/24 0701  10/25 0700    P.O. 1560      I.V. (mL/kg) 3059.3 (42.6) 4292.1 (58.2)     IV Piggyback 50 200     Total Intake(mL/kg) 4669.3 (65) 4492.1 (60.9)     Urine (mL/kg/hr) 2300 (1.3) 1803 (1)     Total Output 2300 1803     Net +2369.3 +2689.1            Unmeasured Urine Occurrence 1 x            Physical Exam  Constitutional:       Appearance: He is ill-appearing.   HENT:      Head:      Comments: Multiple facial trauma, particularly on R     Neurological Exam  Mental Status  Unresponsive to painful stimuli.  GCS 3T.        Lab Results: I have reviewed the following results:  Recent Labs     10/21/24  2312 10/22/24  0526 10/22/24  1625 10/23/24  0405 10/24/24  0437 10/24/24  0724   WBC 11.01*  --   --    < > 7.29  --    HGB 9.8*   < >  --    < > 6.7* 5.3*   HCT 28.9*   < >  --    < > 20.1* 16.1*     --   --    < > 122*  --    BANDSPCT 1   --   --   --   --   --    SODIUM 135  --   --    < > 132*  132*  --    K 3.9  --   --    < > 3.6  3.6  --      --   --    < > 103  103  --    CO2 31  --   --    < > 24  23  --    BUN 11  --   --    < > 6  6  --    CREATININE 0.78  --   --    < > 0.56*  0.55*  --    GLUC 272*  --   --    < > 212*  209*  --    CAIONIZED  --   --   --    < > 1.05*  --    MG  --   --   --    < > 1.8*  --    PHOS  --   --   --    < > 2.2*  --    LACTICACID  --   --  0.8  --   --   --     < > = values in this interval not displayed.       Imaging Results Review: I personally reviewed the following image studies in PACS and associated radiology reports: CT head. My interpretation of the radiology images/reports is: as above.  Other Study Results Review: No additional pertinent studies reviewed.    VTE Pharmacologic Prophylaxis: VTE covered by:    None    and Sequential compression device (Venodyne)     Administrative Statements

## 2024-10-24 NOTE — PROGRESS NOTES
Progress Note - Neurology   Name: Tae Dolan 54 y.o. male I MRN: [id number]  Unit/Bed#: ICU 07 I Date of Admission: 10/20/2024   Date of Service: 10/24/2024 I Hospital Day: 4     Assessment & Plan  Status epilepticus (HCC)  Tae Dolan is a 54 year old male with a pertinent PMH of , neurology consulted for c/f for seizure activity. The patient initially presented to the hospital on 10/20 at 0030 AM after falling out of a second story window.     Work Up:  NC CTH: Intracranial hemorrhage redemonstrated, most prominently suspected epidural hemorrhage in the left parietal region, as described. There is some local mass effect but no midline shift.   Recommend MRI Brain w/wo contrast with Seizure Protocol  EEG: Multiple frequent seizures since being placed on continuous vEEG on 10/22/24 at 2038. Frequent seizures continued after loading with Levetiracetam, Lacosamide, valproic acid and fosphenytoin. 10 seizures in 90 minutes since 1123 and most recent being caught at 1326.  Coma Panel - Normal (elevated acetaminohen)  UDS - Normal  Ethanol level - Normal    Current AEDs: Levetiracetam 2g q12h, Lacosamide 200mg q12h, Clobazam 5mg TID    AEDS that did not work:  Valproic acid and fosphenytoin loading doses were given but made no observable difference on EEG.    Plan:  Frequent Neuro Checks, notify Neurology team if any acute changes in exam and obtain STAT CTH  Seizure / Fall Precautions  Telemetry Monitoring  AEDs: Levetiracetam 2g q12h, Lacosamide 200mg q12h, Clobazam 5mg TID.  EEG: continuous vEEG active.  Recommend seizure suppression 24-48 hours as the patient has had continued seizure activity which poses risk for permanent neurologic damage.   Imaging Studies: MRI Brain w/wo contrast seizure protocol  Rescue Meds: Ativan IV 2mg prn 2 complex partial seizures or 1 GTC seizure  DVT PPx , and SCDs  PT/OT Evaluate and Treat  [place] DOT: Needs to be completed prior to discharge  Rest of care per primary medical  team    Recommendations for outpatient neurological follow up have yet to be determined.      Subjective   Patient seen at bedside this a.m.  There is some concern for possible seizure but it was ruled out as an artifact.  Clobazam 5 mg 3 times daily was initiated.  Discussed patient's progress with his mother at bedside.  All questions and concerns addressed.      Objective :  Temp:  [97.8 °F (36.6 °C)-98.8 °F (37.1 °C)] 98.2 °F (36.8 °C)  HR:  [81-98] 87  BP: (110-161)/(52-72) 139/65  Resp:  [16-21] 16  SpO2:  [95 %-100 %] 100 %  O2 Device: Ventilator    Physical Exam  Vitals reviewed.     Neurological Exam  Mental Status    Patient is currently heavily sedated for seizure suppression.   PERRL.   Gag and Cough reflexes intact.  .        Lab Results: I have reviewed the following results:  Imaging Results Review: No pertinent imaging studies reviewed.  Other Study Results Review: No additional pertinent studies reviewed.    VTE Pharmacologic Prophylaxis: Per primary team

## 2024-10-24 NOTE — PHYSICAL THERAPY NOTE
PHYSICAL THERAPY CANCELLATION NOTE          Patient Name: Tae Dolan  Today's Date: 10/24/2024             10/24/24 1053   Note Type   Note Type Cancelled Session   Cancel Reasons Medical status   Assessment   Assessment Pt active on inpatient PT caseload. Spoke to OT [facility] post ICU mobility rounds, pt is not appropriate for PT tx and mobility at this time as pt is currently intubated and sedated. PT will continue to follow pt as appropriate and as schedule allows.         Mariah Palacios, PT, DPT  10/24/24

## 2024-10-24 NOTE — QUICK NOTE
Patient's family member, Jaz Dolan, was updated at bedside at around 2:00pm and provided with updates regarding clinical progress and plans of care. All questions and concerns were addressed to their satisfaction. We will continue to update daily.     Zonia Hill MD  [facility]  Emergency Medicine Residency, PGY-2

## 2024-10-24 NOTE — PROCEDURES
Insert Complex Venous Access Line    Date/Time: 10/24/2024 2:00 PM    Performed by: Una Starr RN  Authorized by: CARLOS Soto    Patient location:  Bedside  Consent:     Consent obtained:  Verbal and written (obtained by md)    Consent given by:  Guardian (obtained by md)    Procedural risks discussed: discussed with md.    Alternatives discussed: discussed with md.  Universal protocol:     Procedure explained and questions answered to patient or proxy's satisfaction: yes      Immediately prior to procedure, a time out was called: yes      Patient identity confirmed:  Arm band and legal responsible patient representative (family)  Pre-procedure details:     Hand hygiene: Hand hygiene performed prior to insertion      Sterile barrier technique: All elements of maximal sterile technique followed      Skin preparation:  ChloraPrep    Skin preparation agent: Skin preparation agent completely dried prior to procedure    Procedure details:     Complex Venous Access Line Type: PICC      Complex Venous Access Line Indications: medications requiring central line      Orientation:  Right    Location:  Basilic    Procedural supplies:  Triple lumen    Catheter size:  5 Fr    Total catheter length (cm):  46    Catheter out on skin (cm):  0    Max flow rate:  999ml/hr    Arm circumference:  31cm    Patient evaluated for contraindications to access (i.e. fistula, thrombosis, etc): Yes      Approach: percutaneous technique used      Patient position:  Flat    Ultrasound image availability:  Not saved    Sterile ultrasound techniques: Sterile gel and sterile probe covers were used      Number of attempts:  1    Successful placement: yes      Landmarks identified: yes      Vessel of catheter tip end:  Chest Xray needed to confirm placement  Anesthesia (see MAR for exact dosages):     Anesthesia method:  Local infiltration    Local anesthetic:  Lidocaine 1% w/o epi (2ml used)  Post-procedure details:     Post-procedure:   Dressing applied and securement device placed    Assessment:  Blood return through all ports    Post-procedure complications: none      Patient tolerance of procedure:  Tolerated well, no immediate complications

## 2024-10-24 NOTE — WOUND OSTOMY CARE
Consult Note - Wound   Tae Dolan 54 y.o. male MRN: [id number]  Unit/Bed#: ICU 07 Encounter: [id number]        History and Present Illness:  Patient is a 55 yo male that was admitted to [facility]  for treatment of fall. Patient has a PMH of HTN, DM 2, s/p Craniotomy for brain abscesses, chronic headaches. On assessment, patient is in ICU, intubated and sedated, bilateral wrist restraints in place, not able to follow commands, indwelling catheter in place for urine management, no sign of bowel incontinence, currently NPO, assist of two for repositioning and dependent for care.     Wound Care was consulted for left arm abrasion .     Assessment Findings:   B/L heels are dry intact and fransisco with no skin loss or wounds present. Recommend preventative Hydraguard Cream and proper offloading/ repositioning.      B/L sacro-buttocks is dry, intact, pink in color and blanches. No skin loss or wounds present. Recommend preventative hydragaurd to area.     Left Upper arm, traumatic abrasion - large irregular shaped area of patial and full thickness skin loss. Wound bed is covered in mix of yellow slough and brown dry tissue. Camille wound is fragile and excoriated with superficial abrasions and dried drainage. Small to moderate amount of serous drainage noted. Recommend xeroform with DSD and follow up with wound care center upon discharge.     Left poster hand, traumatic abrasion - scattered irregular shaped well adhered areas of eschar. No open aspects or drainage. Camille wound dry and intact. Recommend 3M no sting barrier film.     Right knee, traumatic abrasion - large irregular shaped area of well adhered intact eschar. No open aspects or drainage. Camille wound is dry and intact. Recommend 3M no sting barrier film.    No induration, fluctuance, odor, warmth/temperature differences, redness, or purulence noted to the above noted wounds and skin areas assessed. New dressings applied per orders listed below. Patient tolerated well-  no s/s of non-verbal pain or discomfort observed during the encounter. Bedside nurse aware of plan of care. See flow sheets for more detailed assessment findings.      Orders listed below and wound care will continue to follow, call or Secure Chat with questions.     Skin care plans:  1-Hydraguard to bilateral sacrum, buttock and heels BID and PRN  2-Elevate heels to offload pressure.  3-Ehob cushion in chair when out of bed.  4-Moisturize skin daily with skin nourishing cream.  5-Turn/reposition q2h for pressure re-distribution on skin.  6-Left Hand, B/L Knees: cleanse with NSS soaked gauze. Pat dry. Cover scabs with 3M no sting barrier daily.   7-Left arm: Irrigate with NSS. Pat dry. Apply Xeroform directly to wound bed. Cover with ABD. Wrap with Elvira. Change daily or PRN for soilage/dislodgement.  8-Schedule Follow-up Outpatient Wound Appointment. Ambulatory Referral Placed.     Wounds:  Wound 10/24/24 Traumatic Abrasion(s) Arm Anterior;Left;Proximal;Upper (Active)   Wound Image   10/24/24 1110   Wound Description Slough;Yellow;Brown;Drainage 10/24/24 1110   Camille-wound Assessment Excoriated;Fragile 10/24/24 1110   Wound Length (cm) 35 cm 10/24/24 1110   Wound Width (cm) 12 cm 10/24/24 1110   Wound Depth (cm) 0.3 cm 10/24/24 1110   Wound Surface Area (cm^2) 420 cm^2 10/24/24 1110   Wound Volume (cm^3) 126 cm^3 10/24/24 1110   Calculated Wound Volume (cm^3) 126 cm^3 10/24/24 1110   Drainage Amount Small 10/24/24 1110   Drainage Description Serosanguineous 10/24/24 1110   Non-staged Wound Description Full thickness 10/24/24 1110   Treatments Cleansed;Site care 10/24/24 1110   Dressing Xeroform;ABD;Dry dressing 10/24/24 1110   Packing- # removed 0 10/24/24 1110   Packing- # inserted 0 10/24/24 1110   Dressing Changed New 10/24/24 1110   Patient Tolerance Tolerated well 10/24/24 1110   Dressing Status Clean;Dry;Intact 10/24/24 1110       Wound 10/24/24 Knee Anterior;Right (Active)   Wound Image   10/24/24 1118    Wound Description Dry;Intact;Eschar 10/24/24 1115   Camille-wound Assessment Dry;Intact 10/24/24 1115   Wound Length (cm) 8 cm 10/24/24 1115   Wound Width (cm) 3 cm 10/24/24 1115   Wound Depth (cm) 0 cm 10/24/24 1115   Wound Surface Area (cm^2) 24 cm^2 10/24/24 1115   Wound Volume (cm^3) 0 cm^3 10/24/24 1115   Calculated Wound Volume (cm^3) 0 cm^3 10/24/24 1115   Treatments Cleansed;Site care 10/24/24 1115   Dressing Other (Comment) 10/24/24 1115   Wound packed? No 10/24/24 1115   Packing- # removed 0 10/24/24 1115   Packing- # inserted 0 10/24/24 1115   Patient Tolerance Tolerated well 10/24/24 1115       Wound 10/24/24 Hand Left;Posterior (Active)   Wound Image   10/24/24 1118   Wound Description Intact;Dry;Brown;Black 10/24/24 1118   Camille-wound Assessment Dry;Intact 10/24/24 1118   Drainage Amount None 10/24/24 1118   Non-staged Wound Description Not applicable 10/24/24 1118   Treatments Site care;Cleansed 10/24/24 1118   Dressing Other (Comment) 10/24/24 1118   Wound packed? No 10/24/24 1118   Dressing Changed New 10/24/24 1118   Patient Tolerance Tolerated well 10/24/24 1118   Dressing Status Clean;Dry;Intact 10/24/24 1118               Jannet Rahman RN, BSN, CCRN

## 2024-10-24 NOTE — ASSESSMENT & PLAN NOTE
Lab Results   Component Value Date    HGBA1C 6.4 (H) 10/22/2024       Recent Labs     10/23/24  1238 10/23/24  1746 10/23/24  2345 10/24/24  0532   POCGLU 167* 198* 197* 194*       Blood Sugar Average: Last 72 hrs:  (P) 185.75

## 2024-10-25 PROBLEM — J96.01 ACUTE RESPIRATORY FAILURE WITH HYPOXIA (HCC): Status: ACTIVE | Noted: 2024-10-25

## 2024-10-25 NOTE — ASSESSMENT & PLAN NOTE
Lab Results   Component Value Date    HGBA1C 6.4 (H) 10/22/2024       Recent Labs     10/24/24  1158 10/24/24  1758 10/24/24  2347 10/25/24  0526   POCGLU 155* 111 97 94       Blood Sugar Average: Last 72 hrs:  (P) 161.2088908891035180

## 2024-10-25 NOTE — ASSESSMENT & PLAN NOTE
Tae Dolan is a 54 year old male with a pertinent PMH of , neurology consulted for c/f for seizure activity. The patient initially presented to the hospital on 10/20 at 0030 AM after falling out of a second story window.     Work Up:  NC CTH: Intracranial hemorrhage redemonstrated, most prominently suspected epidural hemorrhage in the left parietal region, as described. There is some local mass effect but no midline shift.   Recommend MRI Brain w/wo contrast with Seizure Protocol  EEG: Multiple frequent seizures since being placed on continuous vEEG on 10/22/24 at 2038. Frequent seizures continued after loading with Levetiracetam, Lacosamide, valproic acid and fosphenytoin. 10 seizures in 90 minutes since 1123 and most recent being caught at 1326.  Coma Panel - Normal (elevated acetaminohen)  UDS - Normal  Ethanol level - Normal    Current AEDs: Levetiracetam 2g q12h, Lacosamide 200mg q12h, Clobazam 5mg TID    AEDS that did not work:  Valproic acid and fosphenytoin loading doses were given but made no observable difference on EEG.    Plan:  Frequent Neuro Checks, notify Neurology team if any acute changes in exam and obtain STAT CTH  Seizure / Fall Precautions  Continue suppression until 10/26/24 morning  Telemetry Monitoring  AEDs: Levetiracetam 2g q12h, Lacosamide 200mg q12h, Clobazam 5mg TID.  EEG: continuous vEEG active.  Recommend seizure suppression 24-48 hours as the patient has had continued seizure activity which poses risk for permanent neurologic damage.   Imaging Studies: MRI Brain w/wo contrast seizure protocol  Rescue Meds: Ativan IV 2mg prn 2 complex partial seizures or 1 GTC seizure  DVT PPx , and SCDs  PT/OT Evaluate and Treat  [place] DOT: Needs to be completed prior to discharge  Rest of care per primary medical team

## 2024-10-25 NOTE — ASSESSMENT & PLAN NOTE
vEEG overnight 10/22-10/23 with multiple subclinical seizures  Loaded with Keppra, lacosamide, and valproic acid and started on maintenance doses  Also given Ativan  Neurology consulted, appreciate recommendations  Neurology discontinued valproic acid  Bolus of Fosphenytoin given  Continue on Keppra, lacosamide  Intubated on 10/23 for planned burst supression.   Versed gtt @ 9mg/hr and Propofol gtt @ 50mcg/min  No reported seizure activity overnight.  Continue vEEG.

## 2024-10-25 NOTE — QUICK NOTE
Notified by nursing that patient had episodes of VT while turning today after PICC was placed.  I, personally reviewed CXR and upon measuring it appears that the PICC should be pulled back at least 4 cm.  I, personally pulled the PICC 4 cm and applied new sterile CHG dressing.

## 2024-10-25 NOTE — ASSESSMENT & PLAN NOTE
CTH done given AMS, lethargy.  CTA head/neck - New extra-axial lenticular shaped hemorrhage along the left parietal convexity as described above, which may represent an epidural hematoma or less likely a subdural hematoma in this region. No significant midline shift noted. Trace new subarachnoid hemorrhage noted along the inferior left temporo-occipital convexity noted.   Pt was started on ASA this admission. He was given DDAVP for reversal.  Neurosurgery consulted, appreciate recommendations.  CTH 10/22-10/23 stable  10/24 - Started Lovenox DVT prophylaxis.    Plan:  Continue neuro-checks q1h.  STAT CTH with drop in GCS by 2 or more in 1 hour.  Discontinue ASA - given DDAVP.  See plan for AED's in plan for status epilepticus.   Continue Lovenox DVT prophylaxis.  Goal SBP < 140, may require Cardene gtt.  Maintain euglycemia, normothermia.  PT/OT eval and treat.

## 2024-10-25 NOTE — ASSESSMENT & PLAN NOTE
S/p craniotomy 1/2019 at [facility], done secondary to abscess/lesions. Baseline independent.   CTH (10/20/24) - No acute intracranial hemorrhage or depressed calvarial fracture. Mild bilateral anterior frontal scalp soft tissue swelling, right greater than left. Right periorbital, perinasal, and premaxillary facial soft tissue swelling also noted.   CTA head/neck (10/22/24) - New extra-axial lenticular shaped hemorrhage along the left parietal convexity as described above, which may represent an epidural hematoma or less likely a subdural hematoma in this region. No significant midline shift noted. Trace new subarachnoid hemorrhage noted along the inferior left temporo-occipital convexity noted.   Continue supportive care.  See individual plans below.

## 2024-10-25 NOTE — ASSESSMENT & PLAN NOTE
Lab Results   Component Value Date    HGBA1C 6.4 (H) 10/22/2024     Recent Labs     10/24/24  1158 10/24/24  1758 10/24/24  2347 10/25/24  0526   POCGLU 155* 111 97 94   Blood Sugar Average: Last 72 hrs:  (P) [id number]    Home regimen: Lantus 15 units SC qHS  Hold home regimen  SSI every 6 hours while NPO  Goal glucose 140 - 180  Hypoglycemia protocol  NPO for now.

## 2024-10-25 NOTE — ASSESSMENT & PLAN NOTE
Hgb / Hct       Results from last 7 days   Lab Units 10/25/24  0427 10/24/24  2352 10/24/24  1759   HEMOGLOBIN g/dL 8.0* 8.6* 8.5*   HEMATOCRIT % 24.5* 26.0* 25.4*   S/p fall  CT C/A/P - Splenomegaly with metallic surgical coils near the splenic hilar region. There is a small amount of intermediate density perisplenic free fluid adjacent to the inferior spleen which could represent subcapsular hematoma (AAST grade I splenic injury).   Hgb on admission - 11.5  Most recent Hgb - 6.7    Plan:  Trend Hgb, repeat CBC in AM.  Transfuse for Hgb < 7.0 or active bleeding, HD instability.  Last hemoglobin 5.3 (10/24), will transfuse  Will repeat CT to evaluate for new or worsening bleeding.  Hold ASA.  Hold Lovenox for DVT prophylaxis .  SCD's for DVT prophylaxis.  Monitor for S/S bleeding.

## 2024-10-25 NOTE — ASSESSMENT & PLAN NOTE
Hgb / Hct       Results from last 7 days   Lab Units 10/25/24  0427 10/24/24  2352 10/24/24  1759   HEMOGLOBIN g/dL 8.0* 8.6* 8.5*   HEMATOCRIT % 24.5* 26.0* 25.4*   S/p fall  CT C/A/P - Splenomegaly with metallic surgical coils near the splenic hilar region. There is a small amount of intermediate density perisplenic free fluid adjacent to the inferior spleen which could represent subcapsular hematoma (AAST grade I splenic injury).   Hgb on admission - 11.5  Hgb down to 5.3 on 10/24 given additional unit PRBC with appropriate response.   Most recent Hgb - 8.0    Plan:  Trend Hgb, repeat CBC in AM.  Transfuse for Hgb < 7.0 or active bleeding, HD instability.  Last hemoglobin 5.3 (10/24), will transfuse  Will repeat CT to evaluate for new or worsening bleeding.  Hold ASA.  Continue DVT prophylaxis with Lovenox.  SCD's for DVT prophylaxis.  Monitor for S/S bleeding.

## 2024-10-25 NOTE — ASSESSMENT & PLAN NOTE
Lab Results   Component Value Date    HGBA1C 6.4 (H) 10/22/2024       Recent Labs     10/24/24  1158 10/24/24  1758 10/24/24  2347 10/25/24  0526   POCGLU 155* 111 97 94     Blood Sugar Average: Last 72 hrs:  (P) [id number]    Home regimen: Lantus 15 units SC qHS  Hold home regimen  SSI every 6 hours while NPO  Goal glucose 140 - 180  Hypoglycemia protocol  NPO for now.

## 2024-10-25 NOTE — ASSESSMENT & PLAN NOTE
S/p craniotomy 1/2019 at [facility], done secondary to abscess/lesions. Baseline independent.   CTH (10/20/24) - No acute intracranial hemorrhage or depressed calvarial fracture. Mild bilateral anterior frontal scalp soft tissue swelling, right greater than left. Right periorbital, perinasal, and premaxillary facial soft tissue swelling also noted.   CTA head/neck (10/22/24) - New extra-axial lenticular shaped hemorrhage along the left parietal convexity as described above, which may represent an epidural hematoma or less likely a subdural hematoma in this region. No significant midline shift noted. Trace new subarachnoid hemorrhage noted along the inferior left temporo-occipital convexity noted.   Continue supportive care.  See individual plans below.

## 2024-10-25 NOTE — OCCUPATIONAL THERAPY NOTE
Occupational Therapy Cancellation Note        Patient Name: Tae Dolan  Today's Date: 10/25/2024     10/25/24 1021   Note Type   Note type Cancelled Session   Cancel Reasons Intubated/sedated   Additional Comments Pt with active OT orders, chart reviewed. per ICU mobility rounds, pt remains not appropriate for therapy at this time. Will cancel and initiate therapy as appropriate     Rosa Espinoza, OT

## 2024-10-25 NOTE — PLAN OF CARE
Problem: PAIN - ADULT  Goal: Verbalizes/displays adequate comfort level or baseline comfort level  Description: Interventions:  - Encourage patient to monitor pain and request assistance  - Assess pain using appropriate pain scale  - Administer analgesics based on type and severity of pain and evaluate response  - Implement non-pharmacological measures as appropriate and evaluate response  - Consider cultural and social influences on pain and pain management  - Notify physician/advanced practitioner if interventions unsuccessful or patient reports new pain  Outcome: Progressing     Problem: SAFETY ADULT  Goal: Patient will remain free of falls  Description: INTERVENTIONS:  - Educate patient/family on patient safety including physical limitations  - Instruct patient to call for assistance with activity   - Consult OT/PT to assist with strengthening/mobility   - Keep Call bell within reach  - Keep bed low and locked with side rails adjusted as appropriate  - Keep care items and personal belongings within reach  - Initiate and maintain comfort rounds  - Make Fall Risk Sign visible to staff  - Obtain necessary fall risk management equipment:   - Apply yellow socks and bracelet for high fall risk patients  - Consider moving patient to room near nurses station  Outcome: Progressing  Goal: Maintain or return to baseline ADL function  Description: INTERVENTIONS:  -  Assess patient's ability to carry out ADLs; assess patient's baseline for ADL function and identify physical deficits which impact ability to perform ADLs (bathing, care of mouth/teeth, toileting, grooming, dressing, etc.)  - Assess/evaluate cause of self-care deficits   - Assess range of motion  - Assess patient's mobility; develop plan if impaired  - Assess patient's need for assistive devices and provide as appropriate  - Encourage maximum independence but intervene and supervise when necessary  - Involve family in performance of ADLs  - Assess for home  care needs following discharge   - Consider OT consult to assist with ADL evaluation and planning for discharge  - Provide patient education as appropriate  Outcome: Progressing  Goal: Maintains/Returns to pre admission functional level  Description: INTERVENTIONS:  - Perform AM-PAC 6 Click Basic Mobility/ Daily Activity assessment daily.  - Set and communicate daily mobility goal to care team and patient/family/caregiver.   - Collaborate with rehabilitation services on mobility goals if consulted  - Out of bed for toileting  - Record patient progress and toleration of activity level   Outcome: Progressing     Problem: DISCHARGE PLANNING  Goal: Discharge to home or other facility with appropriate resources  Description: INTERVENTIONS:  - Identify barriers to discharge w/patient and caregiver  - Arrange for needed discharge resources and transportation as appropriate  - Identify discharge learning needs (meds, wound care, etc.)  - Arrange for interpretive services to assist at discharge as needed  - Refer to Case Management Department for coordinating discharge planning if the patient needs post-hospital services based on physician/advanced practitioner order or complex needs related to functional status, cognitive ability, or social support system  Outcome: Progressing     Problem: Knowledge Deficit  Goal: Patient/family/caregiver demonstrates understanding of disease process, treatment plan, medications, and discharge instructions  Description: Complete learning assessment and assess knowledge base.  Interventions:  - Provide teaching at level of understanding  - Provide teaching via preferred learning methods  Outcome: Progressing     Problem: SAFETY,RESTRAINT: NV/NON-SELF DESTRUCTIVE BEHAVIOR  Goal: Remains free of harm/injury (restraint for non violent/non self-detsructive behavior)  Description: INTERVENTIONS:  - Instruct patient/family regarding restraint use   - Assess and monitor physiologic and  psychological status   - Provide interventions and comfort measures to meet assessed patient needs   - Identify and implement measures to help patient regain control  - Assess readiness for release of restraint   Outcome: Progressing  Goal: Returns to optimal restraint-free functioning  Description: INTERVENTIONS:  - Assess the patient's behavior and symptoms that indicate continued need for restraint  - Identify and implement measures to help patient regain control  - Assess readiness for release of restraint   Outcome: Progressing     Problem: Nutrition/Hydration-ADULT  Goal: Nutrient/Hydration intake appropriate for improving, restoring or maintaining nutritional needs  Description: Monitor and assess patient's nutrition/hydration status for malnutrition. Collaborate with interdisciplinary team and initiate plan and interventions as ordered.  Monitor patient's weight and dietary intake as ordered or per policy. Utilize nutrition screening tool and intervene as necessary. Determine patient's food preferences and provide high-protein, high-caloric foods as appropriate.     INTERVENTIONS:  - Monitor oral intake, urinary output, labs, and treatment plans  - Assess nutrition and hydration status and recommend course of action  - Evaluate amount of meals eaten  - Assist patient with eating if necessary   - Allow adequate time for meals  - Recommend/ encourage appropriate diets, oral nutritional supplements, and vitamin/mineral supplements  - Order, calculate, and assess calorie counts as needed  - Recommend, monitor, and adjust tube feedings and TPN/PPN based on assessed needs  - Assess need for intravenous fluids  - Provide specific nutrition/hydration education as appropriate  - Include patient/family/caregiver in decisions related to nutrition  Outcome: Progressing     Problem: Prexisting or High Potential for Compromised Skin Integrity  Goal: Skin integrity is maintained or improved  Description: INTERVENTIONS:  -  Identify patients at risk for skin breakdown  - Assess and monitor skin integrity  - Assess and monitor nutrition and hydration status  - Monitor labs   - Assess for incontinence   - Turn and reposition patient  - Assist with mobility/ambulation  - Relieve pressure over bony prominences  - Avoid friction and shearing  - Provide appropriate hygiene as needed including keeping skin clean and dry  - Evaluate need for skin moisturizer/barrier cream  - Collaborate with interdisciplinary team   - Patient/family teaching  - Consider wound care consult   Outcome: Progressing

## 2024-10-25 NOTE — PROGRESS NOTES
Progress Note - Neurology   Name: Tae Dolan 54 y.o. male I MRN: [id number]  Unit/Bed#: ICU 07 I Date of Admission: 10/20/2024   Date of Service: 10/25/2024 I Hospital Day: 5     Assessment & Plan  Status epilepticus (HCC)  Tae Dolan is a 54 year old male with a pertinent PMH of , neurology consulted for c/f for seizure activity. The patient initially presented to the hospital on 10/20 at 0030 AM after falling out of a second story window.     Work Up:  NC CTH: Intracranial hemorrhage redemonstrated, most prominently suspected epidural hemorrhage in the left parietal region, as described. There is some local mass effect but no midline shift.   Recommend MRI Brain w/wo contrast with Seizure Protocol  EEG: Multiple frequent seizures since being placed on continuous vEEG on 10/22/24 at 2038. Frequent seizures continued after loading with Levetiracetam, Lacosamide, valproic acid and fosphenytoin. 10 seizures in 90 minutes since 1123 and most recent being caught at 1326.  Coma Panel - Normal (elevated acetaminohen)  UDS - Normal  Ethanol level - Normal    Current AEDs: Levetiracetam 2g q12h, Lacosamide 200mg q12h, Clobazam 5mg TID    AEDS that did not work:  Valproic acid and fosphenytoin loading doses were given but made no observable difference on EEG.    Plan:  Frequent Neuro Checks, notify Neurology team if any acute changes in exam and obtain STAT CTH  Seizure / Fall Precautions  Continue suppression until 10/26/24 morning  Telemetry Monitoring  AEDs: Levetiracetam 2g q12h, Lacosamide 200mg q12h, Clobazam 5mg TID.  EEG: continuous vEEG active.  Recommend seizure suppression 24-48 hours as the patient has had continued seizure activity which poses risk for permanent neurologic damage.   Imaging Studies: MRI Brain w/wo contrast seizure protocol  Rescue Meds: Ativan IV 2mg prn 2 complex partial seizures or 1 GTC seizure  DVT PPx , and SCDs  PT/OT Evaluate and Treat  [place] DOT: Needs to be completed prior to  discharge  Rest of care per primary medical team    Recommendations for outpatient neurological follow up have yet to be determined.      Subjective   Patient seen at bedside. Patient is currently heavily sedated for seizure control. Patient has been seizure free. Recommend continuing suppression until 10/26/24 morning.      Objective :  Temp:  [96.3 °F (35.7 °C)-100.4 °F (38 °C)] 100.2 °F (37.9 °C)  HR:  [75-95] 89  BP: (111-139)/(58-65) 111/58  Resp:  [16-27] 16  SpO2:  [98 %-100 %] 100 %  O2 Device: Ventilator  FiO2 (%):  [40] 40    Physical Exam  Vitals reviewed.     Neurological Exam  Mental Status    Currently suppressed with sedatives: propofol and versed.  PERRL..        Lab Results: I have reviewed the following results:      VTE Pharmacologic Prophylaxis: per primary team

## 2024-10-25 NOTE — PROGRESS NOTES
Progress Note - Critical Care/ICU   Name: Tae Dolan 54 y.o. male I MRN: [id number]  Unit/Bed#: ICU 07 I Date of Admission: 10/20/2024   Date of Service: 10/25/2024 I Hospital Day: 5      Assessment & Plan  Fall  Fall from a height of 10 feet. He does not recall events before and after fall.  Possibly d/t new medication.  Denies intentionally jumping out of window or any thoughts of self harm.   Fall precautions.   Closed nondisplaced fracture of fifth cervical vertebra (HCC)  S/p fall from about 10 feet. Baseline right sided numbness/tingling given history of stroke. No new pain.  CT c-spine - Acute anterior C5 vertebral body fracture with prevertebral soft tissue swelling. No traumatic subluxation.   Neurosurgery consulted, appreciate recommendations.     Plan:  Continue Neuro-checks q1h.  No surgical intervention at this time.   Maintain Vista collar at all times, will need for 8 to 12 weeks.   Follow up on upright films.  DVT prophylaxis: SCD's.  PT/OT eval and treat with Collar in place.  CM consulted for disposition planning.  Follow up with Neurosurgery as outpatient in 2 weeks.   Spleen laceration  Hgb / Hct       Results from last 7 days   Lab Units 10/25/24  0427 10/24/24  2352 10/24/24  1759   HEMOGLOBIN g/dL 8.0* 8.6* 8.5*   HEMATOCRIT % 24.5* 26.0* 25.4*   S/p fall  CT C/A/P - Splenomegaly with metallic surgical coils near the splenic hilar region. There is a small amount of intermediate density perisplenic free fluid adjacent to the inferior spleen which could represent subcapsular hematoma (AAST grade I splenic injury).   Hgb on admission - 11.5  Hgb down to 5.3 on 10/24 given additional unit PRBC with appropriate response.   Most recent Hgb - 8.0    Plan:  Trend Hgb, repeat CBC in AM.  Transfuse for Hgb < 7.0 or active bleeding, HD instability.  Last hemoglobin 5.3 (10/24), will transfuse  Will repeat CT to evaluate for new or worsening bleeding.  Hold ASA.  Continue DVT prophylaxis with  Lovenox.  SCD's for DVT prophylaxis.  Monitor for S/S bleeding.  Mood disorder (HCC) unspecified  No home regimen. No indication for inpatient admission at this time.  Psych consulted, appreciate recommendations.  CM consulted for dispo planning.  Continue supportive care.   History of stroke  Reported he has a sistory of stroke, however on discussion with mother, there was no stroke but were stroke-like residual deficits from brain abscesses and craniotomy.  No home regimen.  Pt was started on ASA this admission.  Discontinue ASA secondary to EDH.  Continue Neuro-checks.   Epidural hematoma (HCC)  CTH done given AMS, lethargy.  CTA head/neck - New extra-axial lenticular shaped hemorrhage along the left parietal convexity as described above, which may represent an epidural hematoma or less likely a subdural hematoma in this region. No significant midline shift noted. Trace new subarachnoid hemorrhage noted along the inferior left temporo-occipital convexity noted.   Pt was started on ASA this admission. He was given DDAVP for reversal.  Neurosurgery consulted, appreciate recommendations.  CTH 10/22-10/23 stable  10/24 - Started Lovenox DVT prophylaxis.    Plan:  Continue neuro-checks q1h.  STAT CTH with drop in GCS by 2 or more in 1 hour.  Discontinue ASA - given DDAVP.  See plan for AED's in plan for status epilepticus.   Continue Lovenox DVT prophylaxis.  Goal SBP < 140, may require Cardene gtt.  Maintain euglycemia, normothermia.  PT/OT eval and treat.  S/P craniotomy  S/p craniotomy 1/2019 at [facility], done secondary to abscess/lesions. Baseline independent.   CTH (10/20/24) - No acute intracranial hemorrhage or depressed calvarial fracture. Mild bilateral anterior frontal scalp soft tissue swelling, right greater than left. Right periorbital, perinasal, and premaxillary facial soft tissue swelling also noted.   CTA head/neck (10/22/24) - New extra-axial lenticular shaped hemorrhage along the  left parietal convexity as described above, which may represent an epidural hematoma or less likely a subdural hematoma in this region. No significant midline shift noted. Trace new subarachnoid hemorrhage noted along the inferior left temporo-occipital convexity noted.   Continue supportive care.  See individual plans below.   Spasticity  History of spasticity s/p craniotomy.  Home regimen: Baclofen and Tizanidine qHS.  Hold home Baclofen and Tizanidine.  Continue Robaxin for analgesia.  Restart Baclofen when appropriate.   Continue supportive care.   Type 2 diabetes mellitus (HCC)  Lab Results   Component Value Date    HGBA1C 6.4 (H) 10/22/2024     Recent Labs     10/24/24  1158 10/24/24  1758 10/24/24  2347 10/25/24  0526   POCGLU 155* 111 97 94   Blood Sugar Average: Last 72 hrs:  (P) 161.5902337724584601    Home regimen: Lantus 15 units SC qHS  Hold home regimen  SSI every 6 hours while NPO  Goal glucose 140 - 180  Hypoglycemia protocol  NPO for now.   Systemic inflammatory response syndrome (SIRS) without organ dysfunction (HCC)  As evidence by tachycardia, fever, leukocytosis  Source, unknown at this time  WBC - 11, Procal - 0.46, Lactic - 0.8  CXR - No acute cardiopulmonary abnormalities  UA - pending.  COVID/FLU/RSV - pending.  Blood cultures x 2 obtained.  He received 30cc/kg resuscitation fluids.     Plan:  Continue to hold ABX.  Follow up blood cultures, UA, viral swab and MRSA.  Monitor/trend fever curve.  Monitor/trend WBC.  Trend endpoints.  History of alcohol use  History of ETOH use in the past, unknown last drink.  Continue CIWA protocol.  Continue thiamine/folate daily.  Encourage cessation.  Status epilepticus (HCC)  vEEG overnight 10/22-10/23 with multiple subclinical seizures  Loaded with Keppra, lacosamide, and valproic acid and started on maintenance doses  Also given Ativan  Neurology consulted, appreciate recommendations  Neurology discontinued valproic acid  Bolus of Fosphenytoin  given  Continue on Keppra, lacosamide  Intubated on 10/23 for planned burst supression.   Versed gtt @ 9mg/hr and Propofol gtt @ 50mcg/min  No reported seizure activity overnight.  Continue vEEG.  Seizure (HCC)  See plan for status epilepticus.  Acute respiratory failure with hypoxia (HCC)  Intubated on 10/23 for airway protection for planned burst suppression in setting of satus epilepticus.   Acute respiratory failure with hypoxia d/t seizure  ETT: #7.5  VENT: 16/450/40/6    Plan:  Continue mechanical ventilation.  Continue sedation.  Propofol gtt  Versed gtt  Perform SAT/SBT when appropriate (not burst suppressed).  Vent bundle.  Airway clearance protocol.   Disposition: Critical Care    ICU Core Measures     Vented Patient  VAP Bundle  VAP bundle ordered     A: Assess, Prevent, and Manage Pain Has pain been assessed? Yes  Need for changes to pain regimen? No   B: Both Spontaneous Awakening Trials (SATs) and Spontaneous Breathing Trials (SBTs) Plan to perform spontaneous awakening trial today? No secondary to ongoing seizures/status epilepticus  Plan to perform spontaneous breathing trial today? No secondary to ongoing seizures/status epilepticus  Obvious barriers to extubation? Yes   C: Choice of Sedation RASS Goal: -5 Unarousable  Need for changes to sedation or analgesia regimen? No   D: Delirium CAM-ICU: Unable to perform secondary to Acute cognitive dysfunction   E: Early Mobility  Plan for early mobility? No   F: Family Engagement Plan for family engagement today? Yes     Review of Invasive Devices:    Jeffery Plan: Continue for accurate I/O monitoring for 48 hours  Central access plan: Patient requires PICC secondary to vasopressors, difficult IV access, medication requiring central line.    Prophylaxis:  VTE VTE covered by:  enoxaparin, Subcutaneous, 30 mg at 10/24/24 2224       Stress Ulcer  covered byomeprazole (PRILOSEC) suspension 2 mg/mL [[id number]]         24 Hour Events : Pt had seizure activity  yesterday morning, required versed bolus and up-titration of versed gtt to 9mg/hr. There have been no reported seizure activity via EMU overnight. Started DVT prophylaxis with Lovenox.     Subjective   Review of Systems: Review of Systems   Unable to perform ROS: Intubated     Objective :                   Vitals I/O      Most Recent Min/Max in 24hrs   Temp 100.2 °F (37.9 °C) Temp  Min: 96.3 °F (35.7 °C)  Max: 100.4 °F (38 °C)   Pulse 89 Pulse  Min: 75  Max: 95   Resp 16 Resp  Min: 16  Max: 27   /58 BP  Min: 111/58  Max: 139/65   O2 Sat 100 % SpO2  Min: 98 %  Max: 100 %      Intake/Output Summary (Last 24 hours) at 10/25/2024 0807  Last data filed at 10/25/2024 0608  Gross per 24 hour   Intake 3849.6 ml   Output 3825 ml   Net 24.6 ml       Diet NPO    Invasive Monitoring   Arterial Line  Kisha /59  Arterial Line BP  Min: 94/41  Max: 174/54   MAP 84 mmHg  Arterial Line MAP (mmHg)  Min: 54 mmHg  Max: 96 mmHg           Physical Exam   Physical Exam  Vitals and nursing note reviewed.   Skin:     General: Skin is warm and dry.      Capillary Refill: Capillary refill takes less than 2 seconds.      Findings: Abrasion and ecchymosis present.   HENT:      Head: Abrasion and laceration present.      Mouth/Throat:      Mouth: Mucous membranes are moist.   Cardiovascular:      Rate and Rhythm: Normal rate and regular rhythm.      Pulses: Normal pulses.           Radial pulses are 2+ on the right side and 2+ on the left side.        Dorsalis pedis pulses are 2+ on the right side and 2+ on the left side.      Heart sounds: Normal heart sounds.   Musculoskeletal:         General: Normal range of motion.      Cervical back: Full passive range of motion without pain, normal range of motion and neck supple.      Right lower leg: Edema present.      Left lower leg: Edema present.   Abdominal: General: Abdomen is flat. Bowel sounds are normal. There is no distension.      Tenderness: There is no abdominal tenderness.    Constitutional:       General: He is not in acute distress.     Appearance: He is well-developed, normal weight and well-nourished. He is ill-appearing.      Interventions: He is sedated, intubated and restrained.   Pulmonary:      Effort: Pulmonary effort is normal. He is intubated.      Breath sounds: Normal breath sounds.   Psychiatric:      Comments: CAROLYN   Neurological:      GCS: GCS eye subscore is 1. GCS verbal subscore is 1. GCS motor subscore is 1.      Comments: GCS 3T, burst suppression   Genitourinary/Anorectal:     Comments: Urethral catheter  Gil present.        Diagnostic Studies        Lab Results: I have reviewed the following results:     Medications:  Scheduled PRN   Artificial Tears, 1 drop, BID  atorvastatin, 10 mg, Daily With Dinner  bacitracin, 1 large application, BID  bisacodyl, 10 mg, Q72H  chlorhexidine, 15 mL, Q12H NANCY  cloBAZam, 5 mg, TID  enoxaparin, 30 mg, Q12H NANCY  folic acid, 1 mg, Daily  insulin lispro, 1-6 Units, Q6H NANCY  lacosamide, 200 mg, Q12H NANCY  levETIRAcetam, 2,000 mg, Q12H NANCY  Multivitamin, 15 mL, Daily  nicotine, 21 mg, Daily  omeprazole (PRILOSEC) suspension 2 mg/mL, 20 mg, Daily  QUEtiapine, 12.5 mg, HS  senna-docusate sodium, 1 tablet, BID  thiamine, 100 mg, Daily      fentaNYL, 50 mcg, Q1H PRN  labetalol, 10 mg, Q6H PRN  ondansetron, 4 mg, Q4H PRN  oxyCODONE, 2.5 mg, Q4H PRN   Or  oxyCODONE, 5 mg, Q4H PRN       Continuous    midazolam, 9 mg/hr, Last Rate: 9 mg/hr (10/25/24 0643)  multi-electrolyte, 125 mL/hr, Last Rate: 20 mL/hr (10/24/24 2156)  niCARdipine, 1-15 mg/hr, Last Rate: 2.5 mg/hr (10/25/24 0422)  propofol, 5-50 mcg/kg/min, Last Rate: 50 mcg/kg/min (10/25/24 0608)         Labs:   CBC    Recent Labs     10/24/24  0437 10/24/24  0724 10/24/24  0932 10/24/24  0942 10/24/24  2704 10/25/24  5497   WBC 7.29  --   --   --   --  6.43   HGB 6.7*   < >  --    < > 8.6* 8.0*   HCT 20.1*   < >  --    < > 26.0* 24.5*   *  --  129*  --   --  146*    < > = values  in this interval not displayed.     BMP    Recent Labs     10/24/24  2352 10/25/24  0427   SODIUM 138 140   K 3.9 3.8   * 110*   CO2 23 23   AGAP 4 7   BUN 4* 4*   CREATININE 0.49* 0.52*   CALCIUM 7.9* 7.9*       Coags    Recent Labs     10/24/24  0932   INR 1.12   PTT 43*        Additional Electrolytes  Recent Labs     10/24/24  0437 10/25/24  0427   MG 1.8* 2.1   PHOS 2.2* 3.1   CAIONIZED 1.05* 1.10*          Blood Gas    No recent results  No recent results LFTs  Recent Labs     10/24/24  0932 10/25/24  0427   ALT 8 8   AST 7* 8*   ALKPHOS 38 43   ALB 2.9* 3.0*   TBILI 0.78 0.63       Infectious  No recent results  Glucose  Recent Labs     10/24/24  1140 10/24/24  1759 10/24/24  2352 10/25/24  0427   GLUC 176* 117 139 111

## 2024-10-25 NOTE — RESPIRATORY THERAPY NOTE
RT Ventilator Management Note  Tae Dolan 54 y.o. male MRN: [id number]  Unit/Bed#: ICU 07 Encounter: [id number]    Pt suctioned for large amount of thick yellow, secretions.    10/25/24 0743   Respiratory Assessment   Assessment Type Assess only   General Appearance Sedated   Respiratory Pattern Assisted   Chest Assessment Chest expansion symmetrical   Bilateral Breath Sounds Diminished;Coarse   Suction ET Tube   Vent Information   Vent ID [id number]   Vent type     Vent Mode AC/VC   $ Vital Capacity Mech/Peak Flow Yes   $ Pulse Oximetry Spot Check Charge Completed   SpO2 100 %   AC/VC Settings   Resp Rate (BPM) 16 BPM   Vt (mL) 450 mL   FIO2 (%) 40 %   PEEP (cmH2O) 6 cmH2O   Flow Pattern (LPM) 60 L/min   Trigger Sensitivity Flow (lpm) 3 %   Humidification Heater   Heater Temperature (Set) 98.6 °F (37 °C)   AC/VC Actuals   Resp Rate (BPM) 16 BPM   VT (mL) 472   MV 7.72   MAP (cmH2O) 9.5 cmH2O   Peak Pressure (cmH2O) 21 cmH2O   I/E Ratio (Obs) 1:3   Heater Temperature (Obs) 98.6 °F (37 °C)   Static Compliance (mL/cmH20) 52 mL/cmH2O   Plateau Pressure (cm H2O) 15 cm H2O   AC/VC Alarms   High Peak Pressure (cmH2O) 40   High Resp Rate (BPM) 40 BPM   High MV (L/min) 16 L/min   Low MV (L/min) 4 L/min   Vt High (mL) 800 mL   Vt Low (mL) 200 mL   AC/VC Apnea Settings   Resp Rate (BPM) 16 BPM   VT (mL) 450 mL   FIO2 (%) 100 %   Apnea Time (s) 20 S   Apnea Flow (L/min) 60 L/min   Maintenance   Alarm (pink) cable attached Yes   Resuscitation bag with peep valve at bedside Yes   Water bag changed No   Circuit changed No   Daily Screen   Patient safety screen outcome: Failed   Not Ready for Weaning due to: Underline problem not resolved   ETT  Oral   Placement Date/Time: 10/23/24 1412   Mask Ventilation: Ventilated by mask (1)  Preoxygenated: Yes  Type: Oral   Secured at (cm) 26   Measured from Lips   Secured Location Center   Repositioned Left to Center   Secured by Commercial tube miramontes   Site Condition Dry   Cuff  Pressure (color) Green   HI-LO Secretions Scant   HI-LO Intervention Patent       Daily Screen         10/24/2024  0719 10/25/2024  0743          Patient safety screen outcome:: Failed Failed      Not Ready for Weaning due to:: Underline problem not resolved Underline problem not resolved                Physical Exam:   Assessment Type: Assess only  General Appearance: Sedated  Respiratory Pattern: Assisted  Chest Assessment: Chest expansion symmetrical  Bilateral Breath Sounds: Diminished, Coarse  Suction: ET Tube      Resp Comments: Breakdown noted on the right side of the lower lips

## 2024-10-25 NOTE — ASSESSMENT & PLAN NOTE
Intubated on 10/23 for airway protection for planned burst suppression in setting of satus epilepticus.   Acute respiratory failure with hypoxia d/t seizure  ETT: #7.5  VENT: 16/450/40/6    Plan:  Continue mechanical ventilation.  Continue sedation.  Propofol gtt  Versed gtt  Perform SAT/SBT when appropriate (not burst suppressed).  Vent bundle.  Airway clearance protocol.

## 2024-10-25 NOTE — CONSULTS
Recommend continuous tube feeds of Jevity 1.2 @ 65 ml/hr. Water flushes of 30 ml q 4 hrs for tube patency. Start at 20 ml/hr, advance by 10 ml q 4 hrs as tolerated until goal rate is reached.    At goal EN regimen provides 1872 kcals, 87 g protein, and 1439 ml total water from formula + flushes.

## 2024-10-25 NOTE — PHYSICAL THERAPY NOTE
PHYSICAL THERAPY CANCELLATION NOTE          Patient Name: Tae Dolan  Today's Date: 10/25/2024             10/25/24 1030   Note Type   Note Type Cancelled Session   Cancel Reasons Medical status   Assessment   Assessment PT orders received, chart review performed. Spoke to EUGENE Corona during ICU mobility rounds. At this time pt is not appropriate for PT tx at this time. PT will continue to follow pt as appropriate and as schedule allows.         Mariah Palacios, PT, DPT  10/25/24

## 2024-10-25 NOTE — TREATMENT PLAN
Imaging of epidural hematoma has remained stable.    Unfortunately, patient with continued seizure activity up until yesterday 10/24, has been burst suppressed since.    Do not recommend any neurosurgical intervention.    Follow up has been scheduled for patient for his cervical fracture and epidural hematoma in 2 weeks. Neurosurgery will sign off. Call with questions.

## 2024-10-26 PROBLEM — J18.9 PNEUMONIA INVOLVING RIGHT LUNG: Status: ACTIVE | Noted: 2024-10-26

## 2024-10-26 NOTE — PROGRESS NOTES
Progress Note - Neurology   Name: Tae Dolan 54 y.o. male I MRN: [id number]  Unit/Bed#: ICU 07 I Date of Admission: 10/20/2024   Date of Service: 10/26/2024 I Hospital Day: 6     Assessment & Plan  Status epilepticus (HCC)  Tae Dolan is a 54 year old male with a pertinent PMH of , neurology consulted for c/f for seizure activity. The patient initially presented to the hospital on 10/20 at 0030 AM after falling out of a second story window.     Work Up:  NC CTH: Intracranial hemorrhage redemonstrated, most prominently suspected epidural hemorrhage in the left parietal region, as described. There is some local mass effect but no midline shift.   Recommend MRI Brain w/wo contrast with Seizure Protocol  EEG: Multiple frequent seizures since being placed on continuous vEEG on 10/22/24 at 2038. Frequent seizures continued after loading with Levetiracetam, Lacosamide, valproic acid and fosphenytoin. 10 seizures in 90 minutes since 1123 and most recent being caught at 1326.  Coma Panel - Normal (elevated acetaminohen)  UDS - Normal  Ethanol level - Normal    Current AEDs: Levetiracetam 2g q12h, Lacosamide 200mg q12h, Clobazam 5mg TID    AEDS that did not work:  Valproic acid and fosphenytoin loading doses were given but made no observable difference on EEG.    Plan:  Frequent Neuro Checks, notify Neurology team if any acute changes in exam and obtain STAT CTH  Seizure / Fall Precautions  Continue suppression until MRI completed. May begin to wean after.   Telemetry Monitoring  AEDs: Levetiracetam 2g q12h, Lacosamide 200mg q12h, Clobazam 5mg TID.  EEG: continuous vEEG active.  Imaging Studies: MRI Brain w/wo contrast seizure protocol  Rescue Meds: Ativan IV 2mg prn 2 complex partial seizures or 1 GTC seizure  DVT PPx , and SCDs  PT/OT Evaluate and Treat  [place] DOT: Needs to be completed prior to discharge  Rest of care per primary medical team    Recommendations for outpatient neurological follow up have yet to be  determined.    Subjective   Patient seen at bedside. EEG reviewed. Epilepsy Monitoring unit reported no seizures.      Objective :  Temp:  [98.8 °F (37.1 °C)-103.1 °F (39.5 °C)] 99.9 °F (37.7 °C)  HR:  [] 86  Resp:  [16-22] 18  SpO2:  [86 %-100 %] 97 %  O2 Device: Ventilator  FiO2 (%):  [40] 40    Physical Exam  Vitals reviewed.     Neurological Exam  Mental Status    Patient under heavy sedation for seizure suppression.  PERRL..        Lab Results: I have reviewed the following results:  Imaging Results Review: No pertinent imaging studies reviewed.      VTE Pharmacologic Prophylaxis: Per primary team

## 2024-10-26 NOTE — ASSESSMENT & PLAN NOTE
Episode of hypoxia overnight 10/25-10/26, secretions thick and foul smelling  CXR showing right-sided consolidation  Continue cefepime, vancomycin, flagyl  F/u sputum culture  Trend WBC  Monitor fevers  Pulmonary toilet

## 2024-10-26 NOTE — PROGRESS NOTES
Tae Dolan is a 54 y.o. male who is currently ordered Vancomycin IV with management by the Pharmacy Consult service.  Relevant clinical data and objective / subjective history reviewed.  Vancomycin Assessment:  Indication and Goal AUC/Trough: Pneumonia (goal -600, trough >10)  Micro:     Renal Function:  SCr: 0.52 mg/dL  CrCl: 158 mL/min  Renal replacement: Not on dialysis  Days of Therapy: 1  Current Dose: 1750 mg IV once loading dose  Vancomycin Plan:  New Dosin mg IV every 12 hours  Estimated AUC: 503 mcg*hr/mL  Estimated Trough: 10.3 mcg/mL  Next Level: 10/28 at 1000  Renal Function Monitoring: Daily BMP and UOP  Pharmacy will continue to follow closely for s/sx of nephrotoxicity, infusion reactions and appropriateness of therapy.  BMP and CBC will be ordered per protocol. We will continue to follow the patient’s culture results and clinical progress daily.    Kesha Salomon

## 2024-10-26 NOTE — ASSESSMENT & PLAN NOTE
Lab Results   Component Value Date    HGBA1C 6.4 (H) 10/22/2024     Recent Labs     10/25/24  0526 10/25/24  1206 10/25/24  1756 10/26/24  0011   POCGLU 94 132 153* 161*   Blood Sugar Average: Last 72 hrs:  (P) 150.5048180179221029    Home regimen: Lantus 15 units SC qHS  Hold home regimen  SSI every 6 hours while intubated.   Goal glucose 140 - 180  Hypoglycemia protocol

## 2024-10-26 NOTE — QUICK NOTE
Patient's family member, Jaz Dolan, was called around 1:20 pm and provided with updates regarding clinical progress and plans of care. All questions and concerns were addressed to their satisfaction. We will continue to update daily.     Zonia Hill MD  [facility]  Emergency Medicine Residency, PGY-2

## 2024-10-26 NOTE — RESPIRATORY THERAPY NOTE
RT Ventilator Management Note  Tae Dolan 54 y.o. male MRN: [id number]  Unit/Bed#: ICU 07 Encounter: [id number]       10/26/24 0757   Respiratory Assessment   Assessment Type Assess only   General Appearance Sleeping   Respiratory Pattern Assisted   Chest Assessment Chest expansion symmetrical   Bilateral Breath Sounds Rhonchi;Coarse   Suction ET Tube   Resp Comments Copious amount of thick yekllow secretions suctioned from inline   O2 Device vent   Vent Information   Vent    Vent type     Vent Mode AC/VC   $ Vital Capacity Mech/Peak Flow Yes   $ Pulse Oximetry Spot Check Charge Completed   SpO2 94 %   AC/VC Settings   Resp Rate (BPM) 16 BPM   Vt (mL) 450 mL   FIO2 (%) 40 %   PEEP (cmH2O) 6 cmH2O   Flow Pattern (LPM) 60 L/min   Trigger Sensitivity Flow (lpm) 3 %   Humidification Heater   Heater Temperature (Set) 98.6 °F (37 °C)   AC/VC Actuals   Resp Rate (BPM) 20 BPM   VT (mL) 471   MV 9.9   MAP (cmH2O) 11 cmH2O   Peak Pressure (cmH2O) 26 cmH2O   I/E Ratio (Obs) 1:2.4   Heater Temperature (Obs) 98.6 °F (37 °C)   Static Compliance (mL/cmH20) 22 mL/cmH2O   Plateau Pressure (cm H2O) 19 cm H2O   AC/VC Alarms   High Peak Pressure (cmH2O) 40   High Resp Rate (BPM) 40 BPM   High MV (L/min) 16 L/min   Low MV (L/min) 4 L/min   Vt High (mL) 800 mL   Vt Low (mL) 200 mL   AC/VC Apnea Settings   Resp Rate (BPM) 16 BPM   VT (mL) 450 mL   FIO2 (%) 100 %   Apnea Time (s) 20 S   Apnea Flow (L/min) 60 L/min   Maintenance   Alarm (pink) cable attached Yes   Resuscitation bag with peep valve at bedside Yes   Water bag changed No   Circuit changed No   Daily Screen   Patient safety screen outcome: Failed   Not Ready for Weaning due to: Underline problem not resolved;Going on Transport intubated   ETT  Oral   Placement Date/Time: 10/23/24 1412   Mask Ventilation: Ventilated by mask (1)  Preoxygenated: Yes  Type: Oral   Secured at (cm) 26   Measured from Lips   Secured Location Center   Repositioned Left to Center    Secured by Commercial tube miramontes   Site Condition Cool;Dry   Cuff Pressure (color) Green   HI-LO Secretions Scant   HI-LO Intervention Patent       Daily Screen         10/25/2024  0743 10/26/2024  7499          Patient safety screen outcome:: Failed Failed      Not Ready for Weaning due to:: Underline problem not resolved Underline problem not resolved;Going on Transport intubated                Physical Exam:   Assessment Type: Assess only  General Appearance: Sleeping  Respiratory Pattern: Assisted  Chest Assessment: Chest expansion symmetrical  Bilateral Breath Sounds: Rhonchi, Coarse  Suction: ET Tube  O2 Device: vent      Resp Comments: Copious amount of thick yekllow secretions suctioned from inline

## 2024-10-26 NOTE — ASSESSMENT & PLAN NOTE
Tae Dolan is a 54 year old male with a pertinent PMH of , neurology consulted for c/f for seizure activity. The patient initially presented to the hospital on 10/20 at 0030 AM after falling out of a second story window.     Work Up:  NC CTH: Intracranial hemorrhage redemonstrated, most prominently suspected epidural hemorrhage in the left parietal region, as described. There is some local mass effect but no midline shift.   Recommend MRI Brain w/wo contrast with Seizure Protocol  EEG: Multiple frequent seizures since being placed on continuous vEEG on 10/22/24 at 2038. Frequent seizures continued after loading with Levetiracetam, Lacosamide, valproic acid and fosphenytoin. 10 seizures in 90 minutes since 1123 and most recent being caught at 1326.  Coma Panel - Normal (elevated acetaminohen)  UDS - Normal  Ethanol level - Normal    Current AEDs: Levetiracetam 2g q12h, Lacosamide 200mg q12h, Clobazam 5mg TID    AEDS that did not work:  Valproic acid and fosphenytoin loading doses were given but made no observable difference on EEG.    Plan:  Frequent Neuro Checks, notify Neurology team if any acute changes in exam and obtain STAT CTH  Seizure / Fall Precautions  Continue suppression until MRI completed. May begin to wean after.   Telemetry Monitoring  AEDs: Levetiracetam 2g q12h, Lacosamide 200mg q12h, Clobazam 5mg TID.  EEG: continuous vEEG active.  Imaging Studies: MRI Brain w/wo contrast seizure protocol  Rescue Meds: Ativan IV 2mg prn 2 complex partial seizures or 1 GTC seizure  DVT PPx , and SCDs  PT/OT Evaluate and Treat  Dl DOT: Needs to be completed prior to discharge  Rest of care per primary medical team

## 2024-10-26 NOTE — ASSESSMENT & PLAN NOTE
Lab Results   Component Value Date    HGBA1C 6.4 (H) 10/22/2024       Recent Labs     10/25/24  1206 10/25/24  1756 10/26/24  0011 10/26/24  0601   POCGLU 132 153* 161* 119       Blood Sugar Average: Last 72 hrs:  (P) [id number]

## 2024-10-26 NOTE — ASSESSMENT & PLAN NOTE
Hgb / Hct       Results from last 7 days   Lab Units 10/25/24  1743 10/25/24  0427 10/24/24  2352   HEMOGLOBIN g/dL 7.9* 8.0* 8.6*   HEMATOCRIT % 24.5* 24.5* 26.0*   S/p fall  CT C/A/P - Splenomegaly with metallic surgical coils near the splenic hilar region. There is a small amount of intermediate density perisplenic free fluid adjacent to the inferior spleen which could represent subcapsular hematoma (AAST grade I splenic injury).   Hgb on admission - 11.5  Hgb down to 5.3 on 10/24 given additional unit PRBC with appropriate response.   Most recent Hgb 7.9    Plan:  Trend Hgb, repeat CBC in AM.  Transfuse for Hgb < 7.0 or active bleeding, HD instability.  Hold ASA.  Continue DVT prophylaxis with Lovenox.  SCD's for DVT prophylaxis.  Monitor for S/S bleeding.

## 2024-10-26 NOTE — PROGRESS NOTES
Progress Note - Critical Care/ICU   Name: Tae Dolan 54 y.o. male I MRN: [id number]  Unit/Bed#: ICU 07 I Date of Admission: 10/20/2024   Date of Service: 10/26/2024 I Hospital Day: 6      Assessment & Plan  Acute respiratory failure with hypoxia (HCC)  Intubated on 10/23 for airway protection for planned burst suppression in setting of satus epilepticus.   Acute respiratory failure with hypoxia d/t seizure  ETT: #7.5  VENT: 16/450/40/6   Actual rate: 19   pPl: 19    Plan:  Continue mechanical ventilation.  Continue sedation.  Propofol gtt  Versed gtt  Hold SBT 2/2 status epilepticus, now with continuous burst suppression. Vent bundle.  Obtain am CXR for evaluation of increased secretions  Airway clearance protocol.   Status epilepticus (HCC)  Frequent Neuro Checks, will notify Neurology team if any acute changes in exam and obtain STAT CTH  Seizure / Fall Precautions  Continue suppression pending MRI  Telemetry Monitoring  AEDs: Levetiracetam 2g q12h, Lacosamide 200mg q12h, Clobazam 5mg TID.  EEG: continuous vEEG active.  Per neurology, recommending seizure suppression 24-48 hours as the patient has had continued seizure activity which poses risk for permanent neurologic damage.   Imaging Studies: MRI Brain w/wo contrast seizure protocol. Plan to obtain today.   Rescue Meds: Ativan IV 2mg prn 2 complex partial seizures or 1 GTC seizure  DVT PPx , and SCDs  PT/OT Evaluate and Treat  Dl DOT: Needs to be completed prior to discharge  Rest of care per primary medical team  Epidural hematoma (HCC)  Imaging:  CT head wo, 10/23/2024: Unchanged 1.3 cm thick acute extra-axial hemorrhage along left parietooccipital cerebral convexity, which could represent a combination of subdural hematoma and epidural hematoma (given lenticular shape at its thickest portion). Unchanged mild mass effect on adjacent left parietal lobe. Unchanged acute trace subarachnoid hemorrhage along left inferior temporal convexity. Unchanged acute  trace thin subdural hematoma along left tentorium cerebelli. No new acute intracranial abnormality.  CTA head and neck w/wo, 10/22/2024: New extra-axial lenticular shaped hemorrhage along the left parietal convexity as described above, which may represent an epidural hematoma or less likely a subdural hematoma in this region. No significant midline shift noted. Trace new subarachnoid hemorrhage noted along the inferior left temporo-occipital convexity noted. Right facial subcutaneous soft tissue swelling/hematoma again demonstrated. CT Angiography: No active extravasation of contrast is noted into the extra-axial hematoma. No large vessel occlusion, high-grade stenosis, or intracranial aneurysm identified on CT angiogram of the head. No hemodynamically significant stenosis or dissection identified on CT angiogram of the neck. Anterior mildly comminuted C5 vertebral body fracture again demonstrated, with mild associated prevertebral edema again demonstrated.  Plan:  Continue to monitor neuro exam closely.  STAT CT head with decline in GCS > 2 pts in 1 hour.  Hold ASA - reversed with DDAVP.  Keppra   Continue to hold all AC/AP x 2 weeks.  CCM/medicine:  Noted with seizure activity on vEEG 10/22-23, currently burst suppressed.  Intubated for burst supression.  Neurology consulted - appreciate recommendations.  Unable to obtain MRI brain currently due to incompatible vEEG leads.  Hyponatremia -current Na 135  Mobilize with PT/OT.  Spleen laceration  Hgb / Hct       Results from last 7 days   Lab Units 10/25/24  1743 10/25/24  0427 10/24/24  2352   HEMOGLOBIN g/dL 7.9* 8.0* 8.6*   HEMATOCRIT % 24.5* 24.5* 26.0*   S/p fall  CT C/A/P - Splenomegaly with metallic surgical coils near the splenic hilar region. There is a small amount of intermediate density perisplenic free fluid adjacent to the inferior spleen which could represent subcapsular hematoma (AAST grade I splenic injury).   Hgb on admission - 11.5  Hgb down to 5.3  on 10/24 given additional unit PRBC with appropriate response.   Most recent Hgb 7.9    Plan:  Trend Hgb, repeat CBC in AM.  Transfuse for Hgb < 7.0 or active bleeding, HD instability.  Hold ASA.  Continue DVT prophylaxis with Lovenox.  SCD's for DVT prophylaxis.  Monitor for S/S bleeding.  Closed nondisplaced fracture of fifth cervical vertebra (HCC)  S/p fall from about 10 feet. Baseline right sided numbness/tingling given history of stroke. No new pain.  CT c-spine - Acute anterior C5 vertebral body fracture with prevertebral soft tissue swelling. No traumatic subluxation.   Neurosurgery consulted, appreciate recommendations.     Plan:  Continue Neuro-checks q1h.  No surgical intervention at this time.   Maintain Vista collar at all times, will need for 8 to 12 weeks.   Follow up on upright films.  DVT prophylaxis: SCD's.  PT/OT eval and treat with Collar in place.  CM consulted for disposition planning.  Follow up with Neurosurgery as outpatient in 2 weeks.   Fall  Fall from a height of 10 feet. He does not recall events before and after fall.  Possibly d/t new medication.  Denies intentionally jumping out of window or any thoughts of self harm.   Fall precautions.   Systemic inflammatory response syndrome (SIRS) without organ dysfunction (HCC)  As evidence by tachycardia, fever, leukocytosis  Suspected neuro fever.   WBC - 6.43, Procal - 0.46, Lactic - 0.8  CXR - No acute cardiopulmonary abnormalities  UA - bland  COVID/FLU/RSV bland- negative  Blood cultures x 2 obtained 10/22: No growth  He received 30cc/kg resuscitation fluids.     Plan:  Continue to hold ABX.  Follow up blood cultures, UA, viral swab and MRSA.  Monitor/trend fever curve.  Monitor/trend WBC.  Trend endpoints.    S/P craniotomy  S/p craniotomy 1/2019 at [facility], done secondary to abscess/lesions. Baseline independent.   CTH (10/20/24) - No acute intracranial hemorrhage or depressed calvarial fracture. Mild bilateral anterior  frontal scalp soft tissue swelling, right greater than left. Right periorbital, perinasal, and premaxillary facial soft tissue swelling also noted.   CTA head/neck (10/22/24) - New extra-axial lenticular shaped hemorrhage along the left parietal convexity as described above, which may represent an epidural hematoma or less likely a subdural hematoma in this region. No significant midline shift noted. Trace new subarachnoid hemorrhage noted along the inferior left temporo-occipital convexity noted.   Continue supportive care.  See individual plans below.   History of stroke  Reported he has a sistory of stroke, however on discussion with mother, there was no stroke but were stroke-like residual deficits from brain abscesses and craniotomy.  No home regimen.  Pt was started on ASA this admission and subsequently discontinued 2/2 ICH  Continue Neuro-checks.   Mood disorder (HCC) unspecified  No home regimen. No indication for inpatient admission at this time.  Psych consulted, appreciate recommendations.  CM consulted for dispo planning.  Continue supportive care.   Spasticity  History of spasticity s/p craniotomy.  Home regimen: Baclofen and Tizanidine qHS.  Hold home Baclofen and Tizanidine.  Continue Robaxin for analgesia.  Restart Baclofen when appropriate.   Continue supportive care.   Type 2 diabetes mellitus (HCC)  Lab Results   Component Value Date    HGBA1C 6.4 (H) 10/22/2024     Recent Labs     10/25/24  0526 10/25/24  1206 10/25/24  1756 10/26/24  0011   POCGLU 94 132 153* 161*   Blood Sugar Average: Last 72 hrs:  (P) [id number]    Home regimen: Lantus 15 units SC qHS  Hold home regimen  SSI every 6 hours while intubated.   Goal glucose 140 - 180  Hypoglycemia protocol    History of alcohol use  History of ETOH use in the past, unknown last drink.  Continue CIWA protocol.  Continue thiamine/folate daily.  Encourage cessation.  Seizure (HCC)  See plan for status epilepticus.  Disposition: Critical  care    ICU Core Measures     Vented Patient  VAP Bundle  VAP bundle ordered     A: Assess, Prevent, and Manage Pain Has pain been assessed? Yes  Need for changes to pain regimen? No   B: Both Spontaneous Awakening Trials (SATs) and Spontaneous Breathing Trials (SBTs) Plan to perform spontaneous awakening trial today? No secondary to ongoing seizures/status epilepticus  Plan to perform spontaneous breathing trial today? No secondary to ongoing seizures/status epilepticus  Obvious barriers to extubation? Yes   C: Choice of Sedation RASS Goal: -5 Unarousable or 0 Alert and Calm  Need for changes to sedation or analgesia regimen? No   D: Delirium CAM-ICU: Unable to perform secondary to Traumatic Brain Injury   E: Early Mobility  Plan for early mobility? NA   F: Family Engagement Plan for family engagement today? Yes       Review of Invasive Devices:    Gil Plan: Continue for accurate I/O monitoring for 48 hours  Central access plan: Patient has multiple central venous catheters.       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 On 10/22 had change in MS with repetitive speech/not answering questions and on repeat head CT shown new extra-axial bleed representing EDH and trace new SAH. DDAVP given 2/2 asa on board. Placed on vEEG 10/22 for change mental status workup which shown multiple subclinical seizures. Patient intubated 10/23 for airway protection to achieve burst suppression after refractive on multiple  AEDs/ativan. Reportedly seizure free since 0952 10/24.     Overnight: remains febrile. Cooling blanket applied. BP labile-has been on and off cardene throughout day/night: EEG leads replaced with MRI compatible leads with anticipated plan for MRI today. Remains intubated for airway protection. Reported increased endotracheal secretions.     Infusions:   Propofol 50mcg/kg/min  Midazolam 9mg/hr  Cardene 2.5mg/hr    Ventilator:   AC 16/450/40/6      Subjective   Review of Systems: See HPI for Review of Systems    Objective :                   Vitals I/O      Most Recent Min/Max in 24hrs   Temp (!) 102.9 °F (39.4 °C) Temp  Min: 98.1 °F (36.7 °C)  Max: 102.9 °F (39.4 °C)   Pulse 98 Pulse  Min: 85  Max: 105   Resp 20 Resp  Min: 16  Max: 22   /58 BP  Min: 111/58  Max: 111/58   O2 Sat 98 % SpO2  Min: 89 %  Max: 100 %      Intake/Output Summary (Last 24 hours) at 10/26/2024 0032  Last data filed at 10/25/2024 2200  Gross per 24 hour   Intake 1946.26 ml   Output 1675 ml   Net 271.26 ml       Diet Enteral/Parenteral; Tube Feeding No Oral Diet; Jevity 1.2 Eliseo; Continuous; 65; 30; Water; Every 4 hours    Invasive Monitoring           Physical Exam   Physical Exam  Vitals reviewed.   Eyes:      General: Gaze aligned appropriately.      Conjunctiva/sclera:      Right eye: Hemorrhage present.      Pupils: Pupils are equal, round, and reactive to light.   Skin:     General: Skin is warm and dry.      Capillary Refill: Capillary refill takes less than 2 seconds.   HENT:      Head: Erazo's sign, contusion and right periorbital erythema present.   Neck:      Comments: Collar in place  Cardiovascular:      Rate and Rhythm: Normal rate and regular rhythm.   Abdominal: General: Bowel sounds are normal.      Palpations: Abdomen is soft.   Constitutional:       General: He is not in acute distress.     Interventions: He is sedated and intubated.   Pulmonary:      Effort: Pulmonary effort is normal. He is intubated.   Neurological:       Mental Status: He is unresponsive.      GCS: GCS eye subscore is 1. GCS verbal subscore is 1. GCS motor subscore is 1.      Comments: Limited exam 2/2 sedation   Genitourinary/Anorectal:     Comments: Gil in place         Diagnostic Studies        Lab Results: I have reviewed the following results:     Medications:  Scheduled PRN   acetaminophen, 650 mg, Q6H  Artificial Tears, 1 drop, BID  atorvastatin, 10 mg, Daily With Dinner  bacitracin, 1 large application, BID  bisacodyl, 10 mg, Q72H  chlorhexidine, 15 mL, Q12H NANCY  cloBAZam, 5 mg, TID  enoxaparin, 30 mg, Q12H NANCY  folic acid, 1 mg, Daily  insulin lispro, 1-6 Units, Q6H NANCY  lacosamide, 200 mg, Q12H NANCY  levETIRAcetam, 2,000 mg, Q12H NANCY  Multivitamin, 15 mL, Daily  nicotine, 21 mg, Daily  omeprazole (PRILOSEC) suspension 2 mg/mL, 20 mg, Daily  QUEtiapine, 12.5 mg, HS  senna-docusate sodium, 1 tablet, BID  thiamine, 100 mg, Daily      fentaNYL, 50 mcg, Q1H PRN  labetalol, 10 mg, Q6H PRN  ondansetron, 4 mg, Q4H PRN  oxyCODONE, 2.5 mg, Q4H PRN   Or  oxyCODONE, 5 mg, Q4H PRN       Continuous    midazolam, 9 mg/hr, Last Rate: 9 mg/hr (10/25/24 1814)  niCARdipine, 1-15 mg/hr, Last Rate: Stopped (10/25/24 2139)  propofol, 5-50 mcg/kg/min, Last Rate: 50 mcg/kg/min (10/25/24 2203)         Labs:   CBC    Recent Labs     10/24/24  0437 10/24/24  0724 10/24/24  0932 10/24/24  0942 10/25/24  0427 10/25/24  1743   WBC 7.29  --   --   --  6.43  --    HGB 6.7*   < >  --    < > 8.0* 7.9*   HCT 20.1*   < >  --    < > 24.5* 24.5*   *  --  129*  --  146*  --     < > = values in this interval not displayed.     BMP    Recent Labs     10/25/24  0427 10/25/24  1743   SODIUM 140 140   K 3.8 3.6   * 110*   CO2 23 24   AGAP 7 6   BUN 4* 5   CREATININE 0.52* 0.63   CALCIUM 7.9* 7.7*       Coags    Recent Labs     10/24/24  0932   INR 1.12   PTT 43*        Additional Electrolytes  Recent Labs     10/24/24  0437 10/25/24  0427   MG 1.8* 2.1   PHOS 2.2* 3.1   CAIONIZED  1.05* 1.10*          Blood Gas    No recent results  No recent results LFTs  Recent Labs     10/24/24  0932 10/25/24  0427   ALT 8 8   AST 7* 8*   ALKPHOS 38 43   ALB 2.9* 3.0*   TBILI 0.78 0.63       Infectious  No recent results  Glucose  Recent Labs     10/24/24  1759 10/24/24  2352 10/25/24  0427 10/25/24  1743   GLUC 117 139 111 154*

## 2024-10-26 NOTE — ASSESSMENT & PLAN NOTE
Imaging:  CT head wo, 10/23/2024: Unchanged 1.3 cm thick acute extra-axial hemorrhage along left parietooccipital cerebral convexity, which could represent a combination of subdural hematoma and epidural hematoma (given lenticular shape at its thickest portion). Unchanged mild mass effect on adjacent left parietal lobe. Unchanged acute trace subarachnoid hemorrhage along left inferior temporal convexity. Unchanged acute trace thin subdural hematoma along left tentorium cerebelli. No new acute intracranial abnormality.  CTA head and neck w/wo, 10/22/2024: New extra-axial lenticular shaped hemorrhage along the left parietal convexity as described above, which may represent an epidural hematoma or less likely a subdural hematoma in this region. No significant midline shift noted. Trace new subarachnoid hemorrhage noted along the inferior left temporo-occipital convexity noted. Right facial subcutaneous soft tissue swelling/hematoma again demonstrated. CT Angiography: No active extravasation of contrast is noted into the extra-axial hematoma. No large vessel occlusion, high-grade stenosis, or intracranial aneurysm identified on CT angiogram of the head. No hemodynamically significant stenosis or dissection identified on CT angiogram of the neck. Anterior mildly comminuted C5 vertebral body fracture again demonstrated, with mild associated prevertebral edema again demonstrated.  Plan:  Continue to monitor neuro exam closely.  STAT CT head with decline in GCS > 2 pts in 1 hour.  Hold ASA - reversed with DDAVP.  Keppra   Continue to hold all AC/AP x 2 weeks.  CCM/medicine:  Noted with seizure activity on vEEG 10/22-23, currently burst suppressed.  Intubated for burst supression.  Neurology consulted - appreciate recommendations.  Unable to obtain MRI brain currently due to incompatible vEEG leads.  Hyponatremia -current Na 135  Mobilize with PT/OT.

## 2024-10-26 NOTE — ASSESSMENT & PLAN NOTE
Frequent Neuro Checks, will notify Neurology team if any acute changes in exam and obtain STAT CTH  Seizure / Fall Precautions  Continue suppression pending MRI  Telemetry Monitoring  AEDs: Levetiracetam 2g q12h, Lacosamide 200mg q12h, Clobazam 5mg TID.  EEG: continuous vEEG active.  Per neurology, recommending seizure suppression 24-48 hours as the patient has had continued seizure activity which poses risk for permanent neurologic damage.   Imaging Studies: MRI Brain w/wo contrast seizure protocol. Plan to obtain today.   Rescue Meds: Ativan IV 2mg prn 2 complex partial seizures or 1 GTC seizure  DVT PPx , and SCDs  PT/OT Evaluate and Treat  [place] DOT: Needs to be completed prior to discharge  Rest of care per primary medical team

## 2024-10-26 NOTE — ASSESSMENT & PLAN NOTE
Reported he has a sistory of stroke, however on discussion with mother, there was no stroke but were stroke-like residual deficits from brain abscesses and craniotomy.  No home regimen.  Pt was started on ASA this admission and subsequently discontinued 2/2 ICH  Continue Neuro-checks.

## 2024-10-26 NOTE — ASSESSMENT & PLAN NOTE
Intubated on 10/23 for airway protection for planned burst suppression in setting of satus epilepticus.   Acute respiratory failure with hypoxia d/t seizure  ETT: #7.5  VENT: 16/450/40/6   Actual rate: 19   pPl: 19    Plan:  Continue mechanical ventilation.  Continue sedation.  Propofol gtt  Versed gtt  Hold SBT 2/2 status epilepticus, now with continuous burst suppression. Vent bundle.  Obtain am CXR for evaluation of increased secretions  Airway clearance protocol.

## 2024-10-26 NOTE — ASSESSMENT & PLAN NOTE
As evidence by tachycardia, fever, leukocytosis  Suspected neuro fever.   WBC - 6.43, Procal - 0.46, Lactic - 0.8  CXR - No acute cardiopulmonary abnormalities  UA - bland  COVID/FLU/RSV bland- negative  Blood cultures x 2 obtained 10/22: No growth  He received 30cc/kg resuscitation fluids.     Plan:  Continue to hold ABX.  Follow up blood cultures, UA, viral swab and MRSA.  Monitor/trend fever curve.  Monitor/trend WBC.  Trend endpoints.

## 2024-10-26 NOTE — PLAN OF CARE
Problem: PAIN - ADULT  Goal: Verbalizes/displays adequate comfort level or baseline comfort level  Description: Interventions:  - Encourage patient to monitor pain and request assistance  - Assess pain using appropriate pain scale  - Administer analgesics based on type and severity of pain and evaluate response  - Implement non-pharmacological measures as appropriate and evaluate response  - Consider cultural and social influences on pain and pain management  - Notify physician/advanced practitioner if interventions unsuccessful or patient reports new pain  Outcome: Progressing     Problem: SAFETY ADULT  Goal: Patient will remain free of falls  Description: INTERVENTIONS:  - Educate patient/family on patient safety including physical limitations  - Instruct patient to call for assistance with activity   - Consult OT/PT to assist with strengthening/mobility   - Keep Call bell within reach  - Keep bed low and locked with side rails adjusted as appropriate  - Keep care items and personal belongings within reach  - Initiate and maintain comfort rounds  - Make Fall Risk Sign visible to staff  - Offer Toileting every  Hours, in advance of need  - Initiate/Maintain alarm  - Obtain necessary fall risk management equipment:   - Apply yellow socks and bracelet for high fall risk patients  - Consider moving patient to room near nurses station  Outcome: Progressing  Goal: Maintain or return to baseline ADL function  Description: INTERVENTIONS:  -  Assess patient's ability to carry out ADLs; assess patient's baseline for ADL function and identify physical deficits which impact ability to perform ADLs (bathing, care of mouth/teeth, toileting, grooming, dressing, etc.)  - Assess/evaluate cause of self-care deficits   - Assess range of motion  - Assess patient's mobility; develop plan if impaired  - Assess patient's need for assistive devices and provide as appropriate  - Encourage maximum independence but intervene and supervise  when necessary  - Involve family in performance of ADLs  - Assess for home care needs following discharge   - Consider OT consult to assist with ADL evaluation and planning for discharge  - Provide patient education as appropriate  Outcome: Progressing  Goal: Maintains/Returns to pre admission functional level  Description: INTERVENTIONS:  - Perform AM-PAC 6 Click Basic Mobility/ Daily Activity assessment daily.  - Set and communicate daily mobility goal to care team and patient/family/caregiver.   - Collaborate with rehabilitation services on mobility goals if consulted  - Perform Range of Motion  times a day.  - Reposition patient every  hours.  - Dangle patient  times a day  - Stand patient  times a day  - Ambulate patient  times a day  - Out of bed to chair  times a day   - Out of bed for meals times a day  - Out of bed for toileting  - Record patient progress and toleration of activity level   Outcome: Progressing     Problem: DISCHARGE PLANNING  Goal: Discharge to home or other facility with appropriate resources  Description: INTERVENTIONS:  - Identify barriers to discharge w/patient and caregiver  - Arrange for needed discharge resources and transportation as appropriate  - Identify discharge learning needs (meds, wound care, etc.)  - Arrange for interpretive services to assist at discharge as needed  - Refer to Case Management Department for coordinating discharge planning if the patient needs post-hospital services based on physician/advanced practitioner order or complex needs related to functional status, cognitive ability, or social support system  Outcome: Progressing     Problem: Knowledge Deficit  Goal: Patient/family/caregiver demonstrates understanding of disease process, treatment plan, medications, and discharge instructions  Description: Complete learning assessment and assess knowledge base.  Interventions:  - Provide teaching at level of understanding  - Provide teaching via preferred learning  methods  Outcome: Progressing     Problem: SAFETY,RESTRAINT: NV/NON-SELF DESTRUCTIVE BEHAVIOR  Goal: Remains free of harm/injury (restraint for non violent/non self-detsructive behavior)  Description: INTERVENTIONS:  - Instruct patient/family regarding restraint use   - Assess and monitor physiologic and psychological status   - Provide interventions and comfort measures to meet assessed patient needs   - Identify and implement measures to help patient regain control  - Assess readiness for release of restraint   Outcome: Progressing  Goal: Returns to optimal restraint-free functioning  Description: INTERVENTIONS:  - Assess the patient's behavior and symptoms that indicate continued need for restraint  - Identify and implement measures to help patient regain control  - Assess readiness for release of restraint   Outcome: Progressing     Problem: Nutrition/Hydration-ADULT  Goal: Nutrient/Hydration intake appropriate for improving, restoring or maintaining nutritional needs  Description: Monitor and assess patient's nutrition/hydration status for malnutrition. Collaborate with interdisciplinary team and initiate plan and interventions as ordered.  Monitor patient's weight and dietary intake as ordered or per policy. Utilize nutrition screening tool and intervene as necessary. Determine patient's food preferences and provide high-protein, high-caloric foods as appropriate.     INTERVENTIONS:  - Monitor oral intake, urinary output, labs, and treatment plans  - Assess nutrition and hydration status and recommend course of action  - Evaluate amount of meals eaten  - Assist patient with eating if necessary   - Allow adequate time for meals  - Recommend/ encourage appropriate diets, oral nutritional supplements, and vitamin/mineral supplements  - Order, calculate, and assess calorie counts as needed  - Recommend, monitor, and adjust tube feedings and TPN/PPN based on assessed needs  - Assess need for intravenous fluids  -  Provide specific nutrition/hydration education as appropriate  - Include patient/family/caregiver in decisions related to nutrition  Outcome: Progressing     Problem: Nutrition/Hydration-ADULT  Goal: Nutrient/Hydration intake appropriate for improving, restoring or maintaining nutritional needs  Description: Monitor and assess patient's nutrition/hydration status for malnutrition. Collaborate with interdisciplinary team and initiate plan and interventions as ordered.  Monitor patient's weight and dietary intake as ordered or per policy. Utilize nutrition screening tool and intervene as necessary. Determine patient's food preferences and provide high-protein, high-caloric foods as appropriate.     INTERVENTIONS:  - Monitor oral intake, urinary output, labs, and treatment plans  - Assess nutrition and hydration status and recommend course of action  - Evaluate amount of meals eaten  - Assist patient with eating if necessary   - Allow adequate time for meals  - Recommend/ encourage appropriate diets, oral nutritional supplements, and vitamin/mineral supplements  - Order, calculate, and assess calorie counts as needed  - Recommend, monitor, and adjust tube feedings and TPN/PPN based on assessed needs  - Assess need for intravenous fluids  - Provide specific nutrition/hydration education as appropriate  - Include patient/family/caregiver in decisions related to nutrition  Outcome: Progressing     Problem: Prexisting or High Potential for Compromised Skin Integrity  Goal: Skin integrity is maintained or improved  Description: INTERVENTIONS:  - Identify patients at risk for skin breakdown  - Assess and monitor skin integrity  - Assess and monitor nutrition and hydration status  - Monitor labs   - Assess for incontinence   - Turn and reposition patient  - Assist with mobility/ambulation  - Relieve pressure over bony prominences  - Avoid friction and shearing  - Provide appropriate hygiene as needed including keeping skin  clean and dry  - Evaluate need for skin moisturizer/barrier cream  - Collaborate with interdisciplinary team   - Patient/family teaching  - Consider wound care consult   Outcome: Progressing

## 2024-10-26 NOTE — ASSESSMENT & PLAN NOTE
S/p craniotomy 1/2019 at [facility], done secondary to abscess/lesions. Baseline independent.   CTH (10/20/24) - No acute intracranial hemorrhage or depressed calvarial fracture. Mild bilateral anterior frontal scalp soft tissue swelling, right greater than left. Right periorbital, perinasal, and premaxillary facial soft tissue swelling also noted.   CTA head/neck (10/22/24) - New extra-axial lenticular shaped hemorrhage along the left parietal convexity as described above, which may represent an epidural hematoma or less likely a subdural hematoma in this region. No significant midline shift noted. Trace new subarachnoid hemorrhage noted along the inferior left temporo-occipital convexity noted.   Continue supportive care.  See individual plans below.

## 2024-10-26 NOTE — RESPIRATORY THERAPY NOTE
Pt desaturing and peak pressuring. Suctioned pt for small amount of thick secretions. Pt continued to desaturate. Pt lavaged and suctioned for large amount of thick secretions. Pt spo2 in the 60s. Pt taken off vent and bagged. While bagging, lavaged pt again and suctioned for large plug. Pt spo2 back in 90s after bagging. Pt placed back on vent.

## 2024-10-27 NOTE — PROGRESS NOTES
Tae Dolan is a 54 y.o. male who is currently ordered Vancomycin IV with management by the Pharmacy Consult service.  Relevant clinical data and objective / subjective history reviewed.  Vancomycin Assessment:  Indication and Goal AUC/Trough: Pneumonia (goal -600, trough >10)  Micro:     Renal Function:  SCr:  0.45 mg/dL  CrCl: 182.4 mL/min  Renal replacement: Not on dialysis  Days of Therapy: 2  Current Dose:  1750 mg IV every 12 hours  Vancomycin Plan:  New Dosing:  continue 1750 mg IV every 12 hours  Estimated AUC: 506 mcg*hr/mL  Estimated Trough: 10.8 mcg/mL  Next Level: 10/28 at 1000  Renal Function Monitoring: Daily BMP and UOP  Pharmacy will continue to follow closely for s/sx of nephrotoxicity, infusion reactions and appropriateness of therapy.  BMP and CBC will be ordered per protocol. We will continue to follow the patient’s culture results and clinical progress daily.    Aaliyah Dahl, Pharmacist

## 2024-10-27 NOTE — ASSESSMENT & PLAN NOTE
Intubated on 10/23 for airway protection for planned burst suppression in setting of satus epilepticus.   Acute respiratory failure with hypoxia d/t seizure,pneumonia (identified 10/26)  10/26 CXR: Development of extensive right perihilar consolidation suspicious for pneumonia/aspiration.   ETT: #7.5. Intubated 10/23.   CY day #4: 16/450/40/6   Actual rate: 18   pPl: 14    Plan:  Continue mechanical ventilation.  Continue cefepime, vancomycin, flagyl.   F/u sputum culture (in process)  Continue sedation.  Propofol gtt  Versed gtt, actively weaning  Hold SBT 2/2 status epilepticus, now with continuous burst suppression. Vent bundle.  Obtain am CXR for evaluation of increased secretions  Airway clearance protocol.

## 2024-10-27 NOTE — ASSESSMENT & PLAN NOTE
Frequent Neuro Checks, will notify Neurology team if any acute changes in exam and obtain STAT CTH  Seizure / Fall Precautions  Continue suppression pending MRI  Telemetry Monitoring  AEDs: Levetiracetam 2g q12h, Lacosamide 200mg q12h, Clobazam 5mg TID. Currently weaning versed infusion.   EEG: continuous vEEG active.  Per neurology, recommending seizure suppression 24-48 hours as the patient has had continued seizure activity which poses risk for permanent neurologic damage.   Imaging Studies: MRI Brain w/wo contrast seizure protocol. Plan to obtain today.   Rescue Meds: Ativan IV 2mg prn 2 complex partial seizures or 1 GTC seizure  DVT PPx , and SCDs  PT/OT Evaluate and Treat  [place] DOT: Needs to be completed prior to discharge  Rest of care per primary medical team

## 2024-10-27 NOTE — ASSESSMENT & PLAN NOTE
Imaging:  CT head wo, 10/23/2024: Unchanged 1.3 cm thick acute extra-axial hemorrhage along left parietooccipital cerebral convexity, which could represent a combination of subdural hematoma and epidural hematoma (given lenticular shape at its thickest portion). Unchanged mild mass effect on adjacent left parietal lobe. Unchanged acute trace subarachnoid hemorrhage along left inferior temporal convexity. Unchanged acute trace thin subdural hematoma along left tentorium cerebelli. No new acute intracranial abnormality.  CTA head and neck w/wo, 10/22/2024: New extra-axial lenticular shaped hemorrhage along the left parietal convexity as described above, which may represent an epidural hematoma or less likely a subdural hematoma in this region. No significant midline shift noted. Trace new subarachnoid hemorrhage noted along the inferior left temporo-occipital convexity noted. Right facial subcutaneous soft tissue swelling/hematoma again demonstrated. CT Angiography: No active extravasation of contrast is noted into the extra-axial hematoma. No large vessel occlusion, high-grade stenosis, or intracranial aneurysm identified on CT angiogram of the head. No hemodynamically significant stenosis or dissection identified on CT angiogram of the neck. Anterior mildly comminuted C5 vertebral body fracture again demonstrated, with mild associated prevertebral edema again demonstrated.  MRI Brain 10/27: No acute parenchymal abnormality.   Persistent subacute left temporoparietal hemorrhagic subdural collection with a focal loculated segment. There is expected regional inflammatory enhancement of the dura and leptomeninges. Focal areas of nonhemorrhagic contusion/edema in the parietal lobe subjacent to the above-mentioned extra-axial hematoma.   Trace posttraumatic subarachnoid hemorrhage in the left temporal sulci.   Additional chronic areas of bifrontal and left cerebellar and brainstem gliosis.   Will d/w neurosurgery MRI  findings.   Plan:  Continue to monitor neuro exam closely.  STAT CT head with decline in GCS > 2 pts in 1 hour.  Hold ASA - reversed with DDAVP.  AEDs per neurology  Continue to hold all AC/AP x 2 weeks.  Noted with seizure activity on vEEG 10/22-23, currently burst suppressed.  Intubated for burst supression.  Neurology consulted/following - appreciate recommendations.  Mobilize with PT/OT.

## 2024-10-27 NOTE — ASSESSMENT & PLAN NOTE
"Tae Dolan is a 54 year old male seen in follow up for aborted focal left hemispheric status epilepticus requiring high doses of propofol and versed sedation which are now being slowly weaned.  Suspect nidus for new onset focal status left hemispheric acute epidural hematoma s/p trauma.    (The patient initially presented to the hospital on 10/20 at 0030 AM after falling out of a second story window. )    Work Up:  NC CTH: Intracranial hemorrhage redemonstrated, most prominently suspected epidural hemorrhage in the left parietal region, as described. There is some local mass effect but no midline shift.   Recommend MRI Brain w/wo contrast with Seizure Protocol  EEG: Multiple frequent seizures since being placed on continuous vEEG on 10/22/24 at 2038. Frequent seizures continued after loading with Levetiracetam, Lacosamide, valproic acid and fosphenytoin. 10 seizures in 90 minutes since 1123 and most recent being caught at 1326.  Last seizure on vEEG monitoring prior to propofol initiation/ boluses 10/23/24 evening.  MRI brain with and without contrast seizure protocol 10/26/24: with no acute parenchymal abnormality:   \"Persistent subacute left temporoparietal hemorrhagic subdural collection with a focal loculated segment. There is expected regional inflammatory enhancement of the dura and leptomeninges.     Focal areas of nonhemorrhagic contusion/edema in the parietal lobe subjacent to the above-mentioned extra-axial hematoma.     Trace posttraumatic subarachnoid hemorrhage in the left temporal sulci.     Additional chronic areas of bifrontal and left cerebellar and brainstem gliosis.\"    UDS - Normal  Ethanol level - Normal    Current AEDs: Levetiracetam 2g q12h, Lacosamide 200mg q12h, Clobazam 5mg TID    AEDS that did not work:  Valproic acid and fosphenytoin loading doses were given but made no observable difference on EEG.    Plan:  Continue slow wean of versed then propofol sedation as per current regimen. " vEEG reviewed with epileptology at bedside with no further breakthrough seizures noted  Telemetry Monitoring  AEDs: Levetiracetam 2g q12h, Lacosamide 200mg q12h, Clobazam 5mg TID. If increasing cortical irritability is noted can consider increasing clobazam dosing to 5/5/10 mg.  Continue vEEG monitoring  Patient with improving fevers with antibiotics. He is on antibiotics. Prefer avoiding cefepime as able as it can further lower seizure threshold- d/w ICU team. As patient has large right sided PNA likely etiology of his fever, thus okay to hold off LP- less concern for CNS infection.  DVT PPx : lovenox   [place] DOT: Needs to be completed prior to discharge  Rest of care per primary medical team  I d/w ICU resident physician above recommendations during my AM rounds

## 2024-10-27 NOTE — ASSESSMENT & PLAN NOTE
S/p craniotomy 1/2019 at [facility], done secondary to abscess/lesions. Baseline independent.   CTH (10/20/24) - No acute intracranial hemorrhage or depressed calvarial fracture. Mild bilateral anterior frontal scalp soft tissue swelling, right greater than left. Right periorbital, perinasal, and premaxillary facial soft tissue swelling also noted.   CTA head/neck (10/22/24) - New extra-axial lenticular shaped hemorrhage along the left parietal convexity as described above, which may represent an epidural hematoma or less likely a subdural hematoma in this region. No significant midline shift noted. Trace new subarachnoid hemorrhage noted along the inferior left temporo-occipital convexity noted.   Continue supportive care.  See individual plans below.

## 2024-10-27 NOTE — PROGRESS NOTES
"Progress Note - Neurology   Name: Tae Dolan 54 y.o. male I MRN: [id number]  Unit/Bed#: ICU 07 I Date of Admission: 10/20/2024   Date of Service: 10/27/2024 I Hospital Day: 7     Assessment & Plan  Status epilepticus (HCC)  Tae Dolan is a 54 year old male seen in follow up for aborted focal left hemispheric status epilepticus requiring high doses of propofol and versed sedation which are now being slowly weaned.  Suspect nidus for new onset focal status left hemispheric acute epidural hematoma s/p trauma.    (The patient initially presented to the hospital on 10/20 at 0030 AM after falling out of a second story window. )    Work Up:  NC CTH: Intracranial hemorrhage redemonstrated, most prominently suspected epidural hemorrhage in the left parietal region, as described. There is some local mass effect but no midline shift.   Recommend MRI Brain w/wo contrast with Seizure Protocol  EEG: Multiple frequent seizures since being placed on continuous vEEG on 10/22/24 at 2038. Frequent seizures continued after loading with Levetiracetam, Lacosamide, valproic acid and fosphenytoin. 10 seizures in 90 minutes since 1123 and most recent being caught at 1326.  Last seizure on vEEG monitoring prior to propofol initiation/ boluses 10/23/24 evening.  MRI brain with and without contrast seizure protocol 10/26/24: with no acute parenchymal abnormality:   \"Persistent subacute left temporoparietal hemorrhagic subdural collection with a focal loculated segment. There is expected regional inflammatory enhancement of the dura and leptomeninges.     Focal areas of nonhemorrhagic contusion/edema in the parietal lobe subjacent to the above-mentioned extra-axial hematoma.     Trace posttraumatic subarachnoid hemorrhage in the left temporal sulci.     Additional chronic areas of bifrontal and left cerebellar and brainstem gliosis.\"    UDS - Normal  Ethanol level - Normal    Current AEDs: Levetiracetam 2g q12h, Lacosamide 200mg q12h, " Clobazam 5mg TID    AEDS that did not work:  Valproic acid and fosphenytoin loading doses were given but made no observable difference on EEG.    Plan:  Continue slow wean of versed then propofol sedation as per current regimen. vEEG reviewed with epileptology at bedside with no further breakthrough seizures noted  Telemetry Monitoring  AEDs: Levetiracetam 2g q12h, Lacosamide 200mg q12h, Clobazam 5mg TID. If increasing cortical irritability is noted can consider increasing clobazam dosing to 5/5/10 mg.  Continue vEEG monitoring  Patient with improving fevers with antibiotics. He is on antibiotics. Prefer avoiding cefepime as able as it can further lower seizure threshold- d/w ICU team. As patient has large right sided PNA likely etiology of his fever, thus okay to hold off LP- less concern for CNS infection.  DVT PPx : lovenox   [place] DOT: Needs to be completed prior to discharge  Rest of care per primary medical team  I d/w ICU resident physician above recommendations during my AM rounds  Epidural hematoma (HCC)  - See above  - Appreciate neurosurgery recs  Type 2 diabetes mellitus (HCC)  Lab Results   Component Value Date    HGBA1C 6.4 (H) 10/22/2024       Recent Labs     10/26/24  2332 10/27/24  0535 10/27/24  0536 10/27/24  1209   POCGLU 145* 169* 177* 259*       Blood Sugar Average: Last 72 hrs:  (P) 151.8    Systemic inflammatory response syndrome (SIRS) without organ dysfunction (HCC)  - PNA treatment per primary team  Seizure (HCC)  See above    Tae Magdaleno will need follow-up in in 6 weeks with epilepsy team for Other in 60 minute appointment. They will not require outpatient neurological testing.        Subjective   Patient is seen and examined in follow up. He remains on high doses of propofol and versed just now being weaned given refractory focal status epilepticus earlier this admission thus exam limited.  Pupils are reactive patient does not follow commands.  Intubated    Review of Systems  10 point  ROS cannot be completed in this patient who is sedated/ intubated    Objective :  Temp:  [98.6 °F (37 °C)-101.4 °F (38.6 °C)] 100.9 °F (38.3 °C)  HR:  [] 96  BP: (137-185)/(58-76) 157/66  Resp:  [15-31] 16  SpO2:  [90 %-100 %] 97 %  O2 Device: Ventilator  FiO2 (%):  [50] 50    Physical ExamNeurological Exam  Gen: NAD  HEENT: s/p recent epidural hematoma, right eye periorbital ecchymoses somewhat improved  Resp: intubated symmetric chest rise  CVS: RRR  Ext: no edema    Intubated sedated- limited neurologic exam  CN 2-12 : limited assessment  given above pupils 3 mm reactive  Motor/ sensory:no spontaneous movement or movement or grimace to protopathic stimulus noted  Cerebellar: sedated- cannot follow  Reflexes: neutral plantar responses        Lab Results: I have reviewed the following results:  Imaging Results Review: I personally reviewed the following image studies in PACS and associated radiology reports: MRI brain. My interpretation of the radiology images/reports is: no acute ischemia noted, left parietal epidural hematoma, SDH, temporal SAH noted .

## 2024-10-27 NOTE — QUICK NOTE
Patient's family member, Jaz Dolan, was at bedside at about 2 pm and was provided with updates regarding clinical progress and plans of care. All questions and concerns were addressed to their satisfaction. We will continue to update daily.     Zonia Hill MD  [facility]  Emergency Medicine Residency, PGY-2

## 2024-10-27 NOTE — ASSESSMENT & PLAN NOTE
Lab Results   Component Value Date    HGBA1C 6.4 (H) 10/22/2024       Recent Labs     10/26/24  2332 10/27/24  0535 10/27/24  0536 10/27/24  1209   POCGLU 145* 169* 177* 259*       Blood Sugar Average: Last 72 hrs:  (P) 151.8

## 2024-10-27 NOTE — ASSESSMENT & PLAN NOTE
Hgb / Hct       Results from last 7 days   Lab Units 10/27/24  0437 10/26/24  1759 10/26/24  0457   HEMOGLOBIN g/dL 8.7* 6.9* 8.3*   HEMATOCRIT % 28.0* 21.0* 25.3*   S/p fall  CT C/A/P - Splenomegaly with metallic surgical coils near the splenic hilar region. There is a small amount of intermediate density perisplenic free fluid adjacent to the inferior spleen which could represent subcapsular hematoma (AAST grade I splenic injury).   Hgb on admission - 11.5  Hgb down to 5.3 on 10/24 given additional unit PRBC with appropriate response.   Most recent Hgb 7.9    Plan:  Trend Hgb, repeat CBC in AM.  Transfuse for Hgb < 7.0 or active bleeding, HD instability.  Hold ASA.  Continue DVT prophylaxis with Lovenox.  SCD's for DVT prophylaxis.  Monitor for S/S bleeding.

## 2024-10-27 NOTE — PROGRESS NOTES
Progress Note - Critical Care/ICU   Name: Tae Dolan 54 y.o. male I MRN: [id number]  Unit/Bed#: ICU 07 I Date of Admission: 10/20/2024   Date of Service: 10/27/2024 I Hospital Day: 7      Assessment & Plan  Acute respiratory failure with hypoxia (HCC)  Intubated on 10/23 for airway protection for planned burst suppression in setting of satus epilepticus.   Acute respiratory failure with hypoxia d/t seizure,pneumonia (identified 10/26)  10/26 CXR: Development of extensive right perihilar consolidation suspicious for pneumonia/aspiration.   ETT: #7.5. Intubated 10/23.   CY day #4: 16/450/40/6   Actual rate: 18   pPl: 14    Plan:  Continue mechanical ventilation.  Continue cefepime, vancomycin, flagyl.   F/u sputum culture (in process)  Continue sedation.  Propofol gtt  Versed gtt, actively weaning  Hold SBT 2/2 status epilepticus, now with continuous burst suppression. Vent bundle.  Obtain am CXR for evaluation of increased secretions  Airway clearance protocol.   Status epilepticus (HCC)  Frequent Neuro Checks, will notify Neurology team if any acute changes in exam and obtain STAT CTH  Seizure / Fall Precautions  Continue suppression pending MRI  Telemetry Monitoring  AEDs: Levetiracetam 2g q12h, Lacosamide 200mg q12h, Clobazam 5mg TID. Currently weaning versed infusion.   EEG: continuous vEEG active.  Per neurology, recommending seizure suppression 24-48 hours as the patient has had continued seizure activity which poses risk for permanent neurologic damage.   Imaging Studies: MRI Brain w/wo contrast seizure protocol. Plan to obtain today.   Rescue Meds: Ativan IV 2mg prn 2 complex partial seizures or 1 GTC seizure  DVT PPx , and SCDs  PT/OT Evaluate and Treat  [place] DOT: Needs to be completed prior to discharge  Rest of care per primary medical team  Epidural hematoma (HCC)  Imaging:  CT head wo, 10/23/2024: Unchanged 1.3 cm thick acute extra-axial hemorrhage along left parietooccipital cerebral convexity,  which could represent a combination of subdural hematoma and epidural hematoma (given lenticular shape at its thickest portion). Unchanged mild mass effect on adjacent left parietal lobe. Unchanged acute trace subarachnoid hemorrhage along left inferior temporal convexity. Unchanged acute trace thin subdural hematoma along left tentorium cerebelli. No new acute intracranial abnormality.  CTA head and neck w/wo, 10/22/2024: New extra-axial lenticular shaped hemorrhage along the left parietal convexity as described above, which may represent an epidural hematoma or less likely a subdural hematoma in this region. No significant midline shift noted. Trace new subarachnoid hemorrhage noted along the inferior left temporo-occipital convexity noted. Right facial subcutaneous soft tissue swelling/hematoma again demonstrated. CT Angiography: No active extravasation of contrast is noted into the extra-axial hematoma. No large vessel occlusion, high-grade stenosis, or intracranial aneurysm identified on CT angiogram of the head. No hemodynamically significant stenosis or dissection identified on CT angiogram of the neck. Anterior mildly comminuted C5 vertebral body fracture again demonstrated, with mild associated prevertebral edema again demonstrated.  MRI Brain 10/27: No acute parenchymal abnormality.   Persistent subacute left temporoparietal hemorrhagic subdural collection with a focal loculated segment. There is expected regional inflammatory enhancement of the dura and leptomeninges. Focal areas of nonhemorrhagic contusion/edema in the parietal lobe subjacent to the above-mentioned extra-axial hematoma.   Trace posttraumatic subarachnoid hemorrhage in the left temporal sulci.   Additional chronic areas of bifrontal and left cerebellar and brainstem gliosis.   Will d/w neurosurgery MRI findings.   Plan:  Continue to monitor neuro exam closely.  STAT CT head with decline in GCS > 2 pts in 1 hour.  Hold ASA - reversed with  DDAVP.  AEDs per neurology  Continue to hold all AC/AP x 2 weeks.  Noted with seizure activity on vEEG 10/22-23, currently burst suppressed.  Intubated for burst supression.  Neurology consulted/following - appreciate recommendations.  Mobilize with PT/OT.  Spleen laceration  Hgb / Hct       Results from last 7 days   Lab Units 10/27/24  0437 10/26/24  1759 10/26/24  0457   HEMOGLOBIN g/dL 8.7* 6.9* 8.3*   HEMATOCRIT % 28.0* 21.0* 25.3*   S/p fall  CT C/A/P - Splenomegaly with metallic surgical coils near the splenic hilar region. There is a small amount of intermediate density perisplenic free fluid adjacent to the inferior spleen which could represent subcapsular hematoma (AAST grade I splenic injury).   Hgb on admission - 11.5  Hgb down to 5.3 on 10/24 given additional unit PRBC with appropriate response.   Most recent Hgb 7.9    Plan:  Trend Hgb, repeat CBC in AM.  Transfuse for Hgb < 7.0 or active bleeding, HD instability.  Hold ASA.  Continue DVT prophylaxis with Lovenox.  SCD's for DVT prophylaxis.  Monitor for S/S bleeding.  Closed nondisplaced fracture of fifth cervical vertebra (HCC)  S/p fall from about 10 feet. Baseline right sided numbness/tingling given history of stroke. No new pain.  CT c-spine - Acute anterior C5 vertebral body fracture with prevertebral soft tissue swelling. No traumatic subluxation.   Neurosurgery consulted, appreciate recommendations.     Plan:  Continue Neuro-checks q1h.  No surgical intervention at this time.   Maintain [place] collar at all times, will need for 8 to 12 weeks.   Follow up on upright films.  DVT prophylaxis: SCD's.  PT/OT eval and treat with Collar in place.  CM consulted for disposition planning.  Follow up with Neurosurgery as outpatient in 2 weeks.   Fall  Fall from a height of 10 feet. He does not recall events before and after fall.  Possibly d/t new medication.  Denies intentionally jumping out of window or any thoughts of self harm.   Fall precautions.    Systemic inflammatory response syndrome (SIRS) without organ dysfunction (HCC)  As evidence by tachycardia, fever, leukocytosis  Suspected neuro fever.   WBC - 6.43, Procal - 0.46, Lactic - 0.8  CXR - No acute cardiopulmonary abnormalities  UA - bland  COVID/FLU/RSV bland- negative  Blood cultures x 2 obtained 10/22: No growth  He received 30cc/kg resuscitation fluids.     Plan:  Continue to hold ABX.  Follow up blood cultures, UA, viral swab and MRSA.  Monitor/trend fever curve.  Monitor/trend WBC.  Trend endpoints.    S/P craniotomy  S/p craniotomy 1/2019 at [facility], done secondary to abscess/lesions. Baseline independent.   CTH (10/20/24) - No acute intracranial hemorrhage or depressed calvarial fracture. Mild bilateral anterior frontal scalp soft tissue swelling, right greater than left. Right periorbital, perinasal, and premaxillary facial soft tissue swelling also noted.   CTA head/neck (10/22/24) - New extra-axial lenticular shaped hemorrhage along the left parietal convexity as described above, which may represent an epidural hematoma or less likely a subdural hematoma in this region. No significant midline shift noted. Trace new subarachnoid hemorrhage noted along the inferior left temporo-occipital convexity noted.   Continue supportive care.  See individual plans below.   History of stroke  Reported he has a sistory of stroke, however on discussion with mother, there was no stroke but were stroke-like residual deficits from brain abscesses and craniotomy.  No home regimen.  Pt was started on ASA this admission and subsequently discontinued 2/2 ICH  Continue Neuro-checks.   Mood disorder (HCC) unspecified  No home regimen. No indication for inpatient admission at this time.  Psych consulted, appreciate recommendations.  CM consulted for dispo planning.  Continue supportive care.   Spasticity  History of spasticity s/p craniotomy.  Home regimen: Baclofen and Tizanidine qHS.  Hold home  Baclofen and Tizanidine.  Continue Robaxin for analgesia.  Restart Baclofen when appropriate.   Continue supportive care.   Type 2 diabetes mellitus (HCC)  Lab Results   Component Value Date    HGBA1C 6.4 (H) 10/22/2024     Recent Labs     10/26/24  1749 10/26/24  2332 10/27/24  0535 10/27/24  0536   POCGLU 176* 145* 169* 177*   Blood Sugar Average: Last 72 hrs:  (P) [id number]    Home regimen: Lantus 15 units SC qHS  Hold home regimen  SSI every 6 hours while intubated.   Goal glucose 140 - 180  Hypoglycemia protocol    History of alcohol use  History of ETOH use in the past, unknown last drink.  Continue CIWA protocol.  Continue thiamine/folate daily.  Encourage cessation.  Seizure (HCC)  See plan for status epilepticus.  Pneumonia involving right lung  Episode of hypoxia overnight 10/25-10/26, secretions thick and foul smelling  CXR showing right-sided consolidation  Continue cefepime, vancomycin, flagyl  F/u sputum culture  Trend WBC  Monitor fevers  Pulmonary toilet    Disposition: Critical care    ICU Core Measures     Vented Patient  VAP Bundle  VAP bundle ordered     A: Assess, Prevent, and Manage Pain Has pain been assessed? Yes  Need for changes to pain regimen? No   B: Both Spontaneous Awakening Trials (SATs) and Spontaneous Breathing Trials (SBTs) Plan to perform spontaneous awakening trial today? No secondary to ongoing seizures/status epilepticus  Plan to perform spontaneous breathing trial today? No secondary to ongoing seizures/status epilepticus  Obvious barriers to extubation? Yes   C: Choice of Sedation RASS Goal: -4 Deep Sedation or 0 Alert and Calm  Need for changes to sedation or analgesia regimen? No   D: Delirium CAM-ICU: Unable to perform secondary to Traumatic Brain Injury   E: Early Mobility  Plan for early mobility? No   F: Family Engagement Plan for family engagement today? Yes       Antibiotic Review: Awaiting culture results.     Review of Invasive Devices:    Jeffery Plan:  Continue for accurate I/O monitoring for 48 hours  Kisha Plan: Keep arterial line for hemodynamic monitoring    Prophylaxis:  VTE VTE covered by:  enoxaparin, Subcutaneous, 30 mg at 10/26/24 2101       Stress Ulcer  covered byomeprazole (PRILOSEC) suspension 2 mg/mL [[id number]]         24 Hour Events : Started on cefepime, vancomycin, flagyl for pneumonia identified on CXR 10/26. Versed infusion started to be down-titrated.       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 On 10/22 had change in MS with repetitive speech/not answering questions and on repeat head CT shown new extra-axial bleed representing EDH and trace new SAH. DDAVP given 2/2 asa on board. Placed on vEEG 10/22 for change mental status workup which shown multiple subclinical seizures. Patient intubated 10/23 for airway protection to achieve burst suppression after refractive on multiple AEDs/ativan. Reportedly seizure free since 0952 10/24.        Subjective   Review of Systems: See HPI for Review of Systems    Objective :                   Vitals I/O      Most Recent Min/Max in 24hrs   Temp 98.6 °F (37 °C) Temp  Min: 98.5 °F (36.9 °C)  Max: 101.4 °F (38.6 °C)   Pulse 89 Pulse  Min: 81  Max: 108   Resp 18 Resp  Min: 15  Max: 31   /66 BP  Min: 137/58  Max: 185/76   O2 Sat 90 % SpO2  Min: 86 %  Max: 100 %      Intake/Output Summary (Last 24 hours) at 10/27/2024 0614  Last data filed at 10/27/2024 0544  Gross per 24 hour   Intake 3755.38 ml   Output 1580 ml   Net 2175.38 ml       Diet Enteral/Parenteral; Tube Feeding No Oral Diet; Jevity 1.2 Eliseo; Continuous; 65; 30; Water; Every 4 hours    Invasive Monitoring            Physical Exam   Physical Exam  Vitals reviewed.   Eyes:      Pupils: Pupils are equal, round, and reactive to light.   Skin:     General: Skin is warm.      Capillary Refill: Capillary refill takes less than 2 seconds.   HENT:      Head: Right periorbital erythema present.      Mouth/Throat:      Mouth: Mucous membranes are moist.   Neck:      Comments: Collar in place   Cardiovascular:      Rate and Rhythm: Normal rate and regular rhythm.   Abdominal:      Palpations: Abdomen is soft.      Tenderness: There is no abdominal tenderness.   Neurological:      GCS: GCS eye subscore is 1. GCS verbal subscore is 1. GCS motor subscore is 1.   Genitourinary/Anorectal:     Comments: Gil in place         Diagnostic Studies        Lab Results: I have reviewed the following results:     Medications:  Scheduled PRN   acetaminophen, 650 mg, Q6H  Albumin 5%, 12.5 g, Once  Artificial Tears, 1 drop, BID  atorvastatin, 10 mg, Daily With Dinner  bacitracin, 1 large application, BID  bisacodyl, 10 mg, Q72H  cefepime, 2,000 mg, Q8H  chlorhexidine, 15 mL, Q12H NANCY  cloBAZam, 5 mg, TID  enoxaparin, 30 mg, Q12H NANCY  folic acid, 1 mg, Daily  insulin lispro, 1-6 Units, Q6H NANCY  lacosamide, 200 mg, Q12H NANCY  levETIRAcetam, 2,000 mg, Q12H NANCY  metroNIDAZOLE, 500 mg, Q8H  Multivitamin, 15 mL, Daily  nicotine, 21 mg, Daily  omeprazole (PRILOSEC) suspension 2 mg/mL, 20 mg, Daily  QUEtiapine, 12.5 mg, HS  senna-docusate sodium, 1 tablet, BID  thiamine, 100 mg, Daily  vancomycin, 1,750 mg, Q12H      fentaNYL, 50 mcg, Q1H PRN  labetalol, 10 mg, Q6H PRN  ondansetron, 4 mg, Q4H PRN  oxyCODONE, 2.5 mg, Q4H PRN   Or  oxyCODONE, 5 mg, Q4H PRN       Continuous    midazolam, 2 mg/hr, Last Rate: 2 mg/hr (10/27/24 1663)  niCARdipine, 1-15 mg/hr, Last Rate: 5 mg/hr (10/27/24 9882)  propofol, 5-50 mcg/kg/min, Last Rate: 50 mcg/kg/min (10/27/24 2454)         Labs:   CBC    Recent Labs     10/26/24  0457 10/26/24  1759 10/27/24  0437   WBC 9.68  --   --     HGB 8.3* 6.9* 8.7*   HCT 25.3* 21.0* 28.0*     --   --      BMP    Recent Labs     10/26/24  1759 10/27/24  0437   SODIUM 138 137   K 3.4* 4.1   * 110*   CO2 18* 21   AGAP 6 6   BUN 8 10   CREATININE 0.37* 0.45*   CALCIUM 5.7* 7.8*       Coags    No recent results     Additional Electrolytes  Recent Labs     10/26/24  0457 10/26/24  1912   MG 2.0  --    PHOS 2.8  --    CAIONIZED 1.12 1.09*          Blood Gas    No recent results  No recent results LFTs  No recent results    Infectious  Recent Labs     10/26/24  0457   PROCALCITONI 0.55*     Glucose  Recent Labs     10/25/24  1743 10/26/24  0457 10/26/24  1759 10/27/24  0437   GLUC 154* 179* 165* 245*

## 2024-10-27 NOTE — ASSESSMENT & PLAN NOTE
Lab Results   Component Value Date    HGBA1C 6.4 (H) 10/22/2024     Recent Labs     10/26/24  1749 10/26/24  2332 10/27/24  0535 10/27/24  0536   POCGLU 176* 145* 169* 177*   Blood Sugar Average: Last 72 hrs:  (P) [id number]    Home regimen: Lantus 15 units SC qHS  Hold home regimen  SSI every 6 hours while intubated.   Goal glucose 140 - 180  Hypoglycemia protocol

## 2024-10-28 NOTE — PROGRESS NOTES
Progress Note - Critical Care/ICU   Name: Tae Dolan 54 y.o. male I MRN: [id number]  Unit/Bed#: ICU 07 I Date of Admission: 10/20/2024   Date of Service: 10/28/2024 I Hospital Day: 8      Assessment & Plan  Acute respiratory failure with hypoxia (HCC)  Intubated on 10/23 for airway protection for planned burst suppression in setting of satus epilepticus.   Acute respiratory failure with hypoxia d/t seizure,pneumonia (identified 10/26)  10/26 CXR: Development of extensive right perihilar consolidation suspicious for pneumonia/aspiration.   10/28 Developed hypoxia early morning, requiring increased O2/PEEP requirements   10/28 Emergent Bronchoscopy. Copious secretions. Culture obtain  ETT: #7.5. Intubated 10/23.   VENT day #5 16/450/80/8   Actual rate: 16   pPl: 20    Plan:  Continue mechanical ventilation.  Continue cefepime, vancomycin, flagyl.    sputum culture (2+ poly, no bacteria)  F/u bronch culture 10/28  Continue sedation.  Propofol gtt  Hold SBT 2/2 status epilepticus, now with continuous burst suppression. Vent bundle.  Obtain am CXR for evaluation of increased secretions  Airway clearance protocol.   Status epilepticus (HCC)  Frequent Neuro Checks, will notify Neurology team if any acute changes in exam and obtain STAT CTH  Seizure / Fall Precautions  Continue suppression pending MRI  Telemetry Monitoring  AEDs: Levetiracetam 2g q12h, Lacosamide 200mg q12h, Clobazam 5mg TID. If increasing cortical irritability is noted can consider increasing clobazam dosing to 5/5/10 mg.   EEG: continuous vEEG   Per neurology, recommending seizure suppression 24-48 hours as the patient has had continued seizure activity which poses risk for permanent neurologic damage.   Imaging Studies: MRI Brain w/wo contrast seizure protocol. Plan to obtain today.   Rescue Meds: Ativan IV 2mg prn 2 complex partial seizures or 1 GTC seizure  DVT PPx , and SCDs  PT/OT Evaluate and Treat  [place] DOT: Needs to be completed prior to  discharge  Rest of care per primary medical team  Epidural hematoma (HCC)  Imaging:  CT head wo, 10/23/2024: Unchanged 1.3 cm thick acute extra-axial hemorrhage along left parietooccipital cerebral convexity, which could represent a combination of subdural hematoma and epidural hematoma (given lenticular shape at its thickest portion). Unchanged mild mass effect on adjacent left parietal lobe. Unchanged acute trace subarachnoid hemorrhage along left inferior temporal convexity. Unchanged acute trace thin subdural hematoma along left tentorium cerebelli. No new acute intracranial abnormality.  CTA head and neck w/wo, 10/22/2024: New extra-axial lenticular shaped hemorrhage along the left parietal convexity as described above, which may represent an epidural hematoma or less likely a subdural hematoma in this region. No significant midline shift noted. Trace new subarachnoid hemorrhage noted along the inferior left temporo-occipital convexity noted. Right facial subcutaneous soft tissue swelling/hematoma again demonstrated. CT Angiography: No active extravasation of contrast is noted into the extra-axial hematoma. No large vessel occlusion, high-grade stenosis, or intracranial aneurysm identified on CT angiogram of the head. No hemodynamically significant stenosis or dissection identified on CT angiogram of the neck. Anterior mildly comminuted C5 vertebral body fracture again demonstrated, with mild associated prevertebral edema again demonstrated.  MRI Brain 10/27: No acute parenchymal abnormality.   Persistent subacute left temporoparietal hemorrhagic subdural collection with a focal loculated segment. There is expected regional inflammatory enhancement of the dura and leptomeninges. Focal areas of nonhemorrhagic contusion/edema in the parietal lobe subjacent to the above-mentioned extra-axial hematoma.   Trace posttraumatic subarachnoid hemorrhage in the left temporal sulci.   Additional chronic areas of bifrontal  and left cerebellar and brainstem gliosis.   Will d/w neurosurgery MRI findings.   Plan:  Continue to monitor neuro exam closely.  STAT CT head with decline in GCS > 2 pts in 1 hour.  Hold ASA - reversed with DDAVP.  AEDs per neurology  Continue to hold all AC/AP x 2 weeks.  Noted with seizure activity on vEEG 10/22-23, currently burst suppressed.  Intubated for burst supression.  Neurology consulted/following - appreciate recommendations.  Mobilize with PT/OT.  Spleen laceration  Hgb / Hct       Results from last 7 days   Lab Units 10/28/24  0438 10/28/24  0436 10/27/24  0750   HEMOGLOBIN g/dL  --  8.7* 8.5*   I STAT HEMOGLOBIN g/dl 8.5*  --   --    HEMATOCRIT %  --  26.8* 25.9*   HEMATOCRIT, ISTAT % 25*  --   --    S/p fall  CT C/A/P - Splenomegaly with metallic surgical coils near the splenic hilar region. There is a small amount of intermediate density perisplenic free fluid adjacent to the inferior spleen which could represent subcapsular hematoma (AAST grade I splenic injury).   Hgb on admission - 11.5  Hgb down to 5.3 on 10/24 given additional unit PRBC with appropriate response.   Most recent Hgb 7.9    Plan:  Trend Hgb, repeat CBC in AM.  Transfuse for Hgb < 7.0 or active bleeding, HD instability.  Hold ASA.  Continue DVT prophylaxis with Lovenox.  SCD's for DVT prophylaxis.  Monitor for S/S bleeding.  Closed nondisplaced fracture of fifth cervical vertebra (HCC)  S/p fall from about 10 feet. Baseline right sided numbness/tingling given history of stroke. No new pain.  CT c-spine - Acute anterior C5 vertebral body fracture with prevertebral soft tissue swelling. No traumatic subluxation.   Neurosurgery consulted, appreciate recommendations.     Plan:  Continue Neuro-checks q1h.  No surgical intervention at this time.   Maintain Vista collar at all times, will need for 8 to 12 weeks.   Follow up on upright films.  DVT prophylaxis: SCD's/lovenox  PT/OT eval and treat with Collar in place.  CM consulted for  disposition planning.  Follow up with Neurosurgery as outpatient in 2 weeks.   Fall  Fall from a height of 10 feet. He does not recall events before and after fall.  Possibly d/t new medication.  Denies intentionally jumping out of window or any thoughts of self harm.   Fall precautions.   Systemic inflammatory response syndrome (SIRS) without organ dysfunction (HCC)  As evidence by tachycardia, fever, leukocytosis  Suspected neuro fever.   WBC - 6.43, Procal - 0.46, Lactic - 0.8  CXR - No acute cardiopulmonary abnormalities  UA - bland  COVID/FLU/RSV bland- negative  Blood cultures x 2 obtained 10/22: No growth  He received 30cc/kg resuscitation fluids.     Plan:  Continue to hold ABX.  Follow up blood cultures, UA, viral swab and MRSA.  Monitor/trend fever curve.  Monitor/trend WBC.  Trend endpoints.    S/P craniotomy  S/p craniotomy 1/2019 at [facility], done secondary to abscess/lesions. Baseline independent.   CTH (10/20/24) - No acute intracranial hemorrhage or depressed calvarial fracture. Mild bilateral anterior frontal scalp soft tissue swelling, right greater than left. Right periorbital, perinasal, and premaxillary facial soft tissue swelling also noted.   CTA head/neck (10/22/24) - New extra-axial lenticular shaped hemorrhage along the left parietal convexity as described above, which may represent an epidural hematoma or less likely a subdural hematoma in this region. No significant midline shift noted. Trace new subarachnoid hemorrhage noted along the inferior left temporo-occipital convexity noted.   Continue supportive care.  See individual plans below.   History of stroke  Reported he has a sistory of stroke, however on discussion with mother, there was no stroke but were stroke-like residual deficits from brain abscesses and craniotomy.  No home regimen.  Pt was started on ASA this admission and subsequently discontinued 2/2 ICH  Continue Neuro-checks.   Mood disorder (HCC)  unspecified  No home regimen. No indication for inpatient admission at this time.  Psych consulted, appreciate recommendations.  CM consulted for dispo planning.  Continue supportive care.   Spasticity  History of spasticity s/p craniotomy.  Home regimen: Baclofen and Tizanidine qHS.  Hold home Baclofen and Tizanidine  Consider resuming Baclofen  Continue Robaxin for analgesia.  Continue supportive care.   Type 2 diabetes mellitus (HCC)  Lab Results   Component Value Date    HGBA1C 6.4 (H) 10/22/2024     Recent Labs     10/27/24  1209 10/27/24  1825 10/28/24  0006 10/28/24  0548   POCGLU 259* 213* 224* 240*   Blood Sugar Average: Last 72 hrs:  (P) [id number]    Home regimen: Lantus 15 units SC qHS  Consider restarting  SSI every 6 hours while intubated.   Goal glucose 140 - 180  Hypoglycemia protocol    History of alcohol use  History of ETOH use in the past, unknown last drink.  D/c CIWA; outside of acute alcohol withdrawal phase.   Continue thiamine/folate daily.  Encourage cessation.  Seizure (HCC)  See plan for status epilepticus.  Pneumonia involving right lung  Episode of hypoxia overnight 10/25-10/26, secretions thick and foul smelling  CXR showing right-sided consolidation  Continue cefepime, vancomycin, flagyl   sputum culture: 2+ poly, no bacteria  Trend WBC  Monitor fevers  Pulmonary toilet    Disposition: Critical care    ICU Core Measures     Vented Patient  VAP Bundle  VAP bundle ordered     A: Assess, Prevent, and Manage Pain Has pain been assessed? Yes  Need for changes to pain regimen? No   B: Both Spontaneous Awakening Trials (SATs) and Spontaneous Breathing Trials (SBTs) Plan to perform spontaneous awakening trial today? No secondary to ongoing seizures/status epilepticus  Plan to perform spontaneous breathing trial today? No secondary to ongoing seizures/status epilepticus  Obvious barriers to extubation? Yes   C: Choice of Sedation RASS Goal: -4 Deep Sedation  Need for changes to  sedation or analgesia regimen? Yes   D: Delirium CAM-ICU: Unable to perform secondary to Traumatic Brain Injury   E: Early Mobility  Plan for early mobility? No   F: Family Engagement Plan for family engagement today? Yes       Antibiotic Review: Awaiting culture results.  and Continue broad spectrum secondary to severity of illness.     Review of Invasive Devices:    Gil Plan: Continue for accurate I/O monitoring for 48 hours  Central access plan: Patient requires PICC secondary to required multiple access.   PIV x2  ETT day 4  OG    Prophylaxis:  VTE VTE covered by:  enoxaparin, Subcutaneous, 30 mg at 10/27/24 2214       Stress Ulcer  covered byomeprazole (PRILOSEC) suspension 2 mg/mL [[id number]]         24 Hour Events : Developed hypoxemia early morning with increased PEEP. Emergent Bronchoscopy performed with improvement in saturations. Versed infusion slowly weaned off at 1200 10/28. No reported epileptic activity on EEG.     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 On 10/22 had change in MS with repetitive speech/not answering questions and on repeat head CT shown new extra-axial bleed representing EDH and trace new SAH. DDAVP given 2/2 asa on board. Placed on vEEG 10/22 for change mental status workup which shown multiple subclinical seizures. Patient intubated 10/23 for airway protection to achieve burst suppression after refractive on multiple AEDs/ativan. Reportedly seizure free since 0952 10/24.       Subjective   Review of Systems: See HPI for Review of Systems    Objective :                   Vitals I/O      Most Recent Min/Max in 24hrs   Temp 100.2 °F (37.9 °C) Temp  Min:  99.5 °F (37.5 °C)  Max: 101.5 °F (38.6 °C)   Pulse 91 Pulse  Min: 89  Max: 109   Resp 12 Resp  Min: 0  Max: 23   BP (!) 113/47 BP  Min: 113/47  Max: 181/65   O2 Sat 97 % SpO2  Min: 83 %  Max: 100 %      Intake/Output Summary (Last 24 hours) at 10/28/2024 0601  Last data filed at 10/28/2024 0529  Gross per 24 hour   Intake 2837.6 ml   Output 3470 ml   Net -632.4 ml       Diet Enteral/Parenteral; Tube Feeding No Oral Diet; Jevity 1.2 Eliseo; Continuous; 65; 30; Water; Every 4 hours    Invasive Monitoring           Physical Exam   Physical Exam  Vitals reviewed.   Eyes:      Funduscopic exam:     Right eye: Hemorrhage present.      Comments: Right eye periorbital ecchymosis.    Skin:     General: Skin is warm and dry.      Capillary Refill: Capillary refill takes less than 2 seconds.   HENT:      Head: Contusion present.      Mouth/Throat:      Mouth: Mucous membranes are moist.   Neck:      Comments: Aspen cervical collar   Cardiovascular:      Rate and Rhythm: Normal rate and regular rhythm.      Pulses: Normal pulses.   Abdominal:      Palpations: Abdomen is soft.   Pulmonary:      Effort: Tachypnea present.      Breath sounds: Decreased air movement present. Examination of the right-upper field reveals rhonchi. Examination of the right-middle field reveals rhonchi. Examination of the right-lower field reveals rhonchi. Decreased breath sounds and rhonchi present.   Neurological:      Mental Status: He is unresponsive.      GCS: GCS eye subscore is 1. GCS verbal subscore is 1. GCS motor subscore is 4.      Comments: Limited exam 2/2 sedation   Genitourinary/Anorectal:     Comments: Gil in place         Diagnostic Studies        Lab Results: I have reviewed the following results:     Medications:  Scheduled PRN   acetaminophen, 650 mg, Q6H  acetylcysteine, 3 mL, Q6H  Albumin 5%, 12.5 g, Once  Artificial Tears, 1 drop, BID  atorvastatin, 10 mg, Daily With Dinner  bacitracin, 1 large application, BID  bisacodyl, 10 mg,  Q72H  chlorhexidine, 15 mL, Q12H NANCY  cloBAZam, 5 mg, TID  enoxaparin, 30 mg, Q12H NANCY  folic acid, 1 mg, Daily  insulin lispro, 1-6 Units, Q6H NANCY  lacosamide, 200 mg, Q12H NANCY  levETIRAcetam, 2,000 mg, Q12H NANCY  Multivitamin, 15 mL, Daily  nicotine, 21 mg, Daily  omeprazole (PRILOSEC) suspension 2 mg/mL, 20 mg, Daily  oxyCODONE, 5 mg, Q6H NANCY  piperacillin-tazobactam, 4.5 g, Q8H  QUEtiapine, 12.5 mg, HS  senna-docusate sodium, 1 tablet, BID  thiamine, 100 mg, Daily  vancomycin, 1,750 mg, Q12H      albuterol, 2.5 mg, Q6H PRN  fentaNYL, 50 mcg, Q1H PRN  labetalol, 10 mg, Q6H PRN  ondansetron, 4 mg, Q4H PRN  oxyCODONE, 2.5 mg, Q4H PRN   Or  oxyCODONE, 5 mg, Q4H PRN       Continuous    niCARdipine, 1-15 mg/hr, Last Rate: Stopped (10/28/24 0545)  propofol, 5-50 mcg/kg/min, Last Rate: 50 mcg/kg/min (10/28/24 0454)         Labs:   CBC    Recent Labs     10/27/24  0750 10/28/24  0436 10/28/24  0438   WBC 13.60* 18.30*  --    HGB 8.5* 8.7* 8.5*   HCT 25.9* 26.8* 25*    314  --      BMP    Recent Labs     10/27/24  0437 10/28/24  0436 10/28/24  0438   SODIUM 137 138  --    K 4.1 3.8  --    * 108  --    CO2 21 23 23   AGAP 6 7  --    BUN 10 11  --    CREATININE 0.45* 0.58*  --    CALCIUM 7.8* 8.1*  --        Coags    No recent results     Additional Electrolytes  Recent Labs     10/27/24  0437 10/27/24  0750 10/28/24  0436 10/28/24  0438   MG 1.9  --  1.9  --    PHOS 2.0*  --  2.1*  --    CAIONIZED  --    < > 1.14 1.21    < > = values in this interval not displayed.          Blood Gas    No recent results  No recent results LFTs  No recent results    Infectious  No recent results    Glucose  Recent Labs     10/26/24  1759 10/27/24  0437 10/28/24  0436   GLUC 165* 245* 318*

## 2024-10-28 NOTE — ASSESSMENT & PLAN NOTE
Lab Results   Component Value Date    HGBA1C 6.4 (H) 10/22/2024       Recent Labs     10/27/24  1209 10/27/24  1825 10/28/24  0006 10/28/24  0548   POCGLU 259* 213* 224* 240*       Blood Sugar Average: Last 72 hrs:  (P) [id number]

## 2024-10-28 NOTE — ASSESSMENT & PLAN NOTE
Intubated on 10/23 for airway protection for planned burst suppression in setting of satus epilepticus.   Acute respiratory failure with hypoxia d/t seizure,pneumonia (identified 10/26)  10/26 CXR: Development of extensive right perihilar consolidation suspicious for pneumonia/aspiration.   10/28 Developed hypoxia early morning, requiring increased O2/PEEP requirements   10/28 Emergent Bronchoscopy. Copious secretions. Culture obtain  ETT: #7.5. Intubated 10/23.   VENT day #5 16/450/80/8   Actual rate: 16   pPl: 20    Plan:  Continue mechanical ventilation.  Continue cefepime, vancomycin, flagyl.    sputum culture (2+ poly, no bacteria)  F/u bronch culture 10/28  Continue sedation.  Propofol gtt  Hold SBT 2/2 status epilepticus, now with continuous burst suppression. Vent bundle.  Obtain am CXR for evaluation of increased secretions  Airway clearance protocol.

## 2024-10-28 NOTE — PHYSICAL THERAPY NOTE
PHYSICAL THERAPY ORTHOTIC FITTING NOTE          Patient Name: Tae Dolan  Today's Date: 10/28/2024             10/28/24 1510   PT Last Visit   PT Visit Date 10/28/24   Note Type   Note type Orthotic Management/Training   Assessment   Assessment Orthotic Fitting:   Time in: 1510  Time out: 1518  Total Time: 8 min    Orthotic Ordered: multipodus boot  Orthotic Ordered by: CARLOS Jimenez  Wearing Schedule: confirmed w/ NP only 1 boot is ordered, spoke w/ RN w/ plan is to rotate boot between LEs    Objective: Pt intubated and sedated in ICU. Pt educated on multipodus boot fit and alignment. Pt fit for boot on LLE while pt supine in bed. Pt requires total assistance for donning/doffing boot at this time, as well as for adjustment of brace as needed. Post fitting, pt remained supine in bed. EUGENE Marie aware and signed for brace.           Mariah Palacios, PT, DPT  10/28/24

## 2024-10-28 NOTE — ASSESSMENT & PLAN NOTE
S/p fall from about 10 feet. Baseline right sided numbness/tingling given history of stroke. No new pain.  CT c-spine - Acute anterior C5 vertebral body fracture with prevertebral soft tissue swelling. No traumatic subluxation.   Neurosurgery consulted, appreciate recommendations.     Plan:  Continue Neuro-checks q1h.  No surgical intervention at this time.   Maintain Vista collar at all times, will need for 8 to 12 weeks.   Follow up on upright films.  DVT prophylaxis: SCD's/lovenox  PT/OT eval and treat with Collar in place.  CM consulted for disposition planning.  Follow up with Neurosurgery as outpatient in 2 weeks.

## 2024-10-28 NOTE — PROGRESS NOTES
Vancomycin IV Pharmacy-to-Dose Consultation    Tae Dolan is a 54 y.o. male who is currently ordered Vancomycin IV with management by the Pharmacy Consult service.    Relevant clinical data and objective / subjective history reviewed.    Vancomycin Assessment:    Indication and Goal AUC/Trough: pneumonia (goal -600, trough >10)    Clinical Status: critically ill    Micro:   10/28 bronchial culture: in process  10/26 sputum culture: 4+ staphylococcus aureus  10/26 urine culture: no growth  10/26 strep pneumoniae: no growth  10/26 legionella: negative  10/26 BC x 2: no growth at 24 hrs  10/24 occult blood, stool: negative  10/22 MRSA culture: MRSA (+)  10/22 BC x 2: no growth after 5 days    Renal Function:  SCr: 0.58 mg/dL  CrCl: 128 mL/min  Renal replacement: none    Days of Therapy: 3    Current Dose: 1750 mg IV q12h    Vancomycin Plan:    New Dosin mg IV q12h    Estimated AUC: 548 mcg*hr/mL    Estimated Trough: 14.6 mcg/mL    Next Level:  at 0700    Renal Function Monitoring: Daily BMP and UOP    Pharmacy will continue to follow closely for s/sx of nephrotoxicity, infusion reactions and appropriateness of therapy.  BMP and CBC will be ordered per protocol. We will continue to follow the patient’s culture results and clinical progress daily.    Rabia Fraser, PharmD  Pharmacist

## 2024-10-28 NOTE — OCCUPATIONAL THERAPY NOTE
Occupational Therapy Cancellation Note     Patient Name: Tae Dolan  Today's Date: 10/28/2024     10/28/24 1031   Note Type   Note Type Cancelled Session   Cancel Reasons Medical status  (OT orders remain active, per ICU mobility rounds patient is not appropriate for participation in OT tx. Will hold and continue to f/u as able and appropriate.)     Little Gu MS OTR/L   [place] Licensure# [id number]

## 2024-10-28 NOTE — ASSESSMENT & PLAN NOTE
Hgb / Hct       Results from last 7 days   Lab Units 10/28/24  0438 10/28/24  0436 10/27/24  0750   HEMOGLOBIN g/dL  --  8.7* 8.5*   I STAT HEMOGLOBIN g/dl 8.5*  --   --    HEMATOCRIT %  --  26.8* 25.9*   HEMATOCRIT, ISTAT % 25*  --   --    S/p fall  CT C/A/P - Splenomegaly with metallic surgical coils near the splenic hilar region. There is a small amount of intermediate density perisplenic free fluid adjacent to the inferior spleen which could represent subcapsular hematoma (AAST grade I splenic injury).   Hgb on admission - 11.5  Hgb down to 5.3 on 10/24 given additional unit PRBC with appropriate response.   Most recent Hgb 7.9    Plan:  Trend Hgb, repeat CBC in AM.  Transfuse for Hgb < 7.0 or active bleeding, HD instability.  Hold ASA.  Continue DVT prophylaxis with Lovenox.  SCD's for DVT prophylaxis.  Monitor for S/S bleeding.

## 2024-10-28 NOTE — ASSESSMENT & PLAN NOTE
Frequent Neuro Checks, will notify Neurology team if any acute changes in exam and obtain STAT CTH  Seizure / Fall Precautions  Continue suppression pending MRI  Telemetry Monitoring  AEDs: Levetiracetam 2g q12h, Lacosamide 200mg q12h, Clobazam 5mg TID. If increasing cortical irritability is noted can consider increasing clobazam dosing to 5/5/10 mg.   EEG: continuous vEEG   Per neurology, recommending seizure suppression 24-48 hours as the patient has had continued seizure activity which poses risk for permanent neurologic damage.   Imaging Studies: MRI Brain w/wo contrast seizure protocol. Plan to obtain today.   Rescue Meds: Ativan IV 2mg prn 2 complex partial seizures or 1 GTC seizure  DVT PPx , and SCDs  PT/OT Evaluate and Treat  [place] DOT: Needs to be completed prior to discharge  Rest of care per primary medical team

## 2024-10-28 NOTE — ASSESSMENT & PLAN NOTE
History of ETOH use in the past, unknown last drink.  D/c CIWA; outside of acute alcohol withdrawal phase.   Continue thiamine/folate daily.  Encourage cessation.

## 2024-10-28 NOTE — ASSESSMENT & PLAN NOTE
Frequent Neuro Checks, will notify Neurology team if any acute changes in exam and obtain STAT CTH  Seizure / Fall Precautions  Wean sedation  Currently on Propofol - plan to wean 5mg every 3 hours overnight.  Alternate is restarting versed  Telemetry Monitoring  AEDs: Levetiracetam 2g q12h, Lacosamide 200mg q12h, Clobazam 5mg TID.   If increasing cortical irritability is noted can consider increasing clobazam dosing to 5/5/10 mg.   EEG: continuous vEEG   Per neurology, recommending seizure suppression 24-48 hours as the patient has had continued seizure activity which poses risk for permanent neurologic damage.   Imaging Studies: MRI Brain w/wo contrast seizure protocol, no acute parenchymal abnormality, persistent subacute left temporoparietal subdural collection with focal loculated segment. Focal areas of nonhemorrhagic contusion/edmea in the parietal lob subjacent to the extra-axial hematoma. Trace postraumatic subarachnoid hemorrhage in left temporal sulci.  Rescue Meds: Ativan IV 2mg prn 2 complex partial seizures or 1 GTC seizure  DVT PPx , and SCDs  PT/OT Evaluate and Treat  [place] DOT: Needs to be completed prior to discharge

## 2024-10-28 NOTE — ASSESSMENT & PLAN NOTE
S/p craniotomy 1/2019 at [facility], done secondary to abscess/lesions. Baseline independent.   CTH (10/20/24) - No acute intracranial hemorrhage or depressed calvarial fracture. Mild bilateral anterior frontal scalp soft tissue swelling, right greater than left. Right periorbital, perinasal, and premaxillary facial soft tissue swelling also noted.   CTA head/neck (10/22/24) - New extra-axial lenticular shaped hemorrhage along the left parietal convexity as described above, which may represent an epidural hematoma or less likely a subdural hematoma in this region. No significant midline shift noted. Trace new subarachnoid hemorrhage noted along the inferior left temporo-occipital convexity noted.   Continue supportive care.  See individual plans below.

## 2024-10-28 NOTE — ASSESSMENT & PLAN NOTE
Lab Results   Component Value Date    HGBA1C 6.4 (H) 10/22/2024     Recent Labs     10/27/24  1209 10/27/24  1825 10/28/24  0006 10/28/24  0548   POCGLU 259* 213* 224* 240*   Blood Sugar Average: Last 72 hrs:  (P) 171.1116203950433328    Home regimen: Lantus 15 units SC qHS  Consider restarting  SSI every 6 hours while intubated.   Goal glucose 140 - 180  Hypoglycemia protocol

## 2024-10-28 NOTE — ASSESSMENT & PLAN NOTE
Episode of hypoxia overnight 10/25-10/26, secretions thick and foul smelling  CXR showing right-sided consolidation  Continue cefepime, vancomycin, flagyl   sputum culture: 2+ poly, no bacteria  Trend WBC  Monitor fevers  Pulmonary toilet

## 2024-10-28 NOTE — ASSESSMENT & PLAN NOTE
Lab Results   Component Value Date    HGBA1C 6.4 (H) 10/22/2024     Recent Labs     10/28/24  0548 10/28/24  1151 10/28/24  1750 10/29/24  0002   POCGLU 240* 315* 313* 286*   Blood Sugar Average: Last 72 hrs:  (P) [id number]    Home regimen: Lantus 15 units SC qHS  Consider restarting  SSI every 6 hours while intubated, increased to algorithm 5  Started Lantus 10 units daily  Goal glucose 140 - 180  Hypoglycemia protocol

## 2024-10-28 NOTE — QUICK NOTE
Patient's family member, Jaz Dolan (mother) and Violetta Martin (sister), were present at bedside at about 2:30 pm and were provided with updates regarding clinical progress and plans of care. All questions and concerns were addressed to their satisfaction. We will continue to update daily.     Zonia Hill MD  [facility]  Emergency Medicine Residency, PGY-2

## 2024-10-28 NOTE — ASSESSMENT & PLAN NOTE
Intubated on 10/23 for airway protection for planned burst suppression in setting of satus epilepticus.   Acute respiratory failure with hypoxia d/t seizure,pneumonia (identified 10/26)  10/26 CXR: Development of extensive right perihilar consolidation suspicious for pneumonia/aspiration.   10/28 Developed hypoxia early morning, requiring increased O2/PEEP requirements   10/28 Emergent Bronchoscopy. Copious secretions. Culture obtain  ETT: #7.5. Intubated 10/23.   VENT day #6 18/450/50/10     Plan:  Continue mechanical ventilation.  Continue zosyn, vancomycin (Day#4)  sputum culture (2+ poly, no bacteria)  F/u bronch culture 10/28  Continue sedation.  Propofol gtt  Hold SBT 2/2 status epilepticus, now with continuous burst suppression. Vent bundle.  Obtain am CXR for evaluation of increased secretions  Airway clearance protocol.

## 2024-10-28 NOTE — ASSESSMENT & PLAN NOTE
Episode of hypoxia overnight 10/27 -10/28, secretions thick and foul smelling  CXR showing right-sided consolidation  Bronchoscopy performed for pulmonary toilet, sputum sent for culture.  Continue Zosyn, vancomycin  Sputum culture: 2+ poly, no bacteria  Trend WBC  Monitor fevers  Pulmonary toilet

## 2024-10-28 NOTE — ASSESSMENT & PLAN NOTE
As evidence by tachycardia, fever, leukocytosis  Suspected source pneumonia (See plan below)  Initial WBC - 6.43, Procal - 0.46, Lactic - 0.8  UA - bland  COVID/FLU/RSV bland- negative  Blood cultures x 2 obtained 10/22: No growth  CXR showing right-sided infiltrate  Sputum culture 10/26: Staph aureus  Sputum culture 10/28: pending  He received 30cc/kg resuscitation fluids.     Plan:  Continue ABX: Vancomycin and Zosyn.   Follow up blood cultures, UA, viral swab and MRSA.  Monitor/trend fever curve.  Monitor/trend WBC.  Trend endpoints.

## 2024-10-28 NOTE — ASSESSMENT & PLAN NOTE
S/p craniotomy 1/2019 at [facility], done secondary to abscess/lesions. Baseline independent.   CTH (10/20/24) - No acute intracranial hemorrhage or depressed calvarial fracture. Mild bilateral anterior frontal scalp soft tissue swelling, right greater than left. Right periorbital, perinasal, and premaxillary facial soft tissue swelling also noted.   CTA head/neck (10/22/24) - New extra-axial lenticular shaped hemorrhage along the left parietal convexity as described above, which may represent an epidural hematoma or less likely a subdural hematoma in this region. No significant midline shift noted. Trace new subarachnoid hemorrhage noted along the inferior left temporo-occipital convexity noted.   Continue supportive care.  See individual plans below.

## 2024-10-28 NOTE — TREATMENT PLAN
Assessment:  Epidural hematoma  Status epilepticus  Acute respiratory failure s/p intubation  Anterior C5 vertebral body fracture  Presumed pneumonia  History of stroke with baseline deficits  History of craniotomy  Diabetes    Imaging:  MRI brain seizure with and without contrast 10/26/2024: No acute parenchymal abnormality. Persistent subacute left temporoparietal hemorrhagic subdural collection with a focal loculated segment. There is expected regional inflammatory enhancement of the dura and leptomeninges. Focal areas of nonhemorrhagic contusion/edema in the parietal lobe subjacent to the above-mentioned extra-axial hematoma.Trace posttraumatic subarachnoid hemorrhage in the left temporal sulci. Additional chronic areas of bifrontal and left cerebellar and brainstem gliosis.    Plan:  Imaging reviewed, findings redemonstrated in regards to epidural collection, subarachnoid hemorrhage and contusions.  Continue with conservative management as previously outlined.  Neurology following for seizure management, currently weaning AEDs  Continue collar for C5 fracture  Rest of care per primary team  Plan for follow-up as scheduled on 11/8/2024 with repeat x-ray cervical spine and CT head without contrast  Neurosurgery to sign off, please call with questions or concerns

## 2024-10-28 NOTE — ASSESSMENT & PLAN NOTE
History of spasticity s/p craniotomy.  Home regimen: Baclofen and Tizanidine qHS.  Hold home Baclofen and Tizanidine  Consider resuming Baclofen  Continue Robaxin for analgesia.  Continue supportive care.

## 2024-10-28 NOTE — PHYSICAL THERAPY NOTE
PHYSICAL THERAPY CANCELLATION NOTE          Patient Name: Tae Dolan  Today's Date: 10/28/2024             10/28/24 1030   Note Type   Note Type Cancelled Session   Cancel Reasons Medical status   Assessment   Assessment PT orders received, chart review performed. Spoke to EUGENE Marie and EUGENE York during ICU mobility rounds. At this time pt is not appropriate for PT/mobility - pt currently intubated on continuous vEEG. PT will continue to follow pt as appropriate and as schedule allows.         Mariah Palacios, PT, DPT  10/28/24

## 2024-10-28 NOTE — ASSESSMENT & PLAN NOTE
Recent Labs     10/26/24  1759 10/27/24  0437 10/27/24  0750 10/28/24  0436 10/28/24  0438 10/29/24  0447   HGB 6.9* 8.7* 8.5* 8.7* 8.5* 7.6*     S/p fall  CT C/A/P - Splenomegaly with metallic surgical coils near the splenic hilar region. There is a small amount of intermediate density perisplenic free fluid adjacent to the inferior spleen which could represent subcapsular hematoma (AAST grade I splenic injury).   Hgb on admission - 11.5  Hgb down to 5.3 on 10/24 given additional unit PRBC with appropriate response.   Most recent Hgb 7.9    Plan:  Trend Hgb, repeat CBC in AM.  Transfuse for Hgb < 7.0 or active bleeding, HD instability.  Hold ASA.  Continue DVT prophylaxis with Lovenox.  SCD's for DVT prophylaxis.  Monitor for S/S bleeding.

## 2024-10-28 NOTE — PROGRESS NOTES
Progress Note - Critical Care/ICU   Name: Tae Dolan 54 y.o. male I MRN: [id number]  Unit/Bed#: ICU 07 I Date of Admission: 10/20/2024   Date of Service: 10/28/2024 I Hospital Day: 8       Interval Events:   Developed hypoxemia into 80's with increased O2/PEEP requirements  STAT CXR performed shown persistent worsening right lung opacities      Pre episode-vent settings 16/450/40/6 ppl14  current vent settings 16/450/80/8 ppl 20    Vitals: Pulse 91, R 21, /47, SpO2 93%    Assessment  Acute hypoxic respiratory failure with increased ventilator requirements    Plan:   Emergent bronchoscopy performed at bedside by Dr. Mckeon for pulmonary toilet. Oxygenation improved  BAL culture sent to lab  Post bronch CXR shows persistent dense opacification RML/RUL  Obtain post bronch ABG   Continue cefepime, vanco, flagyl    Billing Level:  Critical Care Time Statement: Upon my evaluation, this patient had a high probability of imminent or life-threatening deterioration due to acute hypoxic respiratory failure, which required my direct attention, intervention, and personal management.  I spent a total of 40 minutes directly providing critical care services, including evaluating for the presence of life-threatening injuries or illnesses and ventilator management. This time is exclusive of procedures, teaching, family meetings, and any prior time recorded by providers other than myself.      Susan Haney PA-C

## 2024-10-28 NOTE — ASSESSMENT & PLAN NOTE
"Tae Dolan is a 54 year old male seen in follow up for aborted focal left hemispheric status epilepticus requiring high doses of propofol and versed sedation which are now being slowly weaned.  Suspect nidus for new onset focal status left hemispheric acute epidural hematoma s/p trauma.    (The patient initially presented to the hospital on 10/20 at 0030 AM after falling out of a second story window. )    Work Up:  NC CTH: Intracranial hemorrhage redemonstrated, most prominently suspected epidural hemorrhage in the left parietal region, as described. There is some local mass effect but no midline shift.   Recommend MRI Brain w/wo contrast with Seizure Protocol  EEG: Multiple frequent seizures since being placed on continuous vEEG on 10/22/24 at 2038. Frequent seizures continued after loading with Levetiracetam, Lacosamide, valproic acid and fosphenytoin. 10 seizures in 90 minutes since 1123 and most recent being caught at 1326.  Last seizure on vEEG monitoring prior to propofol initiation/ boluses 10/23/24 evening.  MRI brain with and without contrast seizure protocol 10/26/24: with no acute parenchymal abnormality:   \"Persistent subacute left temporoparietal hemorrhagic subdural collection with a focal loculated segment. There is expected regional inflammatory enhancement of the dura and leptomeninges.     Focal areas of nonhemorrhagic contusion/edema in the parietal lobe subjacent to the above-mentioned extra-axial hematoma.     Trace posttraumatic subarachnoid hemorrhage in the left temporal sulci.     Additional chronic areas of bifrontal and left cerebellar and brainstem gliosis.\"    UDS - Normal  Ethanol level - Normal    Current AEDs: Levetiracetam 2g q12h, Lacosamide 200mg q12h, Clobazam 5mg TID    AEDS that did not work:  Valproic acid and fosphenytoin loading doses were given but made no observable difference on EEG.    Plan:  Continue slow wean of propofol sedation as per current regimen. At a rate of " 5mg/3 hours.   Telemetry Monitoring  AEDs: Levetiracetam 2g q12h, Lacosamide 200mg q12h, Clobazam 5mg TID. If increasing cortical irritability is noted can consider increasing clobazam dosing to 5/5/10 mg.  Continue vEEG monitoring  Patient with improving fevers with antibiotics. Prefer avoiding cefepime as able as it can further lower seizure threshold- d/w ICU team. As patient has large right sided PNA likely etiology of his fever, thus okay to hold off LP- less concern for CNS infection.  DVT PPx : lovenox   Dl DOT: Needs to be completed prior to discharge  Rest of care per primary medical team  I d/w ICU resident physician above recommendations during my AM rounds

## 2024-10-28 NOTE — PROGRESS NOTES
"Progress Note - Neurology   Name: Tae Dolan 54 y.o. male I MRN: [id number]  Unit/Bed#: ICU 07 I Date of Admission: 10/20/2024   Date of Service: 10/28/2024 I Hospital Day: 8     Assessment & Plan  Status epilepticus (HCC)  Tae Dolan is a 54 year old male seen in follow up for aborted focal left hemispheric status epilepticus requiring high doses of propofol and versed sedation which are now being slowly weaned.  Suspect nidus for new onset focal status left hemispheric acute epidural hematoma s/p trauma.    (The patient initially presented to the hospital on 10/20 at 0030 AM after falling out of a second story window. )    Work Up:  NC CTH: Intracranial hemorrhage redemonstrated, most prominently suspected epidural hemorrhage in the left parietal region, as described. There is some local mass effect but no midline shift.   Recommend MRI Brain w/wo contrast with Seizure Protocol  EEG: Multiple frequent seizures since being placed on continuous vEEG on 10/22/24 at 2038. Frequent seizures continued after loading with Levetiracetam, Lacosamide, valproic acid and fosphenytoin. 10 seizures in 90 minutes since 1123 and most recent being caught at 1326.  Last seizure on vEEG monitoring prior to propofol initiation/ boluses 10/23/24 evening.  MRI brain with and without contrast seizure protocol 10/26/24: with no acute parenchymal abnormality:   \"Persistent subacute left temporoparietal hemorrhagic subdural collection with a focal loculated segment. There is expected regional inflammatory enhancement of the dura and leptomeninges.     Focal areas of nonhemorrhagic contusion/edema in the parietal lobe subjacent to the above-mentioned extra-axial hematoma.     Trace posttraumatic subarachnoid hemorrhage in the left temporal sulci.     Additional chronic areas of bifrontal and left cerebellar and brainstem gliosis.\"    UDS - Normal  Ethanol level - Normal    Current AEDs: Levetiracetam 2g q12h, Lacosamide 200mg q12h, " Clobazam 5mg TID    AEDS that did not work:  Valproic acid and fosphenytoin loading doses were given but made no observable difference on EEG.    Plan:  Continue slow wean of propofol sedation as per current regimen. At a rate of 5mg/3 hours.   Telemetry Monitoring  AEDs: Levetiracetam 2g q12h, Lacosamide 200mg q12h, Clobazam 5mg TID. If increasing cortical irritability is noted can consider increasing clobazam dosing to 5/5/10 mg.  Continue vEEG monitoring  Patient with improving fevers with antibiotics. Prefer avoiding cefepime as able as it can further lower seizure threshold- d/w ICU team. As patient has large right sided PNA likely etiology of his fever, thus okay to hold off LP- less concern for CNS infection.  DVT PPx : lovenox   Dl DOT: Needs to be completed prior to discharge  Rest of care per primary medical team  I d/w ICU resident physician above recommendations during my AM rounds  Epidural hematoma (HCC)  - See above  - Appreciate neurosurgery recs  Type 2 diabetes mellitus (HCC)  Lab Results   Component Value Date    HGBA1C 6.4 (H) 10/22/2024       Recent Labs     10/27/24  1209 10/27/24  1825 10/28/24  0006 10/28/24  0548   POCGLU 259* 213* 224* 240*       Blood Sugar Average: Last 72 hrs:  (P) [id number]    Seizure (HCC)  See above      Subjective   Patient seen at bedside, continues to be sedated. Slow wean of propofol started. No seizure activity on EEG.      Objective :  Temp:  [99.6 °F (37.6 °C)-102 °F (38.9 °C)] 102 °F (38.9 °C)  HR:  [] 120  BP: (113-181)/(47-65) 156/58  Resp:  [8-26] 23  SpO2:  [83 %-100 %] 97 %  O2 Device: None (Room air)  FiO2 (%):  [40-80] 80    Physical ExamNeurological Exam      Lab Results: I have reviewed the following results:  Imaging Results Review: No pertinent imaging studies reviewed. EEG reviewed - no seizure activity.  Other Study Results Review: No additional pertinent studies reviewed.    VTE Pharmacologic Prophylaxis: Sequential compression  device (Venodyne)

## 2024-10-28 NOTE — PLAN OF CARE
Problem: SAFETY ADULT  Goal: Patient will remain free of falls  Description: INTERVENTIONS:  - Educate patient/family on patient safety including physical limitations  - Instruct patient to call for assistance with activity   - Consult OT/PT to assist with strengthening/mobility   - Keep Call bell within reach  - Keep bed low and locked with side rails adjusted as appropriate  - Keep care items and personal belongings within reach  - Initiate and maintain comfort rounds  - Make Fall Risk Sign visible to staff  Problem: SAFETY,RESTRAINT: NV/NON-SELF DESTRUCTIVE BEHAVIOR  Goal: Remains free of harm/injury (restraint for non violent/non self-detsructive behavior)  Description: INTERVENTIONS:  - Instruct patient/family regarding restraint use   - Assess and monitor physiologic and psychological status   - Provide interventions and comfort measures to meet assessed patient needs   - Identify and implement measures to help patient regain control  - Assess readiness for release of restraint   Outcome: Progressing  Goal: Returns to optimal restraint-free functioning  Description: INTERVENTIONS:  - Assess the patient's behavior and symptoms that indicate continued need for restraint  - Identify and implement measures to help patient regain control  - Assess readiness for release of restraint   Outcome: Progressing     - Apply yellow socks and bracelet for high fall risk patients  - Consider moving patient to room near nurses station  Outcome: Progressing  Goal: Maintain or return to baseline ADL function  Description: INTERVENTIONS:  -  Assess patient's ability to carry out ADLs; assess patient's baseline for ADL function and identify physical deficits which impact ability to perform ADLs (bathing, care of mouth/teeth, toileting, grooming, dressing, etc.)  - Assess/evaluate cause of self-care deficits   - Assess range of motion  - Assess patient's mobility; develop plan if impaired  - Assess patient's need for assistive  devices and provide as appropriate  - Encourage maximum independence but intervene and supervise when necessary  - Involve family in performance of ADLs  - Assess for home care needs following discharge   - Consider OT consult to assist with ADL evaluation and planning for discharge  - Provide patient education as appropriate  Outcome: Progressing  Goal: Maintains/Returns to pre admission functional level  Description: INTERVENTIONS:  - Perform AM-PAC 6 Click Basic Mobility/ Daily Activity assessment daily.  - Set and communicate daily mobility goal to care team and patient/family/caregiver.   - Collaborate with rehabilitation services on mobility goals if consulted  - Out of bed for toileting  - Record patient progress and toleration of activity level   Outcome: Progressing

## 2024-10-29 NOTE — PLAN OF CARE
Problem: PAIN - ADULT  Goal: Verbalizes/displays adequate comfort level or baseline comfort level  Description: Interventions:  - Encourage patient to monitor pain and request assistance  - Assess pain using appropriate pain scale  - Administer analgesics based on type and severity of pain and evaluate response  - Implement non-pharmacological measures as appropriate and evaluate response  - Consider cultural and social influences on pain and pain management  - Notify physician/advanced practitioner if interventions unsuccessful or patient reports new pain  Outcome: Progressing     Problem: SAFETY ADULT  Goal: Patient will remain free of falls  Description: INTERVENTIONS:  - Educate patient/family on patient safety including physical limitations  - Instruct patient to call for assistance with activity   - Consult OT/PT to assist with strengthening/mobility   - Keep Call bell within reach  - Keep bed low and locked with side rails adjusted as appropriate  - Keep care items and personal belongings within reach  - Initiate and maintain comfort rounds  - Make Fall Risk Sign visible to staff  - Apply yellow socks and bracelet for high fall risk patients  - Consider moving patient to room near nurses station  Outcome: Progressing  Goal: Maintain or return to baseline ADL function  Description: INTERVENTIONS:  -  Assess patient's ability to carry out ADLs; assess patient's baseline for ADL function and identify physical deficits which impact ability to perform ADLs (bathing, care of mouth/teeth, toileting, grooming, dressing, etc.)  - Assess/evaluate cause of self-care deficits   - Assess range of motion  - Assess patient's mobility; develop plan if impaired  - Assess patient's need for assistive devices and provide as appropriate  - Encourage maximum independence but intervene and supervise when necessary  - Involve family in performance of ADLs  - Assess for home care needs following discharge   - Consider OT consult  to assist with ADL evaluation and planning for discharge  - Provide patient education as appropriate  Outcome: Progressing  Goal: Maintains/Returns to pre admission functional level  Description: INTERVENTIONS:  - Perform AM-PAC 6 Click Basic Mobility/ Daily Activity assessment daily.  - Set and communicate daily mobility goal to care team and patient/family/caregiver.   - Collaborate with rehabilitation services on mobility goals if consulted  - Out of bed for toileting  - Record patient progress and toleration of activity level   Outcome: Progressing     Problem: DISCHARGE PLANNING  Goal: Discharge to home or other facility with appropriate resources  Description: INTERVENTIONS:  - Identify barriers to discharge w/patient and caregiver  - Arrange for needed discharge resources and transportation as appropriate  - Identify discharge learning needs (meds, wound care, etc.)  - Arrange for interpretive services to assist at discharge as needed  - Refer to Case Management Department for coordinating discharge planning if the patient needs post-hospital services based on physician/advanced practitioner order or complex needs related to functional status, cognitive ability, or social support system  Outcome: Progressing     Problem: Knowledge Deficit  Goal: Patient/family/caregiver demonstrates understanding of disease process, treatment plan, medications, and discharge instructions  Description: Complete learning assessment and assess knowledge base.  Interventions:  - Provide teaching at level of understanding  - Provide teaching via preferred learning methods  Outcome: Progressing     Problem: SAFETY,RESTRAINT: NV/NON-SELF DESTRUCTIVE BEHAVIOR  Goal: Remains free of harm/injury (restraint for non violent/non self-detsructive behavior)  Description: INTERVENTIONS:  - Instruct patient/family regarding restraint use   - Assess and monitor physiologic and psychological status   - Provide interventions and comfort measures  to meet assessed patient needs   - Identify and implement measures to help patient regain control  - Assess readiness for release of restraint   Outcome: Progressing  Goal: Returns to optimal restraint-free functioning  Description: INTERVENTIONS:  - Assess the patient's behavior and symptoms that indicate continued need for restraint  - Identify and implement measures to help patient regain control  - Assess readiness for release of restraint   Outcome: Progressing     Problem: Nutrition/Hydration-ADULT  Goal: Nutrient/Hydration intake appropriate for improving, restoring or maintaining nutritional needs  Description: Monitor and assess patient's nutrition/hydration status for malnutrition. Collaborate with interdisciplinary team and initiate plan and interventions as ordered.  Monitor patient's weight and dietary intake as ordered or per policy. Utilize nutrition screening tool and intervene as necessary. Determine patient's food preferences and provide high-protein, high-caloric foods as appropriate.     INTERVENTIONS:  - Monitor oral intake, urinary output, labs, and treatment plans  - Assess nutrition and hydration status and recommend course of action  - Evaluate amount of meals eaten  - Assist patient with eating if necessary   - Allow adequate time for meals  - Recommend/ encourage appropriate diets, oral nutritional supplements, and vitamin/mineral supplements  - Order, calculate, and assess calorie counts as needed  - Recommend, monitor, and adjust tube feedings and TPN/PPN based on assessed needs  - Assess need for intravenous fluids  - Provide specific nutrition/hydration education as appropriate  - Include patient/family/caregiver in decisions related to nutrition  Outcome: Progressing     Problem: Prexisting or High Potential for Compromised Skin Integrity  Goal: Skin integrity is maintained or improved  Description: INTERVENTIONS:  - Identify patients at risk for skin breakdown  - Assess and monitor  skin integrity  - Assess and monitor nutrition and hydration status  - Monitor labs   - Assess for incontinence   - Turn and reposition patient  - Assist with mobility/ambulation  - Relieve pressure over bony prominences  - Avoid friction and shearing  - Provide appropriate hygiene as needed including keeping skin clean and dry  - Evaluate need for skin moisturizer/barrier cream  - Collaborate with interdisciplinary team   - Patient/family teaching  - Consider wound care consult   Outcome: Progressing     Problem: NEUROSENSORY - ADULT  Goal: Achieves stable or improved neurological status  Description: INTERVENTIONS  - Monitor and report changes in neurological status  - Monitor vital signs such as temperature, blood pressure, glucose, and any other labs ordered   - Initiate measures to prevent increased intracranial pressure  - Monitor for seizure activity and implement precautions if appropriate      Outcome: Progressing  Goal: Remains free of injury related to seizures activity  Description: INTERVENTIONS  - Maintain airway, patient safety  and administer oxygen as ordered  - Monitor patient for seizure activity, document and report duration and description of seizure to physician/advanced practitioner  - If seizure occurs,  ensure patient safety during seizure  - Reorient patient post seizure  - Seizure pads on all 4 side rails  - Instruct patient/family to notify RN of any seizure activity including if an aura is experienced  - Instruct patient/family to call for assistance with activity based on nursing assessment  - Administer anti-seizure medications if ordered    Outcome: Progressing  Goal: Achieves maximal functionality and self care  Description: INTERVENTIONS  - Monitor swallowing and airway patency with patient fatigue and changes in neurological status  - Encourage and assist patient to increase activity and self care.   - Encourage visually impaired, hearing impaired and aphasic patients to use  assistive/communication devices  Outcome: Progressing     Problem: CARDIOVASCULAR - ADULT  Goal: Maintains optimal cardiac output and hemodynamic stability  Description: INTERVENTIONS:  - Monitor I/O, vital signs and rhythm  - Monitor for S/S and trends of decreased cardiac output  - Administer and titrate ordered vasoactive medications to optimize hemodynamic stability  - Assess quality of pulses, skin color and temperature  - Assess for signs of decreased coronary artery perfusion  - Instruct patient to report change in severity of symptoms  Outcome: Progressing  Goal: Absence of cardiac dysrhythmias or at baseline rhythm  Description: INTERVENTIONS:  - Continuous cardiac monitoring, vital signs, obtain 12 lead EKG if ordered  - Administer antiarrhythmic and heart rate control medications as ordered  - Monitor electrolytes and administer replacement therapy as ordered  Outcome: Progressing     Problem: RESPIRATORY - ADULT  Goal: Achieves optimal ventilation and oxygenation  Description: INTERVENTIONS:  - Assess for changes in respiratory status  - Assess for changes in mentation and behavior  - Position to facilitate oxygenation and minimize respiratory effort  - Oxygen administered by appropriate delivery if ordered  - Initiate smoking cessation education as indicated  - Encourage broncho-pulmonary hygiene including cough, deep breathe, Incentive Spirometry  - Assess the need for suctioning and aspirate as needed  - Assess and instruct to report SOB or any respiratory difficulty  - Respiratory Therapy support as indicated  Outcome: Progressing     Problem: GASTROINTESTINAL - ADULT  Goal: Minimal or absence of nausea and/or vomiting  Description: INTERVENTIONS:  - Administer IV fluids if ordered to ensure adequate hydration  - Maintain NPO status until nausea and vomiting are resolved  - Nasogastric tube if ordered  - Administer ordered antiemetic medications as needed  - Provide nonpharmacologic comfort measures  as appropriate  - Advance diet as tolerated, if ordered  - Consider nutrition services referral to assist patient with adequate nutrition and appropriate food choices  Outcome: Progressing  Goal: Maintains or returns to baseline bowel function  Description: INTERVENTIONS:  - Assess bowel function  - Encourage oral fluids to ensure adequate hydration  - Administer IV fluids if ordered to ensure adequate hydration  - Administer ordered medications as needed  - Encourage mobilization and activity  - Consider nutritional services referral to assist patient with adequate nutrition and appropriate food choices  Outcome: Progressing  Goal: Maintains adequate nutritional intake  Description: INTERVENTIONS:  - Monitor percentage of each meal consumed  - Identify factors contributing to decreased intake, treat as appropriate  - Assist with meals as needed  - Monitor I&O, weight, and lab values if indicated  - Obtain nutrition services referral as needed  Outcome: Progressing  Goal: Establish and maintain optimal ostomy function  Description: INTERVENTIONS:  - Assess bowel function  - Encourage oral fluids to ensure adequate hydration  - Administer IV fluids if ordered to ensure adequate hydration   - Administer ordered medications as needed  - Encourage mobilization and activity  - Nutrition services referral to assist patient with appropriate food choices  - Assess stoma site  - Consider wound care consult   Outcome: Progressing  Goal: Oral mucous membranes remain intact  Description: INTERVENTIONS  - Assess oral mucosa and hygiene practices  - Implement preventative oral hygiene regimen  - Implement oral medicated treatments as ordered  - Initiate Nutrition services referral as needed  Outcome: Progressing     Problem: GENITOURINARY - ADULT  Goal: Maintains or returns to baseline urinary function  Description: INTERVENTIONS:  - Assess urinary function  - Encourage oral fluids to ensure adequate hydration if ordered  -  Administer IV fluids as ordered to ensure adequate hydration  - Administer ordered medications as needed  - Offer frequent toileting  - Follow urinary retention protocol if ordered  Outcome: Progressing  Goal: Absence of urinary retention  Description: INTERVENTIONS:  - Assess patient’s ability to void and empty bladder  - Monitor I/O  - Bladder scan as needed  - Discuss with physician/AP medications to alleviate retention as needed  - Discuss catheterization for long term situations as appropriate  Outcome: Progressing  Goal: Urinary catheter remains patent  Description: INTERVENTIONS:  - Assess patency of urinary catheter  - If patient has a chronic villavicencio, consider changing catheter if non-functioning  - Follow guidelines for intermittent irrigation of non-functioning urinary catheter  Outcome: Progressing

## 2024-10-29 NOTE — CASE MANAGEMENT
Case Management Progress Note    Patient name Tae Dolan  Location ICU 07/ICU 07 MRN [id number]  : 1970 Date 10/29/2024       LOS (days): 9  Geometric Mean LOS (GMLOS) (days): 4.5  Days to GMLOS:-4.9        OBJECTIVE:        Current admission status: Inpatient  Preferred Pharmacy:   [id number] - [address] [address]  Phone: [phone] Fax: [phone]    Primary Care Provider: Leny Sparks MD    Primary Insurance: MEDICARE  Secondary Insurance:     PROGRESS NOTE:    Bianca with [facility] at hospital to complete LOC.

## 2024-10-29 NOTE — ASSESSMENT & PLAN NOTE
Lab Results   Component Value Date    HGBA1C 6.4 (H) 10/22/2024       Recent Labs     10/28/24  1151 10/28/24  1750 10/29/24  0002 10/29/24  0551   POCGLU 315* 313* 286* 215*       Blood Sugar Average: Last 72 hrs:  (P) 209.8

## 2024-10-29 NOTE — OCCUPATIONAL THERAPY NOTE
Occupational Therapy Cancellation Note     Patient Name: Tae Dolan  Today's Date: 10/29/2024     10/29/24 1047   Note Type   Note Type Cancelled Session   Cancel Reasons Medical status  (OT orders remain active, per ICU mobility rounds patient is not appropriate for participation in OT tx. Intubated and on continuous EEG monitoring. Will hold and continue to f/u as able and appropriate.)     Little [place] MS OTR/L   [place] Licensure# [id number]

## 2024-10-29 NOTE — PROGRESS NOTES
"Progress Note - Neurology   Name: Tae Dolan 54 y.o. male I MRN: [id number]  Unit/Bed#: ICU 07 I Date of Admission: 10/20/2024   Date of Service: 10/29/2024 I Hospital Day: 9     Assessment & Plan  Status epilepticus (HCC)  Tae Dolan is a 54 year old male seen in follow up for aborted focal left hemispheric status epilepticus requiring high doses of propofol and versed sedation which are now being slowly weaned.  Suspect nidus for new onset focal status left hemispheric acute epidural hematoma s/p trauma.    (The patient initially presented to the hospital on 10/20 at 0030 AM after falling out of a second story window. )    Work Up:  NC CTH: Intracranial hemorrhage redemonstrated, most prominently suspected epidural hemorrhage in the left parietal region, as described. There is some local mass effect but no midline shift.   Recommend MRI Brain w/wo contrast with Seizure Protocol  EEG: Multiple frequent seizures since being placed on continuous vEEG on 10/22/24 at 2038. Frequent seizures continued after loading with Levetiracetam, Lacosamide, valproic acid and fosphenytoin. 10 seizures in 90 minutes since 1123 and most recent being caught at 1326.  Last seizure on vEEG monitoring prior to propofol initiation/ boluses 10/23/24 evening.  MRI brain with and without contrast seizure protocol 10/26/24: with no acute parenchymal abnormality:   \"Persistent subacute left temporoparietal hemorrhagic subdural collection with a focal loculated segment. There is expected regional inflammatory enhancement of the dura and leptomeninges.     Focal areas of nonhemorrhagic contusion/edema in the parietal lobe subjacent to the above-mentioned extra-axial hematoma.     Trace posttraumatic subarachnoid hemorrhage in the left temporal sulci.     Additional chronic areas of bifrontal and left cerebellar and brainstem gliosis.\"    UDS - Normal  Ethanol level - Normal    Current AEDs: Levetiracetam 2g q12h, Lacosamide 200mg q12h, " Clobazam 5mg TID    AEDS that did not work:  Valproic acid and fosphenytoin loading doses were given but made no observable difference on EEG.    Plan:  Propofol weaned off. Remains seizure free. Plan to monitor 24 hours after discontinuation of Propofol   Telemetry Monitoring  AEDs: Levetiracetam 2g q12h, Lacosamide 200mg q12h, Clobazam 5mg TID. If increasing cortical irritability is noted can consider increasing clobazam dosing to 5/5/10 mg.  Continue vEEG monitoring  Patient with improving fevers with antibiotics. Prefer avoiding cefepime as able as it can further lower seizure threshold- d/w ICU team. As patient has large right sided PNA likely etiology of his fever, thus okay to hold off LP- less concern for CNS infection.  DVT PPx : lovenox   [place] DOT: Needs to be completed prior to discharge  Rest of care per primary medical team  I d/w ICU resident physician above recommendations during my AM rounds  Epidural hematoma (HCC)  - See above  - Appreciate neurosurgery recs  Type 2 diabetes mellitus (HCC)  Lab Results   Component Value Date    HGBA1C 6.4 (H) 10/22/2024       Recent Labs     10/28/24  1151 10/28/24  1750 10/29/24  0002 10/29/24  0551   POCGLU 315* 313* 286* 215*       Blood Sugar Average: Last 72 hrs:  (P) 209.8      Recommendations for outpatient neurological follow up have yet to be determined.      Subjective   Patient seen at bedside intubated. On minimal sedation. Gives thumbs up when commanded. Opens eye to vocal stimuli.       Objective :  Temp:  [99 °F (37.2 °C)-102 °F (38.9 °C)] 99.5 °F (37.5 °C)  HR:  [] 110  BP: (156-165)/(53-58) 165/53  Resp:  [15-26] 15  SpO2:  [93 %-99 %] 96 %  O2 Device: Ventilator    Physical Exam  Vitals reviewed.     Neurological Exam  Mental Status    Patient intubated and on precedex only.  Patient opens eyes to vocal stimulus. The patient raises right thumb when asked to give a thumbs up.  Unable to follow any other directions..        Lab Results: I have  reviewed the following results:  Imaging Results Review: No pertinent imaging studies reviewed.  Other Study Results Review: No additional pertinent studies reviewed.    VTE Pharmacologic Prophylaxis: Per primary team

## 2024-10-29 NOTE — PLAN OF CARE
Problem: PAIN - ADULT  Goal: Verbalizes/displays adequate comfort level or baseline comfort level  Description: Interventions:  - Encourage patient to monitor pain and request assistance  - Assess pain using appropriate pain scale  - Administer analgesics based on type and severity of pain and evaluate response  - Implement non-pharmacological measures as appropriate and evaluate response  - Consider cultural and social influences on pain and pain management  - Notify physician/advanced practitioner if interventions unsuccessful or patient reports new pain  Outcome: Progressing     Problem: SAFETY ADULT  Goal: Patient will remain free of falls  Description: INTERVENTIONS:  - Educate patient/family on patient safety including physical limitations  - Instruct patient to call for assistance with activity   - Consult OT/PT to assist with strengthening/mobility   - Keep Call bell within reach  - Keep bed low and locked with side rails adjusted as appropriate  - Keep care items and personal belongings within reach  - Initiate and maintain comfort rounds  - Make Fall Risk Sign visible to staff  - Offer Toileting every 2 Hours, in advance of need  - Initiate/Maintain bed alarm  - Apply yellow socks and bracelet for high fall risk patients  - Consider moving patient to room near nurses station  Outcome: Progressing  Goal: Maintain or return to baseline ADL function  Description: INTERVENTIONS:  -  Assess patient's ability to carry out ADLs; assess patient's baseline for ADL function and identify physical deficits which impact ability to perform ADLs (bathing, care of mouth/teeth, toileting, grooming, dressing, etc.)  - Assess/evaluate cause of self-care deficits   - Assess range of motion  - Assess patient's mobility; develop plan if impaired  - Assess patient's need for assistive devices and provide as appropriate  - Encourage maximum independence but intervene and supervise when necessary  - Involve family in performance  of ADLs  - Assess for home care needs following discharge   - Consider OT consult to assist with ADL evaluation and planning for discharge  - Provide patient education as appropriate  Outcome: Progressing  Goal: Maintains/Returns to pre admission functional level  Description: INTERVENTIONS:  - Perform AM-PAC 6 Click Basic Mobility/ Daily Activity assessment daily.  - Set and communicate daily mobility goal to care team and patient/family/caregiver.   - Collaborate with rehabilitation services on mobility goals if consulted  - Reposition patient every 2 hours.  - Out of bed for toileting  - Record patient progress and toleration of activity level   Outcome: Progressing     Problem: SAFETY,RESTRAINT: NV/NON-SELF DESTRUCTIVE BEHAVIOR  Goal: Remains free of harm/injury (restraint for non violent/non self-detsructive behavior)  Description: INTERVENTIONS:  - Instruct patient/family regarding restraint use   - Assess and monitor physiologic and psychological status   - Provide interventions and comfort measures to meet assessed patient needs   - Identify and implement measures to help patient regain control  - Assess readiness for release of restraint   Outcome: Progressing  Goal: Returns to optimal restraint-free functioning  Description: INTERVENTIONS:  - Assess the patient's behavior and symptoms that indicate continued need for restraint  - Identify and implement measures to help patient regain control  - Assess readiness for release of restraint   Outcome: Progressing     Problem: Nutrition/Hydration-ADULT  Goal: Nutrient/Hydration intake appropriate for improving, restoring or maintaining nutritional needs  Description: Monitor and assess patient's nutrition/hydration status for malnutrition. Collaborate with interdisciplinary team and initiate plan and interventions as ordered.  Monitor patient's weight and dietary intake as ordered or per policy. Utilize nutrition screening tool and intervene as necessary.  Determine patient's food preferences and provide high-protein, high-caloric foods as appropriate.     INTERVENTIONS:  - Monitor oral intake, urinary output, labs, and treatment plans  - Assess nutrition and hydration status and recommend course of action  - Evaluate amount of meals eaten  - Assist patient with eating if necessary   - Allow adequate time for meals  - Recommend/ encourage appropriate diets, oral nutritional supplements, and vitamin/mineral supplements  - Order, calculate, and assess calorie counts as needed  - Recommend, monitor, and adjust tube feedings and TPN/PPN based on assessed needs  - Assess need for intravenous fluids  - Provide specific nutrition/hydration education as appropriate  - Include patient/family/caregiver in decisions related to nutrition  Outcome: Progressing     Problem: Prexisting or High Potential for Compromised Skin Integrity  Goal: Skin integrity is maintained or improved  Description: INTERVENTIONS:  - Identify patients at risk for skin breakdown  - Assess and monitor skin integrity  - Assess and monitor nutrition and hydration status  - Monitor labs   - Assess for incontinence   - Turn and reposition patient  - Assist with mobility/ambulation  - Relieve pressure over bony prominences  - Avoid friction and shearing  - Provide appropriate hygiene as needed including keeping skin clean and dry  - Evaluate need for skin moisturizer/barrier cream  - Collaborate with interdisciplinary team   - Patient/family teaching  - Consider wound care consult   Outcome: Progressing

## 2024-10-29 NOTE — ASSESSMENT & PLAN NOTE
"Tae Dolan is a 54 year old male seen in follow up for aborted focal left hemispheric status epilepticus requiring high doses of propofol and versed sedation which are now being slowly weaned.  Suspect nidus for new onset focal status left hemispheric acute epidural hematoma s/p trauma.    (The patient initially presented to the hospital on 10/20 at 0030 AM after falling out of a second story window. )    Work Up:  NC CTH: Intracranial hemorrhage redemonstrated, most prominently suspected epidural hemorrhage in the left parietal region, as described. There is some local mass effect but no midline shift.   Recommend MRI Brain w/wo contrast with Seizure Protocol  EEG: Multiple frequent seizures since being placed on continuous vEEG on 10/22/24 at 2038. Frequent seizures continued after loading with Levetiracetam, Lacosamide, valproic acid and fosphenytoin. 10 seizures in 90 minutes since 1123 and most recent being caught at 1326.  Last seizure on vEEG monitoring prior to propofol initiation/ boluses 10/23/24 evening.  MRI brain with and without contrast seizure protocol 10/26/24: with no acute parenchymal abnormality:   \"Persistent subacute left temporoparietal hemorrhagic subdural collection with a focal loculated segment. There is expected regional inflammatory enhancement of the dura and leptomeninges.     Focal areas of nonhemorrhagic contusion/edema in the parietal lobe subjacent to the above-mentioned extra-axial hematoma.     Trace posttraumatic subarachnoid hemorrhage in the left temporal sulci.     Additional chronic areas of bifrontal and left cerebellar and brainstem gliosis.\"    UDS - Normal  Ethanol level - Normal    Current AEDs: Levetiracetam 2g q12h, Lacosamide 200mg q12h, Clobazam 5mg TID    AEDS that did not work:  Valproic acid and fosphenytoin loading doses were given but made no observable difference on EEG.    Plan:  Propofol weaned off. Remains seizure free. Plan to monitor 24 hours after " discontinuation of Propofol   Telemetry Monitoring  AEDs: Levetiracetam 2g q12h, Lacosamide 200mg q12h, Clobazam 5mg TID. If increasing cortical irritability is noted can consider increasing clobazam dosing to 5/5/10 mg.  Continue vEEG monitoring  Patient with improving fevers with antibiotics. Prefer avoiding cefepime as able as it can further lower seizure threshold- d/w ICU team. As patient has large right sided PNA likely etiology of his fever, thus okay to hold off LP- less concern for CNS infection.  DVT PPx : lovenox   [place] DOT: Needs to be completed prior to discharge  Rest of care per primary medical team  I d/w ICU resident physician above recommendations during my AM rounds

## 2024-10-29 NOTE — RESPIRATORY THERAPY NOTE
RT Ventilator Management Note  Tae Dolan 54 y.o. male MRN: [id number]  Unit/Bed#: ICU 07 Encounter: [id number]       10/29/24 1450   Respiratory Assessment   Resp Comments Pt uncomfortable on current vent settings. Provider at bedside. Switched pt to PS on documented settings. Pt doing well on PS at this time   Vent Information   Vent    Vent type     Vent Mode (S)  CPAP/PS Spont   $ Pulse Oximetry Spot Check Charge Completed   SpO2 94 %   CPAP/PS Spont Settings   FIO2 (%) 60 %   PEEP (cmH2O) 10 cmH2O   Pressure Support (cmH2O) 8 cmH20   Trigger Sensitivity Flow (lpm) 3 LPM   Rise Time (%) 50 %   Esens % 25 %   Humidification Heater   Heater Temp 98.6 °F (37 °C)   CPAP/PS Spont Actuals   Resp Rate (BPM) 19 BPM   VT (mL) 505 mL   MV (Obs) 9.43   MAP (cmH2O) 12 cmH2O   Peak Pressure (cmH2O) 20 cmH2O   I/E Ratio (Obs) 1:3.8   RSBI 44   Heater Temperature (Obs) 98.6 °F (37 °C)   CPAP/PS Spont Alarms   High Peak Pressure (cmH20) 40 cmH2O   High Resp Rate (BPM) 40 BPM   High MV (L/min) 20 L/min   Low MV (L/min) 3 L/min   High Lyn VTE (mL) 900 mL   Low Lyn VTE (mL) 250 mL   High SPONT VTE (mL) 900 mL   Low Spont VTE (mL) 250 mL   CPAP/PS Spont Apnea Settings   Resp Rate (BPM) 16 BPM   VT (mL) 450 mL   FIO2 (%) 100 %   Apnea Time (s) 20 S   Apnea Flow (LPM) 65 LPM   Maintenance   Alarm (pink) cable attached Yes   Resuscitation bag with peep valve at bedside Yes   Water bag changed No   Circuit changed No       Daily Screen         10/28/2024  0737 10/29/2024  0745          Patient safety screen outcome:: Failed Failed      Not Ready for Weaning due to:: Underline problem not resolved PEEP > 8cmH2O                Physical Exam:   Assessment Type: Post-treatment  Respiratory Pattern: Assisted  Chest Assessment: Chest expansion symmetrical  Bilateral Breath Sounds: Coarse  R Breath Sounds: Rhonchi  Cough: Productive  Suction: ET Tube  O2 Device: vent      Resp Comments: Pt uncomfortable on current vent  settings. Provider at bedside. Switched pt to PS on documented settings. Pt doing well on PS at this time

## 2024-10-29 NOTE — PHYSICAL THERAPY NOTE
PHYSICAL THERAPY CANCELLATION NOTE          Patient Name: Tae Dolan  Today's Date: 10/29/2024             10/29/24 1035   Note Type   Note Type Cancelled Session   Cancel Reasons Medical status   Assessment   Assessment PT orders received, chart review performed. Per ICU mobility rounds, pt continues to be not appropriate for participating in PT at this time. Pt remains intubated and on continuous EEG at bedside. PT will continue to follow pt as appropriate and as schedule allows.         Mariah Palacios, PT, DPT  10/29/24

## 2024-10-29 NOTE — RESPIRATORY THERAPY NOTE
RT Ventilator Management Note  Tae Dolan 54 y.o. male MRN: [id number]  Unit/Bed#: ICU 07 Encounter: [id number]       10/29/24 0745   Respiratory Assessment   Assessment Type Post-treatment   Respiratory Pattern Assisted   Chest Assessment Chest expansion symmetrical   Bilateral Breath Sounds Coarse   R Breath Sounds Rhonchi   Cough Productive   Suction ET Tube   Resp Comments Lavaged and suctioned twice this morning for large amount of thick, yellow secretions   O2 Device vent   Vent Information   Vent    Vent type     Vent Mode AC/VC   $ Vital Capacity Mech/Peak Flow Yes   $ Pulse Oximetry Spot Check Charge Completed   SpO2 96 %   AC/VC Settings   Resp Rate (BPM) 16 BPM   Vt (mL) 450 mL   FIO2 (%) 40 %   PEEP (cmH2O) 10 cmH2O   Flow Pattern (LPM) 65 L/min   Trigger Sensitivity Flow (lpm) 3 %   Humidification Heater   Heater Temperature (Set) 98.6 °F (37 °C)   AC/VC Actuals   Resp Rate (BPM) 20 BPM   VT (mL) 493   MV 9.91   MAP (cmH2O) 11 cmH2O   Peak Pressure (cmH2O) 21 cmH2O   I/E Ratio (Obs) 1:2.6   Heater Temperature (Obs) 98.6 °F (37 °C)   Static Compliance (mL/cmH20) 23 mL/cmH2O   Plateau Pressure (cm H2O) 38 cm H2O   AC/VC Alarms   High Peak Pressure (cmH2O) 40   High Resp Rate (BPM) 40 BPM   High MV (L/min) 16 L/min   Low MV (L/min) 4 L/min   Vt High (mL) 800 mL   Vt Low (mL) 20 mL   AC/VC Apnea Settings   Resp Rate (BPM) 16 BPM   VT (mL) 450 mL   FIO2 (%) 100 %   Apnea Time (s) 20 S   Apnea Flow (L/min) 45 L/min   Maintenance   Alarm (pink) cable attached Yes   Resuscitation bag with peep valve at bedside Yes   Water bag changed Yes   Circuit changed No   Daily Screen   Patient safety screen outcome: Failed   Not Ready for Weaning due to: PEEP > 8cmH2O   ETT  Oral   Placement Date/Time: 10/23/24 1412   Mask Ventilation: Ventilated by mask (1)  Preoxygenated: Yes  Type: Oral   Secured at (cm) 26   Measured from Lips   Secured Location Right   Repositioned Center to Right   Secured by  Commercial tube miramontes   Site Condition Cool;Dry   Cuff Pressure (cm H2O) 28 cm H2O   HI-LO Suction  Intermittent suction   HI-LO Secretions Scant   HI-LO Intervention Patent       Daily Screen         10/28/2024  0737 10/29/2024  0711          Patient safety screen outcome:: Failed Failed      Not Ready for Weaning due to:: Underline problem not resolved PEEP > 8cmH2O                Physical Exam:   Assessment Type: Post-treatment  Respiratory Pattern: Assisted  Chest Assessment: Chest expansion symmetrical  Bilateral Breath Sounds: Coarse  R Breath Sounds: Rhonchi  Cough: Productive  Suction: ET Tube  O2 Device: vent      Resp Comments: Lavaged and suctioned twice this morning for large amount of thick, yellow secretions

## 2024-10-29 NOTE — PROGRESS NOTES
Tae Dolan is a 54 y.o. male who is currently ordered Vancomycin IV with management by the Pharmacy Consult service.  Relevant clinical data and objective / subjective history reviewed.  Vancomycin Assessment:  Indication and Goal AUC/Trough: Pneumonia (goal -600, trough >10)  Clinical Status:  critically ill  Micro:   10/28 bronchial culture: 4+ polys, no organisms seen (prelim)  10/26 sputum culture: 4+ staphylococcus aureus (pending susceptibilities)   10/26 urine culture: no growth  10/26 strep pneumoniae: negative  10/26 legionella: negative  10/26 BC x 2: no growth at 48 hrs  10/22 MRSA culture: MRSA (+)  10/22 BC x 2: no growth at 5 days   Renal Function:  SCr: 1.12 mg/dL, increased from 0.58  CrCl: 73.6 mL/min  Renal replacement: Not on dialysis  Days of Therapy: 4  Current Dose: 1250 mg IV Q12H  Vancomycin Plan:  New Dosing: new LISSETH, start pulse dosing; 1250 mg IV daily PRN for vancomycin level < 15  Estimated AUC: N/A, pulse dosing  Estimated Trough: N/A, pulse dosing  Next Level: 10/30 at 0600  Renal Function Monitoring: Daily BMP and UOP  Pharmacy will continue to follow closely for s/sx of nephrotoxicity, infusion reactions and appropriateness of therapy.  BMP and CBC will be ordered per protocol. We will continue to follow the patient’s culture results and clinical progress daily.    Madelaine Chen, Pharmacist

## 2024-10-29 NOTE — PROGRESS NOTES
Progress Note - Critical Care/ICU   Name: Tae Dolan 54 y.o. male I MRN: [id number]  Unit/Bed#: ICU 07 I Date of Admission: 10/20/2024   Date of Service: 10/29/2024 I Hospital Day: 9      Assessment & Plan  Acute respiratory failure with hypoxia (HCC)  Intubated on 10/23 for airway protection for planned burst suppression in setting of satus epilepticus.   Acute respiratory failure with hypoxia d/t seizure,pneumonia (identified 10/26)  10/26 CXR: Development of extensive right perihilar consolidation suspicious for pneumonia/aspiration.   10/28 Developed hypoxia early morning, requiring increased O2/PEEP requirements   10/28 Emergent Bronchoscopy. Copious secretions. Culture obtain  ETT: #7.5. Intubated 10/23.   VENT day #6 18/450/50/10     Plan:  Continue mechanical ventilation.  Continue zosyn, vancomycin (Day#4)  sputum culture (2+ poly, no bacteria)  F/u bronch culture 10/28  Continue sedation.  Propofol gtt  Hold SBT 2/2 status epilepticus, now with continuous burst suppression. Vent bundle.  Obtain am CXR for evaluation of increased secretions  Airway clearance protocol.   Status epilepticus (HCC)  Frequent Neuro Checks, will notify Neurology team if any acute changes in exam and obtain STAT CTH  Seizure / Fall Precautions  Wean sedation  Currently on Propofol - plan to wean 5mg every 3 hours overnight.  Alternate is restarting versed  Telemetry Monitoring  AEDs: Levetiracetam 2g q12h, Lacosamide 200mg q12h, Clobazam 5mg TID.   If increasing cortical irritability is noted can consider increasing clobazam dosing to 5/5/10 mg.   EEG: continuous vEEG   Per neurology, recommending seizure suppression 24-48 hours as the patient has had continued seizure activity which poses risk for permanent neurologic damage.   Imaging Studies: MRI Brain w/wo contrast seizure protocol, no acute parenchymal abnormality, persistent subacute left temporoparietal subdural collection with focal loculated segment. Focal areas of  nonhemorrhagic contusion/edmea in the parietal lob subjacent to the extra-axial hematoma. Trace postraumatic subarachnoid hemorrhage in left temporal sulci.  Rescue Meds: Ativan IV 2mg prn 2 complex partial seizures or 1 GTC seizure  DVT PPx , and SCDs  PT/OT Evaluate and Treat  Dl DOT: Needs to be completed prior to discharge  Epidural hematoma (HCC)  Imaging:  CT head wo, 10/23/2024: Unchanged 1.3 cm thick acute extra-axial hemorrhage along left parietooccipital cerebral convexity, which could represent a combination of subdural hematoma and epidural hematoma (given lenticular shape at its thickest portion). Unchanged mild mass effect on adjacent left parietal lobe. Unchanged acute trace subarachnoid hemorrhage along left inferior temporal convexity. Unchanged acute trace thin subdural hematoma along left tentorium cerebelli. No new acute intracranial abnormality.  CTA head and neck w/wo, 10/22/2024: New extra-axial lenticular shaped hemorrhage along the left parietal convexity as described above, which may represent an epidural hematoma or less likely a subdural hematoma in this region. No significant midline shift noted. Trace new subarachnoid hemorrhage noted along the inferior left temporo-occipital convexity noted. Right facial subcutaneous soft tissue swelling/hematoma again demonstrated. CT Angiography: No active extravasation of contrast is noted into the extra-axial hematoma. No large vessel occlusion, high-grade stenosis, or intracranial aneurysm identified on CT angiogram of the head. No hemodynamically significant stenosis or dissection identified on CT angiogram of the neck. Anterior mildly comminuted C5 vertebral body fracture again demonstrated, with mild associated prevertebral edema again demonstrated.  MRI Brain 10/27: No acute parenchymal abnormality.   Persistent subacute left temporoparietal hemorrhagic subdural collection with a focal loculated segment. There is expected regional  inflammatory enhancement of the dura and leptomeninges. Focal areas of nonhemorrhagic contusion/edema in the parietal lobe subjacent to the above-mentioned extra-axial hematoma.   Trace posttraumatic subarachnoid hemorrhage in the left temporal sulci.   Additional chronic areas of bifrontal and left cerebellar and brainstem gliosis.   Will d/w neurosurgery MRI findings.   Plan:  Continue to monitor neuro exam closely.  STAT CT head with decline in GCS > 2 pts in 1 hour.  Hold ASA - reversed with DDAVP.  AEDs per neurology  Continue to hold all AC/AP x 2 weeks.  Noted with seizure activity on vEEG 10/22-23, currently burst suppressed.  Intubated for burst supression.  Neurology consulted/following - appreciate recommendations.  Mobilize with PT/OT.   Spleen laceration  Recent Labs     10/26/24  1759 10/27/24  0437 10/27/24  0750 10/28/24  0436 10/28/24  0438 10/29/24  0447   HGB 6.9* 8.7* 8.5* 8.7* 8.5* 7.6*     S/p fall  CT C/A/P - Splenomegaly with metallic surgical coils near the splenic hilar region. There is a small amount of intermediate density perisplenic free fluid adjacent to the inferior spleen which could represent subcapsular hematoma (AAST grade I splenic injury).   Hgb on admission - 11.5  Hgb down to 5.3 on 10/24 given additional unit PRBC with appropriate response.   Most recent Hgb 7.9    Plan:  Trend Hgb, repeat CBC in AM.  Transfuse for Hgb < 7.0 or active bleeding, HD instability.  Hold ASA.  Continue DVT prophylaxis with Lovenox.  SCD's for DVT prophylaxis.  Monitor for S/S bleeding.  Closed nondisplaced fracture of fifth cervical vertebra (HCC)  S/p fall from about 10 feet. Baseline right sided numbness/tingling given history of stroke. No new pain.  CT c-spine - Acute anterior C5 vertebral body fracture with prevertebral soft tissue swelling. No traumatic subluxation.   Neurosurgery consulted, appreciate recommendations.     Plan:  Continue Neuro-checks q1h.  No surgical intervention at this  time.   Maintain Vista collar at all times, will need for 8 to 12 weeks.   Follow up on upright films.  DVT prophylaxis: SCD's/lovenox  PT/OT eval and treat with Collar in place.  CM consulted for disposition planning.  Follow up with Neurosurgery as outpatient in 2 weeks.  Fall  Fall from a height of 10 feet. He does not recall events before and after fall.  Possibly d/t new medication.  Denies intentionally jumping out of window or any thoughts of self harm.   Fall precautions.  Sepsis (HCC)  As evidence by tachycardia, fever, leukocytosis  Suspected source pneumonia (See plan below)  Initial WBC - 6.43, Procal - 0.46, Lactic - 0.8  UA - bland  COVID/FLU/RSV bland- negative  Blood cultures x 2 obtained 10/22: No growth  CXR showing right-sided infiltrate  Sputum culture 10/26: Staph aureus  Sputum culture 10/28: pending  He received 30cc/kg resuscitation fluids.     Plan:  Continue ABX: Vancomycin and Zosyn.   Follow up blood cultures, UA, viral swab and MRSA.  Monitor/trend fever curve.  Monitor/trend WBC.  Trend endpoints.     S/P craniotomy  S/p craniotomy 1/2019 at [facility], done secondary to abscess/lesions. Baseline independent.   CTH (10/20/24) - No acute intracranial hemorrhage or depressed calvarial fracture. Mild bilateral anterior frontal scalp soft tissue swelling, right greater than left. Right periorbital, perinasal, and premaxillary facial soft tissue swelling also noted.   CTA head/neck (10/22/24) - New extra-axial lenticular shaped hemorrhage along the left parietal convexity as described above, which may represent an epidural hematoma or less likely a subdural hematoma in this region. No significant midline shift noted. Trace new subarachnoid hemorrhage noted along the inferior left temporo-occipital convexity noted.   Continue supportive care.  See individual plans below.   History of stroke  Reported he has a sistory of stroke, however on discussion with mother, there was no  stroke but were stroke-like residual deficits from brain abscesses and craniotomy.  No home regimen.  Pt was started on ASA this admission and subsequently discontinued 2/2 ICH  Continue Neuro-checks.  Mood disorder (HCC) unspecified  No home regimen. No indication for inpatient admission at this time.  Psych consulted, appreciate recommendations.  CM consulted for dispo planning.  Continue supportive care.  Spasticity  History of spasticity s/p craniotomy.  Home regimen: Baclofen and Tizanidine qHS.  Hold home Baclofen and Tizanidine  Consider resuming Baclofen  Continue Robaxin for analgesia.  Continue supportive care.  Type 2 diabetes mellitus ([facility])  Lab Results   Component Value Date    HGBA1C 6.4 (H) 10/22/2024     Recent Labs     10/28/24  0548 10/28/24  1151 10/28/24  1750 10/29/24  0002   POCGLU 240* 315* 313* 286*   Blood Sugar Average: Last 72 hrs:  (P) [id number]    Home regimen: Lantus 15 units SC qHS  Consider restarting  SSI every 6 hours while intubated, increased to algorithm 5  Started Lantus 10 units daily  Goal glucose 140 - 180  Hypoglycemia protocol    History of alcohol use  History of ETOH use in the past, unknown last drink.  D/c CIWA; outside of acute alcohol withdrawal phase.   Continue thiamine/folate daily.  Encourage cessation.   Seizure (HCC)  See plan for status epilepticus.   Pneumonia involving right lung  Episode of hypoxia overnight 10/27 -10/28, secretions thick and foul smelling  CXR showing right-sided consolidation  Bronchoscopy performed for pulmonary toilet, sputum sent for culture.  Continue Zosyn, vancomycin  Sputum culture: 2+ poly, no bacteria  Trend WBC  Monitor fevers  Pulmonary toilet     Disposition: Critical care    ICU Core Measures     Vented Patient  VAP Bundle  VAP bundle ordered     A: Assess, Prevent, and Manage Pain Has pain been assessed? Yes  Need for changes to pain regimen? Yes   B: Both Spontaneous Awakening Trials (SATs) and Spontaneous  Breathing Trials (SBTs) Plan to perform spontaneous awakening trial today? No secondary to ongoing seizures/status epilepticus  Plan to perform spontaneous breathing trial today? No secondary to ongoing seizures/status epilepticus  Obvious barriers to extubation? Yes   C: Choice of Sedation RASS Goal: -4 Deep Sedation  Need for changes to sedation or analgesia regimen? Yes   D: Delirium CAM-ICU: Unable to perform secondary to Acute cognitive dysfunction   E: Early Mobility  Plan for early mobility? Yes   F: Family Engagement Plan for family engagement today? Yes       Antibiotic Review: Awaiting culture results.     Review of Invasive Devices:      Central access plan: Patient requires PICC secondary to medications requiring central line, difficult IV access  Kisha Plan: Keep arterial line for hemodynamic monitoring    Prophylaxis:  VTE VTE covered by:  enoxaparin, Subcutaneous, 30 mg at 10/28/24 2131       Stress Ulcer  covered byomeprazole (PRILOSEC) suspension 2 mg/mL [[id number]]         24 Hour Events : Continues to have no reported seizure activity on EMU, remains off Versed. Continued to wean Propofol 5mcg/3hrs overnight current on 10mcg/min. Overnight intermittently hypertensive, likely d/t pain/agitation. He received Labetalol IV with marginal improvement but much better improvement in BP with fentanyl. Given frequency of PRN fentanyl a fentanyl gt was started at 25mcg/hr.     Subjective   Review of Systems: Review of Systems   Unable to perform ROS: Intubated     Objective :                   Vitals I/O      Most Recent Min/Max in 24hrs   Temp 99.9 °F (37.7 °C) Temp  Min: 99.6 °F (37.6 °C)  Max: 102 °F (38.9 °C)   Pulse 100 Pulse  Min: 90  Max: 122   Resp 16 Resp  Min: 15  Max: 26   /53 BP  Min: 113/47  Max: 165/53   O2 Sat 98 % SpO2  Min: 90 %  Max: 99 %      Intake/Output Summary (Last 24 hours) at 10/29/2024 0520  Last data filed at 10/29/2024 0509  Gross per 24 hour   Intake 3189.96 ml   Output  1855 ml   Net 1334.96 ml       Diet Enteral/Parenteral; Tube Feeding No Oral Diet; Jevity 1.2 Eliseo; Continuous; 65; 30; Water; Every 4 hours    Invasive Monitoring   Arterial Line  Kisha /63  Arterial Line BP  Min: 85/45  Max: 202/66   MAP 92 mmHg  Arterial Line MAP (mmHg)  Min: 60 mmHg  Max: 108 mmHg           Physical Exam   Physical Exam  Vitals and nursing note reviewed.   Eyes:      Pupils: Pupils are equal, round, and reactive to light.   Skin:     General: Skin is warm and dry.      Capillary Refill: Capillary refill takes less than 2 seconds.   HENT:      Head: Abrasion, contusion and laceration present.      Mouth/Throat:      Mouth: Mucous membranes are moist.   Neck:      Comments: Aspen collar  Cardiovascular:      Rate and Rhythm: Normal rate and regular rhythm.      Pulses: Normal pulses.           Radial pulses are 2+ on the right side and 2+ on the left side.        Dorsalis pedis pulses are 2+ on the right side and 2+ on the left side.      Comments: Bilateral hand/UE edema +1  Musculoskeletal:      Right lower leg: Edema present.      Left lower leg: Edema present.   Abdominal: General: Abdomen is flat. Bowel sounds are normal. There is no distension.      Palpations: Abdomen is soft.      Tenderness: There is no abdominal tenderness.   Constitutional:       General: He is not in acute distress.     Appearance: He is well-developed, normal weight and well-nourished. He is ill-appearing.      Interventions: He is sedated, intubated and restrained. Cervical collar in place.   Pulmonary:      Effort: Pulmonary effort is normal. He is intubated.      Breath sounds: Rhonchi present.   Psychiatric:      Comments: CAROLYN   Neurological:      Mental Status: He is unresponsive.      GCS: GCS eye subscore is 1. GCS verbal subscore is 1. GCS motor subscore is 1.      Comments: GCS 3T   Genitourinary/Anorectal:     Comments: Urethral catheter  Gil present.        Diagnostic Studies        Lab Results: I  have reviewed the following results:     Medications:  Scheduled PRN   acetaminophen, 650 mg, Q6H  acetylcysteine, 3 mL, Q6H  Albumin 5%, 12.5 g, Once  Artificial Tears, 1 drop, BID  atorvastatin, 10 mg, Daily With Dinner  bacitracin, 1 large application, BID  bisacodyl, 10 mg, Q72H  chlorhexidine, 15 mL, Q12H NANCY  cloBAZam, 5 mg, TID  enoxaparin, 30 mg, Q12H NANCY  folic acid, 1 mg, Daily  insulin glargine, 10 Units, HS  insulin lispro, 4-20 Units, Q6H NANCY  lacosamide, 200 mg, Q12H NANCY  levETIRAcetam, 2,000 mg, Q12H NANCY  Multivitamin, 15 mL, Daily  nicotine, 21 mg, Daily  omeprazole (PRILOSEC) suspension 2 mg/mL, 20 mg, Daily  oxyCODONE, 5 mg, Q6H NANCY  piperacillin-tazobactam, 4.5 g, Q8H  senna-docusate sodium, 1 tablet, BID  thiamine, 100 mg, Daily  vancomycin, 1,250 mg, Q12H      albuterol, 2.5 mg, Q6H PRN  fentaNYL, 50 mcg, Q1H PRN  labetalol, 10 mg, Q6H PRN  ondansetron, 4 mg, Q4H PRN  oxyCODONE, 2.5 mg, Q4H PRN   Or  oxyCODONE, 5 mg, Q4H PRN       Continuous    fentaNYL, 25 mcg/hr, Last Rate: 25 mcg/hr (10/29/24 0503)  propofol, 5-50 mcg/kg/min, Last Rate: 10 mcg/kg/min (10/29/24 0330)         Labs:   CBC    Recent Labs     10/28/24  0436 10/28/24  0438 10/29/24  0447   WBC 18.30*  --  18.46*   HGB 8.7* 8.5* 7.6*   HCT 26.8* 25* 24.1*     --  313     BMP    Recent Labs     10/28/24  0436 10/28/24  0438   SODIUM 138  --    K 3.8  --      --    CO2 23 23   AGAP 7  --    BUN 11  --    CREATININE 0.58*  --    CALCIUM 8.1*  --        Coags    No recent results     Additional Electrolytes  Recent Labs     10/28/24  0436 10/28/24  0438   MG 1.9  --    PHOS 2.1*  --    CAIONIZED 1.14 1.21          Blood Gas    Recent Labs     10/28/24  1348   PHART 7.354   NNV2LQB 40.8   PO2ART 73.8*   GKH3MLM 22.2   BEART -3.1   SOURCE Line, Arterial     Recent Labs     10/28/24  1348   SOURCE Line, Arterial    LFTs  No recent results    Infectious  No recent results  Glucose  Recent Labs     10/28/24  0436   GLUC 318*

## 2024-10-30 PROBLEM — Z86.73 HISTORY OF STROKE: Chronic | Status: ACTIVE | Noted: 2024-01-01

## 2024-10-30 PROBLEM — Z87.898 HISTORY OF ALCOHOL USE: Chronic | Status: ACTIVE | Noted: 2024-01-01

## 2024-10-30 PROBLEM — F39 MOOD DISORDER (HCC): Chronic | Status: ACTIVE | Noted: 2024-01-01

## 2024-10-30 PROBLEM — I10 HYPERTENSION: Status: ACTIVE | Noted: 2024-01-01

## 2024-10-30 PROBLEM — D64.9 ANEMIA: Status: ACTIVE | Noted: 2024-01-01

## 2024-10-30 PROBLEM — Z98.890 S/P CRANIOTOMY: Chronic | Status: ACTIVE | Noted: 2024-01-01

## 2024-10-30 PROBLEM — S06.4XAA EPIDURAL HEMATOMA (HCC): Status: RESOLVED | Noted: 2024-01-01 | Resolved: 2024-01-01

## 2024-10-30 PROBLEM — R25.2 SPASTICITY: Chronic | Status: ACTIVE | Noted: 2024-01-01

## 2024-10-30 PROBLEM — N17.9 AKI (ACUTE KIDNEY INJURY) (HCC): Status: ACTIVE | Noted: 2024-01-01

## 2024-10-30 PROBLEM — G93.40 ENCEPHALOPATHY: Status: ACTIVE | Noted: 2024-01-01

## 2024-10-30 PROBLEM — E11.9 TYPE 2 DIABETES MELLITUS (HCC): Chronic | Status: ACTIVE | Noted: 2024-01-01

## 2024-10-30 NOTE — ASSESSMENT & PLAN NOTE
S/p fall from about 10 feet. Baseline right sided numbness/tingling given history of stroke.   CT c-spine - Acute anterior C5 vertebral body fracture with prevertebral soft tissue swelling. No traumatic subluxation.     Plan:  Neurosx consulted  No surgical intervention at this time.   Maintain Vista collar at all times, will need for 8 to 12 weeks.   Follow up on upright films.  Follow up with Neurosurgery as outpatient in 2 weeks.

## 2024-10-30 NOTE — ASSESSMENT & PLAN NOTE
Seizure / Fall Precautions  Last Seizure 10/23  AEDs: Continue Levetiracetam 2g q12h, Lacosamide 200mg q12h, Clobazam 5mg TID.   Discuss with neuro decreasing AEDs  vEEG stopped 10/29  Propofol gtt stopped 10/29  Dl DOT: Needs to be completed prior to discharge

## 2024-10-30 NOTE — PHYSICAL THERAPY NOTE
PHYSICAL THERAPY CANCELLATION NOTE          Patient Name: Tae Dolan  Today's Date: 10/30/2024             10/30/24 1035   Note Type   Note Type Cancelled Session   Cancel Reasons Medical status   Assessment   Assessment Pt w/ active PT eval and treat orders, chart review performed. Per ICU mobility rounds, pt continues to be inappropriate for PT intervention. Will DC pt from Inpatient PT caseload as pt has had 3 medical cancels in a row. Please re-consult PT when pt is appropriate to participate.         Mariah Palacios, PT, DPT  10/30/24

## 2024-10-30 NOTE — ASSESSMENT & PLAN NOTE
Lab Results   Component Value Date    HGBA1C 6.4 (H) 10/22/2024     Recent Labs     10/29/24  2129 10/29/24  2354 10/30/24  0109 10/30/24  0600   POCGLU 297* 367* 341* 307*   Blood Sugar Average: Last 72 hrs:  (P) 267.25    Home regimen: Lantus 15 units SC qHS.  Pt having continued escalating POCT glucose level.  Continue Lantus 25 units qHS.  SSI every 6 hours while intubated, increased to algorithm 5.  Lispro 5 units SC every 6 hours standing, in addition to SSI.  Consider insulin gtt given resistance.  Goal glucose 140 - 180.  Hypoglycemia protocol.  Continue enteral feedings.

## 2024-10-30 NOTE — ASSESSMENT & PLAN NOTE
Intubated on 10/23 for airway protection for planned burst suppression in setting of satus epilepticus.   Acute respiratory failure with hypoxia d/t seizure,pneumonia (identified 10/26)  10/26 CXR: Development of extensive right perihilar consolidation suspicious for pneumonia/aspiration.   10/28 Developed hypoxia early morning, requiring increased O2/PEEP requirements   10/28 Emergent Bronchoscopy. Copious secretions. Culture obtain  10/29 Bronch - Staph aureus  VENT day #7      Plan:  Continue mechanical ventilation. Wean for sats >92%  Continue Vancomycin (Day#5)  Sputum/bronc cx Staph Aureus  SAT/SBT daily  Vent bundle.

## 2024-10-30 NOTE — ASSESSMENT & PLAN NOTE
History of ETOH use in the past, unknown last drink.  Continue thiamine/folate daily.  Encourage cessation.

## 2024-10-30 NOTE — ASSESSMENT & PLAN NOTE
No home regimen. No indication for inpatient admission at this time.  Psych consulted, appreciate recommendations.  CM consulted for dispo planning.      Helical Rim Advancement Flap Text: The defect edges were debeveled with a #15 blade scalpel.  Given the location of the defect and the proximity to free margins (helical rim) a double helical rim advancement flap was deemed most appropriate.  Using a sterile surgical marker, the appropriate advancement flaps were drawn incorporating the defect and placing the expected incisions between the helical rim and antihelix where possible.  The area thus outlined was incised through and through with a #15 scalpel blade.  With a skin hook and iris scissors, the flaps were gently and sharply undermined and freed up.

## 2024-10-30 NOTE — ASSESSMENT & PLAN NOTE
Imaging:  CT head wo, 10/23/2024: Unchanged 1.3 cm thick acute extra-axial hemorrhage along left parietooccipital cerebral convexity, which could represent a combination of subdural hematoma and epidural hematoma (given lenticular shape at its thickest portion). Unchanged mild mass effect on adjacent left parietal lobe. Unchanged acute trace subarachnoid hemorrhage along left inferior temporal convexity. Unchanged acute trace thin subdural hematoma along left tentorium cerebelli. No new acute intracranial abnormality.  CTA head and neck w/wo, 10/22/2024: New extra-axial lenticular shaped hemorrhage along the left parietal convexity as described above, which may represent an epidural hematoma or less likely a subdural hematoma in this region. No significant midline shift noted. Trace new subarachnoid hemorrhage noted along the inferior left temporo-occipital convexity noted. Right facial subcutaneous soft tissue swelling/hematoma again demonstrated. CT Angiography: No active extravasation of contrast is noted into the extra-axial hematoma. No large vessel occlusion, high-grade stenosis, or intracranial aneurysm identified on CT angiogram of the head. No hemodynamically significant stenosis or dissection identified on CT angiogram of the neck. Anterior mildly comminuted C5 vertebral body fracture again demonstrated, with mild associated prevertebral edema again demonstrated.  MRI Brain 10/27: No acute parenchymal abnormality.   Persistent subacute left temporoparietal hemorrhagic subdural collection with a focal loculated segment. There is expected regional inflammatory enhancement of the dura and leptomeninges. Focal areas of nonhemorrhagic contusion/edema in the parietal lobe subjacent to the above-mentioned extra-axial hematoma. Trace posttraumatic subarachnoid hemorrhage in the left temporal sulci. Additional chronic areas of bifrontal and left cerebellar and brainstem gliosis.     Plan:  Continue to monitor neuro  exam closely.  STAT CT head with decline in GCS > 2 pts in 1 hour.  Continue to hold all AC/AP x 2 weeks.  Noted with seizure activity on vEEG 10/22-23, now off vEEG - see plan below  Neurology consulted/following - appreciate recommendations.

## 2024-10-30 NOTE — ASSESSMENT & PLAN NOTE
S/p fall from about 10 feet. Baseline right sided numbness/tingling given history of stroke. No new pain.  CT c-spine - Acute anterior C5 vertebral body fracture with prevertebral soft tissue swelling. No traumatic subluxation.   Neurosurgery consulted, appreciate recommendations.     Plan:  Continue Neuro-checks q1h.  No surgical intervention at this time.   Analgesia as indicated.  Maintain Vista collar at all times, will need for 8 to 12 weeks.   Follow up on upright films.  DVT prophylaxis: SCD's/lovenox  PT/OT eval and treat with Collar in place.  CM consulted for disposition planning.  Follow up with Neurosurgery as outpatient in 2 weeks.

## 2024-10-30 NOTE — ASSESSMENT & PLAN NOTE
Episode of hypoxia overnight 10/27 -10/28, secretions thick and foul smelling  CXR showing right-sided consolidation  Bronchoscopy performed for pulmonary toilet, sputum sent for culture.  Continue Zosyn, vancomycin  Sputum culture: + staph aureus  Trend WBC  Monitor fevers  Pulmonary toilet

## 2024-10-30 NOTE — ASSESSMENT & PLAN NOTE
Lab Results   Component Value Date    HGBA1C 6.4 (H) 10/22/2024     Recent Labs     10/31/24  0001 10/31/24  0216 10/31/24  0425 10/31/24  0559   POCGLU 166* 122 149* 176*   Blood Sugar Average: Last 72 hrs:  (P) 249    Home regimen: Lantus 15 units SC qHS.  Continue Lantus 25 units qHS.  Start insulin gtt   Consult nutrition to adjust TF

## 2024-10-30 NOTE — PROGRESS NOTES
Tae Dolan is a 54 y.o. male who is currently ordered Vancomycin IV with management by the Pharmacy Consult service.  Relevant clinical data and objective / subjective history reviewed.  Vancomycin Assessment:  Indication and Goal AUC/Trough: Pneumonia (goal -600, trough >10)  Clinical Status:  critically ill  Micro:     Renal Function:  SCr: 1.1 mg/dL  CrCl: 75 mL/min  Renal replacement: Not on dialysis  Days of Therapy: 5  Current Dose: 1250 mg IV as needed for random level <15  Vancomycin Plan:  New Dosing: continue pulse dose  Estimated AUC:  N/A  Estimated Trough: N/A  Next Level: 10/31 at 0600  Renal Function Monitoring: Daily BMP and UOP  Pharmacy will continue to follow closely for s/sx of nephrotoxicity, infusion reactions and appropriateness of therapy.  BMP and CBC will be ordered per protocol. We will continue to follow the patient’s culture results and clinical progress daily.    Colton Chopra, Pharmacist

## 2024-10-30 NOTE — ASSESSMENT & PLAN NOTE
As evidence by tachycardia, fever, leukocytosis  Source pneumonia  CXR showing right-sided infiltrate  10/26 BC NG  Sputum culture 10/26: Staph aureus  Bronch culture 10/28: Staph aureus  Follow up sensitivities       Plan:  ABX: Vancomycin D5  Repeat cx if spikes temp  T/c ID consult

## 2024-10-30 NOTE — ASSESSMENT & PLAN NOTE
Reported he has a sistory of stroke, however on discussion with mother, there was no stroke but were stroke-like residual deficits from brain abscesses and craniotomy.  No home regimen.  Pt was started on ASA this admission and subsequently discontinued 2/2 ICH  Continue Neuro-checks.   No

## 2024-10-30 NOTE — RESPIRATORY THERAPY NOTE
RT Ventilator Management Note  Tae Dolan 54 y.o. male MRN: [id number]  Unit/Bed#: ICU 07 Encounter: [id number]       10/30/24 0806   Respiratory Assessment   Assessment Type During-treatment   General Appearance Sedated   Respiratory Pattern Assisted   Chest Assessment Chest expansion symmetrical   Bilateral Breath Sounds Coarse   Resp Comments Changed peep to 8 per new order. Pt desaturated to 80%. Increased peep back to 10. Pt spo2 still in low n80s. Increased gio2 to 60% temporarily. Pt spo2 increased to 98%. Weaned fio2 back tp 40%. Pt desaturated again to 81%. Placed fio2 back at 60%. Will wean as tolerated   O2 Device vent   Vent Information   Vent    Vent type     Vent Mode AC/VC   $ Pulse Oximetry Spot Check Charge Completed   SpO2 90 %   AC/VC Settings   Resp Rate (BPM) 16 BPM   Vt (mL) 450 mL   FIO2 (%) 40 %   PEEP (cmH2O) 10 cmH2O   Flow Pattern (LPM) 60 L/min   Trigger Sensitivity Flow (lpm) 3 %   Humidification Heater   Heater Temperature (Set) 98.6 °F (37 °C)   AC/VC Actuals   Resp Rate (BPM) 29 BPM   VT (mL) 493   MV 14.2   MAP (cmH2O) 14 cmH2O   Peak Pressure (cmH2O) 28 cmH2O   I/E Ratio (Obs) 1:2.1   Heater Temperature (Obs) 98.6 °F (37 °C)   Static Compliance (mL/cmH20) 29 mL/cmH2O   Plateau Pressure (cm H2O) 23 cm H2O   AC/VC Alarms   High Peak Pressure (cmH2O) 40   High Resp Rate (BPM) 40 BPM   High MV (L/min) 16 L/min   Low MV (L/min) 4 L/min   Vt High (mL) 900 mL   Vt Low (mL) 200 mL   AC/VC Apnea Settings   Resp Rate (BPM) 16 BPM   VT (mL) 450 mL   FIO2 (%) 100 %   Apnea Time (s) 20 S   Apnea Flow (L/min) 60 L/min   Daily Screen   Patient safety screen outcome: Failed   Not Ready for Weaning due to: PEEP > 8cmH2O   ETT  Oral   Placement Date/Time: 10/23/24 1412   Mask Ventilation: Ventilated by mask (1)  Preoxygenated: Yes  Type: Oral   Secured at (cm) 26   Measured from Lips   Secured Location Left   Secured by Commercial tube miramontes   Site Condition Cool;Dry   Cuff  Pressure (cm H2O) 26 cm H2O   HI-LO Suction  Intermittent suction   HI-LO Secretions Scant   HI-LO Intervention Patent       Daily Screen         10/30/2024  0749 10/30/2024  0806          Patient safety screen outcome:: Failed Failed      Not Ready for Weaning due to:: PEEP > 8cmH2O PEEP > 8cmH2O                Physical Exam:   Assessment Type: During-treatment  General Appearance: Sedated  Respiratory Pattern: Assisted  Chest Assessment: Chest expansion symmetrical  Bilateral Breath Sounds: Coarse  Suction: ET Tube  O2 Device: vent      Resp Comments: Changed peep to 8 per new order. Pt desaturated to 80%. Increased peep back to 10. Pt spo2 still in low n80s. Increased gio2 to 60% temporarily. Pt spo2 increased to 98%. Weaned fio2 back tp 40%. Pt desaturated again to 81%. Placed fio2 back at 60%. Will wean as tolerated

## 2024-10-30 NOTE — ASSESSMENT & PLAN NOTE
S/p craniotomy 1/2019 at [facility], done secondary to abscess/lesions. Baseline independent.   CTH (10/20/24) - No acute intracranial hemorrhage or depressed calvarial fracture. Mild bilateral anterior frontal scalp soft tissue swelling, right greater than left. Right periorbital, perinasal, and premaxillary facial soft tissue swelling also noted.   CTA head/neck (10/22/24) - New extra-axial lenticular shaped hemorrhage along the left parietal convexity as described above, which may represent an epidural hematoma or less likely a subdural hematoma in this region. No significant midline shift noted. Trace new subarachnoid hemorrhage noted along the inferior left temporo-occipital convexity noted.   Continue supportive care.  See individual plans below.

## 2024-10-30 NOTE — CONSULTS
Vancomycin IV Pharmacy-to-Dose Consultation    Tae Dolan is a 54 y.o. male who was receiving Vancomycin IV with management by the Pharmacy Consult service for treatment of Pneumonia (goal -600, trough >10)  .       The patient’s Vancomycin therapy has been discontinued. Thank you for allowing us to take part in this patient's care. Pharmacy will sign-off now; please call or re-consult if there are any questions.      Madelaine Chen, PharmD  Critical Care & Internal Medicine Clinical Pharmacist   Available via Epic Secure Chat

## 2024-10-30 NOTE — CASE MANAGEMENT
Case Management Progress Note    Patient name Tae Dolan  Location ICU 07/ICU 07 MRN [id number]  : 1970 Date 10/30/2024       LOS (days): 10  Geometric Mean LOS (GMLOS) (days): 4.5  Days to GMLOS:-6        OBJECTIVE:        Current admission status: Inpatient  Preferred Pharmacy:   [id number] - [address] [address]  Phone: [phone] Fax: [phone]    Primary Care Provider: Leny Sparks MD    Primary Insurance: MEDICARE  Secondary Insurance:     PROGRESS NOTE:  Patient discussed in LOS meeting held today. Assigned CM following for post acute care needs; probable placement. May need trach/PEG; which will be factored into planning process. Will continue to follow for safe disposition upon medical clearance.

## 2024-10-30 NOTE — PROGRESS NOTES
Progress Note - Critical Care/ICU   Name: Tae Dolan 54 y.o. male I MRN: [id number]  Unit/Bed#: ICU 07 I Date of Admission: 10/20/2024   Date of Service: 10/30/2024 I Hospital Day: 10      Assessment & Plan  Acute respiratory failure with hypoxia (HCC)  Intubated on 10/23 for airway protection for planned burst suppression in setting of satus epilepticus.   Acute respiratory failure with hypoxia d/t seizure,pneumonia (identified 10/26)  10/26 CXR: Development of extensive right perihilar consolidation suspicious for pneumonia/aspiration.   10/28 Developed hypoxia early morning, requiring increased O2/PEEP requirements   10/28 Emergent Bronchoscopy. Copious secretions. Culture obtain  ETT: #7.5. Intubated 10/23.   VENT day #7 18/450/40/10     Plan:  Continue mechanical ventilation.  Continue Zosyn, Vancomycin (Day#5)  sputum culture (2+ poly, no bacteria)  F/u bronch culture 10/28  Continue sedation.  Fentanyl 25mcg/hr  SAT/SBT daily  Vent bundle.  CXR as needed  Airway clearance protocol.   Status epilepticus (HCC)  Frequent Neuro Checks, will notify Neurology team if any acute changes in exam and obtain STAT CTH  Seizure / Fall Precautions  Wean sedation  Propofol weaned off 10/29  Telemetry Monitoring  AEDs: Levetiracetam 2g q12h, Lacosamide 200mg q12h, Clobazam 5mg TID.   If increasing cortical irritability is noted can consider increasing clobazam dosing to 5/5/10 mg.   vEEG stopped 10/29  Per neurology, recommending seizure suppression 24-48 hours as the patient has had continued seizure activity which poses risk for permanent neurologic damage.   Imaging Studies: MRI Brain w/wo contrast seizure protocol, no acute parenchymal abnormality, persistent subacute left temporoparietal subdural collection with focal loculated segment. Focal areas of nonhemorrhagic contusion/edmea in the parietal lob subjacent to the extra-axial hematoma. Trace postraumatic subarachnoid hemorrhage in left temporal sulci.  Rescue  Meds: Ativan IV 2mg prn 2 complex partial seizures or 1 GTC seizure  DVT PPx , and SCDs  PT/OT Evaluate and Treat  Dl DOT: Needs to be completed prior to discharge  Epidural hematoma (HCC)  Imaging:  CT head wo, 10/23/2024: Unchanged 1.3 cm thick acute extra-axial hemorrhage along left parietooccipital cerebral convexity, which could represent a combination of subdural hematoma and epidural hematoma (given lenticular shape at its thickest portion). Unchanged mild mass effect on adjacent left parietal lobe. Unchanged acute trace subarachnoid hemorrhage along left inferior temporal convexity. Unchanged acute trace thin subdural hematoma along left tentorium cerebelli. No new acute intracranial abnormality.  CTA head and neck w/wo, 10/22/2024: New extra-axial lenticular shaped hemorrhage along the left parietal convexity as described above, which may represent an epidural hematoma or less likely a subdural hematoma in this region. No significant midline shift noted. Trace new subarachnoid hemorrhage noted along the inferior left temporo-occipital convexity noted. Right facial subcutaneous soft tissue swelling/hematoma again demonstrated. CT Angiography: No active extravasation of contrast is noted into the extra-axial hematoma. No large vessel occlusion, high-grade stenosis, or intracranial aneurysm identified on CT angiogram of the head. No hemodynamically significant stenosis or dissection identified on CT angiogram of the neck. Anterior mildly comminuted C5 vertebral body fracture again demonstrated, with mild associated prevertebral edema again demonstrated.  MRI Brain 10/27: No acute parenchymal abnormality.   Persistent subacute left temporoparietal hemorrhagic subdural collection with a focal loculated segment. There is expected regional inflammatory enhancement of the dura and leptomeninges. Focal areas of nonhemorrhagic contusion/edema in the parietal lobe subjacent to the above-mentioned extra-axial  hematoma.   Trace posttraumatic subarachnoid hemorrhage in the left temporal sulci.   Additional chronic areas of bifrontal and left cerebellar and brainstem gliosis.   Will d/w neurosurgery MRI findings.   Plan:  Continue to monitor neuro exam closely.  STAT CT head with decline in GCS > 2 pts in 1 hour.  Hold ASA - reversed with DDAVP.  AEDs per neurology  Continue to hold all AC/AP x 2 weeks.  Noted with seizure activity on vEEG 10/22-23, now off vEEG - see plan below  Intubated for burst suppression, remains intubated  Neurology consulted/following - appreciate recommendations.  Mobilize with PT/OT.   Spleen laceration  Recent Labs     10/27/24  0750 10/28/24  0436 10/28/24  0438 10/29/24  0447 10/30/24  0600   HGB 8.5* 8.7* 8.5* 7.6* 8.0*   S/p fall  CT C/A/P - Splenomegaly with metallic surgical coils near the splenic hilar region. There is a small amount of intermediate density perisplenic free fluid adjacent to the inferior spleen which could represent subcapsular hematoma (AAST grade I splenic injury).   Hgb on admission - 11.5  Hgb down to 5.3 on 10/24 given additional unit PRBC with appropriate response.   Most recent Hgb 7.9    Plan:  Trend Hgb, repeat CBC in AM.  Transfuse for Hgb < 7.0 or active bleeding, HD instability.  Hold ASA.  Continue DVT prophylaxis with Lovenox.  SCD's for DVT prophylaxis.  Monitor for S/S bleeding.  Closed nondisplaced fracture of fifth cervical vertebra (HCC)  S/p fall from about 10 feet. Baseline right sided numbness/tingling given history of stroke. No new pain.  CT c-spine - Acute anterior C5 vertebral body fracture with prevertebral soft tissue swelling. No traumatic subluxation.   Neurosurgery consulted, appreciate recommendations.     Plan:  Continue Neuro-checks q1h.  No surgical intervention at this time.   Analgesia as indicated.  Maintain Vista collar at all times, will need for 8 to 12 weeks.   Follow up on upright films.  DVT prophylaxis: SCD's/lovenox  PT/OT  eval and treat with Collar in place.  CM consulted for disposition planning.  Follow up with Neurosurgery as outpatient in 2 weeks.  Fall  Fall from a height of 10 feet. He does not recall events before and after fall.  Possibly d/t new medication.  Denies intentionally jumping out of window or any thoughts of self harm.   Fall precautions.  Sepsis (HCC)  As evidence by tachycardia, fever, leukocytosis  Suspected source pneumonia (See plan below)  Initial WBC - 6.43, Procal - 0.46, Lactic - 0.8  UA - bland  COVID/FLU/RSV bland- negative  Blood cultures x 2 obtained 10/22: No growth  CXR showing right-sided infiltrate  Sputum culture 10/26: Staph aureus  Sputum culture 10/28: Staph aureus  Follow up sensitivities  Continue ABX: Zosyn, Vancomycin.  He received 30cc/kg resuscitation fluids.     Plan:  Continue ABX: Vancomycin and Zosyn.   Follow up blood cultures, UA, viral swab and MRSA.  Monitor/trend fever curve.  Monitor/trend WBC.  Trend endpoints.     S/P craniotomy  S/p craniotomy 1/2019 at [facility], done secondary to abscess/lesions. Baseline independent.   CTH (10/20/24) - No acute intracranial hemorrhage or depressed calvarial fracture. Mild bilateral anterior frontal scalp soft tissue swelling, right greater than left. Right periorbital, perinasal, and premaxillary facial soft tissue swelling also noted.   CTA head/neck (10/22/24) - New extra-axial lenticular shaped hemorrhage along the left parietal convexity as described above, which may represent an epidural hematoma or less likely a subdural hematoma in this region. No significant midline shift noted. Trace new subarachnoid hemorrhage noted along the inferior left temporo-occipital convexity noted.   Continue supportive care.  See individual plans below.   History of stroke  Reported he has a sistory of stroke, however on discussion with mother, there was no stroke but were stroke-like residual deficits from brain abscesses and  craniotomy.  No home regimen.  Pt was started on ASA this admission and subsequently discontinued 2/2 ICH  Continue Neuro-checks.  Mood disorder ([facility]) unspecified  No home regimen. No indication for inpatient admission at this time.  Psych consulted, appreciate recommendations.  CM consulted for dispo planning.  Continue supportive care.  Spasticity  History of spasticity s/p craniotomy.  Home regimen: Baclofen and Tizanidine qHS.  Hold home Baclofen and Tizanidine  Consider resuming Baclofen  Continue Robaxin for analgesia.  Continue supportive care.  Type 2 diabetes mellitus ([facility])  Lab Results   Component Value Date    HGBA1C 6.4 (H) 10/22/2024     Recent Labs     10/29/24  2129 10/29/24  2354 10/30/24  0109 10/30/24  0600   POCGLU 297* 367* 341* 307*   Blood Sugar Average: Last 72 hrs:  (P) 267.25    Home regimen: Lantus 15 units SC qHS.  Pt having continued escalating POCT glucose level.  Continue Lantus 25 units qHS.  SSI every 6 hours while intubated, increased to algorithm 5.  Lispro 5 units SC every 6 hours standing, in addition to SSI.  Consider insulin gtt given resistance.  Goal glucose 140 - 180.  Hypoglycemia protocol.  Continue enteral feedings.  History of alcohol use  History of ETOH use in the past, unknown last drink.  D/c CIWA; outside of acute alcohol withdrawal phase.   Continue thiamine/folate daily.  Encourage cessation.   Seizure ([facility])  See plan for status epilepticus.   Pneumonia involving right lung  Episode of hypoxia overnight 10/27 -10/28, secretions thick and foul smelling  CXR showing right-sided consolidation  Bronchoscopy performed for pulmonary toilet, sputum sent for culture.  Continue Zosyn, vancomycin  Sputum culture: + staph aureus  Trend WBC  Monitor fevers  Pulmonary toilet     Disposition: Critical care    ICU Core Measures     Vented Patient  VAP Bundle  VAP bundle ordered     A: Assess, Prevent, and Manage Pain Has pain been assessed? Yes  Need for changes to pain regimen?  No   B: Both Spontaneous Awakening Trials (SATs) and Spontaneous Breathing Trials (SBTs) Plan to perform spontaneous awakening trial today? Yes   Plan to perform spontaneous breathing trial today? Yes   Obvious barriers to extubation? Yes   C: Choice of Sedation RASS Goal: -2 Light Sedation  Need for changes to sedation or analgesia regimen? No   D: Delirium CAM-ICU: Unable to perform secondary to Acute cognitive dysfunction   E: Early Mobility  Plan for early mobility? Yes   F: Family Engagement Plan for family engagement today? Yes       Antibiotic Review: Continue broad spectrum secondary to severity of illness.     Review of Invasive Devices:      Central access plan: Patient requires PICC secondary to medication requiring central line and difficult IV access.  [place] Plan: Keep arterial line for hemodynamic monitoring and frequent ABGs    Prophylaxis:  VTE VTE covered by:  enoxaparin, Subcutaneous, 30 mg at 10/29/24 2106       Stress Ulcer  covered byomeprazole (PRILOSEC) suspension 2 mg/mL [[id number]]         24 Hour Events : Pt hypertensive overnight SBP as high as 220, he was given labetalo and hydralazine PRN with minimal improvement. He was noted to agitated at this time, coughing. The Precedex gtt was re-started and up titrated without improvement in agitation/BP. He was given PRN fentanyl 50mcg and agitation resolved and BP improved to 180 systolic, he was then started on fentanyl gtt. He did require repeated doses of Labetalol and Hydralazine overnight for elevated BP, which was likely d/t agitation/pain. Glucose levels elevated > 300s, SSI increased and additional dose of insulin given with moderate improvement.     Subjective   Review of Systems: Review of Systems   Unable to perform ROS: Intubated     Objective :                   Vitals I/O      Most Recent Min/Max in 24hrs   Temp (!) 100.6 °F (38.1 °C) Temp  Min: 99 °F (37.2 °C)  Max: 100.9 °F (38.3 °C)   Pulse (!) 115 Pulse  Min: 91  Max: 125    Resp 19 Resp  Min: 15  Max: 32   BP (!) 180/85 BP  Min: 109/56  Max: 181/84   O2 Sat 91 % SpO2  Min: 91 %  Max: 100 %      Intake/Output Summary (Last 24 hours) at 10/30/2024 0630  Last data filed at 10/30/2024 0607  Gross per 24 hour   Intake 3379.82 ml   Output 2502 ml   Net 877.82 ml       Diet Enteral/Parenteral; Tube Feeding No Oral Diet; Jevity 1.2 Eliseo; Continuous; 65; Banatrol Plus Banana Flakes - One Packet; BID; 30; Water; Every 4 hours    Invasive Monitoring   Arterial Line  Peak /71  Arterial Line BP  Min: 88/42  Max: 228/73    mmHg  Arterial Line MAP (mmHg)  Min: 56 mmHg  Max: 117 mmHg           Physical Exam   Physical Exam  Vitals and nursing note reviewed.   Eyes:      Pupils: Pupils are equal, round, and reactive to light.   Feet:      Comments: abrasion  Skin:     General: Skin is warm and dry.      Capillary Refill: Capillary refill takes less than 2 seconds.      Findings: Abrasion and bruising present.   HENT:      Head: Normocephalic and atraumatic.      Mouth/Throat:      Mouth: Mucous membranes are moist.   Neck:      Comments: C-collar  Cardiovascular:      Rate and Rhythm: Normal rate and regular rhythm.      Pulses: Normal pulses.           Radial pulses are 2+ on the right side and 2+ on the left side.        Dorsalis pedis pulses are 2+ on the right side and 2+ on the left side.      Heart sounds: Normal heart sounds.   Musculoskeletal:      Right lower leg: Edema present.      Left lower leg: Edema present.      Comments: LUE abrasion   Abdominal: General: Abdomen is flat. Bowel sounds are normal. There is no distension.      Palpations: Abdomen is soft.      Tenderness: There is no abdominal tenderness.   Constitutional:       General: He is not in acute distress.     Appearance: He is well-developed, overweight and well-nourished. He is not ill-appearing or toxic-appearing.      Interventions: He is sedated, intubated and restrained.   Pulmonary:      Effort: Pulmonary  effort is normal. He is intubated.      Breath sounds: Rhonchi present.   Psychiatric:      Comments: CAROLYN   Neurological:      General: No focal deficit present.      Mental Status: He is unresponsive.      GCS: GCS eye subscore is 1. GCS verbal subscore is 1. GCS motor subscore is 1.      Motor: Strength full and intact in all extremities.      Comments: GCS 3T   Genitourinary/Anorectal:  Gil present.        Diagnostic Studies        Lab Results: I have reviewed the following results:     Medications:  Scheduled PRN   acetaminophen, 650 mg, Q6H  acetylcysteine, 3 mL, Q6H  Artificial Tears, 1 drop, BID  atorvastatin, 10 mg, Daily With Dinner  bacitracin, 1 large application, BID  chlorhexidine, 15 mL, Q12H NANCY  cloBAZam, 5 mg, TID  enoxaparin, 30 mg, Q12H NANCY  folic acid, 1 mg, Daily  insulin glargine, 25 Units, HS  insulin lispro, 4-20 Units, Q6H NANCY  insulin lispro, 5 Units, Q6H  lacosamide, 200 mg, Q12H NANCY  levETIRAcetam, 2,000 mg, Q12H NANCY  Multivitamin, 15 mL, Daily  nicotine, 21 mg, Daily  omeprazole (PRILOSEC) suspension 2 mg/mL, 20 mg, Daily  oxyCODONE, 5 mg, Q6H NANCY  piperacillin-tazobactam, 4.5 g, Q8H  thiamine, 100 mg, Daily      albuterol, 2.5 mg, Q6H PRN  fentaNYL, 50 mcg, Q1H PRN  hydrALAZINE, 10 mg, Q6H PRN  labetalol, 10 mg, Q6H PRN  ondansetron, 4 mg, Q4H PRN  vancomycin, 15 mg/kg, Daily PRN       Continuous    dexmedetomidine, 0.1-0.7 mcg/kg/hr, Last Rate: Stopped (10/29/24 2226)  fentaNYL, 25 mcg/hr, Last Rate: 25 mcg/hr (10/29/24 2038)  multi-electrolyte, 75 mL/hr, Last Rate: 75 mL/hr (10/30/24 0240)         Labs:   CBC    Recent Labs     10/29/24  0447 10/30/24  0600   WBC 18.46* 24.24*   HGB 7.6* 8.0*   HCT 24.1* 25.7*    375     BMP    Recent Labs     10/29/24  0447 10/29/24  2231   SODIUM 141 141   K 4.1 4.2   * 110*   CO2 25 26   AGAP 6 5   BUN 21 24   CREATININE 1.12 1.14   CALCIUM 8.2* 8.2*       Coags    No recent results     Additional Electrolytes  Recent Labs      10/29/24  0447   MG 2.6   PHOS 2.8          Blood Gas    Recent Labs     10/28/24  1348   PHART 7.354   USC3QJX 40.8   PO2ART 73.8*   GBD2DVR 22.2   BEART -3.1   SOURCE Line, Arterial     Recent Labs     10/28/24  1348   SOURCE Line, Arterial    LFTs  Recent Labs     10/29/24  0447   ALT 16   AST 15   ALKPHOS 139*   ALB 2.4*   TBILI 0.32       Infectious  No recent results  Glucose  Recent Labs     10/29/24  0447 10/29/24  2231   GLUC 316* 419*

## 2024-10-30 NOTE — ASSESSMENT & PLAN NOTE
As evidence by tachycardia, fever, leukocytosis  Suspected source pneumonia (See plan below)  Initial WBC - 6.43, Procal - 0.46, Lactic - 0.8  UA - bland  COVID/FLU/RSV bland- negative  Blood cultures x 2 obtained 10/22: No growth  CXR showing right-sided infiltrate  Sputum culture 10/26: Staph aureus  Sputum culture 10/28: Staph aureus  Follow up sensitivities  Continue ABX: Zosyn, Vancomycin.  He received 30cc/kg resuscitation fluids.     Plan:  Continue ABX: Vancomycin and Zosyn.   Follow up blood cultures, UA, viral swab and MRSA.  Monitor/trend fever curve.  Monitor/trend WBC.  Trend endpoints.

## 2024-10-30 NOTE — ASSESSMENT & PLAN NOTE
Recent Labs     10/29/24  0447 10/30/24  0600 10/31/24  0421   HGB 7.6* 8.0* 6.2*   S/p fall  CT C/A/P - Splenomegaly with metallic surgical coils near the splenic hilar region. There is a small amount of intermediate density perisplenic free fluid adjacent to the inferior spleen which could represent subcapsular hematoma (AAST grade I splenic injury).     Plan:  Hgb stable CBC in am

## 2024-10-30 NOTE — PROGRESS NOTES
"Progress Note - Neurology   Name: Tae Dolan 54 y.o. male I MRN: [id number]  Unit/Bed#: ICU 07 I Date of Admission: 10/20/2024   Date of Service: 10/30/2024 I Hospital Day: 10     Assessment & Plan  Status epilepticus (HCC)  Tae Dolan is a 54 year old male seen in follow up for aborted focal left hemispheric status epilepticus requiring high doses of propofol and versed sedation which are now being slowly weaned.  Suspect nidus for new onset focal status left hemispheric acute epidural hematoma s/p trauma.    (The patient initially presented to the hospital on 10/20 at 0030 AM after falling out of a second story window. )    Work Up:  NC CTH: Intracranial hemorrhage redemonstrated, most prominently suspected epidural hemorrhage in the left parietal region, as described. There is some local mass effect but no midline shift.   Recommend MRI Brain w/wo contrast with Seizure Protocol  EEG: Multiple frequent seizures since being placed on continuous vEEG on 10/22/24 at 2038. Frequent seizures continued after loading with Levetiracetam, Lacosamide, valproic acid and fosphenytoin. 10 seizures in 90 minutes since 1123 and most recent being caught at 1326.  Last seizure on vEEG monitoring prior to propofol initiation/ boluses 10/23/24 evening.  MRI brain with and without contrast seizure protocol 10/26/24: with no acute parenchymal abnormality:   \"Persistent subacute left temporoparietal hemorrhagic subdural collection with a focal loculated segment. There is expected regional inflammatory enhancement of the dura and leptomeninges.     Focal areas of nonhemorrhagic contusion/edema in the parietal lobe subjacent to the above-mentioned extra-axial hematoma.     Trace posttraumatic subarachnoid hemorrhage in the left temporal sulci.     Additional chronic areas of bifrontal and left cerebellar and brainstem gliosis.\"    UDS - Normal  Ethanol level - Normal    Current AEDs: Levetiracetam 2g q12h, Lacosamide 200mg q12h, " Clobazam 5mg TID    AEDS that did not work:  Valproic acid and fosphenytoin loading doses were given but made no observable difference on EEG.    Plan:  Remains seizure free. Continue AEDs as the patient had prolonged status epilepticus. Patient is at high risk of redeveloping seizures in the short term. Consideration for weaning AEDs should be made in the outpatient with epileptologist.  Telemetry Monitoring  AEDs: Levetiracetam 2g q12h, Lacosamide 200mg q12h, Clobazam 5mg TID. If increasing cortical irritability is noted can consider increasing clobazam dosing to 5/5/10 mg.  Discontinued vEEG on 10/29/24 afternoon  Patient with improving fevers with antibiotics. Prefer avoiding cefepime as able as it can further lower seizure threshold- d/w ICU team. As patient has large right sided PNA likely etiology of his fever, thus okay to hold off LP- less concern for CNS infection.  DVT PPx : lovenox   Dl DOT: Needs to be completed prior to discharge  Rest of care per primary medical team    Sepsis (HCC)  - PNA treatment per primary team    Tae Faithr will need follow-up in in 6 weeks with epilepsy team for Other in 60 minute appointment. They will not require outpatient neurological testing.        Subjective   Patient seen at bedside. Patient opens partially and tries to open them more when requested.     Objective :  Temp:  [99.2 °F (37.3 °C)-100.9 °F (38.3 °C)] 100.1 °F (37.8 °C)  HR:  [] 120  BP: (109-184)/(56-89) 184/89  Resp:  [] 123  SpO2:  [91 %-100 %] 98 %  O2 Device: Ventilator    Physical Exam  Vitals reviewed.   Neurological Exam  Mental Status  Arousable to verbal stimuli.    Cranial Nerves  PERRL.  Opens eyes partially to verbal stimuli. Attempts to open eyes further when requested.   .        Lab Results: I have reviewed the following results:  Imaging Results Review: No pertinent imaging studies reviewed.  Other Study Results Review: No additional pertinent studies reviewed.    VTE  Pharmacologic Prophylaxis: Per primary team

## 2024-10-30 NOTE — OCCUPATIONAL THERAPY NOTE
Occupational Therapy Discharge Note     Patient Name: Tae Dolan  Today's Date: 10/30/2024     10/30/24 1030   OT Last Visit   OT Visit Date 10/30/24   Note Type   Note Type Cancelled Session   Cancel Reasons Medical status  (Pt w/ active OT orders. Per ICU mobility rounds, pt is not appropriate to participate in OT tx. Pt has had >3 medical cancels in a row. Will d/c from IPOT caseload. Please reconsult for OT re-evaluation once medically appropriate.)     Little Gu MS OTR/L   [place] Licensure# [id number]

## 2024-10-30 NOTE — ASSESSMENT & PLAN NOTE
S/p craniotomy 1/2019 at [facility], done secondary to abscess/lesions. Baseline independent.   Continue supportive care.  See individual plans below.

## 2024-10-30 NOTE — NUTRITION
Recommend switching TF to Vital 1.2 formula to provide less CHO in the setting of high blood sugars.     Vital 1.2 @ 65 ml/hr. Water flushes of 30 ml q 4 hrs for tube patency. Continue Banatrol BID as needed for loose stool.    At goal Vital 1.2 provides 1872 kcals, 117 g protein, 173 g CHO, and 1445 ml total water from formula + flushes. Additional 80 kcals/18 g CHO from Banatrol.

## 2024-10-30 NOTE — ASSESSMENT & PLAN NOTE
Intubated on 10/23 for airway protection for planned burst suppression in setting of satus epilepticus.   Acute respiratory failure with hypoxia d/t seizure,pneumonia (identified 10/26)  10/26 CXR: Development of extensive right perihilar consolidation suspicious for pneumonia/aspiration.   10/28 Developed hypoxia early morning, requiring increased O2/PEEP requirements   10/28 Emergent Bronchoscopy. Copious secretions. Culture obtain  ETT: #7.5. Intubated 10/23.   VENT day #7 18/450/40/10     Plan:  Continue mechanical ventilation.  Continue Zosyn, Vancomycin (Day#5)  sputum culture (2+ poly, no bacteria)  F/u bronch culture 10/28  Continue sedation.  Fentanyl 25mcg/hr  SAT/SBT daily  Vent bundle.  CXR as needed  Airway clearance protocol.

## 2024-10-30 NOTE — ASSESSMENT & PLAN NOTE
History of spasticity s/p craniotomy.  Home regimen: Tizanidine qHS.  Taken off baclofen last admission

## 2024-10-30 NOTE — ASSESSMENT & PLAN NOTE
Frequent Neuro Checks, will notify Neurology team if any acute changes in exam and obtain STAT CTH  Seizure / Fall Precautions  Wean sedation  Propofol weaned off 10/29  Telemetry Monitoring  AEDs: Levetiracetam 2g q12h, Lacosamide 200mg q12h, Clobazam 5mg TID.   If increasing cortical irritability is noted can consider increasing clobazam dosing to 5/5/10 mg.   vEEG stopped 10/29  Per neurology, recommending seizure suppression 24-48 hours as the patient has had continued seizure activity which poses risk for permanent neurologic damage.   Imaging Studies: MRI Brain w/wo contrast seizure protocol, no acute parenchymal abnormality, persistent subacute left temporoparietal subdural collection with focal loculated segment. Focal areas of nonhemorrhagic contusion/edmea in the parietal lob subjacent to the extra-axial hematoma. Trace postraumatic subarachnoid hemorrhage in left temporal sulci.  Rescue Meds: Ativan IV 2mg prn 2 complex partial seizures or 1 GTC seizure  DVT PPx , and SCDs  PT/OT Evaluate and Treat  Dl DOT: Needs to be completed prior to discharge

## 2024-10-30 NOTE — ASSESSMENT & PLAN NOTE
Imaging:  CT head wo, 10/23/2024: Unchanged 1.3 cm thick acute extra-axial hemorrhage along left parietooccipital cerebral convexity, which could represent a combination of subdural hematoma and epidural hematoma (given lenticular shape at its thickest portion). Unchanged mild mass effect on adjacent left parietal lobe. Unchanged acute trace subarachnoid hemorrhage along left inferior temporal convexity. Unchanged acute trace thin subdural hematoma along left tentorium cerebelli. No new acute intracranial abnormality.  CTA head and neck w/wo, 10/22/2024: New extra-axial lenticular shaped hemorrhage along the left parietal convexity as described above, which may represent an epidural hematoma or less likely a subdural hematoma in this region. No significant midline shift noted. Trace new subarachnoid hemorrhage noted along the inferior left temporo-occipital convexity noted. Right facial subcutaneous soft tissue swelling/hematoma again demonstrated. CT Angiography: No active extravasation of contrast is noted into the extra-axial hematoma. No large vessel occlusion, high-grade stenosis, or intracranial aneurysm identified on CT angiogram of the head. No hemodynamically significant stenosis or dissection identified on CT angiogram of the neck. Anterior mildly comminuted C5 vertebral body fracture again demonstrated, with mild associated prevertebral edema again demonstrated.  MRI Brain 10/27: No acute parenchymal abnormality.   Persistent subacute left temporoparietal hemorrhagic subdural collection with a focal loculated segment. There is expected regional inflammatory enhancement of the dura and leptomeninges. Focal areas of nonhemorrhagic contusion/edema in the parietal lobe subjacent to the above-mentioned extra-axial hematoma.   Trace posttraumatic subarachnoid hemorrhage in the left temporal sulci.   Additional chronic areas of bifrontal and left cerebellar and brainstem gliosis.   Will d/w neurosurgery MRI  findings.   Plan:  Continue to monitor neuro exam closely.  STAT CT head with decline in GCS > 2 pts in 1 hour.  Hold ASA - reversed with DDAVP.  AEDs per neurology  Continue to hold all AC/AP x 2 weeks.  Noted with seizure activity on vEEG 10/22-23, now off vEEG - see plan below  Intubated for burst suppression, remains intubated  Neurology consulted/following - appreciate recommendations.  Mobilize with PT/OT.

## 2024-10-30 NOTE — ASSESSMENT & PLAN NOTE
Recent Labs     10/27/24  0750 10/28/24  0436 10/28/24  0438 10/29/24  0447 10/30/24  0600   HGB 8.5* 8.7* 8.5* 7.6* 8.0*   S/p fall  CT C/A/P - Splenomegaly with metallic surgical coils near the splenic hilar region. There is a small amount of intermediate density perisplenic free fluid adjacent to the inferior spleen which could represent subcapsular hematoma (AAST grade I splenic injury).   Hgb on admission - 11.5  Hgb down to 5.3 on 10/24 given additional unit PRBC with appropriate response.   Most recent Hgb 7.9    Plan:  Trend Hgb, repeat CBC in AM.  Transfuse for Hgb < 7.0 or active bleeding, HD instability.  Hold ASA.  Continue DVT prophylaxis with Lovenox.  SCD's for DVT prophylaxis.  Monitor for S/S bleeding.

## 2024-10-30 NOTE — ASSESSMENT & PLAN NOTE
"Tae Dolan is a 54 year old male seen in follow up for aborted focal left hemispheric status epilepticus requiring high doses of propofol and versed sedation which are now being slowly weaned.  Suspect nidus for new onset focal status left hemispheric acute epidural hematoma s/p trauma.    (The patient initially presented to the hospital on 10/20 at 0030 AM after falling out of a second story window. )    Work Up:  NC CTH: Intracranial hemorrhage redemonstrated, most prominently suspected epidural hemorrhage in the left parietal region, as described. There is some local mass effect but no midline shift.   Recommend MRI Brain w/wo contrast with Seizure Protocol  EEG: Multiple frequent seizures since being placed on continuous vEEG on 10/22/24 at 2038. Frequent seizures continued after loading with Levetiracetam, Lacosamide, valproic acid and fosphenytoin. 10 seizures in 90 minutes since 1123 and most recent being caught at 1326.  Last seizure on vEEG monitoring prior to propofol initiation/ boluses 10/23/24 evening.  MRI brain with and without contrast seizure protocol 10/26/24: with no acute parenchymal abnormality:   \"Persistent subacute left temporoparietal hemorrhagic subdural collection with a focal loculated segment. There is expected regional inflammatory enhancement of the dura and leptomeninges.     Focal areas of nonhemorrhagic contusion/edema in the parietal lobe subjacent to the above-mentioned extra-axial hematoma.     Trace posttraumatic subarachnoid hemorrhage in the left temporal sulci.     Additional chronic areas of bifrontal and left cerebellar and brainstem gliosis.\"    UDS - Normal  Ethanol level - Normal    Current AEDs: Levetiracetam 2g q12h, Lacosamide 200mg q12h, Clobazam 5mg TID    AEDS that did not work:  Valproic acid and fosphenytoin loading doses were given but made no observable difference on EEG.    Plan:  Remains seizure free. Continue AEDs as the patient had prolonged status " epilepticus. Patient is at high risk of redeveloping seizures in the short term. Consideration for weaning AEDs should be made in the outpatient with epileptologist.  Telemetry Monitoring  AEDs: Levetiracetam 2g q12h, Lacosamide 200mg q12h, Clobazam 5mg TID. If increasing cortical irritability is noted can consider increasing clobazam dosing to 5/5/10 mg.  Discontinued vEEG on 10/29/24 afternoon  Patient with improving fevers with antibiotics. Prefer avoiding cefepime as able as it can further lower seizure threshold- d/w ICU team. As patient has large right sided PNA likely etiology of his fever, thus okay to hold off LP- less concern for CNS infection.  DVT PPx : lovenox   [place] DOT: Needs to be completed prior to discharge  Rest of care per primary medical team

## 2024-10-31 PROBLEM — I95.9 HYPOTENSION: Status: ACTIVE | Noted: 2024-01-01

## 2024-10-31 NOTE — RESPIRATORY THERAPY NOTE
Patient continues to desaturate to the 80s. Increased FIO2 to 80% per AP. ABG drawn on right radial and ran on istat results shown to Ap. No orders at this time.

## 2024-10-31 NOTE — RESPIRATORY THERAPY NOTE
10/31/24 1809   Respiratory Assessment   Resp Comments T high changed by AP. Patient still desaturating. Comfortable on vent. Patient suctioned, air checked in cuff. Increased FIO2 to 60% with little improvement. Increased FIO2 again to 70%, SpO2 90%. AP and resident aware   Vent Information   Vent    Vent type     Vent Mode Bilevel   $ Vital Capacity Mech/Peak Flow Yes   $ Pulse Oximetry Spot Check Charge Completed   SpO2 90 %   BILEVEL Settings   Resp Rate (BPM) 12 BPM   P High (cmH2O) 26 cm H2O   P Low (cmH2O) 0 cm H2O   T High (S) (S)  3.79 sec   T Low (S) 0.5 sec   FIO2 (%) (S)  70 %   Tube Comp 100   Pressure Support (cm H2O) 2 cm H2O   Rise Time (%) 70 %   Trigger Sensitivity Pressure (cmH2O) 2 cmH2O   Esens (%) 25 %   Humidification Heater   Bilevel Actuals   Resp Rate (BPM) 12 BPM   VT (mL) 757 mL   MV (Obs) 9.06   MAP (cmH2O) 21 cmH2O   Peak Pressure (cmH2O) 28 cmH2O   I/E Ratio (Obs) 3:1.1   Heater Temperature (Obs) 98.6 °F (37 °C)   Bilevel Alarms   High Peak Pressure (cmH2O) 50 cmH2O   High Resp Rate (BPM) 40 BPM   High MV (L/min) 20 L/min   Low MV (L/min) 3 L/min   High Lyn VTE (mL) 1200 mL   Low Lyn VTE (mL) 250 mL   High SPONT VTE (mL) 1200 mL   Low Spont VTE (mL) 50 mL   High Insp Spont VT (mL) 1000 mL   Bilevel Apnea Settings   Resp Rate (BPM) 16 BPM   VT (mL) 450   FIO2 (%) 100 %   Apnea Time (s) 20 S   Apnea Flow (L/min) 65 L/min   Maintenance   Alarm (pink) cable attached Yes   Resuscitation bag with peep valve at bedside Yes   Water bag changed No   Circuit changed No

## 2024-10-31 NOTE — PROGRESS NOTES
Progress Note - Critical Care/ICU   Name: Tae Dolan 54 y.o. male I MRN: [id number]  Unit/Bed#: ICU 07 I Date of Admission: 10/20/2024   Date of Service: 10/31/2024 I Hospital Day: 11       Interval Events:       Pt with low TV noted on APRV. Fentanyl 50mcg x1 given and gtt increased. No significant improvement. Suctioned and cuff leak noted. Cuff inflated. Initially improvement in low TV. Ett advanced 1cm and repeat cxr unchanged.     Repeat low tV alarms discussed with dr. Leggett and dr sinha. Decreased t high to 3.79 with improvement. Will check abg in 1hr    Pertinent New Data:   blood pressure, pulse, temperature, respirations, and pulse oximetry      CBC:   Lab Results   Component Value Date    WBC 17.87 (H) 10/31/2024    HGB 7.5 (L) 10/31/2024    HCT 22.0 (L) 10/31/2024     (H) 10/31/2024     10/31/2024    RBC 1.96 (L) 10/31/2024    MCH 31.6 10/31/2024    MCHC 30.4 (L) 10/31/2024    RDW 19.4 (H) 10/31/2024    MPV 9.6 10/31/2024   , CMP:   Lab Results   Component Value Date    SODIUM 148 (H) 10/31/2024    K 3.7 10/31/2024     (H) 10/31/2024    CO2 28 10/31/2024    BUN 29 (H) 10/31/2024    CREATININE 1.11 10/31/2024    CALCIUM 8.1 (L) 10/31/2024    EGFR 73 10/31/2024     chest xrayResults Review Statement: I reviewed radiology reports from this admission including: chest xray.    Assessment and Plan  Diagnosis: acute hypoxic resp failure  Plan: increase sedation, adjust vent settings as above, repeat cxr as above.    Billing Level:  Critical Care Time Statement: Upon my evaluation, this patient had a high probability of imminent or life-threatening deterioration due to resp failure, which required my direct attention, intervention, and personal management.  I spent a total of 25 minutes directly providing critical care services, including complex medical decision making (to support/prevent further life-threatening deterioration). and ventilator management. This time is exclusive of  procedures, teaching, family meetings, and any prior time recorded by providers other than myself.      CARLOS Serrano

## 2024-10-31 NOTE — RESPIRATORY THERAPY NOTE
10/31/24 0734   Respiratory Assessment   Assessment Type Assess only   General Appearance Sedated   Respiratory Pattern Assisted   Chest Assessment Chest expansion symmetrical   Bilateral Breath Sounds Coarse   Suction ET Tube;Oral   Resp Comments Patient recieved on APRV settings. Patients vent matches orders at this time. In-line changed. Vest held at this time per report of patient desaturating with vest.   O2 Device vent   Vent Information   Vent    Vent type     Vent Mode Bilevel   $ Vital Capacity Mech/Peak Flow Yes   $ Pulse Oximetry Spot Check Charge Completed   SpO2 94 %   BILEVEL Settings   Resp Rate (BPM) 14 BPM   P High (cmH2O) 30 cm H2O   P Low (cmH2O) 0 cm H2O   T High (S) 3.79 sec   T Low (S) 0.5 sec   FIO2 (%) (S)  50 %  (recieved on)   Tube Comp 100   Rise Time (%) 70 %   Trigger Sensitivity Pressure (cmH2O) 2 cmH2O   Esens (%) 25 %   Humidification Heater   Heater Temp 98.6 °F (37 °C)   Bilevel Actuals   Resp Rate (BPM) 14 BPM   VT (mL) 404 mL   MV (Obs) 5.34   MAP (cmH2O) 28 cmH2O   Peak Pressure (cmH2O) 33 cmH2O   I/E Ratio (Obs) 7.7:1   Plateau Pressure (cmH2O) 30 cmH2O   Heater Temperature (Obs) 98.6 °F (37 °C)   Bilevel Alarms   High Peak Pressure (cmH2O) 50 cmH2O   High Resp Rate (BPM) 40 BPM   High MV (L/min) 20 L/min   Low MV (L/min) 3 L/min   High Lyn VTE (mL) 1000 mL   Low Lyn VTE (mL) 250 mL   High SPONT VTE (mL) 1000 mL   Low Spont VTE (mL) 50 mL   High Insp Spont VT (mL) 1000 mL   Bilevel Apnea Settings   Resp Rate (BPM) 16 BPM   VT (mL) 450   FIO2 (%) 100 %   Apnea Time (s) 20 S   Apnea Flow (L/min) 65 L/min   Maintenance   Alarm (pink) cable attached Yes   Resuscitation bag with peep valve at bedside Yes   Water bag changed No   Circuit changed No   Daily Screen   Patient safety screen outcome: Failed   Not Ready for Weaning due to: Alternative forms of ventilation;Underline problem not resolved   IHI Ventilator Associated Pneumonia Bundle   Head of Bed Elevated  HOB 30   ETT  Oral 7.5 mm   Placement Date/Time: 10/23/24 1412   Mask Ventilation: Ventilated by mask (1)  Preoxygenated: Yes  Type: Oral  Tube Size: 7.5 mm   Secured at (cm) 26   Measured from Lips   Secured Location Left   Repositioned Left to Center   Secured by Commercial tube miramontes   Site Condition Cool;Dry   Cuff Pressure (cm H2O) 26 cm H2O   Cuff Pressure (color) Green   HI-LO Suction  Intermittent suction   HI-LO Secretions Small;Yellow;White   HI-LO Intervention Patent

## 2024-10-31 NOTE — PROGRESS NOTES
Progress Note - Critical Care/ICU   Name: Tae Dolan 54 y.o. male I MRN: [id number]  Unit/Bed#: ICU 07 I Date of Admission: 10/20/2024   Date of Service: 10/31/2024 I Hospital Day: 11      Assessment & Plan  Acute respiratory failure with hypoxia (HCC)  Intubated on 10/23 for airway protection for planned burst suppression in setting of satus epilepticus.   Acute respiratory failure with hypoxia d/t seizure,pneumonia (identified 10/26)  10/26 CXR: Development of extensive right perihilar consolidation suspicious for pneumonia/aspiration.   10/28 Developed hypoxia early morning, requiring increased O2/PEEP requirements   10/28 Emergent Bronchoscopy. Copious secretions. Culture obtain  10/29 Bronch - Staph aureus  VENT day #7      Plan:  Continue mechanical ventilation. Wean for sats >92%  Continue Vancomycin (Day#5)  Sputum/bronc cx Staph Aureus  SAT/SBT daily  Vent bundle.    Status epilepticus (HCC)  Seizure / Fall Precautions  Last Seizure 10/23  AEDs: Continue Levetiracetam 2g q12h, Lacosamide 200mg q12h, Clobazam 5mg TID.   Discuss with neuro decreasing AEDs  vEEG stopped 10/29  Propofol gtt stopped 10/29  Holder DOT: Needs to be completed prior to discharge  Epidural hematoma (HCC)  Imaging:  CT head wo, 10/23/2024: Unchanged 1.3 cm thick acute extra-axial hemorrhage along left parietooccipital cerebral convexity, which could represent a combination of subdural hematoma and epidural hematoma (given lenticular shape at its thickest portion). Unchanged mild mass effect on adjacent left parietal lobe. Unchanged acute trace subarachnoid hemorrhage along left inferior temporal convexity. Unchanged acute trace thin subdural hematoma along left tentorium cerebelli. No new acute intracranial abnormality.  CTA head and neck w/wo, 10/22/2024: New extra-axial lenticular shaped hemorrhage along the left parietal convexity as described above, which may represent an epidural hematoma or less likely a subdural hematoma in  this region. No significant midline shift noted. Trace new subarachnoid hemorrhage noted along the inferior left temporo-occipital convexity noted. Right facial subcutaneous soft tissue swelling/hematoma again demonstrated. CT Angiography: No active extravasation of contrast is noted into the extra-axial hematoma. No large vessel occlusion, high-grade stenosis, or intracranial aneurysm identified on CT angiogram of the head. No hemodynamically significant stenosis or dissection identified on CT angiogram of the neck. Anterior mildly comminuted C5 vertebral body fracture again demonstrated, with mild associated prevertebral edema again demonstrated.  MRI Brain 10/27: No acute parenchymal abnormality.   Persistent subacute left temporoparietal hemorrhagic subdural collection with a focal loculated segment. There is expected regional inflammatory enhancement of the dura and leptomeninges. Focal areas of nonhemorrhagic contusion/edema in the parietal lobe subjacent to the above-mentioned extra-axial hematoma. Trace posttraumatic subarachnoid hemorrhage in the left temporal sulci. Additional chronic areas of bifrontal and left cerebellar and brainstem gliosis.     Plan:  Continue to monitor neuro exam closely.  STAT CT head with decline in GCS > 2 pts in 1 hour.  Continue to hold all AC/AP x 2 weeks.  Noted with seizure activity on vEEG 10/22-23, now off vEEG - see plan below  Neurology consulted/following - appreciate recommendations.     Spleen laceration  Recent Labs     10/29/24  0447 10/30/24  0600 10/31/24  0421   HGB 7.6* 8.0* 6.2*   S/p fall  CT C/A/P - Splenomegaly with metallic surgical coils near the splenic hilar region. There is a small amount of intermediate density perisplenic free fluid adjacent to the inferior spleen which could represent subcapsular hematoma (AAST grade I splenic injury).     Plan:  Hgb stable CBC in am   Closed nondisplaced fracture of fifth cervical vertebra (HCC)  S/p fall from about  10 feet. Baseline right sided numbness/tingling given history of stroke.   CT c-spine - Acute anterior C5 vertebral body fracture with prevertebral soft tissue swelling. No traumatic subluxation.     Plan:  Neurosx consulted  No surgical intervention at this time.   Maintain Vista collar at all times, will need for 8 to 12 weeks.   Follow up on upright films.  Follow up with Neurosurgery as outpatient in 2 weeks.   Fall  Fall from a height of 10 feet. He does not recall events before and after fall.  Possibly d/t new medication.  Denies intentionally jumping out of window or any thoughts of self harm.   Fall precautions.   Sepsis (HCC)  As evidence by tachycardia, fever, leukocytosis  Source pneumonia  CXR showing right-sided infiltrate  10/26 BC NG  Sputum culture 10/26: Staph aureus  Bronch culture 10/28: Staph aureus  Follow up sensitivities       Plan:  ABX: Vancomycin D5  Repeat cx if spikes temp  T/c ID consult    S/P craniotomy  S/p cr[facility]otomy 1/2019 at [facility], done secondary to abscess/lesions. Baseline independent.   Continue supportive care.  See individual plans below.    Mood disorder (HCC) unspecified  No home regimen. No indication for inpatient admission at this time.  Psych consulted, appreciate recommendations.  CM consulted for dispo planning.     Spasticity  History of spasticity s/p craniotomy.  Home regimen: Tizanidine qHS.  Taken off baclofen last admission   Type 2 diabetes mellitus (HCC)  Lab Results   Component Value Date    HGBA1C 6.4 (H) 10/22/2024     Recent Labs     10/31/24  0001 10/31/24  0216 10/31/24  0425 10/31/24  0559   POCGLU 166* 122 149* 176*   Blood Sugar Average: Last 72 hrs:  (P) 249    Home regimen: Lantus 15 units SC qHS.  Continue Lantus 25 units qHS.  Start insulin gtt   Consult nutrition to adjust TF   History of alcohol use  History of ETOH use in the past, unknown last drink.  Continue thiamine/folate daily.  Encourage cessation.    Pneumonia  involving right lung  See sepsis     Encephalopathy  Continue sedation.  Fentanyl 25mcg/hr, attempt to wean  Precedex as needed  LISSETH (acute kidney injury) (HCC)  Recent Labs     10/29/24  2231 10/30/24  0600 10/31/24  0421   CREATININE 1.14 1.10 1.11   BUN 24 24 29*     Stable, improved  Continue to monitor  Anemia  Recent Labs     10/29/24  0447 10/30/24  0600 10/31/24  0421   HGB 7.6* 8.0* 6.2*   HCT 24.1* 25.7* 20.4*     Continue to monitor hemoglobin  Transfuse if <7  Will get new T&S  Hypertension  Intermittent HTN and hypotension during time in the ICU  Seems to respond to sedation  Continue to monitor  Disposition: Critical care    ICU Core Measures     Vented Patient  VAP Bundle  VAP bundle ordered     A: Assess, Prevent, and Manage Pain Has pain been assessed? Yes  Need for changes to pain regimen? No   B: Both Spontaneous Awakening Trials (SATs) and Spontaneous Breathing Trials (SBTs) Plan to perform spontaneous awakening trial today? Yes   Plan to perform spontaneous breathing trial today? Yes   Obvious barriers to extubation? Yes   C: Choice of Sedation RASS Goal: -3 Moderate Sedation or 0 Alert and Calm  Need for changes to sedation or analgesia regimen? No   D: Delirium CAM-ICU: Unable to perform as patient is intubated and sedated   E: Early Mobility  Plan for early mobility? Yes   F: Family Engagement Plan for family engagement today? Yes       Antibiotic Review: Patient on appropriate coverage based on culture data.     Review of Invasive Devices:      Central access plan: Medications requiring central line Hemodynamic monitoring Patient requires PICC secondary to medication administration      Prophylaxis:  VTE VTE covered by:  enoxaparin, Subcutaneous, 30 mg at 10/30/24 2127       Stress Ulcer  covered byomeprazole (PRILOSEC) suspension 2 mg/mL [[id number]]         24 Hour Events :   Overnight patient had increased agitation and labile pressures that seemed to most respond to sedation adjusments,  fentanyl had to increased to 50 mcg, and precedex at 0.5.    Subjective   Patient was unable to signficantly participate in ROS secondary to intubation and sedation.    Review of Systems: See HPI for Review of Systems    Objective :                   Vitals I/O      Most Recent Min/Max in 24hrs   Temp 98.8 °F (37.1 °C) Temp  Min: 98.8 °F (37.1 °C)  Max: 100.1 °F (37.8 °C)   Pulse 85 Pulse  Min: 81  Max: 120   Resp (!) 11 Resp  Min: 11  Max: 123   BP (!) 85/52 BP  Min: 77/46  Max: 184/89   O2 Sat 98 % SpO2  Min: 86 %  Max: 99 %      Intake/Output Summary (Last 24 hours) at 10/31/2024 0739  Last data filed at 10/31/2024 0600  Gross per 24 hour   Intake 4506.11 ml   Output 1225 ml   Net 3281.11 ml       Diet Enteral/Parenteral; Tube Feeding No Oral Diet; Vital AF 1.2; Continuous; 65; Banatrol Plus Banana Flakes - One Packet; BID; 150; Water; Every 4 hours    Invasive Monitoring   Arterial Line  [place] /51  Arterial Line BP  Min: 136/51  Max: 229/78   MAP 74 mmHg  Arterial Line MAP (mmHg)  Min: 74 mmHg  Max: 121 mmHg           Physical Exam   Physical Exam  Vitals and nursing note reviewed.   Eyes:      Pupils: Pupils are equal, round, and reactive to light.      Comments: Right eye healing subconjunctival hemorrhage.   Skin:     General: Skin is cool.      Comments: Healing abrasions on multiple points of body, especially over lateral left arm   HENT:      Head:      Comments: Bruising over bilateral eyes     Mouth/Throat:      Mouth: Mucous membranes are moist.   Cardiovascular:      Rate and Rhythm: Normal rate and regular rhythm.      Pulses: Normal pulses.   Musculoskeletal:      Comments: Bilateral hands edematous, slight edema and bilateral lower extremities as well.   Constitutional:       General: He is not in acute distress.     Appearance: He is well-developed. He is ill-appearing and toxic-appearing.      Interventions: He is sedated and intubated.   Pulmonary:      Effort: He is intubated.      Breath  sounds: Examination of the right-upper field reveals decreased breath sounds and rhonchi. Examination of the right-middle field reveals decreased breath sounds and rhonchi. Examination of the right-lower field reveals decreased breath sounds and rhonchi. Examination of the left-lower field reveals decreased breath sounds and rhonchi. Decreased breath sounds and rhonchi present.   Neurological:      Comments: Unable to significantly participate in neurologic exam secondary to intubation and sedation.  Spontaneous eye movements, however would not open eyes.  Gag intact.  No spontaneous extremity motion, currently on 50 of fentanyl          Diagnostic Studies        Lab Results: I have reviewed the following results:     Medications:  Scheduled PRN   acetaminophen, 650 mg, Q6H  acetylcysteine, 3 mL, Q6H  Artificial Tears, 1 drop, BID  atorvastatin, 10 mg, Daily With Dinner  bacitracin, 1 large application, BID  cefazolin, 2,000 mg, Q8H  chlorhexidine, 15 mL, Q12H NANCY  cloBAZam, 5 mg, TID  enoxaparin, 30 mg, Q12H NANCY  folic acid, 1 mg, Daily  insulin glargine, 25 Units, HS  lacosamide, 200 mg, Q12H NANCY  levalbuterol, 1.25 mg, Q6H  levETIRAcetam, 2,000 mg, Q12H NANCY  midazolam, ,   Multivitamin, 15 mL, Daily  nicotine, 21 mg, Daily  omeprazole (PRILOSEC) suspension 2 mg/mL, 20 mg, Daily  oxyCODONE, 5 mg, Q6H NANCY  thiamine, 100 mg, Daily      fentaNYL, 50 mcg, Q1H PRN  hydrALAZINE, 20 mg, Q6H PRN  labetalol, 20 mg, Q6H PRN  midazolam, ,   ondansetron, 4 mg, Q4H PRN       Continuous    dexmedetomidine, 0.1-1 mcg/kg/hr, Last Rate: 0.5 mcg/kg/hr (10/31/24 0550)  fentaNYL, 50 mcg/hr, Last Rate: 50 mcg/hr (10/31/24 0030)  insulin regular (HumuLIN R,NovoLIN R) 1 Units/mL in sodium chloride 0.9 % 100 mL infusion, 0.3-21 Units/hr, Last Rate: 8 Units/hr (10/31/24 5159)  multi-electrolyte, 75 mL/hr, Last Rate: 75 mL/hr (10/30/24 0240)         Labs:   CBC    Recent Labs     10/30/24  0600 10/31/24  0421   WBC 24.24* 17.87*   HGB 8.0*  6.2*   HCT 25.7* 20.4*    271   BANDSPCT 10* 6     BMP    Recent Labs     10/30/24  0600 10/31/24  0421   SODIUM 144 148*   K 4.1 3.7   * 111*   CO2 29 28   AGAP 5 9   BUN 24 29*   CREATININE 1.10 1.11   CALCIUM 8.8 8.1*       Coags    No recent results     Additional Electrolytes  Recent Labs     10/30/24  0600 10/31/24  0421   MG 2.7 2.7   PHOS 2.7 3.0          Blood Gas    No recent results  No recent results LFTs  No recent results    Infectious  Recent Labs     10/30/24  0600   PROCALCITONI 1.74*     Glucose  Recent Labs     10/29/24  2231 10/30/24  0600 10/31/24  0421   GLUC 419* 344* 154*

## 2024-10-31 NOTE — ASSESSMENT & PLAN NOTE
Vitals:    11/01/24 0107 11/01/24 0120 11/01/24 0135 11/01/24 0200   BP:  142/72 150/73 158/76   Pulse:  100 97 100   Resp:       Temp:    100.4 °F (38 °C)   TempSrc:       SpO2: (!) 86% (!) 89% 95% 94%   Weight:       Height:         Intermittently hypotensive  10/31 1 unit PRBC and given fluid boluses with improvement   Lactic acid 1.4

## 2024-10-31 NOTE — WOUND OSTOMY CARE
Progress Note - Wound   Tae Dolan 54 y.o. male MRN: [id number]  Unit/Bed#: ICU 07 Encounter: [id number]        Assessment:   Patient admitted to [facility] for treatment of fall. Patient is seen for wound care follow-up. On assessment, patient is in ICU, on critical care low air loss mattress, intubated and sedated, bilateral wrist restraints in place, not able to follow commands, indwelling catheter in place for urine management, rectal tube in place for bowel management, enteral nutrition through OG, assist of two for repositioning and dependent for care. Primary nurse at bedside for assessment.     Findings:  B/L heels are dry intact and fransisco with no skin loss or wounds present. Recommend preventative Hydraguard Cream and proper offloading/ repositioning.      Left Upper arm, traumatic abrasion - large irregular shaped area of skin loss is now pink and fully epithelized. No open aspects or drainage. Continue bacitracin ointment as ordered by trauma team.      Left poster hand, traumatic abrasion - scattered irregular shaped well adhered areas of eschar. No open aspects or drainage. Camille wound dry and intact. continue 3M no sting barrier film.      Right knee, traumatic abrasion - large irregular shaped area of well adhered intact eschar. No open aspects or drainage. Camille wound is dry and intact. continue 3M no sting barrier film.      No induration, fluctuance, odor, warmth/temperature differences, redness, or purulence noted to the above noted wounds and skin areas assessed. New dressings applied per orders listed below. Patient tolerated well- no s/s of non-verbal pain or discomfort observed during the encounter. Bedside nurse aware of plan of care. See flow sheets for more detailed assessment findings.        Wound care will sign off at this time, please re-consult if needed. Please follow skin care recommendations written as orders for skin protection and prevention.      Skin care Plan:  1-Protect sacrum  w/Silicone foam, cha with P, change q3d and PRN, peel back and check skin integrity q-shift.  2-Turn/reposition q2h or when medically stable for pressure re-distribution on skin.  3-Elevate heels to offload pressure.  4-Moisturize skin daily with skin nourishing cream.  5-Ehob cushion in chair when out of bed.  6-Hydraguard/Silicone cream to bilateral heels BID and PRN.  7-Left Hand, B/L Knees: cleanse with NSS soaked gauze. Pat dry. Cover scabs with 3M no sting barrier daily.       WOUNDS:  Wound 10/24/24 Traumatic Abrasion(s) Arm Anterior;Left;Proximal;Upper (Active)   Wound Image   10/31/24 0929   Wound Description CAROLYN 10/30/24 1600   Camille-wound Assessment CAROLYN 10/30/24 1600   Wound Length (cm) 0 cm 10/31/24 0929   Wound Width (cm) 0 cm 10/31/24 0929   Wound Depth (cm) 0 cm 10/31/24 0929   Wound Surface Area (cm^2) 0 cm^2 10/31/24 0929   Wound Volume (cm^3) 0 cm^3 10/31/24 0929   Calculated Wound Volume (cm^3) 0 cm^3 10/31/24 0929   Change in Wound Size % 100 10/31/24 0929   Wound Site Closure Open to air 10/28/24 0000   Drainage Amount None 10/31/24 0400   Drainage Description Serosanguineous 10/28/24 0000   Non-staged Wound Description Full thickness 10/24/24 1110   Treatments Cleansed;Site care 10/29/24 0800   Dressing Open to air 10/31/24 0800   Packing- # removed 0 10/24/24 1110   Packing- # inserted 0 10/24/24 1110   Dressing Changed Changed 10/29/24 0800   Patient Tolerance Tolerated well 10/29/24 0800   Dressing Status Clean;Dry;Intact 10/29/24 0800       Wound 10/24/24 Knee Anterior;Right (Active)   Wound Image   10/31/24 0923   Wound Description Dry;Intact;Eschar 10/28/24 0000   Camille-wound Assessment Dry;Intact 10/28/24 0000   Wound Length (cm) 8 cm 10/24/24 1115   Wound Width (cm) 3 cm 10/24/24 1115   Wound Depth (cm) 0 cm 10/24/24 1115   Wound Surface Area (cm^2) 24 cm^2 10/24/24 1115   Wound Volume (cm^3) 0 cm^3 10/24/24 1115   Calculated Wound Volume (cm^3) 0 cm^3 10/24/24 1115   Drainage Amount  None 10/31/24 0400   Treatments Cleansed;Site care 10/29/24 0800   Dressing Open to air 10/31/24 0800   Wound packed? No 10/24/24 1115   Packing- # removed 0 10/24/24 1115   Packing- # inserted 0 10/24/24 1115   Patient Tolerance Tolerated well 10/28/24 0800       Wound 10/24/24 Hand Left;Posterior (Active)   Wound Image   10/31/24 0922   Wound Description Intact;Dry;Brown;Black 10/28/24 0000   Camille-wound Assessment Dry;Intact 10/28/24 0000   Wound Site Closure Open to air 10/28/24 0000   Drainage Amount None 10/31/24 0400   Non-staged Wound Description Not applicable 10/24/24 1118   Treatments Cleansed;Site care 10/29/24 0800   Dressing Open to air 10/31/24 0800   Wound packed? No 10/24/24 1118   Dressing Changed New 10/24/24 1118   Patient Tolerance Tolerated well 10/28/24 0800   Dressing Status Clean;Dry;Intact 10/26/24 1600                Jannet Rahman RN, BSN, CCRN

## 2024-10-31 NOTE — RESPIRATORY THERAPY NOTE
10/31/24 1655   Respiratory Assessment   Resp Comments Tube advanced to 27 per AP and Dr. Leggett.   ETT  Oral 7.5 mm   Placement Date/Time: 10/23/24 1412   Mask Ventilation: Ventilated by mask (1)  Preoxygenated: Yes  Type: Oral  Tube Size: 7.5 mm   Secured at (cm) (S)  27   Measured from Lips   Secured Location Center   Secured by Commercial tube miramontes

## 2024-10-31 NOTE — PLAN OF CARE
Problem: SAFETY,RESTRAINT: NV/NON-SELF DESTRUCTIVE BEHAVIOR  Goal: Remains free of harm/injury (restraint for non violent/non self-detsructive behavior)  Description: INTERVENTIONS:  - Instruct patient/family regarding restraint use   - Assess and monitor physiologic and psychological status   - Provide interventions and comfort measures to meet assessed patient needs   - Identify and implement measures to help patient regain control  - Assess readiness for release of restraint   10/31/2024 1050 by Miguel A Hines RN  Outcome: Progressing  10/31/2024 0949 by Miguel A Hines RN  Outcome: Progressing  Goal: Returns to optimal restraint-free functioning  Description: INTERVENTIONS:  - Assess the patient's behavior and symptoms that indicate continued need for restraint  - Identify and implement measures to help patient regain control  - Assess readiness for release of restraint   10/31/2024 1050 by Miguel A Hines RN  Outcome: Progressing  10/31/2024 0949 by Miguel A Hines RN  Outcome: Progressing

## 2024-10-31 NOTE — ASSESSMENT & PLAN NOTE
Intermittent HTN and hypotension during time in the ICU  Seems to respond to sedation  Continue to monitor

## 2024-10-31 NOTE — ASSESSMENT & PLAN NOTE
Recent Labs     10/29/24  2231 10/30/24  0600 10/31/24  0421   CREATININE 1.14 1.10 1.11   BUN 24 24 29*     Stable, improved  Continue to monitor

## 2024-10-31 NOTE — ASSESSMENT & PLAN NOTE
Recent Labs     10/29/24  0447 10/30/24  0600 10/31/24  0421   HGB 7.6* 8.0* 6.2*   HCT 24.1* 25.7* 20.4*     Continue to monitor hemoglobin  Transfuse if <7  Will get new T&S

## 2024-10-31 NOTE — PROCEDURES
POC Cardiac US    Date/Time: 10/31/2024 10:36 AM    Performed by: Zonia Hill MD  Authorized by: Zonia Hill MD    Patient location:  ICU  Other Assisting Provider: Yes (comment) (CARLOS Serrano)    Procedure details:     Exam Type:  Diagnostic    Indications: hypotension and suspected volume depletion      Assessment / Evaluation for: cardiac function, pericardial effusion, inferior vena cava for fluid responsiveness and intravascular volume status      Exam Type: initial exam      Image quality: limited diagnostic      Image availability:  Images available in PACS and still images obtained  Patient Details:     Cardiac Rhythm:  Regular    Mechanical ventilation: Yes    Cardiac findings:     Echo technique: limited 2D      Views obtained: parasternal long axis, parasternal short axis, subcostal and apical      Views obtained comment:  Limited subcostal views    Pericardial effusion: absent      Tamponade physiology: absent      Wall motion: normal      LV systolic function: normal      RV dilation: none    Pulmonary findings:     Left Lung Findings: left pleural effusion present and left lung sliding      Right lung findings: right pleural effusion present and right lung sliding      B-lines: 1 to 3    IVC findings:     IVC Size: indeterminate      IVC Inspiratory Collapse: partial    Interpretation:      Limited views of IVC, fluid status indeterminate

## 2024-10-31 NOTE — QUICK NOTE
Vitals:    10/31/24 1000 10/31/24 1010 10/31/24 1013 10/31/24 1015   BP: (!) 71/36 (!) 66/34 (!) 67/34 (!) 67/34   Pulse: 92 85 87 87   Resp: 14 17 13 13   Temp: 100 °F (37.8 °C)  99 °F (37.2 °C)    TempSrc:       SpO2: 98% 98% 98% 99%   Weight:       Height:         Hypotensive into the 60s SBP. Given IVF bolus as well as 1 U pRBCs without significant improvement. Levophed ordered.    Bedside POCUS performed, no gross cardiac abnormalities. Indeterminate IVC imaging.  Lung sliding present bilaterally in all lung fields - decreased in RUL but present. (See procedure note for more information)

## 2024-10-31 NOTE — PROGRESS NOTES
"Progress Note - Critical Care/ICU   Name: Tae Dolan 54 y.o. male I MRN: [id number]  Unit/Bed#: ICU 07 I Date of Admission: 10/20/2024   Date of Service: 10/31/2024 I Hospital Day: 11       Interval Events: Pt with an episode of hypotension at 1800, down to SBP 79. Shortly after he started to desaturate down to 80% on APRV vent settings FiO2 45%. CXR from 1600 reviewed and noted to still be with patchy infiltrates throughout Rt side but improved slightly from AM CXR.    FiO2 increased to 70% and pt positioned with good lung down (On left side). Sats remained at 84%. FiO2 increased to 80% and ABG drawn. Sats started to improve to 88% then 91%.     Pertinent New Data:   blood pressure and pulse oximetry  Pt opening eyes to voice, moving feet to command, GCS 9(E3, NT, M6). LS coarse rhonchi b/l, no secretions from ETT  CBC:   Lab Results   Component Value Date    WBC 17.87 (H) 10/31/2024    HGB 7.5 (L) 10/31/2024    HCT 28 (L) 10/31/2024     (H) 10/31/2024     10/31/2024    RBC 1.96 (L) 10/31/2024    MCH 31.6 10/31/2024    MCHC 30.4 (L) 10/31/2024    RDW 19.4 (H) 10/31/2024    MPV 9.6 10/31/2024   , CMP:   Lab Results   Component Value Date    SODIUM 148 (H) 10/31/2024    K 3.7 10/31/2024     (H) 10/31/2024    CO2 29 10/31/2024    BUN 29 (H) 10/31/2024    CREATININE 1.11 10/31/2024    GLUCOSE 136 10/31/2024    CALCIUM 8.1 (L) 10/31/2024    EGFR 73 10/31/2024   , ABG: No results found for: \"PHART\", \"GZM9JDM\", \"PO2ART\", \"XBM1SAO\", \"D5RHUDOO\", \"BEART\", \"SOURCE\"  chest xrayResults Review Statement: I personally reviewed the following image studies in PACS and associated radiology reports: chest xray. My interpretation of the radiology images/reports is: See above under interval events.    Assessment and Plan  Diagnosis: Acute hypoxic Resp failure  Hypotension   Plan: Continue FiO2 80%, good lung down position  Continue aggressive pulmonary clearance exercises  ABG at bedside: 7.30/55/37.   Sats now " 91%, will recheck abg in an hr  BP improved with 1L IVF  Stat Hgb and lactic. Pt did require 1 unit PRBC today for hgb 6.7    Billing Level:  Critical Care Time Statement: Upon my evaluation, this patient had a high probability of imminent or life-threatening deterioration due to Acute hypoxic resp failure, which required my direct attention, intervention, and personal management.  I spent a total of 25 minutes directly providing critical care services, including interpretation of complex medical databases, evaluating for the presence of life-threatening injuries or illnesses, management of organ system failure(s) , complex medical decision making (to support/prevent further life-threatening deterioration)., interpretation of hemodynamic data, and ventilator management. This time is exclusive of procedures, teaching, family meetings, and any prior time recorded by providers other than myself.      CARLOS Bee

## 2024-10-31 NOTE — PLAN OF CARE
Problem: SAFETY ADULT  Goal: Patient will remain free of falls  Description: INTERVENTIONS:  - Educate patient/family on patient safety including physical limitations  - Instruct patient to call for assistance with activity   - Consult OT/PT to assist with strengthening/mobility   - Keep Call bell within reach  - Keep bed low and locked with side rails adjusted as appropriate  - Keep care items and personal belongings within reach  - Initiate and maintain comfort rounds  - Make Fall Risk Sign visible to staff  - Offer Toileting every 2 Hours, in advance of need  - Initiate/Maintain bed alarm  - Apply yellow socks and bracelet for high fall risk patients  - Consider moving patient to room near nurses station  Outcome: Progressing  Goal: Maintain or return to baseline ADL function  Description: INTERVENTIONS:  -  Assess patient's ability to carry out ADLs; assess patient's baseline for ADL function and identify physical deficits which impact ability to perform ADLs (bathing, care of mouth/teeth, toileting, grooming, dressing, etc.)  - Assess/evaluate cause of self-care deficits   - Assess range of motion  - Assess patient's mobility; develop plan if impaired  - Assess patient's need for assistive devices and provide as appropriate  - Encourage maximum independence but intervene and supervise when necessary  - Involve family in performance of ADLs  - Assess for home care needs following discharge   - Consider OT consult to assist with ADL evaluation and planning for discharge  - Provide patient education as appropriate  Outcome: Progressing  Goal: Maintains/Returns to pre admission functional level  Description: INTERVENTIONS:  - Perform AM-PAC 6 Click Basic Mobility/ Daily Activity assessment daily.  - Set and communicate daily mobility goal to care team and patient/family/caregiver.   - Collaborate with rehabilitation services on mobility goals if consulted  - Reposition patient every 2 hours.  - Out of bed for  toileting  - Record patient progress and toleration of activity level   Outcome: Progressing     Problem: Nutrition/Hydration-ADULT  Goal: Nutrient/Hydration intake appropriate for improving, restoring or maintaining nutritional needs  Description: Monitor and assess patient's nutrition/hydration status for malnutrition. Collaborate with interdisciplinary team and initiate plan and interventions as ordered.  Monitor patient's weight and dietary intake as ordered or per policy. Utilize nutrition screening tool and intervene as necessary. Determine patient's food preferences and provide high-protein, high-caloric foods as appropriate.     INTERVENTIONS:  - Monitor oral intake, urinary output, labs, and treatment plans  - Assess nutrition and hydration status and recommend course of action  - Evaluate amount of meals eaten  - Assist patient with eating if necessary   - Allow adequate time for meals  - Recommend/ encourage appropriate diets, oral nutritional supplements, and vitamin/mineral supplements  - Order, calculate, and assess calorie counts as needed  - Recommend, monitor, and adjust tube feedings and TPN/PPN based on assessed needs  - Assess need for intravenous fluids  - Provide specific nutrition/hydration education as appropriate  - Include patient/family/caregiver in decisions related to nutrition  Outcome: Progressing     Problem: Prexisting or High Potential for Compromised Skin Integrity  Goal: Skin integrity is maintained or improved  Description: INTERVENTIONS:  - Identify patients at risk for skin breakdown  - Assess and monitor skin integrity  - Assess and monitor nutrition and hydration status  - Monitor labs   - Assess for incontinence   - Turn and reposition patient  - Assist with mobility/ambulation  - Relieve pressure over bony prominences  - Avoid friction and shearing  - Provide appropriate hygiene as needed including keeping skin clean and dry  - Evaluate need for skin moisturizer/barrier  cream  - Collaborate with interdisciplinary team   - Patient/family teaching  - Consider wound care consult   Outcome: Progressing

## 2024-11-01 PROBLEM — D72.829 LEUKOCYTOSIS: Status: ACTIVE | Noted: 2024-01-01

## 2024-11-01 PROBLEM — J80 ARDS (ADULT RESPIRATORY DISTRESS SYNDROME) (HCC): Status: ACTIVE | Noted: 2024-01-01

## 2024-11-01 PROBLEM — J15.211 PNEUMONIA OF BOTH LUNGS DUE TO METHICILLIN SUSCEPTIBLE STAPHYLOCOCCUS AUREUS (MSSA) (HCC): Status: ACTIVE | Noted: 2024-01-01

## 2024-11-01 NOTE — ASSESSMENT & PLAN NOTE
S/p craniotomy 1/2019 at [facility], done secondary to abscess/lesions. Baseline independent.   Continue supportive care.   See individual plans below.

## 2024-11-01 NOTE — PROGRESS NOTES
Progress Note - Critical Care/ICU   Name: Tae Dolan 54 y.o. male I MRN: [id number]  Unit/Bed#: ICU 07 I Date of Admission: 10/20/2024   Date of Service: 11/1/2024 I Hospital Day: 12       Interval Events: Pt desaturated to 78-80% on APRV vent settings FiO2 100%. Pt with multiple episodes of desaturation overnight which would improve with lavage and/or repositioning and he would recover slowly. This time pt did not improve after lavage. He required to be bagged for 10 minutes prior to coming up to mid 80s. CXR done with shows almost complete white out on Rt side, increased b/l patchy opacities worse compared to yesterday's xrays.   Attempted to increase APRV settings to P high of 30 with no improvement. Attempted a recruitment exercise of peep 30 x 30 seconds and then placed on ACVC ARDs net protocol with High peep. Pt remained with sats of 80-82%. Changed to ACPC settings Ards net high peep and slowly came up to 87%.      Pertinent New Data:   respirations and pulse oximetry    chest xrayResults Review Statement: I personally reviewed the following image studies in PACS and associated radiology reports: chest xray. My interpretation of the radiology images/reports is: Almost complete white out on Rt side. Increased patchy opacities B/L compared to CXRs from 10/31/24.    Assessment and Plan  Diagnosis: Acute Hypoxic Respiratory failure  ARDS  Plan: Continue ACPC. Current settings 20/12/24/100  Check ABG  Start Chayo with dose minimizing protocol  ABG again in 4 hours  Keep with good lung down position (on left side)  Goal SpO2 > 88%    Billing Level:  Critical Care Time Statement: Upon my evaluation, this patient had a high probability of imminent or life-threatening deterioration due to ARDs, which required my direct attention, intervention, and personal management.  I spent a total of 35 minutes directly providing critical care services, including interpretation of complex medical databases, evaluating for  the presence of life-threatening injuries or illnesses, management of organ system failure(s) , complex medical decision making (to support/prevent further life-threatening deterioration)., interpretation of hemodynamic data, titration of continuous IV medications (drips), and ventilator management. This time is exclusive of procedures, teaching, family meetings, and any prior time recorded by providers other than myself.      CARLOS Bee

## 2024-11-01 NOTE — ASSESSMENT & PLAN NOTE
As evidenced by fever, tachycardia, leukocytosis.  Despite increasing leukocytosis and continued fevers, lactate, bicarb, creatinine remain normal/stable suggesting that sepsis state is not worsening.  Blood cultures thus far negative.  - Continue cefazolin as above  - Continue to monitor CBC and BMP  - ARDS management per primary team  - Repeat blood cultures and check RP 2 panel  - Check LFTs

## 2024-11-01 NOTE — PLAN OF CARE
Problem: PAIN - ADULT  Goal: Verbalizes/displays adequate comfort level or baseline comfort level  Description: Interventions:  - Encourage patient to monitor pain and request assistance  - Assess pain using appropriate pain scale  - Administer analgesics based on type and severity of pain and evaluate response  - Implement non-pharmacological measures as appropriate and evaluate response  - Consider cultural and social influences on pain and pain management  - Notify physician/advanced practitioner if interventions unsuccessful or patient reports new pain  11/1/2024 1942 by Gladys Iverson RN  Outcome: Not Progressing  11/1/2024 1942 by Gladys Iverson RN  Outcome: Progressing     Problem: SAFETY ADULT  Goal: Patient will remain free of falls  Description: INTERVENTIONS:  - Educate patient/family on patient safety including physical limitations  - Instruct patient to call for assistance with activity   - Consult OT/PT to assist with strengthening/mobility   - Keep Call bell within reach  - Keep bed low and locked with side rails adjusted as appropriate  - Keep care items and personal belongings within reach  - Initiate and maintain comfort rounds  - Make Fall Risk Sign visible to staff  - Apply yellow socks and bracelet for high fall risk patients  - Consider moving patient to room near nurses station  11/1/2024 1942 by Gladys Iverson RN  Outcome: Not Progressing  11/1/2024 1942 by Gladys Iverson RN  Outcome: Progressing  Goal: Maintain or return to baseline ADL function  Description: INTERVENTIONS:  -  Assess patient's ability to carry out ADLs; assess patient's baseline for ADL function and identify physical deficits which impact ability to perform ADLs (bathing, care of mouth/teeth, toileting, grooming, dressing, etc.)  - Assess/evaluate cause of self-care deficits   - Assess range of motion  - Assess patient's mobility; develop plan if impaired  - Assess patient's need for assistive devices and provide as  appropriate  - Encourage maximum independence but intervene and supervise when necessary  - Involve family in performance of ADLs  - Assess for home care needs following discharge   - Consider OT consult to assist with ADL evaluation and planning for discharge  - Provide patient education as appropriate  11/1/2024 1942 by Gladys Iverson RN  Outcome: Not Progressing  11/1/2024 1942 by Gladys Iverson RN  Outcome: Progressing  Goal: Maintains/Returns to pre admission functional level  Description: INTERVENTIONS:  - Perform AM-PAC 6 Click Basic Mobility/ Daily Activity assessment daily.  - Set and communicate daily mobility goal to care team and patient/family/caregiver.   - Collaborate with rehabilitation services on mobility goals if consulted  - Out of bed for toileting  - Record patient progress and toleration of activity level   11/1/2024 1942 by Gladys Iverson RN  Outcome: Not Progressing  11/1/2024 1942 by Gladys Iverson RN  Outcome: Progressing     Problem: DISCHARGE PLANNING  Goal: Discharge to home or other facility with appropriate resources  Description: INTERVENTIONS:  - Identify barriers to discharge w/patient and caregiver  - Arrange for needed discharge resources and transportation as appropriate  - Identify discharge learning needs (meds, wound care, etc.)  - Arrange for interpretive services to assist at discharge as needed  - Refer to Case Management Department for coordinating discharge planning if the patient needs post-hospital services based on physician/advanced practitioner order or complex needs related to functional status, cognitive ability, or social support system  11/1/2024 1942 by Gladys Iverson RN  Outcome: Not Progressing  11/1/2024 1942 by Gladys Iverson RN  Outcome: Progressing     Problem: Knowledge Deficit  Goal: Patient/family/caregiver demonstrates understanding of disease process, treatment plan, medications, and discharge instructions  Description: Complete learning  assessment and assess knowledge base.  Interventions:  - Provide teaching at level of understanding  - Provide teaching via preferred learning methods  11/1/2024 1942 by Gladys Iverson RN  Outcome: Not Progressing  11/1/2024 1942 by Gladys Iverson RN  Outcome: Progressing     Problem: SAFETY,RESTRAINT: NV/NON-SELF DESTRUCTIVE BEHAVIOR  Goal: Remains free of harm/injury (restraint for non violent/non self-detsructive behavior)  Description: INTERVENTIONS:  - Instruct patient/family regarding restraint use   - Assess and monitor physiologic and psychological status   - Provide interventions and comfort measures to meet assessed patient needs   - Identify and implement measures to help patient regain control  - Assess readiness for release of restraint   11/1/2024 1942 by Gladys Iverson RN  Outcome: Not Progressing  11/1/2024 1942 by Gladys Iverson RN  Outcome: Progressing  Goal: Returns to optimal restraint-free functioning  Description: INTERVENTIONS:  - Assess the patient's behavior and symptoms that indicate continued need for restraint  - Identify and implement measures to help patient regain control  - Assess readiness for release of restraint   11/1/2024 1942 by Gladys Iverson RN  Outcome: Not Progressing  11/1/2024 1942 by Gladys Iverson RN  Outcome: Progressing     Problem: Nutrition/Hydration-ADULT  Goal: Nutrient/Hydration intake appropriate for improving, restoring or maintaining nutritional needs  Description: Monitor and assess patient's nutrition/hydration status for malnutrition. Collaborate with interdisciplinary team and initiate plan and interventions as ordered.  Monitor patient's weight and dietary intake as ordered or per policy. Utilize nutrition screening tool and intervene as necessary. Determine patient's food preferences and provide high-protein, high-caloric foods as appropriate.     INTERVENTIONS:  - Monitor oral intake, urinary output, labs, and treatment plans  - Assess nutrition  and hydration status and recommend course of action  - Evaluate amount of meals eaten  - Assist patient with eating if necessary   - Allow adequate time for meals  - Recommend/ encourage appropriate diets, oral nutritional supplements, and vitamin/mineral supplements  - Order, calculate, and assess calorie counts as needed  - Recommend, monitor, and adjust tube feedings and TPN/PPN based on assessed needs  - Assess need for intravenous fluids  - Provide specific nutrition/hydration education as appropriate  - Include patient/family/caregiver in decisions related to nutrition  11/1/2024 1942 by Gladys Iverson RN  Outcome: Not Progressing  11/1/2024 1942 by Gladys Iverson RN  Outcome: Progressing     Problem: Prexisting or High Potential for Compromised Skin Integrity  Goal: Skin integrity is maintained or improved  Description: INTERVENTIONS:  - Identify patients at risk for skin breakdown  - Assess and monitor skin integrity  - Assess and monitor nutrition and hydration status  - Monitor labs   - Assess for incontinence   - Turn and reposition patient  - Assist with mobility/ambulation  - Relieve pressure over bony prominences  - Avoid friction and shearing  - Provide appropriate hygiene as needed including keeping skin clean and dry  - Evaluate need for skin moisturizer/barrier cream  - Collaborate with interdisciplinary team   - Patient/family teaching  - Consider wound care consult   11/1/2024 1942 by Gladys Iverson RN  Outcome: Not Progressing  11/1/2024 1942 by Gladys Iverson RN  Outcome: Progressing     Problem: NEUROSENSORY - ADULT  Goal: Achieves stable or improved neurological status  Description: INTERVENTIONS  - Monitor and report changes in neurological status  - Monitor vital signs such as temperature, blood pressure, glucose, and any other labs ordered   - Initiate measures to prevent increased intracranial pressure  - Monitor for seizure activity and implement precautions if appropriate       11/1/2024 1942 by Gladys Iverson RN  Outcome: Not Progressing  11/1/2024 1942 by Gladys Iverson RN  Outcome: Progressing  Goal: Remains free of injury related to seizures activity  Description: INTERVENTIONS  - Maintain airway, patient safety  and administer oxygen as ordered  - Monitor patient for seizure activity, document and report duration and description of seizure to physician/advanced practitioner  - If seizure occurs,  ensure patient safety during seizure  - Reorient patient post seizure  - Seizure pads on all 4 side rails  - Instruct patient/family to notify RN of any seizure activity including if an aura is experienced  - Instruct patient/family to call for assistance with activity based on nursing assessment  - Administer anti-seizure medications if ordered    11/1/2024 1942 by Gladys Iverson RN  Outcome: Not Progressing  11/1/2024 1942 by Gladys Iverson RN  Outcome: Progressing  Goal: Achieves maximal functionality and self care  Description: INTERVENTIONS  - Monitor swallowing and airway patency with patient fatigue and changes in neurological status  - Encourage and assist patient to increase activity and self care.   - Encourage visually impaired, hearing impaired and aphasic patients to use assistive/communication devices  11/1/2024 1942 by Gladys Iverson RN  Outcome: Not Progressing  11/1/2024 1942 by Gladys Iverson RN  Outcome: Progressing     Problem: CARDIOVASCULAR - ADULT  Goal: Maintains optimal cardiac output and hemodynamic stability  Description: INTERVENTIONS:  - Monitor I/O, vital signs and rhythm  - Monitor for S/S and trends of decreased cardiac output  - Administer and titrate ordered vasoactive medications to optimize hemodynamic stability  - Assess quality of pulses, skin color and temperature  - Assess for signs of decreased coronary artery perfusion  - Instruct patient to report change in severity of symptoms  11/1/2024 1942 by Gladys Iverson RN  Outcome: Not  Progressing  11/1/2024 1942 by Gladys Iverson RN  Outcome: Progressing  Goal: Absence of cardiac dysrhythmias or at baseline rhythm  Description: INTERVENTIONS:  - Continuous cardiac monitoring, vital signs, obtain 12 lead EKG if ordered  - Administer antiarrhythmic and heart rate control medications as ordered  - Monitor electrolytes and administer replacement therapy as ordered  11/1/2024 1942 by Gladys Iverson RN  Outcome: Not Progressing  11/1/2024 1942 by Gladys Iverson RN  Outcome: Progressing     Problem: RESPIRATORY - ADULT  Goal: Achieves optimal ventilation and oxygenation  Description: INTERVENTIONS:  - Assess for changes in respiratory status  - Assess for changes in mentation and behavior  - Position to facilitate oxygenation and minimize respiratory effort  - Oxygen administered by appropriate delivery if ordered  - Initiate smoking cessation education as indicated  - Encourage broncho-pulmonary hygiene including cough, deep breathe, Incentive Spirometry  - Assess the need for suctioning and aspirate as needed  - Assess and instruct to report SOB or any respiratory difficulty  - Respiratory Therapy support as indicated  11/1/2024 1942 by Gladys Iverson RN  Outcome: Not Progressing  11/1/2024 1942 by Gladys Iverson RN  Outcome: Progressing     Problem: GASTROINTESTINAL - ADULT  Goal: Minimal or absence of nausea and/or vomiting  Description: INTERVENTIONS:  - Administer IV fluids if ordered to ensure adequate hydration  - Maintain NPO status until nausea and vomiting are resolved  - Nasogastric tube if ordered  - Administer ordered antiemetic medications as needed  - Provide nonpharmacologic comfort measures as appropriate  - Advance diet as tolerated, if ordered  - Consider nutrition services referral to assist patient with adequate nutrition and appropriate food choices  11/1/2024 1942 by Gladys Iverson RN  Outcome: Not Progressing  11/1/2024 1942 by Gladys Iverson RN  Outcome:  Progressing  Goal: Maintains or returns to baseline bowel function  Description: INTERVENTIONS:  - Assess bowel function  - Encourage oral fluids to ensure adequate hydration  - Administer IV fluids if ordered to ensure adequate hydration  - Administer ordered medications as needed  - Encourage mobilization and activity  - Consider nutritional services referral to assist patient with adequate nutrition and appropriate food choices  11/1/2024 1942 by Gladys Iverson RN  Outcome: Not Progressing  11/1/2024 1942 by Gladys Iverson RN  Outcome: Progressing  Goal: Maintains adequate nutritional intake  Description: INTERVENTIONS:  - Monitor percentage of each meal consumed  - Identify factors contributing to decreased intake, treat as appropriate  - Assist with meals as needed  - Monitor I&O, weight, and lab values if indicated  - Obtain nutrition services referral as needed  11/1/2024 1942 by Gladys Iverson RN  Outcome: Not Progressing  11/1/2024 1942 by Gladys Iverson RN  Outcome: Progressing  Goal: Establish and maintain optimal ostomy function  Description: INTERVENTIONS:  - Assess bowel function  - Encourage oral fluids to ensure adequate hydration  - Administer IV fluids if ordered to ensure adequate hydration   - Administer ordered medications as needed  - Encourage mobilization and activity  - Nutrition services referral to assist patient with appropriate food choices  - Assess stoma site  - Consider wound care consult   11/1/2024 1942 by Gladys Iverson RN  Outcome: Not Progressing  11/1/2024 1942 by Gladys Iverson RN  Outcome: Progressing  Goal: Oral mucous membranes remain intact  Description: INTERVENTIONS  - Assess oral mucosa and hygiene practices  - Implement preventative oral hygiene regimen  - Implement oral medicated treatments as ordered  - Initiate Nutrition services referral as needed  11/1/2024 1942 by Gladys Iverson RN  Outcome: Not Progressing  11/1/2024 1942 by Gladys Iverson RN  Outcome:  Progressing     Problem: GENITOURINARY - ADULT  Goal: Maintains or returns to baseline urinary function  Description: INTERVENTIONS:  - Assess urinary function  - Encourage oral fluids to ensure adequate hydration if ordered  - Administer IV fluids as ordered to ensure adequate hydration  - Administer ordered medications as needed  - Offer frequent toileting  - Follow urinary retention protocol if ordered  11/1/2024 1942 by Gladys Iverson RN  Outcome: Not Progressing  11/1/2024 1942 by Gladys Iverson RN  Outcome: Progressing  Goal: Absence of urinary retention  Description: INTERVENTIONS:  - Assess patient’s ability to void and empty bladder  - Monitor I/O  - Bladder scan as needed  - Discuss with physician/AP medications to alleviate retention as needed  - Discuss catheterization for long term situations as appropriate  11/1/2024 1942 by Gladys Iverson RN  Outcome: Not Progressing  11/1/2024 1942 by Gladys Iverson RN  Outcome: Progressing  Goal: Urinary catheter remains patent  Description: INTERVENTIONS:  - Assess patency of urinary catheter  - If patient has a chronic villavicencio, consider changing catheter if non-functioning  - Follow guidelines for intermittent irrigation of non-functioning urinary catheter  11/1/2024 1942 by Gladys Iverson RN  Outcome: Not Progressing  11/1/2024 1942 by Gladys Iverson RN  Outcome: Progressing     Problem: SKIN/TISSUE INTEGRITY - ADULT  Goal: Skin Integrity remains intact(Skin Breakdown Prevention)  Description: Assess:    -Inspect skin when repositioning, toileting, and assisting with ADLS    -Assess extremities for adequate circulation and sensation     Bed Management:  -Have minimal linens on bed & keep smooth, unwrinkled  -Change linens as needed when moist or perspiring      Toileting:  -Offer bedside commode      Activity:    -Encourage activity and walks on unit    -Use appropriate equipment to lift or move patient in bed      Skin Care:  -Avoid use of baby powder, tape,  friction and shearing, hot water or constrictive clothing    -Do not massage red bony areas    Next Steps:  11/1/2024 1942 by Gladys Iverson RN  Outcome: Not Progressing  11/1/2024 1942 by Gldays Iverson RN  Outcome: Progressing  Goal: Incision(s), wounds(s) or drain site(s) healing without S/S of infection  Description: INTERVENTIONS  - Assess and document dressing, incision, wound bed, drain sites and surrounding tissue  - Provide patient and family education  11/1/2024 1942 by Gladys Iverson RN  Outcome: Not Progressing  11/1/2024 1942 by Gladys Iverson RN  Outcome: Progressing  Goal: Pressure injury heals and does not worsen  Description: Interventions:  - Implement low air loss mattress or specialty surface (Criteria met)  - Apply silicone foam dressing  - Apply fecal or urinary incontinence containment device   - Utilize friction reducing device or surface for transfers   - Consider nutrition services referral as needed  11/1/2024 1942 by Gladys Iverson RN  Outcome: Not Progressing  11/1/2024 1942 by Gladys Iverson RN  Outcome: Progressing

## 2024-11-01 NOTE — PLAN OF CARE
Problem: PAIN - ADULT  Goal: Verbalizes/displays adequate comfort level or baseline comfort level  Description: Interventions:  - Encourage patient to monitor pain and request assistance  - Assess pain using appropriate pain scale  - Administer analgesics based on type and severity of pain and evaluate response  - Implement non-pharmacological measures as appropriate and evaluate response  - Consider cultural and social influences on pain and pain management  - Notify physician/advanced practitioner if interventions unsuccessful or patient reports new pain  Outcome: Progressing     Problem: SAFETY ADULT  Goal: Patient will remain free of falls  Description: INTERVENTIONS:  - Educate patient/family on patient safety including physical limitations  - Instruct patient to call for assistance with activity   - Consult OT/PT to assist with strengthening/mobility   - Keep Call bell within reach  - Keep bed low and locked with side rails adjusted as appropriate  - Keep care items and personal belongings within reach  - Initiate and maintain comfort rounds  - Make Fall Risk Sign visible to staff  - Apply yellow socks and bracelet for high fall risk patients  - Consider moving patient to room near nurses station  Outcome: Progressing  Goal: Maintain or return to baseline ADL function  Description: INTERVENTIONS:  -  Assess patient's ability to carry out ADLs; assess patient's baseline for ADL function and identify physical deficits which impact ability to perform ADLs (bathing, care of mouth/teeth, toileting, grooming, dressing, etc.)  - Assess/evaluate cause of self-care deficits   - Assess range of motion  - Assess patient's mobility; develop plan if impaired  - Assess patient's need for assistive devices and provide as appropriate  - Encourage maximum independence but intervene and supervise when necessary  - Involve family in performance of ADLs  - Assess for home care needs following discharge   - Consider OT consult  to assist with ADL evaluation and planning for discharge  - Provide patient education as appropriate  Outcome: Progressing  Goal: Maintains/Returns to pre admission functional level  Description: INTERVENTIONS:  - Perform AM-PAC 6 Click Basic Mobility/ Daily Activity assessment daily.  - Set and communicate daily mobility goal to care team and patient/family/caregiver.   - Collaborate with rehabilitation services on mobility goals if consulted  - Out of bed for toileting  - Record patient progress and toleration of activity level   Outcome: Progressing     Problem: DISCHARGE PLANNING  Goal: Discharge to home or other facility with appropriate resources  Description: INTERVENTIONS:  - Identify barriers to discharge w/patient and caregiver  - Arrange for needed discharge resources and transportation as appropriate  - Identify discharge learning needs (meds, wound care, etc.)  - Arrange for interpretive services to assist at discharge as needed  - Refer to Case Management Department for coordinating discharge planning if the patient needs post-hospital services based on physician/advanced practitioner order or complex needs related to functional status, cognitive ability, or social support system  Outcome: Progressing     Problem: Knowledge Deficit  Goal: Patient/family/caregiver demonstrates understanding of disease process, treatment plan, medications, and discharge instructions  Description: Complete learning assessment and assess knowledge base.  Interventions:  - Provide teaching at level of understanding  - Provide teaching via preferred learning methods  Outcome: Progressing     Problem: SAFETY,RESTRAINT: NV/NON-SELF DESTRUCTIVE BEHAVIOR  Goal: Remains free of harm/injury (restraint for non violent/non self-detsructive behavior)  Description: INTERVENTIONS:  - Instruct patient/family regarding restraint use   - Assess and monitor physiologic and psychological status   - Provide interventions and comfort measures  to meet assessed patient needs   - Identify and implement measures to help patient regain control  - Assess readiness for release of restraint   Outcome: Progressing  Goal: Returns to optimal restraint-free functioning  Description: INTERVENTIONS:  - Assess the patient's behavior and symptoms that indicate continued need for restraint  - Identify and implement measures to help patient regain control  - Assess readiness for release of restraint   Outcome: Progressing     Problem: Nutrition/Hydration-ADULT  Goal: Nutrient/Hydration intake appropriate for improving, restoring or maintaining nutritional needs  Description: Monitor and assess patient's nutrition/hydration status for malnutrition. Collaborate with interdisciplinary team and initiate plan and interventions as ordered.  Monitor patient's weight and dietary intake as ordered or per policy. Utilize nutrition screening tool and intervene as necessary. Determine patient's food preferences and provide high-protein, high-caloric foods as appropriate.     INTERVENTIONS:  - Monitor oral intake, urinary output, labs, and treatment plans  - Assess nutrition and hydration status and recommend course of action  - Evaluate amount of meals eaten  - Assist patient with eating if necessary   - Allow adequate time for meals  - Recommend/ encourage appropriate diets, oral nutritional supplements, and vitamin/mineral supplements  - Order, calculate, and assess calorie counts as needed  - Recommend, monitor, and adjust tube feedings and TPN/PPN based on assessed needs  - Assess need for intravenous fluids  - Provide specific nutrition/hydration education as appropriate  - Include patient/family/caregiver in decisions related to nutrition  Outcome: Progressing     Problem: Prexisting or High Potential for Compromised Skin Integrity  Goal: Skin integrity is maintained or improved  Description: INTERVENTIONS:  - Identify patients at risk for skin breakdown  - Assess and monitor  skin integrity  - Assess and monitor nutrition and hydration status  - Monitor labs   - Assess for incontinence   - Turn and reposition patient  - Assist with mobility/ambulation  - Relieve pressure over bony prominences  - Avoid friction and shearing  - Provide appropriate hygiene as needed including keeping skin clean and dry  - Evaluate need for skin moisturizer/barrier cream  - Collaborate with interdisciplinary team   - Patient/family teaching  - Consider wound care consult   Outcome: Progressing     Problem: NEUROSENSORY - ADULT  Goal: Achieves stable or improved neurological status  Description: INTERVENTIONS  - Monitor and report changes in neurological status  - Monitor vital signs such as temperature, blood pressure, glucose, and any other labs ordered   - Initiate measures to prevent increased intracranial pressure  - Monitor for seizure activity and implement precautions if appropriate      Outcome: Progressing  Goal: Remains free of injury related to seizures activity  Description: INTERVENTIONS  - Maintain airway, patient safety  and administer oxygen as ordered  - Monitor patient for seizure activity, document and report duration and description of seizure to physician/advanced practitioner  - If seizure occurs,  ensure patient safety during seizure  - Reorient patient post seizure  - Seizure pads on all 4 side rails  - Instruct patient/family to notify RN of any seizure activity including if an aura is experienced  - Instruct patient/family to call for assistance with activity based on nursing assessment  - Administer anti-seizure medications if ordered    Outcome: Progressing  Goal: Achieves maximal functionality and self care  Description: INTERVENTIONS  - Monitor swallowing and airway patency with patient fatigue and changes in neurological status  - Encourage and assist patient to increase activity and self care.   - Encourage visually impaired, hearing impaired and aphasic patients to use  assistive/communication devices  Outcome: Progressing     Problem: CARDIOVASCULAR - ADULT  Goal: Maintains optimal cardiac output and hemodynamic stability  Description: INTERVENTIONS:  - Monitor I/O, vital signs and rhythm  - Monitor for S/S and trends of decreased cardiac output  - Administer and titrate ordered vasoactive medications to optimize hemodynamic stability  - Assess quality of pulses, skin color and temperature  - Assess for signs of decreased coronary artery perfusion  - Instruct patient to report change in severity of symptoms  Outcome: Progressing  Goal: Absence of cardiac dysrhythmias or at baseline rhythm  Description: INTERVENTIONS:  - Continuous cardiac monitoring, vital signs, obtain 12 lead EKG if ordered  - Administer antiarrhythmic and heart rate control medications as ordered  - Monitor electrolytes and administer replacement therapy as ordered  Outcome: Progressing     Problem: RESPIRATORY - ADULT  Goal: Achieves optimal ventilation and oxygenation  Description: INTERVENTIONS:  - Assess for changes in respiratory status  - Assess for changes in mentation and behavior  - Position to facilitate oxygenation and minimize respiratory effort  - Oxygen administered by appropriate delivery if ordered  - Initiate smoking cessation education as indicated  - Encourage broncho-pulmonary hygiene including cough, deep breathe, Incentive Spirometry  - Assess the need for suctioning and aspirate as needed  - Assess and instruct to report SOB or any respiratory difficulty  - Respiratory Therapy support as indicated  Outcome: Progressing     Problem: GASTROINTESTINAL - ADULT  Goal: Minimal or absence of nausea and/or vomiting  Description: INTERVENTIONS:  - Administer IV fluids if ordered to ensure adequate hydration  - Maintain NPO status until nausea and vomiting are resolved  - Nasogastric tube if ordered  - Administer ordered antiemetic medications as needed  - Provide nonpharmacologic comfort measures  as appropriate  - Advance diet as tolerated, if ordered  - Consider nutrition services referral to assist patient with adequate nutrition and appropriate food choices  Outcome: Progressing  Goal: Maintains or returns to baseline bowel function  Description: INTERVENTIONS:  - Assess bowel function  - Encourage oral fluids to ensure adequate hydration  - Administer IV fluids if ordered to ensure adequate hydration  - Administer ordered medications as needed  - Encourage mobilization and activity  - Consider nutritional services referral to assist patient with adequate nutrition and appropriate food choices  Outcome: Progressing  Goal: Maintains adequate nutritional intake  Description: INTERVENTIONS:  - Monitor percentage of each meal consumed  - Identify factors contributing to decreased intake, treat as appropriate  - Assist with meals as needed  - Monitor I&O, weight, and lab values if indicated  - Obtain nutrition services referral as needed  Outcome: Progressing  Goal: Establish and maintain optimal ostomy function  Description: INTERVENTIONS:  - Assess bowel function  - Encourage oral fluids to ensure adequate hydration  - Administer IV fluids if ordered to ensure adequate hydration   - Administer ordered medications as needed  - Encourage mobilization and activity  - Nutrition services referral to assist patient with appropriate food choices  - Assess stoma site  - Consider wound care consult   Outcome: Progressing  Goal: Oral mucous membranes remain intact  Description: INTERVENTIONS  - Assess oral mucosa and hygiene practices  - Implement preventative oral hygiene regimen  - Implement oral medicated treatments as ordered  - Initiate Nutrition services referral as needed  Outcome: Progressing     Problem: GENITOURINARY - ADULT  Goal: Maintains or returns to baseline urinary function  Description: INTERVENTIONS:  - Assess urinary function  - Encourage oral fluids to ensure adequate hydration if ordered  -  Administer IV fluids as ordered to ensure adequate hydration  - Administer ordered medications as needed  - Offer frequent toileting  - Follow urinary retention protocol if ordered  Outcome: Progressing  Goal: Absence of urinary retention  Description: INTERVENTIONS:  - Assess patient’s ability to void and empty bladder  - Monitor I/O  - Bladder scan as needed  - Discuss with physician/AP medications to alleviate retention as needed  - Discuss catheterization for long term situations as appropriate  Outcome: Progressing  Goal: Urinary catheter remains patent  Description: INTERVENTIONS:  - Assess patency of urinary catheter  - If patient has a chronic villavicencio, consider changing catheter if non-functioning  - Follow guidelines for intermittent irrigation of non-functioning urinary catheter  Outcome: Progressing     Problem: SKIN/TISSUE INTEGRITY - ADULT  Goal: Skin Integrity remains intact(Skin Breakdown Prevention)  Description: Assess:  -Assess extremities for adequate circulation and sensation     Bed Management:  -Have minimal linens on bed & keep smooth, unwrinkled  -Change linens as needed when moist or perspiring      Toileting:  -Offer bedside commode      Activity:  -Encourage activity and walks on unit  -Encourage or provide ROM exercises     -Use appropriate equipment to lift or move patient in bed        Skin Care:  -Avoid use of baby powder, tape, friction and shearing, hot water or constrictive clothing  -Do not massage red bony areas        Outcome: Progressing  Goal: Incision(s), wounds(s) or drain site(s) healing without S/S of infection  Description: INTERVENTIONS  - Assess and document dressing, incision, wound bed, drain sites and surrounding tissue  - Provide patient and family education  Outcome: Progressing  Goal: Pressure injury heals and does not worsen  Description: Interventions:  - Implement low air loss mattress or specialty surface (Criteria met)  - Apply silicone foam dressing  - Apply  fecal or urinary incontinence containment device   - Utilize friction reducing device or surface for transfers     - Consider nutrition services referral as needed  Outcome: Progressing

## 2024-11-01 NOTE — ASSESSMENT & PLAN NOTE
Difficult to assess currently given sedation.  Likely due to head injury, status epilepticus prior to intubation.

## 2024-11-01 NOTE — ASSESSMENT & PLAN NOTE
Patient with previously difficult to control seizures, likely in the setting of intracranial hematoma and closed head injury.  Currently receiving lacosamide, levetiracetam, clobazam (long-acting benzo).

## 2024-11-01 NOTE — ASSESSMENT & PLAN NOTE
Estimated Creatinine Clearance: 77.8 mL/min (by C-G formula based on SCr of 1.06 mg/dL).   - Dose adjust antimicrobials

## 2024-11-01 NOTE — ASSESSMENT & PLAN NOTE
History of ETOH use in the past, unknown last drink.  Continue thiamine/folate daily.  Encourage cessation when appropriate

## 2024-11-01 NOTE — RESPIRATORY THERAPY NOTE
Pt had gurgling sounds and appeared to have air leaking past the cuff. CUff pressure checked, pressure was in green on Foster-cuff syringe. Used MLT to stop gurgling. CUff pressure appears to be holding. Got cuff manometer to double check cuff pressure. Was able to decrease pressure to 32 cm H20 without gurgling. AP aware.

## 2024-11-01 NOTE — SEPSIS NOTE
Sepsis Note   Tae Dolan 54 y.o. male MRN: [id number]  Unit/Bed#: ICU 07 Encounter: [id number]       Initial Sepsis Screening       Row Name 11/01/24 0132 10/23/24 1932             Is the patient's history suggestive of a new or worsening infection? No  -RS No  intubated 2/2 persistent seizure in trauma patient.  -NG                 User Key  (r) = Recorded By, (t) = Taken By, (c) = Cosigned By      Initials Name Provider Type    NG CARLOS Carmona Nurse Practitioner    RS CARLOS Bee Nurse Practitioner                        Body mass index is 26.7 kg/m².  Wt Readings from Last 1 Encounters:   10/31/24 82 kg (180 lb 12.4 oz)        Ideal body weight: 70.7 kg (155 lb 13.8 oz)  Adjusted ideal body weight: 75.2 kg (165 lb 13.3 oz)

## 2024-11-01 NOTE — PROCEDURES
Arterial Line Insertion    Date/Time: 11/1/2024 11:50 AM    Performed by: RT Alise  Authorized by: April Foley MD    Patient location:  ICU  [place] protocol:     Patient identity confirmed:  Arm band  Indications:     Indications: hemodynamic monitoring, multiple ABGs and continuous blood pressure monitoring    Pre-procedure details:     Skin preparation:  Chlorhexidine  Anesthesia (see MAR for exact dosages):     Anesthesia method:  None  Procedure details:     Location / Tip of Catheter:  Radial    Laterality:  Left    Sanju's test performed: yes      Sanju's test abnormal: no      Needle gauge:  20 G    Placement technique:  Seldinger    Number of attempts:  2    Successful placement: yes      Transducer: waveform confirmed    Post-procedure details:     Post-procedure:  Secured with tape and sutured    Patient tolerance of procedure:  Tolerated well, no immediate complications

## 2024-11-01 NOTE — ASSESSMENT & PLAN NOTE
No operative management per neurosurgery, unchanged in appearance on CT head from 10/30.  - This can certainly cause fever

## 2024-11-01 NOTE — ASSESSMENT & PLAN NOTE
Recent Labs     10/29/24  0447 10/30/24  0600 10/31/24  0421 10/31/24  0809 10/31/24  1239 10/31/24  1841 10/31/24  1844   HGB 7.6* 8.0* 6.2* 6.7* 7.5* 9.5* 7.5*   S/p fall  CT C/A/P - Splenomegaly with metallic surgical coils near the splenic hilar region. There is a small amount of intermediate density perisplenic free fluid adjacent to the inferior spleen which could represent subcapsular hematoma (AAST grade I splenic injury).     Plan:  Yesterday given 1 unit PRBC with improvement from hgb 6.7 to 7.5  Continue to monitor CBC, f/u AM labs

## 2024-11-01 NOTE — ASSESSMENT & PLAN NOTE
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  Patient's mother reports he was fully treated and recovered from this with residual right sided weakness.  - No signs of intracranial infection on imaging this admission

## 2024-11-01 NOTE — ASSESSMENT & PLAN NOTE
Recent Labs     10/29/24  0447 10/30/24  0600 10/31/24  0421 10/31/24  0809 10/31/24  1239 10/31/24  1841 10/31/24  1844   HGB 7.6* 8.0* 6.2* 6.7* 7.5* 9.5* 7.5*   HCT 24.1* 25.7* 20.4* 22.0*  --  28*  --      Continue to monitor hemoglobin  Transfuse if <7

## 2024-11-01 NOTE — PLAN OF CARE
Problem: PAIN - ADULT  Goal: Verbalizes/displays adequate comfort level or baseline comfort level  Description: Interventions:  - Encourage patient to monitor pain and request assistance  - Assess pain using appropriate pain scale  - Administer analgesics based on type and severity of pain and evaluate response  - Implement non-pharmacological measures as appropriate and evaluate response  - Consider cultural and social influences on pain and pain management  - Notify physician/advanced practitioner if interventions unsuccessful or patient reports new pain  Outcome: Progressing     Problem: SAFETY ADULT  Goal: Patient will remain free of falls  Description: INTERVENTIONS:  - Educate patient/family on patient safety including physical limitations  - Instruct patient to call for assistance with activity   - Consult OT/PT to assist with strengthening/mobility   - Keep Call bell within reach  - Keep bed low and locked with side rails adjusted as appropriate  - Keep care items and personal belongings within reach  - Initiate and maintain comfort rounds  - Make Fall Risk Sign visible to staff  - Offer Toileting every 2 Hours, in advance of need  - Apply yellow socks and bracelet for high fall risk patients  - Consider moving patient to room near nurses station  Outcome: Progressing  Goal: Maintain or return to baseline ADL function  Description: INTERVENTIONS:  -  Assess patient's ability to carry out ADLs; assess patient's baseline for ADL function and identify physical deficits which impact ability to perform ADLs (bathing, care of mouth/teeth, toileting, grooming, dressing, etc.)  - Assess/evaluate cause of self-care deficits   - Assess range of motion  - Assess patient's mobility; develop plan if impaired  - Assess patient's need for assistive devices and provide as appropriate  - Encourage maximum independence but intervene and supervise when necessary  - Involve family in performance of ADLs  - Assess for home  care needs following discharge   - Consider OT consult to assist with ADL evaluation and planning for discharge  - Provide patient education as appropriate  Outcome: Progressing  Goal: Maintains/Returns to pre admission functional level  Description: INTERVENTIONS:  - Perform AM-PAC 6 Click Basic Mobility/ Daily Activity assessment daily.  - Set and communicate daily mobility goal to care team and patient/family/caregiver.   - Collaborate with rehabilitation services on mobility goals if consulted  - Perform Range of Motion 3 times a day.  - Reposition patient every 2 hours.  - Record patient progress and toleration of activity level   Outcome: Progressing     Problem: DISCHARGE PLANNING  Goal: Discharge to home or other facility with appropriate resources  Description: INTERVENTIONS:  - Identify barriers to discharge w/patient and caregiver  - Arrange for needed discharge resources and transportation as appropriate  - Identify discharge learning needs (meds, wound care, etc.)  - Arrange for interpretive services to assist at discharge as needed  - Refer to Case Management Department for coordinating discharge planning if the patient needs post-hospital services based on physician/advanced practitioner order or complex needs related to functional status, cognitive ability, or social support system  Outcome: Progressing     Problem: Knowledge Deficit  Goal: Patient/family/caregiver demonstrates understanding of disease process, treatment plan, medications, and discharge instructions  Description: Complete learning assessment and assess knowledge base.  Interventions:  - Provide teaching at level of understanding  - Provide teaching via preferred learning methods  Outcome: Progressing     Problem: SAFETY,RESTRAINT: NV/NON-SELF DESTRUCTIVE BEHAVIOR  Goal: Remains free of harm/injury (restraint for non violent/non self-detsructive behavior)  Description: INTERVENTIONS:  - Instruct patient/family regarding restraint use    - Assess and monitor physiologic and psychological status   - Provide interventions and comfort measures to meet assessed patient needs   - Identify and implement measures to help patient regain control  - Assess readiness for release of restraint   Outcome: Progressing  Goal: Returns to optimal restraint-free functioning  Description: INTERVENTIONS:  - Assess the patient's behavior and symptoms that indicate continued need for restraint  - Identify and implement measures to help patient regain control  - Assess readiness for release of restraint   Outcome: Progressing     Problem: Nutrition/Hydration-ADULT  Goal: Nutrient/Hydration intake appropriate for improving, restoring or maintaining nutritional needs  Description: Monitor and assess patient's nutrition/hydration status for malnutrition. Collaborate with interdisciplinary team and initiate plan and interventions as ordered.  Monitor patient's weight and dietary intake as ordered or per policy. Utilize nutrition screening tool and intervene as necessary. Determine patient's food preferences and provide high-protein, high-caloric foods as appropriate.     INTERVENTIONS:  - Monitor oral intake, urinary output, labs, and treatment plans  - Assess nutrition and hydration status and recommend course of action  - Evaluate amount of meals eaten  - Assist patient with eating if necessary   - Allow adequate time for meals  - Recommend/ encourage appropriate diets, oral nutritional supplements, and vitamin/mineral supplements  - Order, calculate, and assess calorie counts as needed  - Recommend, monitor, and adjust tube feedings and TPN/PPN based on assessed needs  - Assess need for intravenous fluids  - Provide specific nutrition/hydration education as appropriate  - Include patient/family/caregiver in decisions related to nutrition  Outcome: Progressing     Problem: Prexisting or High Potential for Compromised Skin Integrity  Goal: Skin integrity is maintained or  improved  Description: INTERVENTIONS:  - Identify patients at risk for skin breakdown  - Assess and monitor skin integrity  - Assess and monitor nutrition and hydration status  - Monitor labs   - Assess for incontinence   - Turn and reposition patient  - Assist with mobility/ambulation  - Relieve pressure over bony prominences  - Avoid friction and shearing  - Provide appropriate hygiene as needed including keeping skin clean and dry  - Evaluate need for skin moisturizer/barrier cream  - Collaborate with interdisciplinary team   - Patient/family teaching  - Consider wound care consult   Outcome: Progressing     Problem: NEUROSENSORY - ADULT  Goal: Achieves stable or improved neurological status  Description: INTERVENTIONS  - Monitor and report changes in neurological status  - Monitor vital signs such as temperature, blood pressure, glucose, and any other labs ordered   - Initiate measures to prevent increased intracranial pressure  - Monitor for seizure activity and implement precautions if appropriate      Outcome: Progressing  Goal: Remains free of injury related to seizures activity  Description: INTERVENTIONS  - Maintain airway, patient safety  and administer oxygen as ordered  - Monitor patient for seizure activity, document and report duration and description of seizure to physician/advanced practitioner  - If seizure occurs,  ensure patient safety during seizure  - Reorient patient post seizure  - Seizure pads on all 4 side rails  - Instruct patient/family to notify RN of any seizure activity including if an aura is experienced  - Instruct patient/family to call for assistance with activity based on nursing assessment  - Administer anti-seizure medications if ordered    Outcome: Progressing  Goal: Achieves maximal functionality and self care  Description: INTERVENTIONS  - Monitor swallowing and airway patency with patient fatigue and changes in neurological status  - Encourage and assist patient to increase  activity and self care.   - Encourage visually impaired, hearing impaired and aphasic patients to use assistive/communication devices  Outcome: Progressing     Problem: CARDIOVASCULAR - ADULT  Goal: Maintains optimal cardiac output and hemodynamic stability  Description: INTERVENTIONS:  - Monitor I/O, vital signs and rhythm  - Monitor for S/S and trends of decreased cardiac output  - Administer and titrate ordered vasoactive medications to optimize hemodynamic stability  - Assess quality of pulses, skin color and temperature  - Assess for signs of decreased coronary artery perfusion  - Instruct patient to report change in severity of symptoms  Outcome: Progressing  Goal: Absence of cardiac dysrhythmias or at baseline rhythm  Description: INTERVENTIONS:  - Continuous cardiac monitoring, vital signs, obtain 12 lead EKG if ordered  - Administer antiarrhythmic and heart rate control medications as ordered  - Monitor electrolytes and administer replacement therapy as ordered  Outcome: Progressing     Problem: RESPIRATORY - ADULT  Goal: Achieves optimal ventilation and oxygenation  Description: INTERVENTIONS:  - Assess for changes in respiratory status  - Assess for changes in mentation and behavior  - Position to facilitate oxygenation and minimize respiratory effort  - Oxygen administered by appropriate delivery if ordered  - Initiate smoking cessation education as indicated  - Encourage broncho-pulmonary hygiene including cough, deep breathe, Incentive Spirometry  - Assess the need for suctioning and aspirate as needed  - Assess and instruct to report SOB or any respiratory difficulty  - Respiratory Therapy support as indicated  Outcome: Progressing     Problem: GASTROINTESTINAL - ADULT  Goal: Minimal or absence of nausea and/or vomiting  Description: INTERVENTIONS:  - Administer IV fluids if ordered to ensure adequate hydration  - Maintain NPO status until nausea and vomiting are resolved  - Nasogastric tube if  ordered  - Administer ordered antiemetic medications as needed  - Provide nonpharmacologic comfort measures as appropriate  - Advance diet as tolerated, if ordered  - Consider nutrition services referral to assist patient with adequate nutrition and appropriate food choices  Outcome: Progressing  Goal: Maintains or returns to baseline bowel function  Description: INTERVENTIONS:  - Assess bowel function  - Encourage oral fluids to ensure adequate hydration  - Administer IV fluids if ordered to ensure adequate hydration  - Administer ordered medications as needed  - Encourage mobilization and activity  - Consider nutritional services referral to assist patient with adequate nutrition and appropriate food choices  Outcome: Progressing  Goal: Maintains adequate nutritional intake  Description: INTERVENTIONS:  - Monitor percentage of each meal consumed  - Identify factors contributing to decreased intake, treat as appropriate  - Assist with meals as needed  - Monitor I&O, weight, and lab values if indicated  - Obtain nutrition services referral as needed  Outcome: Progressing  Goal: Establish and maintain optimal ostomy function  Description: INTERVENTIONS:  - Assess bowel function  - Encourage oral fluids to ensure adequate hydration  - Administer IV fluids if ordered to ensure adequate hydration   - Administer ordered medications as needed  - Encourage mobilization and activity  - Nutrition services referral to assist patient with appropriate food choices  - Assess stoma site  - Consider wound care consult   Outcome: Progressing  Goal: Oral mucous membranes remain intact  Description: INTERVENTIONS  - Assess oral mucosa and hygiene practices  - Implement preventative oral hygiene regimen  - Implement oral medicated treatments as ordered  - Initiate Nutrition services referral as needed  Outcome: Progressing     Problem: GENITOURINARY - ADULT  Goal: Maintains or returns to baseline urinary function  Description:  INTERVENTIONS:  - Assess urinary function  - Encourage oral fluids to ensure adequate hydration if ordered  - Administer IV fluids as ordered to ensure adequate hydration  - Administer ordered medications as needed  - Offer frequent toileting  - Follow urinary retention protocol if ordered  Outcome: Progressing  Goal: Absence of urinary retention  Description: INTERVENTIONS:  - Assess patient’s ability to void and empty bladder  - Monitor I/O  - Bladder scan as needed  - Discuss with physician/AP medications to alleviate retention as needed  - Discuss catheterization for long term situations as appropriate  Outcome: Progressing  Goal: Urinary catheter remains patent  Description: INTERVENTIONS:  - Assess patency of urinary catheter  - If patient has a chronic villavicencio, consider changing catheter if non-functioning  - Follow guidelines for intermittent irrigation of non-functioning urinary catheter  Outcome: Progressing

## 2024-11-01 NOTE — ASSESSMENT & PLAN NOTE
Patient remains on mechanical ventilation since 10/23, initially intubated for mental status and seizures.  Suspect ARDS now from MSSA pneumonia, possible volume overload.  Bronchoscopy on 10/28 with copious thick secretions, much improved on repeat evaluation 10/29.  Serial x-rays continue to show dense bilateral opacities.  - Continue treatment of MSSA pneumonia as below  - Check RP2 panel (previously COVID-negative)  - Aggressive mucus clearance:  mucolytics, chest PT, pulm toilet as much as tolerated  - Diuretics, steroids, proning per ICU team

## 2024-11-01 NOTE — ASSESSMENT & PLAN NOTE
As evidence by tachycardia, fever, leukocytosis  Source pneumonia  CXR showing right-sided infiltrate  10/26 BC NG  Sputum culture 10/26: Staph aureus  Bronch culture 10/28: Staph aureus  Follow up sensitivities       Plan:  ABX: completed 5 days of Vancomycin on 10/30. Currently day 3 for Cefazolin   Repeat cx if spikes temp  T/c ID consult

## 2024-11-01 NOTE — ASSESSMENT & PLAN NOTE
Recent Labs     10/29/24  2231 10/30/24  0600 10/31/24  0421   CREATININE 1.14 1.10 1.11   BUN 24 24 29*     Stable, improved  Cr increased over the last 3 days, possibly secondary to intermittent hypo/hyper-tension   Close monitoring I/O status and renal index

## 2024-11-01 NOTE — CONSULTS
Consultation - Infectious Disease   Name: Tae Dolan 54 y.o. male I MRN: [id number]  Unit/Bed#: ICU 07 I Date of Admission: 10/20/2024   Date of Service: 11/1/2024 I Hospital Day: 12   Inpatient consult to Infectious Diseases  Consult performed by: Suresh Rivera MD  Consult ordered by: Susan Haney PA-C        Physician Requesting Evaluation: April Foley MD   Reason for Evaluation / Principal Problem: Pneumonia, ARDS    Assessment & Plan  ARDS (adult respiratory distress syndrome) ([facility])  Patient remains on mechanical ventilation since 10/23, initially intubated for mental status and seizures.  Suspect ARDS now from MSSA pneumonia, possible volume overload.  Bronchoscopy on 10/28 with copious thick secretions, much improved on repeat evaluation 10/29.  Serial x-rays continue to show dense bilateral opacities.  - Continue treatment of MSSA pneumonia as below  - Check RP2 panel (previously COVID-negative)  - Aggressive mucus clearance:  mucolytics, chest PT, pulm toilet as much as tolerated  - Diuretics, steroids, proning per ICU team  Pneumonia of both lungs due to methicillin susceptible Staphylococcus aureus (MSSA) ([facility])  Patient screened positive for MRSA nares colonization.  Deep bronchoscopy cultures however grew MSSA.  MSSA vancomycin REN=2, which would likely lead to vancomycin failure clinically.  He has, however, been receiving effective antimicrobial therapy since 10/26 (in both pip-tazo and cefepime).  He is now receiving cefazolin.  Given the very dense and extensive consolidations, I suspect he simply needs more time and more antibiotics as well as aggressive pulmonary toilet.  - Continue cefazolin 2 g every 8 hours  - Follow BMP and CBC to monitor for medication toxicities and treatment response  - Would repeat bronchoscopy when able, for additional source control measures as well as new samples to ensure we are still treating the right organism  - Would consider repeating CT chest if he  stabilizes over the next 48 to 72 hours to assess development of an empyema  Sepsis (HCC)  As evidenced by fever, tachycardia, leukocytosis.  Despite increasing leukocytosis and continued fevers, lactate, bicarb, creatinine remain normal/stable suggesting that sepsis state is not worsening.  Blood cultures thus far negative.  - Continue cefazolin as above  - Continue to monitor CBC and BMP  - ARDS management per primary team  - Repeat blood cultures and check RP 2 panel  - Check LFTs  Leukocytosis  Likely multifactorial in the setting of stress response to hypoxia, pneumonia, ARDS.  Increase in WBC count from 17 to 30.4 on 11/1.    - Patient having diarrhea, but C. difficile PCR negative  - Follow-up repeat blood cultures  - Consider seizure, though appears to be controlled as of 10/28 EEG and CT head on 10/30 without new findings  - Continue management of pneumonia and ARDS as above  Fall  Patient with a fall off roof leading to his present admission.  Complicated by C5 fracture and spleen laceration (grade 1).  Closed nondisplaced fracture of fifth cervical vertebra (HCC)  Nonoperative management per neurosurgery, patient in c-collar.  Spleen laceration  No operative management.  Epidural hematoma (HCC)  No operative management per neurosurgery, unchanged in appearance on CT head from 10/30.  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  Patient's mother reports he was fully treated and recovered from this with residual right sided weakness.  - No signs of intracranial infection on imaging this admission  Type 2 diabetes mellitus (HCC)  Lab Results   Component Value Date    HGBA1C 6.4 (H) 10/22/2024       Recent Labs     11/01/24  0836 11/01/24  1010 11/01/24  1208 11/01/24  1626   POCGLU 118 119 85 245*   This is a risk factor for infection.  - Maintain euglycemia to improve WBC function and improve wound healing   Status epilepticus  (formerly [facility])  Patient with previously difficult to control seizures, likely in the setting of intracranial hematoma and closed head injury.  Currently receiving lacosamide, levetiracetam, clobazam (long-acting benzo).    Encephalopathy  Difficult to assess currently given sedation.  Likely due to head injury, status epilepticus prior to intubation.  LISSEHT (acute kidney injury) (formerly [facility])  Estimated Creatinine Clearance: 77.8 mL/min (by C-G formula based on SCr of 1.06 mg/dL).   - Dose adjust antimicrobials     I have discussed the above management plan in detail with the primary service attending and residents, who agree with the plan to continue cefazolin.  We will continue to follow along with you.    I have performed an extensive review of the medical records in Epic including review of the notes, radiographs, and laboratory results.    HISTORY OF PRESENT ILLNESS:    HPI: Tae Dolan is a 54 y.o. male with PMH of T2DM, CVA, prior craniotomy for abscess (2019) with residual spasticity who presented on 10/20/2024 after 10-ft fall.  He was found to have C5 body fracture and grade 1 splenic laceration.  He was subsequently found to have left temporo-parietal subdural hematoma and status epilepticus, intubated 10/23.  In the setting of worsening fever trend, antibiotics were started on 10/26 (cefepime, metronidazole, vancomycin.  This was switched to vancomycin + pip-tazo after 48 hrs.  Source thought to be worsening pneumonia.  Bronchoscopy on 10/28 noted copious thick secretions in the right lung - cultures grew MSSA.  Repeat bronchoscopy 10/29 with minimal secretions.  Antibiotics were narrowed to cefazolin on 10/30.  Patient remains with worsening respiratory failure, leukocytosis, and fevers.  ID is consulted for further recommendations.    I spoke with the patient's mother Jaz at bedside.  Tae lives with her, and has had residual right-sided weakness since craniotomy and abscess treatment in 2019.  He was on the  roof trying to fix an AC unit, and fell.  Over the last few days, the ICU team has been dealing with hypoxemia.  The patient has not been stable for bronchoscopy or repeat CT scans.  They tried chest PT vest, but hypoxia worsened with this.  He started on epoprostenol.  He is having increased liquid stools.  No secretions today, but bedside RN reports overnight there were significant thick tan secretions.        Antimicrobials:  Cefazolin (10/30 - )    Vancomycin + pip-tazo (10/26 - 10/30)    REVIEW OF SYSTEMS:  A complete review of systems is negative other than that noted in the HPI.    PAST MEDICAL HISTORY:  History reviewed. No pertinent past medical history.  History reviewed. No pertinent surgical history.    FAMILY HISTORY:  Non-contributory     SOCIAL HISTORY:  Social History   Social History     Substance and Sexual Activity   Alcohol Use None     Social History     Substance and Sexual Activity   Drug Use Not on file     Social History     Tobacco Use   Smoking Status Not on file   Smokeless Tobacco Not on file       ALLERGIES:  Not on File    MEDICATIONS:  All current active medications have been reviewed.      PHYSICAL EXAM:  Temp:  [100.1 °F (37.8 °C)-101 °F (38.3 °C)] 100.4 °F (38 °C)  HR:  [] 101  Resp:  [12-29] 20  BP: ()/(40-95) 105/51  SpO2:  [78 %-97 %] 92 %  Temp (24hrs), Av.6 °F (38.1 °C), Min:100.1 °F (37.8 °C), Max:101 °F (38.3 °C)  Current: Temperature: 100.4 °F (38 °C)    Intake/Output Summary (Last 24 hours) at 2024 1756  Last data filed at 2024 1658  Gross per 24 hour   Intake 5069.66 ml   Output 3225 ml   Net 1844.66 ml       General: Intubated, sedated  HEENT:  Normocephalic, sclera anicteric  Heart:  RRR, no murmurs, rubs, or gallops  Lungs: Coarse breath sounds bilaterally  Abdomen:  Soft, nontender, nondistended, normal bowel sounds  Extremities:  No cyanosis or edema  Skin: Abrasion over the right knee, no erythema or joint effusion  Neuro: Sedated,  not following commands    LABS, IMAGING, & OTHER STUDIES:  Lab Results:  I have personally reviewed pertinent labs.  Results from last 7 days   Lab Units 11/01/24  0432 10/31/24  1844 10/31/24  1841 10/31/24  0809 10/31/24  0421 10/30/24  0600   WBC Thousand/uL 30.41*  --   --   --  17.87* 24.24*   HEMOGLOBIN g/dL 8.5* 7.5*  --    < > 6.2* 8.0*   I STAT HEMOGLOBIN g/dl  --   --  9.5*  --   --   --    PLATELETS Thousands/uL 332  --   --   --  271 375    < > = values in this interval not displayed.     Results from last 7 days   Lab Units 11/01/24  0432 10/31/24  1841 10/31/24  0421 10/30/24  0600 10/29/24  2231 10/29/24  0447   SODIUM mmol/L 145  --  148* 144   < > 141   POTASSIUM mmol/L 4.4  --  3.7 4.1   < > 4.1   CHLORIDE mmol/L 110*  --  111* 110*   < > 110*   CO2 mmol/L 28  --  28 29   < > 25   CO2, I-STAT mmol/L  --  29  --   --   --   --    BUN mg/dL 33*  --  29* 24   < > 21   CREATININE mg/dL 1.06  --  1.11 1.10   < > 1.12   EGFR ml/min/1.73sq m 78  --  73 74   < > 73   GLUCOSE, ISTAT mg/dl  --  136  --   --   --   --    CALCIUM mg/dL 8.8  --  8.1* 8.8   < > 8.2*   AST U/L  --   --   --   --   --  15   ALT U/L  --   --   --   --   --  16   ALK PHOS U/L  --   --   --   --   --  139*    < > = values in this interval not displayed.     Results from last 7 days   Lab Units 10/31/24  1614 10/28/24  0523 10/26/24  1519 10/26/24  0902 10/26/24  0846   BLOOD CULTURE   --   --   --   --  No Growth After 5 Days.  No Growth After 5 Days.   SPUTUM CULTURE   --   --  4+ Growth of Staphylococcus aureus*  1+ Growth of  --   --    GRAM STAIN RESULT   --  4+ Polys  No organisms seen 2+ Polys  No bacteria seen  --   --    URINE CULTURE   --   --   --  No Growth <1000 cfu/mL  --    LEGIONELLA URINARY ANTIGEN   --   --   --  Negative  --    C DIFF TOXIN B BY PCR  Negative  --   --   --   --      Results from last 7 days   Lab Units 10/30/24  0600 10/26/24  0457   PROCALCITONIN ng/ml 1.74* 0.55*       Imaging Studies:   1)  Chest XR 11/1/24:  2) Chest XR 10/31/24:  3) CT Chest 10/30/24  I have personally reviewed the above imaging study reports and images in PACS.    Xrays show worsening bilateral opacities from 10/31 into 11/1.    CT chest shows dense consolidative opacities involving most of the right lung and LLL.      Other Studies:   I have personally reviewed pertinent reports.    Suresh Rivera MD  [facility]

## 2024-11-01 NOTE — ASSESSMENT & PLAN NOTE
Lab Results   Component Value Date    HGBA1C 6.4 (H) 10/22/2024     Recent Labs     10/31/24  1957 10/31/24  2154 10/31/24  2354 11/01/24  0201   POCGLU 117 132 189* 147*   Blood Sugar Average: Last 72 hrs:  (P) [id number]    Home regimen: Lantus 15 units SC qHS.  Continue Lantus 25 units qHS.  Continue insulin gtt for tight glycemic control  Nutrition following for TF recommendations

## 2024-11-01 NOTE — NURSING NOTE
Consulted to replace the right upper arm PICC for sluggish blood return. The white and gray lumen were flushed with a brisk blood return obtained. PICC functioning well. No need to replace.

## 2024-11-01 NOTE — ASSESSMENT & PLAN NOTE
Likely multifactorial in the setting of stress response to hypoxia, pneumonia, ARDS.  Increase in WBC count from 17 to 30.4 on 11/1.    - Patient having diarrhea, but C. difficile PCR negative  - Follow-up repeat blood cultures  - Consider seizure, though appears to be controlled as of 10/28 EEG and CT head on 10/30 without new findings  - Continue management of pneumonia and ARDS as above

## 2024-11-01 NOTE — ASSESSMENT & PLAN NOTE
Lab Results   Component Value Date    HGBA1C 6.4 (H) 10/22/2024       Recent Labs     11/01/24  0836 11/01/24  1010 11/01/24  1208 11/01/24  1626   POCGLU 118 119 85 245*   This is a risk factor for infection.  - Maintain euglycemia to improve WBC function and improve wound healing

## 2024-11-01 NOTE — ASSESSMENT & PLAN NOTE
Patient with a fall off roof leading to his present admission.  Complicated by C5 fracture and spleen laceration (grade 1).

## 2024-11-01 NOTE — ASSESSMENT & PLAN NOTE
Patient screened positive for MRSA nares colonization.  Deep bronchoscopy cultures however grew MSSA.  MSSA vancomycin REN=2, which would likely lead to vancomycin failure clinically.  He has, however, been receiving effective antimicrobial therapy since 10/26 (in both pip-tazo and cefepime).  He is now receiving cefazolin.  Given the very dense and extensive consolidations, I suspect he simply needs more time and more antibiotics as well as aggressive pulmonary toilet.  - Continue cefazolin 2 g every 8 hours  - Follow BMP and CBC to monitor for medication toxicities and treatment response  - Would repeat bronchoscopy when able, for additional source control measures as well as new samples to ensure we are still treating the right organism  - Would consider repeating CT chest if he stabilizes over the next 48 to 72 hours to assess development of an empyema

## 2024-11-01 NOTE — ASSESSMENT & PLAN NOTE
No home regimen. No indication for inpatient admission at this time.  Psych consulted, appreciate recommendations.  CM consulted for dispo planning.

## 2024-11-01 NOTE — ASSESSMENT & PLAN NOTE
Intubated on 10/23 for airway protection for planned burst suppression in setting of satus epilepticus.   Acute respiratory failure with hypoxia d/t seizure,pneumonia (identified 10/26)  10/26 CXR: Development of extensive right perihilar consolidation suspicious for pneumonia/aspiration.   10/28 Developed hypoxia early morning, requiring increased O2/PEEP requirements   10/28 Emergent Bronchoscopy. Copious secretions. Culture obtain  10/29 Bronch - Staph aureus  VENT day #9  10/31 Overnight pt with multiple episodes of desaturation on APRV vent settings. Was changed to PC settings and started veletri infusion      Plan:  Continue mechanical ventilation. Wean for sats >88%  Completed course of Vancomycin (5 days) on 10/30/24  Continues on cefazolin, day 3  Sputum/bronc cx Staph Aureus  SAT/SBT daily  Vent bundle.

## 2024-11-01 NOTE — PROGRESS NOTES
Progress Note - Critical Care/ICU   Name: Tae Dolan 54 y.o. male I MRN: [id number]  Unit/Bed#: ICU 07 I Date of Admission: 10/20/2024   Date of Service: 11/1/2024 I Hospital Day: 12      Assessment & Plan  ARDS (adult respiratory distress syndrome) (HCC)  Intubated on 10/23 for airway protection for planned burst suppression in setting of satus epilepticus.   Acute respiratory failure with hypoxia d/t seizure,pneumonia (identified 10/26)  10/26 CXR: Development of extensive right perihilar consolidation suspicious for pneumonia/aspiration.   10/28 Developed hypoxia early morning, requiring increased O2/PEEP requirements   10/28 Emergent Bronchoscopy. Copious secretions. Culture obtain  10/29 Bronch - Staph aureus  VENT day #9  10/31 Overnight pt with multiple episodes of desaturation on APRV vent settings. Was changed to PC settings and started veletri infusion      Plan:  Continue mechanical ventilation. Wean for sats >88%  Completed course of Vancomycin (5 days) on 10/30/24  Continues on cefazolin, day 3  Sputum/bronc cx Staph Aureus  SAT/SBT daily  Vent bundle.     Status epilepticus (HCC)  Seizure / Fall Precautions  Last Seizure 10/23  AEDs: Continue Levetiracetam 2g q12h, Lacosamide 200mg q12h, Clobazam 5mg TID.   Discuss with neuro decreasing AEDs  vEEG stopped 10/29  Propofol gtt stopped 10/29  Dl DOT: Needs to be completed prior to discharge   Epidural hematoma (HCC)  Imaging:  CT head wo, 10/23/2024: Unchanged 1.3 cm thick acute extra-axial hemorrhage along left parietooccipital cerebral convexity, which could represent a combination of subdural hematoma and epidural hematoma (given lenticular shape at its thickest portion). Unchanged mild mass effect on adjacent left parietal lobe. Unchanged acute trace subarachnoid hemorrhage along left inferior temporal convexity. Unchanged acute trace thin subdural hematoma along left tentorium cerebelli. No new acute intracranial abnormality.  CTA head and  neck w/wo, 10/22/2024: New extra-axial lenticular shaped hemorrhage along the left parietal convexity as described above, which may represent an epidural hematoma or less likely a subdural hematoma in this region. No significant midline shift noted. Trace new subarachnoid hemorrhage noted along the inferior left temporo-occipital convexity noted. Right facial subcutaneous soft tissue swelling/hematoma again demonstrated. CT Angiography: No active extravasation of contrast is noted into the extra-axial hematoma. No large vessel occlusion, high-grade stenosis, or intracranial aneurysm identified on CT angiogram of the head. No hemodynamically significant stenosis or dissection identified on CT angiogram of the neck. Anterior mildly comminuted C5 vertebral body fracture again demonstrated, with mild associated prevertebral edema again demonstrated.  MRI Brain 10/27: No acute parenchymal abnormality.   Persistent subacute left temporoparietal hemorrhagic subdural collection with a focal loculated segment. There is expected regional inflammatory enhancement of the dura and leptomeninges. Focal areas of nonhemorrhagic contusion/edema in the parietal lobe subjacent to the above-mentioned extra-axial hematoma. Trace posttraumatic subarachnoid hemorrhage in the left temporal sulci. Additional chronic areas of bifrontal and left cerebellar and brainstem gliosis.     Plan:  Continue to monitor neuro exam closely.  STAT CT head with decline in GCS > 2 pts in 1 hour.  Continue to hold all AC/AP x 2 weeks.   Noted with seizure activity on vEEG 10/22-23, now off vEEG - see plan below  Neurology consulted/following - appreciate recommendations.     Spleen laceration  Recent Labs     10/29/24  0447 10/30/24  0600 10/31/24  0421 10/31/24  0809 10/31/24  1239 10/31/24  1841 10/31/24  1844   HGB 7.6* 8.0* 6.2* 6.7* 7.5* 9.5* 7.5*   S/p fall  CT C/A/P - Splenomegaly with metallic surgical coils near the splenic hilar region. There is a  small amount of intermediate density perisplenic free fluid adjacent to the inferior spleen which could represent subcapsular hematoma (AAST grade I splenic injury).     Plan:  Yesterday given 1 unit PRBC with improvement from hgb 6.7 to 7.5  Continue to monitor CBC, f/u AM labs  Closed nondisplaced fracture of fifth cervical vertebra (HCC)  S/p fall from about 10 feet. Baseline right sided numbness/tingling given history of stroke.   CT c-spine - Acute anterior C5 vertebral body fracture with prevertebral soft tissue swelling. No traumatic subluxation.     Plan:  Neurosx consulted  No surgical intervention at this time.   Maintain Vista collar at all times, will need for 8 to 12 weeks.   Follow up on upright films.  Follow up with Neurosurgery as outpatient in 2 weeks.    Fall  Fall from a height of 10 feet. He does not recall events before and after fall.  Possibly d/t new medication.  Denies intentionally jumping out of window or any thoughts of self harm.   Fall precautions.    Sepsis ([facility])  As evidence by tachycardia, fever, leukocytosis  Source pneumonia  CXR showing right-sided infiltrate  10/26 BC NG  Sputum culture 10/26: Staph aureus  Bronch culture 10/28: Staph aureus  Follow up sensitivities       Plan:  ABX: completed 5 days of Vancomycin on 10/30. Currently day 3 for Cefazolin   Repeat cx if spikes temp  T/c ID consult    S/P craniotomy  S/p craniotomy 1/2019 at [facility], done secondary to abscess/lesions. Baseline independent.   Continue supportive care.   See individual plans below.    Mood disorder ([facility]) unspecified  No home regimen. No indication for inpatient admission at this time.  Psych consulted, appreciate recommendations.  CM consulted for dispo planning.      Spasticity  History of spasticity s/p craniotomy.  Home regimen: Tizanidine qHS.   Taken off baclofen last admission   Type 2 diabetes mellitus ([facility])  Lab Results   Component Value Date    HGBA1C 6.4 (H) 10/22/2024      Recent Labs     10/31/24  1957 10/31/24  2154 10/31/24  2354 11/01/24  0201   POCGLU 117 132 189* 147*   Blood Sugar Average: Last 72 hrs:  (P) 216.2621167112444203    Home regimen: Lantus 15 units SC qHS.  Continue Lantus 25 units qHS.  Continue insulin gtt for tight glycemic control  Nutrition following for TF recommendations   History of alcohol use  History of ETOH use in the past, unknown last drink.  Continue thiamine/folate daily.  Encourage cessation when appropriate    Pneumonia involving right lung  See sepsis      Encephalopathy  Continue sedation.  Fentanyl 50 mcg/hr  Continue routine neuro checks and delirium precautions     LISSETH (acute kidney injury) (HCC)  Recent Labs     10/29/24  2231 10/30/24  0600 10/31/24  0421   CREATININE 1.14 1.10 1.11   BUN 24 24 29*     Stable, improved  Cr increased over the last 3 days, possibly secondary to intermittent hypo/hyper-tension   Close monitoring I/O status and renal index  Anemia  Recent Labs     10/29/24  0447 10/30/24  0600 10/31/24  0421 10/31/24  0809 10/31/24  1239 10/31/24  1841 10/31/24  1844   HGB 7.6* 8.0* 6.2* 6.7* 7.5* 9.5* 7.5*   HCT 24.1* 25.7* 20.4* 22.0*  --  28*  --      Continue to monitor hemoglobin  Transfuse if <7  Hypertension  Intermittent HTN and hypotension during time in the ICU  Seems to respond to sedation  Continue to monitor   Hypotension  Vitals:    11/01/24 0107 11/01/24 0120 11/01/24 0135 11/01/24 0200   BP:  142/72 150/73 158/76   Pulse:  100 97 100   Resp:       Temp:    100.4 °F (38 °C)   TempSrc:       SpO2: (!) 86% (!) 89% 95% 94%   Weight:       Height:         Intermittently hypotensive  10/31 1 unit PRBC and given fluid boluses with improvement   Lactic acid 1.4  Disposition: Critical care    ICU Core Measures     Vented Patient  VAP Bundle  VAP bundle ordered     A: Assess, Prevent, and Manage Pain Has pain been assessed? Yes  Need for changes to pain regimen? No   B: Both Spontaneous Awakening Trials (SATs) and  Spontaneous Breathing Trials (SBTs) Plan to perform spontaneous awakening trial today? Yes   Plan to perform spontaneous breathing trial today? Yes   Obvious barriers to extubation? Yes   C: Choice of Sedation RASS Goal: -1 Drowsy  Need for changes to sedation or analgesia regimen? No   D: Delirium CAM-ICU: Negative   E: Early Mobility  Plan for early mobility? Yes   F: Family Engagement Plan for family engagement today? Yes       Antibiotic Review: Patient on appropriate coverage based on culture data.  and Awaiting culture results.     Review of Invasive Devices:      Central access plan: Patient requires PICC secondary to no peripheral access       Prophylaxis:  VTE VTE covered by:  enoxaparin, Subcutaneous, 30 mg at 10/31/24 2124       Stress Ulcer  covered byomeprazole (PRILOSEC) suspension 2 mg/mL [[id number]]         24 Hour Events : Overnight pt with multiple episodes of desaturation on APRV vent settings despite FiO2 of 100%.  Around 0120 today patient desaturated to 80% and despite lavaging and vent setting changes, sats were not improving.  He required to be bagged, vent settings changed to [facility] protocol and started on Veletri (see prior note for details). Prior to veletri starting PF ratio 51.     Subjective   Review of Systems: See HPI for Review of Systems    Objective :                   Vitals I/O      Most Recent Min/Max in 24hrs   Temp 100.4 °F (38 °C) Temp  Min: 97 °F (36.1 °C)  Max: 101.3 °F (38.5 °C)   Pulse 100 Pulse  Min: 83  Max: 130   Resp (!) 29 Resp  Min: 11  Max: 29   /76 BP  Min: 66/34  Max: 201/95   O2 Sat 94 % SpO2  Min: 78 %  Max: 100 %      Intake/Output Summary (Last 24 hours) at 11/1/2024 0300  Last data filed at 11/1/2024 0200  Gross per 24 hour   Intake 7477.67 ml   Output 2950 ml   Net 4527.67 ml       Diet Enteral/Parenteral; Tube Feeding No Oral Diet; Vital AF 1.2; Continuous; 65; Banatrol Plus Banana Flakes - Three Packets; BID; 350; Water; Every 4  hours    Invasive Monitoring           Physical Exam   Physical Exam  Vitals and nursing note reviewed.   Eyes:      Conjunctiva/sclera: Conjunctivae normal.   Skin:     General: Skin is warm and dry.   HENT:      Head: Normocephalic and atraumatic.      Mouth/Throat:      Mouth: Mucous membranes are moist.   Cardiovascular:      Rate and Rhythm: Regular rhythm. Tachycardia present.      Heart sounds: Normal heart sounds.   Musculoskeletal:         General: Normal range of motion.      Right lower le+ Edema present.      Left lower le+ Edema present.   Abdominal: General: Bowel sounds are normal.      Palpations: Abdomen is soft.      Tenderness: There is no abdominal tenderness.   Constitutional:       Appearance: He is ill-appearing.      Interventions: He is intubated and restrained.   Pulmonary:      Effort: He is intubated.      Breath sounds: Rhonchi present.      Comments: ACPC vent settings. LS coarse rhonchi b/l, SpO2 93% on 100% FIO2   Neurological:      GCS: GCS eye subscore is 3. GCS verbal subscore is 1. GCS motor subscore is 6.      Comments: Opens eyes to voice and following commands with B/L LE.         Diagnostic Studies        Lab Results: I have reviewed the following results:     Medications:  Scheduled PRN   acetaminophen, 650 mg, Q6H  Artificial Tears, 1 drop, BID  atorvastatin, 10 mg, Daily With Dinner  bacitracin, 1 large application, BID  cefazolin, 2,000 mg, Q8H  chlorhexidine, 15 mL, Q12H NANCY  cloBAZam, 5 mg, TID  enoxaparin, 30 mg, Q12H NANCY  folic acid, 1 mg, Daily  insulin glargine, 25 Units, HS  lacosamide, 200 mg, Q12H NANCY  levalbuterol, 1.25 mg, Q6H  levETIRAcetam, 2,000 mg, Q12H NANCY  Multivitamin, 15 mL, Daily  nicotine, 14 mg, Daily  omeprazole (PRILOSEC) suspension 2 mg/mL, 20 mg, Daily  oxyCODONE, 5 mg, Q6H NANCY  sodium chloride, 4 mL, Q6H  thiamine, 100 mg, Daily      fentaNYL, 50 mcg, Q1H PRN  hydrALAZINE, 20 mg, Q6H PRN  labetalol, 20 mg, Q6H PRN  ondansetron, 4 mg, Q4H  PRN       Continuous    epoprostenol, 6.25-50 ng/kg/min (Ideal), Last Rate: 50 ng/kg/min (11/01/24 0114)  fentaNYL, 50 mcg/hr, Last Rate: 50 mcg/hr (10/31/24 1904)  insulin regular (HumuLIN R,NovoLIN R) 1 Units/mL in sodium chloride 0.9 % 100 mL infusion, 0.3-21 Units/hr, Last Rate: 8 Units/hr (11/01/24 0208)         Labs:   CBC    Recent Labs     10/30/24  0600 10/31/24  0421 10/31/24  0809 10/31/24  1239 10/31/24  1841 10/31/24  1844   WBC 24.24* 17.87*  --   --   --   --    HGB 8.0* 6.2* 6.7*   < > 9.5* 7.5*   HCT 25.7* 20.4* 22.0*  --  28*  --     271  --   --   --   --    BANDSPCT 10* 6  --   --   --   --     < > = values in this interval not displayed.     BMP    Recent Labs     10/30/24  0600 10/31/24  0421 10/31/24  1841   SODIUM 144 148*  --    K 4.1 3.7  --    * 111*  --    CO2 29 28 29   AGAP 5 9  --    BUN 24 29*  --    CREATININE 1.10 1.11  --    CALCIUM 8.8 8.1*  --        Coags    No recent results     Additional Electrolytes  Recent Labs     10/30/24  0600 10/31/24  0421 10/31/24  1841   MG 2.7 2.7  --    PHOS 2.7 3.0  --    CAIONIZED  --   --  1.22          Blood Gas    Recent Labs     11/01/24 0117   PHART 7.249*   [id number] 62.4*   PO2ART 51.5*   [id number] 26.7   BEART -1.1   SOURCE Radial, Right     Recent Labs     11/01/24 0117   SOURCE Radial, Right    LFTs  No recent results    Infectious  Recent Labs     10/30/24  0600   PROCALCITONI 1.74*     Glucose  Recent Labs     10/30/24  0600 10/31/24  0421   GLUC 344* 154*

## 2024-11-02 NOTE — ASSESSMENT & PLAN NOTE
Intubated on 10/23 for airway protection for planned burst suppression in setting of satus epilepticus.   Acute respiratory failure with hypoxia d/t seizure,pneumonia (identified 10/26)  10/26 CXR: Development of extensive right perihilar consolidation suspicious for pneumonia/aspiration.   10/28 Developed hypoxia early morning, requiring increased O2/PEEP requirements   10/28 Emergent Bronchoscopy. Copious secretions. Culture obtain  10/29 Bronch - Staph aureus  VENT day #9  10/31 Overnight pt with multiple episodes of desaturation on APRV vent settings. Was changed to PC settings and started veletri infusion      Plan:  Continue mechanical ventilation. Wean for sats >88%  Completed course of Vancomycin (5 days) on 10/30/24  Continues on cefazolin, day 4  Sputum/bronc cx Staph Aureus  SAT/SBT daily  Vent bundle.

## 2024-11-02 NOTE — ASSESSMENT & PLAN NOTE
ID consulted - input appreciated  Repeat blood cultures drawn   RVP negative  Continue Ancef - day 4  Consider repeat bronch +/- CT chest when able to tolerate

## 2024-11-02 NOTE — ASSESSMENT & PLAN NOTE
Recent Labs     10/30/24  0600 10/31/24  0421 10/31/24  0809 10/31/24  1239 10/31/24  1841 10/31/24  1844 11/01/24  0432 11/01/24  1839   HGB 8.0* 6.2* 6.7* 7.5* 9.5* 7.5* 8.5* 7.5*   HCT 25.7* 20.4* 22.0*  --  28*  --  27.4* 22*     Continue to monitor hemoglobin  Transfuse if <7

## 2024-11-02 NOTE — PROCEDURES
Central Line Insertion    Date/Time: 11/2/2024 11:18 AM    Performed by: Kilo Sheehan MD  Authorized by: Kilo Sheehan MD    Patient location:  ICU  Other Assisting Provider: No    Consent:     Consent obtained:  Emergent situation  Universal protocol:     Patient identity confirmed:  Hospital-assigned identification number and arm band  Pre-procedure details:     Hand hygiene: Hand hygiene performed prior to insertion      Sterile barrier technique: All elements of maximal sterile technique followed      Skin preparation:  ChloraPrep    Skin preparation agent: Skin preparation agent completely dried prior to procedure    Indications:     Central line indications: medications requiring central line    Anesthesia (see MAR for exact dosages):     Anesthesia method:  None  Procedure details:     Location:  Left subclavian    Vessel type: vein      Laterality:  Left    Approach: percutaneous technique used      Patient position:  Flat    Catheter type:  Triple lumen 16cm    Catheter size:  7 Fr    Landmarks identified: yes      Ultrasound guidance: no      Manometry confirmation: yes      Number of attempts:  2    Successful placement: yes    Post-procedure details:     Post-procedure:  Dressing applied and line sutured    Assessment:  Blood return through all ports and free fluid flow    Post-procedure complications: none      Patient tolerance of procedure:  Tolerated well, no immediate complications

## 2024-11-02 NOTE — ASSESSMENT & PLAN NOTE
Lab Results   Component Value Date    HGBA1C 6.4 (H) 10/22/2024     Recent Labs     11/01/24  1814 11/01/24 1955 11/01/24 2150 11/02/24  0002   POCGLU 216* 207* 232* 167*   Blood Sugar Average: Last 72 hrs:  (P) 188.9841230925261142    Home regimen: Lantus 15 units SC qHS.  Continue Lantus 25 units qHS.  Continue insulin gtt for tight glycemic control, accuchecks q2  Nutrition following for TF recommendations

## 2024-11-02 NOTE — ASSESSMENT & PLAN NOTE
Recent Labs     10/31/24  0421 10/31/24  0809 10/31/24  1239 10/31/24  1841 10/31/24  1844 11/01/24  0432 11/01/24  1839   HGB 6.2* 6.7* 7.5* 9.5* 7.5* 8.5* 7.5*   S/p fall  CT C/A/P - Splenomegaly with metallic surgical coils near the splenic hilar region. There is a small amount of intermediate density perisplenic free fluid adjacent to the inferior spleen which could represent subcapsular hematoma (AAST grade I splenic injury).     Plan:  10/31 given 1 unit PRBC with improvement from hgb 6.7 to 7.5  Continue to monitor CBC, f/u AM labs

## 2024-11-02 NOTE — DISCHARGE SUMMARY
Discharge Summary - Critical Care/ICU   Name: Tae Dolan 54 y.o. male I MRN: [id number]  Unit/Bed#: ICU 07 I Date of Admission: 10/20/2024   Date of Service: 11/2/2024 I Hospital Day: 13    Admission Date: 10/20/2024   Admitting Diagnosis: Laceration of spleen, initial encounter [S36.039A]  Closed nondisplaced fracture of fifth cervical vertebra, unspecified fracture morphology, initial encounter (Former[facility]) [S12.401A]  Discharge Date: 11/02/24    HPI:     Procedures Performed:   Orders Placed This Encounter   Procedures    Central Line    Chest Tube Insertion    Chest Tube Insertion    Chest Tube Insertion    Fast Ultrasound    Intubation    POC Cardiac US       Summary of Hospital Course: Hospital Course: No notes on file  53 y/o male h/o stroke,h/o 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 On 10/22 had change in MS with repetitive speech/not answering questions and on repeat head CT shown new extra-axial bleed representing EDH and trace new SAH. DDAVP given 2/2 asa on board. Placed on vEEG 10/22 for change mental status workup which shown multiple subclinical seizures. Patient intubated 10/23 for airway protection to achieve burst suppression after refractive on multiple AEDs/ativan. Reportedly seizure free since 0952 10/24.     Hospital course complicated by developing multilobar right sided pneumonia on 10/26 which had grown out MSSA. He was initiated on ceftriaxone. Unfortunately patient's ventilator requirements had increased and patient developed ARDS. He required to be on APRV for oxygenation/ventilation on maximum support and was developing hypoxemia with minimal nursing care.     Patient developed an endotracheal  cuff leak which was preventing patient from getting adequate volumes on the ventilator. An ETT exchange was performed by anesthesia at bedside. Unfortunately patient sustained a PEA cardiac arrest during tube exchange, suspected from hypoxemia. ROSC achieved however patient remained profoundly hypoxic, often times with oxygen saturations unregisterable. He sustained another brief cardiac arrest with ROSC. He developed shock requiring multiple pressors. Patient's mother came to bedside and after updating her on patient's deterioration, cardiac arrest and persistent hypoxemic state, she requested to transition patient to comfort care.     Patient . Mother declined autopsy.  notified.           Significant Findings, Care, Treatment and Services Provided:   10/20 CTH: neg. C Spine: Acute anterior C5 vertebral body fracture with prevertebral soft tissue swelling. No traumatic subluxation.  10/20 CT C/A/P: Splenomegaly with metallic surgical coils near the splenic hilar region.  There is a small amount of intermediate density perisplenic free fluid adjacent to the inferior spleen which could represent subcapsular hematoma (AAST grade I splenic injury).  10/22 CTH: New extra-axial lenticular shaped hemorrhage along the left parietal convexity as described above, which may represent an epidural hematoma or less likely a subdural hematoma in this region. No significant midline shift noted. Trace new SAH noted along the inferior left temporo-occipital convexity   10/22 COVID negative, Flu/RSV negative, Blood Cx x4 negative, Strep pneumo/legionella negative, Urine Cx negative, Sputum Cx: mixed abebe, RVP negative, MRSA+  10/23 Intubated for burst suppression--last seizure  10/24 CT C/A/P: No acute findings, no active extravasation. Partial imaged asymmetric enlargement of R vastus intermedius w/ associated SubQ fat stranding: Muscle injury vs hematoma.  10/26 MRI: No acute parenchymal abnormality. Persistent  subacute left temporoparietal hemorrhagic subdural collection with a focal loculated segment. There is expected regional inflammatory enhancement of the dura and leptomeninges. Focal areas of nonhemorrhagic contusion/edema in the parietal lobe subjacent to the above-mentioned extra-axial hematoma. Trace posttraumatic subarachnoid hemorrhage in the left temporal sulci. Additional chronic areas of bifrontal and left cerebellar and brainstem gliosis.  10/26 BC NG/Sputum  MSSA/ MRSA +  10/28 Bronch cx MSSA  10/29 Bronch  10/29 v EEG d/c   10/30 CT H/C/A/P: unchanged L parietal extra-axial hemorrhage, severe multilobar pna, unchanged R thigh hematoma  10/31 C.diff negative   CXR: pulmonary edema, multifocal PNA - given 20mg lasix. RP2 panel negative   ETT tube exchange by anesthesia. Cardiac arrest x2. Chest tube x4,  Left subclavian. Transition to comfort care.     Complications:     Disposition:      Final Diagnosis: ARDS / MSSA multifocal pneumonia    Medical Problems       Resolved Problems  Date Reviewed: 10/28/2024   None         Condition at Time of Death: critical   Date, Time and Cause of Death    Date of Death: 24  Time of Death: 11:45 AM  Preliminary Cause of Death: ARDS (adult respiratory distress syndrome) (HCC)  Entered by: Susan Haney PA-C[[id number]]       Attribution       MW1.1 Susan Haney PA-C 24 13:54            Death Note:  INPATIENT DEATH NOTE  Tae Dolan 54 y.o. male MRN: [id number]  Unit/Bed#: ICU 07 Encounter: [id number]             PHYSICAL EXAM:  Unresponsive to noxious stimuli, Spontaneous respirations absent, Breath sounds absent, Carotid pulse absent, Heart sounds absent, Pupillary light reflex absent, and Corneal blink reflex absent    Medical Examiner notification criteria:  Any type of trauma   Medical Examiner's office notified?:  Yes   Medical Examiner accepted case?:  Yes  Name of Medical Examiner: Mk WU          Autopsy Options:  Post-mortem  examination declined by next of kin    Primary Service Attending Physician notified?:  yes - Attending:  To    Physician/Resident responsible for completing Discharge Summary:  Susan Haney PA-C

## 2024-11-02 NOTE — PROCEDURES
Chest Tube Insertion    Date/Time: 11/2/2024 11:15 AM    Performed by: Kilo Sheehan MD  Authorized by: Kilo Sheehan MD    Patient location:  Other (comment) (ICU)  Other Assisting Provider: No    Consent:     Consent obtained:  Emergent situation  Universal protocol:     Patient identity confirmed:  Hospital-assigned identification number and arm band  Pre-procedure details:     Skin preparation:  Betadine  Indications:     Indications: pneumothroax    Anesthesia (see MAR for exact dosages):     Anesthesia method:  None  Procedure details:     Placement location:  Lateral    Laterality:  Left    Approach:  Open    Scalpel size:  10    Tube size (Fr):  28    Dissection instrument:  Finger and Elizabeth clamp    Ultrasound guidance: no      Tension pneumothorax: no      Needle Decompression: no      Tube connected to:  Suction    Drainage characteristics:  Bloody    Suture material:  2-0 silk    Dressing:  4x4 sterile gauze and Xeroform gauze  Post-procedure details:     Post-insertion x-ray findings: tube in good position      Patient tolerance of procedure:  Tolerated well, no immediate complications

## 2024-11-02 NOTE — PROGRESS NOTES
Progress Note - Critical Care/ICU   Name: Tae Dolan 54 y.o. male I MRN: [id number]  Unit/Bed#: ICU 07 I Date of Admission: 10/20/2024   Date of Service: 11/2/2024 I Hospital Day: 13      Assessment & Plan  ARDS (adult respiratory distress syndrome) (HCC)  Intubated on 10/23 for airway protection for planned burst suppression in setting of satus epilepticus.   Acute respiratory failure with hypoxia d/t seizure,pneumonia (identified 10/26)  10/26 CXR: Development of extensive right perihilar consolidation suspicious for pneumonia/aspiration.   10/28 Developed hypoxia early morning, requiring increased O2/PEEP requirements   10/28 Emergent Bronchoscopy. Copious secretions. Culture obtain  10/29 Bronch - Staph aureus  VENT day #9  10/31 Overnight pt with multiple episodes of desaturation on APRV vent settings. Was changed to PC settings and started veletri infusion      Plan:  Continue mechanical ventilation. Wean for sats >88%  Completed course of Vancomycin (5 days) on 10/30/24  Continues on cefazolin, day 4  Sputum/bronc cx Staph Aureus  SAT/SBT daily  Vent bundle.     Status epilepticus (HCC)  Seizure / Fall Precautions  Last Seizure 10/23  AEDs: Continue Levetiracetam 2g q12h, Lacosamide 200mg q12h, Clobazam 5mg TID.   Discuss with neuro prior to decreasing AEDs  vEEG stopped 10/29  Propofol gtt stopped 10/29  Dl DOT: Needs to be completed prior to discharge   Epidural hematoma (HCC)  Imaging:  CT head wo, 10/23/2024: Unchanged 1.3 cm thick acute extra-axial hemorrhage along left parietooccipital cerebral convexity, which could represent a combination of subdural hematoma and epidural hematoma (given lenticular shape at its thickest portion). Unchanged mild mass effect on adjacent left parietal lobe. Unchanged acute trace subarachnoid hemorrhage along left inferior temporal convexity. Unchanged acute trace thin subdural hematoma along left tentorium cerebelli. No new acute intracranial abnormality.  CTA  head and neck w/wo, 10/22/2024: New extra-axial lenticular shaped hemorrhage along the left parietal convexity as described above, which may represent an epidural hematoma or less likely a subdural hematoma in this region. No significant midline shift noted. Trace new subarachnoid hemorrhage noted along the inferior left temporo-occipital convexity noted. Right facial subcutaneous soft tissue swelling/hematoma again demonstrated. CT Angiography: No active extravasation of contrast is noted into the extra-axial hematoma. No large vessel occlusion, high-grade stenosis, or intracranial aneurysm identified on CT angiogram of the head. No hemodynamically significant stenosis or dissection identified on CT angiogram of the neck. Anterior mildly comminuted C5 vertebral body fracture again demonstrated, with mild associated prevertebral edema again demonstrated.  MRI Brain 10/27: No acute parenchymal abnormality.   Persistent subacute left temporoparietal hemorrhagic subdural collection with a focal loculated segment. There is expected regional inflammatory enhancement of the dura and leptomeninges. Focal areas of nonhemorrhagic contusion/edema in the parietal lobe subjacent to the above-mentioned extra-axial hematoma. Trace posttraumatic subarachnoid hemorrhage in the left temporal sulci. Additional chronic areas of bifrontal and left cerebellar and brainstem gliosis.     Plan:  Continue to monitor neuro exam closely.  STAT CT head with decline in GCS > 2 pts in 1 hour.  Continue to hold all AC/AP x 2 weeks.   Noted with seizure activity on vEEG 10/22-23, now off vEEG - see plan below  Neurology consulted/following - appreciate recommendations.     Spleen laceration  Recent Labs     10/31/24  0421 10/31/24  0809 10/31/24  1239 10/31/24  1841 10/31/24  1844 11/01/24  0432 11/01/24  1839   HGB 6.2* 6.7* 7.5* 9.5* 7.5* 8.5* 7.5*   S/p fall  CT C/A/P - Splenomegaly with metallic surgical coils near the splenic hilar region.  There is a small amount of intermediate density perisplenic free fluid adjacent to the inferior spleen which could represent subcapsular hematoma (AAST grade I splenic injury).     Plan:  10/31 given 1 unit PRBC with improvement from hgb 6.7 to 7.5  Continue to monitor CBC, f/u AM labs  Closed nondisplaced fracture of fifth cervical vertebra (HCC)  S/p fall from about 10 feet. Baseline right sided numbness/tingling given history of stroke.   CT Cspine - Acute anterior C5 vertebral body fracture with prevertebral soft tissue swelling. No traumatic subluxation.     Plan:  Neurosx consulted  No surgical intervention at this time.   Maintain Vista collar at all times, will need for 8 to 12 weeks.   Follow up on upright films.  Follow up with Neurosurgery as outpatient in 2 weeks.    Fall  Fall from a height of 10 feet. He does not recall events before and after fall.  Possibly d/t new medication.  Denies intentionally jumping out of window or any thoughts of self harm.   Fall precautions.    Sepsis (HCC)  As evidence by tachycardia, fever, leukocytosis  Source pneumonia  CXR showing right-sided infiltrate  10/22 and 10/26 Blood Cx negative, repeat blood cultures drawn 11/1   Sputum culture 10/26: Staph aureus  Bronch culture 10/28: Staph aureus  Follow up sensitivities       Plan:  ABX: completed 5 days of Vancomycin on 10/30. Currently day 4 for Cefazolin   Repeat cx if spikes temp  ID consulted - input appreciated    S/P craniotomy  S/p craniotomy January 2019 at [facility], done secondary to abscess/lesions. Baseline independent.   Continue supportive care.   See individual plans below   Mood disorder (HCC) unspecified  No home regimen. No indication for inpatient admission at this time.  Psych consulted, appreciate recommendations.  CM consulted for dispo planning.      Spasticity  History of spasticity s/p craniotomy.  Home regimen: Tizanidine qHS.   Taken off baclofen last admission   Type 2 diabetes  mellitus ([facility])  Lab Results   Component Value Date    HGBA1C 6.4 (H) 10/22/2024     Recent Labs     11/01/24  1814 11/01/24  1955 11/01/24  2150 11/02/24  0002   POCGLU 216* 207* 232* 167*   Blood Sugar Average: Last 72 hrs:  (P) [id number]    Home regimen: Lantus 15 units SC qHS.  Continue Lantus 25 units qHS.  Continue insulin gtt for tight glycemic control, accuchecks q2  Nutrition following for TF recommendations   History of alcohol use  History of ETOH use in the past, unknown last drink.  Continue thiamine/folate daily.  Encourage cessation when appropriate    Pneumonia of both lungs due to methicillin susceptible Staphylococcus aureus (MSSA) ([facility])  See sepsis      Encephalopathy  Continue sedation.  Fentanyl 37.5 mcg/hr  Continue routine neuro checks and delirium precautions     LISSETH (acute kidney injury) ([facility])  Recent Labs     10/30/24  0600 10/31/24  0421 11/01/24  0432   CREATININE 1.10 1.11 1.06   BUN 24 29* 33*     Stable, improved  Cr increased over the last 3 days, possibly secondary to intermittent hypo/hyper-tension, improving - await AM labs  Close monitoring I/O status and renal index  Anemia  Recent Labs     10/30/24  0600 10/31/24  0421 10/31/24  0809 10/31/24  1239 10/31/24  1841 10/31/24  1844 11/01/24  0432 11/01/24  1839   HGB 8.0* 6.2* 6.7* 7.5* 9.5* 7.5* 8.5* 7.5*   HCT 25.7* 20.4* 22.0*  --  28*  --  27.4* 22*     Continue to monitor hemoglobin  Transfuse if <7  Hypertension  Intermittent HTN and hypotension during time in the ICU  Seems to respond to sedation  Continue to monitor   Hypotension  Vitals:    11/01/24 2300 11/01/24 2330 11/01/24 2344 11/02/24 0000   BP: 125/61 129/60  140/64   BP Location:       Pulse: 94 93 92 93   Resp: 20 20 20 20   Temp: 99.4 °F (37.4 °C) 100 °F (37.8 °C)  (!) 100.6 °F (38.1 °C)   TempSrc: Esophageal   Esophageal   SpO2: 100%  99%    Weight:       Height:         Intermittently hypotensive  10/31 1 unit PRBC and given fluid boluses with  improvement   Lactic acid 1.4  Leukocytosis  ID consulted - input appreciated  Repeat blood cultures drawn   RVP negative  Continue Ancef - day 4  Consider repeat bronch +/- CT chest when able to tolerate    Disposition: Critical care    ICU Core Measures     Vented Patient  VAP Bundle  VAP bundle ordered     A: Assess, Prevent, and Manage Pain Has pain been assessed? Yes  Need for changes to pain regimen? No   B: Both Spontaneous Awakening Trials (SATs) and Spontaneous Breathing Trials (SBTs) Plan to perform spontaneous awakening trial today? Yes   Plan to perform spontaneous breathing trial today? Yes   Obvious barriers to extubation? Yes   C: Choice of Sedation RASS Goal: 0 Alert and Calm  Need for changes to sedation or analgesia regimen? No   D: Delirium CAM-ICU: Unable to perform secondary to Traumatic Brain Injury   E: Early Mobility  Plan for early mobility? Yes   F: Family Engagement Plan for family engagement today? Yes       Antibiotic Review: Infectious disease consulted    Review of Invasive Devices:      Central access plan: Patient requires PICC secondary to multiple infusions, frequent labs  [place] Plan: Keep arterial line for hemodynamic monitoring, frequent ABGs, and frequent labs    Prophylaxis:  VTE VTE covered by:  enoxaparin, Subcutaneous, 30 mg at 11/01/24 2121       Stress Ulcer  covered byomeprazole (PRILOSEC) suspension 2 mg/mL [[id number]]         24 Hour Events : Overnight, patient with desaturations to high 70's along with hypertensive episode, responded well to PRN's. Audible cuff leak, volumes and saturations improved with advancement of ETT and repositioning to side of this mouth.     Subjective   Review of Systems: Review of Systems   Unable to perform ROS: Intubated       Objective :                   Vitals I/O      Most Recent Min/Max in 24hrs   Temp (!) 100.6 °F (38.1 °C) Temp  Min: 99.4 °F (37.4 °C)  Max: 100.6 °F (38.1 °C)   Pulse 93 Pulse  Min: 86  Max: 113   Resp 20 Resp  Min:  15  Max: 22   /64 BP  Min: 100/52  Max: 187/70   O2 Sat 99 % SpO2  Min: 75 %  Max: 100 %      Intake/Output Summary (Last 24 hours) at 11/2/2024 0113  Last data filed at 11/2/2024 0000  Gross per 24 hour   Intake 3930.64 ml   Output 2525 ml   Net 1405.64 ml       Diet Enteral/Parenteral; Tube Feeding No Oral Diet; Vital AF 1.2; Continuous; 65; Banatrol Plus Banana Flakes - One Packet; TID; 350; Water; Every 4 hours    Invasive Monitoring   Arterial Line  Kisha /57  Arterial Line BP  Min: 92/44  Max: 219/70   MAP 81 mmHg  Arterial Line MAP (mmHg)  Min: 55 mmHg  Max: 101 mmHg           Physical Exam   Physical Exam  Vitals and nursing note reviewed.   Eyes:      General: Dysconjugate gaze.      Conjunctiva/sclera: Conjunctivae normal.      Pupils: Pupils are equal, round, and reactive to light.   Skin:     General: Skin is warm and dry.   HENT:      Head: Normocephalic. Contusion present.      Comments: Bilateral ecchymosis around eyes      Mouth/Throat:      Mouth: Mucous membranes are moist.      Pharynx: Oropharynx is clear.   Cardiovascular:      Rate and Rhythm: Normal rate and regular rhythm.   Musculoskeletal:      Cervical back: Neck supple.   Abdominal: General: There is no distension.      Palpations: Abdomen is soft.      Tenderness: There is no abdominal tenderness.   Constitutional:       General: He is not in acute distress.     Appearance: He is well-developed and well-nourished. He is ill-appearing.      Interventions: He is sedated, intubated and restrained.   Pulmonary:      Effort: Tachypnea present. He is intubated.      Breath sounds: Decreased breath sounds and rhonchi present.   Neurological:      GCS: GCS eye subscore is 1. GCS verbal subscore is 1. GCS motor subscore is 1.          Diagnostic Studies        Lab Results: I have reviewed the following results:     Medications:  Scheduled PRN   acetaminophen, 650 mg, Q6H  Artificial Tears, 1 drop, BID  atorvastatin, 10 mg, Daily With  Dinner  bacitracin, 1 large application, BID  cefazolin, 2,000 mg, Q8H  chlorhexidine, 15 mL, Q12H NANCY  cloBAZam, 5 mg, TID  enoxaparin, 30 mg, Q12H NANCY  folic acid, 1 mg, Daily  insulin glargine, 25 Units, HS  lacosamide, 200 mg, Q12H NANCY  levalbuterol, 1.25 mg, Q6H  levETIRAcetam, 2,000 mg, Q12H NANCY  Multivitamin, 15 mL, Daily  nicotine, 14 mg, Daily  omeprazole (PRILOSEC) suspension 2 mg/mL, 20 mg, Daily  oxyCODONE, 5 mg, Q6H NANCY  sodium chloride, 4 mL, Q6H  thiamine, 100 mg, Daily      fentaNYL, 50 mcg, Q1H PRN  hydrALAZINE, 20 mg, Q6H PRN  labetalol, 20 mg, Q6H PRN  ondansetron, 4 mg, Q4H PRN       Continuous    epoprostenol, 6.25-50 ng/kg/min (Ideal), Last Rate: 50 ng/kg/min (11/01/24 2344)  fentaNYL, 37.5 mcg/hr, Last Rate: 37.5 mcg/hr (11/01/24 2215)  insulin regular (HumuLIN R,NovoLIN R) 1 Units/mL in sodium chloride 0.9 % 100 mL infusion, 0.3-21 Units/hr, Last Rate: 6 Units/hr (11/02/24 0014)         Labs:   CBC    Recent Labs     10/31/24  0421 10/31/24  0809 11/01/24 0432 11/01/24  1839   WBC 17.87*  --  30.41*  --    HGB 6.2*   < > 8.5* 7.5*   HCT 20.4*   < > 27.4* 22*     --  332  --    BANDSPCT 6  --  8  --     < > = values in this interval not displayed.     BMP    Recent Labs     10/31/24  0421 10/31/24  1841 11/01/24  0432 11/01/24  1839   SODIUM 148*  --  145  --    K 3.7  --  4.4  --    *  --  110*  --    CO2 28   < > 28 31   AGAP 9  --  7  --    BUN 29*  --  33*  --    CREATININE 1.11  --  1.06  --    CALCIUM 8.1*  --  8.8  --     < > = values in this interval not displayed.       Coags    No recent results     Additional Electrolytes  Recent Labs     10/31/24  0421 10/31/24  1841 11/01/24  0432 11/01/24  1839   MG 2.7  --  2.6  --    PHOS 3.0  --  3.8  --    CAIONIZED  --  1.22  --  1.19          Blood Gas    Recent Labs     11/02/24  0003   PHART 7.289*   ANW0AMG 52.7*   PO2ART 93.2   XBN7HQY 24.7   BEART -2.0   SOURCE Line, Arterial     Recent Labs     11/02/24  0003   SOURCE  Line, Arterial    LFTs  Recent Labs     11/01/24  1737   ALT 5*   AST 17   ALKPHOS 204*   ALB 2.2*   TBILI 0.29       Infectious  Recent Labs     11/01/24  1737   PROCALCITONI 5.26*     Glucose  Recent Labs     10/31/24  0421 11/01/24  0432   GLUC 154* 133

## 2024-11-02 NOTE — ASSESSMENT & PLAN NOTE
Recent Labs     10/30/24  0600 10/31/24  0421 11/01/24  0432   CREATININE 1.10 1.11 1.06   BUN 24 29* 33*     Stable, improved  Cr increased over the last 3 days, possibly secondary to intermittent hypo/hyper-tension, improving - await AM labs  Close monitoring I/O status and renal index

## 2024-11-02 NOTE — ASSESSMENT & PLAN NOTE
S/p fall from about 10 feet. Baseline right sided numbness/tingling given history of stroke.   CT Cspine - Acute anterior C5 vertebral body fracture with prevertebral soft tissue swelling. No traumatic subluxation.     Plan:  Neurosx consulted  No surgical intervention at this time.   Maintain Vista collar at all times, will need for 8 to 12 weeks.   Follow up on upright films.  Follow up with Neurosurgery as outpatient in 2 weeks.

## 2024-11-02 NOTE — ASSESSMENT & PLAN NOTE
As evidence by tachycardia, fever, leukocytosis  Source pneumonia  CXR showing right-sided infiltrate  10/22 and 10/26 Blood Cx negative, repeat blood cultures drawn 11/1   Sputum culture 10/26: Staph aureus  Bronch culture 10/28: Staph aureus  Follow up sensitivities       Plan:  ABX: completed 5 days of Vancomycin on 10/30. Currently day 4 for Cefazolin   Repeat cx if spikes temp  ID consulted - input appreciated

## 2024-11-02 NOTE — ASSESSMENT & PLAN NOTE
Seizure / Fall Precautions  Last Seizure 10/23  AEDs: Continue Levetiracetam 2g q12h, Lacosamide 200mg q12h, Clobazam 5mg TID.   Discuss with neuro prior to decreasing AEDs  vEEG stopped 10/29  Propofol gtt stopped 10/29  Art DOT: Needs to be completed prior to discharge

## 2024-11-02 NOTE — PROGRESS NOTES
Progress Note - Critical Care/ICU   Name: Tae Dolan 54 y.o. male I MRN: [id number]  Unit/Bed#: ICU 07 I Date of Admission: 10/20/2024   Date of Service: 11/2/2024 I Hospital Day: 13       Interval Events:   Patient was noted to have endotracheal cuff leak this morning on initial evaluation which was preventing patient from maintaining adequate lung volumes on ventilator.   Anesthesia/Attending notified. Endotracheal tube exchange performed with cook catheter/McGraff by anesthesia. During exchange, patient developed PEA cardiac arrest, suspected from hypoxemia. Developed ROSC and sustained another cardiac arrest event shortly thereafter with ROSC. B/l Chest tubes were placed for pneumothorax on imaging.   Patient continued with profound hypoxemia with inability to adequately oxygenate despite ETT exchange, b/l chest tube placement and developed shock requiring multiple pressors. An emergent central line was placed. Many times the oxygen saturation was unregisterable on pulse oximetry and due to this, was unable to be placed on mechanical ventilator due to profound hypoxia, requiring continuous BVM            Pertinent New Data:   Pulse 103, R 34 (via BVM), /41 (59) (on pressors), SpO2 14%      chest xrayET tube 2 cm above the alan. NG tube retracted with sidehole in lower esophagus. Moderate new right pneumothorax. A chest tube was subsequently inserted.     Assessment   Severe Acute hypoxic respiratory failure 2/2 ARDS due to multilobar MSSA pneumonia/pneumothorax s/p b/l chest tube x4    Plan:   Mother was updated and present at bedside. Mother (medical decision maker) requests to transition to comfort related strategies.       Billing Level:  Critical Care Time Statement: Upon my evaluation, this patient had a high probability of imminent or life-threatening deterioration due to respiratory failure/shock, which required my direct attention, intervention, and personal management.  I spent a total of  60 minutes directly providing critical care services, including evaluating for the presence of life-threatening injuries or illnesses, interpretation of hemodynamic data, titration of vasoactive medications, titration of continuous IV medications (drips), and ventilator management. This time is exclusive of procedures, teaching, family meetings, and any prior time recorded by providers other than myself.      Susan Haney PA-C

## 2024-11-02 NOTE — PROCEDURES
Chest Tube Insertion    Date/Time: 11/2/2024 10:33 AM    Performed by: Valeria Fisher PA-C  Authorized by: Valeria Fisher PA-C    Patient location:  Bedside  Consent:     Consent obtained:  Emergent situation  Indications:     Indications: tension pneumothorax    Procedure details:     Placement location:  Lateral    Laterality:  Right    Approach:  Open    Scalpel size:  10    Tube size (Fr):  28    Dissection instrument:  Elizabeth clamp    Drainage characteristics:  Air only    Suture material:  2-0 silk    Dressing:  4x4 sterile gauze and Xeroform gauze  Post-procedure details:     Post-insertion x-ray findings: tube in good position    Comments:      Second right chest tube placed for persistent right tension ptx. Tube placed cranial to previously placed right chest tube. Pleural was entered with an immediate rush of air. Pleural cavity was swept with caudal scarring encountered with no scarring cranially, anterior, posterior, or medially. Chest tube placed into the pleural cavity and connected to suction with intermittent large air leak noted.

## 2024-11-02 NOTE — NURSING NOTE
Wasted Fentanyl drip post patient expiration. 50 ml= 500 mcg of fentanyl wasted and disposed of. [facility] Witness was Mayela Rivas

## 2024-11-02 NOTE — RESPIRATORY THERAPY NOTE
11/02/24 1205   Inhalation Therapy Tx   $ CBT - First Hour and  Each Additional Hour 3 Additional hours   Resp Comments called by RN to remove patient from ventilator as he has passed. patient removed at this time. Veletri stopped. ETT remains at this time.

## 2024-11-02 NOTE — RESPIRATORY THERAPY NOTE
RT Ventilator Management Note  Tae Dolan 54 y.o. male MRN: [id number]  Unit/Bed#: ICU 07 Encounter: [id number]      [id number] (documentation noted from start of critical event to currently) Patient received on bilevel settings with max veletri dose. Spoke to AP would like to continue vest therapy and wean veletri per RT protocol. RT ordered STAT ABG to initiate weaning protocol due to last order being placed around 4 am. RT started patient on vest therapy. During the duration of the vest being on about 2-3 minutes patient went hypoxic with initial readings in the mid 80s and then dropped to lowest noted was 76%. Patients BP systolic was in the 200s with diastolic in the 100s. Vest therapy stopped at this time. Patient during encounter had audible cuff leak. Spoke to AP, told to hold on increasing air in the cuff. RT in report was given 27 at the lip, tube placement was at 25 at the teeth. Per Ap, advance to 26 but do not add air. Stat xray was ordered. Per xray reading tube placement confirmed. Audible air still heard around cuff. Vent alarming for various issues mainly TV not registering appropriate. Due to vent alarming and audible air heard anesthesia called to bedside to do a tube exchange. Anesthesia and Dr. Lewis called to bedside by Ap. Anesthesia exchanged tube over cook cath to a 8.0 at 27@ the lip. Patient had positive color change and BBS heard by Ap. Patient during this time was difficult to bag, patient being bagged at 100% and max peep on the ambu bag per anesthesia. Xray shot with new tube placement. Was noted to be right at the alan, withdrew to 26 at the teeth per provider. Patient noted to have bilateral pneumos. Trauma placed bilateral chest tubes while patient was being bagged. Patient during this time was placed on the vent various times and was unable to ventilate patient. Patient trailed on bilevel, AC/VC, and PC. With no improvement in ventilation status and still hypoxic. Patient  during time arrested and required CPR on multiple occasions. At which times patient was removed from vent and bagged with veletri in-line. After ROSC patient placed back on vent but unable to ventilate so patient was bagged while being placed on G6 on PCV+ settings per trauma. See RT documentation on flow chart. Patient at this time remains on G6 vent, with veletri at max dose. SpO2 remains unreadable despite multiple sites trialed. Patient had multiple ISTATS with recent PaO2 41. Peak pressures noted to be 62 currently. Selvin and Dr. Anderson aware    Daily Screen         11/1/2024  0724 11/2/2024  1047          Patient safety screen outcome:: Failed Failed (P)       Not Ready for Weaning due to:: PEEP > 8cmH2O;Underline problem not resolved Alternative forms of ventilation;PEEP > 8cmH2O;FiO2 >60%;Underline problem not resolved;Recieving paralytics (P)                 Physical Exam:   Assessment Type: (P) Assess only  General Appearance: (P) Sedated, Unresponsive  Respiratory Pattern: (P) Assisted  Chest Assessment: (P) Chest expansion symmetrical  Bilateral Breath Sounds: (P) Rhonchi, Coarse  R Breath Sounds: Rhonchi  Cough: Strong  Suction: ET Tube, Oral  O2 Device: vent       11/02/24 1047   Respiratory Assessment   Assessment Type Assess only   General Appearance Sedated;Unresponsive   Respiratory Pattern Assisted   Chest Assessment Chest expansion symmetrical   Bilateral Breath Sounds Rhonchi;Coarse   Vent Information   Vent    Vent type (S)  [place] C6   [place] Vent Mode (S)  P-CMV/PCV+   $ Vital Capacity Mech/Peak Flow Yes   $ Pulse Oximetry Spot Check Charge Completed   SpO2 (S)    (unreadable at this time)   P-CMV/PCV+ Settings   Resp Rate (BPM) 28 BPM   Pressure Control/Pinsp (cmH20) 36 cmH20   Insp Time (S) 1 sec   FiO2 (%) 100 %   PEEP (cmH2O) (!) 25 cmH2O   Insp Resistance 14   P-ramp (ms) 70 (ms)   Flow Trigger (LPM) 5 LPM   Humidification Heater   Heater Temp 98.6 °F (37 °C)   P-CMV/PCV+ Actuals    Resp Rate (BPM) 24 BPM   VT (mL) 533 mL   MV 12.8   MAP (cmH2O) (S)  41 cmH2O   Peak Pressure (cmH2O) (S)  62 cmH2O  (Dr. Anderson aware)   I:E Ratio (Obs) 1:1.2   Static Compliance (mL/cmH2O)   (unable to obtain at this time.)   Plateau Pressure (cmH2O)   (unable to obtain at this time)   Heater Temperature (Obs) 98.6 °F (37 °C)   ETCO2 (mmHg) 0 mmHg   P-CMV/PCV+ Alarms    High Peak Pressure (cmH2O) (S)  80 cmH2O  (due to pressure limiiting)   Low Pressure (cmH2O) 5 cm H2O   High Resp Rate (BPM) 50 BPM   Low Resp Rate (BPM) 8 BPM   High MV (L/min) 30 L/min   Low MV (L/min) 4 L/min   High Spont VTE (mL) 1200 mL   Low Spont VTE (mL) 280 mL   Maintenance   Alarm (pink) cable attached Yes   Resuscitation bag with peep valve at bedside Yes   Water bag changed No   Circuit changed No   Daily Screen   Patient safety screen outcome: Failed   Not Ready for Weaning due to: Alternative forms of ventilation;PEEP > 8cmH2O;FiO2 >60%;Underline problem not resolved;Recieving paralytics   IHI Ventilator Associated Pneumonia Bundle   Head of Bed Elevated HOB 30   ETT  Oral 7.5 mm   Placement Date/Time: 10/23/24 1412   Mask Ventilation: Ventilated by mask (1)  Preoxygenated: Yes  Type: Oral  Tube Size: 7.5 mm   Secured at (cm) 27   Measured from Lips   Secured Location Center   Secured by Commercial tube miramontes   Site Condition Cool;Dry   Cuff Pressure (cm H2O)   (MLT)   Cuff Pressure (color) Green   HI-LO Suction  Intermittent suction   HI-LO Secretions Copious;Yellow;Thick   HI-LO Intervention Patent

## 2024-11-02 NOTE — PROCEDURES
Chest Tube Insertion    Date/Time: 11/2/2024 10:28 AM    Performed by: Valeria Fisher PA-C  Authorized by: Valeria Fisher PA-C    Patient location:  Bedside  Other Assisting Provider: Yes (comment) (Dr. Ullrich)    Consent:     Consent obtained:  Emergent situation  Indications:     Indications: pneumothroax    Sedation:     Sedation type:  Moderate (conscious) sedation (See separate Procedural Sedation form)  Anesthesia (see MAR for exact dosages):     Anesthesia method:  None  Procedure details:     Placement location:  Lateral    Laterality:  Right    Approach:  Open    Scalpel size:  15    Tube size (Fr):  28    Dissection instrument:  Finger    Tension pneumothorax: yes      Tube connected to:  Suction    Drainage characteristics:  Air only    Suture material:  2-0 silk    Dressing:  4x4 sterile gauze and Xeroform gauze  Post-procedure details:     Post-insertion x-ray findings: tube in good position    Comments:      Right pleural cavity entered with finger dissection, immediate rush of air and then intermittent gushes of air with ventilation encountered. Pleural scarring encountered with no pocket for tube placement. Blunt finger dissection was performed by Dr. Ullrich with successful pocket for tube placement created.

## 2024-11-02 NOTE — ASSESSMENT & PLAN NOTE
Continue sedation.  Fentanyl 37.5 mcg/hr  Continue routine neuro checks and delirium precautions

## 2024-11-02 NOTE — DEATH NOTE
INPATIENT DEATH NOTE  Tae Dolan 54 y.o. male MRN: [id number]  Unit/Bed#: ICU 07 Encounter: [id number]             PHYSICAL EXAM:  Unresponsive to noxious stimuli, Spontaneous respirations absent, Breath sounds absent, Carotid pulse absent, Heart sounds absent, Pupillary light reflex absent, and Corneal blink reflex absent    Medical Examiner notification criteria:  Any type of trauma   Medical Examiner's office notified?:  Yes   Medical Examiner accepted case?:  Yes  Name of Medical Examiner: Mk WU          Autopsy Options:  Post-mortem examination declined by next of kin    Primary Service Attending Physician notified?:  yes - Attending: Dr. Anderson    Physician/Resident responsible for completing Discharge Summary:  Susan Haney PA-C

## 2024-11-02 NOTE — ASSESSMENT & PLAN NOTE
Vitals:    11/01/24 2300 11/01/24 2330 11/01/24 2344 11/02/24 0000   BP: 125/61 129/60  140/64   BP Location:       Pulse: 94 93 92 93   Resp: 20 20 20 20   Temp: 99.4 °F (37.4 °C) 100 °F (37.8 °C)  (!) 100.6 °F (38.1 °C)   TempSrc: Esophageal   Esophageal   SpO2: 100%  99%    Weight:       Height:         Intermittently hypotensive  10/31 1 unit PRBC and given fluid boluses with improvement   Lactic acid 1.4

## 2024-11-02 NOTE — ASSESSMENT & PLAN NOTE
S/p craniotomy January 2019 at [facility], done secondary to abscess/lesions. Baseline independent.   Continue supportive care.   See individual plans below

## 2024-11-06 LAB
BACTERIA BLD CULT: NORMAL
BACTERIA BLD CULT: NORMAL

## 2025-03-25 NOTE — ASSESSMENT & PLAN NOTE
PLEASE PUT ORDER FOR GENE SIGHT TESTING IN THE PORTAL   · ABG and BMP with anion gap acidosis with unclear etiology  · Given salicylate level positive, and unknown time of ingestion, advised by poison Control to treat as true overdose  · Bicarb infusion  Poison Control recommending dialysis however will discuss further with Nephrology  · Nephrology consult      · Patient also had positive ketones in urine as well as positive beta hydroxybutyrate, possible component of starvation ketosis

## (undated) DEVICE — TUBING BUBBLE CLEAR 5MM X 100 FT NS

## (undated) DEVICE — DISPOSABLE BIOPSY VALVE MAJ-1555: Brand: SINGLE USE BIOPSY VALVE (STERILE)

## (undated) DEVICE — SOLIDIFIER FLUID WASTE CONTROL 1500ML

## (undated) DEVICE — TUBING AUX CHANNEL

## (undated) DEVICE — AIRLIFE™  ADULT CUSHION NASAL CANNULA WITH 7 FOOT (2.1 M) CRUSH-RESISTANT OXYGEN TUBING, AND U/CONNECT-IT ADAPTER: Brand: AIRLIFE™

## (undated) DEVICE — LUBRICANT SURGILUBE TUBE 4 OZ  FLIP TOP

## (undated) DEVICE — GAUZE SPONGES,16 PLY: Brand: CURITY

## (undated) DEVICE — GLOVE EXAM NON-STRL NTRL PLUS LRG PF

## (undated) DEVICE — "MAJ-901 WATER CONTAINER SET CV-160/140": Brand: WATER CONTAINER

## (undated) DEVICE — BRUSH ENDO CLEANING DBL-HEADER

## (undated) DEVICE — MEDI-VAC YANKAUER SUCTION HANDLE: Brand: CARDINAL HEALTH

## (undated) DEVICE — "MH-443 SUCTION VALVE F/EVIS140 EVIS160": Brand: SUCTION VALVE

## (undated) DEVICE — 1200CC GUARDIAN II: Brand: GUARDIAN

## (undated) DEVICE — "MH-438 A/W VLVE F/140 EVIS-140": Brand: AIR/WATER VALVE